# Patient Record
Sex: MALE | Race: WHITE | NOT HISPANIC OR LATINO
[De-identification: names, ages, dates, MRNs, and addresses within clinical notes are randomized per-mention and may not be internally consistent; named-entity substitution may affect disease eponyms.]

---

## 2018-06-06 PROBLEM — Z00.00 ENCOUNTER FOR PREVENTIVE HEALTH EXAMINATION: Status: ACTIVE | Noted: 2018-06-06

## 2018-06-08 PROBLEM — I46.9 SUDDEN CARDIAC DEATH: Status: ACTIVE | Noted: 2018-06-08

## 2018-06-08 PROBLEM — Z86.79 HISTORY OF CORONARY ARTERY DISEASE: Status: RESOLVED | Noted: 2018-06-08 | Resolved: 2018-06-08

## 2018-06-08 PROBLEM — Z78.9 SOCIAL ALCOHOL USE: Status: ACTIVE | Noted: 2018-06-08

## 2018-06-08 PROBLEM — Z82.49 FAMILY HISTORY OF VALVULAR HEART DISEASE: Status: ACTIVE | Noted: 2018-06-08

## 2018-06-08 PROBLEM — I25.2 HISTORY OF MYOCARDIAL INFARCTION: Status: RESOLVED | Noted: 2018-06-08 | Resolved: 2018-06-08

## 2018-06-08 PROBLEM — Z82.49 FAMILY HISTORY OF HYPERTENSION: Status: ACTIVE | Noted: 2018-06-08

## 2018-06-08 PROBLEM — Z82.49 FAMILY HISTORY OF CONGESTIVE HEART FAILURE: Status: ACTIVE | Noted: 2018-06-08

## 2018-06-12 ENCOUNTER — APPOINTMENT (OUTPATIENT)
Dept: CARDIOTHORACIC SURGERY | Facility: CLINIC | Age: 78
End: 2018-06-12
Payer: MEDICARE

## 2018-06-12 VITALS
DIASTOLIC BLOOD PRESSURE: 79 MMHG | RESPIRATION RATE: 13 BRPM | TEMPERATURE: 98 F | HEIGHT: 70 IN | BODY MASS INDEX: 25.77 KG/M2 | HEART RATE: 62 BPM | WEIGHT: 180 LBS | OXYGEN SATURATION: 98 % | SYSTOLIC BLOOD PRESSURE: 145 MMHG

## 2018-06-12 DIAGNOSIS — Z78.9 OTHER SPECIFIED HEALTH STATUS: ICD-10-CM

## 2018-06-12 DIAGNOSIS — Z82.49 FAMILY HISTORY OF ISCHEMIC HEART DISEASE AND OTHER DISEASES OF THE CIRCULATORY SYSTEM: ICD-10-CM

## 2018-06-12 DIAGNOSIS — I46.9 CARDIAC ARREST, CAUSE UNSPECIFIED: ICD-10-CM

## 2018-06-12 DIAGNOSIS — I25.2 OLD MYOCARDIAL INFARCTION: ICD-10-CM

## 2018-06-12 DIAGNOSIS — Z86.79 PERSONAL HISTORY OF OTHER DISEASES OF THE CIRCULATORY SYSTEM: ICD-10-CM

## 2018-06-12 PROCEDURE — 99205 OFFICE O/P NEW HI 60 MIN: CPT

## 2018-06-14 ENCOUNTER — TRANSCRIPTION ENCOUNTER (OUTPATIENT)
Age: 78
End: 2018-06-14

## 2018-06-21 ENCOUNTER — OUTPATIENT (OUTPATIENT)
Dept: OUTPATIENT SERVICES | Facility: HOSPITAL | Age: 78
LOS: 1 days | End: 2018-06-21
Payer: MEDICARE

## 2018-06-21 DIAGNOSIS — T82.897A OTHER SPECIFIED COMPLICATION OF CARDIAC PROSTHETIC DEVICES, IMPLANTS AND GRAFTS, INITIAL ENCOUNTER: ICD-10-CM

## 2018-06-21 DIAGNOSIS — I77.810 THORACIC AORTIC ECTASIA: ICD-10-CM

## 2018-06-21 LAB
ANION GAP SERPL CALC-SCNC: 10 MMOL/L — SIGNIFICANT CHANGE UP (ref 5–17)
BUN SERPL-MCNC: 26 MG/DL — HIGH (ref 8–20)
CALCIUM SERPL-MCNC: 9 MG/DL — SIGNIFICANT CHANGE UP (ref 8.6–10.2)
CHLORIDE SERPL-SCNC: 103 MMOL/L — SIGNIFICANT CHANGE UP (ref 98–107)
CO2 SERPL-SCNC: 26 MMOL/L — SIGNIFICANT CHANGE UP (ref 22–29)
CREAT SERPL-MCNC: 1.11 MG/DL — SIGNIFICANT CHANGE UP (ref 0.5–1.3)
GLUCOSE SERPL-MCNC: 92 MG/DL — SIGNIFICANT CHANGE UP (ref 70–115)
POTASSIUM SERPL-MCNC: 4.7 MMOL/L — SIGNIFICANT CHANGE UP (ref 3.5–5.3)
POTASSIUM SERPL-SCNC: 4.7 MMOL/L — SIGNIFICANT CHANGE UP (ref 3.5–5.3)
SODIUM SERPL-SCNC: 139 MMOL/L — SIGNIFICANT CHANGE UP (ref 135–145)

## 2018-06-21 PROCEDURE — 71275 CT ANGIOGRAPHY CHEST: CPT

## 2018-06-21 PROCEDURE — 74174 CTA ABD&PLVS W/CONTRAST: CPT

## 2018-06-21 PROCEDURE — 71275 CT ANGIOGRAPHY CHEST: CPT | Mod: 26

## 2018-06-21 PROCEDURE — 74174 CTA ABD&PLVS W/CONTRAST: CPT | Mod: 26

## 2018-06-21 PROCEDURE — 80048 BASIC METABOLIC PNL TOTAL CA: CPT

## 2018-06-21 PROCEDURE — 36415 COLL VENOUS BLD VENIPUNCTURE: CPT

## 2019-01-07 ENCOUNTER — OUTPATIENT (OUTPATIENT)
Dept: OUTPATIENT SERVICES | Facility: HOSPITAL | Age: 79
LOS: 1 days | End: 2019-01-07
Payer: MEDICARE

## 2019-01-07 ENCOUNTER — APPOINTMENT (OUTPATIENT)
Dept: CT IMAGING | Facility: CLINIC | Age: 79
End: 2019-01-07
Payer: MEDICARE

## 2019-01-07 DIAGNOSIS — I77.810 THORACIC AORTIC ECTASIA: ICD-10-CM

## 2019-01-07 PROCEDURE — 71275 CT ANGIOGRAPHY CHEST: CPT | Mod: 26

## 2019-01-07 PROCEDURE — 71275 CT ANGIOGRAPHY CHEST: CPT

## 2019-01-07 PROCEDURE — 82565 ASSAY OF CREATININE: CPT

## 2019-01-08 ENCOUNTER — CLINICAL ADVICE (OUTPATIENT)
Age: 79
End: 2019-01-08

## 2019-02-06 ENCOUNTER — APPOINTMENT (OUTPATIENT)
Dept: CARDIOTHORACIC SURGERY | Facility: CLINIC | Age: 79
End: 2019-02-06
Payer: MEDICARE

## 2019-02-08 ENCOUNTER — APPOINTMENT (OUTPATIENT)
Dept: CARDIOTHORACIC SURGERY | Facility: CLINIC | Age: 79
End: 2019-02-08
Payer: MEDICARE

## 2019-02-08 VITALS
OXYGEN SATURATION: 98 % | TEMPERATURE: 97.8 F | DIASTOLIC BLOOD PRESSURE: 98 MMHG | HEIGHT: 70 IN | HEART RATE: 61 BPM | SYSTOLIC BLOOD PRESSURE: 156 MMHG | RESPIRATION RATE: 13 BRPM

## 2019-02-08 PROCEDURE — 99213 OFFICE O/P EST LOW 20 MIN: CPT

## 2019-02-08 NOTE — PHYSICAL EXAM
[Sclera] : the sclera and conjunctiva were normal [PERRL With Normal Accommodation] : pupils were equal in size, round, and reactive to light [Extraocular Movements] : extraocular movements were intact [Neck Appearance] : the appearance of the neck was normal [Neck Cervical Mass (___cm)] : no neck mass was observed [Jugular Venous Distention Increased] : there was no jugular-venous distention [Thyroid Diffuse Enlargement] : the thyroid was not enlarged [Thyroid Nodule] : there were no palpable thyroid nodules [] : no respiratory distress [Auscultation Breath Sounds / Voice Sounds] : lungs were clear to auscultation bilaterally [Heart Rate And Rhythm] : heart rate was normal and rhythm regular [Surgical / Traumatic Scar Sternum] : a scar [Edema] : there was no peripheral edema [Breast Appearance] : normal in appearance [Bowel Sounds] : normal bowel sounds [Abdomen Soft] : soft [FreeTextEntry1] : deferred [Cervical Lymph Nodes Enlarged Posterior Bilaterally] : posterior cervical [No CVA Tenderness] : no ~M costovertebral angle tenderness [Abnormal Walk] : normal gait [Involuntary Movements] : no involuntary movements were seen [Skin Color & Pigmentation] : normal skin color and pigmentation [No Focal Deficits] : no focal deficits [Oriented To Time, Place, And Person] : oriented to person, place, and time [Impaired Insight] : insight and judgment were intact

## 2019-02-08 NOTE — HISTORY OF PRESENT ILLNESS
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 \par \par He was seen in June 2018 for aortic dilatation on echocardiogram and prosthetic aortic valve stenosis (mild elevation in gradients, mGr 23.6mm Hg, pGr 40.2 mmHg, LUCY 1.1 cm2).

## 2019-02-08 NOTE — ASSESSMENT
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 \par \par He was seen in June 2018 for aortic dilatation on echocardiogram and prosthetic aortic valve stenosis (mild elevation in gradients, mGr 23.6mm Hg, pGr 40.2 mmHg, LUCY 1.1 cm2). \par \par Imaging results and medical records were reviewed at length. He is scheduled for VT ablation next week with Dr. Tsang. He is continues on amiodarone and mexiletine. Gradients across the aortic valve are mGr 14.4 mmHg, pGr 29.6 mmHg, AoV 2.7m/s, EF 55%. Will continue to monitor the aortic root dilatation but eventually this will need to be addressed surgically. \par \par Plan:\par 1) Repeat CTA and Echo in 6 months\par 2) Proceed with VT ablation next week \par 3) Follow up in 6 months.

## 2019-02-08 NOTE — DATA REVIEWED
[FreeTextEntry1] : Cardiac Catherization revealed moderate bioprosthetic aortic valve stenosis, LVEF 45%, Left main coronary artery showed moderate calcification and patent, LAD demonstrated moderate tortuosity and moderate irregularities, Circumflex coronary artery moderate tortuosity and moderate irregularities, Right coronary artery showed moderate tortuosity and moderate irregularities. Ostial right posterior descending artery revealed 100% stenosis.  \par \par Echocardiogram revealed a bioprosthetic valve is present in the aortic position. The peak velocity is 3.25 m/sec, the peak gradient is 42.3 mm Hg, the mean gradient is 24 mm Hg. Trace aortic insufficiency is present. Marked posterior mitral annular calcification. Mild mitral regurgitation is present. Aorta at the level of the sinuses of Valsalva is dilated at 4.60 cm. The ascending aorta is dilated at 3.89 cm. Aortic arch is normal. LVEF 47%

## 2019-03-02 ENCOUNTER — TRANSCRIPTION ENCOUNTER (OUTPATIENT)
Age: 79
End: 2019-03-02

## 2019-12-14 ENCOUNTER — APPOINTMENT (OUTPATIENT)
Dept: CT IMAGING | Facility: CLINIC | Age: 79
End: 2019-12-14
Payer: MEDICARE

## 2019-12-14 ENCOUNTER — OUTPATIENT (OUTPATIENT)
Dept: OUTPATIENT SERVICES | Facility: HOSPITAL | Age: 79
LOS: 1 days | End: 2019-12-14
Payer: MEDICARE

## 2019-12-14 DIAGNOSIS — I77.810 THORACIC AORTIC ECTASIA: ICD-10-CM

## 2019-12-14 PROCEDURE — 71275 CT ANGIOGRAPHY CHEST: CPT

## 2019-12-14 PROCEDURE — 71275 CT ANGIOGRAPHY CHEST: CPT | Mod: 26

## 2020-12-03 ENCOUNTER — APPOINTMENT (OUTPATIENT)
Dept: CARDIOTHORACIC SURGERY | Facility: CLINIC | Age: 80
End: 2020-12-03
Payer: MEDICARE

## 2020-12-03 VITALS
HEART RATE: 75 BPM | OXYGEN SATURATION: 97 % | TEMPERATURE: 98 F | RESPIRATION RATE: 14 BRPM | DIASTOLIC BLOOD PRESSURE: 75 MMHG | SYSTOLIC BLOOD PRESSURE: 146 MMHG

## 2020-12-03 VITALS — BODY MASS INDEX: 25.77 KG/M2 | HEIGHT: 70 IN | WEIGHT: 180 LBS

## 2020-12-03 PROCEDURE — 99213 OFFICE O/P EST LOW 20 MIN: CPT

## 2020-12-03 RX ORDER — AMIODARONE HYDROCHLORIDE 200 MG/1
200 TABLET ORAL
Qty: 30 | Refills: 1 | Status: COMPLETED | COMMUNITY
Start: 2018-11-07 | End: 2020-12-03

## 2020-12-03 RX ORDER — APIXABAN 5 MG/1
5 TABLET, FILM COATED ORAL
Qty: 180 | Refills: 1 | Status: COMPLETED | COMMUNITY
End: 2020-12-03

## 2020-12-03 RX ORDER — AMIODARONE HYDROCHLORIDE 400 MG/1
400 TABLET ORAL EVERY 8 HOURS
Refills: 0 | Status: COMPLETED | COMMUNITY
Start: 2018-11-07 | End: 2020-12-03

## 2020-12-03 NOTE — PHYSICAL EXAM
[Sclera] : the sclera and conjunctiva were normal [PERRL With Normal Accommodation] : pupils were equal in size, round, and reactive to light [Extraocular Movements] : extraocular movements were intact [Neck Appearance] : the appearance of the neck was normal [Neck Cervical Mass (___cm)] : no neck mass was observed [Jugular Venous Distention Increased] : there was no jugular-venous distention [Thyroid Diffuse Enlargement] : the thyroid was not enlarged [Thyroid Nodule] : there were no palpable thyroid nodules [] : no respiratory distress [Auscultation Breath Sounds / Voice Sounds] : lungs were clear to auscultation bilaterally [Prosthetic Aortic Valve] : prosthetic aortic valve heard [No Pitting Edema] : no pitting edema present [Surgical / Traumatic Scar Sternum] : a scar [Right Carotid Bruit] : no bruit heard over the right carotid [Left Carotid Bruit] : no bruit heard over the left carotid [Breast Appearance] : normal in appearance [Bowel Sounds] : normal bowel sounds [Abdomen Soft] : soft [Cervical Lymph Nodes Enlarged Posterior Bilaterally] : posterior cervical [Cervical Lymph Nodes Enlarged Anterior Bilaterally] : anterior cervical [No CVA Tenderness] : no ~M costovertebral angle tenderness [Involuntary Movements] : no involuntary movements were seen [Skin Color & Pigmentation] : normal skin color and pigmentation [No Focal Deficits] : no focal deficits [Oriented To Time, Place, And Person] : oriented to person, place, and time [Impaired Insight] : insight and judgment were intact

## 2020-12-03 NOTE — REVIEW OF SYSTEMS
[Feeling Tired] : feeling tired [Chest Pain] : no chest pain [Palpitations] : palpitations [Dizziness] : no dizziness [Negative] : Heme/Lymph

## 2020-12-03 NOTE — CONSULT LETTER
[Dear  ___] : Dear ~SEVERO, [Courtesy Letter:] : I had the pleasure of seeing your patient, [unfilled], in my office today. [Please see my note below.] : Please see my note below. [Consult Closing:] : Thank you very much for allowing me to participate in the care of this patient.  If you have any questions, please do not hesitate to contact me. [Sincerely,] : Sincerely, [FreeTextEntry2] : Alfredito Romero MD\par [address]\par [place] [id number] [FreeTextEntry3] : Noe Fenton MD\par  & \par \par Cardiovascular & Thoracic Surgery\par [facility] \par [address]\par [facility] [address]\par

## 2020-12-03 NOTE — HISTORY OF PRESENT ILLNESS
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 \par \par He was seen in June 2018 for aortic dilatation on echocardiogram and prosthetic aortic valve stenosis (mild elevation in gradients, mGr 23.6mm Hg, pGr 40.2 mmHg, LUCY 1.1 cm2). \par

## 2021-05-06 ENCOUNTER — OUTPATIENT (OUTPATIENT)
Dept: OUTPATIENT SERVICES | Facility: HOSPITAL | Age: 81
LOS: 1 days | End: 2021-05-06
Payer: MEDICARE

## 2021-05-06 ENCOUNTER — APPOINTMENT (OUTPATIENT)
Dept: CT IMAGING | Facility: CLINIC | Age: 81
End: 2021-05-06
Payer: MEDICARE

## 2021-05-06 DIAGNOSIS — I35.1 NONRHEUMATIC AORTIC (VALVE) INSUFFICIENCY: ICD-10-CM

## 2021-05-06 DIAGNOSIS — I77.810 THORACIC AORTIC ECTASIA: ICD-10-CM

## 2021-05-06 DIAGNOSIS — Z00.8 ENCOUNTER FOR OTHER GENERAL EXAMINATION: ICD-10-CM

## 2021-05-06 PROCEDURE — G1004: CPT

## 2021-05-06 PROCEDURE — 71275 CT ANGIOGRAPHY CHEST: CPT

## 2021-05-06 PROCEDURE — 71275 CT ANGIOGRAPHY CHEST: CPT | Mod: 26,ME

## 2021-05-11 ENCOUNTER — APPOINTMENT (OUTPATIENT)
Dept: CARDIOTHORACIC SURGERY | Facility: CLINIC | Age: 81
End: 2021-05-11
Payer: MEDICARE

## 2021-05-11 VITALS
HEIGHT: 70 IN | HEART RATE: 62 BPM | DIASTOLIC BLOOD PRESSURE: 76 MMHG | BODY MASS INDEX: 24.34 KG/M2 | WEIGHT: 170 LBS | OXYGEN SATURATION: 98 % | TEMPERATURE: 97.6 F | SYSTOLIC BLOOD PRESSURE: 119 MMHG | RESPIRATION RATE: 14 BRPM

## 2021-05-11 PROCEDURE — 99215 OFFICE O/P EST HI 40 MIN: CPT

## 2021-05-11 NOTE — PHYSICAL EXAM
[Sclera] : the sclera and conjunctiva were normal [Neck Appearance] : the appearance of the neck was normal [Jugular Venous Distention Increased] : there was no jugular-venous distention [] : no respiratory distress [Respiration, Rhythm And Depth] : normal respiratory rhythm and effort [Exaggerated Use Of Accessory Muscles For Inspiration] : no accessory muscle use [Auscultation Breath Sounds / Voice Sounds] : lungs were clear to auscultation bilaterally [Apical Impulse] : the apical impulse was normal [Heart Sounds] : normal S1 and S2 [Heart Rate And Rhythm] : heart rate was normal and rhythm regular [Murmurs] : no murmurs [FreeTextEntry1] : Healed MSI [Right Carotid Bruit] : no bruit heard over the right carotid [Left Carotid Bruit] : no bruit heard over the left carotid [Bowel Sounds] : normal bowel sounds [Abdomen Soft] : soft [Abdomen Tenderness] : non-tender [No CVA Tenderness] : no ~M costovertebral angle tenderness [Involuntary Movements] : no involuntary movements were seen [Skin Color & Pigmentation] : normal skin color and pigmentation [No Focal Deficits] : no focal deficits [Oriented To Time, Place, And Person] : oriented to person, place, and time [Impaired Insight] : insight and judgment were intact [Affect] : the affect was normal [Mood] : the mood was normal

## 2021-05-11 NOTE — REVIEW OF SYSTEMS
[As noted in HPI] : as noted in HPI [Palpitations] : palpitations [Negative] : ENT [Chest Pain] : no chest pain [Lower Ext Edema] : no extremity edema

## 2021-05-11 NOTE — HISTORY OF PRESENT ILLNESS
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), aortic valve regurgitation (s/p AVR #23 bovine pericardial valve in 2004), mitral valve regurgitation, and ascending aortic dilatation (4.5-4.6 cm on TTE). \par \par He has been followed in our aortic registry with surveillance imaging. He was last seen for an office visit on 12/3/2020.  He was feeling well and clinically stable. Plan was repeat CTA of chest in six months and an echocardiogram in 1 year.  He returns today for this follow up visit to review recent imaging with CTA of chest on 5/6/2021 that demonstrated aortic root 5.9 x 6.1 previously 5.3 x 5.6 cm, mid ascending aorta at the level of the right pulmonary artery 4.4 x 4.4 cm unchanged, Transverse arch at the isthmus 3 x 3 cm, unchanged, Mid descending 2.8 x 2.8 cm unchanged.\par \par He presents today with his wife and reports he has been doing and feeling well.  Walks 2 miles a day and denies any chest pain, back pain or SOB.  He does note occasional palpitations infrequently at times without associated symptoms.  Overall he feels well with no complaints or concerns. Denies swelling, weight gain, dizziness, fever or chills.  Recieved both his COVID vaccinations.  \par \par

## 2021-05-11 NOTE — ASSESSMENT
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 He presents today to follow up on recent CTA results that demonstrated aortic root 5.9 x 6.1 previously 5.3 x 5.6 cm, mid ascending aorta at the level of the right pulmonary artery 4.4 x 4.4 cm unchanged, Transverse arch at the isthmus 3 x 3 cm, unchanged, Mid descending 2.8 x 2.8 cm unchanged. Currently doing well without symptoms. \par \par \par I reviewed the cardiac imaging, medical records and reports with patient and discussed the case.  I discussed the risks, benefits and alternatives to surgery. Risks included but not limited to bleeding, stroke, Myocardial Infarction, kidney problems, blood transfusion, permanent pacemaker implantation, infections and death.  I also discussed the various approaches in detail. I feel that the patient will benefit and is a candidate for a redo sternotomy with aortic aneurysm repair, possible aortic valve replacement. All questions and concerns were addressed and patient agrees to proceed with surgery. He will call after echo this week to schedule surgery date.\par  \par \par Plan:\par 1) Will have TTE with cardiologist Dr. Romero this week. Will have results faxed to our office fto review.\par 2) Cardiac Catherization to evaluate coronary arteries (can be done at [place] with Dr. Romero)\par 3) Stop Xarelto and ASA one week prior to surgery date\par 4) Call with any questions or concerns\par \par \par

## 2021-05-11 NOTE — DATA REVIEWED
[FreeTextEntry1] : Echocardiogram on 11/13/2020 demonstrated \par pGr 21.1 mmHg, mGr 12.4 mmHg, AoV 2.4 m/sec, LUCY 1. 27 cm2. Mild AR\par \par CTA of chest on 5/6/2021 demonstrated\par aortic root 5.9 x 6.1 previously 5.3 x 5.6 cm\par mid ascending aorta at the level of the right pulmonary artery 4.4 x 4.4 cm unchanged\par Transverse arch at the isthmus 3 x 3 cm, unchanged \par Mid descending 2.8 x 2.8 cm unchanged\par \par CTA coronary and heart with IV contrast on 10/28/2020\par Aneurysmal ascending thoracic aorta measuring up to 57 mm at the sinotubular junction, which is effaced\par Aortic root is somewhat rotated in appearance\par Annulus 31 x 26 mm\par Sinuses of Valsalva 54 x 54 x 51 mm\par Sinotubular junction 57 x 57 mm (STJ is effaced)\par Mid ascending thoracic aorta 56 x 54 mm \par Ascending aorta proximal to brachiocephalic trunk 43 x 43 mm\par Aortic valve prosthesis \par \par

## 2021-05-11 NOTE — END OF VISIT
[FreeTextEntry3] : Written by Raman Ariza NP, acting as a scribe for Dr. Fenton\par “The documentation recorded by the scribe accurately reflects the service I personally performed and the decisions made by me.” Signature Noe Fenton MD.

## 2021-05-11 NOTE — CONSULT LETTER
[Dear  ___] : Dear ~SEVERO, [Courtesy Letter:] : I had the pleasure of seeing your patient, [unfilled], in my office today. [Please see my note below.] : Please see my note below. [Consult Closing:] : Thank you very much for allowing me to participate in the care of this patient.  If you have any questions, please do not hesitate to contact me. [Sincerely,] : Sincerely, [FreeTextEntry2] : Alfredito Romero MD\par [address]\par [place] [id number] [FreeTextEntry3] : Noe Fenton MD\par  & \par \par Cardiovascular & Thoracic Surgery\par [facility] \par [address]\par [facility] [address]\par

## 2021-06-01 ENCOUNTER — OUTPATIENT (OUTPATIENT)
Dept: OUTPATIENT SERVICES | Facility: HOSPITAL | Age: 81
LOS: 1 days | End: 2021-06-01

## 2021-06-01 ENCOUNTER — OUTPATIENT (OUTPATIENT)
Dept: OUTPATIENT SERVICES | Facility: HOSPITAL | Age: 81
LOS: 1 days | End: 2021-06-01
Payer: MEDICARE

## 2021-06-01 VITALS
WEIGHT: 179.9 LBS | OXYGEN SATURATION: 99 % | HEART RATE: 60 BPM | HEIGHT: 67 IN | RESPIRATION RATE: 16 BRPM | SYSTOLIC BLOOD PRESSURE: 109 MMHG | DIASTOLIC BLOOD PRESSURE: 75 MMHG | TEMPERATURE: 98 F

## 2021-06-01 DIAGNOSIS — Z01.818 ENCOUNTER FOR OTHER PREPROCEDURAL EXAMINATION: ICD-10-CM

## 2021-06-01 DIAGNOSIS — Z98.890 OTHER SPECIFIED POSTPROCEDURAL STATES: Chronic | ICD-10-CM

## 2021-06-01 DIAGNOSIS — Z95.0 PRESENCE OF CARDIAC PACEMAKER: Chronic | ICD-10-CM

## 2021-06-01 DIAGNOSIS — E03.9 HYPOTHYROIDISM, UNSPECIFIED: ICD-10-CM

## 2021-06-01 DIAGNOSIS — Z95.810 PRESENCE OF AUTOMATIC (IMPLANTABLE) CARDIAC DEFIBRILLATOR: Chronic | ICD-10-CM

## 2021-06-01 DIAGNOSIS — Z95.2 PRESENCE OF PROSTHETIC HEART VALVE: Chronic | ICD-10-CM

## 2021-06-01 DIAGNOSIS — I71.2 THORACIC AORTIC ANEURYSM, WITHOUT RUPTURE: ICD-10-CM

## 2021-06-01 DIAGNOSIS — Z11.52 ENCOUNTER FOR SCREENING FOR COVID-19: ICD-10-CM

## 2021-06-01 DIAGNOSIS — Z29.9 ENCOUNTER FOR PROPHYLACTIC MEASURES, UNSPECIFIED: ICD-10-CM

## 2021-06-01 DIAGNOSIS — Z90.79 ACQUIRED ABSENCE OF OTHER GENITAL ORGAN(S): Chronic | ICD-10-CM

## 2021-06-01 LAB
A1C WITH ESTIMATED AVERAGE GLUCOSE RESULT: 5.4 % — SIGNIFICANT CHANGE UP (ref 4–5.6)
ANION GAP SERPL CALC-SCNC: 11 MMOL/L — SIGNIFICANT CHANGE UP (ref 5–17)
BLD GP AB SCN SERPL QL: NEGATIVE — SIGNIFICANT CHANGE UP
BUN SERPL-MCNC: 33 MG/DL — HIGH (ref 7–23)
CALCIUM SERPL-MCNC: 9.3 MG/DL — SIGNIFICANT CHANGE UP (ref 8.4–10.5)
CHLORIDE SERPL-SCNC: 105 MMOL/L — SIGNIFICANT CHANGE UP (ref 96–108)
CO2 SERPL-SCNC: 21 MMOL/L — LOW (ref 22–31)
CREAT SERPL-MCNC: 1.25 MG/DL — SIGNIFICANT CHANGE UP (ref 0.5–1.3)
ESTIMATED AVERAGE GLUCOSE: 108 MG/DL — SIGNIFICANT CHANGE UP (ref 68–114)
GLUCOSE SERPL-MCNC: 86 MG/DL — SIGNIFICANT CHANGE UP (ref 70–99)
HCT VFR BLD CALC: 37.9 % — LOW (ref 39–50)
HGB BLD-MCNC: 11.9 G/DL — LOW (ref 13–17)
MCHC RBC-ENTMCNC: 28.5 PG — SIGNIFICANT CHANGE UP (ref 27–34)
MCHC RBC-ENTMCNC: 31.4 GM/DL — LOW (ref 32–36)
MCV RBC AUTO: 90.7 FL — SIGNIFICANT CHANGE UP (ref 80–100)
MRSA PCR RESULT.: SIGNIFICANT CHANGE UP
NRBC # BLD: 0 /100 WBCS — SIGNIFICANT CHANGE UP (ref 0–0)
PLATELET # BLD AUTO: 171 K/UL — SIGNIFICANT CHANGE UP (ref 150–400)
POTASSIUM SERPL-MCNC: 4.9 MMOL/L — SIGNIFICANT CHANGE UP (ref 3.5–5.3)
POTASSIUM SERPL-SCNC: 4.9 MMOL/L — SIGNIFICANT CHANGE UP (ref 3.5–5.3)
RBC # BLD: 4.18 M/UL — LOW (ref 4.2–5.8)
RBC # FLD: 14 % — SIGNIFICANT CHANGE UP (ref 10.3–14.5)
RH IG SCN BLD-IMP: POSITIVE — SIGNIFICANT CHANGE UP
S AUREUS DNA NOSE QL NAA+PROBE: SIGNIFICANT CHANGE UP
SODIUM SERPL-SCNC: 137 MMOL/L — SIGNIFICANT CHANGE UP (ref 135–145)
WBC # BLD: 6.25 K/UL — SIGNIFICANT CHANGE UP (ref 3.8–10.5)
WBC # FLD AUTO: 6.25 K/UL — SIGNIFICANT CHANGE UP (ref 3.8–10.5)

## 2021-06-01 PROCEDURE — 71046 X-RAY EXAM CHEST 2 VIEWS: CPT | Mod: 26

## 2021-06-01 PROCEDURE — 93880 EXTRACRANIAL BILAT STUDY: CPT | Mod: 26

## 2021-06-01 NOTE — H&P PST ADULT - HISTORY OF PRESENT ILLNESS
80 yr old male with PMH of  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 (admitted to [facility] 5/2017), SVT (s/p VF arrest in 12/2017, while on a cruise: [place], hosp. in [facility]: then SB and AICD implanted) Aotic valve regurgitation (s/p AVR 2004), Mitral valve regurgitation, Ascending aortic dilation. 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 Pt reports cardiology has been following this for several years. Pt denies, c/p SOB, or palpitations at this time. Pt denies recent travel or sick contact.     **Covid vaccine record card in chart** 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 (admitted to [facility] 5/2017), SVT (s/p VF arrest in 12/2017, while on a cruise: [place], hosp. in [facility]: then SB and AICD implanted) Aotic valve regurgitation (s/p AVR 2004), Mitral valve regurgitation, Ascending aortic dilation. 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 Pt reports cardiology has been following this for several years. Pt denies, c/p SOB, or palpitations at this time. Pt evaluated by Dr. Fenton for scheduled Redo Sternotomy, [place] on 6/2/21. Pt denies recent travel or sick contact.     **Covid vaccine record card in chart**

## 2021-06-01 NOTE — H&P PST ADULT - NSICDXPROBLEM_GEN_ALL_CORE_FT
PROBLEM DIAGNOSES  Problem: Thoracic aortic aneurysm without rupture  Assessment and Plan:     Problem: Hypothyroidism  Assessment and Plan:        PROBLEM DIAGNOSES  Problem: Thoracic aortic aneurysm without rupture  Assessment and Plan: -scheduled redo sternotomy, demetrio on 6/2/21  -preop instructions given  -soap instructions given  -labs: CBC, BMP, A1c, T&S, MSSA/MRSA, done in PST  -sent for Cxay & B/L Carotid Dop. for today  -obtain last echo  --obtain last interrogation from EP MD  -pt stopped Xarelto as instructed by cardiology a week ago  -c/w anthypertensive meds a sprescribed    Problem: Hypothyroidism  Assessment and Plan: -c/w levothyroxine a prescribed    Problem: Need for prophylactic measure  Assessment and Plan: The Caprini score indicates that this patient is at high risk for a VTE event (score 6 or greater). Surgical patients in this group will benefit from both pharmacologic prophylaxis and intermittent compression devices.  The surgical team will determine the balance between VTE risk and bleeding risk, and other clinical considerations

## 2021-06-01 NOTE — H&P PST ADULT - ASSESSMENT
FAZALI VTE 2.0 SCORE [CLOT updated 2019]    AGE RELATED RISK FACTORS                                                       MOBILITY RELATED FACTORS  [ ] Age 41-60 years                                            (1 Point)                    [ ] Bed rest                                                        (1 Point)  [ ] Age: 61-74 years                                           (2 Points)                  [ ] Plaster cast                                                   (2 Points)  [ ] Age= 75 years                                              (3 Points)                    [ ] Bed bound for more than 72 hours                 (2 Points)    DISEASE RELATED RISK FACTORS                                               GENDER SPECIFIC FACTORS  [ ] Edema in the lower extremities                       (1 Point)              [ ] Pregnancy                                                     (1 Point)  [ ] Varicose veins                                               (1 Point)                     [ ] Post-partum < 6 weeks                                   (1 Point)             [ ] BMI > 25 Kg/m2                                            (1 Point)                     [ ] Hormonal therapy  or oral contraception          (1 Point)                 [ ] Sepsis (in the previous month)                        (1 Point)               [ ] History of pregnancy complications                 (1 point)  [ ] Pneumonia or serious lung disease                                               [ ] Unexplained or recurrent                     (1 Point)           (in the previous month)                               (1 Point)  [ ] Abnormal pulmonary function test                     (1 Point)                 SURGERY RELATED RISK FACTORS  [ ] Acute myocardial infarction                              (1 Point)               [ ]  Section                                             (1 Point)  [ ] Congestive heart failure (in the previous month)  (1 Point)      [ ] Minor surgery                                                  (1 Point)   [ ] Inflammatory bowel disease                             (1 Point)               [ ] Arthroscopic surgery                                        (2 Points)  [ ] Central venous access                                      (2 Points)                [ ] General surgery lasting more than 45 minutes (2 points)  [ ] Malignancy- Present or previous                   (2 Points)                [ ] Elective arthroplasty                                         (5 points)    [ ] Stroke (in the previous month)                          (5 Points)                                                                                                                                                           HEMATOLOGY RELATED FACTORS                                                 TRAUMA RELATED RISK FACTORS  [ ] Prior episodes of VTE                                     (3 Points)                [ ] Fracture of the hip, pelvis, or leg                       (5 Points)  [ ] Positive family history for VTE                         (3 Points)             [ ] Acute spinal cord injury (in the previous month)  (5 Points)  [ ] Prothrombin 55790 A                                     (3 Points)               [ ] Paralysis  (less than 1 month)                             (5 Points)  [ ] Factor V Leiden                                             (3 Points)                  [ ] Multiple Trauma within 1 month                        (5 Points)  [ ] Lupus anticoagulants                                     (3 Points)                                                           [ ] Anticardiolipin antibodies                               (3 Points)                                                       [ ] High homocysteine in the blood                      (3 Points)                                             [ ] Other congenital or acquired thrombophilia      (3 Points)                                                [ ] Heparin induced thrombocytopenia                  (3 Points)                                     Total Score [     5     ]

## 2021-06-01 NOTE — H&P PST ADULT - NSICDXPASTMEDICALHX_GEN_ALL_CORE_FT
PAST MEDICAL HISTORY:  AICD (automatic cardioverter/defibrillator) present     Aortic stenosis     Aortic valve regurgitation     Ascending aorta dilation     Cardiac arrest     H/O paroxysmal supraventricular tachycardia     HLD (hyperlipidemia)     Mitral valve regurgitation

## 2021-06-01 NOTE — H&P PST ADULT - NSANTHOSAYNRD_GEN_A_CORE
No. ANATOLIY screening performed.  STOP BANG Legend: 0-2 = LOW Risk; 3-4 = INTERMEDIATE Risk; 5-8 = HIGH Risk

## 2021-06-02 ENCOUNTER — INPATIENT (INPATIENT)
Facility: HOSPITAL | Age: 81
LOS: 6 days | Discharge: HOME | DRG: 219 | End: 2021-06-09
Attending: THORACIC SURGERY (CARDIOTHORACIC VASCULAR SURGERY) | Admitting: THORACIC SURGERY (CARDIOTHORACIC VASCULAR SURGERY)
Payer: MEDICARE

## 2021-06-02 ENCOUNTER — RESULT REVIEW (OUTPATIENT)
Age: 81
End: 2021-06-02

## 2021-06-02 ENCOUNTER — APPOINTMENT (OUTPATIENT)
Dept: CARDIOTHORACIC SURGERY | Facility: HOSPITAL | Age: 81
End: 2021-06-02

## 2021-06-02 VITALS
DIASTOLIC BLOOD PRESSURE: 71 MMHG | WEIGHT: 179.9 LBS | RESPIRATION RATE: 18 BRPM | OXYGEN SATURATION: 98 % | TEMPERATURE: 98 F | HEART RATE: 60 BPM | HEIGHT: 67 IN | SYSTOLIC BLOOD PRESSURE: 114 MMHG

## 2021-06-02 DIAGNOSIS — Z90.79 ACQUIRED ABSENCE OF OTHER GENITAL ORGAN(S): Chronic | ICD-10-CM

## 2021-06-02 DIAGNOSIS — Z95.0 PRESENCE OF CARDIAC PACEMAKER: Chronic | ICD-10-CM

## 2021-06-02 DIAGNOSIS — Z95.2 PRESENCE OF PROSTHETIC HEART VALVE: Chronic | ICD-10-CM

## 2021-06-02 DIAGNOSIS — Z98.890 OTHER SPECIFIED POSTPROCEDURAL STATES: Chronic | ICD-10-CM

## 2021-06-02 DIAGNOSIS — Z95.810 PRESENCE OF AUTOMATIC (IMPLANTABLE) CARDIAC DEFIBRILLATOR: Chronic | ICD-10-CM

## 2021-06-02 DIAGNOSIS — I71.2 THORACIC AORTIC ANEURYSM, WITHOUT RUPTURE: ICD-10-CM

## 2021-06-02 PROBLEM — Z86.79 PERSONAL HISTORY OF OTHER DISEASES OF THE CIRCULATORY SYSTEM: Chronic | Status: ACTIVE | Noted: 2021-06-01

## 2021-06-02 PROBLEM — I35.1 NONRHEUMATIC AORTIC (VALVE) INSUFFICIENCY: Chronic | Status: ACTIVE | Noted: 2021-06-01

## 2021-06-02 PROBLEM — I34.0 NONRHEUMATIC MITRAL (VALVE) INSUFFICIENCY: Chronic | Status: ACTIVE | Noted: 2021-06-01

## 2021-06-02 PROBLEM — I35.0 NONRHEUMATIC AORTIC (VALVE) STENOSIS: Chronic | Status: ACTIVE | Noted: 2021-06-01

## 2021-06-02 PROBLEM — I77.810 THORACIC AORTIC ECTASIA: Chronic | Status: ACTIVE | Noted: 2021-06-01

## 2021-06-02 PROBLEM — I46.9 CARDIAC ARREST, CAUSE UNSPECIFIED: Chronic | Status: ACTIVE | Noted: 2021-06-01

## 2021-06-02 PROBLEM — E78.5 HYPERLIPIDEMIA, UNSPECIFIED: Chronic | Status: ACTIVE | Noted: 2021-06-01

## 2021-06-02 LAB
ALBUMIN SERPL ELPH-MCNC: 3.8 G/DL — SIGNIFICANT CHANGE UP (ref 3.3–5)
ALP SERPL-CCNC: 64 U/L — SIGNIFICANT CHANGE UP (ref 40–120)
ALT FLD-CCNC: 15 U/L — SIGNIFICANT CHANGE UP (ref 10–45)
ANION GAP SERPL CALC-SCNC: 17 MMOL/L — SIGNIFICANT CHANGE UP (ref 5–17)
APTT BLD: 34.1 SEC — SIGNIFICANT CHANGE UP (ref 27.5–35.5)
AST SERPL-CCNC: 35 U/L — SIGNIFICANT CHANGE UP (ref 10–40)
BASE EXCESS BLDV CALC-SCNC: -0.6 MMOL/L — SIGNIFICANT CHANGE UP (ref -2–2)
BASE EXCESS BLDV CALC-SCNC: -1.4 MMOL/L — SIGNIFICANT CHANGE UP (ref -2–2)
BASE EXCESS BLDV CALC-SCNC: -2.6 MMOL/L — LOW (ref -2–2)
BASE EXCESS BLDV CALC-SCNC: -5.8 MMOL/L — LOW (ref -2–2)
BASOPHILS # BLD AUTO: 0.02 K/UL — SIGNIFICANT CHANGE UP (ref 0–0.2)
BASOPHILS NFR BLD AUTO: 0.2 % — SIGNIFICANT CHANGE UP (ref 0–2)
BILIRUB SERPL-MCNC: 1.4 MG/DL — HIGH (ref 0.2–1.2)
BLOOD GAS VENOUS - CREATININE: SIGNIFICANT CHANGE UP MG/DL (ref 0.5–1.3)
BUN SERPL-MCNC: 20 MG/DL — SIGNIFICANT CHANGE UP (ref 7–23)
CA-I SERPL-SCNC: 0.78 MMOL/L — LOW (ref 1.12–1.3)
CA-I SERPL-SCNC: 0.88 MMOL/L — LOW (ref 1.12–1.3)
CA-I SERPL-SCNC: 0.98 MMOL/L — LOW (ref 1.12–1.3)
CA-I SERPL-SCNC: 1.05 MMOL/L — LOW (ref 1.12–1.3)
CALCIUM SERPL-MCNC: 9.1 MG/DL — SIGNIFICANT CHANGE UP (ref 8.4–10.5)
CHLORIDE BLDV-SCNC: SIGNIFICANT CHANGE UP MMOL/L (ref 96–108)
CHLORIDE SERPL-SCNC: 111 MMOL/L — HIGH (ref 96–108)
CK MB BLD-MCNC: 9.6 % — HIGH (ref 0–3.5)
CK MB CFR SERPL CALC: 20.7 NG/ML — HIGH (ref 0–6.7)
CK SERPL-CCNC: 215 U/L — HIGH (ref 30–200)
CO2 SERPL-SCNC: 19 MMOL/L — LOW (ref 22–31)
CREAT SERPL-MCNC: 0.93 MG/DL — SIGNIFICANT CHANGE UP (ref 0.5–1.3)
EOSINOPHIL # BLD AUTO: 0 K/UL — SIGNIFICANT CHANGE UP (ref 0–0.5)
EOSINOPHIL NFR BLD AUTO: 0 % — SIGNIFICANT CHANGE UP (ref 0–6)
FIBRINOGEN PPP-MCNC: 448 MG/DL — SIGNIFICANT CHANGE UP (ref 290–520)
GAS PNL BLDA: SIGNIFICANT CHANGE UP
GAS PNL BLDV: 139 MMOL/L — SIGNIFICANT CHANGE UP (ref 135–145)
GAS PNL BLDV: 142 MMOL/L — SIGNIFICANT CHANGE UP (ref 135–145)
GAS PNL BLDV: 144 MMOL/L — SIGNIFICANT CHANGE UP (ref 135–145)
GAS PNL BLDV: 146 MMOL/L — HIGH (ref 135–145)
GAS PNL BLDV: SIGNIFICANT CHANGE UP
GLUCOSE BLDV-MCNC: 108 MG/DL — HIGH (ref 70–99)
GLUCOSE BLDV-MCNC: 117 MG/DL — HIGH (ref 70–99)
GLUCOSE BLDV-MCNC: 118 MG/DL — HIGH (ref 70–99)
GLUCOSE BLDV-MCNC: 127 MG/DL — HIGH (ref 70–99)
GLUCOSE SERPL-MCNC: 134 MG/DL — HIGH (ref 70–99)
HCO3 BLDV-SCNC: 20 MMOL/L — LOW (ref 21–29)
HCO3 BLDV-SCNC: 23 MMOL/L — SIGNIFICANT CHANGE UP (ref 21–29)
HCO3 BLDV-SCNC: 23 MMOL/L — SIGNIFICANT CHANGE UP (ref 21–29)
HCO3 BLDV-SCNC: 24 MMOL/L — SIGNIFICANT CHANGE UP (ref 21–29)
HCT VFR BLD CALC: 25.8 % — LOW (ref 39–50)
HCT VFR BLDA CALC: 28 % — LOW (ref 39–50)
HCT VFR BLDA CALC: 30 % — LOW (ref 39–50)
HCT VFR BLDA CALC: 31 % — LOW (ref 39–50)
HCT VFR BLDA CALC: 31 % — LOW (ref 39–50)
HEPARINASE TEG R TIME: 6.7 MIN — SIGNIFICANT CHANGE UP (ref 4.3–8.3)
HGB BLD CALC-MCNC: 10 G/DL — LOW (ref 13–17)
HGB BLD CALC-MCNC: 10 G/DL — LOW (ref 13–17)
HGB BLD CALC-MCNC: 9 G/DL — LOW (ref 13–17)
HGB BLD CALC-MCNC: 9.9 G/DL — LOW (ref 13–17)
HGB BLD-MCNC: 8.4 G/DL — LOW (ref 13–17)
HOROWITZ INDEX BLDV+IHG-RTO: 0 — SIGNIFICANT CHANGE UP
IMM GRANULOCYTES NFR BLD AUTO: 0.6 % — SIGNIFICANT CHANGE UP (ref 0–1.5)
INR BLD: 1.04 RATIO — SIGNIFICANT CHANGE UP (ref 0.88–1.16)
LACTATE BLDV-MCNC: 0.8 MMOL/L — SIGNIFICANT CHANGE UP (ref 0.7–2)
LACTATE BLDV-MCNC: 0.9 MMOL/L — SIGNIFICANT CHANGE UP (ref 0.7–2)
LACTATE BLDV-MCNC: 0.9 MMOL/L — SIGNIFICANT CHANGE UP (ref 0.7–2)
LACTATE BLDV-MCNC: 1.1 MMOL/L — SIGNIFICANT CHANGE UP (ref 0.7–2)
LYMPHOCYTES # BLD AUTO: 0.34 K/UL — LOW (ref 1–3.3)
LYMPHOCYTES # BLD AUTO: 3.5 % — LOW (ref 13–44)
MCHC RBC-ENTMCNC: 29.3 PG — SIGNIFICANT CHANGE UP (ref 27–34)
MCHC RBC-ENTMCNC: 32.6 GM/DL — SIGNIFICANT CHANGE UP (ref 32–36)
MCV RBC AUTO: 89.9 FL — SIGNIFICANT CHANGE UP (ref 80–100)
MONOCYTES # BLD AUTO: 0.57 K/UL — SIGNIFICANT CHANGE UP (ref 0–0.9)
MONOCYTES NFR BLD AUTO: 5.9 % — SIGNIFICANT CHANGE UP (ref 2–14)
NEUTROPHILS # BLD AUTO: 8.7 K/UL — HIGH (ref 1.8–7.4)
NEUTROPHILS NFR BLD AUTO: 89.8 % — HIGH (ref 43–77)
NRBC # BLD: 0 /100 WBCS — SIGNIFICANT CHANGE UP (ref 0–0)
PCO2 BLDV: 39 MMHG — SIGNIFICANT CHANGE UP (ref 35–50)
PCO2 BLDV: 39 MMHG — SIGNIFICANT CHANGE UP (ref 35–50)
PCO2 BLDV: 41 MMHG — SIGNIFICANT CHANGE UP (ref 35–50)
PCO2 BLDV: 45 MMHG — SIGNIFICANT CHANGE UP (ref 35–50)
PH BLDV: 7.3 — LOW (ref 7.35–7.45)
PH BLDV: 7.33 — LOW (ref 7.35–7.45)
PH BLDV: 7.38 — SIGNIFICANT CHANGE UP (ref 7.35–7.45)
PH BLDV: 7.4 — SIGNIFICANT CHANGE UP (ref 7.35–7.45)
PLATELET # BLD AUTO: 158 K/UL — SIGNIFICANT CHANGE UP (ref 150–400)
PLATELET MAPPING ACTF MAX AMPLITUDE: 16 MM — SIGNIFICANT CHANGE UP (ref 2–19)
PLATELET MAPPING ADP MAXIMUM AMPLITUDE: 60.8 MM — SIGNIFICANT CHANGE UP (ref 45–69)
PLATELET MAPPING ADP PERCENT INHIBITION: 11.6 % — SIGNIFICANT CHANGE UP (ref 0–17)
PLATELET MAPPING HKH MAXIMUM AMPLITUDE: 66.7 MM — SIGNIFICANT CHANGE UP (ref 53–68)
PO2 BLDV: 117 MMHG — HIGH (ref 25–45)
PO2 BLDV: 131 MMHG — HIGH (ref 25–45)
PO2 BLDV: 169 MMHG — HIGH (ref 25–45)
PO2 BLDV: 57 MMHG — HIGH (ref 25–45)
POTASSIUM BLDV-SCNC: 6.1 MMOL/L — HIGH (ref 3.5–5)
POTASSIUM BLDV-SCNC: 6.7 MMOL/L — CRITICAL HIGH (ref 3.5–5)
POTASSIUM BLDV-SCNC: 7.1 MMOL/L — CRITICAL HIGH (ref 3.5–5)
POTASSIUM BLDV-SCNC: 7.2 MMOL/L — CRITICAL HIGH (ref 3.5–5)
POTASSIUM SERPL-MCNC: 5.5 MMOL/L — HIGH (ref 3.5–5.3)
POTASSIUM SERPL-SCNC: 5.5 MMOL/L — HIGH (ref 3.5–5.3)
PROT SERPL-MCNC: 5.7 G/DL — LOW (ref 6–8.3)
PROTHROM AB SERPL-ACNC: 12.5 SEC — SIGNIFICANT CHANGE UP (ref 10.6–13.6)
RAPIDTEG MAXIMUM AMPLITUDE: 65.1 MM — SIGNIFICANT CHANGE UP (ref 52–70)
RBC # BLD: 2.87 M/UL — LOW (ref 4.2–5.8)
RBC # FLD: 13.7 % — SIGNIFICANT CHANGE UP (ref 10.3–14.5)
SAO2 % BLDV: 87 % — SIGNIFICANT CHANGE UP (ref 67–88)
SAO2 % BLDV: 98 % — HIGH (ref 67–88)
SAO2 % BLDV: 99 % — HIGH (ref 67–88)
SAO2 % BLDV: 99 % — HIGH (ref 67–88)
SODIUM SERPL-SCNC: 147 MMOL/L — HIGH (ref 135–145)
TEG FUNCTIONAL FIBRINOGEN: 22.9 MM — SIGNIFICANT CHANGE UP (ref 15–32)
TEG MAXIMUM AMPLITUDE: 62.9 MM — SIGNIFICANT CHANGE UP (ref 52–69)
TEG REACTION TIME: 7.1 MIN — SIGNIFICANT CHANGE UP (ref 4.6–9.1)
TROPONIN T, HIGH SENSITIVITY RESULT: 446 NG/L — HIGH (ref 0–51)
WBC # BLD: 9.69 K/UL — SIGNIFICANT CHANGE UP (ref 3.8–10.5)
WBC # FLD AUTO: 9.69 K/UL — SIGNIFICANT CHANGE UP (ref 3.8–10.5)

## 2021-06-02 PROCEDURE — 71045 X-RAY EXAM CHEST 1 VIEW: CPT | Mod: 26

## 2021-06-02 PROCEDURE — 33863 ASCENDING AORTIC GRAFT: CPT

## 2021-06-02 PROCEDURE — 88304 TISSUE EXAM BY PATHOLOGIST: CPT | Mod: 26

## 2021-06-02 PROCEDURE — 33425 REPAIR OF MITRAL VALVE: CPT

## 2021-06-02 PROCEDURE — 99291 CRITICAL CARE FIRST HOUR: CPT

## 2021-06-02 PROCEDURE — 93287 PERI-PX DEVICE EVAL & PRGR: CPT | Mod: 26

## 2021-06-02 RX ORDER — DEXTROSE 50 % IN WATER 50 %
50 SYRINGE (ML) INTRAVENOUS
Refills: 0 | Status: DISCONTINUED | OUTPATIENT
Start: 2021-06-02 | End: 2021-06-03

## 2021-06-02 RX ORDER — POLYETHYLENE GLYCOL 3350 17 G/17G
17 POWDER, FOR SOLUTION ORAL DAILY
Refills: 0 | Status: DISCONTINUED | OUTPATIENT
Start: 2021-06-02 | End: 2021-06-09

## 2021-06-02 RX ORDER — HYDROMORPHONE HYDROCHLORIDE 2 MG/ML
0.5 INJECTION INTRAMUSCULAR; INTRAVENOUS; SUBCUTANEOUS ONCE
Refills: 0 | Status: DISCONTINUED | OUTPATIENT
Start: 2021-06-02 | End: 2021-06-02

## 2021-06-02 RX ORDER — INSULIN HUMAN 100 [IU]/ML
3 INJECTION, SOLUTION SUBCUTANEOUS
Qty: 100 | Refills: 0 | Status: DISCONTINUED | OUTPATIENT
Start: 2021-06-02 | End: 2021-06-03

## 2021-06-02 RX ORDER — SODIUM BICARBONATE 1 MEQ/ML
50 SYRINGE (ML) INTRAVENOUS ONCE
Refills: 0 | Status: COMPLETED | OUTPATIENT
Start: 2021-06-02 | End: 2021-06-02

## 2021-06-02 RX ORDER — POTASSIUM CHLORIDE 20 MEQ
10 PACKET (EA) ORAL
Refills: 0 | Status: DISCONTINUED | OUTPATIENT
Start: 2021-06-02 | End: 2021-06-02

## 2021-06-02 RX ORDER — SODIUM CHLORIDE 9 MG/ML
500 INJECTION INTRAMUSCULAR; INTRAVENOUS; SUBCUTANEOUS ONCE
Refills: 0 | Status: COMPLETED | OUTPATIENT
Start: 2021-06-02 | End: 2021-06-02

## 2021-06-02 RX ORDER — SODIUM CHLORIDE 9 MG/ML
1000 INJECTION INTRAMUSCULAR; INTRAVENOUS; SUBCUTANEOUS
Refills: 0 | Status: DISCONTINUED | OUTPATIENT
Start: 2021-06-02 | End: 2021-06-05

## 2021-06-02 RX ORDER — CEFUROXIME AXETIL 250 MG
1500 TABLET ORAL EVERY 8 HOURS
Refills: 0 | Status: DISCONTINUED | OUTPATIENT
Start: 2021-06-02 | End: 2021-06-03

## 2021-06-02 RX ORDER — ALBUMIN HUMAN 25 %
250 VIAL (ML) INTRAVENOUS ONCE
Refills: 0 | Status: COMPLETED | OUTPATIENT
Start: 2021-06-02 | End: 2021-06-02

## 2021-06-02 RX ORDER — DEXTROSE 50 % IN WATER 50 %
25 SYRINGE (ML) INTRAVENOUS
Refills: 0 | Status: DISCONTINUED | OUTPATIENT
Start: 2021-06-02 | End: 2021-06-03

## 2021-06-02 RX ORDER — ACETAMINOPHEN 500 MG
1000 TABLET ORAL ONCE
Refills: 0 | Status: COMPLETED | OUTPATIENT
Start: 2021-06-02 | End: 2021-06-02

## 2021-06-02 RX ORDER — METOCLOPRAMIDE HCL 10 MG
10 TABLET ORAL EVERY 8 HOURS
Refills: 0 | Status: DISCONTINUED | OUTPATIENT
Start: 2021-06-02 | End: 2021-06-02

## 2021-06-02 RX ORDER — PANTOPRAZOLE SODIUM 20 MG/1
40 TABLET, DELAYED RELEASE ORAL DAILY
Refills: 0 | Status: DISCONTINUED | OUTPATIENT
Start: 2021-06-02 | End: 2021-06-03

## 2021-06-02 RX ORDER — SODIUM CHLORIDE 9 MG/ML
500 INJECTION, SOLUTION INTRAVENOUS ONCE
Refills: 0 | Status: COMPLETED | OUTPATIENT
Start: 2021-06-02 | End: 2021-06-02

## 2021-06-02 RX ORDER — SODIUM CHLORIDE 9 MG/ML
1000 INJECTION, SOLUTION INTRAVENOUS
Refills: 0 | Status: DISCONTINUED | OUTPATIENT
Start: 2021-06-02 | End: 2021-06-03

## 2021-06-02 RX ORDER — DEXMEDETOMIDINE HYDROCHLORIDE IN 0.9% SODIUM CHLORIDE 4 UG/ML
1 INJECTION INTRAVENOUS
Qty: 200 | Refills: 0 | Status: DISCONTINUED | OUTPATIENT
Start: 2021-06-02 | End: 2021-06-02

## 2021-06-02 RX ORDER — CHLORHEXIDINE GLUCONATE 213 G/1000ML
1 SOLUTION TOPICAL
Refills: 0 | Status: DISCONTINUED | OUTPATIENT
Start: 2021-06-02 | End: 2021-06-09

## 2021-06-02 RX ORDER — MILRINONE LACTATE 1 MG/ML
0.2 INJECTION, SOLUTION INTRAVENOUS
Qty: 20 | Refills: 0 | Status: DISCONTINUED | OUTPATIENT
Start: 2021-06-02 | End: 2021-06-02

## 2021-06-02 RX ORDER — CHLORHEXIDINE GLUCONATE 213 G/1000ML
1 SOLUTION TOPICAL ONCE
Refills: 0 | Status: DISCONTINUED | OUTPATIENT
Start: 2021-06-02 | End: 2021-06-02

## 2021-06-02 RX ORDER — CHLORHEXIDINE GLUCONATE 213 G/1000ML
5 SOLUTION TOPICAL
Refills: 0 | Status: DISCONTINUED | OUTPATIENT
Start: 2021-06-02 | End: 2021-06-03

## 2021-06-02 RX ORDER — SODIUM CHLORIDE 9 MG/ML
250 INJECTION INTRAMUSCULAR; INTRAVENOUS; SUBCUTANEOUS ONCE
Refills: 0 | Status: COMPLETED | OUTPATIENT
Start: 2021-06-02 | End: 2021-06-02

## 2021-06-02 RX ORDER — OXYCODONE AND ACETAMINOPHEN 5; 325 MG/1; MG/1
1 TABLET ORAL EVERY 6 HOURS
Refills: 0 | Status: DISCONTINUED | OUTPATIENT
Start: 2021-06-02 | End: 2021-06-09

## 2021-06-02 RX ORDER — METOCLOPRAMIDE HCL 10 MG
10 TABLET ORAL EVERY 8 HOURS
Refills: 0 | Status: DISCONTINUED | OUTPATIENT
Start: 2021-06-02 | End: 2021-06-04

## 2021-06-02 RX ORDER — SODIUM CHLORIDE 9 MG/ML
3 INJECTION INTRAMUSCULAR; INTRAVENOUS; SUBCUTANEOUS EVERY 8 HOURS
Refills: 0 | Status: DISCONTINUED | OUTPATIENT
Start: 2021-06-02 | End: 2021-06-02

## 2021-06-02 RX ORDER — LEVOTHYROXINE SODIUM 125 MCG
50 TABLET ORAL DAILY
Refills: 0 | Status: DISCONTINUED | OUTPATIENT
Start: 2021-06-03 | End: 2021-06-09

## 2021-06-02 RX ORDER — LIDOCAINE HCL 20 MG/ML
0.2 VIAL (ML) INJECTION ONCE
Refills: 0 | Status: DISCONTINUED | OUTPATIENT
Start: 2021-06-02 | End: 2021-06-02

## 2021-06-02 RX ORDER — NOREPINEPHRINE BITARTRATE/D5W 8 MG/250ML
0.08 PLASTIC BAG, INJECTION (ML) INTRAVENOUS
Qty: 8 | Refills: 0 | Status: DISCONTINUED | OUTPATIENT
Start: 2021-06-02 | End: 2021-06-03

## 2021-06-02 RX ORDER — OXYCODONE AND ACETAMINOPHEN 5; 325 MG/1; MG/1
2 TABLET ORAL EVERY 6 HOURS
Refills: 0 | Status: DISCONTINUED | OUTPATIENT
Start: 2021-06-02 | End: 2021-06-09

## 2021-06-02 RX ORDER — CEFUROXIME AXETIL 250 MG
1500 TABLET ORAL ONCE
Refills: 0 | Status: DISCONTINUED | OUTPATIENT
Start: 2021-06-02 | End: 2021-06-02

## 2021-06-02 RX ORDER — DOBUTAMINE HCL 250MG/20ML
1.25 VIAL (ML) INTRAVENOUS
Qty: 500 | Refills: 0 | Status: DISCONTINUED | OUTPATIENT
Start: 2021-06-02 | End: 2021-06-05

## 2021-06-02 RX ADMIN — HYDROMORPHONE HYDROCHLORIDE 0.5 MILLIGRAM(S): 2 INJECTION INTRAMUSCULAR; INTRAVENOUS; SUBCUTANEOUS at 19:24

## 2021-06-02 RX ADMIN — HYDROMORPHONE HYDROCHLORIDE 0.5 MILLIGRAM(S): 2 INJECTION INTRAMUSCULAR; INTRAVENOUS; SUBCUTANEOUS at 22:15

## 2021-06-02 RX ADMIN — SODIUM CHLORIDE 3000 MILLILITER(S): 9 INJECTION INTRAMUSCULAR; INTRAVENOUS; SUBCUTANEOUS at 22:45

## 2021-06-02 RX ADMIN — Medication 100 MILLIGRAM(S): at 16:42

## 2021-06-02 RX ADMIN — MILRINONE LACTATE 5.78 MICROGRAM(S)/KG/MIN: 1 INJECTION, SOLUTION INTRAVENOUS at 16:36

## 2021-06-02 RX ADMIN — SODIUM CHLORIDE 3 MILLILITER(S): 9 INJECTION INTRAMUSCULAR; INTRAVENOUS; SUBCUTANEOUS at 06:11

## 2021-06-02 RX ADMIN — Medication 125 MILLILITER(S): at 23:00

## 2021-06-02 RX ADMIN — Medication 125 MILLILITER(S): at 20:13

## 2021-06-02 RX ADMIN — SODIUM CHLORIDE 1000 MILLILITER(S): 9 INJECTION INTRAMUSCULAR; INTRAVENOUS; SUBCUTANEOUS at 19:44

## 2021-06-02 RX ADMIN — SODIUM CHLORIDE 2000 MILLILITER(S): 9 INJECTION, SOLUTION INTRAVENOUS at 16:40

## 2021-06-02 RX ADMIN — HYDROMORPHONE HYDROCHLORIDE 0.5 MILLIGRAM(S): 2 INJECTION INTRAMUSCULAR; INTRAVENOUS; SUBCUTANEOUS at 22:23

## 2021-06-02 RX ADMIN — Medication 62.5 MILLIMOLE(S): at 19:37

## 2021-06-02 RX ADMIN — HYDROMORPHONE HYDROCHLORIDE 0.5 MILLIGRAM(S): 2 INJECTION INTRAMUSCULAR; INTRAVENOUS; SUBCUTANEOUS at 23:36

## 2021-06-02 RX ADMIN — HYDROMORPHONE HYDROCHLORIDE 0.5 MILLIGRAM(S): 2 INJECTION INTRAMUSCULAR; INTRAVENOUS; SUBCUTANEOUS at 22:38

## 2021-06-02 RX ADMIN — CHLORHEXIDINE GLUCONATE 5 MILLILITER(S): 213 SOLUTION TOPICAL at 18:54

## 2021-06-02 RX ADMIN — Medication 400 MILLIGRAM(S): at 20:11

## 2021-06-02 RX ADMIN — Medication 100 MILLIGRAM(S): at 21:15

## 2021-06-02 RX ADMIN — HYDROMORPHONE HYDROCHLORIDE 0.5 MILLIGRAM(S): 2 INJECTION INTRAMUSCULAR; INTRAVENOUS; SUBCUTANEOUS at 20:11

## 2021-06-02 RX ADMIN — HYDROMORPHONE HYDROCHLORIDE 0.5 MILLIGRAM(S): 2 INJECTION INTRAMUSCULAR; INTRAVENOUS; SUBCUTANEOUS at 20:26

## 2021-06-02 RX ADMIN — HYDROMORPHONE HYDROCHLORIDE 0.5 MILLIGRAM(S): 2 INJECTION INTRAMUSCULAR; INTRAVENOUS; SUBCUTANEOUS at 21:59

## 2021-06-02 RX ADMIN — Medication 50 MILLIEQUIVALENT(S): at 23:36

## 2021-06-02 RX ADMIN — SODIUM CHLORIDE 2000 MILLILITER(S): 9 INJECTION INTRAMUSCULAR; INTRAVENOUS; SUBCUTANEOUS at 17:32

## 2021-06-02 RX ADMIN — HYDROMORPHONE HYDROCHLORIDE 0.5 MILLIGRAM(S): 2 INJECTION INTRAMUSCULAR; INTRAVENOUS; SUBCUTANEOUS at 19:39

## 2021-06-02 RX ADMIN — Medication 1000 MILLIGRAM(S): at 20:26

## 2021-06-02 RX ADMIN — HYDROMORPHONE HYDROCHLORIDE 0.5 MILLIGRAM(S): 2 INJECTION INTRAMUSCULAR; INTRAVENOUS; SUBCUTANEOUS at 23:51

## 2021-06-02 RX ADMIN — Medication 10 MILLIGRAM(S): at 21:16

## 2021-06-02 RX ADMIN — SODIUM CHLORIDE 2000 MILLILITER(S): 9 INJECTION INTRAMUSCULAR; INTRAVENOUS; SUBCUTANEOUS at 18:11

## 2021-06-02 RX ADMIN — DEXMEDETOMIDINE HYDROCHLORIDE IN 0.9% SODIUM CHLORIDE 19.3 MICROGRAM(S)/KG/HR: 4 INJECTION INTRAVENOUS at 17:37

## 2021-06-02 RX ADMIN — Medication 11.6 MICROGRAM(S)/KG/MIN: at 16:36

## 2021-06-02 NOTE — AIRWAY REMOVAL NOTE  ADULT & PEDS - ARTIFICAL AIRWAY REMOVAL COMMENTS
Written order for extubation verified. The patient was identified by full name and birth date compared to the identification band. Present during the procedure was Julio Cesar Garcia RN.

## 2021-06-02 NOTE — PROCEDURE NOTE - ADDITIONAL PROCEDURE DETAILS
Indication: post cardiac surgery device reprogramming    Presenting rhythm: AP  at 80bpm    Stable lead impedance    Changes made: DDD 80bpm per Dr. Fenton, tachytherapy enabled

## 2021-06-02 NOTE — PRE-ANESTHESIA EVALUATION ADULT - NSANTHPMHFT_GEN_ALL_CORE
79yo man with hx of AVR, VF/VT requiring multiple ablations, afib s/p ablation 10/2020 on xarelto, PPM dependent, AICD in place, hx inf wall MI LVEF 45%, HLD, METS>4, thoracic aortic aneurysm presenting for redo sternotomy and bental procedure. TTE shows MVR, LVEF 45%, RVF normal.

## 2021-06-02 NOTE — BRIEF OPERATIVE NOTE - NSICDXBRIEFPREOP_GEN_ALL_CORE_FT
PRE-OP DIAGNOSIS:  Aortic root aneurysm 02-Jun-2021 16:52:51  Huy Simon  History of aortic valve replacement 02-Jun-2021 16:53:01  Huy Simon  Mitral valve regurgitation 02-Jun-2021 16:53:09  Huy Simon

## 2021-06-02 NOTE — PROCEDURE NOTE - ADDITIONAL PROCEDURE DETAILS
Indication: pre op device reprogramming for cardiac surgery     Presenting rhythm: AP V sensed 60bpm     Patient is atrially dependent.     Changes made: DOO 80bpm per Dr. Fenton, tachytherapy disabled.    R2 pads on patient to external defibrillator

## 2021-06-02 NOTE — BRIEF OPERATIVE NOTE - NSICDXBRIEFPROCEDURE_GEN_ALL_CORE_FT
PROCEDURES:  Sternotomy, repeat 02-Jun-2021 16:52:08  Huy Simon  Bentall procedure 02-Jun-2021 16:52:18  Huy Simon  Repair, mitral valve, with WARREN 02-Jun-2021 16:52:42  Huy Simon

## 2021-06-02 NOTE — PRE-ANESTHESIA EVALUATION ADULT - NSANTHOSAYNRD_GEN_A_CORE
No. NAATOLIY screening performed.  STOP BANG Legend: 0-2 = LOW Risk; 3-4 = INTERMEDIATE Risk; 5-8 = HIGH Risk

## 2021-06-02 NOTE — PRE-OP CHECKLIST - HEIGHT IN CM
----- Message from Lauren Aleman M.D. sent at 4/5/2017 11:56 AM PDT -----  Please call jaime graves -- bloodwork today    Tx so much   [phone]

## 2021-06-02 NOTE — PRE-ANESTHESIA EVALUATION ADULT - NSANTHADDINFOFT_GEN_ALL_CORE
Discussed GETA, pre ind art line, CVC, intraop WARREN, possible transfusion requirement, postop ventilation, possibilty of dental trauma especially to loose incisor. All questions addressed.

## 2021-06-02 NOTE — PROGRESS NOTE ADULT - SUBJECTIVE AND OBJECTIVE BOX
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 5/2017), SVT (s/p VF arrest in 12/2017, while on a cruise: [place], hosp. in [facility]: then SB and AICD implanted) Aotic valve regurgitation (s/p AVR 2004), Mitral valve regurgitation, Ascending aortic dilation. 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 Pt reports cardiology has been following this for several years. Pt denies, c/p SOB, or palpitations at this time. Pt evaluated by Dr. Fenton for scheduled Redo Sternotomy, Bentall on 6/2/21. Pt denies recent travel or sick contact.     **Covid vaccine record card in chart** (01 Jun 2021 07:11)      Surgery/Hospital course:  6/02 Sternotomy repeat, Bentall Procedure, and Repair Mitral Valve with Transesophageal  Echocardiogram.    Today/Overnight:  80 yr old male with PMH of HLD, hypothyroidism, ICM, IWMI (1994, s/p DCA of RCA) AF (Xarelto), VT storm requiring ATP and ICD shocks (admitted to [facility] 5/2017), SVT (s/p VF arrest in 12/2017, had a recent Sternotomy repeat, Bentall Procedure, and Repair Mitral Valve with Transesophageal  Echocardiogram today requiring pressor support with IV Levophed and inotropic support with IV Primacor.       Vital Signs Last 24 Hrs  T(C): 35.6 (02 Jun 2021 16:32), Max: 36.4 (02 Jun 2021 06:02)  T(F): 96.1 (02 Jun 2021 16:32), Max: 97.5 (02 Jun 2021 06:02)  HR: 80 (02 Jun 2021 17:00) (60 - 80)  BP: 114/71 (02 Jun 2021 06:02) (114/71 - 114/71)  BP(mean): --  RR: 14 (02 Jun 2021 17:00) (14 - 18)  SpO2: 100% (02 Jun 2021 17:00) (98% - 100%)  ============================I/O===========================  I&O's Summary    ============================ LABS =========================                        8.4    9.69  )-----------( 158      ( 02 Jun 2021 16:40 )             25.8     06-01    137  |  105  |  33<H>  ----------------------------<  86   4.9   |  21<L>  |  1.25    Ca    9.3      01 Jun 2021 08:35          ABG - ( 02 Jun 2021 16:35 )  pH, Arterial: 7.34  pH, Blood: x     /  pCO2: 42    /  pO2: 277   / HCO3: 22    / Base Excess: -2.7  /  SaO2: 100                 ======================Micro/Rad/Cardio=================  CXR: Reviewed  Echo: Reviewed  ======================================================  PAST MEDICAL & SURGICAL HISTORY:  Aortic stenosis    AICD (automatic cardioverter/defibrillator) present    Aortic valve regurgitation    Ascending aorta dilation    H/O paroxysmal supraventricular tachycardia    HLD (hyperlipidemia)    Mitral valve regurgitation    Cardiac arrest    S/P aortic valve replacement  3/13/2004    S/P hernia repair  2002    History of tonsillectomy and adenoidectomy    S/P TURP  1996    S/P colonoscopy  2001, 2002, 2006, 2011, 2016    S/P endoscopy  2006    S/P cardiac cath  1994, 1995, 1999, 2002, 2004    AICD (automatic cardioverter/defibrillator) present  12/19/17    Cardiac pacemaker  12/19/17    History of cardioversion  9/11/20    S/P ablation of atrial fibrillation  10/29/20    Status post ablation of ventricular arrhythmia  2/14/19      ========================ASSESSMENT ================  Aortic Root Aneurysm Status Post Sternotomy repeat, Bentall Procedure, and Repair Mitral Valve with Transesophageal  Echocardiogram 6/02  Cardiac Arrest   Hypovolemic Shock  Cardiogenic Shock  Post op respiratory insufficiency   Automatic Cardioverter/Defibrillator  Aortic valve regurgitation  Ascending aorta dilation  Mitral valve regurgitation  History of Aortic Valve replacement in 2004   Status post ablation of ventricular arrhythmia  History of Hyperlipidemia   Stress Hyperglycemia   Acute Blood Loss Anemia Status Post  2 Platelets, 10 Cryo, 2 FFP, and 1000 FEIBA  Elevated blood urea nitrogen   Metabolic Acidosis    Plan:  ====================== NEUROLOGY=====================  Addressed analgesic regimen with Oxydone to optimize function and sedated with Iv Precedex for ventilatory synchrony.     dexMEDEtomidine Infusion 1 MICROgram(s)/kG/Hr (19.3 mL/Hr) IV Continuous <Continuous>  oxycodone    5 mG/acetaminophen 325 mG 1 Tablet(s) Oral every 6 hours PRN Mild Pain (1 - 3)  oxycodone    5 mG/acetaminophen 325 mG 2 Tablet(s) Oral every 6 hours PRN Moderate Pain (4 - 6)    ==================== RESPIRATORY======================  Post Operative Insuffiencey, respiratory status required full ventilatory support, close monitoring of respiratory rate and breathing pattern, the following of ABG’s with A-line monitoring, continuous pulse oximetry monitoring    Mechanical Ventilation:  Mode: AC/ CMV (Assist Control/ Continuous Mandatory Ventilation)  RR (machine): 14  TV (machine): 650  FiO2: 50  PEEP: 5  ITime: 1  MAP: 10  PIP: 18      ====================CARDIOVASCULAR==================  Patient with a history of a Aortic Valve replacement status post 2004,  Mitral valve regurgitation, Aortic valve regurgitation, Ascending aorta dilation, and with a Automatic Cardioverter/Defibrillator present had a Aortic Root Aneurysm Status Post Sternotomy repeat, Bentall Procedure, and Repair Mitral Valve with Transesophageal  Echocardiogram today on 6/02. Invasive hemodynamic monitoring with a central venous catheter & an A-line were required for the continuous central venous and MAP/BP monitoring to ensure adequate cardiovascular support. Patient is currently on IV Primacor for inotropic support and IV Levophed infusion for vasogenic shock. Most recent lactate is 1.0. Volume resuscitation ordered for hypovolemic shock.     milrinone Infusion 0.25 MICROgram(s)/kG/Min (5.78 mL/Hr) IV Continuous <Continuous>  norepinephrine Infusion 0.08 MICROgram(s)/kG/Min (11.6 mL/Hr) IV Continuous <Continuous>    ===================HEMATOLOGIC/ONC ===================  Anemia due to acute blood loss requiring 2 Platelets, 10 Cryo, 2 FFP, and 1000 FEIBA intraoperative. Most recent hematocrit 25.8% and hemoglobin 8.4, monitor hemoglobin and hematocrit levels.     ===================== RENAL =========================  Optimize renal perfusion with adequate volume resuscitation and continued monitoring of urine output, fluid balance, BUN/Creatinine.     ==================== GASTROINTESTINAL===================  NPO after recent procedure, advance diet as tolerated. Protonix for stress ulcer prophylaxis. Continue bowel regimen with Miralax.     lactated ringers Bolus 500 milliLiter(s) IV Bolus once  metoclopramide Injectable 10 milliGRAM(s) IV Push every 8 hours  pantoprazole  Injectable 40 milliGRAM(s) IV Push daily  polyethylene glycol 3350 17 Gram(s) Oral daily  sodium chloride 0.9%. 1000 milliLiter(s) (10 mL/Hr) IV Continuous <Continuous>    =======================    ENDOCRINE  =====================  Metabolic stability, stress hyperglycemia required an IV regular Insulin drip while following serial glucose levels to help achieve and maintain euglycemia.      dextrose 5%. 1000 milliLiter(s) (15 mL/Hr) IV Continuous <Continuous>  dextrose 50% Injectable 50 milliLiter(s) IV Push every 15 minutes  dextrose 50% Injectable 25 milliLiter(s) IV Push every 15 minutes  insulin regular Infusion 3 Unit(s)/Hr (3 mL/Hr) IV Continuous <Continuous>    ========================INFECTIOUS DISEASE================  Afebrile, white blood cells within normal limits. Continue Cefuroxime for perioperative antibiotic coverage.     cefuroxime  IVPB 1500 milliGRAM(s) IV Intermittent every 8 hours      Patient requires continuous monitoring with bedside rhythm monitoring, pulse oximetry monitoring, and continuous central venous and arterial pressure monitoring; and intermittent blood gas analysis.  Care plan discussed with ICU care team.    Patient remained critical, at risk for life threatening decompensation.   I have spent 35 minutes providing acute care with multiple re-evaluations throughout the evening.     By signing my name below, I, Randee Krause , attest that this documentation has been prepared under the direction and in the presence of Valeria Real MD   Electronically signed: Home De La Garza, 06-02-21 @ 17:15    I, Valeria Real, personally performed the services described in this documentation. All medical record entries made by the scribe were at my direction and in my presence. 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 in [facility]: then SB and AICD implanted) Aotic valve regurgitation (s/p AVR 2004), Mitral valve regurgitation, Ascending aortic dilation. 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 Pt reports cardiology has been following this for several years. Pt denies, c/p SOB, or palpitations at this time. Pt evaluated by Dr. Fenton for scheduled Redo Sternotomy, Bentall on 6/2/21. Pt denies recent travel or sick contact.     **Covid vaccine record card in chart** (01 Jun 2021 07:11)      Surgery/Hospital course:  6/02 Sternotomy repeat, Bentall Procedure, and Repair Mitral Valve with Transesophageal  Echocardiogram.    Today/Overnight:  80 yr old male with PMH of HLD, hypothyroidism, ICM, IWMI (1994, s/p DCA of RCA) AF (Xarelto), VT storm requiring ATP and ICD shocks (admitted to [facility] 5/2017), SVT (s/p VF arrest in 12/2017, had a recent Sternotomy repeat, Bentall Procedure, and Repair Mitral Valve with Transesophageal  Echocardiogram today requiring pressor support with IV Levophed and inotropic support with IV Primacor. Plan to start IV Dobutamine at 5.      Vital Signs Last 24 Hrs  T(C): 35.6 (02 Jun 2021 16:32), Max: 36.4 (02 Jun 2021 06:02)  T(F): 96.1 (02 Jun 2021 16:32), Max: 97.5 (02 Jun 2021 06:02)  HR: 80 (02 Jun 2021 17:00) (60 - 80)  BP: 114/71 (02 Jun 2021 06:02) (114/71 - 114/71)  BP(mean): --  RR: 14 (02 Jun 2021 17:00) (14 - 18)  SpO2: 100% (02 Jun 2021 17:00) (98% - 100%)  ============================I/O===========================  I&O's Summary    ============================ LABS =========================                        8.4    9.69  )-----------( 158      ( 02 Jun 2021 16:40 )             25.8     06-01    137  |  105  |  33<H>  ----------------------------<  86   4.9   |  21<L>  |  1.25    Ca    9.3      01 Jun 2021 08:35          ABG - ( 02 Jun 2021 16:35 )  pH, Arterial: 7.34  pH, Blood: x     /  pCO2: 42    /  pO2: 277   / HCO3: 22    / Base Excess: -2.7  /  SaO2: 100                 ======================Micro/Rad/Cardio=================  CXR: Reviewed  Echo: Reviewed  ======================================================  PAST MEDICAL & SURGICAL HISTORY:  Aortic stenosis    AICD (automatic cardioverter/defibrillator) present    Aortic valve regurgitation    Ascending aorta dilation    H/O paroxysmal supraventricular tachycardia    HLD (hyperlipidemia)    Mitral valve regurgitation    Cardiac arrest    S/P aortic valve replacement  3/13/2004    S/P hernia repair  2002    History of tonsillectomy and adenoidectomy    S/P TURP  1996    S/P colonoscopy  2001, 2002, 2006, 2011, 2016    S/P endoscopy  2006    S/P cardiac cath  1994, 1995, 1999, 2002, 2004    AICD (automatic cardioverter/defibrillator) present  12/19/17    Cardiac pacemaker  12/19/17    History of cardioversion  9/11/20    S/P ablation of atrial fibrillation  10/29/20    Status post ablation of ventricular arrhythmia  2/14/19      ========================ASSESSMENT ================  Aortic Root Aneurysm Status Post Sternotomy repeat, Bentall Procedure, and Repair Mitral Valve with Transesophageal  Echocardiogram 6/02  Cardiac Arrest   Hypovolemic Shock  Cardiogenic Shock  Post op respiratory insufficiency   Automatic Cardioverter/Defibrillator  Aortic valve regurgitation  Ascending aorta dilation  Mitral valve regurgitation  History of Aortic Valve replacement in 2004   Status post ablation of ventricular arrhythmia  History of Hyperlipidemia   Stress Hyperglycemia   Acute Blood Loss Anemia Status Post  2 Platelets, 10 Cryo, 2 FFP, and 1000 FEIBA  Elevated blood urea nitrogen   Metabolic Acidosis    Plan:  ====================== NEUROLOGY=====================  Addressed analgesic regimen with Oxydone to optimize function and sedated with Iv Precedex for ventilatory synchrony.     dexMEDEtomidine Infusion 1 MICROgram(s)/kG/Hr (19.3 mL/Hr) IV Continuous <Continuous>  oxycodone    5 mG/acetaminophen 325 mG 1 Tablet(s) Oral every 6 hours PRN Mild Pain (1 - 3)  oxycodone    5 mG/acetaminophen 325 mG 2 Tablet(s) Oral every 6 hours PRN Moderate Pain (4 - 6)    ==================== RESPIRATORY======================  Post Operative Insuffiencey, respiratory status required full ventilatory support, close monitoring of respiratory rate and breathing pattern, the following of ABG’s with A-line monitoring, continuous pulse oximetry monitoring    Mechanical Ventilation:  Mode: AC/ CMV (Assist Control/ Continuous Mandatory Ventilation)  RR (machine): 14  TV (machine): 650  FiO2: 50  PEEP: 5  ITime: 1  MAP: 10  PIP: 18      ====================CARDIOVASCULAR==================  Patient with a history of a Aortic Valve replacement status post 2004,  Mitral valve regurgitation, Aortic valve regurgitation, Ascending aorta dilation, and with a Automatic Cardioverter/Defibrillator present had a Aortic Root Aneurysm Status Post Sternotomy repeat, Bentall Procedure, and Repair Mitral Valve with Transesophageal  Echocardiogram today on 6/02. Invasive hemodynamic monitoring with a central venous catheter & an A-line were required for the continuous central venous and MAP/BP monitoring to ensure adequate cardiovascular support. Patient is currently on IV Primacor for inotropic support and IV Levophed infusion for vasogenic shock. Plan to start IV Dobutamine at 5 for inotropic support. Most recent lactate is 1.0. Volume resuscitation ordered for hypovolemic shock.     milrinone Infusion 0.25 MICROgram(s)/kG/Min (5.78 mL/Hr) IV Continuous <Continuous>  norepinephrine Infusion 0.08 MICROgram(s)/kG/Min (11.6 mL/Hr) IV Continuous <Continuous>    ===================HEMATOLOGIC/ONC ===================  Anemia due to acute blood loss requiring 2 Platelets, 10 Cryo, 2 FFP, and 1000 FEIBA intraoperative. Most recent hematocrit 25.8% and hemoglobin 8.4, monitor hemoglobin and hematocrit levels.     ===================== RENAL =========================  Optimize renal perfusion with adequate volume resuscitation and continued monitoring of urine output, fluid balance, BUN/Creatinine.     ==================== GASTROINTESTINAL===================  NPO after recent procedure, advance diet as tolerated. Protonix for stress ulcer prophylaxis. Continue bowel regimen with Miralax.     lactated ringers Bolus 500 milliLiter(s) IV Bolus once  metoclopramide Injectable 10 milliGRAM(s) IV Push every 8 hours  pantoprazole  Injectable 40 milliGRAM(s) IV Push daily  polyethylene glycol 3350 17 Gram(s) Oral daily  sodium chloride 0.9%. 1000 milliLiter(s) (10 mL/Hr) IV Continuous <Continuous>    =======================    ENDOCRINE  =====================  Metabolic stability, stress hyperglycemia required an IV regular Insulin drip while following serial glucose levels to help achieve and maintain euglycemia.      dextrose 5%. 1000 milliLiter(s) (15 mL/Hr) IV Continuous <Continuous>  dextrose 50% Injectable 50 milliLiter(s) IV Push every 15 minutes  dextrose 50% Injectable 25 milliLiter(s) IV Push every 15 minutes  insulin regular Infusion 3 Unit(s)/Hr (3 mL/Hr) IV Continuous <Continuous>    ========================INFECTIOUS DISEASE================  Afebrile, white blood cells within normal limits. Continue Cefuroxime for perioperative antibiotic coverage.     cefuroxime  IVPB 1500 milliGRAM(s) IV Intermittent every 8 hours      Patient requires continuous monitoring with bedside rhythm monitoring, pulse oximetry monitoring, and continuous central venous and arterial pressure monitoring; and intermittent blood gas analysis.  Care plan discussed with ICU care team.    Patient remained critical, at risk for life threatening decompensation.   I have spent 35 minutes providing acute care with multiple re-evaluations throughout the evening.     By signing my name below, I, Randee Krause , attest that this documentation has been prepared under the direction and in the presence of Valeria Real MD   Electronically signed: Home De La Garza, 06-02-21 @ 17:15    I, Valeria Real, personally performed the services described in this documentation. All medical record entries made by the buddyibe were at my direction and in my presence. 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 in [facility]: then SB and AICD implanted) Aotic valve regurgitation (s/p AVR 2004), Mitral valve regurgitation, Ascending aortic dilation. 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 Pt reports cardiology has been following this for several years. Pt denies, c/p SOB, or palpitations at this time. Pt evaluated by Dr. Fenton for scheduled Redo Sternotomy, Bentall on 6/2/21. Pt denies recent travel or sick contact.     **Covid vaccine record card in chart** (01 Jun 2021 07:11)      Surgery/Hospital course:  6/02 Sternotomy repeat, Bentall Procedure, and Repair Mitral Valve with Transesophageal  Echocardiogram.    Today/Overnight:  80 yr old male with PMH of HLD, hypothyroidism, ICM, IWMI (1994, s/p DCA of RCA) AF (Xarelto), VT storm requiring ATP and ICD shocks (admitted to [facility] 5/2017), SVT (s/p VF arrest in 12/2017, had a recent Sternotomy repeat, Bentall Procedure, and Repair Mitral Valve with Transesophageal  Echocardiogram today requiring pressor support with IV Levophed and inotropic support with IV Primacor. Plan to start IV Dobutamine at 5.      Vital Signs Last 24 Hrs  T(C): 35.6 (02 Jun 2021 16:32), Max: 36.4 (02 Jun 2021 06:02)  T(F): 96.1 (02 Jun 2021 16:32), Max: 97.5 (02 Jun 2021 06:02)  HR: 80 (02 Jun 2021 17:00) (60 - 80)  BP: 114/71 (02 Jun 2021 06:02) (114/71 - 114/71)  BP(mean): --  RR: 14 (02 Jun 2021 17:00) (14 - 18)  SpO2: 100% (02 Jun 2021 17:00) (98% - 100%)  ============================I/O===========================  I&O's Detail    02 Jun 2021 07:01  -  02 Jun 2021 22:00  --------------------------------------------------------  IN:    Albumin 5%  - 250 mL: 500 mL    DOBUTamine: 23.2 mL    IV PiggyBack: 887.5 mL    Lactated Ringers Bolus: 500 mL    Milrinone: 17.4 mL    Norepinephrine: 33.4 mL    sodium chloride 0.9%: 60 mL    Sodium Chloride 0.9% Bolus: 1000 mL  Total IN: 3021.5 mL    OUT:    Bulb (mL): 90 mL    Chest Tube (mL): 100 mL    Chest Tube (mL): 10 mL    Chest Tube (mL): 135 mL    Dexmedetomidine: 0 mL    Indwelling Catheter - Urethral (mL): 1535 mL    Insulin: 0 mL    Milrinone: 0 mL  Total OUT: 1870 mL    Total NET: 1151.5 mL    ============================ LABS =========================                        8.4    9.69  )-----------( 158      ( 02 Jun 2021 16:40 )             25.8     06-01    137  |  105  |  33<H>  ----------------------------<  86   4.9   |  21<L>  |  1.25    Ca    9.3      01 Jun 2021 08:35          ABG - ( 02 Jun 2021 16:35 )  pH, Arterial: 7.34  pH, Blood: x     /  pCO2: 42    /  pO2: 277   / HCO3: 22    / Base Excess: -2.7  /  SaO2: 100       ======================Micro/Rad/Cardio=================  CXR: Reviewed  Echo: Reviewed  ======================================================  PAST MEDICAL & SURGICAL HISTORY:  Aortic stenosis    AICD (automatic cardioverter/defibrillator) present    Aortic valve regurgitation    Ascending aorta dilation    H/O paroxysmal supraventricular tachycardia    HLD (hyperlipidemia)    Mitral valve regurgitation    Cardiac arrest    S/P aortic valve replacement  3/13/2004    S/P hernia repair  2002    History of tonsillectomy and adenoidectomy    S/P TURP  1996    S/P colonoscopy  2001, 2002, 2006, 2011, 2016    S/P endoscopy  2006    S/P cardiac cath  1994, 1995, 1999, 2002, 2004    AICD (automatic cardioverter/defibrillator) present  12/19/17    Cardiac pacemaker  12/19/17    History of cardioversion  9/11/20    S/P ablation of atrial fibrillation  10/29/20    Status post ablation of ventricular arrhythmia  2/14/19      ========================ASSESSMENT ================  Aortic Root Aneurysm Status Post Sternotomy repeat, Bentall Procedure, and Repair Mitral Valve with Transesophageal  Echocardiogram 6/02  Cardiac Arrest   Hypovolemic Shock  Cardiogenic Shock  Post op respiratory insufficiency   Automatic Cardioverter/Defibrillator  Aortic valve regurgitation  Ascending aorta dilation  Mitral valve regurgitation  History of Aortic Valve replacement in 2004   Status post ablation of ventricular arrhythmia  History of Hyperlipidemia   Stress Hyperglycemia   Acute Blood Loss Anemia Status Post  2 Platelets, 10 Cryo, 2 FFP, and 1000 FEIBA  Elevated blood urea nitrogen   Metabolic Acidosis    Plan:  ====================== NEUROLOGY=====================  Addressed analgesic regimen with IV Dilaudid to optimize function. Slowly clearing anesthesia, no focal deficit evident.    ==================== RESPIRATORY======================  Post operative respiratory status required full ventilatory support, close monitoring of respiratory rate and breathing pattern, the following of ABG’s with A-line monitoring, and continuous pulse oximetry monitoring. Plan for weaning to pressure support and extubation once patient is more alert.    Mechanical Ventilation:  Mode: AC/ CMV (Assist Control/ Continuous Mandatory Ventilation)  RR (machine): 14  TV (machine): 650  FiO2: 50  PEEP: 5  ITime: 1  MAP: 10  PIP: 18      ====================CARDIOVASCULAR==================  Patient with a history of a Aortic Valve replacement in 2004,  Mitral valve regurgitation, Aortic valve regurgitation, Ascending aorta dilation, and with a Automatic Cardioverter/Defibrillator present had a Aortic Root Aneurysm now recovering s/p Bentall Procedure, and Repair Mitral Valve with Transesophageal  Echocardiogram today on 6/02. Invasive hemodynamic monitoring with a central venous catheter & an A-line were required for the continuous central venous and MAP/BP monitoring to ensure adequate cardiovascular support. Patient admitted to CTICU on an IV Primacor and an IV Norepinephrine infusion for Cardiogenic shock and was transitioned to an IV Dobutamine infusion. In addition volume resuscitation ordered for hypovolemic shock and norepineprhine weaned to off.    ===================HEMATOLOGIC/ONC ===================  Anemia due to acute blood loss requiring 2 Platelets, 10 Cryo, 2 FFP, and 1000 FEIBA intraoperative. Most recent hematocrit 25.8% and hemoglobin 8.4, monitor hemoglobin and hematocrit levels. No current plan for transfusion.    ===================== RENAL =========================  Optimize renal perfusion with adequate volume resuscitation and continued monitoring of urine output, fluid balance, and BUN/Creatinine.     ==================== GASTROINTESTINAL===================  NPO after recent procedure. Protonix for stress ulcer prophylaxis. Continue bowel regimen with Miralax.     metoclopramide Injectable 10 milliGRAM(s) IV Push every 8 hours  pantoprazole  Injectable 40 milliGRAM(s) IV Push daily  polyethylene glycol 3350 17 Gram(s) Oral daily  sodium chloride 0.9%. 1000 milliLiter(s) (10 mL/Hr) IV Continuous <Continuous>    =======================    ENDOCRINE  =====================  Metabolic stability and potential stress hyperglycemia required monitoring of glucose levels with order for insulin drip for glucose consistently over 140 mg/dl to help achieve and maintain euglycemia. Currently not requiring an insulin infusion.    ========================INFECTIOUS DISEASE================  Afebrile, white blood cells within normal limits. Continue Cefuroxime for perioperative antibiotic coverage.     cefuroxime  IVPB 1500 milliGRAM(s) IV Intermittent every 8 hours      Patient requires continuous monitoring with bedside rhythm monitoring, pulse oximetry monitoring, and continuous central venous and arterial pressure monitoring; and intermittent blood gas analysis.  Care plan discussed with ICU care team.    Patient remained critical, at risk for life threatening decompensation.   I have spent 40 minutes providing acute care with multiple re-evaluations throughout the evening.     By signing my name below, I, Randee Krause , attest that this documentation has been prepared under the direction and in the presence of Valeria Real MD   Electronically signed: Home De La Garza, 06-02-21 @ 17:15    I, Valreia Real, personally performed the services described in this documentation. All medical record entries made by the scribe were at my direction and in my presence. I have reviewed the chart and agree that the record reflects my personal performance and is accurate and complete  Electronically signed: Valeria Real MD 06-02-21 @ 17:15

## 2021-06-02 NOTE — BRIEF OPERATIVE NOTE - OPERATION/FINDINGS
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  Status post redo sternotomy, aortic root replacement with 32mm Hemashield platinum graft.  Mitral valve repair with edge to edge repair of A2 and P2.  Aortic cross clamp time of 210 minutes.

## 2021-06-03 LAB
ALBUMIN SERPL ELPH-MCNC: 3.8 G/DL — SIGNIFICANT CHANGE UP (ref 3.3–5)
ALBUMIN SERPL ELPH-MCNC: 4.3 G/DL — SIGNIFICANT CHANGE UP (ref 3.3–5)
ALP SERPL-CCNC: 57 U/L — SIGNIFICANT CHANGE UP (ref 40–120)
ALP SERPL-CCNC: 66 U/L — SIGNIFICANT CHANGE UP (ref 40–120)
ALT FLD-CCNC: 12 U/L — SIGNIFICANT CHANGE UP (ref 10–45)
ALT FLD-CCNC: 15 U/L — SIGNIFICANT CHANGE UP (ref 10–45)
ANION GAP SERPL CALC-SCNC: 16 MMOL/L — SIGNIFICANT CHANGE UP (ref 5–17)
ANION GAP SERPL CALC-SCNC: 17 MMOL/L — SIGNIFICANT CHANGE UP (ref 5–17)
APTT BLD: 35.1 SEC — SIGNIFICANT CHANGE UP (ref 27.5–35.5)
AST SERPL-CCNC: 37 U/L — SIGNIFICANT CHANGE UP (ref 10–40)
AST SERPL-CCNC: 46 U/L — HIGH (ref 10–40)
BASE EXCESS BLDV CALC-SCNC: -0.6 MMOL/L — SIGNIFICANT CHANGE UP (ref -2–2)
BASE EXCESS BLDV CALC-SCNC: -1.9 MMOL/L — SIGNIFICANT CHANGE UP (ref -2–2)
BILIRUB SERPL-MCNC: 1.4 MG/DL — HIGH (ref 0.2–1.2)
BILIRUB SERPL-MCNC: 1.8 MG/DL — HIGH (ref 0.2–1.2)
BUN SERPL-MCNC: 23 MG/DL — SIGNIFICANT CHANGE UP (ref 7–23)
BUN SERPL-MCNC: 30 MG/DL — HIGH (ref 7–23)
CA-I SERPL-SCNC: 1.11 MMOL/L — LOW (ref 1.12–1.3)
CALCIUM SERPL-MCNC: 7.9 MG/DL — LOW (ref 8.4–10.5)
CALCIUM SERPL-MCNC: 8.4 MG/DL — SIGNIFICANT CHANGE UP (ref 8.4–10.5)
CHLORIDE BLDV-SCNC: 112 MMOL/L — HIGH (ref 96–108)
CHLORIDE SERPL-SCNC: 107 MMOL/L — SIGNIFICANT CHANGE UP (ref 96–108)
CHLORIDE SERPL-SCNC: 111 MMOL/L — HIGH (ref 96–108)
CO2 BLDV-SCNC: 26 MMOL/L — SIGNIFICANT CHANGE UP (ref 22–30)
CO2 BLDV-SCNC: 27 MMOL/L — SIGNIFICANT CHANGE UP (ref 22–30)
CO2 SERPL-SCNC: 20 MMOL/L — LOW (ref 22–31)
CO2 SERPL-SCNC: 21 MMOL/L — LOW (ref 22–31)
CREAT SERPL-MCNC: 1.33 MG/DL — HIGH (ref 0.5–1.3)
CREAT SERPL-MCNC: 1.79 MG/DL — HIGH (ref 0.5–1.3)
GAS PNL BLDA: SIGNIFICANT CHANGE UP
GAS PNL BLDV: 147 MMOL/L — HIGH (ref 135–145)
GAS PNL BLDV: SIGNIFICANT CHANGE UP
GLUCOSE BLDV-MCNC: 172 MG/DL — HIGH (ref 70–99)
GLUCOSE SERPL-MCNC: 145 MG/DL — HIGH (ref 70–99)
GLUCOSE SERPL-MCNC: 180 MG/DL — HIGH (ref 70–99)
HCO3 BLDV-SCNC: 24 MMOL/L — SIGNIFICANT CHANGE UP (ref 21–29)
HCO3 BLDV-SCNC: 25 MMOL/L — SIGNIFICANT CHANGE UP (ref 21–29)
HCT VFR BLD CALC: 22.3 % — LOW (ref 39–50)
HCT VFR BLDA CALC: 23 % — LOW (ref 39–50)
HGB BLD CALC-MCNC: 7.4 G/DL — LOW (ref 13–17)
HGB BLD-MCNC: 7.1 G/DL — LOW (ref 13–17)
HOROWITZ INDEX BLDV+IHG-RTO: 36 — SIGNIFICANT CHANGE UP
HOROWITZ INDEX BLDV+IHG-RTO: 40 — SIGNIFICANT CHANGE UP
INR BLD: 1.17 RATIO — HIGH (ref 0.88–1.16)
LACTATE BLDV-MCNC: 1.1 MMOL/L — SIGNIFICANT CHANGE UP (ref 0.7–2)
MAGNESIUM SERPL-MCNC: 2.8 MG/DL — HIGH (ref 1.6–2.6)
MCHC RBC-ENTMCNC: 28.9 PG — SIGNIFICANT CHANGE UP (ref 27–34)
MCHC RBC-ENTMCNC: 31.8 GM/DL — LOW (ref 32–36)
MCV RBC AUTO: 90.7 FL — SIGNIFICANT CHANGE UP (ref 80–100)
NRBC # BLD: 0 /100 WBCS — SIGNIFICANT CHANGE UP (ref 0–0)
OTHER CELLS CSF MANUAL: 8 ML/DL — LOW (ref 18–22)
PCO2 BLDV: 52 MMHG — HIGH (ref 35–50)
PCO2 BLDV: 52 MMHG — HIGH (ref 35–50)
PH BLDV: 7.29 — LOW (ref 7.35–7.45)
PH BLDV: 7.31 — LOW (ref 7.35–7.45)
PHOSPHATE SERPL-MCNC: 4.7 MG/DL — HIGH (ref 2.5–4.5)
PLATELET # BLD AUTO: 133 K/UL — LOW (ref 150–400)
PO2 BLDV: 44 MMHG — SIGNIFICANT CHANGE UP (ref 25–45)
PO2 BLDV: 48 MMHG — HIGH (ref 25–45)
POTASSIUM BLDV-SCNC: 4.2 MMOL/L — SIGNIFICANT CHANGE UP (ref 3.5–5.3)
POTASSIUM SERPL-MCNC: 4.4 MMOL/L — SIGNIFICANT CHANGE UP (ref 3.5–5.3)
POTASSIUM SERPL-MCNC: 5.1 MMOL/L — SIGNIFICANT CHANGE UP (ref 3.5–5.3)
POTASSIUM SERPL-SCNC: 4.4 MMOL/L — SIGNIFICANT CHANGE UP (ref 3.5–5.3)
POTASSIUM SERPL-SCNC: 5.1 MMOL/L — SIGNIFICANT CHANGE UP (ref 3.5–5.3)
PROT SERPL-MCNC: 5.5 G/DL — LOW (ref 6–8.3)
PROT SERPL-MCNC: 6.4 G/DL — SIGNIFICANT CHANGE UP (ref 6–8.3)
PROTHROM AB SERPL-ACNC: 13.9 SEC — HIGH (ref 10.6–13.6)
RBC # BLD: 2.46 M/UL — LOW (ref 4.2–5.8)
RBC # FLD: 14 % — SIGNIFICANT CHANGE UP (ref 10.3–14.5)
SAO2 % BLDV: 76 % — SIGNIFICANT CHANGE UP (ref 67–88)
SAO2 % BLDV: 80 % — SIGNIFICANT CHANGE UP (ref 67–88)
SODIUM SERPL-SCNC: 143 MMOL/L — SIGNIFICANT CHANGE UP (ref 135–145)
SODIUM SERPL-SCNC: 149 MMOL/L — HIGH (ref 135–145)
WBC # BLD: 10.05 K/UL — SIGNIFICANT CHANGE UP (ref 3.8–10.5)
WBC # FLD AUTO: 10.05 K/UL — SIGNIFICANT CHANGE UP (ref 3.8–10.5)

## 2021-06-03 PROCEDURE — 99292 CRITICAL CARE ADDL 30 MIN: CPT

## 2021-06-03 PROCEDURE — 99291 CRITICAL CARE FIRST HOUR: CPT

## 2021-06-03 PROCEDURE — 36620 INSERTION CATHETER ARTERY: CPT | Mod: 78

## 2021-06-03 PROCEDURE — 71045 X-RAY EXAM CHEST 1 VIEW: CPT | Mod: 26

## 2021-06-03 RX ORDER — VASOPRESSIN 20 [USP'U]/ML
0.1 INJECTION INTRAVENOUS
Qty: 50 | Refills: 0 | Status: DISCONTINUED | OUTPATIENT
Start: 2021-06-03 | End: 2021-06-04

## 2021-06-03 RX ORDER — HYDROMORPHONE HYDROCHLORIDE 2 MG/ML
0.5 INJECTION INTRAMUSCULAR; INTRAVENOUS; SUBCUTANEOUS ONCE
Refills: 0 | Status: DISCONTINUED | OUTPATIENT
Start: 2021-06-03 | End: 2021-06-03

## 2021-06-03 RX ORDER — ENOXAPARIN SODIUM 100 MG/ML
30 INJECTION SUBCUTANEOUS DAILY
Refills: 0 | Status: DISCONTINUED | OUTPATIENT
Start: 2021-06-03 | End: 2021-06-04

## 2021-06-03 RX ORDER — ATORVASTATIN CALCIUM 80 MG/1
40 TABLET, FILM COATED ORAL DAILY
Refills: 0 | Status: DISCONTINUED | OUTPATIENT
Start: 2021-06-03 | End: 2021-06-09

## 2021-06-03 RX ORDER — CALCIUM GLUCONATE 100 MG/ML
1 VIAL (ML) INTRAVENOUS ONCE
Refills: 0 | Status: COMPLETED | OUTPATIENT
Start: 2021-06-03 | End: 2021-06-03

## 2021-06-03 RX ORDER — HYDROMORPHONE HYDROCHLORIDE 2 MG/ML
0.5 INJECTION INTRAMUSCULAR; INTRAVENOUS; SUBCUTANEOUS ONCE
Refills: 0 | Status: DISCONTINUED | OUTPATIENT
Start: 2021-06-03 | End: 2021-06-04

## 2021-06-03 RX ORDER — ASPIRIN/CALCIUM CARB/MAGNESIUM 324 MG
81 TABLET ORAL DAILY
Refills: 0 | Status: DISCONTINUED | OUTPATIENT
Start: 2021-06-03 | End: 2021-06-09

## 2021-06-03 RX ORDER — FENTANYL CITRATE 50 UG/ML
50 INJECTION INTRAVENOUS ONCE
Refills: 0 | Status: DISCONTINUED | OUTPATIENT
Start: 2021-06-03 | End: 2021-06-03

## 2021-06-03 RX ORDER — FUROSEMIDE 40 MG
10 TABLET ORAL ONCE
Refills: 0 | Status: COMPLETED | OUTPATIENT
Start: 2021-06-03 | End: 2021-06-03

## 2021-06-03 RX ORDER — NOREPINEPHRINE BITARTRATE/D5W 8 MG/250ML
0.05 PLASTIC BAG, INJECTION (ML) INTRAVENOUS
Qty: 8 | Refills: 0 | Status: DISCONTINUED | OUTPATIENT
Start: 2021-06-03 | End: 2021-06-03

## 2021-06-03 RX ORDER — ACETAMINOPHEN 500 MG
1000 TABLET ORAL ONCE
Refills: 0 | Status: COMPLETED | OUTPATIENT
Start: 2021-06-03 | End: 2021-06-03

## 2021-06-03 RX ORDER — BUMETANIDE 0.25 MG/ML
0.5 INJECTION INTRAMUSCULAR; INTRAVENOUS
Qty: 20 | Refills: 0 | Status: DISCONTINUED | OUTPATIENT
Start: 2021-06-03 | End: 2021-06-04

## 2021-06-03 RX ORDER — INSULIN LISPRO 100/ML
VIAL (ML) SUBCUTANEOUS
Refills: 0 | Status: DISCONTINUED | OUTPATIENT
Start: 2021-06-03 | End: 2021-06-05

## 2021-06-03 RX ORDER — ALBUMIN HUMAN 25 %
250 VIAL (ML) INTRAVENOUS ONCE
Refills: 0 | Status: COMPLETED | OUTPATIENT
Start: 2021-06-03 | End: 2021-06-03

## 2021-06-03 RX ORDER — PANTOPRAZOLE SODIUM 20 MG/1
40 TABLET, DELAYED RELEASE ORAL
Refills: 0 | Status: DISCONTINUED | OUTPATIENT
Start: 2021-06-03 | End: 2021-06-09

## 2021-06-03 RX ADMIN — Medication 1000 MILLIGRAM(S): at 02:29

## 2021-06-03 RX ADMIN — Medication 400 MILLIGRAM(S): at 02:12

## 2021-06-03 RX ADMIN — Medication 2: at 23:10

## 2021-06-03 RX ADMIN — Medication 10 MILLIGRAM(S): at 21:34

## 2021-06-03 RX ADMIN — Medication 400 MILLIGRAM(S): at 21:35

## 2021-06-03 RX ADMIN — HYDROMORPHONE HYDROCHLORIDE 0.5 MILLIGRAM(S): 2 INJECTION INTRAMUSCULAR; INTRAVENOUS; SUBCUTANEOUS at 04:58

## 2021-06-03 RX ADMIN — OXYCODONE AND ACETAMINOPHEN 2 TABLET(S): 5; 325 TABLET ORAL at 14:15

## 2021-06-03 RX ADMIN — HYDROMORPHONE HYDROCHLORIDE 0.5 MILLIGRAM(S): 2 INJECTION INTRAMUSCULAR; INTRAVENOUS; SUBCUTANEOUS at 02:29

## 2021-06-03 RX ADMIN — ATORVASTATIN CALCIUM 40 MILLIGRAM(S): 80 TABLET, FILM COATED ORAL at 04:58

## 2021-06-03 RX ADMIN — HYDROMORPHONE HYDROCHLORIDE 0.5 MILLIGRAM(S): 2 INJECTION INTRAMUSCULAR; INTRAVENOUS; SUBCUTANEOUS at 06:38

## 2021-06-03 RX ADMIN — OXYCODONE AND ACETAMINOPHEN 2 TABLET(S): 5; 325 TABLET ORAL at 14:45

## 2021-06-03 RX ADMIN — HYDROMORPHONE HYDROCHLORIDE 0.5 MILLIGRAM(S): 2 INJECTION INTRAMUSCULAR; INTRAVENOUS; SUBCUTANEOUS at 20:00

## 2021-06-03 RX ADMIN — HYDROMORPHONE HYDROCHLORIDE 0.5 MILLIGRAM(S): 2 INJECTION INTRAMUSCULAR; INTRAVENOUS; SUBCUTANEOUS at 09:07

## 2021-06-03 RX ADMIN — HYDROMORPHONE HYDROCHLORIDE 0.5 MILLIGRAM(S): 2 INJECTION INTRAMUSCULAR; INTRAVENOUS; SUBCUTANEOUS at 20:15

## 2021-06-03 RX ADMIN — Medication 100 MILLIGRAM(S): at 05:02

## 2021-06-03 RX ADMIN — HYDROMORPHONE HYDROCHLORIDE 0.5 MILLIGRAM(S): 2 INJECTION INTRAMUSCULAR; INTRAVENOUS; SUBCUTANEOUS at 16:13

## 2021-06-03 RX ADMIN — HYDROMORPHONE HYDROCHLORIDE 0.5 MILLIGRAM(S): 2 INJECTION INTRAMUSCULAR; INTRAVENOUS; SUBCUTANEOUS at 15:54

## 2021-06-03 RX ADMIN — OXYCODONE AND ACETAMINOPHEN 1 TABLET(S): 5; 325 TABLET ORAL at 09:07

## 2021-06-03 RX ADMIN — FENTANYL CITRATE 50 MICROGRAM(S): 50 INJECTION INTRAVENOUS at 05:13

## 2021-06-03 RX ADMIN — CHLORHEXIDINE GLUCONATE 1 APPLICATION(S): 213 SOLUTION TOPICAL at 05:17

## 2021-06-03 RX ADMIN — HYDROMORPHONE HYDROCHLORIDE 0.5 MILLIGRAM(S): 2 INJECTION INTRAMUSCULAR; INTRAVENOUS; SUBCUTANEOUS at 06:53

## 2021-06-03 RX ADMIN — PANTOPRAZOLE SODIUM 40 MILLIGRAM(S): 20 TABLET, DELAYED RELEASE ORAL at 08:01

## 2021-06-03 RX ADMIN — Medication 10 MILLIGRAM(S): at 13:36

## 2021-06-03 RX ADMIN — Medication 2: at 16:43

## 2021-06-03 RX ADMIN — POLYETHYLENE GLYCOL 3350 17 GRAM(S): 17 POWDER, FOR SOLUTION ORAL at 12:11

## 2021-06-03 RX ADMIN — Medication 100 GRAM(S): at 00:22

## 2021-06-03 RX ADMIN — Medication 10 MILLIGRAM(S): at 14:12

## 2021-06-03 RX ADMIN — Medication 125 MILLILITER(S): at 12:23

## 2021-06-03 RX ADMIN — Medication 100 MILLIGRAM(S): at 13:36

## 2021-06-03 RX ADMIN — Medication 6.94 MICROGRAM(S)/KG/MIN: at 09:15

## 2021-06-03 RX ADMIN — Medication 10 MILLIGRAM(S): at 05:01

## 2021-06-03 RX ADMIN — FENTANYL CITRATE 50 MICROGRAM(S): 50 INJECTION INTRAVENOUS at 04:58

## 2021-06-03 RX ADMIN — Medication 50 MICROGRAM(S): at 05:01

## 2021-06-03 RX ADMIN — INSULIN HUMAN 3 UNIT(S)/HR: 100 INJECTION, SOLUTION SUBCUTANEOUS at 09:15

## 2021-06-03 RX ADMIN — HYDROMORPHONE HYDROCHLORIDE 0.5 MILLIGRAM(S): 2 INJECTION INTRAMUSCULAR; INTRAVENOUS; SUBCUTANEOUS at 05:13

## 2021-06-03 RX ADMIN — HYDROMORPHONE HYDROCHLORIDE 0.5 MILLIGRAM(S): 2 INJECTION INTRAMUSCULAR; INTRAVENOUS; SUBCUTANEOUS at 12:16

## 2021-06-03 RX ADMIN — HYDROMORPHONE HYDROCHLORIDE 0.5 MILLIGRAM(S): 2 INJECTION INTRAMUSCULAR; INTRAVENOUS; SUBCUTANEOUS at 11:40

## 2021-06-03 RX ADMIN — Medication 81 MILLIGRAM(S): at 04:58

## 2021-06-03 RX ADMIN — Medication 1000 MILLIGRAM(S): at 21:50

## 2021-06-03 RX ADMIN — HYDROMORPHONE HYDROCHLORIDE 0.5 MILLIGRAM(S): 2 INJECTION INTRAMUSCULAR; INTRAVENOUS; SUBCUTANEOUS at 09:32

## 2021-06-03 RX ADMIN — OXYCODONE AND ACETAMINOPHEN 1 TABLET(S): 5; 325 TABLET ORAL at 09:32

## 2021-06-03 RX ADMIN — HYDROMORPHONE HYDROCHLORIDE 0.5 MILLIGRAM(S): 2 INJECTION INTRAMUSCULAR; INTRAVENOUS; SUBCUTANEOUS at 02:12

## 2021-06-03 RX ADMIN — VASOPRESSIN 6 UNIT(S)/MIN: 20 INJECTION INTRAVENOUS at 09:15

## 2021-06-03 RX ADMIN — BUMETANIDE 2.5 MG/HR: 0.25 INJECTION INTRAMUSCULAR; INTRAVENOUS at 16:04

## 2021-06-03 RX ADMIN — ENOXAPARIN SODIUM 30 MILLIGRAM(S): 100 INJECTION SUBCUTANEOUS at 21:34

## 2021-06-03 NOTE — PHYSICAL THERAPY INITIAL EVALUATION ADULT - PERTINENT HX OF CURRENT PROBLEM, REHAB EVAL
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 in [facility] [place] [facility]": then [facility] and AICD implanted) Aotic valve regurgitation (s/p AVR 2004), Mitral valve regurgitation, Ascending aortic dilation.

## 2021-06-03 NOTE — CONSULT NOTE ADULT - ASSESSMENT
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

## 2021-06-03 NOTE — CONSULT NOTE ADULT - TIME BILLING
SURESH that is likely ATN  Bumex gtt to ensure nonoliguria   Critically sick POD #1 open heart surgery

## 2021-06-03 NOTE — PHYSICAL THERAPY INITIAL EVALUATION ADULT - ADDITIONAL COMMENTS
Pt reports he lives with spouse in [place] in ranch home with 1 stairs to entrance. Prior to admission pt independent with all functional mobility including ambulation without AD.

## 2021-06-03 NOTE — CONSULT NOTE ADULT - SUBJECTIVE AND OBJECTIVE BOX
NEPHROLOGY - NSN    Patient seen and examined.    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 in [facility]: then [facility] and AICD implanted) Aotic valve regurgitation (s/p AVR 2004), Mitral valve regurgitation, Ascending aortic dilation. 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 Pt reports cardiology has been following this for several years. Pt denies, c/p SOB, or palpitations at this time. Pt evaluated by Dr. Fenton for scheduled Redo Sternotomy, Bentall on 6/2/21. Pt denies recent travel or sick contact.   Sternotomy/Bentall procedure /Repair, mitral valve on 6/2   EF 45 % and on Bumex gtt  Off all inotropes and only on vaso gtt   Creatinine was normal before the OR and he has had 210minutes           PAST MEDICAL & SURGICAL HISTORY:  Aortic stenosis    AICD (automatic cardioverter/defibrillator) present    Aortic valve regurgitation    Ascending aorta dilation    H/O paroxysmal supraventricular tachycardia    HLD (hyperlipidemia)    Mitral valve regurgitation    Cardiac arrest    S/P aortic valve replacement  3/13/2004    S/P hernia repair  2002    History of tonsillectomy and adenoidectomy    S/P TURP  1996    S/P colonoscopy  2001, 2002, 2006, 2011, 2016    S/P endoscopy  2006    S/P cardiac cath  1994, 1995, 1999, 2002, 2004    AICD (automatic cardioverter/defibrillator) present  12/19/17    Cardiac pacemaker  12/19/17    History of cardioversion  9/11/20    S/P ablation of atrial fibrillation  10/29/20    Status post ablation of ventricular arrhythmia  2/14/19        MEDICATIONS  (STANDING):  aspirin enteric coated 81 milliGRAM(s) Oral daily  atorvastatin 40 milliGRAM(s) Oral daily  buMETAnide Infusion 0.5 mG/Hr (2.5 mL/Hr) IV Continuous <Continuous>  chlorhexidine 0.12% Liquid 5 milliLiter(s) Oral Mucosa two times a day  chlorhexidine 2% Cloths 1 Application(s) Topical <User Schedule>  dextrose 5%. 1000 milliLiter(s) (15 mL/Hr) IV Continuous <Continuous>  dextrose 50% Injectable 50 milliLiter(s) IV Push every 15 minutes  dextrose 50% Injectable 25 milliLiter(s) IV Push every 15 minutes  DOBUTamine Infusion 4 MICROgram(s)/kG/Min (9.25 mL/Hr) IV Continuous <Continuous>  enoxaparin Injectable 30 milliGRAM(s) SubCutaneous daily  insulin lispro (ADMELOG) corrective regimen sliding scale   SubCutaneous Before meals and at bedtime  insulin regular Infusion 3 Unit(s)/Hr (3 mL/Hr) IV Continuous <Continuous>  levothyroxine 50 MICROGram(s) Oral daily  metoclopramide Injectable 10 milliGRAM(s) IV Push every 8 hours  norepinephrine Infusion 0.05 MICROgram(s)/kG/Min (7.23 mL/Hr) IV Continuous <Continuous>  pantoprazole    Tablet 40 milliGRAM(s) Oral before breakfast  polyethylene glycol 3350 17 Gram(s) Oral daily  sodium chloride 0.9%. 1000 milliLiter(s) (10 mL/Hr) IV Continuous <Continuous>  vasopressin Infusion 0.1 Unit(s)/Min (6 mL/Hr) IV Continuous <Continuous>      Allergies    No Known Allergies    Intolerances        SOCIAL HISTORY:  Denies alcohol abuse, drug abuse or tobacco usage.     FAMILY HISTORY:      VITALS:  T(C): 36.9 (06-03-21 @ 16:00), Max: 37.1 (06-03-21 @ 00:00)  HR: 81 (06-03-21 @ 17:00) (80 - 107)  BP: --  RR: 26 (06-03-21 @ 17:00) (9 - 33)  SpO2: 100% (06-03-21 @ 17:00) (92% - 100%)    REVIEW OF SYSTEMS:  Denies any nausea, vomiting, diarrhea, fever or chills. Denies chest pain, SOB, focal weakness, hematuria or dysuria. Good oral intake and denies fatigue or weakness. All other pertinent systems are reviewed and are negative.    PHYSICAL EXAM:  Constitutional: NAD  HEENT: EOMI  Neck:  No JVD, supple   Respiratory: CTA B/L  Cardiovascular: S1 and S2, RRR  Gastrointestinal: + BS, soft, NT, ND  Extremities: No peripheral edema, + peripheral pulses  Neurological: A/O x 3, CN2-12 intact  Psychiatric: Normal mood, normal affect  : No Royal  Skin: No rashes, C/D/I  Access: Not applicable    I and O's:    06-02 @ 07:01  -  06-03 @ 07:00  --------------------------------------------------------  IN: 4772.2 mL / OUT: 3190 mL / NET: 1582.2 mL    06-03 @ 07:01  -  06-03 @ 17:40  --------------------------------------------------------  IN: 829.4 mL / OUT: 710 mL / NET: 119.4 mL          LABS:                        7.1    10.05 )-----------( 133      ( 03 Jun 2021 00:26 )             22.3     06-03    143  |  107  |  30<H>  ----------------------------<  145<H>  5.1   |  20<L>  |  1.79<H>    Ca    8.4      03 Jun 2021 13:50  Phos  4.7     06-03  Mg     2.8     06-03    TPro  6.4  /  Alb  4.3  /  TBili  1.4<H>  /  DBili  x   /  AST  46<H>  /  ALT  15  /  AlkPhos  66  06-03      URINE:      RADIOLOGY & ADDITIONAL STUDIES:     NEPHROLOGY - NSN    Patient seen and examined.    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 in [facility] [place]: then [facility] and AICD implanted) Aotic valve regurgitation (s/p AVR 2004), Mitral valve regurgitation, Ascending aortic dilation. 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 Pt reports cardiology has been following this for several years. Pt denies, c/p SOB, or palpitations at this time. Pt evaluated by Dr. Fenton for scheduled Redo Sternotomy, Bentall on 6/2/21. Pt denies recent travel or sick contact.   Sternotomy/Bentall procedure /Repair, mitral valve on 6/2   EF 45 % and on Bumex gtt  Off all pressors    Creatinine was normal before the OR and he has had 210minutes   Pt states that the right kidney is atrophic (per history)  There is no hematuria or bubbles in the urine.  No history of NSAIDS or nephrolithisis.  The patient urinates once or twice in the night and there is no incontinence.  No family hx or renal disease or back pain.    No recent abx use.  No alleviating or aggravating factors with respect to the kidneys.           PAST MEDICAL & SURGICAL HISTORY:  Aortic stenosis    AICD (automatic cardioverter/defibrillator) present    Aortic valve regurgitation    Ascending aorta dilation    H/O paroxysmal supraventricular tachycardia    HLD (hyperlipidemia)    Mitral valve regurgitation    Cardiac arrest    S/P aortic valve replacement  3/13/2004    S/P hernia repair  2002    History of tonsillectomy and adenoidectomy    S/P TURP  1996    S/P colonoscopy  2001, 2002, 2006, 2011, 2016    S/P endoscopy  2006    S/P cardiac cath  1994, 1995, 1999, 2002, 2004    AICD (automatic cardioverter/defibrillator) present  12/19/17    Cardiac pacemaker  12/19/17    History of cardioversion  9/11/20    S/P ablation of atrial fibrillation  10/29/20    Status post ablation of ventricular arrhythmia  2/14/19        MEDICATIONS  (STANDING):  aspirin enteric coated 81 milliGRAM(s) Oral daily  atorvastatin 40 milliGRAM(s) Oral daily  buMETAnide Infusion 0.5 mG/Hr (2.5 mL/Hr) IV Continuous <Continuous>  chlorhexidine 0.12% Liquid 5 milliLiter(s) Oral Mucosa two times a day  chlorhexidine 2% Cloths 1 Application(s) Topical <User Schedule>  dextrose 5%. 1000 milliLiter(s) (15 mL/Hr) IV Continuous <Continuous>  dextrose 50% Injectable 50 milliLiter(s) IV Push every 15 minutes  dextrose 50% Injectable 25 milliLiter(s) IV Push every 15 minutes  DOBUTamine Infusion 4 MICROgram(s)/kG/Min (9.25 mL/Hr) IV Continuous <Continuous>  enoxaparin Injectable 30 milliGRAM(s) SubCutaneous daily  insulin lispro (ADMELOG) corrective regimen sliding scale   SubCutaneous Before meals and at bedtime  insulin regular Infusion 3 Unit(s)/Hr (3 mL/Hr) IV Continuous <Continuous>  levothyroxine 50 MICROGram(s) Oral daily  metoclopramide Injectable 10 milliGRAM(s) IV Push every 8 hours  norepinephrine Infusion 0.05 MICROgram(s)/kG/Min (7.23 mL/Hr) IV Continuous <Continuous>  pantoprazole    Tablet 40 milliGRAM(s) Oral before breakfast  polyethylene glycol 3350 17 Gram(s) Oral daily  sodium chloride 0.9%. 1000 milliLiter(s) (10 mL/Hr) IV Continuous <Continuous>  vasopressin Infusion 0.1 Unit(s)/Min (6 mL/Hr) IV Continuous <Continuous>      Allergies    No Known Allergies    Intolerances        SOCIAL HISTORY:  Denies alcohol abuse, drug abuse or tobacco usage.     FAMILY HISTORY:      VITALS:  T(C): 36.9 (06-03-21 @ 16:00), Max: 37.1 (06-03-21 @ 00:00)  HR: 81 (06-03-21 @ 17:00) (80 - 107)  BP: --  RR: 26 (06-03-21 @ 17:00) (9 - 33)  SpO2: 100% (06-03-21 @ 17:00) (92% - 100%)    REVIEW OF SYSTEMS:  Denies any nausea, vomiting, diarrhea, fever or chills. Denies chest pain, SOB, focal weakness, hematuria or dysuria. Good oral intake and denies fatigue or weakness. All other pertinent systems are reviewed and are negative.    PHYSICAL EXAM:  Constitutional: NAD  HEENT: EOMI  Neck:  No JVD, supple   Respiratory: CTA B/L  Cardiovascular: S1 and S2, RRR  Gastrointestinal: + BS, soft, NT, ND  Extremities: No peripheral edema, + peripheral pulses  Neurological: A/O x 3, CN2-12 intact  Psychiatric: Normal mood, normal affect  : +  Royal  Skin: No rashes, C/D/I  Access: Not applicable    I and O's:    06-02 @ 07:01  -  06-03 @ 07:00  --------------------------------------------------------  IN: 4772.2 mL / OUT: 3190 mL / NET: 1582.2 mL    06-03 @ 07:01  -  06-03 @ 17:40  --------------------------------------------------------  IN: 829.4 mL / OUT: 710 mL / NET: 119.4 mL          LABS:                        7.1    10.05 )-----------( 133      ( 03 Jun 2021 00:26 )             22.3     06-03    143  |  107  |  30<H>  ----------------------------<  145<H>  5.1   |  20<L>  |  1.79<H>    Ca    8.4      03 Jun 2021 13:50  Phos  4.7     06-03  Mg     2.8     06-03    TPro  6.4  /  Alb  4.3  /  TBili  1.4<H>  /  DBili  x   /  AST  46<H>  /  ALT  15  /  AlkPhos  66  06-03      URINE:      RADIOLOGY & ADDITIONAL STUDIES:    < from: Xray Chest 1 View- PORTABLE-Routine (06.03.21 @ 02:29) >    EXAM:  XR CHEST PORTABLE ROUTINE 1V                            PROCEDURE DATE:  06/03/2021            INTERPRETATION:  CLINICAL INFORMATION: Status post CT surgery.    TECHNIQUE: Frontal radiograph of the chest.    COMPARISON: Frontal radiograph ofthe chest 6/2/2021.    FINDINGS:    Interval removal of endotracheal tube and enteric tube.  Status post median sternotomy.  Right IJ Cordis sheath catheter tip is in the SVC.  Mediastinal drain, left chest wall permanent pacemaker and bilateral chest tubes again seen.    Right lung is clear with resolution of previous right lower lung opacity.  Improved left retrocardiac/basilar aeration with residual subsegmental atelectasis seen.  No pleural effusion or pneumothorax.  Heart size and the mediastinum cannot be accurately evaluated on this projection.  No acute abnormality within visible osseous structures.      IMPRESSION:    Right lung is clear with resolution of previous right lower lung opacity.    Improved left retrocardiac/basilar aeration. Residual subsegmental atelectasis seen.              SANDRINE CALERO MD; Resident Radiology  This document has been electronically signed.  ULI MEYERS MD; Attending Radiologist  This document has been electronically signed. Michael  3 2021  1:31PM    < end of copied text >

## 2021-06-03 NOTE — PHYSICAL THERAPY INITIAL EVALUATION ADULT - PRECAUTIONS/LIMITATIONS, REHAB EVAL
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 Pt reports cardiology has been following this for several years. Pt evaluated by Dr. Fenton for scheduled Redo Sternotomy, Koko on 6/2/21./cardiac precautions/fall precautions/sternal precautions

## 2021-06-03 NOTE — PHYSICAL THERAPY INITIAL EVALUATION ADULT - ACTIVE RANGE OF MOTION EXAMINATION, REHAB EVAL
b/l shoulder flex assessed from 0-90 degrees/bilateral upper extremity Active ROM was WFL (within functional limits)/bilateral  lower extremity Active ROM was WFL (within functional limits)

## 2021-06-03 NOTE — PHYSICAL THERAPY INITIAL EVALUATION ADULT - CRITERIA FOR SKILLED THERAPEUTIC INTERVENTIONS
impairments found/functional limitations in following categories/rehab potential/predicted duration of therapy intervention/anticipated discharge recommendation

## 2021-06-03 NOTE — PROGRESS NOTE ADULT - SUBJECTIVE AND OBJECTIVE BOX
Patient seen and examined at the bedside.    Remained critically ill on continuous ICU monitoring.    OBJECTIVE:  T(C): 36.9 (2021 16:00), Max: 37.1 (2021 00:00)  T(F): 98.4 (2021 16:00), Max: 98.8 (2021 00:00)  HR: 81 (2021 17:00) (80 - 107)  BP: --  BP(mean): --  RR: 26 (2021 17:00) (9 - 33)  SpO2: 100% (2021 17:00) (92% - 100%)    REVIEW OF SYSTEMS:  Constitutional:     [x] negative [] fevers [] chills []fatigue            HEENT:               [x] negative [] difficulty hearing []vertigo []epistaxis     CV:                     [x] negative [ ] chest pain[] incisional chest pain [] edema                 Resp:                  [x] negative [] wheezing [] SOB [] cough [] hemoptysis    GI:                      [x] negative [] nausea [] vomiting []  diarrhea[] constipation [] melena         :                     [x] negative [ ] hematuria [ ] dysuria[ ] urgency [] incontinence         Musculoskeletal:  [x] negative [ ] back pain [ ] myalgias [ ] arthralgias [ ] fracture  Skin:                   [x] negative [ ] rash [ ] itch  Neurological:      [x] negative []seizures []syncope []confusion    [x] All other systems negative  [ ] Unable to assess ROS due to    Physical Exam:  General: NAD  Neurology: A&Ox3, nonfocal, BRAVO x 4  Eyes: PERRLA/ EOMI, Gross vision intact  ENT/Neck: Neck supple, trachea midline, No JVD, Gross hearing intact  Respiratory: No wheezing, rhonchi. Rales noted bilaterally.  CV: RRR, S1S2, no murmurs, rubs or gallops        [] Sternal dressing, [] Mediastinal CT, [] Pleural CT, [] RAYMUNDO drain        [] Sinus rhythm, [] Afib, [] Temporary pacing, [] PPM  Abdominal: Soft, NT, ND +BS,   Extremities: 1-2+ pedal edema noted, + peripheral pulses  Skin: No Rashes, Hematoma, Ecchymosis                         LINES:  [x] Arterial Line   [x] Central Line  [ ] PA catheter  [ ] IABP  [ ] ECMO  [ ] LVAD  [ ] Ventilator  [x] PPM/AICD [ ] pacemaker [ ] Impella      RADIOLOGY:  Patient name: AAMIR BHATIA  YOB: 1940   Age: 80 (M)   MR#: [id number]  Study Date: 2021  Location: O/PSonographer: Aamir Gayle M.D.  Study quality: Technically good  Referring Physician: [employer] Joceline SORIANO M.D  Blood Pressure: 110/70 mmHg  Height: 177 cm  Weight: 77 kg  BSA: 2 m2  Heart Rate: 60 mmHg  ------------------------------------------------------------------------  PROCEDURE: Intra operative transeophageal echocardiogram  with 2D, M mode and complete  Doppler examination.  The  transesophageal probe was placed in the esophagus after  induction of anesthesia. Real-time and reconstructed  3-dimensional imaging was performed.  Color Doppler  analysis was carried out.  INDICATION: Thoracic aortic aneurysm, without rupture  (I71.2)  ------------------------------------------------------------------------  Dimensions:    Normal Values:  LA:     6.9    2.0 - 4.0 cm  Ao:     5.7    2.0 - 3.8 cm  SEPTUM:        0.6 - 1.2 cm  PWT:           0.6 - 1.1 cm  LVIDd:  6.3    3.0 - 5.6 cm  LVIDs:  4.9    1.8 - 4.0 cm  Fractional short: 22 %  EF (Visual Estimate): 45 %  EF (Teicholtz): 44 %  Doppler Peak Velocity (m/sec): AoV=1.4  ------------------------------------------------------------------------  Pre-Bypass Observations:  Mitral Valve: Mitral annular calcification is seen. The  posterior leaflet is tethered, and there is severe  eccentric mitral regurgitation seen. The A2 leaflet tip or  an anterior leaflet cordae appears to be torn. Severe  posteriorly directed eccentric jet.  Aortic Valve/Aorta: Bioprosthetic aortic valve replacement.  All leaflets opening, valve appears well seated, no PVL  seen. Peak transaortic valve gradient equals 8 mm Hg, mean  transaortic valve gradient equals 4 mm Hg, aortic valve  velocity time integral equals 28 cm, estimated aortic valve  area equals 2.1 sqcm. Mild central aortic regurgitation.  Peak left ventricular outflow tract gradient equals 2 mm  Hg, mean gradient is equal to 1 mm Hg, LVOT velocity time  integral equals 17 cm.  Aortic Root: 5.7 cm.  Sinotubular Junction: 6 cm.  Ascending Aorta: 4.7 cm.  LVOT diameter: 2.1 cm.  Atheroma Thickness: 1.9 mm (Grade 1).  Descending Thoracic Aorta: 2.84  No aortic dissection seen.  Left Atrium: Severe left atrial enlargement. There is no  thrombus seen in the left atrial appendage.  Left Ventricle: Moderate segmental left ventricular  systolic dysfunction. LVEF 45%. Inferior wall akinesis.  Mild left ventricular enlargement. Indeterminate diastolic  function.  Right Heart: Normal right atrium. Normal right ventricular  size and function. Normal tricuspid valve. Mild tricuspid  regurgitation. Normal pulmonic valve. Minimal pulmonic  regurgitation.  Pericardium/Pleura: Normal pericardium with no pericardial  effusion.  Hemodynamic: Agitated saline injection and color flow  Doppler demonstrates no evidence of a patent foramen ovale.  ------------------------------------------------------------------------  Post-Bypass Observations:    s/p aortic root replacement with Bentall, s/p MV repair  with tati suture. On milrinone at  0.25mcg/kg/min. Paced  rhythm. LVEF 45%, RVF Normal, Mitral regurgitation has  improved to moderate, mean grad 5mmHg, peak 9mmHg. Mild  TVR, minimal PI. No aortic dissection seen. All findings  communicated with Dr. Fenton.  ------------------------------------------------------------------------  Conclusions:  1. Mitral annular calcification is seen. The posterior  leaflet is tethered, and there is severe eccentric mitral  regurgitation seen. The A2 leaflet tip or an anterior  leaflet cordae appears to be torn. Severe posteriorly  directed eccentric jet.  2. Bioprosthetic aortic valve replacement. All leaflets  opening, valve appears well seated, no PVL seen. Peak  transaortic valve gradient equals 8 mm Hg, mean transaortic  valve gradient equals 4 mm Hg, aortic valve velocity time  integral equals 28 cm, estimated aortic valve area equals  2.1 sqcm. Mild central aortic regurgitation.  3. Aortic Root: 5.7 cm.  Sinotubular Junction: 6 cm.  Ascending Aorta: 4.7 cm.  LVOT diameter: 2.1 cm.  Atheroma Thickness: 1.9 mm (Grade 1).  Descending Thoracic Aorta: 2.84  No aortic dissection seen.  4. Severe left atrial enlargement. There is no thrombus  seen in the left atrial appendage.  5. Mild left ventricular enlargement.  6. Moderate segmental left ventricular systolic  dysfunction. LVEF 45%. Peak left ventricular outflow tract  gradient equals 2 mm Hg, mean gradient is equal to 1 mm Hg,  LVOT velocity time integral equals 17 cm. Inferior wall  akinesis.  7. Septal Dyssynchrony consistent with ventricular pacing.  8. Indeterminate diastolic function.  9. Normal right atrium.  10. Normal right ventricular size and function.  11. Normal tricuspid valve. Mild tricuspid regurgitation.  12. Normal pulmonic valve. Minimal pulmonic regurgitation.  13. Agitated saline injection and color flow Doppler  demonstrates no evidence of a patent foramen ovale.  14. Normal pericardium with no pericardial effusion.  All findings communicated with Dr. Fenton  Confirmed on  2021 - 16:40:47 by Aamir Gayle M.D.  ------------------------------------------------------------------------    Assessment:    Plan:   ***Neuro***  Oxycodone PRN for analgesia.     [x] Nonfocal  [] Sedated  [] Motor Strength  [] Speech    ***Cardiovascular***    Hemodynamic lability, instability. Requires IVCD [x]  []Epi  []Primacor  [x]Levo  [x] Vaso  [] Cardene  [] Nitroprusside  Anticoagulation: [] Heparin  [] Argatroban [] Lovenox  [] Coumadin // [] HIT positive [] MALATHI positive  Antiplatelet therapy: [x] ASA  [] Brilinta  [] Plavix     CHF- acute [x]   chronic [x]    systolic [x]   diatolic [ ]          - Echo- EF -         [] ECMO: ____ rpm, ____ flow.   [] LVAD: _____rpm, _____flow.              - Monitor hemolysis labs including LDH, haptoglobin levels.    ***Pulmonary***  Receiving supplemental O2 therapy with BIPAP/ 6L NC.  Continue to monitor RR, breathing pattern, pulse ox, and ABG's.  Encourage incentive spirometry.   Ventilator Management:  []AC-rest  [] CPAP-PS Wean  []Trach Collar  []Extubate  [] T-Piece  []peep>5   Mode: CPAP with PS  FiO2: 40  PEEP: 5  PS: 5  MAP: 10  PIP: 18                ***GI***  Tolerating PO consistent carb diet.     Stress ulcer prophylaxis: [x] Protonix  [] Pepcid    ***Renal***  SURESH   Continue to monitor I/Os, BUN/Creatinine.   Replete lytes PRN. Keep K> 4 and Mg >2.    I&O's Summary    2021 07:  -  2021 07:00  --------------------------------------------------------  IN: 4772.2 mL / OUT: 3190 mL / NET: 1582.2 mL    2021 07: 17:41  --------------------------------------------------------  IN: 829.4 mL / OUT: 710 mL / NET: 119.4 mL      ***ID***      ***Endocrine***  [] Hyperglycemia  [] DM2 [] DM1 [] Prediabetes : HbA1c 5.4%             - [] Insulin gtt  [x] ISS  [] NPH  [] Lantus             - Need tight glycemic control to prevent wound infection.    [x] Acquired Hypothyroidism: Continue with Synthroid.    ALL MEDICATIONS (delete after use)  MEDICATIONS  (STANDING):  aspirin enteric coated 81 milliGRAM(s) Oral daily  atorvastatin 40 milliGRAM(s) Oral daily  buMETAnide Infusion 0.5 mG/Hr (2.5 mL/Hr) IV Continuous <Continuous>  chlorhexidine 0.12% Liquid 5 milliLiter(s) Oral Mucosa two times a day  chlorhexidine 2% Cloths 1 Application(s) Topical <User Schedule>  dextrose 5%. 1000 milliLiter(s) (15 mL/Hr) IV Continuous <Continuous>  dextrose 50% Injectable 50 milliLiter(s) IV Push every 15 minutes  dextrose 50% Injectable 25 milliLiter(s) IV Push every 15 minutes  DOBUTamine Infusion 4 MICROgram(s)/kG/Min (9.25 mL/Hr) IV Continuous <Continuous>  enoxaparin Injectable 30 milliGRAM(s) SubCutaneous daily  insulin lispro (ADMELOG) corrective regimen sliding scale   SubCutaneous Before meals and at bedtime  insulin regular Infusion 3 Unit(s)/Hr (3 mL/Hr) IV Continuous <Continuous>  levothyroxine 50 MICROGram(s) Oral daily  metoclopramide Injectable 10 milliGRAM(s) IV Push every 8 hours  norepinephrine Infusion 0.05 MICROgram(s)/kG/Min (7.23 mL/Hr) IV Continuous <Continuous>  pantoprazole    Tablet 40 milliGRAM(s) Oral before breakfast  polyethylene glycol 3350 17 Gram(s) Oral daily  sodium chloride 0.9%. 1000 milliLiter(s) (10 mL/Hr) IV Continuous <Continuous>  vasopressin Infusion 0.1 Unit(s)/Min (6 mL/Hr) IV Continuous <Continuous>    MEDICATIONS  (PRN):  oxycodone    5 mG/acetaminophen 325 mG 1 Tablet(s) Oral every 6 hours PRN Mild Pain (1 - 3)  oxycodone    5 mG/acetaminophen 325 mG 2 Tablet(s) Oral every 6 hours PRN Moderate Pain (4 - 6)        Patient requires continuous monitoring with bedside rhythm monitoring, pulse oximetry monitoring, and continuous central venous and arterial pressure monitoring; and intermittent blood gas analysis. Care plan discussed with the ICU care team.   Patient remained critical, at risk for life threatening decompensation.    I have spent 30 minutes providing critical care management to this patient.    By signing my name below, I, Wil Braden, attest that this documentation has been prepared under the direction and in the presence of Russell Dael MD   Electronically signed: Home Rose, ((TIMESTAMP))    I, Russell Deal, personally performed the services described in this documentation. all medical record entries made by the scribe were at my direction and in my presence. I have reviewed the chart and agree that the record reflects my personal performance and is accurate and complete  Electronically signed: Wil Braden MD          Patient seen and examined at the bedside.    Remained critically ill on continuous ICU monitoring.    OBJECTIVE:  T(C): 36.9 (2021 16:00), Max: 37.1 (2021 00:00)  T(F): 98.4 (2021 16:00), Max: 98.8 (2021 00:00)  HR: 81 (2021 17:00) (80 - 107)  BP: --  BP(mean): --  RR: 26 (2021 17:00) (9 - 33)  SpO2: 100% (2021 17:00) (92% - 100%)    REVIEW OF SYSTEMS:  Constitutional:     [x] negative [] fevers [] chills []fatigue            HEENT:               [x] negative [] difficulty hearing []vertigo []epistaxis     CV:                     [x] negative [ ] chest pain[] incisional chest pain [] edema                 Resp:                  [x] negative [] wheezing [] SOB [] cough [] hemoptysis    GI:                      [x] negative [] nausea [] vomiting []  diarrhea[] constipation [] melena         :                     [x] negative [ ] hematuria [ ] dysuria[ ] urgency [] incontinence         Musculoskeletal:  [x] negative [ ] back pain [ ] myalgias [ ] arthralgias [ ] fracture  Skin:                   [x] negative [ ] rash [ ] itch  Neurological:      [x] negative []seizures []syncope []confusion    [x] All other systems negative  [ ] Unable to assess ROS due to    Physical Exam:  General: NAD  Neurology: A&Ox3, nonfocal, BRAVO x 4  Eyes: PERRLA/ EOMI, Gross vision intact  ENT/Neck: Neck supple, trachea midline, No JVD, Gross hearing intact  Respiratory: No wheezing, rhonchi. Rales noted bilaterally.  CV: RRR, S1S2, no murmurs, rubs or gallops        [] Sternal dressing, [] Mediastinal CT, [] Pleural CT, [] RAYMUNDO drain        [] Sinus rhythm, [] Afib, [] Temporary pacing, [] PPM  Abdominal: Soft, NT, ND +BS,   Extremities: 1-2+ pedal edema noted, + peripheral pulses  Skin: No Rashes, Hematoma, Ecchymosis                         LINES:  [x] Arterial Line   [x] Central Line  [ ] PA catheter  [ ] IABP  [ ] ECMO  [ ] LVAD  [ ] Ventilator  [x] PPM/AICD [ ] pacemaker [ ] Impella      RADIOLOGY:  Patient name: AAMIR BHATIA  YOB: 1940   Age: 80 (M)   MR#: [id number]  Study Date: 2021  Location: O/PSonographer: Aamir Gayle M.D.  Study quality: Technically good  Referring Physician: [employer] Joceline SORIANO M.D  Blood Pressure: 110/70 mmHg  Height: 177 cm  Weight: 77 kg  BSA: 2 m2  Heart Rate: 60 mmHg  ------------------------------------------------------------------------  PROCEDURE: Intra operative transeophageal echocardiogram  with 2D, M mode and complete  Doppler examination.  The  transesophageal probe was placed in the esophagus after  induction of anesthesia. Real-time and reconstructed  3-dimensional imaging was performed.  Color Doppler  analysis was carried out.  INDICATION: Thoracic aortic aneurysm, without rupture  (I71.2)  ------------------------------------------------------------------------  Dimensions:    Normal Values:  LA:     6.9    2.0 - 4.0 cm  Ao:     5.7    2.0 - 3.8 cm  SEPTUM:        0.6 - 1.2 cm  PWT:           0.6 - 1.1 cm  LVIDd:  6.3    3.0 - 5.6 cm  LVIDs:  4.9    1.8 - 4.0 cm  Fractional short: 22 %  EF (Visual Estimate): 45 %  EF (Teicholtz): 44 %  Doppler Peak Velocity (m/sec): AoV=1.4  ------------------------------------------------------------------------  Pre-Bypass Observations:  Mitral Valve: Mitral annular calcification is seen. The  posterior leaflet is tethered, and there is severe  eccentric mitral regurgitation seen. The A2 leaflet tip or  an anterior leaflet cordae appears to be torn. Severe  posteriorly directed eccentric jet.  Aortic Valve/Aorta: Bioprosthetic aortic valve replacement.  All leaflets opening, valve appears well seated, no PVL  seen. Peak transaortic valve gradient equals 8 mm Hg, mean  transaortic valve gradient equals 4 mm Hg, aortic valve  velocity time integral equals 28 cm, estimated aortic valve  area equals 2.1 sqcm. Mild central aortic regurgitation.  Peak left ventricular outflow tract gradient equals 2 mm  Hg, mean gradient is equal to 1 mm Hg, LVOT velocity time  integral equals 17 cm.  Aortic Root: 5.7 cm.  Sinotubular Junction: 6 cm.  Ascending Aorta: 4.7 cm.  LVOT diameter: 2.1 cm.  Atheroma Thickness: 1.9 mm (Grade 1).  Descending Thoracic Aorta: 2.84  No aortic dissection seen.  Left Atrium: Severe left atrial enlargement. There is no  thrombus seen in the left atrial appendage.  Left Ventricle: Moderate segmental left ventricular  systolic dysfunction. LVEF 45%. Inferior wall akinesis.  Mild left ventricular enlargement. Indeterminate diastolic  function.  Right Heart: Normal right atrium. Normal right ventricular  size and function. Normal tricuspid valve. Mild tricuspid  regurgitation. Normal pulmonic valve. Minimal pulmonic  regurgitation.  Pericardium/Pleura: Normal pericardium with no pericardial  effusion.  Hemodynamic: Agitated saline injection and color flow  Doppler demonstrates no evidence of a patent foramen ovale.  ------------------------------------------------------------------------  Post-Bypass Observations:    s/p aortic root replacement with Bentall, s/p MV repair  with tati suture. On milrinone at  0.25mcg/kg/min. Paced  rhythm. LVEF 45%, RVF Normal, Mitral regurgitation has  improved to moderate, mean grad 5mmHg, peak 9mmHg. Mild  TVR, minimal PI. No aortic dissection seen. All findings  communicated with Dr. Fenton.  ------------------------------------------------------------------------  Conclusions:  1. Mitral annular calcification is seen. The posterior  leaflet is tethered, and there is severe eccentric mitral  regurgitation seen. The A2 leaflet tip or an anterior  leaflet cordae appears to be torn. Severe posteriorly  directed eccentric jet.  2. Bioprosthetic aortic valve replacement. All leaflets  opening, valve appears well seated, no PVL seen. Peak  transaortic valve gradient equals 8 mm Hg, mean transaortic  valve gradient equals 4 mm Hg, aortic valve velocity time  integral equals 28 cm, estimated aortic valve area equals  2.1 sqcm. Mild central aortic regurgitation.  3. Aortic Root: 5.7 cm.  Sinotubular Junction: 6 cm.  Ascending Aorta: 4.7 cm.  LVOT diameter: 2.1 cm.  Atheroma Thickness: 1.9 mm (Grade 1).  Descending Thoracic Aorta: 2.84  No aortic dissection seen.  4. Severe left atrial enlargement. There is no thrombus  seen in the left atrial appendage.  5. Mild left ventricular enlargement.  6. Moderate segmental left ventricular systolic  dysfunction. LVEF 45%. Peak left ventricular outflow tract  gradient equals 2 mm Hg, mean gradient is equal to 1 mm Hg,  LVOT velocity time integral equals 17 cm. Inferior wall  akinesis.  7. Septal Dyssynchrony consistent with ventricular pacing.  8. Indeterminate diastolic function.  9. Normal right atrium.  10. Normal right ventricular size and function.  11. Normal tricuspid valve. Mild tricuspid regurgitation.  12. Normal pulmonic valve. Minimal pulmonic regurgitation.  13. Agitated saline injection and color flow Doppler  demonstrates no evidence of a patent foramen ovale.  14. Normal pericardium with no pericardial effusion.  All findings communicated with Dr. Fenton  Confirmed on  2021 - 16:40:47 by Aamir Gayle M.D.  ------------------------------------------------------------------------    Assessment:  80 yr old male with PMH of HLD, hypothyroidism, ICM, Aortic Root Aneurysm Status Post Sternotomy repeat, Bentall Procedure, and Repair Mitral Valve with Transesophageal, Echocardiogram POD# 1, Cardiac Arrest, Hypovolemic Shock, Cardiogenic Shock, Post op respiratory insufficiency, Automatic Cardioverter/Defibrillator, Aortic valve regurgitation, Ascending aorta dilation, Mitral valve regurgitation, History of Aortic Valve replacement in , S/P ablation of ventricular arrhythmia, Hx of Hyperlipidemia, Stress Hyperglycemia, Acute Blood Loss Anemia Status Post  2 Platelets, 10 Cryo, 2 FFP, and 1000 FEIBA Elevated blood urea nitrogen, Metabolic Acidosis    Plan:   ***Neuro***  Oxycodone PRN for analgesia.   [x] Nonfocal  [] Sedated  [] Motor Strength  [] Speech    ***Cardiovascular***  S/P AVR, MR, Asc Ao dilatation in 2004.  Aortic Root Aneurysm now recovering S/P Re-op Bentall, MV annuloplasty POD# 1.   PPM, paced at 80VVI.   Lipitor for recent tissue valve procedure.   Hemodynamic lability, instability. Requires IVCD [x]  []Epi  []Primacor  [x]Levo  [x] Vaso  [] Cardene  [] Nitroprusside  Anticoagulation: [] Heparin  [] Argatroban [x] Lovenox  [] Coumadin // [] HIT positive [] MALATHI positive  Antiplatelet therapy: [x] ASA  [] Brilinta  [] Plavix     CHF- acute [x]   chronic [x]    systolic [x]   diatolic [ ]          - Echo- EF -     45-50%    [] ECMO: ____ rpm, ____ flow.   [] LVAD: _____rpm, _____flow.              - Monitor hemolysis labs including LDH, haptoglobin levels.    ***Pulmonary***  Receiving supplemental O2 therapy with 4L NC.  Continue to monitor RR, breathing pattern, pulse ox, and ABG's.  Encourage incentive spirometry.     ***GI***  Tolerating PO consistent carb diet.   Reglan for gut motility.   Stress ulcer prophylaxis: [x] Protonix  [] Pepcid    ***Renal***  SURESH with increasing creatinine 1.3 -> 1.7, keep trending.   Continue diuresis with IV Bumex infusion with goal Fluid  overload pt to void 50-60ccs an hour of urine.   Continue to monitor I/Os, BUN/Creatinine.   Replete lytes PRN. Keep K> 4 and Mg >2.    I&O's Summary    2021 07:01  -  2021 07:00  --------------------------------------------------------  IN: 4772.2 mL / OUT: 3190 mL / NET: 1582.2 mL    2021 07:01  -  2021 17:41  --------------------------------------------------------  IN: 829.4 mL / OUT: 710 mL / NET: 119.4 mL      ***ID***  Afebrile, WBC within normal limits.   Monitor temperature and trend WBC.     ***Endocrine***  [x] Hyperglycemia  [] DM2 [] DM1 [] Prediabetes : HbA1c 5.4%             - [] Insulin gtt  [x] ISS  [] NPH  [] Lantus             - Need tight glycemic control to prevent wound infection.    [x] Acquired Hypothyroidism: Continue with Synthroid.        Patient requires continuous monitoring with bedside rhythm monitoring, pulse oximetry monitoring, and continuous central venous and arterial pressure monitoring; and intermittent blood gas analysis. Care plan discussed with the ICU care team.   Patient remained critical, at risk for life threatening decompensation.    I have spent 30 minutes providing critical care management to this patient.    By signing my name below, I, Wil Braden, attest that this documentation has been prepared under the direction and in the presence of Russell Deal MD   Electronically signed: Home Rose, ((TIMESTAMP))    I, Russell Deal, personally performed the services described in this documentation. all medical record entries made by the scribe were at my direction and in my presence. I have reviewed the chart and agree that the record reflects my personal performance and is accurate and complete  Electronically signed: Wil Braden MD          Patient seen and examined at the bedside.    Remained critically ill on continuous ICU monitoring.    OBJECTIVE:  T(C): 36.9 (2021 16:00), Max: 37.1 (2021 00:00)  T(F): 98.4 (2021 16:00), Max: 98.8 (2021 00:00)  HR: 81 (2021 17:00) (80 - 107)  BP: --  BP(mean): --  RR: 26 (2021 17:00) (9 - 33)  SpO2: 100% (2021 17:00) (92% - 100%)    REVIEW OF SYSTEMS:  Constitutional:     [x] negative [] fevers [] chills []fatigue            HEENT:               [x] negative [] difficulty hearing []vertigo []epistaxis     CV:                     [x] negative [ ] chest pain[] incisional chest pain [] edema                 Resp:                  [x] negative [] wheezing [] SOB [] cough [] hemoptysis    GI:                      [x] negative [] nausea [] vomiting []  diarrhea[] constipation [] melena         :                     [x] negative [ ] hematuria [ ] dysuria[ ] urgency [] incontinence         Musculoskeletal:  [x] negative [ ] back pain [ ] myalgias [ ] arthralgias [ ] fracture  Skin:                   [x] negative [ ] rash [ ] itch  Neurological:      [x] negative []seizures []syncope []confusion    [x] All other systems negative  [ ] Unable to assess ROS due to    Physical Exam:  General: in bed with multiple lines, gtt & drains   Neurology: A&Ox3, nonfocal, BRAVO x 4  Eyes: PERRLA/ EOMI, Gross vision intact  ENT/Neck: Neck supple, trachea midline, No JVD, Gross hearing intact  Respiratory: No wheezing, rhonchi. Rales noted bilaterally.  CV: Paced HR         [X] Sternal dressing, [X] Mediastinal CT, [X] Pleural CT, [X] RAYMUNDO drain        [] Sinus rhythm, [] Afib, [] Temporary pacing, [X] PPM  Abdominal: Soft, NT, ND +BS,   Extremities: 1-2+ pedal edema noted, + peripheral pulses  Skin: No Rashes, Hematoma, Ecchymosis                         LINES:  [x] Arterial Line   [x] Central Line  [ ] PA catheter  [ ] IABP  [ ] ECMO  [ ] LVAD  [ ] Ventilator  [x] PPM/AICD [ X] pacemaker [ ] Impella      RADIOLOGY:  Patient name: AAMIR BHATIA  YOB: 1940   Age: 80 (M)   MR#: [id number]  Study Date: 2021  Location: O/PSonographer: Aamir Gayle M.D.  Study quality: Technically good  Referring Physician: [employer] [facility] Joceline SORIANO M.D  Blood Pressure: 110/70 mmHg  Height: 177 cm  Weight: 77 kg  BSA: 2 m2  Heart Rate: 60 mmHg  ------------------------------------------------------------------------  PROCEDURE: Intra operative transeophageal echocardiogram  with 2D, M mode and complete  Doppler examination.  The  transesophageal probe was placed in the esophagus after  induction of anesthesia. Real-time and reconstructed  3-dimensional imaging was performed.  Color Doppler  analysis was carried out.  INDICATION: Thoracic aortic aneurysm, without rupture  (I71.2)  ------------------------------------------------------------------------  Dimensions:    Normal Values:  LA:     6.9    2.0 - 4.0 cm  Ao:     5.7    2.0 - 3.8 cm  SEPTUM:        0.6 - 1.2 cm  PWT:           0.6 - 1.1 cm  LVIDd:  6.3    3.0 - 5.6 cm  LVIDs:  4.9    1.8 - 4.0 cm  Fractional short: 22 %  EF (Visual Estimate): 45 %  EF (Teicholtz): 44 %  Doppler Peak Velocity (m/sec): AoV=1.4  ------------------------------------------------------------------------  Pre-Bypass Observations:  Mitral Valve: Mitral annular calcification is seen. The  posterior leaflet is tethered, and there is severe  eccentric mitral regurgitation seen. The A2 leaflet tip or  an anterior leaflet cordae appears to be torn. Severe  posteriorly directed eccentric jet.  Aortic Valve/Aorta: Bioprosthetic aortic valve replacement.  All leaflets opening, valve appears well seated, no PVL  seen. Peak transaortic valve gradient equals 8 mm Hg, mean  transaortic valve gradient equals 4 mm Hg, aortic valve  velocity time integral equals 28 cm, estimated aortic valve  area equals 2.1 sqcm. Mild central aortic regurgitation.  Peak left ventricular outflow tract gradient equals 2 mm  Hg, mean gradient is equal to 1 mm Hg, LVOT velocity time  integral equals 17 cm.  Aortic Root: 5.7 cm.  Sinotubular Junction: 6 cm.  Ascending Aorta: 4.7 cm.  LVOT diameter: 2.1 cm.  Atheroma Thickness: 1.9 mm (Grade 1).  Descending Thoracic Aorta: 2.84  No aortic dissection seen.  Left Atrium: Severe left atrial enlargement. There is no  thrombus seen in the left atrial appendage.  Left Ventricle: Moderate segmental left ventricular  systolic dysfunction. LVEF 45%. Inferior wall akinesis.  Mild left ventricular enlargement. Indeterminate diastolic  function.  Right Heart: Normal right atrium. Normal right ventricular  size and function. Normal tricuspid valve. Mild tricuspid  regurgitation. Normal pulmonic valve. Minimal pulmonic  regurgitation.  Pericardium/Pleura: Normal pericardium with no pericardial  effusion.  Hemodynamic: Agitated saline injection and color flow  Doppler demonstrates no evidence of a patent foramen ovale.  ------------------------------------------------------------------------  Post-Bypass Observations:    s/p aortic root replacement with Bentall, s/p MV repair  with tati suture. On milrinone at  0.25mcg/kg/min. Paced  rhythm. LVEF 45%, RVF Normal, Mitral regurgitation has  improved to moderate, mean grad 5mmHg, peak 9mmHg. Mild  TVR, minimal PI. No aortic dissection seen. All findings  communicated with Dr. Fenton.  ------------------------------------------------------------------------  Conclusions:  1. Mitral annular calcification is seen. The posterior  leaflet is tethered, and there is severe eccentric mitral  regurgitation seen. The A2 leaflet tip or an anterior  leaflet cordae appears to be torn. Severe posteriorly  directed eccentric jet.  2. Bioprosthetic aortic valve replacement. All leaflets  opening, valve appears well seated, no PVL seen. Peak  transaortic valve gradient equals 8 mm Hg, mean transaortic  valve gradient equals 4 mm Hg, aortic valve velocity time  integral equals 28 cm, estimated aortic valve area equals  2.1 sqcm. Mild central aortic regurgitation.  3. Aortic Root: 5.7 cm.  Sinotubular Junction: 6 cm.  Ascending Aorta: 4.7 cm.  LVOT diameter: 2.1 cm.  Atheroma Thickness: 1.9 mm (Grade 1).  Descending Thoracic Aorta: 2.84  No aortic dissection seen.  4. Severe left atrial enlargement. There is no thrombus  seen in the left atrial appendage.  5. Mild left ventricular enlargement.  6. Moderate segmental left ventricular systolic  dysfunction. LVEF 45%. Peak left ventricular outflow tract  gradient equals 2 mm Hg, mean gradient is equal to 1 mm Hg,  LVOT velocity time integral equals 17 cm. Inferior wall  akinesis.  7. Septal Dyssynchrony consistent with ventricular pacing.  8. Indeterminate diastolic function.  9. Normal right atrium.  10. Normal right ventricular size and function.  11. Normal tricuspid valve. Mild tricuspid regurgitation.  12. Normal pulmonic valve. Minimal pulmonic regurgitation.  13. Agitated saline injection and color flow Doppler  demonstrates no evidence of a patent foramen ovale.  14. Normal pericardium with no pericardial effusion.  All findings communicated with Dr. Fenton  Confirmed on  2021 - 16:40:47 by Aamir Gayle M.D.  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   [x] Nonfocal  [] Sedated  [] Motor Strength  [] Speech    ***Cardiovascular***  Redo sternotomy Biobentall, MV repair  Post cardiotomy shock    requiring inotropic & pressors  Anemic required I unit PRBC   Episodes of PVC/ NSVT / AICD in place   Hemodynamic lability, instability. Requires IVCD [x]  []Epi  []Primacor  [x]Levo  [x] Vaso  [] Cardene  [] Nitroprusside  Anticoagulation: [] Heparin  [] Argatroban [x] Lovenox  [] Coumadin // [] HIT positive [] MALATHI positive  Antiplatelet therapy: [x] ASA  [] Brilinta  [] Plavix     Continue invasive hemodynamic monitoring   Trend perfusion indices   SURESH/ Oliguric / started on BUMEX gtt     CHF- acute [x]   chronic [x]    systolic [x]   diastolic [ ]          - Echo- EF -     45-50%    [] ECMO: ____ rpm, ____ flow.   [] LVAD: _____rpm, _____flow.              - Monitor hemolysis labs including LDH, haptoglobin levels.    ***Pulmonary***  S/P Extubated   Receiving supplemental O2 therapy with 4L NC.  Continue to monitor RR, breathing pattern, pulse ox, and ABG's.  Encourage incentive spirometry.   Lung expansion maneuvers  maintain all chest tubes     ***GI***  Tolerating PO consistent carb diet.   Reglan for gut motility.   Stress ulcer prophylaxis: [x] Protonix  [] Pepcid    ***Renal***  SURESH with increasing creatinine 1.3 -> 1.7, keep trending.   Continue diuresis with IV Bumex infusion with goal Fluid  overload pt to void 50-60ccs an hour of urine.   Continue to monitor I/Os, BUN/Creatinine.   Replete lytes PRN. Keep K> 4 and Mg >2.    I&O's Summary    2021 07:  -  2021 07:00  --------------------------------------------------------  IN: 4772.2 mL / OUT: 3190 mL / NET: 1582.2 mL    2021 07:01  -  2021 17:41  --------------------------------------------------------  IN: 829.4 mL / OUT: 710 mL / NET: 119.4 mL      ***ID***  Afebrile, WBC within normal limits.   Monitor temperature and trend WBC.     ***Endocrine***  [x] Hyperglycemia  [] DM2 [] DM1 [] Prediabetes : HbA1c 5.4%             - [] Insulin gtt  [x] ISS  [] NPH  [] Lantus             - Need tight glycemic control to prevent wound infection.    [x] Acquired Hypothyroidism: Continue with Synthroid.        Patient requires continuous monitoring with bedside rhythm monitoring, pulse oximetry monitoring, and continuous central venous and arterial pressure monitoring; and intermittent blood gas analysis. Care plan discussed with the ICU care team.   Patient remained critical, at risk for life threatening decompensation.    I have spent 30 minutes providing critical care management to this patient.    By signing my name below, I, Wil Braden, attest that this documentation has been prepared under the direction and in the presence of Russell Deal MD   Electronically signed: Home Rose, ((TIMESTAMP))    I, Russell Deal, personally performed the services described in this documentation. all medical record entries made by the scribe were at my direction and in my presence. I have reviewed the chart and agree that the record reflects my personal performance and is accurate and complete  Electronically signed: Wil Braden MD

## 2021-06-04 LAB
ALBUMIN SERPL ELPH-MCNC: 4 G/DL — SIGNIFICANT CHANGE UP (ref 3.3–5)
ALP SERPL-CCNC: 66 U/L — SIGNIFICANT CHANGE UP (ref 40–120)
ALT FLD-CCNC: 16 U/L — SIGNIFICANT CHANGE UP (ref 10–45)
ANION GAP SERPL CALC-SCNC: 14 MMOL/L — SIGNIFICANT CHANGE UP (ref 5–17)
ANION GAP SERPL CALC-SCNC: 15 MMOL/L — SIGNIFICANT CHANGE UP (ref 5–17)
AST SERPL-CCNC: 44 U/L — HIGH (ref 10–40)
BASE EXCESS BLDV CALC-SCNC: -2.3 MMOL/L — LOW (ref -2–2)
BASE EXCESS BLDV CALC-SCNC: -2.9 MMOL/L — LOW (ref -2–2)
BASOPHILS # BLD AUTO: 0.03 K/UL — SIGNIFICANT CHANGE UP (ref 0–0.2)
BASOPHILS NFR BLD AUTO: 0.2 % — SIGNIFICANT CHANGE UP (ref 0–2)
BILIRUB SERPL-MCNC: 1.2 MG/DL — SIGNIFICANT CHANGE UP (ref 0.2–1.2)
BUN SERPL-MCNC: 35 MG/DL — HIGH (ref 7–23)
BUN SERPL-MCNC: 37 MG/DL — HIGH (ref 7–23)
CA-I SERPL-SCNC: 1.07 MMOL/L — LOW (ref 1.12–1.3)
CALCIUM SERPL-MCNC: 8.3 MG/DL — LOW (ref 8.4–10.5)
CALCIUM SERPL-MCNC: 8.5 MG/DL — SIGNIFICANT CHANGE UP (ref 8.4–10.5)
CHLORIDE BLDV-SCNC: 107 MMOL/L — SIGNIFICANT CHANGE UP (ref 96–108)
CHLORIDE SERPL-SCNC: 100 MMOL/L — SIGNIFICANT CHANGE UP (ref 96–108)
CHLORIDE SERPL-SCNC: 104 MMOL/L — SIGNIFICANT CHANGE UP (ref 96–108)
CO2 BLDV-SCNC: 24 MMOL/L — SIGNIFICANT CHANGE UP (ref 22–30)
CO2 BLDV-SCNC: 25 MMOL/L — SIGNIFICANT CHANGE UP (ref 22–30)
CO2 SERPL-SCNC: 20 MMOL/L — LOW (ref 22–31)
CO2 SERPL-SCNC: 22 MMOL/L — SIGNIFICANT CHANGE UP (ref 22–31)
CREAT SERPL-MCNC: 2.21 MG/DL — HIGH (ref 0.5–1.3)
CREAT SERPL-MCNC: 2.45 MG/DL — HIGH (ref 0.5–1.3)
EOSINOPHIL # BLD AUTO: 0.18 K/UL — SIGNIFICANT CHANGE UP (ref 0–0.5)
EOSINOPHIL NFR BLD AUTO: 1.4 % — SIGNIFICANT CHANGE UP (ref 0–6)
GAS PNL BLDA: SIGNIFICANT CHANGE UP
GAS PNL BLDV: 134 MMOL/L — LOW (ref 135–145)
GAS PNL BLDV: SIGNIFICANT CHANGE UP
GAS PNL BLDV: SIGNIFICANT CHANGE UP
GLUCOSE BLDV-MCNC: 133 MG/DL — HIGH (ref 70–99)
GLUCOSE BLDV-MCNC: 264 MG/DL — HIGH (ref 70–99)
GLUCOSE SERPL-MCNC: 151 MG/DL — HIGH (ref 70–99)
GLUCOSE SERPL-MCNC: 156 MG/DL — HIGH (ref 70–99)
HCO3 BLDV-SCNC: 23 MMOL/L — SIGNIFICANT CHANGE UP (ref 21–29)
HCO3 BLDV-SCNC: 23 MMOL/L — SIGNIFICANT CHANGE UP (ref 21–29)
HCT VFR BLD CALC: 26 % — LOW (ref 39–50)
HCT VFR BLDA CALC: 26 % — LOW (ref 39–50)
HGB BLD CALC-MCNC: 8.2 G/DL — LOW (ref 13–17)
HGB BLD-MCNC: 8.5 G/DL — LOW (ref 13–17)
HOROWITZ INDEX BLDV+IHG-RTO: 36 — SIGNIFICANT CHANGE UP
HOROWITZ INDEX BLDV+IHG-RTO: 36 — SIGNIFICANT CHANGE UP
IMM GRANULOCYTES NFR BLD AUTO: 0.6 % — SIGNIFICANT CHANGE UP (ref 0–1.5)
LACTATE BLDV-MCNC: 0.7 MMOL/L — SIGNIFICANT CHANGE UP (ref 0.7–2)
LYMPHOCYTES # BLD AUTO: 0.56 K/UL — LOW (ref 1–3.3)
LYMPHOCYTES # BLD AUTO: 4.4 % — LOW (ref 13–44)
MAGNESIUM SERPL-MCNC: 2.6 MG/DL — SIGNIFICANT CHANGE UP (ref 1.6–2.6)
MCHC RBC-ENTMCNC: 29.1 PG — SIGNIFICANT CHANGE UP (ref 27–34)
MCHC RBC-ENTMCNC: 32.7 GM/DL — SIGNIFICANT CHANGE UP (ref 32–36)
MCV RBC AUTO: 89 FL — SIGNIFICANT CHANGE UP (ref 80–100)
MONOCYTES # BLD AUTO: 0.97 K/UL — HIGH (ref 0–0.9)
MONOCYTES NFR BLD AUTO: 7.6 % — SIGNIFICANT CHANGE UP (ref 2–14)
NEUTROPHILS # BLD AUTO: 11.02 K/UL — HIGH (ref 1.8–7.4)
NEUTROPHILS NFR BLD AUTO: 85.8 % — HIGH (ref 43–77)
NRBC # BLD: 0 /100 WBCS — SIGNIFICANT CHANGE UP (ref 0–0)
OTHER CELLS CSF MANUAL: 10 ML/DL — LOW (ref 18–22)
PCO2 BLDV: 47 MMHG — SIGNIFICANT CHANGE UP (ref 35–50)
PCO2 BLDV: 48 MMHG — SIGNIFICANT CHANGE UP (ref 35–50)
PH BLDV: 7.3 — LOW (ref 7.35–7.45)
PH BLDV: 7.31 — LOW (ref 7.35–7.45)
PHOSPHATE SERPL-MCNC: 5.6 MG/DL — HIGH (ref 2.5–4.5)
PLATELET # BLD AUTO: 167 K/UL — SIGNIFICANT CHANGE UP (ref 150–400)
PO2 BLDV: 46 MMHG — HIGH (ref 25–45)
PO2 BLDV: 56 MMHG — HIGH (ref 25–45)
POTASSIUM BLDA-SCNC: 3.9 MMOL/L — SIGNIFICANT CHANGE UP (ref 3.5–5.3)
POTASSIUM BLDA-SCNC: 4.5 MMOL/L — SIGNIFICANT CHANGE UP (ref 3.5–5.3)
POTASSIUM BLDV-SCNC: 4.4 MMOL/L — SIGNIFICANT CHANGE UP (ref 3.5–5.3)
POTASSIUM BLDV-SCNC: 4.5 MMOL/L — SIGNIFICANT CHANGE UP (ref 3.5–5.3)
POTASSIUM BLDV-SCNC: 4.6 MMOL/L — SIGNIFICANT CHANGE UP (ref 3.5–5.3)
POTASSIUM SERPL-MCNC: 4.6 MMOL/L — SIGNIFICANT CHANGE UP (ref 3.5–5.3)
POTASSIUM SERPL-MCNC: 5.5 MMOL/L — HIGH (ref 3.5–5.3)
POTASSIUM SERPL-SCNC: 4.6 MMOL/L — SIGNIFICANT CHANGE UP (ref 3.5–5.3)
POTASSIUM SERPL-SCNC: 5.5 MMOL/L — HIGH (ref 3.5–5.3)
PROT SERPL-MCNC: 6.1 G/DL — SIGNIFICANT CHANGE UP (ref 6–8.3)
RBC # BLD: 2.92 M/UL — LOW (ref 4.2–5.8)
RBC # FLD: 15.5 % — HIGH (ref 10.3–14.5)
SAO2 % BLDV: 80 % — SIGNIFICANT CHANGE UP (ref 67–88)
SAO2 % BLDV: 88 % — SIGNIFICANT CHANGE UP (ref 67–88)
SODIUM SERPL-SCNC: 137 MMOL/L — SIGNIFICANT CHANGE UP (ref 135–145)
SODIUM SERPL-SCNC: 138 MMOL/L — SIGNIFICANT CHANGE UP (ref 135–145)
SURGICAL PATHOLOGY STUDY: SIGNIFICANT CHANGE UP
TSH SERPL-MCNC: 2.28 UIU/ML — SIGNIFICANT CHANGE UP (ref 0.27–4.2)
WBC # BLD: 12.84 K/UL — HIGH (ref 3.8–10.5)
WBC # FLD AUTO: 12.84 K/UL — HIGH (ref 3.8–10.5)

## 2021-06-04 PROCEDURE — 99291 CRITICAL CARE FIRST HOUR: CPT

## 2021-06-04 PROCEDURE — 71045 X-RAY EXAM CHEST 1 VIEW: CPT | Mod: 26

## 2021-06-04 PROCEDURE — 99292 CRITICAL CARE ADDL 30 MIN: CPT | Mod: 24

## 2021-06-04 RX ORDER — INSULIN HUMAN 100 [IU]/ML
10 INJECTION, SOLUTION SUBCUTANEOUS ONCE
Refills: 0 | Status: COMPLETED | OUTPATIENT
Start: 2021-06-04 | End: 2021-06-04

## 2021-06-04 RX ORDER — SODIUM ZIRCONIUM CYCLOSILICATE 10 G/10G
10 POWDER, FOR SUSPENSION ORAL ONCE
Refills: 0 | Status: COMPLETED | OUTPATIENT
Start: 2021-06-04 | End: 2021-06-04

## 2021-06-04 RX ORDER — ALBUMIN HUMAN 25 %
250 VIAL (ML) INTRAVENOUS ONCE
Refills: 0 | Status: COMPLETED | OUTPATIENT
Start: 2021-06-04 | End: 2021-06-04

## 2021-06-04 RX ORDER — DEXTROSE 50 % IN WATER 50 %
50 SYRINGE (ML) INTRAVENOUS ONCE
Refills: 0 | Status: COMPLETED | OUTPATIENT
Start: 2021-06-04 | End: 2021-06-04

## 2021-06-04 RX ORDER — TAMSULOSIN HYDROCHLORIDE 0.4 MG/1
0.4 CAPSULE ORAL AT BEDTIME
Refills: 0 | Status: DISCONTINUED | OUTPATIENT
Start: 2021-06-04 | End: 2021-06-09

## 2021-06-04 RX ORDER — APIXABAN 2.5 MG/1
2.5 TABLET, FILM COATED ORAL EVERY 12 HOURS
Refills: 0 | Status: DISCONTINUED | OUTPATIENT
Start: 2021-06-04 | End: 2021-06-09

## 2021-06-04 RX ORDER — HYDROMORPHONE HYDROCHLORIDE 2 MG/ML
0.5 INJECTION INTRAMUSCULAR; INTRAVENOUS; SUBCUTANEOUS ONCE
Refills: 0 | Status: DISCONTINUED | OUTPATIENT
Start: 2021-06-04 | End: 2021-06-04

## 2021-06-04 RX ORDER — CALCIUM GLUCONATE 100 MG/ML
1 VIAL (ML) INTRAVENOUS ONCE
Refills: 0 | Status: COMPLETED | OUTPATIENT
Start: 2021-06-04 | End: 2021-06-04

## 2021-06-04 RX ORDER — MAGNESIUM SULFATE 500 MG/ML
2 VIAL (ML) INJECTION ONCE
Refills: 0 | Status: COMPLETED | OUTPATIENT
Start: 2021-06-04 | End: 2021-06-04

## 2021-06-04 RX ORDER — POTASSIUM CHLORIDE 20 MEQ
20 PACKET (EA) ORAL ONCE
Refills: 0 | Status: COMPLETED | OUTPATIENT
Start: 2021-06-04 | End: 2021-06-04

## 2021-06-04 RX ADMIN — Medication 125 MILLILITER(S): at 10:30

## 2021-06-04 RX ADMIN — TAMSULOSIN HYDROCHLORIDE 0.4 MILLIGRAM(S): 0.4 CAPSULE ORAL at 22:19

## 2021-06-04 RX ADMIN — Medication 125 MILLILITER(S): at 13:56

## 2021-06-04 RX ADMIN — Medication 50 MILLILITER(S): at 03:13

## 2021-06-04 RX ADMIN — SODIUM ZIRCONIUM CYCLOSILICATE 10 GRAM(S): 10 POWDER, FOR SUSPENSION ORAL at 02:17

## 2021-06-04 RX ADMIN — Medication 10 MILLIGRAM(S): at 05:04

## 2021-06-04 RX ADMIN — PANTOPRAZOLE SODIUM 40 MILLIGRAM(S): 20 TABLET, DELAYED RELEASE ORAL at 07:36

## 2021-06-04 RX ADMIN — POLYETHYLENE GLYCOL 3350 17 GRAM(S): 17 POWDER, FOR SOLUTION ORAL at 12:17

## 2021-06-04 RX ADMIN — Medication 2: at 21:45

## 2021-06-04 RX ADMIN — CHLORHEXIDINE GLUCONATE 1 APPLICATION(S): 213 SOLUTION TOPICAL at 06:06

## 2021-06-04 RX ADMIN — Medication 50 MICROGRAM(S): at 05:04

## 2021-06-04 RX ADMIN — INSULIN HUMAN 10 UNIT(S): 100 INJECTION, SOLUTION SUBCUTANEOUS at 03:16

## 2021-06-04 RX ADMIN — Medication 50 GRAM(S): at 22:25

## 2021-06-04 RX ADMIN — Medication 2: at 12:17

## 2021-06-04 RX ADMIN — Medication 20 MILLIEQUIVALENT(S): at 22:39

## 2021-06-04 RX ADMIN — HYDROMORPHONE HYDROCHLORIDE 0.5 MILLIGRAM(S): 2 INJECTION INTRAMUSCULAR; INTRAVENOUS; SUBCUTANEOUS at 01:51

## 2021-06-04 RX ADMIN — HYDROMORPHONE HYDROCHLORIDE 0.5 MILLIGRAM(S): 2 INJECTION INTRAMUSCULAR; INTRAVENOUS; SUBCUTANEOUS at 02:06

## 2021-06-04 RX ADMIN — APIXABAN 2.5 MILLIGRAM(S): 2.5 TABLET, FILM COATED ORAL at 17:37

## 2021-06-04 RX ADMIN — HYDROMORPHONE HYDROCHLORIDE 0.5 MILLIGRAM(S): 2 INJECTION INTRAMUSCULAR; INTRAVENOUS; SUBCUTANEOUS at 10:01

## 2021-06-04 RX ADMIN — HYDROMORPHONE HYDROCHLORIDE 0.5 MILLIGRAM(S): 2 INJECTION INTRAMUSCULAR; INTRAVENOUS; SUBCUTANEOUS at 11:00

## 2021-06-04 RX ADMIN — Medication 100 GRAM(S): at 03:14

## 2021-06-04 RX ADMIN — Medication 2: at 07:38

## 2021-06-04 RX ADMIN — Medication 125 MILLILITER(S): at 18:00

## 2021-06-04 RX ADMIN — Medication 2: at 16:29

## 2021-06-04 RX ADMIN — APIXABAN 2.5 MILLIGRAM(S): 2.5 TABLET, FILM COATED ORAL at 06:09

## 2021-06-04 RX ADMIN — ATORVASTATIN CALCIUM 40 MILLIGRAM(S): 80 TABLET, FILM COATED ORAL at 12:16

## 2021-06-04 RX ADMIN — Medication 81 MILLIGRAM(S): at 12:16

## 2021-06-04 NOTE — PROGRESS NOTE ADULT - SUBJECTIVE AND OBJECTIVE BOX
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 in [facility]: then [facility] and AICD implanted) Aotic valve regurgitation (s/p AVR 2004), Mitral valve regurgitation, Ascending aortic dilation. 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 Pt reports cardiology has been following this for several years. Pt denies, c/p SOB, or palpitations at this time. Pt evaluated by Dr. Fenton for scheduled Redo Sternotomy, Bentall on 6/2/21. Pt denies recent travel or sick contact.     **Covid vaccine record card in chart** (01 Jun 2021 07:11)      Surgery/Hospital course:  6/02 Sternotomy repeat, Bentall Procedure, and Repair Mitral Valve with Transesophageal  Echocardiogram.  6/04 Extubated     Today/Overnight:  IV Primacor and IV Levo dc'ed, patient is experiencing fevers of 100.4 F, and continue Flomax for Elevated blood urea nitrogen and Metabolic Acidosis.     Vital Signs Last 24 Hrs  T(C): 38 (04 Jun 2021 16:00), Max: 38 (04 Jun 2021 16:00)  T(F): 100.4 (04 Jun 2021 16:00), Max: 100.4 (04 Jun 2021 16:00)  HR: 80 (04 Jun 2021 18:00) (71 - 97)  BP: 127/67 (04 Jun 2021 18:00) (86/52 - 129/68)  BP(mean): 92 (04 Jun 2021 18:00) (64 - 100)  RR: 20 (04 Jun 2021 18:00) (11 - 36)  SpO2: 99% (04 Jun 2021 18:00) (86% - 100%)  ============================I/O===========================  I&O's Detail    03 Jun 2021 07:01  -  04 Jun 2021 07:00  --------------------------------------------------------  IN:    Albumin 5%  - 250 mL: 250 mL    Bumetanide: 32.5 mL    DOBUTamine: 11.6 mL    DOBUTamine: 46.4 mL    DOBUTamine: 69 mL    DOBUTamine: 18.6 mL    DOBUTamine: 13.8 mL    Insulin: 11 mL    IV PiggyBack: 100 mL    Oral Fluid: 570 mL    PRBCs (Packed Red Blood Cells): 300 mL    sodium chloride 0.9%: 240 mL    Vasopressin: 73 mL  Total IN: 1735.9 mL    OUT:    Bulb (mL): 75 mL    Chest Tube (mL): 150 mL    Chest Tube (mL): 150 mL    Chest Tube (mL): 90 mL    Voided (mL): 2640 mL  Total OUT: 3105 mL    Total NET: -1369.1 mL      04 Jun 2021 07:01  -  04 Jun 2021 18:48  --------------------------------------------------------  IN:    DOBUTamine: 63.8 mL    Oral Fluid: 240 mL    sodium chloride 0.9%: 90 mL    Vasopressin: 12 mL  Total IN: 405.8 mL    OUT:    Bulb (mL): 25 mL    Indwelling Catheter - Urethral (mL): 1305 mL    Voided (mL): 0 mL  Total OUT: 1330 mL    Total NET: -924.2 mL        ============================ LABS =========================                        8.5    12.84 )-----------( 167      ( 04 Jun 2021 00:34 )             26.0     06-04    137  |  100  |  37<H>  ----------------------------<  151<H>  4.6   |  22  |  2.45<H>    Ca    8.5      04 Jun 2021 13:13  Phos  5.6     06-04  Mg     2.6     06-04    TPro  6.1  /  Alb  4.0  /  TBili  1.2  /  DBili  x   /  AST  44<H>  /  ALT  16  /  AlkPhos  66  06-04    LIVER FUNCTIONS - ( 04 Jun 2021 00:34 )  Alb: 4.0 g/dL / Pro: 6.1 g/dL / ALK PHOS: 66 U/L / ALT: 16 U/L / AST: 44 U/L / GGT: x           PT/INR - ( 03 Jun 2021 00:26 )   PT: 13.9 sec;   INR: 1.17 ratio         PTT - ( 03 Jun 2021 00:26 )  PTT:35.1 sec  ABG - ( 04 Jun 2021 05:27 )  pH, Arterial: 7.36  pH, Blood: x     /  pCO2: 44    /  pO2: 138   / HCO3: 24    / Base Excess: -.9   /  SaO2: 100                 ======================Micro/Rad/Cardio=================  CXR: Reviewed  Echo: Reviewed  ======================================================  PAST MEDICAL & SURGICAL HISTORY:  Aortic stenosis    AICD (automatic cardioverter/defibrillator) present    Aortic valve regurgitation    Ascending aorta dilation    H/O paroxysmal supraventricular tachycardia    HLD (hyperlipidemia)    Mitral valve regurgitation    Cardiac arrest    S/P aortic valve replacement  3/13/2004    S/P hernia repair  2002    History of tonsillectomy and adenoidectomy    S/P TURP  1996    S/P colonoscopy  2001, 2002, 2006, 2011, 2016    S/P endoscopy  2006    S/P cardiac cath  1994, 1995, 1999, 2002, 2004    AICD (automatic cardioverter/defibrillator) present  12/19/17    Cardiac pacemaker  12/19/17    History of cardioversion  9/11/20    S/P ablation of atrial fibrillation  10/29/20    Status post ablation of ventricular arrhythmia  2/14/19      ========================ASSESSMENT ================  Aortic Root Aneurysm Status Post Sternotomy repeat, Bentall Procedure, and Repair Mitral Valve with Transesophageal  Echocardiogram 6/02  Cardiac Arrest   Hypovolemic Shock  Cardiogenic Shock  Post op respiratory insufficiency   Automatic Cardioverter/Defibrillator  Aortic valve regurgitation  Ascending aorta dilation  Mitral valve regurgitation  History of Aortic Valve replacement in 2004   Status post ablation of ventricular arrhythmia  History of Hyperlipidemia   Stress Hyperglycemia   Acute Blood Loss Anemia Status Post  2 Platelets, 10 Cryo, 2 FFP, and 1000 FEIBA  Elevated blood urea nitrogen   Metabolic Acidosis  Hyperchloremia     Plan:  ====================== NEUROLOGY=====================  Nonfocal, continue to monitor neuro status per ICU protocol.   Continue with Oxycodone for pain management     oxycodone    5 mG/acetaminophen 325 mG 1 Tablet(s) Oral every 6 hours PRN Mild Pain (1 - 3)  oxycodone    5 mG/acetaminophen 325 mG 2 Tablet(s) Oral every 6 hours PRN Moderate Pain (4 - 6)    ==================== RESPIRATORY======================  Extubated, receiving supplemental O2 therapy with BiPAP FiO2 40%  Continue to monitor RR, breathing pattern, pulse ox, and ABG's.  Encourage incentive spirometry.     ====================CARDIOVASCULAR==================  Patient with a history of a Aortic Valve replacement in 2004,  Mitral valve regurgitation, Aortic valve regurgitation, Ascending aorta dilation, and with a Automatic Cardioverter/Defibrillator present had a Aortic Root Aneurysm now recovering s/p Bentall Procedure, and Repair Mitral Valve with Transesophageal  Echocardiogram on 6/02.  IV Primacor and an IV Norepinephrine infusion for Cardiogenic shock have been Dc'ed   Continue with IV Vasopressor for pressor support   Transitioned to an IV Dobutamine infusion  Continue with ASA and Lipitor       aspirin enteric coated 81 milliGRAM(s) Oral daily  atorvastatin 40 milliGRAM(s) Oral daily  DOBUTamine Infusion 2.5 MICROgram(s)/kG/Min (5.78 mL/Hr) IV Continuous <Continuous>  vasopressin Infusion 0.1 Unit(s)/Min (6 mL/Hr) IV Continuous <Continuous>    ===================HEMATOLOGIC/ONC ===================  Anemia due to acute blood loss requiring 2 Platelets, 10 Cryo, 2 FFP, and 1000 FEIBA intraoperative S/P 6/02.  H/H 9.4/29%  monitor hemoglobin and hematocrit levels. No current plan for transfusion.   Continue Apixaban for blood clots    apixaban 2.5 milliGRAM(s) Oral every 12 hours      ===================== RENAL =========================  Continue to monitor I/Os, BUN/Creatinine, and urine output.   Goal net negative fluid balance. Replete lytes PRN. Keep K> 4 and Mg >2.   Continue Flomax for Elevated blood urea nitrogen and Metabolic Acidosis.     tamsulosin 0.4 milliGRAM(s) Oral at bedtime    ==================== GASTROINTESTINAL===================  Tolerating regular diet  Continue Protonix for stress ulcer prophylaxis.   Bowel regimen with Miralax.     pantoprazole    Tablet 40 milliGRAM(s) Oral before breakfast  polyethylene glycol 3350 17 Gram(s) Oral daily  sodium chloride 0.9%. 1000 milliLiter(s) (10 mL/Hr) IV Continuous <Continuous>    =======================    ENDOCRINE  =====================  Stress Hyperglycemia, glycemic control with Admelog sliding scale and Glucagon PRN.   Monitor blood glucose levels.   Continue Synthroid for Hypothyroidism.    insulin lispro (ADMELOG) corrective regimen sliding scale   SubCutaneous Before meals and at bedtime  levothyroxine 50 MICROGram(s) Oral daily      ========================INFECTIOUS DISEASE================  Febrile, 99.9F  Leukocytosis WBC: 12.84    Patient requires continuous monitoring with bedside rhythm monitoring, pulse oximetry monitoring, and continuous central venous and arterial pressure monitoring; and intermittent blood gas analysis.  Care plan discussed with ICU care team.    Patient remained critical, at risk for life threatening decompensation.   I have spent 35 minutes providing acute care with multiple re-evaluations throughout the evening.     By signing my name below, I, Randee Krause, attest that this documentation has been prepared under the direction and in the presence of Juliet Chase NP.  Electronically signed: Home De La Garza, 06-04-21 @ 18:48    I, Juliet Chase NP, personally performed the services described in this documentation. All medical record entries made by the scribe were at my direction and in my presence. 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 in [facility]: then [facility] and AICD implanted) Aotic valve regurgitation (s/p AVR 2004), Mitral valve regurgitation, Ascending aortic dilation. 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 Pt reports cardiology has been following this for several years. Pt denies, c/p SOB, or palpitations at this time. Pt evaluated by Dr. Fenton for scheduled Redo Sternotomy, Bentall on 6/2/21. Pt denies recent travel or sick contact.     **Covid vaccine record card in chart** (01 Jun 2021 07:11)      Surgery/Hospital course:  6/02 Sternotomy repeat, Bentall Procedure, and Repair Mitral Valve with Transesophageal  Echocardiogram.  6/04 Extubated     Today/Overnight:  IV Primacor and IV Levo dc'ed, patient is experiencing fevers of 100.4 F, and continue Flomax for Elevated blood urea nitrogen and Metabolic Acidosis.     Vital Signs Last 24 Hrs  T(C): 38 (04 Jun 2021 16:00), Max: 38 (04 Jun 2021 16:00)  T(F): 100.4 (04 Jun 2021 16:00), Max: 100.4 (04 Jun 2021 16:00)  HR: 80 (04 Jun 2021 18:00) (71 - 97)  BP: 127/67 (04 Jun 2021 18:00) (86/52 - 129/68)  BP(mean): 92 (04 Jun 2021 18:00) (64 - 100)  RR: 20 (04 Jun 2021 18:00) (11 - 36)  SpO2: 99% (04 Jun 2021 18:00) (86% - 100%)  ============================I/O===========================  I&O's Detail    03 Jun 2021 07:01  -  04 Jun 2021 07:00  --------------------------------------------------------  IN:    Albumin 5%  - 250 mL: 250 mL    Bumetanide: 32.5 mL    DOBUTamine: 11.6 mL    DOBUTamine: 46.4 mL    DOBUTamine: 69 mL    DOBUTamine: 18.6 mL    DOBUTamine: 13.8 mL    Insulin: 11 mL    IV PiggyBack: 100 mL    Oral Fluid: 570 mL    PRBCs (Packed Red Blood Cells): 300 mL    sodium chloride 0.9%: 240 mL    Vasopressin: 73 mL  Total IN: 1735.9 mL    OUT:    Bulb (mL): 75 mL    Chest Tube (mL): 150 mL    Chest Tube (mL): 150 mL    Chest Tube (mL): 90 mL    Voided (mL): 2640 mL  Total OUT: 3105 mL    Total NET: -1369.1 mL      04 Jun 2021 07:01  -  04 Jun 2021 18:48  --------------------------------------------------------  IN:    DOBUTamine: 63.8 mL    Oral Fluid: 240 mL    sodium chloride 0.9%: 90 mL    Vasopressin: 12 mL  Total IN: 405.8 mL    OUT:    Bulb (mL): 25 mL    Indwelling Catheter - Urethral (mL): 1305 mL    Voided (mL): 0 mL  Total OUT: 1330 mL    Total NET: -924.2 mL        ============================ LABS =========================                        8.5    12.84 )-----------( 167      ( 04 Jun 2021 00:34 )             26.0     06-04    137  |  100  |  37<H>  ----------------------------<  151<H>  4.6   |  22  |  2.45<H>    Ca    8.5      04 Jun 2021 13:13  Phos  5.6     06-04  Mg     2.6     06-04    TPro  6.1  /  Alb  4.0  /  TBili  1.2  /  DBili  x   /  AST  44<H>  /  ALT  16  /  AlkPhos  66  06-04    LIVER FUNCTIONS - ( 04 Jun 2021 00:34 )  Alb: 4.0 g/dL / Pro: 6.1 g/dL / ALK PHOS: 66 U/L / ALT: 16 U/L / AST: 44 U/L / GGT: x           PT/INR - ( 03 Jun 2021 00:26 )   PT: 13.9 sec;   INR: 1.17 ratio         PTT - ( 03 Jun 2021 00:26 )  PTT:35.1 sec  ABG - ( 04 Jun 2021 05:27 )  pH, Arterial: 7.36  pH, Blood: x     /  pCO2: 44    /  pO2: 138   / HCO3: 24    / Base Excess: -.9   /  SaO2: 100                 ======================Micro/Rad/Cardio=================  CXR: Reviewed  Echo: Reviewed  ======================================================  PAST MEDICAL & SURGICAL HISTORY:  Aortic stenosis    AICD (automatic cardioverter/defibrillator) present    Aortic valve regurgitation    Ascending aorta dilation    H/O paroxysmal supraventricular tachycardia    HLD (hyperlipidemia)    Mitral valve regurgitation    Cardiac arrest    S/P aortic valve replacement  3/13/2004    S/P hernia repair  2002    History of tonsillectomy and adenoidectomy    S/P TURP  1996    S/P colonoscopy  2001, 2002, 2006, 2011, 2016    S/P endoscopy  2006    S/P cardiac cath  1994, 1995, 1999, 2002, 2004    AICD (automatic cardioverter/defibrillator) present  12/19/17    Cardiac pacemaker  12/19/17    History of cardioversion  9/11/20    S/P ablation of atrial fibrillation  10/29/20    Status post ablation of ventricular arrhythmia  2/14/19      ========================ASSESSMENT ================  Aortic Root Aneurysm Status Post Sternotomy repeat, Bentall Procedure, and Repair Mitral Valve with Transesophageal  Echocardiogram 6/02  Cardiac Arrest   Hypovolemic Shock  Cardiogenic Shock  Post op respiratory insufficiency   Automatic Cardioverter/Defibrillator  Aortic valve regurgitation  Ascending aorta dilation  Mitral valve regurgitation  History of Aortic Valve replacement in 2004   Status post ablation of ventricular arrhythmia  History of Hyperlipidemia   Stress Hyperglycemia   Acute Blood Loss Anemia Status Post  2 Platelets, 10 Cryo, 2 FFP, and 1000 FEIBA  Elevated blood urea nitrogen   Metabolic Acidosis  Hyperchloremia     Plan:  ====================== NEUROLOGY=====================  Nonfocal, continue to monitor neuro status per ICU protocol.   Continue with Oxycodone for pain management     oxycodone    5 mG/acetaminophen 325 mG 1 Tablet(s) Oral every 6 hours PRN Mild Pain (1 - 3)  oxycodone    5 mG/acetaminophen 325 mG 2 Tablet(s) Oral every 6 hours PRN Moderate Pain (4 - 6)    ==================== RESPIRATORY======================  Extubated, receiving supplemental O2 therapy nc 4l  Continue to monitor RR, breathing pattern, pulse ox, and ABG's.  Encourage incentive spirometry.     ====================CARDIOVASCULAR==================  Patient with a history of a Aortic Valve replacement in 2004,  Mitral valve regurgitation, Aortic valve regurgitation, Ascending aorta dilation, and with a Automatic Cardioverter/Defibrillator present had a Aortic Root Aneurysm now recovering s/p Bentall Procedure, and Repair Mitral Valve with Transesophageal  Echocardiogram on 6/02.  IV Primacor and an IV Norepinephrine infusion for Cardiogenic shock have been Dc'ed   Continue with IV Vasopressor for pressor support   Transitioned to an IV Dobutamine infusion  Continue with ASA and Lipitor       aspirin enteric coated 81 milliGRAM(s) Oral daily  atorvastatin 40 milliGRAM(s) Oral daily  DOBUTamine Infusion 2.5 MICROgram(s)/kG/Min (5.78 mL/Hr) IV Continuous <Continuous>  vasopressin Infusion 0.1 Unit(s)/Min (6 mL/Hr) IV Continuous <Continuous>    ===================HEMATOLOGIC/ONC ===================  Anemia due to acute blood loss requiring 2 Platelets, 10 Cryo, 2 FFP, and 1000 FEIBA intraoperative S/P 6/02.  H/H 9.4/29%  monitor hemoglobin and hematocrit levels. No current plan for transfusion.   Continue Apixaban for blood clots    apixaban 2.5 milliGRAM(s) Oral every 12 hours      ===================== RENAL =========================  Continue to monitor I/Os, BUN/Creatinine, and urine output.   Goal net negative fluid balance. Replete lytes PRN. Keep K> 4 and Mg >2.   Continue Flomax for Elevated blood urea nitrogen and Metabolic Acidosis.     tamsulosin 0.4 milliGRAM(s) Oral at bedtime    ==================== GASTROINTESTINAL===================  Tolerating regular diet  Continue Protonix for stress ulcer prophylaxis.   Bowel regimen with Miralax.     pantoprazole    Tablet 40 milliGRAM(s) Oral before breakfast  polyethylene glycol 3350 17 Gram(s) Oral daily  sodium chloride 0.9%. 1000 milliLiter(s) (10 mL/Hr) IV Continuous <Continuous>    =======================    ENDOCRINE  =====================  Stress Hyperglycemia, glycemic control with Admelog sliding scale and Glucagon PRN.   Monitor blood glucose levels.   Continue Synthroid for Hypothyroidism.    insulin lispro (ADMELOG) corrective regimen sliding scale   SubCutaneous Before meals and at bedtime  levothyroxine 50 MICROGram(s) Oral daily      ========================INFECTIOUS DISEASE================  Febrile, 99.9F  Leukocytosis WBC: 12.84    Patient requires continuous monitoring with bedside rhythm monitoring, pulse oximetry monitoring, and continuous central venous and arterial pressure monitoring; and intermittent blood gas analysis.  Care plan discussed with ICU care team.    Patient remained critical, at risk for life threatening decompensation.   I have spent 35 minutes providing acute care with multiple re-evaluations throughout the evening.     By signing my name below, I, Randee Krause, attest that this documentation has been prepared under the direction and in the presence of Juliet Chase NP.  Electronically signed: Home De La Garza, 06-04-21 @ 18:48    I, Juliet Chase NP, personally performed the services described in this documentation. All medical record entries made by the scribe were at my direction and in my presence. I have reviewed the chart and agree that the record reflects my personal performance and is accurate and complete  Electronically signed: Juliet Chase NP, 06-04-21 @ 18:48

## 2021-06-04 NOTE — PROGRESS NOTE ADULT - SUBJECTIVE AND OBJECTIVE BOX
[facility] [phone]        Patient seen and examined in bed.  He is on  gtt and vaso gtt         MEDICATIONS  (STANDING):  apixaban 2.5 milliGRAM(s) Oral every 12 hours  aspirin enteric coated 81 milliGRAM(s) Oral daily  atorvastatin 40 milliGRAM(s) Oral daily  chlorhexidine 2% Cloths 1 Application(s) Topical <User Schedule>  DOBUTamine Infusion 2.5 MICROgram(s)/kG/Min (5.78 mL/Hr) IV Continuous <Continuous>  insulin lispro (ADMELOG) corrective regimen sliding scale   SubCutaneous Before meals and at bedtime  levothyroxine 50 MICROGram(s) Oral daily  pantoprazole    Tablet 40 milliGRAM(s) Oral before breakfast  polyethylene glycol 3350 17 Gram(s) Oral daily  sodium chloride 0.9%. 1000 milliLiter(s) (10 mL/Hr) IV Continuous <Continuous>  tamsulosin 0.4 milliGRAM(s) Oral at bedtime  vasopressin Infusion 0.1 Unit(s)/Min (6 mL/Hr) IV Continuous <Continuous>      VITAL:  T(C): , Max: 37.5 (21 @ 20:00)  T(F): , Max: 99.5 (21 @ 20:00)  HR: 80 (21 @ 08:00)  BP: 108/52 (21 @ 03:30)  BP(mean): 76 (21 @ 03:30)  RR: 26 (21 @ 08:00)  SpO2: 99% (21 @ 08:00)  Wt(kg): --    I and O's:     @ 07:01  -   @ 07:00  --------------------------------------------------------  IN: 1735.9 mL / OUT: 3105 mL / NET: -1369.1 mL     @ 07:01  -   @ 08:13  --------------------------------------------------------  IN: 257.8 mL / OUT: 250 mL / NET: 7.8 mL          PHYSICAL EXAM:    Constitutional: NAD  Neck:  No JVD  Respiratory: CTAB/L  Cardiovascular: S1 and S2  Gastrointestinal: BS+, soft, NT/ND  Extremities: No peripheral edema  Neurological: A/O x 3, no focal deficits  Psychiatric: Normal mood, normal affect  : +  Royal  Skin: No rashes  Access: Not applicable    LABS:                        8.5    12.84 )-----------( 167      ( 2021 00:34 )             26.0     06-04    138  |  104  |  35<H>  ----------------------------<  156<H>  5.5<H>   |  20<L>  |  2.21<H>    Ca    8.3<L>      2021 00:34  Phos  5.6     06-04  Mg     2.6     06-04    TPro  6.1  /  Alb  4.0  /  TBili  1.2  /  DBili  x   /  AST  44<H>  /  ALT  16  /  AlkPhos  66  06-04          Urine Studies:          RADIOLOGY & ADDITIONAL STUDIES:

## 2021-06-04 NOTE — PROGRESS NOTE ADULT - ACP WITH SCRIBE
I personally performed the service described in the documentation  recorded by the scribe in my presence, and it accurately and completely records my words and actions.

## 2021-06-04 NOTE — PROGRESS NOTE ADULT - SUBJECTIVE AND OBJECTIVE BOX
CRITICAL CARE ATTENDING - CTICU    MEDICATIONS  (STANDING):  apixaban 2.5 milliGRAM(s) Oral every 12 hours  aspirin enteric coated 81 milliGRAM(s) Oral daily  atorvastatin 40 milliGRAM(s) Oral daily  chlorhexidine 2% Cloths 1 Application(s) Topical <User Schedule>  DOBUTamine Infusion 2.5 MICROgram(s)/kG/Min (5.78 mL/Hr) IV Continuous <Continuous>  insulin lispro (ADMELOG) corrective regimen sliding scale   SubCutaneous Before meals and at bedtime  levothyroxine 50 MICROGram(s) Oral daily  pantoprazole    Tablet 40 milliGRAM(s) Oral before breakfast  polyethylene glycol 3350 17 Gram(s) Oral daily  sodium chloride 0.9%. 1000 milliLiter(s) (10 mL/Hr) IV Continuous <Continuous>  tamsulosin 0.4 milliGRAM(s) Oral at bedtime  vasopressin Infusion 0.1 Unit(s)/Min (6 mL/Hr) IV Continuous <Continuous>                                    8.5    12.84 )-----------( 167      ( 2021 00:34 )             26.0       06-04    138  |  104  |  35<H>  ----------------------------<  156<H>  5.5<H>   |  20<L>  |  2.21<H>    Ca    8.3<L>      2021 00:34  Phos  5.6     06-04  Mg     2.6     06-04    TPro  6.1  /  Alb  4.0  /  TBili  1.2  /  DBili  x   /  AST  44<H>  /  ALT  16  /  AlkPhos  66  06-04      PT/INR - ( 2021 00:26 )   PT: 13.9 sec;   INR: 1.17 ratio         PTT - ( 2021 00:26 )  PTT:35.1 sec        Daily     Daily Weight in k.7 (2021 00:00)      -02 @ 07:01  -  - @ 07:00  --------------------------------------------------------  IN: 4772.2 mL / OUT: 3190 mL / NET: 1582.2 mL     @ 07:01   @ 06:21  --------------------------------------------------------  IN: 1718.1 mL / OUT: 2955 mL / NET: -1236.9 mL        Critically Ill patient  : [ ] preoperative ,   [ x] post operative    Requires :  [x ] Arterial Line   [ x] Central Line  [ ] PA catheter  [ ] IABP  [ ] ECMO  [ ] LVAD  [ ] Ventilator  [ x] PPM /AICD  [ ] Impella.                      [x ] ABG's     [x ] Pulse Oxymetry Monitoring  Bedside evaluation , monitoring , treatment of hemodynamics , fluids , IVP/ IVCD meds.        Diagnosis:     POD 2 - Re-op bentall, MV annuloplasty / Ascending Aortic Repair    Requires chest PT, pulmonary toilet, suctioning to maintain SaO2,  patent airway and treat atelectasis.     CHF- acute [x ]   chronic [ x]    systolic [x ]   diatolic [ ]          - Echo- EF - 45-50%      [ ] RV dysfunction          - Cxr-cardiomegally, edema          - Clinical-  [x ]inotropes   [ x]pressors   [ ]diuresis   [ ]IABP   [ ]ECMO   [ ]LVAD   [ ]Respiratory Failure.     Hemodynamic lability,  instability. Requires IVCD [ x] vasopressors [x ] inotropes  [ ] vasodilator  [x ]IVSS fluid  to maintain MAP, perfusion, C.I.     PPM / AICD    Hypotension     Hypovolemia     Renal Failure - Acute Kidney Injury     Requires bedside physical therapy, mobilization and total correction care.     Chest Tube Drainage     ECG                 By signing my name below, I, Devora De La Fuente, attest that this documentation has been prepared under the direction and in the presence of Ildefonso Bañuelos MD.   Electronically Signed: Home Wilburn 21 @ 06:21      Discussed with CT surgeon, Physician Assistant - Nurse Practitioner- Critical care medicine team.   Discussed at  AM / PM rounds.   Chart, labs , films reviewed.    Cumulative Critical Care Time Given Today:  CRITICAL CARE ATTENDING - CTICU    MEDICATIONS  (STANDING):  apixaban 2.5 milliGRAM(s) Oral every 12 hours  aspirin enteric coated 81 milliGRAM(s) Oral daily  atorvastatin 40 milliGRAM(s) Oral daily  chlorhexidine 2% Cloths 1 Application(s) Topical <User Schedule>  DOBUTamine Infusion 2.5 MICROgram(s)/kG/Min (5.78 mL/Hr) IV Continuous <Continuous>  insulin lispro (ADMELOG) corrective regimen sliding scale   SubCutaneous Before meals and at bedtime  levothyroxine 50 MICROGram(s) Oral daily  pantoprazole    Tablet 40 milliGRAM(s) Oral before breakfast  polyethylene glycol 3350 17 Gram(s) Oral daily  sodium chloride 0.9%. 1000 milliLiter(s) (10 mL/Hr) IV Continuous <Continuous>  tamsulosin 0.4 milliGRAM(s) Oral at bedtime  vasopressin Infusion 0.1 Unit(s)/Min (6 mL/Hr) IV Continuous <Continuous>                                    8.5    12.84 )-----------( 167      ( 2021 00:34 )             26.0       06-04    138  |  104  |  35<H>  ----------------------------<  156<H>  5.5<H>   |  20<L>  |  2.21<H>    Ca    8.3<L>      2021 00:34  Phos  5.6     06-04  Mg     2.6     06-04    TPro  6.1  /  Alb  4.0  /  TBili  1.2  /  DBili  x   /  AST  44<H>  /  ALT  16  /  AlkPhos  66  06-04      PT/INR - ( 2021 00:26 )   PT: 13.9 sec;   INR: 1.17 ratio         PTT - ( 2021 00:26 )  PTT:35.1 sec        Daily     Daily Weight in k.7 (2021 00:00)      -02 @ 07:01  -  - @ 07:00  --------------------------------------------------------  IN: 4772.2 mL / OUT: 3190 mL / NET: 1582.2 mL     @ 07:01   @ 06:21  --------------------------------------------------------  IN: 1718.1 mL / OUT: 2955 mL / NET: -1236.9 mL        Critically Ill patient  : [ ] preoperative ,   [ x] post operative    Requires :  [x ] Arterial Line   [ x] Central Line  [ ] PA catheter  [ ] IABP  [ ] ECMO  [ ] LVAD  [ ] Ventilator  [ x] PPM /AICD  [ ] Impella.                      [x ] ABG's     [x ] Pulse Oxymetry Monitoring  Bedside evaluation , monitoring , treatment of hemodynamics , fluids , IVP/ IVCD meds.        Diagnosis:     POD 2 - Re-op bentall, MV annuloplasty / Ascending Aortic Repair    Requires chest PT, pulmonary toilet, suctioning to maintain SaO2,  patent airway and treat atelectasis.     CHF- acute [x ]   chronic [ x]    systolic [x ]   diatolic [ ]          - Echo- EF - 45-50%      [ ] RV dysfunction          - Cxr-cardiomegally, edema          - Clinical-  [x ]inotropes   [ x]pressors   [ x]diuresis   [ ]IABP   [ ]ECMO   [ ]LVAD   [ ]Respiratory Failure.     Hemodynamic lability,  instability. Requires IVCD [ x] vasopressors [x ] inotropes  [ ] vasodilator  [x ]IVSS fluid  to maintain MAP, perfusion, C.I.     PPM / AICD    Hypotension     Hypovolemia     Renal Failure - Acute Kidney Injury     Oliguria    Requires bedside physical therapy, mobilization and total shelter care.     Chest Tube Drainage     ECG     Hyperkalemia                By signing my name below, I, Devora De La Fuente, attest that this documentation has been prepared under the direction and in the presence of Ildefonso Bañuelos MD.   Electronically Signed: Home Wilburn 21 @ 06:21    IIldefonso, personally performed the services described in this documentation. All medical record entries made by the scribe were at my direction and in my presence. I have reviewed the chart and agree that the record reflects my personal performance and is accurate and complete.   Ildefonso Bañuelos MD.       Discussed with CT surgeon, Physician Assistant - Nurse Practitioner- Critical care medicine team.   Discussed at  AM / PM rounds.   Chart, labs , films reviewed.    Cumulative Critical Care Time Given Today:  45 min

## 2021-06-04 NOTE — PROGRESS NOTE ADULT - ASSESSMENT
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  Creatinine increase was expected and should plateau in am in am  Renal sono at some point but not needed at present  Lokelma 10gr po x1   (with the degree of urine output the potassium should not be an issue and VBG potassium was 4.4)  2 CVS-Inotropic support with ;  Before dc will start ARB/ACE(not now)  Off pressors   3 -Cont herbert and please keep   4 Pulm-Incentive spirometry;  Chest tube per CTICU     Sayed [facility]   [phone]

## 2021-06-05 LAB
ALBUMIN SERPL ELPH-MCNC: 4.1 G/DL — SIGNIFICANT CHANGE UP (ref 3.3–5)
ALBUMIN SERPL ELPH-MCNC: 4.1 G/DL — SIGNIFICANT CHANGE UP (ref 3.3–5)
ALBUMIN SERPL ELPH-MCNC: 4.2 G/DL — SIGNIFICANT CHANGE UP (ref 3.3–5)
ALP SERPL-CCNC: 71 U/L — SIGNIFICANT CHANGE UP (ref 40–120)
ALP SERPL-CCNC: 79 U/L — SIGNIFICANT CHANGE UP (ref 40–120)
ALP SERPL-CCNC: 86 U/L — SIGNIFICANT CHANGE UP (ref 40–120)
ALT FLD-CCNC: 19 U/L — SIGNIFICANT CHANGE UP (ref 10–45)
ALT FLD-CCNC: 24 U/L — SIGNIFICANT CHANGE UP (ref 10–45)
ALT FLD-CCNC: 26 U/L — SIGNIFICANT CHANGE UP (ref 10–45)
ANION GAP SERPL CALC-SCNC: 13 MMOL/L — SIGNIFICANT CHANGE UP (ref 5–17)
ANION GAP SERPL CALC-SCNC: 14 MMOL/L — SIGNIFICANT CHANGE UP (ref 5–17)
ANION GAP SERPL CALC-SCNC: 15 MMOL/L — SIGNIFICANT CHANGE UP (ref 5–17)
AST SERPL-CCNC: 36 U/L — SIGNIFICANT CHANGE UP (ref 10–40)
AST SERPL-CCNC: 37 U/L — SIGNIFICANT CHANGE UP (ref 10–40)
AST SERPL-CCNC: 56 U/L — HIGH (ref 10–40)
BASOPHILS # BLD AUTO: 0.01 K/UL — SIGNIFICANT CHANGE UP (ref 0–0.2)
BASOPHILS NFR BLD AUTO: 0.1 % — SIGNIFICANT CHANGE UP (ref 0–2)
BILIRUB SERPL-MCNC: 1.2 MG/DL — SIGNIFICANT CHANGE UP (ref 0.2–1.2)
BILIRUB SERPL-MCNC: 1.3 MG/DL — HIGH (ref 0.2–1.2)
BILIRUB SERPL-MCNC: 1.3 MG/DL — HIGH (ref 0.2–1.2)
BLD GP AB SCN SERPL QL: NEGATIVE — SIGNIFICANT CHANGE UP
BUN SERPL-MCNC: 37 MG/DL — HIGH (ref 7–23)
BUN SERPL-MCNC: 39 MG/DL — HIGH (ref 7–23)
BUN SERPL-MCNC: 39 MG/DL — HIGH (ref 7–23)
CALCIUM SERPL-MCNC: 8.5 MG/DL — SIGNIFICANT CHANGE UP (ref 8.4–10.5)
CALCIUM SERPL-MCNC: 8.7 MG/DL — SIGNIFICANT CHANGE UP (ref 8.4–10.5)
CALCIUM SERPL-MCNC: 8.9 MG/DL — SIGNIFICANT CHANGE UP (ref 8.4–10.5)
CHLORIDE SERPL-SCNC: 98 MMOL/L — SIGNIFICANT CHANGE UP (ref 96–108)
CHLORIDE SERPL-SCNC: 99 MMOL/L — SIGNIFICANT CHANGE UP (ref 96–108)
CHLORIDE SERPL-SCNC: 99 MMOL/L — SIGNIFICANT CHANGE UP (ref 96–108)
CO2 SERPL-SCNC: 23 MMOL/L — SIGNIFICANT CHANGE UP (ref 22–31)
CO2 SERPL-SCNC: 23 MMOL/L — SIGNIFICANT CHANGE UP (ref 22–31)
CO2 SERPL-SCNC: 25 MMOL/L — SIGNIFICANT CHANGE UP (ref 22–31)
CREAT SERPL-MCNC: 2.39 MG/DL — HIGH (ref 0.5–1.3)
CREAT SERPL-MCNC: 2.51 MG/DL — HIGH (ref 0.5–1.3)
CREAT SERPL-MCNC: 2.53 MG/DL — HIGH (ref 0.5–1.3)
EOSINOPHIL # BLD AUTO: 0.03 K/UL — SIGNIFICANT CHANGE UP (ref 0–0.5)
EOSINOPHIL NFR BLD AUTO: 0.3 % — SIGNIFICANT CHANGE UP (ref 0–6)
GAS PNL BLDA: SIGNIFICANT CHANGE UP
GLUCOSE SERPL-MCNC: 106 MG/DL — HIGH (ref 70–99)
GLUCOSE SERPL-MCNC: 125 MG/DL — HIGH (ref 70–99)
GLUCOSE SERPL-MCNC: 135 MG/DL — HIGH (ref 70–99)
HCT VFR BLD CALC: 27.4 % — LOW (ref 39–50)
HGB BLD-MCNC: 8.8 G/DL — LOW (ref 13–17)
IMM GRANULOCYTES NFR BLD AUTO: 0.9 % — SIGNIFICANT CHANGE UP (ref 0–1.5)
LYMPHOCYTES # BLD AUTO: 0.65 K/UL — LOW (ref 1–3.3)
LYMPHOCYTES # BLD AUTO: 6.6 % — LOW (ref 13–44)
MAGNESIUM SERPL-MCNC: 2.9 MG/DL — HIGH (ref 1.6–2.6)
MAGNESIUM SERPL-MCNC: 3 MG/DL — HIGH (ref 1.6–2.6)
MCHC RBC-ENTMCNC: 28.2 PG — SIGNIFICANT CHANGE UP (ref 27–34)
MCHC RBC-ENTMCNC: 32.1 GM/DL — SIGNIFICANT CHANGE UP (ref 32–36)
MCV RBC AUTO: 87.8 FL — SIGNIFICANT CHANGE UP (ref 80–100)
MONOCYTES # BLD AUTO: 0.67 K/UL — SIGNIFICANT CHANGE UP (ref 0–0.9)
MONOCYTES NFR BLD AUTO: 6.8 % — SIGNIFICANT CHANGE UP (ref 2–14)
NEUTROPHILS # BLD AUTO: 8.44 K/UL — HIGH (ref 1.8–7.4)
NEUTROPHILS NFR BLD AUTO: 85.3 % — HIGH (ref 43–77)
NRBC # BLD: 0 /100 WBCS — SIGNIFICANT CHANGE UP (ref 0–0)
PHOSPHATE SERPL-MCNC: 3.4 MG/DL — SIGNIFICANT CHANGE UP (ref 2.5–4.5)
PHOSPHATE SERPL-MCNC: 3.8 MG/DL — SIGNIFICANT CHANGE UP (ref 2.5–4.5)
PLATELET # BLD AUTO: 105 K/UL — LOW (ref 150–400)
POTASSIUM SERPL-MCNC: 4.1 MMOL/L — SIGNIFICANT CHANGE UP (ref 3.5–5.3)
POTASSIUM SERPL-MCNC: 4.3 MMOL/L — SIGNIFICANT CHANGE UP (ref 3.5–5.3)
POTASSIUM SERPL-MCNC: 4.4 MMOL/L — SIGNIFICANT CHANGE UP (ref 3.5–5.3)
POTASSIUM SERPL-MCNC: 5.5 MMOL/L — HIGH (ref 3.5–5.3)
POTASSIUM SERPL-SCNC: 4.1 MMOL/L — SIGNIFICANT CHANGE UP (ref 3.5–5.3)
POTASSIUM SERPL-SCNC: 4.3 MMOL/L — SIGNIFICANT CHANGE UP (ref 3.5–5.3)
POTASSIUM SERPL-SCNC: 4.4 MMOL/L — SIGNIFICANT CHANGE UP (ref 3.5–5.3)
POTASSIUM SERPL-SCNC: 5.5 MMOL/L — HIGH (ref 3.5–5.3)
PROT SERPL-MCNC: 6.2 G/DL — SIGNIFICANT CHANGE UP (ref 6–8.3)
PROT SERPL-MCNC: 6.6 G/DL — SIGNIFICANT CHANGE UP (ref 6–8.3)
PROT SERPL-MCNC: 6.7 G/DL — SIGNIFICANT CHANGE UP (ref 6–8.3)
RBC # BLD: 3.12 M/UL — LOW (ref 4.2–5.8)
RBC # FLD: 15.9 % — HIGH (ref 10.3–14.5)
RH IG SCN BLD-IMP: POSITIVE — SIGNIFICANT CHANGE UP
SODIUM SERPL-SCNC: 136 MMOL/L — SIGNIFICANT CHANGE UP (ref 135–145)
SODIUM SERPL-SCNC: 136 MMOL/L — SIGNIFICANT CHANGE UP (ref 135–145)
SODIUM SERPL-SCNC: 137 MMOL/L — SIGNIFICANT CHANGE UP (ref 135–145)
WBC # BLD: 9.89 K/UL — SIGNIFICANT CHANGE UP (ref 3.8–10.5)
WBC # FLD AUTO: 9.89 K/UL — SIGNIFICANT CHANGE UP (ref 3.8–10.5)

## 2021-06-05 PROCEDURE — 71045 X-RAY EXAM CHEST 1 VIEW: CPT | Mod: 26

## 2021-06-05 PROCEDURE — 99292 CRITICAL CARE ADDL 30 MIN: CPT

## 2021-06-05 PROCEDURE — 99232 SBSQ HOSP IP/OBS MODERATE 35: CPT | Mod: GC

## 2021-06-05 PROCEDURE — 99291 CRITICAL CARE FIRST HOUR: CPT

## 2021-06-05 RX ORDER — METOPROLOL TARTRATE 50 MG
25 TABLET ORAL
Refills: 0 | Status: DISCONTINUED | OUTPATIENT
Start: 2021-06-05 | End: 2021-06-06

## 2021-06-05 RX ORDER — AMIODARONE HYDROCHLORIDE 400 MG/1
150 TABLET ORAL ONCE
Refills: 0 | Status: COMPLETED | OUTPATIENT
Start: 2021-06-05 | End: 2021-06-05

## 2021-06-05 RX ORDER — ALBUMIN HUMAN 25 %
250 VIAL (ML) INTRAVENOUS ONCE
Refills: 0 | Status: COMPLETED | OUTPATIENT
Start: 2021-06-05 | End: 2021-06-05

## 2021-06-05 RX ORDER — POTASSIUM CHLORIDE 20 MEQ
20 PACKET (EA) ORAL ONCE
Refills: 0 | Status: COMPLETED | OUTPATIENT
Start: 2021-06-05 | End: 2021-06-05

## 2021-06-05 RX ORDER — INSULIN LISPRO 100/ML
VIAL (ML) SUBCUTANEOUS AT BEDTIME
Refills: 0 | Status: DISCONTINUED | OUTPATIENT
Start: 2021-06-06 | End: 2021-06-09

## 2021-06-05 RX ORDER — DEXTROSE 50 % IN WATER 50 %
25 SYRINGE (ML) INTRAVENOUS ONCE
Refills: 0 | Status: DISCONTINUED | OUTPATIENT
Start: 2021-06-05 | End: 2021-06-05

## 2021-06-05 RX ORDER — AMIODARONE HYDROCHLORIDE 400 MG/1
TABLET ORAL
Refills: 0 | Status: DISCONTINUED | OUTPATIENT
Start: 2021-06-05 | End: 2021-06-05

## 2021-06-05 RX ORDER — DEXTROSE 50 % IN WATER 50 %
12.5 SYRINGE (ML) INTRAVENOUS ONCE
Refills: 0 | Status: DISCONTINUED | OUTPATIENT
Start: 2021-06-05 | End: 2021-06-05

## 2021-06-05 RX ORDER — GLUCAGON INJECTION, SOLUTION 0.5 MG/.1ML
1 INJECTION, SOLUTION SUBCUTANEOUS ONCE
Refills: 0 | Status: DISCONTINUED | OUTPATIENT
Start: 2021-06-05 | End: 2021-06-05

## 2021-06-05 RX ORDER — AMIODARONE HYDROCHLORIDE 400 MG/1
400 TABLET ORAL EVERY 8 HOURS
Refills: 0 | Status: DISCONTINUED | OUTPATIENT
Start: 2021-06-05 | End: 2021-06-07

## 2021-06-05 RX ORDER — MAGNESIUM SULFATE 500 MG/ML
1 VIAL (ML) INJECTION ONCE
Refills: 0 | Status: COMPLETED | OUTPATIENT
Start: 2021-06-05 | End: 2021-06-05

## 2021-06-05 RX ORDER — INSULIN LISPRO 100/ML
VIAL (ML) SUBCUTANEOUS
Refills: 0 | Status: DISCONTINUED | OUTPATIENT
Start: 2021-06-05 | End: 2021-06-09

## 2021-06-05 RX ORDER — SODIUM CHLORIDE 9 MG/ML
1000 INJECTION, SOLUTION INTRAVENOUS
Refills: 0 | Status: DISCONTINUED | OUTPATIENT
Start: 2021-06-05 | End: 2021-06-09

## 2021-06-05 RX ORDER — DEXTROSE 50 % IN WATER 50 %
25 SYRINGE (ML) INTRAVENOUS ONCE
Refills: 0 | Status: DISCONTINUED | OUTPATIENT
Start: 2021-06-05 | End: 2021-06-09

## 2021-06-05 RX ORDER — DEXTROSE 50 % IN WATER 50 %
15 SYRINGE (ML) INTRAVENOUS ONCE
Refills: 0 | Status: DISCONTINUED | OUTPATIENT
Start: 2021-06-05 | End: 2021-06-05

## 2021-06-05 RX ADMIN — PANTOPRAZOLE SODIUM 40 MILLIGRAM(S): 20 TABLET, DELAYED RELEASE ORAL at 07:50

## 2021-06-05 RX ADMIN — Medication 5: at 08:01

## 2021-06-05 RX ADMIN — AMIODARONE HYDROCHLORIDE 600 MILLIGRAM(S): 400 TABLET ORAL at 22:41

## 2021-06-05 RX ADMIN — Medication 8: at 11:30

## 2021-06-05 RX ADMIN — AMIODARONE HYDROCHLORIDE 600 MILLIGRAM(S): 400 TABLET ORAL at 18:35

## 2021-06-05 RX ADMIN — AMIODARONE HYDROCHLORIDE 400 MILLIGRAM(S): 400 TABLET ORAL at 21:58

## 2021-06-05 RX ADMIN — Medication 81 MILLIGRAM(S): at 11:31

## 2021-06-05 RX ADMIN — Medication 20 MILLIEQUIVALENT(S): at 23:22

## 2021-06-05 RX ADMIN — Medication 50 MICROGRAM(S): at 06:17

## 2021-06-05 RX ADMIN — APIXABAN 2.5 MILLIGRAM(S): 2.5 TABLET, FILM COATED ORAL at 17:02

## 2021-06-05 RX ADMIN — AMIODARONE HYDROCHLORIDE 400 MILLIGRAM(S): 400 TABLET ORAL at 16:42

## 2021-06-05 RX ADMIN — Medication 100 GRAM(S): at 07:50

## 2021-06-05 RX ADMIN — Medication 100 GRAM(S): at 20:56

## 2021-06-05 RX ADMIN — Medication 125 MILLILITER(S): at 07:32

## 2021-06-05 RX ADMIN — AMIODARONE HYDROCHLORIDE 600 MILLIGRAM(S): 400 TABLET ORAL at 08:51

## 2021-06-05 RX ADMIN — ATORVASTATIN CALCIUM 40 MILLIGRAM(S): 80 TABLET, FILM COATED ORAL at 11:31

## 2021-06-05 RX ADMIN — Medication 25 MILLIGRAM(S): at 23:30

## 2021-06-05 RX ADMIN — AMIODARONE HYDROCHLORIDE 400 MILLIGRAM(S): 400 TABLET ORAL at 09:13

## 2021-06-05 RX ADMIN — CHLORHEXIDINE GLUCONATE 1 APPLICATION(S): 213 SOLUTION TOPICAL at 06:14

## 2021-06-05 RX ADMIN — TAMSULOSIN HYDROCHLORIDE 0.4 MILLIGRAM(S): 0.4 CAPSULE ORAL at 21:58

## 2021-06-05 RX ADMIN — APIXABAN 2.5 MILLIGRAM(S): 2.5 TABLET, FILM COATED ORAL at 05:01

## 2021-06-05 NOTE — CONSULT NOTE ADULT - SUBJECTIVE AND OBJECTIVE BOX
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 in [facility]: then [facility] and AICD implanted) Aotic valve regurgitation (s/p AVR 2004), Mitral valve regurgitation, Ascending aortic dilation. 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 Pt reports cardiology has been following this for several years. Pt denies, c/p SOB, or palpitations at this time. Pt evaluated by Dr. Fenton for scheduled Redo Sternotomy, Bentall on 6/2/21. Pt denies recent travel or sick contact.     **Covid vaccine record card in chart**   Surgery/Hospital Course:  6/02 repeat sternotomy, Bentall Procedure, and mitral valve repair   6/04 Extubated     Post procedure course c/b cardiogenic shock and SURESH requiring IV dobutamine. Today, dobutamine was discontinued at 8AM. Patient was noted to have several episodes of NSVT (longest being around 30 seconds) terminated by ATP when assessed by device interrogation. patient is otherwise asymptomatic without any complaints.           PMHx:   Aortic stenosis    AICD (automatic cardioverter/defibrillator) present    Aortic valve regurgitation    Ascending aorta dilation    H/O paroxysmal supraventricular tachycardia    HLD (hyperlipidemia)    Mitral valve regurgitation    S/P ablation of atrial fibrillation    Cardiac arrest        PSHx:   S/P aortic valve replacement    S/P hernia repair    History of tonsillectomy and adenoidectomy    S/P TURP    S/P colonoscopy    S/P endoscopy    S/P cardiac cath    AICD (automatic cardioverter/defibrillator) present    Cardiac pacemaker    History of cardioversion    S/P ablation of atrial fibrillation    Status post ablation of ventricular arrhythmia        Allergies:  No Known Allergies      Home Meds:    Current Medications:   aMIOdarone    Tablet 400 milliGRAM(s) Oral every 8 hours  apixaban 2.5 milliGRAM(s) Oral every 12 hours  aspirin enteric coated 81 milliGRAM(s) Oral daily  atorvastatin 40 milliGRAM(s) Oral daily  chlorhexidine 2% Cloths 1 Application(s) Topical <User Schedule>  dextrose 5%. 1000 milliLiter(s) IV Continuous <Continuous>  dextrose 5%. 1000 milliLiter(s) IV Continuous <Continuous>  dextrose 50% Injectable 25 Gram(s) IV Push once  insulin lispro (ADMELOG) corrective regimen sliding scale   SubCutaneous three times a day before meals  levothyroxine 50 MICROGram(s) Oral daily  metoprolol tartrate 25 milliGRAM(s) Oral two times a day  oxycodone    5 mG/acetaminophen 325 mG 1 Tablet(s) Oral every 6 hours PRN  oxycodone    5 mG/acetaminophen 325 mG 2 Tablet(s) Oral every 6 hours PRN  pantoprazole    Tablet 40 milliGRAM(s) Oral before breakfast  polyethylene glycol 3350 17 Gram(s) Oral daily  tamsulosin 0.4 milliGRAM(s) Oral at bedtime      F      Physical Exam:  T(F): 99.5 (06-05), Max: 99.9 (06-05)  HR: 93 (06-05) (80 - 122)  BP: 101/68 (06-05) (90/57 - 137/62)  RR: 12 (06-05)  SpO2: 97% (06-05)  GENERAL: No acute distress, well-developed  HEAD:  Atraumatic, Normocephalic  ENT: EOMI, PERRLA, conjunctiva and sclera clear, Neck supple, No JVD, moist mucosa  CHEST/LUNG: Clear to auscultation bilaterally; No wheeze, equal breath sounds bilaterally   BACK: No spinal tenderness  HEART: Regular rate and rhythm; No murmurs, rubs, or gallops  ABDOMEN: Soft, Nontender, Nondistended; Bowel sounds present  EXTREMITIES:  No clubbing, cyanosis, or edema  PSYCH: Nl behavior, nl affect  NEUROLOGY: AAOx3, non-focal, cranial nerves intact  SKIN: Normal color, No rashes or lesions  LINES:    Cardiovascular Diagnostic Testing:    ECG: Personally reviewed:    Echo: Personally reviewed:    Stress Testing:    Cath:    Imaging:    CXR: Personally reviewed    Labs: Personally reviewed                        8.8    9.89  )-----------( 105      ( 05 Jun 2021 00:54 )             27.4     06-05    x   |  x   |  x   ----------------------------<  x   4.1   |  x   |  x     Ca    8.9      05 Jun 2021 17:57  Phos  3.4     06-05  Mg     3.0     06-05    TPro  6.6  /  Alb  4.2  /  TBili  1.3<H>  /  DBili  x   /  AST  37  /  ALT  24  /  AlkPhos  86  06-05            Thyroid Stimulating Hormone, Serum: 2.28 uIU/mL (06-04 @ 00:08)

## 2021-06-05 NOTE — CONSULT NOTE ADULT - ATTENDING COMMENTS
History of ventricular arrhythmia s/p ablations (for VT and AF).  Most recent ablation was approximately 2 months ago after which Amanda and Amio were DCed.  Now with recurrent arrhythmia (salvos of NSVT and one episode requiring ATP) post OHS.  VTs are superiorly directed c/w originating from his inferior wall scar.  Would resume amio for now.  Also agree with beta blocker therapy; increase as tolerated.

## 2021-06-05 NOTE — PROGRESS NOTE ADULT - SUBJECTIVE AND OBJECTIVE BOX
Extubated Off pressors/remains on low-dose Dobutamine gtt   VITAL: T(C): , Max: 38 (06-04-21 @ 16:00) T(F): , Max: 100.4 (06-04-21 @ 16:00) HR: 84 (06-05-21 @ 10:00) BP: 94/66 (06-05-21 @ 10:00) BP(mean): 77 (06-05-21 @ 10:00) RR: 20 (06-05-21 @ 10:00) SpO2: 99% (06-05-21 @ 10:00) urine output 3240cc/24h   PHYSICAL EXAM: Constitutional: NAD Neck:  No JVD Respiratory: CTAB/L Cardiovascular: S1 and S2 Gastrointestinal: BS+, soft, NT/ND Extremities: No peripheral edema Neurological: A/O x 3, no focal deficits Psychiatric: Normal mood, normal affect : +  Royal Skin: No rashes  LABS:                      8.8   9.89  )-----------( 105      ( 05 Jun 2021 00:54 )            27.4   Na(136)/K(4.3)/Cl(98)/HCO3(23)/BUN(37)/Cr(2.51)Glu(135)/Ca(8.5)/Mg(2.9)/PO4(3.8)    06-05 @ 00:54 Na(137)/K(4.6)/Cl(100)/HCO3(22)/BUN(37)/Cr(2.45)Glu(151)/Ca(8.5)/Mg(--)/PO4(--)    06-04 @ 13:13 Na(138)/K(5.5)/Cl(104)/HCO3(20)/BUN(35)/Cr(2.21)Glu(156)/Ca(8.3)/Mg(2.6)/PO4(5.6)    06-04 @ 00:34 Na(143)/K(5.1)/Cl(107)/HCO3(20)/BUN(30)/Cr(1.79)Glu(145)/Ca(8.4)/Mg(--)/PO4(--)    06-03 @ 13:50 Na(149)/K(4.4)/Cl(111)/HCO3(21)/BUN(23)/Cr(1.33)Glu(180)/Ca(7.9)/Mg(2.8)/PO4(4.7)    06-03 @ 00:26 Na(147)/K(5.5)/Cl(111)/HCO3(19)/BUN(20)/Cr(0.93)Glu(134)/Ca(9.1)/Mg(3.6)/PO4(2.4)    06-02 @ 16:40   IMPRESSION: 80M w/ AFib, CAD, HFrEF-ICD, and VT storm; s/p MV repair+Bentall 6/2/21  (1)Renal - SURESH - ischemic ATN - nonoliguric/superb urine output without diuretics; creatinine plateauing in mid 2s, corresponding with GFR of ~25ml/min  (2)Lytes - highly acceptable  (3)CV - inotropic requirements downtrending s/p OHS   RECOMMEND: (1)No standing IVF or diuretics for now; would add intermittent-dose Lasix (40mg IV q12h) if urine output trends below 30cc/h (2)Dose new meds for GFR 25ml/min (present dosing is acceptable)   Saravanan Casanova MD [facility] Office: [phone] Cell: [phone]        Extubated Off pressors/inotopres   VITAL: T(C): , Max: 38 (06-04-21 @ 16:00) T(F): , Max: 100.4 (06-04-21 @ 16:00) HR: 84 (06-05-21 @ 10:00) BP: 94/66 (06-05-21 @ 10:00) BP(mean): 77 (06-05-21 @ 10:00) RR: 20 (06-05-21 @ 10:00) SpO2: 99% (06-05-21 @ 10:00) urine output 3240cc/24h   PHYSICAL EXAM: Constitutional: NAD Neck:  No JVD Respiratory: CTAB/L Cardiovascular: reg s1s2 Gastrointestinal: BS+, soft, NT/ND Extremities: No peripheral edema Neurological: A/O x 3, no focal deficits Psychiatric: Normal mood, normal affect : +  Royal Skin: No rashes  LABS:                      8.8   9.89  )-----------( 105      ( 05 Jun 2021 00:54 )            27.4   Na(136)/K(4.3)/Cl(98)/HCO3(23)/BUN(37)/Cr(2.51)Glu(135)/Ca(8.5)/Mg(2.9)/PO4(3.8)    06-05 @ 00:54 Na(137)/K(4.6)/Cl(100)/HCO3(22)/BUN(37)/Cr(2.45)Glu(151)/Ca(8.5)/Mg(--)/PO4(--)    06-04 @ 13:13 Na(138)/K(5.5)/Cl(104)/HCO3(20)/BUN(35)/Cr(2.21)Glu(156)/Ca(8.3)/Mg(2.6)/PO4(5.6)    06-04 @ 00:34 Na(143)/K(5.1)/Cl(107)/HCO3(20)/BUN(30)/Cr(1.79)Glu(145)/Ca(8.4)/Mg(--)/PO4(--)    06-03 @ 13:50 Na(149)/K(4.4)/Cl(111)/HCO3(21)/BUN(23)/Cr(1.33)Glu(180)/Ca(7.9)/Mg(2.8)/PO4(4.7)    06-03 @ 00:26 Na(147)/K(5.5)/Cl(111)/HCO3(19)/BUN(20)/Cr(0.93)Glu(134)/Ca(9.1)/Mg(3.6)/PO4(2.4)    06-02 @ 16:40   IMPRESSION: 80M w/ AFib, CAD, HFrEF-ICD, and VT storm; s/p MV repair+Bentall 6/2/21  (1)Renal - SURESH - ischemic ATN - nonoliguric/superb urine output without diuretics; creatinine plateauing in mid 2s, corresponding with GFR of ~25ml/min  (2)Lytes - highly acceptable  (3)CV - inotropic requirements downtrending s/p OHS   RECOMMEND: (1)No standing IVF or diuretics for now; would add intermittent-dose Lasix (40mg IV q12h) if urine output trends below 30cc/h (2)Dose new meds for GFR 25ml/min (present dosing is acceptable) (3)No objection to d/c of keon Casanova MD [facility] Office: [phone] Cell: [phone]

## 2021-06-05 NOTE — CONSULT NOTE ADULT - ASSESSMENT
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  Now s/p Redo Sternotomy, Bentall on 6/2/21 and MV repair. Post procedure course c/b cardiogenic shock and SURESH requiring IV dobutamine. Today, dobutamine was discontinued at 8AM. Patient was noted to have several episodes of NSVT (longest being around 30 seconds) terminated by ATP when assessed by device interrogation. patient is otherwise asymptomatic without any complaints.   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  Now s/p Redo Sternotomy, Bentall on 6/2/21 and MV repair. Post procedure course c/b cardiogenic shock and SURESH requiring IV dobutamine. Today, dobutamine was discontinued at 8AM. Patient was noted to have several episodes of NSVT (longest being around 30 seconds) terminated by ATP when assessed by device interrogation. patient is otherwise asymptomatic without any complaints.   -ICD interogation with episodes of NSVT that were terminated by ATP  -restart home lopressor (can start at 25mg BID and uptitrate to his home dose at 50mg BID)  -continue PO amio (currently on 400 TID)  -may benefit from VT ablation in the future but given recent cardiac surgery, would hold off until recovered from surgical standpoint  -ensure K>4, Mg>2  -will discuss with Dr Carbajal in AM

## 2021-06-05 NOTE — PROGRESS NOTE ADULT - SUBJECTIVE AND OBJECTIVE BOX
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 in [facility]: then [facility] and AICD implanted) Aotic valve regurgitation (s/p AVR 2004), Mitral valve regurgitation, Ascending aortic dilation. 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 Pt reports cardiology has been following this for several years. Pt denies, c/p SOB, or palpitations at this time. Pt evaluated by Dr. Fenton for scheduled Redo Sternotomy, Bentall on 6/2/21. Pt denies recent travel or sick contact.     **Covid vaccine record card in chart** (01 Jun 2021 07:11)      Surgery/Hospital Course:  6/02 repeat sternotomy, Bentall Procedure, and mitral valve repair   6/04 Extubated     Today:  No acute events     ICU Vital Signs Last 24 Hrs  T(C): 37.7 (05 Jun 2021 04:00), Max: 38 (04 Jun 2021 16:00)  T(F): 99.9 (05 Jun 2021 04:00), Max: 100.4 (04 Jun 2021 16:00)  HR: 87 (05 Jun 2021 07:00) (80 - 103)  BP: 112/58 (05 Jun 2021 07:00) (86/52 - 133/68)  BP(mean): 79 (05 Jun 2021 07:00) (64 - 100)  ABP: 122/67 (05 Jun 2021 05:00) (103/47 - 279/279)  ABP(mean): 89 (05 Jun 2021 05:00) (69 - 279)  RR: 20 (05 Jun 2021 07:00) (12 - 36)  SpO2: 100% (05 Jun 2021 07:00) (86% - 100%)      Physical Exam:  Gen:  Awake, alert   CNS: non focal 	  Neck: no JVD  RES : clear , no wheezing              CVS: Regular  rhythm. Normal S1/S2  Abd: Soft, non-distended. Bowel sounds present.  Skin: No rash.  Ext:  no edema    ============================I/O===========================   I&O's Detail    04 Jun 2021 07:01  -  05 Jun 2021 07:00  --------------------------------------------------------  IN:    Albumin 5%  - 250 mL: 250 mL    DOBUTamine: 2.9 mL    DOBUTamine: 92.8 mL    DOBUTamine: 20.3 mL    IV PiggyBack: 50 mL    Oral Fluid: 420 mL    sodium chloride 0.9%: 170 mL    Vasopressin: 12 mL  Total IN: 1018 mL    OUT:    Bulb (mL): 42 mL    Indwelling Catheter - Urethral (mL): 3240 mL    Voided (mL): 0 mL  Total OUT: 3282 mL    Total NET: -2264 mL        ============================ LABS =========================                        8.8    9.89  )-----------( 105      ( 05 Jun 2021 00:54 )             27.4     06-05    136  |  98  |  37<H>  ----------------------------<  135<H>  4.3   |  23  |  2.51<H>    Ca    8.5      05 Jun 2021 00:54  Phos  3.8     06-05  Mg     2.9     06-05    TPro  6.2  /  Alb  4.1  /  TBili  1.3<H>  /  DBili  x   /  AST  36  /  ALT  19  /  AlkPhos  71  06-05    LIVER FUNCTIONS - ( 05 Jun 2021 00:54 )  Alb: 4.1 g/dL / Pro: 6.2 g/dL / ALK PHOS: 71 U/L / ALT: 19 U/L / AST: 36 U/L / GGT: x             ABG - ( 05 Jun 2021 00:50 )  pH, Arterial: 7.42  pH, Blood: x     /  pCO2: 41    /  pO2: 107   / HCO3: 26    / Base Excess: 2.0   /  SaO2: 99                  ======================Micro/Rad/Cardio=================  CXR: Reviewed  Echo:Reviewed  ======================================================  PAST MEDICAL & SURGICAL HISTORY:  Aortic stenosis    AICD (automatic cardioverter/defibrillator) present    Aortic valve regurgitation    Ascending aorta dilation    H/O paroxysmal supraventricular tachycardia    HLD (hyperlipidemia)    Mitral valve regurgitation    Cardiac arrest    S/P aortic valve replacement  3/13/2004    S/P hernia repair  2002    History of tonsillectomy and adenoidectomy    S/P TURP  1996    S/P colonoscopy  2001, 2002, 2006, 2011, 2016    S/P endoscopy  2006    S/P cardiac cath  1994, 1995, 1999, 2002, 2004    AICD (automatic cardioverter/defibrillator) present  12/19/17    Cardiac pacemaker  12/19/17    History of cardioversion  9/11/20    S/P ablation of atrial fibrillation  10/29/20    Status post ablation of ventricular arrhythmia  2/14/19      ====================ASSESSMENT ==============  Aortic Root Aneurysm Status Post Sternotomy repeat, Bentall Procedure, and Repair Mitral Valve 6/02  Cardiac Arrest   Hypovolemic Shock  Cardiogenic Shock  Post op respiratory insufficiency   Automatic Cardioverter/Defibrillator  Stress Hyperglycemia   Acute Blood Loss Anemia   Elevated blood urea nitrogen   Metabolic Acidosis  Hyperchloremia   Post op respiratory insufficiency     Plan:  ====================== NEUROLOGY=====================  Nonfocal, continue monitoring neuro status   Percocet PRN for analgesia     oxycodone    5 mG/acetaminophen 325 mG 1 Tablet(s) Oral every 6 hours PRN Mild Pain (1 - 3)  oxycodone    5 mG/acetaminophen 325 mG 2 Tablet(s) Oral every 6 hours PRN Moderate Pain (4 - 6)    ==================== RESPIRATORY======================  Supplemental O2 via 6L NC   Encourage incentive spirometry, continue pulse ox monitoring, follow ABGs     ====================CARDIOVASCULAR==================  S/p repeat sternotomy, Bentall Procedure, and mitral valve repair   Continue invasive hemodynamic monitoring   Inotropic support with IV Dobutamine   PPM/AICD paced at 80   ASA, Eliquis, and Lipitor for CAD     aspirin enteric coated 81 milliGRAM(s) Oral daily  atorvastatin 40 milliGRAM(s) Oral daily  apixaban 2.5 milliGRAM(s) Oral every 12 hours  DOBUTamine Infusion 1.25 MICROgram(s)/kG/Min (2.89 mL/Hr) IV Continuous <Continuous>    ===================HEMATOLOGIC/ONC ===================  Acute blood loss anemia, monitor H&H/Plts      ===================== RENAL =========================  Nephrology following   SURESH, likely ATN and multifactorial, nonoliguric   Continue monitoring urine output, I&OS, BUN/Cr   Monitor and replete lytes prn   Hyperkalemia s/p Lokelma   Hx of BPH, c/w Flomax     tamsulosin 0.4 milliGRAM(s) Oral at bedtime    ==================== GASTROINTESTINAL===================  Tolerating regular PO diet   Bowel regimen with Miralax     albumin human  5% IVPB 250 milliLiter(s) IV Intermittent once  magnesium sulfate  IVPB 1 Gram(s) IV Intermittent once  GI prophylaxis, pantoprazole    Tablet 40 milliGRAM(s) Oral before breakfast  polyethylene glycol 3350 17 Gram(s) Oral daily    =======================    ENDOCRINE  =====================  Continue glucose control with admelog sliding scale for stress hyperglycemia   Synthroid for hypothyroidism     insulin lispro (ADMELOG) corrective regimen sliding scale   SubCutaneous Before meals and at bedtime  levothyroxine 50 MICROGram(s) Oral daily    ========================INFECTIOUS DISEASE================  Temp 99.9F, WBC downtrending 12.84->9.89   Continue trending WBC and monitoring fever curve         I have spent 35 minutes providing acute care for this critically ill patient     Patient requires continuous monitoring with bedside rhythm monitoring, pulse ox monitoring, and intermittent blood gas analysis. Care plan discussed with ICU care team. Patient remained critical and at risk for life threatening decompensation.     By signing my name below, I, Devora De La Fuente, attest that this documentation has been prepared under the direction and in the presence of Zackery Greenberg MD   Electronically signed: Home Wilburn, 06-05-21 @ 07:18    I, Zackery Greenberg, personally performed the services described in this documentation. all medical record entries made by the buddyibmarino were at my direction and in my presence. 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 in [facility]: then [facility] and AICD implanted) Aotic valve regurgitation (s/p AVR 2004), Mitral valve regurgitation, Ascending aortic dilation. 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 Pt reports cardiology has been following this for several years. Pt denies, c/p SOB, or palpitations at this time. Pt evaluated by Dr. Fenton for scheduled Redo Sternotomy, Bentall on 6/2/21. Pt denies recent travel or sick contact.     **Covid vaccine record card in chart** (01 Jun 2021 07:11)      Surgery/Hospital Course:  6/02 repeat sternotomy, Bentall Procedure, and mitral valve repair   6/04 Extubated     Today:  Resolving post op respiratory insufficiency requiring supplemental O2. Also with resolving post op cardiogenic shock/cardiovascular dysfunction requiring IV Dobutamine. Post op acute kidney injury, nonoliguric, -1.3L x24 hours without diuretic, will monitor serial BUN/Cr and maintain herbert. Requiring ASA and Eliquis for full dose anticoagulation.     ICU Vital Signs Last 24 Hrs  T(C): 37.7 (05 Jun 2021 04:00), Max: 38 (04 Jun 2021 16:00)  T(F): 99.9 (05 Jun 2021 04:00), Max: 100.4 (04 Jun 2021 16:00)  HR: 87 (05 Jun 2021 07:00) (80 - 103)  BP: 112/58 (05 Jun 2021 07:00) (86/52 - 133/68)  BP(mean): 79 (05 Jun 2021 07:00) (64 - 100)  ABP: 122/67 (05 Jun 2021 05:00) (103/47 - 279/279)  ABP(mean): 89 (05 Jun 2021 05:00) (69 - 279)  RR: 20 (05 Jun 2021 07:00) (12 - 36)  SpO2: 100% (05 Jun 2021 07:00) (86% - 100%)      Physical Exam:  Gen:  Awake, alert   CNS: non focal 	  Neck: no JVD  RES : clear , no wheezing              CVS: Regular  rhythm. Normal S1/S2  Abd: Soft, non-distended. Bowel sounds present.  Skin: No rash.  Ext:  no edema    ============================I/O===========================   I&O's Detail    04 Jun 2021 07:01  -  05 Jun 2021 07:00  --------------------------------------------------------  IN:    Albumin 5%  - 250 mL: 250 mL    DOBUTamine: 2.9 mL    DOBUTamine: 92.8 mL    DOBUTamine: 20.3 mL    IV PiggyBack: 50 mL    Oral Fluid: 420 mL    sodium chloride 0.9%: 170 mL    Vasopressin: 12 mL  Total IN: 1018 mL    OUT:    Bulb (mL): 42 mL    Indwelling Catheter - Urethral (mL): 3240 mL    Voided (mL): 0 mL  Total OUT: 3282 mL    Total NET: -2264 mL        ============================ LABS =========================                        8.8    9.89  )-----------( 105      ( 05 Jun 2021 00:54 )             27.4     06-05    136  |  98  |  37<H>  ----------------------------<  135<H>  4.3   |  23  |  2.51<H>    Ca    8.5      05 Jun 2021 00:54  Phos  3.8     06-05  Mg     2.9     06-05    TPro  6.2  /  Alb  4.1  /  TBili  1.3<H>  /  DBili  x   /  AST  36  /  ALT  19  /  AlkPhos  71  06-05    LIVER FUNCTIONS - ( 05 Jun 2021 00:54 )  Alb: 4.1 g/dL / Pro: 6.2 g/dL / ALK PHOS: 71 U/L / ALT: 19 U/L / AST: 36 U/L / GGT: x             ABG - ( 05 Jun 2021 00:50 )  pH, Arterial: 7.42  pH, Blood: x     /  pCO2: 41    /  pO2: 107   / HCO3: 26    / Base Excess: 2.0   /  SaO2: 99                  ======================Micro/Rad/Cardio=================  CXR: Reviewed  Echo:Reviewed  ======================================================  PAST MEDICAL & SURGICAL HISTORY:  Aortic stenosis    AICD (automatic cardioverter/defibrillator) present    Aortic valve regurgitation    Ascending aorta dilation    H/O paroxysmal supraventricular tachycardia    HLD (hyperlipidemia)    Mitral valve regurgitation    Cardiac arrest    S/P aortic valve replacement  3/13/2004    S/P hernia repair  2002    History of tonsillectomy and adenoidectomy    S/P TURP  1996    S/P colonoscopy  2001, 2002, 2006, 2011, 2016    S/P endoscopy  2006    S/P cardiac cath  1994, 1995, 1999, 2002, 2004    AICD (automatic cardioverter/defibrillator) present  12/19/17    Cardiac pacemaker  12/19/17    History of cardioversion  9/11/20    S/P ablation of atrial fibrillation  10/29/20    Status post ablation of ventricular arrhythmia  2/14/19      ====================ASSESSMENT ==============  Aortic Root Aneurysm Status Post Sternotomy repeat, Bentall Procedure, and Repair Mitral Valve 6/02  Cardiac Arrest   Hypovolemic Shock  Cardiogenic Shock  Post op respiratory insufficiency   Automatic Cardioverter/Defibrillator  Stress Hyperglycemia   Acute Blood Loss Anemia   Elevated blood urea nitrogen   Metabolic Acidosis  Hyperchloremia   Post op respiratory insufficiency     Plan:  ====================== NEUROLOGY=====================  Nonfocal, continue monitoring neuro status   Percocet PRN for analgesia     oxycodone    5 mG/acetaminophen 325 mG 1 Tablet(s) Oral every 6 hours PRN Mild Pain (1 - 3)  oxycodone    5 mG/acetaminophen 325 mG 2 Tablet(s) Oral every 6 hours PRN Moderate Pain (4 - 6)    ==================== RESPIRATORY======================  Resolving post op respiratory insufficiency requiring supplemental O2 via NC   Encourage incentive spirometry, continue pulse ox monitoring, follow ABGs     ====================CARDIOVASCULAR==================  S/p repeat sternotomy, Bentall Procedure, and mitral valve repair   Resolving post op cardiogenic shock/cardiovascular dysfunction requiring IV Dobutamine   Continue invasive hemodynamic monitoring   Inotropic support with IV Dobutamine   PPM/AICD paced at 80   ASA and Eliquis for full dose anticoagulation; Lipitor for CAD     aspirin enteric coated 81 milliGRAM(s) Oral daily  atorvastatin 40 milliGRAM(s) Oral daily  apixaban 2.5 milliGRAM(s) Oral every 12 hours  DOBUTamine Infusion 1.25 MICROgram(s)/kG/Min (2.89 mL/Hr) IV Continuous <Continuous>    ===================HEMATOLOGIC/ONC ===================  Acute blood loss anemia, monitor H&H/Plts      ===================== RENAL =========================  Nephrology following   Post op acute kidney injury, nonoliguric, -1.3L x24 hours without diuretic,   Will monitor serial BUN/Cr and maintain herbert  Continue monitoring urine output, I&OS   Monitor and replete lytes prn   Hyperkalemia s/p Lokelma   Hx of BPH, c/w Flomax     tamsulosin 0.4 milliGRAM(s) Oral at bedtime    ==================== GASTROINTESTINAL===================  Tolerating regular PO diet   Bowel regimen with Miralax     albumin human  5% IVPB 250 milliLiter(s) IV Intermittent once  magnesium sulfate  IVPB 1 Gram(s) IV Intermittent once  GI prophylaxis, pantoprazole    Tablet 40 milliGRAM(s) Oral before breakfast  polyethylene glycol 3350 17 Gram(s) Oral daily    =======================    ENDOCRINE  =====================  Continue glucose control with admelog sliding scale for stress hyperglycemia   Synthroid for hypothyroidism     insulin lispro (ADMELOG) corrective regimen sliding scale   SubCutaneous Before meals and at bedtime  levothyroxine 50 MICROGram(s) Oral daily    ========================INFECTIOUS DISEASE================  Temp 99.9F, WBC downtrending 12.84->9.89   Continue trending WBC and monitoring fever curve         I have spent 35 minutes providing acute care for this critically ill patient     Patient requires continuous monitoring with bedside rhythm monitoring, pulse ox monitoring, and intermittent blood gas analysis. Care plan discussed with ICU care team. Patient remained critical and at risk for life threatening decompensation.     By signing my name below, I, Devora De La Fuente, attest that this documentation has been prepared under the direction and in the presence of Zackery Greenberg MD   Electronically signed: Home Wilburn, 06-05-21 @ 07:18    I, Zackery Greenberg, personally performed the services described in this documentation. all medical record entries made by the buddyibmarino were at my direction and in my presence. 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 in [facility] [place] [facility]: then [facility] and AICD implanted) Aotic valve regurgitation (s/p AVR 2004), Mitral valve regurgitation, Ascending aortic dilation. 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 Pt reports cardiology has been following this for several years. Pt denies, c/p SOB, or palpitations at this time. Pt evaluated by Dr. Fenton for scheduled Redo Sternotomy, Bentall on 6/2/21. Pt denies recent travel or sick contact.     **Covid vaccine record card in chart** (01 Jun 2021 07:11)      Surgery/Hospital Course:  6/02 repeat sternotomy, Bentall Procedure, and mitral valve repair   6/04 Extubated     Today:  Resolving post op respiratory insufficiency requiring supplemental O2. Also with resolving post op cardiogenic shock/cardiovascular dysfunction requiring IV Dobutamine. Post op acute kidney injury, nonoliguric, -1.3L x24 hours without diuretic, will monitor serial BUN/Cr and maintain herbert. Requiring ASA and Eliquis for full dose anticoagulation.     ICU Vital Signs Last 24 Hrs  T(C): 37.7 (05 Jun 2021 04:00), Max: 38 (04 Jun 2021 16:00)  T(F): 99.9 (05 Jun 2021 04:00), Max: 100.4 (04 Jun 2021 16:00)  HR: 87 (05 Jun 2021 07:00) (80 - 103)  BP: 112/58 (05 Jun 2021 07:00) (86/52 - 133/68)  BP(mean): 79 (05 Jun 2021 07:00) (64 - 100)  ABP: 122/67 (05 Jun 2021 05:00) (103/47 - 279/279)  ABP(mean): 89 (05 Jun 2021 05:00) (69 - 279)  RR: 20 (05 Jun 2021 07:00) (12 - 36)  SpO2: 100% (05 Jun 2021 07:00) (86% - 100%)      Physical Exam:  Gen:  Awake, alert   CNS: non focal 	  Neck: no JVD  RES : clear , no wheezing              CVS: Regular  rhythm. Normal S1/S2  Abd: Soft, non-distended. Bowel sounds present.  Skin: No rash.  Ext:  no edema    ============================I/O===========================   I&O's Detail    04 Jun 2021 07:01  -  05 Jun 2021 07:00  --------------------------------------------------------  IN:    Albumin 5%  - 250 mL: 250 mL    DOBUTamine: 2.9 mL    DOBUTamine: 92.8 mL    DOBUTamine: 20.3 mL    IV PiggyBack: 50 mL    Oral Fluid: 420 mL    sodium chloride 0.9%: 170 mL    Vasopressin: 12 mL  Total IN: 1018 mL    OUT:    Bulb (mL): 42 mL    Indwelling Catheter - Urethral (mL): 3240 mL    Voided (mL): 0 mL  Total OUT: 3282 mL    Total NET: -2264 mL        ============================ LABS =========================                        8.8    9.89  )-----------( 105      ( 05 Jun 2021 00:54 )             27.4     06-05    136  |  98  |  37<H>  ----------------------------<  135<H>  4.3   |  23  |  2.51<H>    Ca    8.5      05 Jun 2021 00:54  Phos  3.8     06-05  Mg     2.9     06-05    TPro  6.2  /  Alb  4.1  /  TBili  1.3<H>  /  DBili  x   /  AST  36  /  ALT  19  /  AlkPhos  71  06-05    LIVER FUNCTIONS - ( 05 Jun 2021 00:54 )  Alb: 4.1 g/dL / Pro: 6.2 g/dL / ALK PHOS: 71 U/L / ALT: 19 U/L / AST: 36 U/L / GGT: x             ABG - ( 05 Jun 2021 00:50 )  pH, Arterial: 7.42  pH, Blood: x     /  pCO2: 41    /  pO2: 107   / HCO3: 26    / Base Excess: 2.0   /  SaO2: 99                  ======================Micro/Rad/Cardio=================  CXR: Reviewed  Echo:Reviewed  ======================================================  PAST MEDICAL & SURGICAL HISTORY:  Aortic stenosis    AICD (automatic cardioverter/defibrillator) present    Aortic valve regurgitation    Ascending aorta dilation    H/O paroxysmal supraventricular tachycardia    HLD (hyperlipidemia)    Mitral valve regurgitation    Cardiac arrest    S/P aortic valve replacement  3/13/2004    S/P hernia repair  2002    History of tonsillectomy and adenoidectomy    S/P TURP  1996    S/P colonoscopy  2001, 2002, 2006, 2011, 2016    S/P endoscopy  2006    S/P cardiac cath  1994, 1995, 1999, 2002, 2004    AICD (automatic cardioverter/defibrillator) present  12/19/17    Cardiac pacemaker  12/19/17    History of cardioversion  9/11/20    S/P ablation of atrial fibrillation  10/29/20    Status post ablation of ventricular arrhythmia  2/14/19      ====================ASSESSMENT ==============  Aortic Root Aneurysm Status Post Sternotomy repeat, Bentall Procedure, and Repair Mitral Valve 6/02  Cardiac Arrest   Hypovolemic Shock  Cardiogenic Shock  Post op respiratory insufficiency   Automatic Cardioverter/Defibrillator  Stress Hyperglycemia   Acute Blood Loss Anemia   Elevated blood urea nitrogen   Metabolic Acidosis  Hyperchloremia   Post op respiratory insufficiency     Plan:  ====================== NEUROLOGY=====================  Nonfocal, continue monitoring neuro status   Percocet PRN for analgesia     oxycodone    5 mG/acetaminophen 325 mG 1 Tablet(s) Oral every 6 hours PRN Mild Pain (1 - 3)  oxycodone    5 mG/acetaminophen 325 mG 2 Tablet(s) Oral every 6 hours PRN Moderate Pain (4 - 6)    ==================== RESPIRATORY======================  Resolving post op respiratory insufficiency requiring supplemental O2 via NC   Encourage incentive spirometry, continue pulse ox monitoring, follow ABGs     ====================CARDIOVASCULAR==================  S/p repeat sternotomy, Bentall Procedure, and mitral valve repair   Resolving post op cardiogenic shock/cardiovascular dysfunction requiring IV Dobutamine   Continue invasive hemodynamic monitoring   Inotropic support with IV Dobutamine   PPM/AICD paced at 80   ASA and Eliquis for full dose anticoagulation; Lipitor for CAD     aspirin enteric coated 81 milliGRAM(s) Oral daily  atorvastatin 40 milliGRAM(s) Oral daily  apixaban 2.5 milliGRAM(s) Oral every 12 hours  DOBUTamine Infusion 1.25 MICROgram(s)/kG/Min (2.89 mL/Hr) IV Continuous <Continuous>    ===================HEMATOLOGIC/ONC ===================  Acute blood loss anemia, monitor H&H/Plts      ===================== RENAL =========================  Nephrology following   Post op acute kidney injury, nonoliguric, -1.3L x24 hours without diuretic,   Will monitor serial BUN/Cr and maintain herbert  Continue monitoring urine output, I&OS   Monitor and replete lytes prn   Hyperkalemia s/p Lokelma   Hx of BPH, c/w Flomax     tamsulosin 0.4 milliGRAM(s) Oral at bedtime    ==================== GASTROINTESTINAL===================  Tolerating regular PO diet   Bowel regimen with Miralax     albumin human  5% IVPB 250 milliLiter(s) IV Intermittent once  magnesium sulfate  IVPB 1 Gram(s) IV Intermittent once  GI prophylaxis, pantoprazole    Tablet 40 milliGRAM(s) Oral before breakfast  polyethylene glycol 3350 17 Gram(s) Oral daily    =======================    ENDOCRINE  =====================  Continue glucose control with admelog sliding scale for stress hyperglycemia   Synthroid for hypothyroidism     insulin lispro (ADMELOG) corrective regimen sliding scale   SubCutaneous Before meals and at bedtime  levothyroxine 50 MICROGram(s) Oral daily    ========================INFECTIOUS DISEASE================  Temp 99.9F, WBC downtrending 12.84->9.89   Continue trending WBC and monitoring fever curve         I have spent 75 minutes providing acute care for this critically ill patient     Patient requires continuous monitoring with bedside rhythm monitoring, pulse ox monitoring, and intermittent blood gas analysis. Care plan discussed with ICU care team. Patient remained critical and at risk for life threatening decompensation.     By signing my name below, I, Devora De La Fuente, attest that this documentation has been prepared under the direction and in the presence of Zackery Greenberg MD   Electronically signed: Home Wilburn, 06-05-21 @ 07:18    I, Zackery Greenberg, personally performed the services described in this documentation. all medical record entries made by the buddyibmarino were at my direction and in my presence. I have reviewed the chart and agree that the record reflects my personal performance and is accurate and complete  Electronically signed: Zackery Greenberg MD        English

## 2021-06-06 DIAGNOSIS — N17.9 ACUTE KIDNEY FAILURE, UNSPECIFIED: ICD-10-CM

## 2021-06-06 DIAGNOSIS — Z29.9 ENCOUNTER FOR PROPHYLACTIC MEASURES, UNSPECIFIED: ICD-10-CM

## 2021-06-06 DIAGNOSIS — I47.2 VENTRICULAR TACHYCARDIA: ICD-10-CM

## 2021-06-06 DIAGNOSIS — Z98.890 OTHER SPECIFIED POSTPROCEDURAL STATES: ICD-10-CM

## 2021-06-06 LAB
ALBUMIN SERPL ELPH-MCNC: 3.8 G/DL — SIGNIFICANT CHANGE UP (ref 3.3–5)
ALBUMIN SERPL ELPH-MCNC: 3.9 G/DL — SIGNIFICANT CHANGE UP (ref 3.3–5)
ALP SERPL-CCNC: 80 U/L — SIGNIFICANT CHANGE UP (ref 40–120)
ALP SERPL-CCNC: 81 U/L — SIGNIFICANT CHANGE UP (ref 40–120)
ALT FLD-CCNC: 21 U/L — SIGNIFICANT CHANGE UP (ref 10–45)
ALT FLD-CCNC: 26 U/L — SIGNIFICANT CHANGE UP (ref 10–45)
ANION GAP SERPL CALC-SCNC: 13 MMOL/L — SIGNIFICANT CHANGE UP (ref 5–17)
ANION GAP SERPL CALC-SCNC: 16 MMOL/L — SIGNIFICANT CHANGE UP (ref 5–17)
AST SERPL-CCNC: 32 U/L — SIGNIFICANT CHANGE UP (ref 10–40)
AST SERPL-CCNC: 50 U/L — HIGH (ref 10–40)
BASOPHILS # BLD AUTO: 0.02 K/UL — SIGNIFICANT CHANGE UP (ref 0–0.2)
BASOPHILS NFR BLD AUTO: 0.2 % — SIGNIFICANT CHANGE UP (ref 0–2)
BILIRUB SERPL-MCNC: 1.2 MG/DL — SIGNIFICANT CHANGE UP (ref 0.2–1.2)
BILIRUB SERPL-MCNC: 1.2 MG/DL — SIGNIFICANT CHANGE UP (ref 0.2–1.2)
BUN SERPL-MCNC: 37 MG/DL — HIGH (ref 7–23)
BUN SERPL-MCNC: 38 MG/DL — HIGH (ref 7–23)
CALCIUM SERPL-MCNC: 8.5 MG/DL — SIGNIFICANT CHANGE UP (ref 8.4–10.5)
CALCIUM SERPL-MCNC: 8.7 MG/DL — SIGNIFICANT CHANGE UP (ref 8.4–10.5)
CHLORIDE SERPL-SCNC: 100 MMOL/L — SIGNIFICANT CHANGE UP (ref 96–108)
CHLORIDE SERPL-SCNC: 100 MMOL/L — SIGNIFICANT CHANGE UP (ref 96–108)
CK MB BLD-MCNC: 1.8 % — SIGNIFICANT CHANGE UP (ref 0–3.5)
CK MB CFR SERPL CALC: 4.2 NG/ML — SIGNIFICANT CHANGE UP (ref 0–6.7)
CK SERPL-CCNC: 169 U/L — SIGNIFICANT CHANGE UP (ref 30–200)
CK SERPL-CCNC: 231 U/L — HIGH (ref 30–200)
CO2 SERPL-SCNC: 22 MMOL/L — SIGNIFICANT CHANGE UP (ref 22–31)
CO2 SERPL-SCNC: 24 MMOL/L — SIGNIFICANT CHANGE UP (ref 22–31)
CREAT SERPL-MCNC: 2.22 MG/DL — HIGH (ref 0.5–1.3)
CREAT SERPL-MCNC: 2.25 MG/DL — HIGH (ref 0.5–1.3)
EOSINOPHIL # BLD AUTO: 0.1 K/UL — SIGNIFICANT CHANGE UP (ref 0–0.5)
EOSINOPHIL NFR BLD AUTO: 1.1 % — SIGNIFICANT CHANGE UP (ref 0–6)
GLUCOSE SERPL-MCNC: 119 MG/DL — HIGH (ref 70–99)
GLUCOSE SERPL-MCNC: 123 MG/DL — HIGH (ref 70–99)
HCT VFR BLD CALC: 29.8 % — LOW (ref 39–50)
HGB BLD-MCNC: 9.6 G/DL — LOW (ref 13–17)
IMM GRANULOCYTES NFR BLD AUTO: 0.8 % — SIGNIFICANT CHANGE UP (ref 0–1.5)
LYMPHOCYTES # BLD AUTO: 0.71 K/UL — LOW (ref 1–3.3)
LYMPHOCYTES # BLD AUTO: 8 % — LOW (ref 13–44)
MAGNESIUM SERPL-MCNC: 3 MG/DL — HIGH (ref 1.6–2.6)
MCHC RBC-ENTMCNC: 28.3 PG — SIGNIFICANT CHANGE UP (ref 27–34)
MCHC RBC-ENTMCNC: 32.2 GM/DL — SIGNIFICANT CHANGE UP (ref 32–36)
MCV RBC AUTO: 87.9 FL — SIGNIFICANT CHANGE UP (ref 80–100)
MONOCYTES # BLD AUTO: 0.79 K/UL — SIGNIFICANT CHANGE UP (ref 0–0.9)
MONOCYTES NFR BLD AUTO: 8.9 % — SIGNIFICANT CHANGE UP (ref 2–14)
NEUTROPHILS # BLD AUTO: 7.21 K/UL — SIGNIFICANT CHANGE UP (ref 1.8–7.4)
NEUTROPHILS NFR BLD AUTO: 81 % — HIGH (ref 43–77)
NRBC # BLD: 0 /100 WBCS — SIGNIFICANT CHANGE UP (ref 0–0)
PHOSPHATE SERPL-MCNC: 3.3 MG/DL — SIGNIFICANT CHANGE UP (ref 2.5–4.5)
PLATELET # BLD AUTO: 100 K/UL — LOW (ref 150–400)
POTASSIUM SERPL-MCNC: 4.5 MMOL/L — SIGNIFICANT CHANGE UP (ref 3.5–5.3)
POTASSIUM SERPL-MCNC: 5.4 MMOL/L — HIGH (ref 3.5–5.3)
POTASSIUM SERPL-SCNC: 4.5 MMOL/L — SIGNIFICANT CHANGE UP (ref 3.5–5.3)
POTASSIUM SERPL-SCNC: 5.4 MMOL/L — HIGH (ref 3.5–5.3)
PROT SERPL-MCNC: 6.2 G/DL — SIGNIFICANT CHANGE UP (ref 6–8.3)
PROT SERPL-MCNC: 6.6 G/DL — SIGNIFICANT CHANGE UP (ref 6–8.3)
RBC # BLD: 3.39 M/UL — LOW (ref 4.2–5.8)
RBC # FLD: 15.7 % — HIGH (ref 10.3–14.5)
SARS-COV-2 RNA SPEC QL NAA+PROBE: SIGNIFICANT CHANGE UP
SODIUM SERPL-SCNC: 137 MMOL/L — SIGNIFICANT CHANGE UP (ref 135–145)
SODIUM SERPL-SCNC: 138 MMOL/L — SIGNIFICANT CHANGE UP (ref 135–145)
TROPONIN T, HIGH SENSITIVITY RESULT: 248 NG/L — HIGH (ref 0–51)
WBC # BLD: 8.9 K/UL — SIGNIFICANT CHANGE UP (ref 3.8–10.5)
WBC # FLD AUTO: 8.9 K/UL — SIGNIFICANT CHANGE UP (ref 3.8–10.5)

## 2021-06-06 PROCEDURE — 93306 TTE W/DOPPLER COMPLETE: CPT | Mod: 26

## 2021-06-06 PROCEDURE — 71045 X-RAY EXAM CHEST 1 VIEW: CPT | Mod: 26

## 2021-06-06 PROCEDURE — 99291 CRITICAL CARE FIRST HOUR: CPT

## 2021-06-06 RX ORDER — SODIUM CHLORIDE 9 MG/ML
3 INJECTION INTRAMUSCULAR; INTRAVENOUS; SUBCUTANEOUS EVERY 8 HOURS
Refills: 0 | Status: DISCONTINUED | OUTPATIENT
Start: 2021-06-06 | End: 2021-06-09

## 2021-06-06 RX ORDER — METOPROLOL TARTRATE 50 MG
50 TABLET ORAL
Refills: 0 | Status: DISCONTINUED | OUTPATIENT
Start: 2021-06-06 | End: 2021-06-09

## 2021-06-06 RX ADMIN — AMIODARONE HYDROCHLORIDE 400 MILLIGRAM(S): 400 TABLET ORAL at 14:14

## 2021-06-06 RX ADMIN — POLYETHYLENE GLYCOL 3350 17 GRAM(S): 17 POWDER, FOR SOLUTION ORAL at 11:28

## 2021-06-06 RX ADMIN — SODIUM CHLORIDE 3 MILLILITER(S): 9 INJECTION INTRAMUSCULAR; INTRAVENOUS; SUBCUTANEOUS at 14:10

## 2021-06-06 RX ADMIN — TAMSULOSIN HYDROCHLORIDE 0.4 MILLIGRAM(S): 0.4 CAPSULE ORAL at 21:27

## 2021-06-06 RX ADMIN — Medication 50 MILLIGRAM(S): at 21:35

## 2021-06-06 RX ADMIN — AMIODARONE HYDROCHLORIDE 400 MILLIGRAM(S): 400 TABLET ORAL at 21:25

## 2021-06-06 RX ADMIN — Medication 2: at 11:32

## 2021-06-06 RX ADMIN — Medication 25 MILLIGRAM(S): at 05:05

## 2021-06-06 RX ADMIN — SODIUM CHLORIDE 3 MILLILITER(S): 9 INJECTION INTRAMUSCULAR; INTRAVENOUS; SUBCUTANEOUS at 21:35

## 2021-06-06 RX ADMIN — PANTOPRAZOLE SODIUM 40 MILLIGRAM(S): 20 TABLET, DELAYED RELEASE ORAL at 06:22

## 2021-06-06 RX ADMIN — ATORVASTATIN CALCIUM 40 MILLIGRAM(S): 80 TABLET, FILM COATED ORAL at 11:32

## 2021-06-06 RX ADMIN — Medication 81 MILLIGRAM(S): at 11:32

## 2021-06-06 RX ADMIN — Medication 50 MICROGRAM(S): at 05:06

## 2021-06-06 RX ADMIN — CHLORHEXIDINE GLUCONATE 1 APPLICATION(S): 213 SOLUTION TOPICAL at 05:06

## 2021-06-06 RX ADMIN — AMIODARONE HYDROCHLORIDE 400 MILLIGRAM(S): 400 TABLET ORAL at 05:05

## 2021-06-06 RX ADMIN — APIXABAN 2.5 MILLIGRAM(S): 2.5 TABLET, FILM COATED ORAL at 05:06

## 2021-06-06 RX ADMIN — APIXABAN 2.5 MILLIGRAM(S): 2.5 TABLET, FILM COATED ORAL at 18:50

## 2021-06-06 NOTE — PROGRESS NOTE ADULT - SUBJECTIVE AND OBJECTIVE BOX
off pressors/off inotropes   VITAL: T(C): , Max: 37.5 (06-05-21 @ 20:00) T(F): , Max: 99.5 (06-05-21 @ 20:00) HR: 80 (06-06-21 @ 12:00) BP: 109/68 (06-06-21 @ 12:00) BP(mean): 83 (06-06-21 @ 12:00) RR: 19 (06-06-21 @ 12:00) SpO2: 99% (06-06-21 @ 12:00) urine output 1670cc/24h   PHYSICAL EXAM: Constitutional: NAD Neck:  No JVD Respiratory: CTAB/L Cardiovascular: reg s1s2 Gastrointestinal: BS+, soft, NT/ND Extremities: No peripheral edema Neurological: A/O x 3, no focal deficits : +  Royal Skin: No rashes   LABS:                      9.6   8.90  )-----------( 100      ( 06 Jun 2021 00:20 )            29.8   Na(137)/K(4.5)/Cl(100)/HCO3(24)/BUN(38)/Cr(2.25)Glu(119)/Ca(8.7)/Mg(--)/PO4(--)    06-06 @ 03:07 Na(138)/K(5.4)/Cl(100)/HCO3(22)/BUN(37)/Cr(2.22)Glu(123)/Ca(8.5)/Mg(3.0)/PO4(3.3)    06-06 @ 00:20 Na(--)/K(4.1)/Cl(--)/HCO3(--)/BUN(--)/Cr(--)Glu(--)/Ca(--)/Mg(--)/PO4(--)    06-05 @ 22:28 Na(137)/K(4.4)/Cl(99)/HCO3(25)/BUN(39)/Cr(2.53)Glu(125)/Ca(8.9)/Mg(--)/PO4(--)    06-05 @ 17:57   IMPRESSION: 80M w/ AFib, CAD, HFrEF-ICD, and VT storm; s/p MV repair+Bentall 6/2/21  (1)Renal - SURESH - nonoliguric ischemic ATN - plateaued creatinine in low 2s - GFR ~25ml/min  (2)Lytes - highly acceptable  (3)CV - inotropic requirements downtrending s/p OHS   RECOMMEND: (1)No IVF/no diuretics (2)Dose new meds for GFR 25ml/min (present dosing is acceptable) (3)No objection to d/c of keon Casanova MD [facility] Office: [phone] Cell: [phone]        off pressors/off inotropes no complaints  VITAL: T(C): , Max: 37.5 (06-05-21 @ 20:00) T(F): , Max: 99.5 (06-05-21 @ 20:00) HR: 80 (06-06-21 @ 12:00) BP: 109/68 (06-06-21 @ 12:00) BP(mean): 83 (06-06-21 @ 12:00) RR: 19 (06-06-21 @ 12:00) SpO2: 99% (06-06-21 @ 12:00) urine output 1670cc/24h   PHYSICAL EXAM: Constitutional: NAD Neck:  No JVD Respiratory: CTAB/L Cardiovascular: reg s1s2 Gastrointestinal: BS+, soft, NT/ND Extremities: No peripheral edema Neurological: A/O x 3, no focal deficits : +  Royal Skin: No rashes   LABS:                      9.6   8.90  )-----------( 100      ( 06 Jun 2021 00:20 )            29.8   Na(137)/K(4.5)/Cl(100)/HCO3(24)/BUN(38)/Cr(2.25)Glu(119)/Ca(8.7)/Mg(--)/PO4(--)    06-06 @ 03:07 Na(138)/K(5.4)/Cl(100)/HCO3(22)/BUN(37)/Cr(2.22)Glu(123)/Ca(8.5)/Mg(3.0)/PO4(3.3)    06-06 @ 00:20 Na(--)/K(4.1)/Cl(--)/HCO3(--)/BUN(--)/Cr(--)Glu(--)/Ca(--)/Mg(--)/PO4(--)    06-05 @ 22:28 Na(137)/K(4.4)/Cl(99)/HCO3(25)/BUN(39)/Cr(2.53)Glu(125)/Ca(8.9)/Mg(--)/PO4(--)    06-05 @ 17:57   IMPRESSION: 80M w/ AFib, CAD, HFrEF-ICD, and VT storm; s/p MV repair+Bentall 6/2/21  (1)Renal - SURESH - nonoliguric ischemic ATN - plateaued creatinine in low 2s - GFR ~25ml/min  (2)Lytes - highly acceptable  (3)CV - inotropic requirements downtrending s/p OHS   RECOMMEND: (1)No IVF/no diuretics (2)Dose new meds for GFR 25ml/min (present dosing is acceptable) (3)No objection to d/c of keon Casanova MD [facility] Office: [phone] Cell: [phone]

## 2021-06-06 NOTE — PROGRESS NOTE ADULT - SUBJECTIVE AND OBJECTIVE BOX
VITAL SIGNS-Telemetry:  SR 80  Vital Signs Last 24 Hrs  T(C): 37.3 (21 @ 12:00), Max: 37.5 (21 @ 20:00)  T(F): 99.1 (21 @ 12:00), Max: 99.5 (21 @ 20:00)  HR: 80 (21 @ 13:20) (80 - 94)  BP: 107/61 (21 @ 13:20) (92/58 - 137/62)  RR: 18 (21 @ 13:00) (7 - 26)  SpO2: 96% (21 @ 13:20) (96% - 100%)          @ 07:01  -   @ 07:00  --------------------------------------------------------  IN: 640 mL / OUT: 1670 mL / NET: -1030 mL     @ 07:01  -   @ 14:09  --------------------------------------------------------  IN: 250 mL / OUT: 430 mL / NET: -180 mL    Daily     Daily Weight in k.2 (2021 00:00)    CAPILLARY BLOOD GLUCOSE  POCT Blood Glucose.: 128 mg/dL (2021 11:25)  POCT Blood Glucose.: 112 mg/dL (2021 07:52)  POCT Blood Glucose.: 130 mg/dL (2021 22:04)  POCT Blood Glucose.: 106 mg/dL (2021 16:43)             PHYSICAL EXAM:  Neurology: alert and oriented x 3, nonfocal, no gross deficits  CV : S1S2  Sternal Wound :  CDI , Stable  Lungs: cta  Abdomen: soft, nontender, nondistended, positive bowel sounds, last bowel movement  preop       Extremities:     no c/c/e    aMIOdarone    Tablet 400 milliGRAM(s) Oral every 8 hours  apixaban 2.5 milliGRAM(s) Oral every 12 hours  aspirin enteric coated 81 milliGRAM(s) Oral daily  atorvastatin 40 milliGRAM(s) Oral daily  chlorhexidine 2% Cloths 1 Application(s) Topical <User Schedule>  dextrose 5%. 1000 milliLiter(s) IV Continuous <Continuous>  dextrose 5%. 1000 milliLiter(s) IV Continuous <Continuous>  dextrose 50% Injectable 25 Gram(s) IV Push once  insulin lispro (ADMELOG) corrective regimen sliding scale   SubCutaneous at bedtime  insulin lispro (ADMELOG) corrective regimen sliding scale   SubCutaneous three times a day before meals  levothyroxine 50 MICROGram(s) Oral daily  metoprolol tartrate 50 milliGRAM(s) Oral two times a day  oxycodone    5 mG/acetaminophen 325 mG 1 Tablet(s) Oral every 6 hours PRN  oxycodone    5 mG/acetaminophen 325 mG 2 Tablet(s) Oral every 6 hours PRN  pantoprazole    Tablet 40 milliGRAM(s) Oral before breakfast  polyethylene glycol 3350 17 Gram(s) Oral daily  sodium chloride 0.9% lock flush 3 milliLiter(s) IV Push every 8 hours  tamsulosin 0.4 milliGRAM(s) Oral at bedtime      Physical Therapy Rec:   Home  [  x]   Home w/ PT  [  ]  Rehab  [  ]  Discussed with Cardiothoracic Team at AM rounds. VITAL SIGNS-Telemetry:  SR 80  Vital Signs Last 24 Hrs  T(C): 37.3 (21 @ 12:00), Max: 37.5 (21 @ 20:00)  T(F): 99.1 (21 @ 12:00), Max: 99.5 (21 @ 20:00)  HR: 80 (21 @ 13:20) (80 - 94)  BP: 107/61 (21 @ 13:20) (92/58 - 137/62)  RR: 18 (21 @ 13:00) (7 - 26)  SpO2: 96% (21 @ 13:20) (96% - 100%)          @ 07:01  -   @ 07:00  --------------------------------------------------------  IN: 640 mL / OUT: 1670 mL / NET: -1030 mL     @ 07:01  -   @ 14:09  --------------------------------------------------------  IN: 250 mL / OUT: 430 mL / NET: -180 mL    Daily     Daily Weight in k.2 (2021 00:00)    CAPILLARY BLOOD GLUCOSE  POCT Blood Glucose.: 128 mg/dL (2021 11:25)  POCT Blood Glucose.: 112 mg/dL (2021 07:52)  POCT Blood Glucose.: 130 mg/dL (2021 22:04)  POCT Blood Glucose.: 106 mg/dL (2021 16:43)             PHYSICAL EXAM:  Neurology: alert and oriented x 3, nonfocal, no gross deficits  CV : S1S2  Sternal Wound :  CDI , Stable  Lungs: cta  Abdomen: soft, nontender, nondistended, positive bowel sounds, last bowel movement  preop       Extremities:     no c/c/e  RUEDA - SBD    aMIOdarone    Tablet 400 milliGRAM(s) Oral every 8 hours  apixaban 2.5 milliGRAM(s) Oral every 12 hours  aspirin enteric coated 81 milliGRAM(s) Oral daily  atorvastatin 40 milliGRAM(s) Oral daily  chlorhexidine 2% Cloths 1 Application(s) Topical <User Schedule>  dextrose 5%. 1000 milliLiter(s) IV Continuous <Continuous>  dextrose 5%. 1000 milliLiter(s) IV Continuous <Continuous>  dextrose 50% Injectable 25 Gram(s) IV Push once  insulin lispro (ADMELOG) corrective regimen sliding scale   SubCutaneous at bedtime  insulin lispro (ADMELOG) corrective regimen sliding scale   SubCutaneous three times a day before meals  levothyroxine 50 MICROGram(s) Oral daily  metoprolol tartrate 50 milliGRAM(s) Oral two times a day  oxycodone    5 mG/acetaminophen 325 mG 1 Tablet(s) Oral every 6 hours PRN  oxycodone    5 mG/acetaminophen 325 mG 2 Tablet(s) Oral every 6 hours PRN  pantoprazole    Tablet 40 milliGRAM(s) Oral before breakfast  polyethylene glycol 3350 17 Gram(s) Oral daily  sodium chloride 0.9% lock flush 3 milliLiter(s) IV Push every 8 hours  tamsulosin 0.4 milliGRAM(s) Oral at bedtime      Physical Therapy Rec:   Home  [  x]   Home w/ PT  [  ]  Rehab  [  ]  Discussed with Cardiothoracic Team at AM rounds.

## 2021-06-06 NOTE — PROGRESS NOTE ADULT - ASSESSMENT
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 in [facility]: then SB and AICD implanted) Aotic valve regurgitation (s/p AVR 2004), Mitral valve regurgitation, Ascending aortic dilation. 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 Pt reports cardiology has been following this for several years. Pt denies, c/p SOB, or palpitations at this time. Pt evaluated by Dr. Fenton for scheduled Redo Sternotomy, [place] on 6/2/21. Pt denies recent travel or sick contact.      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 in [facility]: then SB and AICD implanted) Aotic valve regurgitation (s/p AVR ), Mitral valve regurgitation, Ascending aortic dilation. CTA of chest in 21 reveals aortic root aneurysm. Pt reports cardiology has been following this for several years. Pt denies, c/p SOB, or palpitations at this time. Pt evaluated by Dr. Fenton for scheduled Redo Sternotomy, Bentall on 21    On 21, pt underwent Redo Bentall/MV repair ef 45%  post-op   21 -Dobutrex gtt - NSVT - shocked by his ICD. -  d/c'd - amio load initiated  SURESH - creatinine 2.2 from 1.2 - herbert to remain in place for strict I/O's - will monitor bmp daily  diuretics d/c'd   echo this am - tr. to SDU POD #3  d/c plan anticipate home mid week

## 2021-06-06 NOTE — PROGRESS NOTE ADULT - SUBJECTIVE AND OBJECTIVE BOX
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 in [facility]: then [facility] and AICD implanted) Aotic valve regurgitation (s/p AVR 2004), Mitral valve regurgitation, Ascending aortic dilation. 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 Pt reports cardiology has been following this for several years. Pt denies, c/p SOB, or palpitations at this time. Pt evaluated by Dr. Fenton for scheduled Redo Sternotomy, Bentall on 6/2/21. Pt denies recent travel or sick contact.     **Covid vaccine record card in chart** (01 Jun 2021 07:11)      Surgery/Hospital Course:  6/02 Sternotomy repeat, Bentall Procedure, MVr  6/04 Extubated     Today:    ICU Vital Signs Last 24 Hrs  T(C): 37.3 (06 Jun 2021 04:00), Max: 37.6 (05 Jun 2021 08:00)  T(F): 99.1 (06 Jun 2021 04:00), Max: 99.7 (05 Jun 2021 08:00)  HR: 80 (06 Jun 2021 06:00) (80 - 122)  BP: 118/79 (06 Jun 2021 06:00) (90/57 - 137/62)  BP(mean): 95 (06 Jun 2021 06:00) (68 - 96)  ABP: --  ABP(mean): --  RR: 22 (06 Jun 2021 06:00) (7 - 27)  SpO2: 99% (06 Jun 2021 06:00) (96% - 100%)      Physical Exam:  Gen:  Awake, alert   CNS: non focal 	  Neck: no JVD  RES : clear , no wheezing              CVS: Regular  rhythm. Normal S1/S2  Abd: Soft, non-distended. Bowel sounds present.  Skin: No rash.  Ext:  no edema    ============================I/O===========================   I&O's Detail    04 Jun 2021 07:01  -  05 Jun 2021 07:00  --------------------------------------------------------  IN:    Albumin 5%  - 250 mL: 500 mL    DOBUTamine: 20.3 mL    DOBUTamine: 2.9 mL    DOBUTamine: 92.8 mL    IV PiggyBack: 50 mL    Oral Fluid: 420 mL    sodium chloride 0.9%: 170 mL    Vasopressin: 12 mL  Total IN: 1268 mL    OUT:    Bulb (mL): 42 mL    Indwelling Catheter - Urethral (mL): 3240 mL    Voided (mL): 0 mL  Total OUT: 3282 mL    Total NET: -2014 mL      05 Jun 2021 07:01  -  06 Jun 2021 06:45  --------------------------------------------------------  IN:    IV PiggyBack: 400 mL    Oral Fluid: 240 mL  Total IN: 640 mL    OUT:    DOBUTamine: 0 mL    Indwelling Catheter - Urethral (mL): 1545 mL  Total OUT: 1545 mL    Total NET: -905 mL        ============================ LABS =========================                        9.6    8.90  )-----------( 100      ( 06 Jun 2021 00:20 )             29.8     06-06    137  |  100  |  38<H>  ----------------------------<  119<H>  4.5   |  24  |  2.25<H>    Ca    8.7      06 Jun 2021 03:07  Phos  3.3     06-06  Mg     3.0     06-06    TPro  6.2  /  Alb  3.8  /  TBili  1.2  /  DBili  x   /  AST  32  /  ALT  21  /  AlkPhos  81  06-06    LIVER FUNCTIONS - ( 06 Jun 2021 03:07 )  Alb: 3.8 g/dL / Pro: 6.2 g/dL / ALK PHOS: 81 U/L / ALT: 21 U/L / AST: 32 U/L / GGT: x             ABG - ( 05 Jun 2021 00:50 )  pH, Arterial: 7.42  pH, Blood: x     /  pCO2: 41    /  pO2: 107   / HCO3: 26    / Base Excess: 2.0   /  SaO2: 99                  ======================Micro/Rad/Cardio=================  CXR: Reviewed  Echo: Reviewed  ======================================================  PAST MEDICAL & SURGICAL HISTORY:  Aortic stenosis    AICD (automatic cardioverter/defibrillator) present    Aortic valve regurgitation    Ascending aorta dilation    H/O paroxysmal supraventricular tachycardia    HLD (hyperlipidemia)    Mitral valve regurgitation    Cardiac arrest    S/P aortic valve replacement  3/13/2004    S/P hernia repair  2002    History of tonsillectomy and adenoidectomy    S/P TURP  1996    S/P colonoscopy  2001, 2002, 2006, 2011, 2016    S/P endoscopy  2006    S/P cardiac cath  1994, 1995, 1999, 2002, 2004    AICD (automatic cardioverter/defibrillator) present  12/19/17    Cardiac pacemaker  12/19/17    History of cardioversion  9/11/20    S/P ablation of atrial fibrillation  10/29/20    Status post ablation of ventricular arrhythmia  2/14/19      ====================ASSESSMENT ==============  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   Continue with Percocet for analgesia PRN    oxycodone    5 mG/acetaminophen 325 mG 1 Tablet(s) Oral every 6 hours PRN Mild Pain (1 - 3)  oxycodone    5 mG/acetaminophen 325 mG 2 Tablet(s) Oral every 6 hours PRN Moderate Pain (4 - 6)  =================== RESPIRATORY======================  Comfortable on room air, SpO2 >95%  Continue to monitor SpO2 via pulse oximetry  Encourage bedside spirometry     ====================CARDIOVASCULAR==================  S/p repeat sternotomy, Bentall Procedure, and mitral valve repair on 6/2  Resolving post op cardiogenic shock/cardiovascular dysfunction requiring IV Dobutamine   Continue BP and rate control with PO Amio and Lopressor   Invasive hemodynamic monitoring, assess perfusion indices.   PPM/AICD in place, currently paced at 80   ASA and Eliquis for full dose anticoagulation; Lipitor for CAD     aMIOdarone    Tablet 400 milliGRAM(s) Oral every 8 hours  metoprolol tartrate 25 milliGRAM(s) Oral two times a day  aspirin enteric coated 81 milliGRAM(s) Oral daily  atorvastatin 40 milliGRAM(s) Oral daily  apixaban 2.5 milliGRAM(s) Oral every 12 hours  ===================HEMATOLOGIC/ONC ===================  Anemia due to acute blood loss, requiring 2 Platelets, 10 Cryo, 2 FFP, and 1000 FEIBA intraoperative on 6/02.  Monitor hemoglobin and hematocrit levels. No current plan for transfusion.     ===================== RENAL =========================  Nephrology following. Post op nonoliguric SURESH.  Continue to monitor I/Os, BUN/Creatinine, and urine output.   Goal net negative fluid balance. Replete lytes PRN. Keep K> 4 and Mg >2.   Hx of BPH, c/w Flomax     tamsulosin 0.4 milliGRAM(s) Oral at bedtime  ==================== GASTROINTESTINAL===================  Tolerating regular PO diet  Continue Protonix for stress ulcer prophylaxis.   Bowel regimen with Miralax.     dextrose 5%. 1000 milliLiter(s) (50 mL/Hr) IV Continuous <Continuous>  dextrose 5%. 1000 milliLiter(s) (100 mL/Hr) IV Continuous <Continuous>  pantoprazole    Tablet 40 milliGRAM(s) Oral before breakfast  polyethylene glycol 3350 17 Gram(s) Oral daily  =======================    ENDOCRINE  =====================  Stress Hyperglycemia, glycemic control with Admelog sliding scale. Monitor blood glucose levels.   Continue Synthroid for Hypothyroidism.    dextrose 50% Injectable 25 Gram(s) IV Push once  insulin lispro (ADMELOG) corrective regimen sliding scale   SubCutaneous at bedtime  insulin lispro (ADMELOG) corrective regimen sliding scale   SubCutaneous three times a day before meals  levothyroxine 50 MICROGram(s) Oral daily  ========================INFECTIOUS DISEASE================  TMAX 99.7F, WBC within normal limits.   Monitor temperature and trend WBC. Monitor off abx.       I have spent 35 minutes providing acute care for this critically ill patient     Patient requires continuous monitoring with bedside rhythm monitoring, pulse ox monitoring, and intermittent blood gas analysis. Care plan discussed with ICU care team. Patient remained critical and at risk for life threatening decompensation.     By signing my name below, I, Bell Alaniz, attest that this documentation has been prepared under the direction and in the presence of Zackery Greenberg MD   Electronically signed: Home Rose, 06-06-21 @ 06:45    I, Zackery Greenberg, personally performed the services described in this documentation. all medical record entries made by the scribe were at my direction and in my presence. 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 in [facility]: then [facility] and AICD implanted) Aotic valve regurgitation (s/p AVR 2004), Mitral valve regurgitation, Ascending aortic dilation. 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 Pt reports cardiology has been following this for several years. Pt denies, c/p SOB, or palpitations at this time. Pt evaluated by Dr. Fenton for scheduled Redo Sternotomy, Bentall on 6/2/21. Pt denies recent travel or sick contact.     **Covid vaccine record card in chart** (01 Jun 2021 07:11)      Surgery/Hospital Course:  6/02 Sternotomy repeat, Bentall Procedure, MVr  6/04 Extubated     Today:  Patient with resolving post op cardiogenic shock/cardiovascular dysfunction, requiring IV Dobutamine infusion. PPM/AICD in place, currently paced at 80. Will increase Lopressor from 25mg BID to 50mg BID. Pt is a candidate for transfer to the stepdown unit.     ICU Vital Signs Last 24 Hrs  T(C): 37.3 (06 Jun 2021 04:00), Max: 37.6 (05 Jun 2021 08:00)  T(F): 99.1 (06 Jun 2021 04:00), Max: 99.7 (05 Jun 2021 08:00)  HR: 80 (06 Jun 2021 06:00) (80 - 122)  BP: 118/79 (06 Jun 2021 06:00) (90/57 - 137/62)  BP(mean): 95 (06 Jun 2021 06:00) (68 - 96)  ABP: --  ABP(mean): --  RR: 22 (06 Jun 2021 06:00) (7 - 27)  SpO2: 99% (06 Jun 2021 06:00) (96% - 100%)      Physical Exam:  Gen:  Awake, alert   CNS: non focal 	  Neck: no JVD  RES : clear , no wheezing              CVS: Regular  rhythm. Normal S1/S2  Abd: Soft, non-distended. Bowel sounds present.  Skin: No rash.  Ext:  no edema    ============================I/O===========================   I&O's Detail    04 Jun 2021 07:01  -  05 Jun 2021 07:00  --------------------------------------------------------  IN:    Albumin 5%  - 250 mL: 500 mL    DOBUTamine: 20.3 mL    DOBUTamine: 2.9 mL    DOBUTamine: 92.8 mL    IV PiggyBack: 50 mL    Oral Fluid: 420 mL    sodium chloride 0.9%: 170 mL    Vasopressin: 12 mL  Total IN: 1268 mL    OUT:    Bulb (mL): 42 mL    Indwelling Catheter - Urethral (mL): 3240 mL    Voided (mL): 0 mL  Total OUT: 3282 mL    Total NET: -2014 mL      05 Jun 2021 07:01  -  06 Jun 2021 06:45  --------------------------------------------------------  IN:    IV PiggyBack: 400 mL    Oral Fluid: 240 mL  Total IN: 640 mL    OUT:    DOBUTamine: 0 mL    Indwelling Catheter - Urethral (mL): 1545 mL  Total OUT: 1545 mL    Total NET: -905 mL        ============================ LABS =========================                        9.6    8.90  )-----------( 100      ( 06 Jun 2021 00:20 )             29.8     06-06    137  |  100  |  38<H>  ----------------------------<  119<H>  4.5   |  24  |  2.25<H>    Ca    8.7      06 Jun 2021 03:07  Phos  3.3     06-06  Mg     3.0     06-06    TPro  6.2  /  Alb  3.8  /  TBili  1.2  /  DBili  x   /  AST  32  /  ALT  21  /  AlkPhos  81  06-06    LIVER FUNCTIONS - ( 06 Jun 2021 03:07 )  Alb: 3.8 g/dL / Pro: 6.2 g/dL / ALK PHOS: 81 U/L / ALT: 21 U/L / AST: 32 U/L / GGT: x             ABG - ( 05 Jun 2021 00:50 )  pH, Arterial: 7.42  pH, Blood: x     /  pCO2: 41    /  pO2: 107   / HCO3: 26    / Base Excess: 2.0   /  SaO2: 99                  ======================Micro/Rad/Cardio=================  CXR: Reviewed  Echo: Reviewed  ======================================================  PAST MEDICAL & SURGICAL HISTORY:  Aortic stenosis    AICD (automatic cardioverter/defibrillator) present    Aortic valve regurgitation    Ascending aorta dilation    H/O paroxysmal supraventricular tachycardia    HLD (hyperlipidemia)    Mitral valve regurgitation    Cardiac arrest    S/P aortic valve replacement  3/13/2004    S/P hernia repair  2002    History of tonsillectomy and adenoidectomy    S/P TURP  1996    S/P colonoscopy  2001, 2002, 2006, 2011, 2016    S/P endoscopy  2006    S/P cardiac cath  1994, 1995, 1999, 2002, 2004    AICD (automatic cardioverter/defibrillator) present  12/19/17    Cardiac pacemaker  12/19/17    History of cardioversion  9/11/20    S/P ablation of atrial fibrillation  10/29/20    Status post ablation of ventricular arrhythmia  2/14/19      ====================ASSESSMENT ==============  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   Continue with Percocet for analgesia PRN    oxycodone    5 mG/acetaminophen 325 mG 1 Tablet(s) Oral every 6 hours PRN Mild Pain (1 - 3)  oxycodone    5 mG/acetaminophen 325 mG 2 Tablet(s) Oral every 6 hours PRN Moderate Pain (4 - 6)  =================== RESPIRATORY======================  Comfortable on room air, SpO2 >95%  Continue to monitor SpO2 via pulse oximetry  Encourage bedside spirometry     ====================CARDIOVASCULAR==================  S/p repeat sternotomy, Bentall Procedure, and mitral valve repair on 6/2  Resolving post op cardiogenic shock/cardiovascular dysfunction requiring IV Dobutamine   Continue BP and rate control with PO Amio and Lopressor   Invasive hemodynamic monitoring, assess perfusion indices.   PPM/AICD in place, currently paced at 80   ASA and Eliquis for full dose anticoagulation; Lipitor for CAD     aMIOdarone    Tablet 400 milliGRAM(s) Oral every 8 hours  metoprolol tartrate 25 milliGRAM(s) Oral two times a day  aspirin enteric coated 81 milliGRAM(s) Oral daily  atorvastatin 40 milliGRAM(s) Oral daily  apixaban 2.5 milliGRAM(s) Oral every 12 hours  ===================HEMATOLOGIC/ONC ===================  Anemia due to acute blood loss, requiring 2 Platelets, 10 Cryo, 2 FFP, and 1000 FEIBA intraoperative on 6/02.  Monitor hemoglobin and hematocrit levels. No current plan for transfusion.     ===================== RENAL =========================  Nephrology following. Post op nonoliguric SURESH.  Continue to monitor I/Os, BUN/Creatinine, and urine output.   Goal net negative fluid balance. Replete lytes PRN. Keep K> 4 and Mg >2.   Hx of BPH, c/w Flomax     tamsulosin 0.4 milliGRAM(s) Oral at bedtime  ==================== GASTROINTESTINAL===================  Tolerating regular PO diet  Continue Protonix for stress ulcer prophylaxis.   Bowel regimen with Miralax.     dextrose 5%. 1000 milliLiter(s) (50 mL/Hr) IV Continuous <Continuous>  dextrose 5%. 1000 milliLiter(s) (100 mL/Hr) IV Continuous <Continuous>  pantoprazole    Tablet 40 milliGRAM(s) Oral before breakfast  polyethylene glycol 3350 17 Gram(s) Oral daily  =======================    ENDOCRINE  =====================  Stress Hyperglycemia, glycemic control with Admelog sliding scale. Monitor blood glucose levels.   Continue Synthroid for Hypothyroidism.    dextrose 50% Injectable 25 Gram(s) IV Push once  insulin lispro (ADMELOG) corrective regimen sliding scale   SubCutaneous at bedtime  insulin lispro (ADMELOG) corrective regimen sliding scale   SubCutaneous three times a day before meals  levothyroxine 50 MICROGram(s) Oral daily  ========================INFECTIOUS DISEASE================  TMAX 99.7F, WBC within normal limits.   Monitor temperature and trend WBC. Monitor off abx.       I have spent 35 minutes providing acute care for this critically ill patient     Patient requires continuous monitoring with bedside rhythm monitoring, pulse ox monitoring, and intermittent blood gas analysis. Care plan discussed with ICU care team. Patient remained critical and at risk for life threatening decompensation.     By signing my name below, I, Bell Alaniz, attest that this documentation has been prepared under the direction and in the presence of Zackery Greenberg MD   Electronically signed: Home Rose, 06-06-21 @ 06:45    I, Zackery Greenberg, personally performed the services described in this documentation. all medical record entries made by the buddyibmarino were at my direction and in my presence. 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 in [facility]: then [facility] and AICD implanted) Aotic valve regurgitation (s/p AVR 2004), Mitral valve regurgitation, Ascending aortic dilation. 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 Pt reports cardiology has been following this for several years. Pt denies, c/p SOB, or palpitations at this time. Pt evaluated by Dr. Fenton for scheduled Redo Sternotomy, Bentall on 6/2/21. Pt denies recent travel or sick contact.     **Covid vaccine record card in chart** (01 Jun 2021 07:11)      Surgery/Hospital Course:  6/02 Sternotomy repeat, Bentall Procedure, MVr  6/04 Extubated     Today:  Patient with resolving post op cardiogenic shock/cardiovascular dysfunction, requiring IV Dobutamine infusion. PPM/AICD in place, currently paced at 80. Will increase Lopressor from 25mg BID to 50mg BID. Pt is a candidate for transfer to the stepdown unit.     ICU Vital Signs Last 24 Hrs  T(C): 37.3 (06 Jun 2021 04:00), Max: 37.6 (05 Jun 2021 08:00)  T(F): 99.1 (06 Jun 2021 04:00), Max: 99.7 (05 Jun 2021 08:00)  HR: 80 (06 Jun 2021 06:00) (80 - 122)  BP: 118/79 (06 Jun 2021 06:00) (90/57 - 137/62)  BP(mean): 95 (06 Jun 2021 06:00) (68 - 96)  ABP: --  ABP(mean): --  RR: 22 (06 Jun 2021 06:00) (7 - 27)  SpO2: 99% (06 Jun 2021 06:00) (96% - 100%)      Physical Exam:  Gen:  Awake, alert   CNS: non focal 	  Neck: no JVD  RES : clear , no wheezing              CVS: Regular  rhythm. Normal S1/S2  Abd: Soft, non-distended. Bowel sounds present.  Skin: No rash.  Ext:  no edema    ============================I/O===========================   I&O's Detail    04 Jun 2021 07:01  -  05 Jun 2021 07:00  --------------------------------------------------------  IN:    Albumin 5%  - 250 mL: 500 mL    DOBUTamine: 20.3 mL    DOBUTamine: 2.9 mL    DOBUTamine: 92.8 mL    IV PiggyBack: 50 mL    Oral Fluid: 420 mL    sodium chloride 0.9%: 170 mL    Vasopressin: 12 mL  Total IN: 1268 mL    OUT:    Bulb (mL): 42 mL    Indwelling Catheter - Urethral (mL): 3240 mL    Voided (mL): 0 mL  Total OUT: 3282 mL    Total NET: -2014 mL      05 Jun 2021 07:01  -  06 Jun 2021 06:45  --------------------------------------------------------  IN:    IV PiggyBack: 400 mL    Oral Fluid: 240 mL  Total IN: 640 mL    OUT:    DOBUTamine: 0 mL    Indwelling Catheter - Urethral (mL): 1545 mL  Total OUT: 1545 mL    Total NET: -905 mL        ============================ LABS =========================                        9.6    8.90  )-----------( 100      ( 06 Jun 2021 00:20 )             29.8     06-06    137  |  100  |  38<H>  ----------------------------<  119<H>  4.5   |  24  |  2.25<H>    Ca    8.7      06 Jun 2021 03:07  Phos  3.3     06-06  Mg     3.0     06-06    TPro  6.2  /  Alb  3.8  /  TBili  1.2  /  DBili  x   /  AST  32  /  ALT  21  /  AlkPhos  81  06-06    LIVER FUNCTIONS - ( 06 Jun 2021 03:07 )  Alb: 3.8 g/dL / Pro: 6.2 g/dL / ALK PHOS: 81 U/L / ALT: 21 U/L / AST: 32 U/L / GGT: x             ABG - ( 05 Jun 2021 00:50 )  pH, Arterial: 7.42  pH, Blood: x     /  pCO2: 41    /  pO2: 107   / HCO3: 26    / Base Excess: 2.0   /  SaO2: 99                  ======================Micro/Rad/Cardio=================  CXR: Reviewed  Echo: Reviewed  ======================================================  PAST MEDICAL & SURGICAL HISTORY:  Aortic stenosis    AICD (automatic cardioverter/defibrillator) present    Aortic valve regurgitation    Ascending aorta dilation    H/O paroxysmal supraventricular tachycardia    HLD (hyperlipidemia)    Mitral valve regurgitation    Cardiac arrest    S/P aortic valve replacement  3/13/2004    S/P hernia repair  2002    History of tonsillectomy and adenoidectomy    S/P TURP  1996    S/P colonoscopy  2001, 2002, 2006, 2011, 2016    S/P endoscopy  2006    S/P cardiac cath  1994, 1995, 1999, 2002, 2004    AICD (automatic cardioverter/defibrillator) present  12/19/17    Cardiac pacemaker  12/19/17    History of cardioversion  9/11/20    S/P ablation of atrial fibrillation  10/29/20    Status post ablation of ventricular arrhythmia  2/14/19      ====================ASSESSMENT ==============  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   Continue with Percocet for analgesia PRN    oxycodone    5 mG/acetaminophen 325 mG 1 Tablet(s) Oral every 6 hours PRN Mild Pain (1 - 3)  oxycodone    5 mG/acetaminophen 325 mG 2 Tablet(s) Oral every 6 hours PRN Moderate Pain (4 - 6)  =================== RESPIRATORY======================  Comfortable on room air, SpO2 >95%  Continue to monitor SpO2 via pulse oximetry  Encourage bedside spirometry     ====================CARDIOVASCULAR==================  S/p repeat sternotomy, Bentall Procedure, and mitral valve repair on 6/2  Resolving post op cardiogenic shock/cardiovascular dysfunction requiring IV Dobutamine   Continue BP and rate control with PO Amio and Lopressor. Increase Lopressor from 25mg BID to 50mg BID.  Invasive hemodynamic monitoring, assess perfusion indices.   PPM/AICD in place, currently paced at 80   ASA and Eliquis for full dose anticoagulation; Lipitor for CAD     aMIOdarone    Tablet 400 milliGRAM(s) Oral every 8 hours  metoprolol tartrate 25 milliGRAM(s) Oral two times a day  aspirin enteric coated 81 milliGRAM(s) Oral daily  atorvastatin 40 milliGRAM(s) Oral daily  apixaban 2.5 milliGRAM(s) Oral every 12 hours  ===================HEMATOLOGIC/ONC ===================  Anemia due to acute blood loss, requiring 2 Platelets, 10 Cryo, 2 FFP, and 1000 FEIBA intraoperative on 6/02.  Monitor hemoglobin and hematocrit levels. No current plan for transfusion.     ===================== RENAL =========================  Nephrology following. Post op nonoliguric SURESH.  Continue to monitor I/Os, BUN/Creatinine, and urine output.   Goal net negative fluid balance. Replete lytes PRN. Keep K> 4 and Mg >2.   Hx of BPH, c/w Flomax     tamsulosin 0.4 milliGRAM(s) Oral at bedtime  ==================== GASTROINTESTINAL===================  Tolerating regular PO diet  Continue Protonix for stress ulcer prophylaxis.   Bowel regimen with Miralax.     dextrose 5%. 1000 milliLiter(s) (50 mL/Hr) IV Continuous <Continuous>  dextrose 5%. 1000 milliLiter(s) (100 mL/Hr) IV Continuous <Continuous>  pantoprazole    Tablet 40 milliGRAM(s) Oral before breakfast  polyethylene glycol 3350 17 Gram(s) Oral daily  =======================    ENDOCRINE  =====================  Stress Hyperglycemia, glycemic control with Admelog sliding scale. Monitor blood glucose levels.   Continue Synthroid for Hypothyroidism.    dextrose 50% Injectable 25 Gram(s) IV Push once  insulin lispro (ADMELOG) corrective regimen sliding scale   SubCutaneous at bedtime  insulin lispro (ADMELOG) corrective regimen sliding scale   SubCutaneous three times a day before meals  levothyroxine 50 MICROGram(s) Oral daily  ========================INFECTIOUS DISEASE================  TMAX 99.7F, WBC within normal limits.   Monitor temperature and trend WBC. Monitor off abx.       I have spent 35 minutes providing acute care for this critically ill patient     Patient requires continuous monitoring with bedside rhythm monitoring, pulse ox monitoring, and intermittent blood gas analysis. Care plan discussed with ICU care team. Patient remained critical and at risk for life threatening decompensation.     By signing my name below, I, Bell Alaniz, attest that this documentation has been prepared under the direction and in the presence of Zackery Greenberg MD   Electronically signed: Home Rose, 06-06-21 @ 06:45    I, Zackery Greenberg, personally performed the services described in this documentation. all medical record entries made by the buddyibmarino were at my direction and in my presence. I have reviewed the chart and agree that the record reflects my personal performance and is accurate and complete  Electronically signed: Zackery Greenberg MD

## 2021-06-07 LAB
ALBUMIN SERPL ELPH-MCNC: 3.5 G/DL — SIGNIFICANT CHANGE UP (ref 3.3–5)
ALP SERPL-CCNC: 78 U/L — SIGNIFICANT CHANGE UP (ref 40–120)
ALT FLD-CCNC: 26 U/L — SIGNIFICANT CHANGE UP (ref 10–45)
ANION GAP SERPL CALC-SCNC: 12 MMOL/L — SIGNIFICANT CHANGE UP (ref 5–17)
AST SERPL-CCNC: 28 U/L — SIGNIFICANT CHANGE UP (ref 10–40)
BASOPHILS # BLD AUTO: 0.02 K/UL — SIGNIFICANT CHANGE UP (ref 0–0.2)
BASOPHILS NFR BLD AUTO: 0.3 % — SIGNIFICANT CHANGE UP (ref 0–2)
BILIRUB SERPL-MCNC: 1 MG/DL — SIGNIFICANT CHANGE UP (ref 0.2–1.2)
BUN SERPL-MCNC: 41 MG/DL — HIGH (ref 7–23)
CALCIUM SERPL-MCNC: 8.6 MG/DL — SIGNIFICANT CHANGE UP (ref 8.4–10.5)
CHLORIDE SERPL-SCNC: 100 MMOL/L — SIGNIFICANT CHANGE UP (ref 96–108)
CO2 SERPL-SCNC: 23 MMOL/L — SIGNIFICANT CHANGE UP (ref 22–31)
CREAT SERPL-MCNC: 2.14 MG/DL — HIGH (ref 0.5–1.3)
EOSINOPHIL # BLD AUTO: 0.11 K/UL — SIGNIFICANT CHANGE UP (ref 0–0.5)
EOSINOPHIL NFR BLD AUTO: 1.6 % — SIGNIFICANT CHANGE UP (ref 0–6)
GLUCOSE SERPL-MCNC: 108 MG/DL — HIGH (ref 70–99)
HCT VFR BLD CALC: 28.7 % — LOW (ref 39–50)
HGB BLD-MCNC: 9.3 G/DL — LOW (ref 13–17)
IMM GRANULOCYTES NFR BLD AUTO: 0.4 % — SIGNIFICANT CHANGE UP (ref 0–1.5)
LYMPHOCYTES # BLD AUTO: 0.75 K/UL — LOW (ref 1–3.3)
LYMPHOCYTES # BLD AUTO: 10.6 % — LOW (ref 13–44)
MAGNESIUM SERPL-MCNC: 2.9 MG/DL — HIGH (ref 1.6–2.6)
MCHC RBC-ENTMCNC: 28.5 PG — SIGNIFICANT CHANGE UP (ref 27–34)
MCHC RBC-ENTMCNC: 32.4 GM/DL — SIGNIFICANT CHANGE UP (ref 32–36)
MCV RBC AUTO: 88 FL — SIGNIFICANT CHANGE UP (ref 80–100)
MONOCYTES # BLD AUTO: 0.88 K/UL — SIGNIFICANT CHANGE UP (ref 0–0.9)
MONOCYTES NFR BLD AUTO: 12.4 % — SIGNIFICANT CHANGE UP (ref 2–14)
NEUTROPHILS # BLD AUTO: 5.3 K/UL — SIGNIFICANT CHANGE UP (ref 1.8–7.4)
NEUTROPHILS NFR BLD AUTO: 74.7 % — SIGNIFICANT CHANGE UP (ref 43–77)
NRBC # BLD: 0 /100 WBCS — SIGNIFICANT CHANGE UP (ref 0–0)
PHOSPHATE SERPL-MCNC: 2.8 MG/DL — SIGNIFICANT CHANGE UP (ref 2.5–4.5)
PLATELET # BLD AUTO: 148 K/UL — LOW (ref 150–400)
POTASSIUM SERPL-MCNC: 4.3 MMOL/L — SIGNIFICANT CHANGE UP (ref 3.5–5.3)
POTASSIUM SERPL-SCNC: 4.3 MMOL/L — SIGNIFICANT CHANGE UP (ref 3.5–5.3)
PROT SERPL-MCNC: 6 G/DL — SIGNIFICANT CHANGE UP (ref 6–8.3)
RBC # BLD: 3.26 M/UL — LOW (ref 4.2–5.8)
RBC # FLD: 15 % — HIGH (ref 10.3–14.5)
SODIUM SERPL-SCNC: 135 MMOL/L — SIGNIFICANT CHANGE UP (ref 135–145)
WBC # BLD: 7.09 K/UL — SIGNIFICANT CHANGE UP (ref 3.8–10.5)
WBC # FLD AUTO: 7.09 K/UL — SIGNIFICANT CHANGE UP (ref 3.8–10.5)

## 2021-06-07 PROCEDURE — 93283 PRGRMG EVAL IMPLANTABLE DFB: CPT | Mod: 26

## 2021-06-07 RX ORDER — AMIODARONE HYDROCHLORIDE 400 MG/1
400 TABLET ORAL DAILY
Refills: 0 | Status: CANCELLED | OUTPATIENT
Start: 2021-06-13 | End: 2021-06-09

## 2021-06-07 RX ORDER — AMIODARONE HYDROCHLORIDE 400 MG/1
200 TABLET ORAL DAILY
Refills: 0 | Status: CANCELLED | OUTPATIENT
Start: 2021-06-20 | End: 2021-06-09

## 2021-06-07 RX ADMIN — Medication 50 MILLIGRAM(S): at 05:31

## 2021-06-07 RX ADMIN — Medication 2: at 08:41

## 2021-06-07 RX ADMIN — APIXABAN 2.5 MILLIGRAM(S): 2.5 TABLET, FILM COATED ORAL at 05:32

## 2021-06-07 RX ADMIN — SODIUM CHLORIDE 3 MILLILITER(S): 9 INJECTION INTRAMUSCULAR; INTRAVENOUS; SUBCUTANEOUS at 14:11

## 2021-06-07 RX ADMIN — CHLORHEXIDINE GLUCONATE 1 APPLICATION(S): 213 SOLUTION TOPICAL at 05:40

## 2021-06-07 RX ADMIN — POLYETHYLENE GLYCOL 3350 17 GRAM(S): 17 POWDER, FOR SOLUTION ORAL at 06:58

## 2021-06-07 RX ADMIN — Medication 50 MILLIGRAM(S): at 17:25

## 2021-06-07 RX ADMIN — Medication 81 MILLIGRAM(S): at 11:52

## 2021-06-07 RX ADMIN — Medication 50 MICROGRAM(S): at 05:31

## 2021-06-07 RX ADMIN — AMIODARONE HYDROCHLORIDE 400 MILLIGRAM(S): 400 TABLET ORAL at 05:31

## 2021-06-07 RX ADMIN — ATORVASTATIN CALCIUM 40 MILLIGRAM(S): 80 TABLET, FILM COATED ORAL at 11:52

## 2021-06-07 RX ADMIN — PANTOPRAZOLE SODIUM 40 MILLIGRAM(S): 20 TABLET, DELAYED RELEASE ORAL at 05:30

## 2021-06-07 RX ADMIN — AMIODARONE HYDROCHLORIDE 400 MILLIGRAM(S): 400 TABLET ORAL at 14:09

## 2021-06-07 RX ADMIN — TAMSULOSIN HYDROCHLORIDE 0.4 MILLIGRAM(S): 0.4 CAPSULE ORAL at 21:16

## 2021-06-07 RX ADMIN — SODIUM CHLORIDE 3 MILLILITER(S): 9 INJECTION INTRAMUSCULAR; INTRAVENOUS; SUBCUTANEOUS at 21:00

## 2021-06-07 RX ADMIN — SODIUM CHLORIDE 3 MILLILITER(S): 9 INJECTION INTRAMUSCULAR; INTRAVENOUS; SUBCUTANEOUS at 05:41

## 2021-06-07 RX ADMIN — APIXABAN 2.5 MILLIGRAM(S): 2.5 TABLET, FILM COATED ORAL at 17:25

## 2021-06-07 NOTE — PROCEDURE NOTE - ADDITIONAL PROCEDURE DETAILS
call to check device for inappropriate pacing on telemetry     upon interrogation it is noted that patient is a paced with long AV delays, this is intentional and allows for ventricular intrinsic condition     [id number] call to check device for inappropriate pacing on telemetry   normal pacemaker function   upon interrogation it is noted that the pacemaker settings with long AV delays, this is intentional and allows for ventricular intrinsic condition     [id number]

## 2021-06-07 NOTE — PROGRESS NOTE ADULT - SUBJECTIVE AND OBJECTIVE BOX
im ok  VITAL SIGNS    Telemetry:  nsr 70    Vital Signs Last 24 Hrs  T(C): 36.7 (21 @ 07:14), Max: 37.3 (21 @ 12:00)  T(F): 98.1 (21 @ 07:14), Max: 99.1 (21 @ 12:00)  HR: 80 (21 @ 08:00) (80 - 85)  BP: 90/53 (21 @ 08:00) (82/50 - 121/59)  RR: 19 (21 @ 08:00) (17 - 24)  SpO2: 96% (21 @ 08:00) (95% - 99%)                    @ 07:01  -   @ 07:00  --------------------------------------------------------  IN: 810 mL / OUT: 1475 mL / NET: -665 mL     @ 07:01  -   @ 08:37  --------------------------------------------------------  IN: 0 mL / OUT: 45 mL / NET: -45 mL          Daily     Daily Weight in k.5 (2021 06:48)            CAPILLARY BLOOD GLUCOSE      POCT Blood Glucose.: 122 mg/dL (2021 08:16)  POCT Blood Glucose.: 115 mg/dL (2021 21:25)  POCT Blood Glucose.: 94 mg/dL (2021 16:13)  POCT Blood Glucose.: 128 mg/dL (2021 11:25)                  Coumadin    [ ] YES          [  x]      NO         Eliquis                          PHYSICAL EXAM        Neurology: alert and oriented x 3, nonfocal, no gross deficits  CV : s1 s2 RRR  Sternal Wound :  CDI , Stable  Lungs: cta  Abdomen: soft, nontender, nondistended, positive bowel sounds, last bowel movement +                        :    herbert - sbd         Extremities:    trace   edema   /  -   calve tenderness ,              aMIOdarone    Tablet 400 milliGRAM(s) Oral every 8 hours  apixaban 2.5 milliGRAM(s) Oral every 12 hours  aspirin enteric coated 81 milliGRAM(s) Oral daily  atorvastatin 40 milliGRAM(s) Oral daily  chlorhexidine 2% Cloths 1 Application(s) Topical <User Schedule>  dextrose 5%. 1000 milliLiter(s) IV Continuous <Continuous>  dextrose 5%. 1000 milliLiter(s) IV Continuous <Continuous>  dextrose 50% Injectable 25 Gram(s) IV Push once  insulin lispro (ADMELOG) corrective regimen sliding scale   SubCutaneous at bedtime  insulin lispro (ADMELOG) corrective regimen sliding scale   SubCutaneous three times a day before meals  levothyroxine 50 MICROGram(s) Oral daily  metoprolol tartrate 50 milliGRAM(s) Oral two times a day  oxycodone    5 mG/acetaminophen 325 mG 1 Tablet(s) Oral every 6 hours PRN  oxycodone    5 mG/acetaminophen 325 mG 2 Tablet(s) Oral every 6 hours PRN  pantoprazole    Tablet 40 milliGRAM(s) Oral before breakfast  polyethylene glycol 3350 17 Gram(s) Oral daily  sodium chloride 0.9% lock flush 3 milliLiter(s) IV Push every 8 hours  tamsulosin 0.4 milliGRAM(s) Oral at bedtime                    Physical Therapy Rec:   Home  [  ]   Home w/ PT  [  ]  Rehab  [  ]  Discussed with Cardiothoracic Team at AM rounds.

## 2021-06-07 NOTE — PROGRESS NOTE ADULT - ASSESSMENT
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 Most recent ablation was approximately 2 months ago after which Mexiletine  and Amiodarone  were discontinued. Aortic valve regurgitation (s/p AVR 2004), Mitral valve regurgitation, Ascending aortic dilation. 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  Now s/p Redo Sternotomy, Bentall  and MV repair on 6/2/21.  Post op  course c/b cardiogenic shock,  SURESH requiring IV dobutamine  and NSVT terminated by ATP after dobutamine discontinued according to ICD interrogation.     1. 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 Most recent ablation was approximately 2 months ago after which Mexiletine  and Amiodarone  were discontinued. Aortic valve regurgitation (s/p AVR 2004), Mitral valve regurgitation, Ascending aortic dilation. 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  Now s/p Redo Sternotomy, Bentall  and MV repair on 6/2/21.  Post op  course c/b cardiogenic shock,  SURESH requiring IV dobutamine  and NSVT terminated by ATP after dobutamine discontinued according to ICD interrogation.     1. ICM, with  NSVT post OHS    - Monitor on telemetry  - Keep K+ >4, MG++>2   - continue metoprolol tartrate 50 mg oral two times   - continue Amiodarone load 400 TID to 10 gm then 200 mg oral daily   - may benefit from VT ablation in the future but given recent cardiac surgery, would hold off until recovered from surgical standpoint    [phone]  addendum : 4:15  Change Amiodarone load to 400 mg oral BID x 1 week   then 400 mg oral daily x 1 week then 200 mg daily

## 2021-06-07 NOTE — PROGRESS NOTE ADULT - ASSESSMENT
80M w/ AFib, CAD, HFrEF-ICD, and VT storm; s/p MV repair+Bentall 6/2/21    (1)Renal - SURESH - nonoliguric ischemic ATN - plateaued creatinine in low 2s - GFR ~25ml/min    (2)Lytes - highly acceptable    (3)CV - Soft BP and now on lopressor       RECOMMEND:  (1)Once BP improves then start Lasix 40mg po qd   (2)Dose new meds for GFR 25ml/min (present dosing is acceptable)  (3) Eliquis dosing at present is correct but once the creatinine is down to 1.5 then will need to increase   (4)Cont Flomax         Sayed [facility]   [facility]   [phone]        80M w/ AFib, CAD, HFrEF-ICD, and VT storm; s/p MV repair+Bentall 6/2/21    (1)Renal - SURESH - nonoliguric ischemic ATN - plateaued creatinine in low 2s - GFR ~25ml/min    (2)Lytes - highly acceptable    (3)CV - Soft BP and now on lopressor       RECOMMEND:  (1)Once BP improves then start Lasix 40mg po qd   (2)Dose new meds for GFR 25ml/min (present dosing is acceptable)  (3) Eliquis dosing at present is correct but once the creatinine is down to 1.5 then will need to increase   (4)Cont Flomax and dc the Royal         Sayed [facility]   [phone]

## 2021-06-07 NOTE — SBIRT NOTE ADULT - NSSBIRTALCPOSREINDET_GEN_A_CORE
Provided SBIRT services: Full screen positive. Screening results were reviewed with the patient and patient was provided information about healthy guidelines and potential negative consequences associated with level of risk. Motivation and readiness to reduce or stop use was discussed and goals and activities to make changes were suggested/offered.

## 2021-06-07 NOTE — PROGRESS NOTE ADULT - SUBJECTIVE AND OBJECTIVE BOX
NEPHROLOGY-[facility] [phone]        Patient seen and examined in bed.  He was in good spirits         MEDICATIONS  (STANDING):  aMIOdarone    Tablet 400 milliGRAM(s) Oral every 8 hours  apixaban 2.5 milliGRAM(s) Oral every 12 hours  aspirin enteric coated 81 milliGRAM(s) Oral daily  atorvastatin 40 milliGRAM(s) Oral daily  chlorhexidine 2% Cloths 1 Application(s) Topical <User Schedule>  dextrose 5%. 1000 milliLiter(s) (50 mL/Hr) IV Continuous <Continuous>  dextrose 5%. 1000 milliLiter(s) (100 mL/Hr) IV Continuous <Continuous>  dextrose 50% Injectable 25 Gram(s) IV Push once  insulin lispro (ADMELOG) corrective regimen sliding scale   SubCutaneous at bedtime  insulin lispro (ADMELOG) corrective regimen sliding scale   SubCutaneous three times a day before meals  levothyroxine 50 MICROGram(s) Oral daily  metoprolol tartrate 50 milliGRAM(s) Oral two times a day  pantoprazole    Tablet 40 milliGRAM(s) Oral before breakfast  polyethylene glycol 3350 17 Gram(s) Oral daily  sodium chloride 0.9% lock flush 3 milliLiter(s) IV Push every 8 hours  tamsulosin 0.4 milliGRAM(s) Oral at bedtime      VITAL:  T(C): , Max: 37.3 (06-06-21 @ 12:00)  T(F): , Max: 99.1 (06-06-21 @ 12:00)  HR: 80 (06-07-21 @ 08:00)  BP: 90/53 (06-07-21 @ 08:00)  BP(mean): 66 (06-07-21 @ 08:00)  RR: 19 (06-07-21 @ 08:00)  SpO2: 96% (06-07-21 @ 08:00)  Wt(kg): --    I and O's:    06-06 @ 07:01  -  06-07 @ 07:00  --------------------------------------------------------  IN: 810 mL / OUT: 1475 mL / NET: -665 mL    06-07 @ 07:01  -  06-07 @ 08:29  --------------------------------------------------------  IN: 0 mL / OUT: 45 mL / NET: -45 mL          PHYSICAL EXAM:    Constitutional: NAD  Neck:  No JVD  Respiratory: CTAB/L  Cardiovascular: S1 and S2  Gastrointestinal: BS+, soft, NT/ND  Extremities: No peripheral edema  Neurological: A/O x 3, no focal deficits  Psychiatric: Normal mood, normal affect  : No Royal  Skin: No rashes  Access: Not applicable    LABS:                        9.3    7.09  )-----------( 148      ( 07 Jun 2021 06:08 )             28.7     06-07    135  |  100  |  41<H>  ----------------------------<  108<H>  4.3   |  23  |  2.14<H>    Ca    8.6      07 Jun 2021 06:08  Phos  2.8     06-07  Mg     2.9     06-07    TPro  6.0  /  Alb  3.5  /  TBili  1.0  /  DBili  x   /  AST  28  /  ALT  26  /  AlkPhos  78  06-07          Urine Studies:          RADIOLOGY & ADDITIONAL STUDIES:    < from: Xray Chest 1 View- PORTABLE-Routine (Xray Chest 1 View- PORTABLE-Routine in AM.) (06.06.21 @ 02:28) >    EXAM:  XR CHEST PORTABLE ROUTINE 1V                            PROCEDURE DATE:  06/06/2021            INTERPRETATION:  A single chest x-ray was obtained on June 6, 2021.    INDICATION: Status post open heart surgery.    IMPRESSION:    The heart is enlarged. Bilateral pleural effusion. Left lower lobe pneumonia and/or atelectasis. A pacer is in good position. Status post sternotomy. No pneumothorax.                DEB LOERA MD; Attending Radiologist  This document has been electronically signed. Jun 6 2021  8:06AM    < end of copied text >           NEPHROLOGY[facility] [phone]        Patient seen and examined in bed.  He was in good spirits         MEDICATIONS  (STANDING):  aMIOdarone    Tablet 400 milliGRAM(s) Oral every 8 hours  apixaban 2.5 milliGRAM(s) Oral every 12 hours  aspirin enteric coated 81 milliGRAM(s) Oral daily  atorvastatin 40 milliGRAM(s) Oral daily  chlorhexidine 2% Cloths 1 Application(s) Topical <User Schedule>  dextrose 5%. 1000 milliLiter(s) (50 mL/Hr) IV Continuous <Continuous>  dextrose 5%. 1000 milliLiter(s) (100 mL/Hr) IV Continuous <Continuous>  dextrose 50% Injectable 25 Gram(s) IV Push once  insulin lispro (ADMELOG) corrective regimen sliding scale   SubCutaneous at bedtime  insulin lispro (ADMELOG) corrective regimen sliding scale   SubCutaneous three times a day before meals  levothyroxine 50 MICROGram(s) Oral daily  metoprolol tartrate 50 milliGRAM(s) Oral two times a day  pantoprazole    Tablet 40 milliGRAM(s) Oral before breakfast  polyethylene glycol 3350 17 Gram(s) Oral daily  sodium chloride 0.9% lock flush 3 milliLiter(s) IV Push every 8 hours  tamsulosin 0.4 milliGRAM(s) Oral at bedtime      VITAL:  T(C): , Max: 37.3 (06-06-21 @ 12:00)  T(F): , Max: 99.1 (06-06-21 @ 12:00)  HR: 80 (06-07-21 @ 08:00)  BP: 90/53 (06-07-21 @ 08:00)  BP(mean): 66 (06-07-21 @ 08:00)  RR: 19 (06-07-21 @ 08:00)  SpO2: 96% (06-07-21 @ 08:00)  Wt(kg): --    I and O's:    06-06 @ 07:01  -  06-07 @ 07:00  --------------------------------------------------------  IN: 810 mL / OUT: 1475 mL / NET: -665 mL    06-07 @ 07:01  -  06-07 @ 08:29  --------------------------------------------------------  IN: 0 mL / OUT: 45 mL / NET: -45 mL          PHYSICAL EXAM:    Constitutional: NAD  Neck:  No JVD  Respiratory: CTAB/L  Cardiovascular: S1 and S2  Gastrointestinal: BS+, soft, NT/ND  Extremities: No peripheral edema  Neurological: A/O x 3, no focal deficits  Psychiatric: Normal mood, normal affect  : + Royal  Skin: No rashes  Access: Not applicable    LABS:                        9.3    7.09  )-----------( 148      ( 07 Jun 2021 06:08 )             28.7     06-07    135  |  100  |  41<H>  ----------------------------<  108<H>  4.3   |  23  |  2.14<H>    Ca    8.6      07 Jun 2021 06:08  Phos  2.8     06-07  Mg     2.9     06-07    TPro  6.0  /  Alb  3.5  /  TBili  1.0  /  DBili  x   /  AST  28  /  ALT  26  /  AlkPhos  78  06-07          Urine Studies:          RADIOLOGY & ADDITIONAL STUDIES:    < from: Xray Chest 1 View- PORTABLE-Routine (Xray Chest 1 View- PORTABLE-Routine in AM.) (06.06.21 @ 02:28) >    EXAM:  XR CHEST PORTABLE ROUTINE 1V                            PROCEDURE DATE:  06/06/2021            INTERPRETATION:  A single chest x-ray was obtained on June 6, 2021.    INDICATION: Status post open heart surgery.    IMPRESSION:    The heart is enlarged. Bilateral pleural effusion. Left lower lobe pneumonia and/or atelectasis. A pacer is in good position. Status post sternotomy. No pneumothorax.                DEB LOERA MD; Attending Radiologist  This document has been electronically signed. Jun 6 2021  8:06AM    < end of copied text >

## 2021-06-07 NOTE — PROGRESS NOTE ADULT - ASSESSMENT
80 yr old male with PMH of HLD, hypothyroidism, ICM, IWMI (, s/p DCA of RCA) AF (Xarelto), VT storm requirring ATP and ICD shocks (admitted to [facility] 2017), SVT (s/p VF arrest in 2017, while on a cruise: [place], hosp. in [facility]: then SB and AICD implanted) Aotic valve regurgitation (s/p AVR ), Mitral valve regurgitation, Ascending aortic dilation. CTA of chest in 21 reveals aortic root aneurysm. Pt reports cardiology has been following this for several years. Pt denies, c/p SOB, or palpitations at this time. Pt evaluated by Dr. Fenton for scheduled Redo Sternotomy, Bentall on 21    On 21, pt underwent Redo Bentall/MV repair ef 45%  post-op   21 -Dobutrex gtt - NSVT - shocked by his ICD. -  d/c'd - amio load initiated  SURESH - creatinine 2.2 from 1.2 - herbert to remain in place for strict I/O's - will monitor bmp daily  diuretics d/c'd   echo this am - tr. to SDU POD #3  d/c plan anticipate home mid week   Increase ambulation.  Has been off , d/c  herbert.  Creat stable - suresh/ckd  Eps called for pacer interrogation, v pacing  with inappropriate spikes

## 2021-06-07 NOTE — PROGRESS NOTE ADULT - SUBJECTIVE AND OBJECTIVE BOX
24H hour events:     MEDICATIONS:  aMIOdarone    Tablet 400 milliGRAM(s) Oral every 8 hours  apixaban 2.5 milliGRAM(s) Oral every 12 hours  aspirin enteric coated 81 milliGRAM(s) Oral daily  metoprolol tartrate 50 milliGRAM(s) Oral two times a day  tamsulosin 0.4 milliGRAM(s) Oral at bedtime  oxycodone    5 mG/acetaminophen 325 mG 1 Tablet(s) Oral every 6 hours PRN  oxycodone    5 mG/acetaminophen 325 mG 2 Tablet(s) Oral every 6 hours PRN    pantoprazole    Tablet 40 milliGRAM(s) Oral before breakfast  polyethylene glycol 3350 17 Gram(s) Oral daily    atorvastatin 40 milliGRAM(s) Oral daily  dextrose 50% Injectable 25 Gram(s) IV Push once  insulin lispro (ADMELOG) corrective regimen sliding scale   SubCutaneous at bedtime  insulin lispro (ADMELOG) corrective regimen sliding scale   SubCutaneous three times a day before meals  levothyroxine 50 MICROGram(s) Oral daily    chlorhexidine 2% Cloths 1 Application(s) Topical <User Schedule>  dextrose 5%. 1000 milliLiter(s) IV Continuous <Continuous>  dextrose 5%. 1000 milliLiter(s) IV Continuous <Continuous>  sodium chloride 0.9% lock flush 3 milliLiter(s) IV Push every 8 hours    REVIEW OF SYSTEMS:  See HPI, otherwise ROS negative.    PHYSICAL EXAM:  T(C): 36.7 (06-07-21 @ 07:14), Max: 37.3 (06-06-21 @ 12:00)  HR: 80 (06-07-21 @ 08:00) (80 - 85)  BP: 90/53 (06-07-21 @ 08:00) (82/50 - 121/59)  RR: 19 (06-07-21 @ 08:00) (18 - 24)  SpO2: 96% (06-07-21 @ 08:00) (95% - 99%)    I&O's Summary    06 Jun 2021 07:01  -  07 Jun 2021 07:00  --------------------------------------------------------  IN: 810 mL / OUT: 1475 mL / NET: -665 mL    07 Jun 2021 07:01  -  07 Jun 2021 11:31  --------------------------------------------------------  IN: 360 mL / OUT: 85 mL / NET: 275 mL    Appearance: Alert. NAD	  Cardiovascular: +S1S2 RRR no m/g/r  Respiratory: CTA B/L	  Psychiatry: A & O x 3, Mood & affect appropriate  Gastrointestinal:  Soft, NT. ND. +BS	  Skin: mid line sternal incision 	  Neurologic: Non-focal  Extremities: No edema BLE  Vascular: Peripheral pulses palpable 2+ bilaterally    LABS:	 	    CBC Full  -  ( 07 Jun 2021 06:08 )  WBC Count : 7.09 K/uL  Hemoglobin : 9.3 g/dL  Hematocrit : 28.7 %  Platelet Count - Automated : 148 K/uL  Mean Cell Volume : 88.0 fl  Mean Cell Hemoglobin : 28.5 pg  Mean Cell Hemoglobin Concentration : 32.4 gm/dL  Auto Neutrophil # : 5.30 K/uL  Auto Lymphocyte # : 0.75 K/uL  Auto Monocyte # : 0.88 K/uL  Auto Eosinophil # : 0.11 K/uL  Auto Basophil # : 0.02 K/uL  Auto Neutrophil % : 74.7 %  Auto Lymphocyte % : 10.6 %  Auto Monocyte % : 12.4 %  Auto Eosinophil % : 1.6 %  Auto Basophil % : 0.3 %    06-07    135  |  100  |  41<H>  ----------------------------<  108<H>  4.3   |  23  |  2.14<H>  06-06    137  |  100  |  38<H>  ----------------------------<  119<H>  4.5   |  24  |  2.25<H>    Ca    8.6      07 Jun 2021 06:08  Ca    8.7      06 Jun 2021 03:07  Phos  2.8     06-07  Phos  3.3     06-06  Mg     2.9     06-07  Mg     3.0     06-06    TPro  6.0  /  Alb  3.5  /  TBili  1.0  /  DBili  x   /  AST  28  /  ALT  26  /  AlkPhos  78  06-07  TPro  6.2  /  Alb  3.8  /  TBili  1.2  /  DBili  x   /  AST  32  /  ALT  21  /  AlkPhos  81  06-06    TSH: Thyroid Stimulating Hormone, Serum (06.04.21 @ 00:08)   Thyroid Stimulating Hormone, Serum: 2.28 uIU/mL     TELEMETRY: a paced v sensed 80's  RADIOLOGY:    	  ASSESSMENT/PLAN:

## 2021-06-08 LAB
ALBUMIN SERPL ELPH-MCNC: 3.6 G/DL — SIGNIFICANT CHANGE UP (ref 3.3–5)
ALP SERPL-CCNC: 83 U/L — SIGNIFICANT CHANGE UP (ref 40–120)
ALT FLD-CCNC: 32 U/L — SIGNIFICANT CHANGE UP (ref 10–45)
ANION GAP SERPL CALC-SCNC: 13 MMOL/L — SIGNIFICANT CHANGE UP (ref 5–17)
AST SERPL-CCNC: 32 U/L — SIGNIFICANT CHANGE UP (ref 10–40)
BASOPHILS # BLD AUTO: 0.02 K/UL — SIGNIFICANT CHANGE UP (ref 0–0.2)
BASOPHILS NFR BLD AUTO: 0.3 % — SIGNIFICANT CHANGE UP (ref 0–2)
BILIRUB SERPL-MCNC: 0.9 MG/DL — SIGNIFICANT CHANGE UP (ref 0.2–1.2)
BUN SERPL-MCNC: 49 MG/DL — HIGH (ref 7–23)
CALCIUM SERPL-MCNC: 8.6 MG/DL — SIGNIFICANT CHANGE UP (ref 8.4–10.5)
CHLORIDE SERPL-SCNC: 100 MMOL/L — SIGNIFICANT CHANGE UP (ref 96–108)
CO2 SERPL-SCNC: 21 MMOL/L — LOW (ref 22–31)
CREAT SERPL-MCNC: 2 MG/DL — HIGH (ref 0.5–1.3)
EOSINOPHIL # BLD AUTO: 0.16 K/UL — SIGNIFICANT CHANGE UP (ref 0–0.5)
EOSINOPHIL NFR BLD AUTO: 2.1 % — SIGNIFICANT CHANGE UP (ref 0–6)
GLUCOSE SERPL-MCNC: 110 MG/DL — HIGH (ref 70–99)
HCT VFR BLD CALC: 29.8 % — LOW (ref 39–50)
HGB BLD-MCNC: 9.5 G/DL — LOW (ref 13–17)
IMM GRANULOCYTES NFR BLD AUTO: 0.6 % — SIGNIFICANT CHANGE UP (ref 0–1.5)
LYMPHOCYTES # BLD AUTO: 0.79 K/UL — LOW (ref 1–3.3)
LYMPHOCYTES # BLD AUTO: 10.2 % — LOW (ref 13–44)
MAGNESIUM SERPL-MCNC: 2.8 MG/DL — HIGH (ref 1.6–2.6)
MCHC RBC-ENTMCNC: 27.8 PG — SIGNIFICANT CHANGE UP (ref 27–34)
MCHC RBC-ENTMCNC: 31.9 GM/DL — LOW (ref 32–36)
MCV RBC AUTO: 87.1 FL — SIGNIFICANT CHANGE UP (ref 80–100)
MONOCYTES # BLD AUTO: 0.83 K/UL — SIGNIFICANT CHANGE UP (ref 0–0.9)
MONOCYTES NFR BLD AUTO: 10.8 % — SIGNIFICANT CHANGE UP (ref 2–14)
NEUTROPHILS # BLD AUTO: 5.87 K/UL — SIGNIFICANT CHANGE UP (ref 1.8–7.4)
NEUTROPHILS NFR BLD AUTO: 76 % — SIGNIFICANT CHANGE UP (ref 43–77)
NRBC # BLD: 0 /100 WBCS — SIGNIFICANT CHANGE UP (ref 0–0)
PHOSPHATE SERPL-MCNC: 3.1 MG/DL — SIGNIFICANT CHANGE UP (ref 2.5–4.5)
PLATELET # BLD AUTO: 174 K/UL — SIGNIFICANT CHANGE UP (ref 150–400)
POTASSIUM SERPL-MCNC: 4.3 MMOL/L — SIGNIFICANT CHANGE UP (ref 3.5–5.3)
POTASSIUM SERPL-SCNC: 4.3 MMOL/L — SIGNIFICANT CHANGE UP (ref 3.5–5.3)
PROT SERPL-MCNC: 6.1 G/DL — SIGNIFICANT CHANGE UP (ref 6–8.3)
RBC # BLD: 3.42 M/UL — LOW (ref 4.2–5.8)
RBC # FLD: 14.8 % — HIGH (ref 10.3–14.5)
SODIUM SERPL-SCNC: 134 MMOL/L — LOW (ref 135–145)
WBC # BLD: 7.72 K/UL — SIGNIFICANT CHANGE UP (ref 3.8–10.5)
WBC # FLD AUTO: 7.72 K/UL — SIGNIFICANT CHANGE UP (ref 3.8–10.5)

## 2021-06-08 PROCEDURE — 99232 SBSQ HOSP IP/OBS MODERATE 35: CPT

## 2021-06-08 PROCEDURE — 71045 X-RAY EXAM CHEST 1 VIEW: CPT | Mod: 26

## 2021-06-08 RX ORDER — AMIODARONE HYDROCHLORIDE 400 MG/1
400 TABLET ORAL EVERY 12 HOURS
Refills: 0 | Status: DISCONTINUED | OUTPATIENT
Start: 2021-06-08 | End: 2021-06-09

## 2021-06-08 RX ADMIN — Medication 50 MILLIGRAM(S): at 22:28

## 2021-06-08 RX ADMIN — Medication 50 MICROGRAM(S): at 05:34

## 2021-06-08 RX ADMIN — Medication 81 MILLIGRAM(S): at 11:50

## 2021-06-08 RX ADMIN — TAMSULOSIN HYDROCHLORIDE 0.4 MILLIGRAM(S): 0.4 CAPSULE ORAL at 21:01

## 2021-06-08 RX ADMIN — Medication 50 MILLIGRAM(S): at 05:34

## 2021-06-08 RX ADMIN — POLYETHYLENE GLYCOL 3350 17 GRAM(S): 17 POWDER, FOR SOLUTION ORAL at 11:50

## 2021-06-08 RX ADMIN — SODIUM CHLORIDE 3 MILLILITER(S): 9 INJECTION INTRAMUSCULAR; INTRAVENOUS; SUBCUTANEOUS at 11:52

## 2021-06-08 RX ADMIN — APIXABAN 2.5 MILLIGRAM(S): 2.5 TABLET, FILM COATED ORAL at 05:34

## 2021-06-08 RX ADMIN — SODIUM CHLORIDE 3 MILLILITER(S): 9 INJECTION INTRAMUSCULAR; INTRAVENOUS; SUBCUTANEOUS at 21:04

## 2021-06-08 RX ADMIN — Medication 10 MILLIGRAM(S): at 12:13

## 2021-06-08 RX ADMIN — AMIODARONE HYDROCHLORIDE 400 MILLIGRAM(S): 400 TABLET ORAL at 20:59

## 2021-06-08 RX ADMIN — SODIUM CHLORIDE 3 MILLILITER(S): 9 INJECTION INTRAMUSCULAR; INTRAVENOUS; SUBCUTANEOUS at 05:43

## 2021-06-08 RX ADMIN — PANTOPRAZOLE SODIUM 40 MILLIGRAM(S): 20 TABLET, DELAYED RELEASE ORAL at 05:35

## 2021-06-08 RX ADMIN — CHLORHEXIDINE GLUCONATE 1 APPLICATION(S): 213 SOLUTION TOPICAL at 05:34

## 2021-06-08 RX ADMIN — ATORVASTATIN CALCIUM 40 MILLIGRAM(S): 80 TABLET, FILM COATED ORAL at 11:50

## 2021-06-08 RX ADMIN — APIXABAN 2.5 MILLIGRAM(S): 2.5 TABLET, FILM COATED ORAL at 17:48

## 2021-06-08 NOTE — PROGRESS NOTE ADULT - PROBLEM SELECTOR PLAN 4
cr 2.2 - diuretics d/c'd   keon for strict I./O's  ck basic metabolic daily

## 2021-06-08 NOTE — PROGRESS NOTE ADULT - SUBJECTIVE AND OBJECTIVE BOX
im good    VITAL SIGNS    Telemetry:  nsr 80    Vital Signs Last 24 Hrs  T(C): 36.7 (21 @ 04:00), Max: 36.9 (21 @ 12:23)  T(F): 98 (21 @ 04:00), Max: 98.5 (21 @ 19:04)  HR: 80 (21 @ 04:00) (80 - 80)  BP: 111/60 (21 @ 04:00) (87/55 - 114/66)  RR: 18 (21 @ 04:00) (18 - 21)  SpO2: 94% (21 @ 04:00) (93% - 98%)                    @ 07:01  -   @ 07:00  --------------------------------------------------------  IN: 810 mL / OUT: 1475 mL / NET: -665 mL     @ 07:01  -   @ 06:02  --------------------------------------------------------  IN: 720 mL / OUT: 621 mL / NET: 99 mL          Daily     Daily Weight in k.5 (2021 06:48)            CAPILLARY BLOOD GLUCOSE      POCT Blood Glucose.: 124 mg/dL (2021 21:19)  POCT Blood Glucose.: 110 mg/dL (2021 16:11)  POCT Blood Glucose.: 96 mg/dL (2021 11:45)  POCT Blood Glucose.: 122 mg/dL (2021 08:16)            Drains:     Pacing Wires          Coumadin    [ ] YES          [ x ]      NO         eliquis                          PHYSICAL EXAM        Neurology: alert and oriented x 3, nonfocal, no gross deficits  CV : s1 s2 RRR  Sternal Wound :  CDI , Stable  Lungs: bibasilar crackles  Abdomen: soft, nontender, nondistended, positive bowel sounds, last bowel movement +                       :    voiding         Extremities:    +  edema   /  -   calve tenderness ,           apixaban 2.5 milliGRAM(s) Oral every 12 hours  aspirin enteric coated 81 milliGRAM(s) Oral daily  atorvastatin 40 milliGRAM(s) Oral daily  chlorhexidine 2% Cloths 1 Application(s) Topical <User Schedule>  dextrose 5%. 1000 milliLiter(s) IV Continuous <Continuous>  dextrose 5%. 1000 milliLiter(s) IV Continuous <Continuous>  dextrose 50% Injectable 25 Gram(s) IV Push once  insulin lispro (ADMELOG) corrective regimen sliding scale   SubCutaneous at bedtime  insulin lispro (ADMELOG) corrective regimen sliding scale   SubCutaneous three times a day before meals  levothyroxine 50 MICROGram(s) Oral daily  metoprolol tartrate 50 milliGRAM(s) Oral two times a day  oxycodone    5 mG/acetaminophen 325 mG 1 Tablet(s) Oral every 6 hours PRN  oxycodone    5 mG/acetaminophen 325 mG 2 Tablet(s) Oral every 6 hours PRN  pantoprazole    Tablet 40 milliGRAM(s) Oral before breakfast  polyethylene glycol 3350 17 Gram(s) Oral daily  sodium chloride 0.9% lock flush 3 milliLiter(s) IV Push every 8 hours  tamsulosin 0.4 milliGRAM(s) Oral at bedtime                    Physical Therapy Rec:   Home  [x  ]   Home w/ PT  [  ]  Rehab  [  ]  Discussed with Cardiothoracic Team at AM rounds.

## 2021-06-08 NOTE — DIETITIAN INITIAL EVALUATION ADULT. - CHIEF COMPLAINT
Pt is a "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 (admitted to [facility] 5/2017), SVT (s/p VF arrest in 12/2017, while on a cruise: [place], hosp. in [facility]: then SB and AICD implanted) Aotic valve regurgitation (s/p AVR 2004), Mitral valve regurgitation, Ascending aortic dilation. 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 Pt reports cardiology has been following this for several years. Pt denies, c/p SOB, or palpitations at this time. Pt evaluated by Dr. Fenton for scheduled Redo Sternotomy, K[place] on 6/2/21"

## 2021-06-08 NOTE — DIETITIAN INITIAL EVALUATION ADULT. - PERTINENT LABORATORY DATA
06-08 Na 134 mmol/L<L> Glu 110 mg/dL<H> K+ 4.3 mmol/L Cr  2.00 mg/dL<H> BUN 49 mg/dL<H> Phos 3.1 mg/dL Alb 3.6 g/dL   Hgb 9.5 g/dL<L> Hct 29.8 %<L>  A1C with Estimated Average Glucose Result: 5.4 % (06-01-21 @ 11:06)  CAPILLARY BLOOD GLUCOSE  POCT Blood Glucose.: 117 mg/dL (08 Jun 2021 11:34)  POCT Blood Glucose.: 102 mg/dL (08 Jun 2021 07:45)  POCT Blood Glucose.: 124 mg/dL (07 Jun 2021 21:19)  POCT Blood Glucose.: 110 mg/dL (07 Jun 2021 16:11)

## 2021-06-08 NOTE — PROGRESS NOTE ADULT - ASSESSMENT
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 Most recent ablation was approximately 2 months ago after which Mexiletine  and Amiodarone  were discontinued. Aortic valve regurgitation (s/p AVR 2004), Mitral valve regurgitation, Ascending aortic dilation. 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  Now s/p Redo Sternotomy, Bentall  and MV repair on 6/2/21.  Post op  course c/b cardiogenic shock,  SURESH requiring IV dobutamine  and NSVT terminated by ATP after dobutamine discontinued according to ICD interrogation.     1. ICM, with  NSVT post OHS    - Monitor on telemetry  - Keep K+ >4, MG++>2   - continue metoprolol tartrate 50 mg oral two times daily  - continue  Amiodarone load to 400 mg oral BID x 1 week then 400 mg oral daily x 1 week then 200 mg daily, discussed load dosing and black box warning when taking amiodarone, will need monitor LFTs, TFTs, and yearly out patient follow up with opthalmology and PFTs  - may benefit from VT ablation in the future but given recent cardiac surgery, would hold off until recovered from surgical standpoint    [phone]   80 year old  male with PMHx  hypothyroidism, HLD, CAD, IWMI (1994, s/p DCA of RCA)  ICM, s/p VF arrest in  s/p STJ Abbott dual chamber ICD implant 12/2017, 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 Most recent ablation was approximately 2 months ago after which Mexiletine  and Amiodarone  were discontinued. Aortic valve regurgitation (s/p AVR 2004), Mitral valve regurgitation, Ascending aortic dilation. 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  Now s/p Redo Sternotomy, Bentall  and MV repair on 6/2/21.  Post op  course c/b cardiogenic shock,  SURESH requiring IV dobutamine  and NSVT terminated by ATP after dobutamine discontinued according to ICD interrogation.     1. ICM, with  NSVT post OHS    - Monitor on telemetry  - Keep K+ >4, MG++>2   - continue metoprolol tartrate 50 mg oral two times daily  - continue  Amiodarone load to 400 mg oral BID x 1 week then 400 mg oral daily x 1 week then 200 mg daily, discussed load dosing and black box warning when taking amiodarone, will need monitor LFTs, TFTs, and yearly out patient follow up with opthalmology and PFTs  - may benefit from VT ablation in the future but given recent cardiac surgery, would hold off until recovered from surgical standpoint  - call when appropriate  to reduce pacing rate to original lower base rate 60 bpm   [phone]   80 year old  male with PMHx  hypothyroidism, HLD, CAD, IWMI (1994, s/p DCA of RCA)  ICM, s/p VF arrest in  s/p STJ Abbott dual chamber ICD implant 12/2017, 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 Most recent ablation was approximately 2 months ago after which Mexiletine  and Amiodarone  were discontinued. Aortic valve regurgitation (s/p AVR 2004), Mitral valve regurgitation, Ascending aortic dilation. 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  Now s/p Redo Sternotomy, Bentall  and MV repair on 6/2/21.  Post op  course c/b cardiogenic shock,  SURESH requiring IV dobutamine  and NSVT terminated by ATP after dobutamine discontinued according to ICD interrogation.     1. ICM, with  NSVT post OHS    - Monitor on telemetry  - Keep K+ >4, MG++>2   - continue metoprolol tartrate 50 mg oral two times daily  - continue  Amiodarone load to 400 mg oral BID x 1 week then 400 mg oral daily x 1 week then 200 mg daily, discussed load dosing and black box warning when taking amiodarone, will need monitor LFTs, TFTs, and yearly out patient follow up with opthalmology and PFTs  - may benefit from VT ablation in the future but given recent cardiac surgery, would hold off until recovered from surgical standpoint  - call when appropriate  to reduce pacing rate to original lower base rate 60 bpm   [phone]  addendum   EP signing off   follow up as out patient

## 2021-06-08 NOTE — PROGRESS NOTE ADULT - ASSESSMENT
80M w/ AFib, CAD, HFrEF-ICD, and VT storm; s/p MV repair+Bentall 6/2/21    (1)Renal - SURESH - nonoliguric ischemic ATN - plateaued creatinine in low 2s - GFR ~25ml/min    (2)Lytes - highly acceptable    (3)CV -NSVT       RECOMMEND:  (1)Torsemide 10mg po qd   (2)Dose new meds for GFR 25ml/min (present dosing is acceptable)  (3) Eliquis dosing at present is correct but once the creatinine is down to 1.5 then will need to increase   (4)EPS keli noted;  Possibly start Amio         Sayed [facility]   [phone]

## 2021-06-08 NOTE — DIETITIAN INITIAL EVALUATION ADULT. - ORAL INTAKE PTA/DIET HISTORY
Pt was eating well with no changes in appetite. Pt was not following therapeutic diet; eats well-balanced meals prepared by wife, however pt does report frequent salt use. Confirms no known food allergies. Denies Hx of chewing or swallowing issues. Denies micronutrient or oral nutrient supplement use.

## 2021-06-08 NOTE — DIETITIAN INITIAL EVALUATION ADULT. - DIET TYPE
Fluid needs deferred to provider. RD remains available for diet changes as needed/able./low sodium/regular Fluid needs deferred to provider. RD remains available for diet changes as needed/able./DASH/TLC (sodium and cholesterol restricted diet)

## 2021-06-08 NOTE — PROGRESS NOTE ADULT - ATTENDING COMMENTS
This 80 year old man with history of VT and ablations recently underwent aortic and mitral valve surgery. Post operatively, he developed sustained monomorphic VT treated by his [employer] ICD. He was commenced on amiodarone.    He has remained arrhythmia free on the drug and stable. Plan to continue amiodarone and review in clinic to see if he needs any further interventions for VT
lethargy, confusion , fall

## 2021-06-08 NOTE — DIETITIAN INITIAL EVALUATION ADULT. - PHYSCIAL ASSESSMENT
IBW%: 127%   Skin per nursing documentation: No pressure injuries noted. Midsternal incision noted.  BMI based on daily wt 85.5 kG (6/7) well nourished

## 2021-06-08 NOTE — PROGRESS NOTE ADULT - SUBJECTIVE AND OBJECTIVE BOX
24H hour events: no NSVT or VT over night     MEDICATIONS:  apixaban 2.5 milliGRAM(s) Oral every 12 hours  aspirin enteric coated 81 milliGRAM(s) Oral daily  metoprolol tartrate 50 milliGRAM(s) Oral two times a day  tamsulosin 0.4 milliGRAM(s) Oral at bedtime  torsemide 10 milliGRAM(s) Oral daily   oxycodone    5 mG/acetaminophen 325 mG 1 Tablet(s) Oral every 6 hours PRN  oxycodone    5 mG/acetaminophen 325 mG 2 Tablet(s) Oral every 6 hours PRN    pantoprazole    Tablet 40 milliGRAM(s) Oral before breakfast  polyethylene glycol 3350 17 Gram(s) Oral daily    atorvastatin 40 milliGRAM(s) Oral daily  dextrose 50% Injectable 25 Gram(s) IV Push once  insulin lispro (ADMELOG) corrective regimen sliding scale   SubCutaneous at bedtime  insulin lispro (ADMELOG) corrective regimen sliding scale   SubCutaneous three times a day before meals  levothyroxine 50 MICROGram(s) Oral daily    chlorhexidine 2% Cloths 1 Application(s) Topical <User Schedule>  dextrose 5%. 1000 milliLiter(s) IV Continuous <Continuous>  dextrose 5%. 1000 milliLiter(s) IV Continuous <Continuous>  sodium chloride 0.9% lock flush 3 milliLiter(s) IV Push every 8 hours    REVIEW OF SYSTEMS:  See HPI, otherwise ROS negative.    PHYSICAL EXAM:  T(C): 36.6 (06-08-21 @ 07:29), Max: 36.9 (06-07-21 @ 12:23)  HR: 80 (06-08-21 @ 08:00) (78 - 80)  BP: 94/61 (06-08-21 @ 07:29) (87/55 - 114/66)  RR: 18 (06-08-21 @ 08:00) (18 - 21)  SpO2: 99% (06-08-21 @ 08:00) (93% - 99%)    I&O's Summary    07 Jun 2021 07:01  -  08 Jun 2021 07:00  --------------------------------------------------------  IN: 720 mL / OUT: 821 mL / NET: -101 mL    08 Jun 2021 07:01  -  08 Jun 2021 10:24  --------------------------------------------------------  IN: 150 mL / OUT: 200 mL / NET: -50 mL    Appearance: Alert. NAD	  Cardiovascular: +S1S2 RRR no m/g/r  Respiratory: CTA B/L	  Psychiatry: A & O x 3, Mood & affect appropriate  Gastrointestinal:  Soft, NT. ND. +BS	  Skin: mid sternal incision 	  Neurologic: Non-focal  Extremities: edema bilateral + 2   Vascular: Peripheral pulses palpable 2+ bilaterally    LABS:	 	    CBC Full  -  ( 08 Jun 2021 05:37 )  WBC Count : 7.72 K/uL  Hemoglobin : 9.5 g/dL  Hematocrit : 29.8 %  Platelet Count - Automated : 174 K/uL  Mean Cell Volume : 87.1 fl  Mean Cell Hemoglobin : 27.8 pg  Mean Cell Hemoglobin Concentration : 31.9 gm/dL  Auto Neutrophil # : 5.87 K/uL  Auto Lymphocyte # : 0.79 K/uL  Auto Monocyte # : 0.83 K/uL  Auto Eosinophil # : 0.16 K/uL  Auto Basophil # : 0.02 K/uL  Auto Neutrophil % : 76.0 %  Auto Lymphocyte % : 10.2 %  Auto Monocyte % : 10.8 %  Auto Eosinophil % : 2.1 %  Auto Basophil % : 0.3 %    06-08    134<L>  |  100  |  49<H>  ----------------------------<  110<H>  4.3   |  21<L>  |  2.00<H>  06-07    135  |  100  |  41<H>  ----------------------------<  108<H>  4.3   |  23  |  2.14<H>    Ca    8.6      08 Jun 2021 05:37  Ca    8.6      07 Jun 2021 06:08  Phos  3.1     06-08  Phos  2.8     06-07  Mg     2.8     06-08  Mg     2.9     06-07    TPro  6.1  /  Alb  3.6  /  TBili  0.9  /  DBili  x   /  AST  32  /  ALT  32  /  AlkPhos  83  06-08  TPro  6.0  /  Alb  3.5  /  TBili  1.0  /  DBili  x   /  AST  28  /  ALT  26  /  AlkPhos  78  06-07    TSH: Thyroid Stimulating Hormone, Serum (06.04.21 @ 00:08)   Thyroid Stimulating Hormone, Serum: 2.28 uIU/mL     TELEMETRY: a paced v sensed 80's  RADIOLOGY:  < from: TTE with Doppler (w/Cont) (06.06.21 @ 07:27) >  ------------------------------------------------------------------------  Dimensions:    Normal Values:  LA:     5.4    2.0 - 4.0 cm  Ao:     3.4    2.0 - 3.8 cm  SEPTUM: 1.3    0.6 - 1.2 cm  PWT:    1.1    0.6 - 1.1 cm  LVIDd:  5.4    3.0 - 5.6 cm  LVIDs:         1.8 - 4.0 cm  Derived variables:  LVMI: 140 g/m2  RWT: 0.40  EF (Visual Estimate): 40 %  Doppler Peak Velocity (m/sec): AoV=2.1  ------------------------------------------------------------------------  Observations:  Mitral Valve: Mitral annular calcification and calcified  mitral leaflets with normal diastolic opening. The  posterior leaflets is particularly restricted. Moderate to  severe posteriorly directed mitral regurgitation. Mean  transmitral valve gradient equals 5 mm Hg, consistent with  mild to moderate mitral stenosis.  Aortic Valve/Aorta: Bioprosthetic aortic valve. Peak  transaortic valve gradient equals 17 mm Hg, mean  transaortic valve gradient equals 11 mm Hg, which is  probably normal in the presence of a bioprosthetic aortic  valve.  Aortic Root: 3.4 cm.  Left Atrium: Severely dilated left atrium.  LA volume index  = 50 cc/m2.  Left Ventricle: Endocardial visualization enhanced with  intravenous injection of Ultrasonic Enhancing Agent  (Definity).  Moderate segmental left ventricular systolic  dysfunction. The inferior and inferolateral walls are  hypokinetic. Septal motion is consistent with RV pacing.  Eccentric left ventricular hypertrophy (dilated left  ventricle with normal relative wall thickness).  Right Heart: Normal right atrium. A device wire is noted in  the right heart. Normal right ventricular size with  decreased right ventricular systolic function. Normal  tricuspid valve. Minimal tricuspid regurgitation. Normal  pulmonic valve.Minimal pulmonic regurgitation.  Pericardium/Pleura: Normal pericardium with no pericardial  effusion.  Bilateral pleural effusions.  Hemodynamic: Estimated right atrial pressure is 8 mm Hg.  Estimated right ventricular systolic pressure equals 27 mm  Hg, assuming right atrial pressure equals 8 mm Hg,  consistent with normal pulmonary pressures.  ------------------------------------------------------------------------  Conclusions:  1. Mitral annular calcification and calcified mitral  leaflets with normal diastolic opening. The posterior  leaflets is particularly restricted. Moderate to severe  posteriorly directed mitral regurgitation. Mean transmitral  valve gradient equals 5 mm Hg, consistent with mild to  moderate mitral stenosis.  2. Bioprosthetic aortic valve. Peak transaortic valve  gradient equals 17 mm Hg, mean transaortic valve gradient  equals 11 mm Hg, which is probably normal in the presence  of a bioprosthetic aortic valve.  3. Eccentric left ventricular hypertrophy (dilated left  ventricle with normal relative wall thickness).  4. Endocardial visualization enhanced with intravenous  injection of Ultrasonic Enhancing Agent (Definity).  Moderate segmental left ventricular systolic dysfunction.  The inferior and inferolateral walls are hypokinetic.  Septal motion is consistent with RV pacing.  5. Normal right atrium. A device wire is noted in the right  heart.  6. Normal right ventricular size with decreased right  ventricular systolic function.  7. Bilateral pleural effusions.  ------------------------------------------------------------------------  Confirmed on  6/6/2021 - 14:50:38 by Rush Ferreira M.D.  ------------------------------------------------------------------------        	 24H hour events: no NSVT or VT over night     MEDICATIONS:  apixaban 2.5 milliGRAM(s) Oral every 12 hours  aspirin enteric coated 81 milliGRAM(s) Oral daily  metoprolol tartrate 50 milliGRAM(s) Oral two times a day  tamsulosin 0.4 milliGRAM(s) Oral at bedtime  torsemide 10 milliGRAM(s) Oral daily   oxycodone    5 mG/acetaminophen 325 mG 1 Tablet(s) Oral every 6 hours PRN  oxycodone    5 mG/acetaminophen 325 mG 2 Tablet(s) Oral every 6 hours PRN    pantoprazole    Tablet 40 milliGRAM(s) Oral before breakfast  polyethylene glycol 3350 17 Gram(s) Oral daily    atorvastatin 40 milliGRAM(s) Oral daily  dextrose 50% Injectable 25 Gram(s) IV Push once  insulin lispro (ADMELOG) corrective regimen sliding scale   SubCutaneous at bedtime  insulin lispro (ADMELOG) corrective regimen sliding scale   SubCutaneous three times a day before meals  levothyroxine 50 MICROGram(s) Oral daily    chlorhexidine 2% Cloths 1 Application(s) Topical <User Schedule>  dextrose 5%. 1000 milliLiter(s) IV Continuous <Continuous>  dextrose 5%. 1000 milliLiter(s) IV Continuous <Continuous>  sodium chloride 0.9% lock flush 3 milliLiter(s) IV Push every 8 hours    REVIEW OF SYSTEMS:  See HPI, otherwise ROS negative.    PHYSICAL EXAM:  T(C): 36.6 (06-08-21 @ 07:29), Max: 36.9 (06-07-21 @ 12:23)  HR: 80 (06-08-21 @ 08:00) (78 - 80)  BP: 94/61 (06-08-21 @ 07:29) (87/55 - 114/66)  RR: 18 (06-08-21 @ 08:00) (18 - 21)  SpO2: 99% (06-08-21 @ 08:00) (93% - 99%)    I&O's Summary    07 Jun 2021 07:01  -  08 Jun 2021 07:00  --------------------------------------------------------  IN: 720 mL / OUT: 821 mL / NET: -101 mL    08 Jun 2021 07:01  -  08 Jun 2021 10:24  --------------------------------------------------------  IN: 150 mL / OUT: 200 mL / NET: -50 mL    Appearance: Alert. NAD	  Cardiovascular: +S1S2 RRR no m/g/r  Respiratory: CTA B/L	  Psychiatry: A & O x 3, Mood & affect appropriate  Gastrointestinal:  Soft, NT. ND. +BS	  Skin: mid sternal incision 	  Neurologic: Non-focal  Extremities: edema bilateral + 2   Vascular: Peripheral pulses palpable 2+ bilaterally    LABS:	 	    CBC Full  -  ( 08 Jun 2021 05:37 )  WBC Count : 7.72 K/uL  Hemoglobin : 9.5 g/dL  Hematocrit : 29.8 %  Platelet Count - Automated : 174 K/uL  Mean Cell Volume : 87.1 fl  Mean Cell Hemoglobin : 27.8 pg  Mean Cell Hemoglobin Concentration : 31.9 gm/dL  Auto Neutrophil # : 5.87 K/uL  Auto Lymphocyte # : 0.79 K/uL  Auto Monocyte # : 0.83 K/uL  Auto Eosinophil # : 0.16 K/uL  Auto Basophil # : 0.02 K/uL  Auto Neutrophil % : 76.0 %  Auto Lymphocyte % : 10.2 %  Auto Monocyte % : 10.8 %  Auto Eosinophil % : 2.1 %  Auto Basophil % : 0.3 %    06-08    134<L>  |  100  |  49<H>  ----------------------------<  110<H>  4.3   |  21<L>  |  2.00<H>  06-07    135  |  100  |  41<H>  ----------------------------<  108<H>  4.3   |  23  |  2.14<H>    Ca    8.6      08 Jun 2021 05:37  Ca    8.6      07 Jun 2021 06:08  Phos  3.1     06-08  Phos  2.8     06-07  Mg     2.8     06-08  Mg     2.9     06-07    TPro  6.1  /  Alb  3.6  /  TBili  0.9  /  DBili  x   /  AST  32  /  ALT  32  /  AlkPhos  83  06-08  TPro  6.0  /  Alb  3.5  /  TBili  1.0  /  DBili  x   /  AST  28  /  ALT  26  /  AlkPhos  78  06-07    TSH: Thyroid Stimulating Hormone, Serum (06.04.21 @ 00:08)   Thyroid Stimulating Hormone, Serum: 2.28 uIU/mL     TELEMETRY: a paced v sensed 80's, av paced   RADIOLOGY:  < from: TTE with Doppler (w/Cont) (06.06.21 @ 07:27) >  ------------------------------------------------------------------------  Dimensions:    Normal Values:  LA:     5.4    2.0 - 4.0 cm  Ao:     3.4    2.0 - 3.8 cm  SEPTUM: 1.3    0.6 - 1.2 cm  PWT:    1.1    0.6 - 1.1 cm  LVIDd:  5.4    3.0 - 5.6 cm  LVIDs:         1.8 - 4.0 cm  Derived variables:  LVMI: 140 g/m2  RWT: 0.40  EF (Visual Estimate): 40 %  Doppler Peak Velocity (m/sec): AoV=2.1  ------------------------------------------------------------------------  Observations:  Mitral Valve: Mitral annular calcification and calcified  mitral leaflets with normal diastolic opening. The  posterior leaflets is particularly restricted. Moderate to  severe posteriorly directed mitral regurgitation. Mean  transmitral valve gradient equals 5 mm Hg, consistent with  mild to moderate mitral stenosis.  Aortic Valve/Aorta: Bioprosthetic aortic valve. Peak  transaortic valve gradient equals 17 mm Hg, mean  transaortic valve gradient equals 11 mm Hg, which is  probably normal in the presence of a bioprosthetic aortic  valve.  Aortic Root: 3.4 cm.  Left Atrium: Severely dilated left atrium.  LA volume index  = 50 cc/m2.  Left Ventricle: Endocardial visualization enhanced with  intravenous injection of Ultrasonic Enhancing Agent  (Definity).  Moderate segmental left ventricular systolic  dysfunction. The inferior and inferolateral walls are  hypokinetic. Septal motion is consistent with RV pacing.  Eccentric left ventricular hypertrophy (dilated left  ventricle with normal relative wall thickness).  Right Heart: Normal right atrium. A device wire is noted in  the right heart. Normal right ventricular size with  decreased right ventricular systolic function. Normal  tricuspid valve. Minimal tricuspid regurgitation. Normal  pulmonic valve.Minimal pulmonic regurgitation.  Pericardium/Pleura: Normal pericardium with no pericardial  effusion.  Bilateral pleural effusions.  Hemodynamic: Estimated right atrial pressure is 8 mm Hg.  Estimated right ventricular systolic pressure equals 27 mm  Hg, assuming right atrial pressure equals 8 mm Hg,  consistent with normal pulmonary pressures.  ------------------------------------------------------------------------  Conclusions:  1. Mitral annular calcification and calcified mitral  leaflets with normal diastolic opening. The posterior  leaflets is particularly restricted. Moderate to severe  posteriorly directed mitral regurgitation. Mean transmitral  valve gradient equals 5 mm Hg, consistent with mild to  moderate mitral stenosis.  2. Bioprosthetic aortic valve. Peak transaortic valve  gradient equals 17 mm Hg, mean transaortic valve gradient  equals 11 mm Hg, which is probably normal in the presence  of a bioprosthetic aortic valve.  3. Eccentric left ventricular hypertrophy (dilated left  ventricle with normal relative wall thickness).  4. Endocardial visualization enhanced with intravenous  injection of Ultrasonic Enhancing Agent (Definity).  Moderate segmental left ventricular systolic dysfunction.  The inferior and inferolateral walls are hypokinetic.  Septal motion is consistent with RV pacing.  5. Normal right atrium. A device wire is noted in the right  heart.  6. Normal right ventricular size with decreased right  ventricular systolic function.  7. Bilateral pleural effusions.  ------------------------------------------------------------------------  Confirmed on  6/6/2021 - 14:50:38 by Rush Ferreira M.D.  ------------------------------------------------------------------------

## 2021-06-08 NOTE — PROGRESS NOTE ADULT - SUBJECTIVE AND OBJECTIVE BOX
NEPHROLOGY-[facility] [phone]        Patient seen and examined in bed.  He was in good spirits         MEDICATIONS  (STANDING):  apixaban 2.5 milliGRAM(s) Oral every 12 hours  aspirin enteric coated 81 milliGRAM(s) Oral daily  atorvastatin 40 milliGRAM(s) Oral daily  chlorhexidine 2% Cloths 1 Application(s) Topical <User Schedule>  dextrose 5%. 1000 milliLiter(s) (50 mL/Hr) IV Continuous <Continuous>  dextrose 5%. 1000 milliLiter(s) (100 mL/Hr) IV Continuous <Continuous>  dextrose 50% Injectable 25 Gram(s) IV Push once  insulin lispro (ADMELOG) corrective regimen sliding scale   SubCutaneous at bedtime  insulin lispro (ADMELOG) corrective regimen sliding scale   SubCutaneous three times a day before meals  levothyroxine 50 MICROGram(s) Oral daily  metoprolol tartrate 50 milliGRAM(s) Oral two times a day  pantoprazole    Tablet 40 milliGRAM(s) Oral before breakfast  polyethylene glycol 3350 17 Gram(s) Oral daily  sodium chloride 0.9% lock flush 3 milliLiter(s) IV Push every 8 hours  tamsulosin 0.4 milliGRAM(s) Oral at bedtime  torsemide 10 milliGRAM(s) Oral daily      VITAL:  T(C): , Max: 36.9 (06-07-21 @ 19:04)  T(F): , Max: 98.5 (06-07-21 @ 19:04)  HR: 80 (06-08-21 @ 11:33)  BP: 124/69 (06-08-21 @ 11:33)  BP(mean): 79 (06-08-21 @ 04:00)  RR: 18 (06-08-21 @ 11:33)  SpO2: 98% (06-08-21 @ 11:33)  Wt(kg): --    I and O's:    06-07 @ 07:01  -  06-08 @ 07:00  --------------------------------------------------------  IN: 720 mL / OUT: 821 mL / NET: -101 mL    06-08 @ 07:01  -  06-08 @ 12:33  --------------------------------------------------------  IN: 150 mL / OUT: 200 mL / NET: -50 mL          PHYSICAL EXAM:    Constitutional: NAD  Neck:  No JVD  Respiratory: CTAB/L  Cardiovascular: S1 and S2  Gastrointestinal: BS+, soft, NT/ND  Extremities: No peripheral edema  Neurological: A/O x 3, no focal deficits  Psychiatric: Normal mood, normal affect  : No Royal  Skin: No rashes  Access: Not applicable    LABS:                        9.5    7.72  )-----------( 174      ( 08 Jun 2021 05:37 )             29.8     06-08    134<L>  |  100  |  49<H>  ----------------------------<  110<H>  4.3   |  21<L>  |  2.00<H>    Ca    8.6      08 Jun 2021 05:37  Phos  3.1     06-08  Mg     2.8     06-08    TPro  6.1  /  Alb  3.6  /  TBili  0.9  /  DBili  x   /  AST  32  /  ALT  32  /  AlkPhos  83  06-08          Urine Studies:          RADIOLOGY & ADDITIONAL STUDIES:

## 2021-06-08 NOTE — DIETITIAN INITIAL EVALUATION ADULT. - ADD RECOMMEND
2) Reinforce nutrition education as able. 3) Continue to trend labs, weight, skin integrity, and intake. 4) As medically feasible, consider addition of multivitamin.

## 2021-06-08 NOTE — PROGRESS NOTE ADULT - ASSESSMENT
80 yr old male with PMH of HLD, hypothyroidism, ICM, IWMI (, s/p DCA of RCA) AF (Xarelto), VT storm requirring ATP and ICD shocks (admitted to [facility] 2017), SVT (s/p VF arrest in 2017, while on a cruise: [place] [facility] [place] then SB and AICD implanted) Aotic valve regurgitation (s/p AVR ), Mitral valve regurgitation, Ascending aortic dilation. CTA of chest in 21 reveals aortic root aneurysm. Pt reports cardiology has been following this for several years. Pt denies, c/p SOB, or palpitations at this time. Pt evaluated by Dr. Fenton for scheduled Redo Sternotomy, Bentall on 21    On 21, pt underwent Redo Bentall/MV repair ef 45%  post-op   21 -Dobutrex gtt - NSVT - shocked by his ICD. -  d/c'd - amio load initiated  SURESH - creatinine 2.2 from 1.2 - herbert to remain in place for strict I/O's - will monitor bmp daily  diuretics d/c'd   echo this am - tr. to SDU POD #3  d/c plan anticipate home mid week   Increase ambulation.  Has been off , d/c  herbert.  Creat stable - suresh/ckd  Eps called for pacer interrogation, v pacing  with inappropriate spikes   Pacer interrogated yesterday, dddr 80, may decrease prior to d/c or as outpatient   Increase ambulation.  Renal following, consider diuresis

## 2021-06-08 NOTE — PROGRESS NOTE ADULT - PROBLEM SELECTOR PROBLEM 1
S/P ascending aortic aneurysm repair

## 2021-06-08 NOTE — DIETITIAN INITIAL EVALUATION ADULT. - PERTINENT MEDS FT
atorvastatin 40 milliGRAM(s) Oral daily  insulin lispro (ADMELOG) corrective regimen sliding scale   SubCutaneous at bedtime  insulin lispro (ADMELOG) corrective regimen sliding scale   SubCutaneous three times a day before meals  levothyroxine 50 MICROGram(s) Oral daily  pantoprazole    Tablet 40 milliGRAM(s) Oral before breakfast  polyethylene glycol 3350 17 Gram(s) Oral daily  sodium chloride 0.9% lock flush 3 milliLiter(s) IV Push every 8 hours  torsemide 10 milliGRAM(s) Oral daily

## 2021-06-08 NOTE — PROGRESS NOTE ADULT - PROBLEM SELECTOR PROBLEM 3
NSVT (nonsustained ventricular tachycardia)

## 2021-06-08 NOTE — DIETITIAN INITIAL EVALUATION ADULT. - OTHER INFO
Dosing wt: 169.9 lbs. Daily wt in lbs - standin.4 (). Reports UBW of 175-180 lbs, denies any recent changes in wt. Wt fluctuations inhouse likely 2/2 fluid shifts as pt being diuresed with edema. RD will continue to trend as new wts available/able.     Pt is eating well with no changes in appetite. Denies recent N/V, diarrhea, or constipation. Last BM .    Provided verbal education on heart healthy nutrition. Emphasis on lean protein sources; limiting sodium intake; foods that contain sodium; choosing whole grains vs refined carbohydrates; limiting saturated fat + red meat intake; increased protein needs s/p midsternal incision and need for optimal glycemic control. Pt made aware RD to remain available for any questions.

## 2021-06-09 ENCOUNTER — TRANSCRIPTION ENCOUNTER (OUTPATIENT)
Age: 81
End: 2021-06-09

## 2021-06-09 VITALS
RESPIRATION RATE: 16 BRPM | OXYGEN SATURATION: 98 % | SYSTOLIC BLOOD PRESSURE: 107 MMHG | HEART RATE: 70 BPM | TEMPERATURE: 98 F | DIASTOLIC BLOOD PRESSURE: 65 MMHG

## 2021-06-09 LAB
ALBUMIN SERPL ELPH-MCNC: 3.6 G/DL — SIGNIFICANT CHANGE UP (ref 3.3–5)
ALP SERPL-CCNC: 94 U/L — SIGNIFICANT CHANGE UP (ref 40–120)
ALT FLD-CCNC: 39 U/L — SIGNIFICANT CHANGE UP (ref 10–45)
ANION GAP SERPL CALC-SCNC: 14 MMOL/L — SIGNIFICANT CHANGE UP (ref 5–17)
AST SERPL-CCNC: 40 U/L — SIGNIFICANT CHANGE UP (ref 10–40)
BASOPHILS # BLD AUTO: 0.03 K/UL — SIGNIFICANT CHANGE UP (ref 0–0.2)
BASOPHILS NFR BLD AUTO: 0.4 % — SIGNIFICANT CHANGE UP (ref 0–2)
BILIRUB SERPL-MCNC: 0.9 MG/DL — SIGNIFICANT CHANGE UP (ref 0.2–1.2)
BUN SERPL-MCNC: 50 MG/DL — HIGH (ref 7–23)
CALCIUM SERPL-MCNC: 9.6 MG/DL — SIGNIFICANT CHANGE UP (ref 8.4–10.5)
CHLORIDE SERPL-SCNC: 100 MMOL/L — SIGNIFICANT CHANGE UP (ref 96–108)
CO2 SERPL-SCNC: 23 MMOL/L — SIGNIFICANT CHANGE UP (ref 22–31)
CREAT SERPL-MCNC: 2.12 MG/DL — HIGH (ref 0.5–1.3)
EOSINOPHIL # BLD AUTO: 0.21 K/UL — SIGNIFICANT CHANGE UP (ref 0–0.5)
EOSINOPHIL NFR BLD AUTO: 2.7 % — SIGNIFICANT CHANGE UP (ref 0–6)
GLUCOSE SERPL-MCNC: 115 MG/DL — HIGH (ref 70–99)
HCT VFR BLD CALC: 29.6 % — LOW (ref 39–50)
HGB BLD-MCNC: 9.6 G/DL — LOW (ref 13–17)
IMM GRANULOCYTES NFR BLD AUTO: 0.6 % — SIGNIFICANT CHANGE UP (ref 0–1.5)
LYMPHOCYTES # BLD AUTO: 0.82 K/UL — LOW (ref 1–3.3)
LYMPHOCYTES # BLD AUTO: 10.4 % — LOW (ref 13–44)
MCHC RBC-ENTMCNC: 28.2 PG — SIGNIFICANT CHANGE UP (ref 27–34)
MCHC RBC-ENTMCNC: 32.4 GM/DL — SIGNIFICANT CHANGE UP (ref 32–36)
MCV RBC AUTO: 87.1 FL — SIGNIFICANT CHANGE UP (ref 80–100)
MONOCYTES # BLD AUTO: 0.97 K/UL — HIGH (ref 0–0.9)
MONOCYTES NFR BLD AUTO: 12.3 % — SIGNIFICANT CHANGE UP (ref 2–14)
NEUTROPHILS # BLD AUTO: 5.8 K/UL — SIGNIFICANT CHANGE UP (ref 1.8–7.4)
NEUTROPHILS NFR BLD AUTO: 73.6 % — SIGNIFICANT CHANGE UP (ref 43–77)
NRBC # BLD: 0 /100 WBCS — SIGNIFICANT CHANGE UP (ref 0–0)
PLATELET # BLD AUTO: 194 K/UL — SIGNIFICANT CHANGE UP (ref 150–400)
POTASSIUM SERPL-MCNC: 4.6 MMOL/L — SIGNIFICANT CHANGE UP (ref 3.5–5.3)
POTASSIUM SERPL-SCNC: 4.6 MMOL/L — SIGNIFICANT CHANGE UP (ref 3.5–5.3)
PROT SERPL-MCNC: 6.3 G/DL — SIGNIFICANT CHANGE UP (ref 6–8.3)
RBC # BLD: 3.4 M/UL — LOW (ref 4.2–5.8)
RBC # FLD: 14.6 % — HIGH (ref 10.3–14.5)
SODIUM SERPL-SCNC: 137 MMOL/L — SIGNIFICANT CHANGE UP (ref 135–145)
WBC # BLD: 7.88 K/UL — SIGNIFICANT CHANGE UP (ref 3.8–10.5)
WBC # FLD AUTO: 7.88 K/UL — SIGNIFICANT CHANGE UP (ref 3.8–10.5)

## 2021-06-09 PROCEDURE — U0003: CPT

## 2021-06-09 PROCEDURE — 82435 ASSAY OF BLOOD CHLORIDE: CPT

## 2021-06-09 PROCEDURE — 80048 BASIC METABOLIC PNL TOTAL CA: CPT

## 2021-06-09 PROCEDURE — 86900 BLOOD TYPING SEROLOGIC ABO: CPT

## 2021-06-09 PROCEDURE — G0463: CPT

## 2021-06-09 PROCEDURE — 97116 GAIT TRAINING THERAPY: CPT

## 2021-06-09 PROCEDURE — U0005: CPT

## 2021-06-09 PROCEDURE — 85384 FIBRINOGEN ACTIVITY: CPT

## 2021-06-09 PROCEDURE — 36430 TRANSFUSION BLD/BLD COMPNT: CPT

## 2021-06-09 PROCEDURE — 85025 COMPLETE CBC W/AUTO DIFF WBC: CPT

## 2021-06-09 PROCEDURE — 83735 ASSAY OF MAGNESIUM: CPT

## 2021-06-09 PROCEDURE — P9047: CPT

## 2021-06-09 PROCEDURE — 85730 THROMBOPLASTIN TIME PARTIAL: CPT

## 2021-06-09 PROCEDURE — 86965 POOLING BLOOD PLATELETS: CPT

## 2021-06-09 PROCEDURE — 84132 ASSAY OF SERUM POTASSIUM: CPT

## 2021-06-09 PROCEDURE — 83605 ASSAY OF LACTIC ACID: CPT

## 2021-06-09 PROCEDURE — 82803 BLOOD GASES ANY COMBINATION: CPT

## 2021-06-09 PROCEDURE — C1768: CPT

## 2021-06-09 PROCEDURE — 86891 AUTOLOGOUS BLOOD OP SALVAGE: CPT

## 2021-06-09 PROCEDURE — P9011: CPT

## 2021-06-09 PROCEDURE — 83036 HEMOGLOBIN GLYCOSYLATED A1C: CPT

## 2021-06-09 PROCEDURE — 85610 PROTHROMBIN TIME: CPT

## 2021-06-09 PROCEDURE — 82962 GLUCOSE BLOOD TEST: CPT

## 2021-06-09 PROCEDURE — P9012: CPT

## 2021-06-09 PROCEDURE — P9059: CPT

## 2021-06-09 PROCEDURE — 84443 ASSAY THYROID STIM HORMONE: CPT

## 2021-06-09 PROCEDURE — 85018 HEMOGLOBIN: CPT

## 2021-06-09 PROCEDURE — 93880 EXTRACRANIAL BILAT STUDY: CPT

## 2021-06-09 PROCEDURE — C9803: CPT

## 2021-06-09 PROCEDURE — 94002 VENT MGMT INPAT INIT DAY: CPT

## 2021-06-09 PROCEDURE — 86850 RBC ANTIBODY SCREEN: CPT

## 2021-06-09 PROCEDURE — 85014 HEMATOCRIT: CPT

## 2021-06-09 PROCEDURE — 82550 ASSAY OF CK (CPK): CPT

## 2021-06-09 PROCEDURE — P9037: CPT

## 2021-06-09 PROCEDURE — 82565 ASSAY OF CREATININE: CPT

## 2021-06-09 PROCEDURE — P9041: CPT

## 2021-06-09 PROCEDURE — 86901 BLOOD TYPING SEROLOGIC RH(D): CPT

## 2021-06-09 PROCEDURE — C1889: CPT

## 2021-06-09 PROCEDURE — 82947 ASSAY GLUCOSE BLOOD QUANT: CPT

## 2021-06-09 PROCEDURE — 97530 THERAPEUTIC ACTIVITIES: CPT

## 2021-06-09 PROCEDURE — 85027 COMPLETE CBC AUTOMATED: CPT

## 2021-06-09 PROCEDURE — 82553 CREATINE MB FRACTION: CPT

## 2021-06-09 PROCEDURE — 85396 CLOTTING ASSAY WHOLE BLOOD: CPT

## 2021-06-09 PROCEDURE — 84484 ASSAY OF TROPONIN QUANT: CPT

## 2021-06-09 PROCEDURE — 97162 PT EVAL MOD COMPLEX 30 MIN: CPT

## 2021-06-09 PROCEDURE — 84295 ASSAY OF SERUM SODIUM: CPT

## 2021-06-09 PROCEDURE — C1769: CPT

## 2021-06-09 PROCEDURE — C8929: CPT

## 2021-06-09 PROCEDURE — 88304 TISSUE EXAM BY PATHOLOGIST: CPT

## 2021-06-09 PROCEDURE — 71045 X-RAY EXAM CHEST 1 VIEW: CPT

## 2021-06-09 PROCEDURE — P9045: CPT

## 2021-06-09 PROCEDURE — 87641 MR-STAPH DNA AMP PROBE: CPT

## 2021-06-09 PROCEDURE — 71046 X-RAY EXAM CHEST 2 VIEWS: CPT

## 2021-06-09 PROCEDURE — 87640 STAPH A DNA AMP PROBE: CPT

## 2021-06-09 PROCEDURE — 94660 CPAP INITIATION&MGMT: CPT

## 2021-06-09 PROCEDURE — 86923 COMPATIBILITY TEST ELECTRIC: CPT

## 2021-06-09 PROCEDURE — 97110 THERAPEUTIC EXERCISES: CPT

## 2021-06-09 PROCEDURE — 82330 ASSAY OF CALCIUM: CPT

## 2021-06-09 PROCEDURE — 80053 COMPREHEN METABOLIC PANEL: CPT

## 2021-06-09 PROCEDURE — 84100 ASSAY OF PHOSPHORUS: CPT

## 2021-06-09 PROCEDURE — P9016: CPT

## 2021-06-09 PROCEDURE — C1751: CPT

## 2021-06-09 RX ORDER — DOCUSATE SODIUM 100 MG
2 CAPSULE ORAL
Qty: 0 | Refills: 0 | DISCHARGE

## 2021-06-09 RX ORDER — POLYETHYLENE GLYCOL 3350 17 G/17G
17 POWDER, FOR SOLUTION ORAL
Qty: 510 | Refills: 0
Start: 2021-06-09 | End: 2021-07-08

## 2021-06-09 RX ORDER — ERYTHROPOIETIN 10000 [IU]/ML
10000 INJECTION, SOLUTION INTRAVENOUS; SUBCUTANEOUS ONCE
Refills: 0 | Status: COMPLETED | OUTPATIENT
Start: 2021-06-09 | End: 2021-06-09

## 2021-06-09 RX ORDER — POLYETHYLENE GLYCOL 3350 17 G/17G
1 POWDER, FOR SOLUTION ORAL
Qty: 0 | Refills: 0 | DISCHARGE

## 2021-06-09 RX ORDER — ASPIRIN/CALCIUM CARB/MAGNESIUM 324 MG
1 TABLET ORAL
Qty: 30 | Refills: 0
Start: 2021-06-09 | End: 2021-07-08

## 2021-06-09 RX ORDER — ASPIRIN/CALCIUM CARB/MAGNESIUM 324 MG
1 TABLET ORAL
Qty: 0 | Refills: 0 | DISCHARGE

## 2021-06-09 RX ORDER — RIVAROXABAN 15 MG-20MG
1 KIT ORAL
Qty: 14 | Refills: 0
Start: 2021-06-09 | End: 2021-06-22

## 2021-06-09 RX ORDER — FUROSEMIDE 40 MG
1 TABLET ORAL
Qty: 7 | Refills: 0
Start: 2021-06-09 | End: 2021-06-15

## 2021-06-09 RX ORDER — OXYCODONE HYDROCHLORIDE 5 MG/1
1 TABLET ORAL
Qty: 30 | Refills: 0
Start: 2021-06-09 | End: 2021-06-13

## 2021-06-09 RX ORDER — RIVAROXABAN 15 MG-20MG
1 KIT ORAL
Qty: 0 | Refills: 0 | DISCHARGE

## 2021-06-09 RX ORDER — APIXABAN 2.5 MG/1
1 TABLET, FILM COATED ORAL
Qty: 60 | Refills: 0
Start: 2021-06-09 | End: 2021-07-08

## 2021-06-09 RX ORDER — METOPROLOL TARTRATE 50 MG
1 TABLET ORAL
Qty: 60 | Refills: 0
Start: 2021-06-09 | End: 2021-07-08

## 2021-06-09 RX ORDER — AMLODIPINE BESYLATE AND BENAZEPRIL HYDROCHLORIDE 10; 20 MG/1; MG/1
1 CAPSULE ORAL
Qty: 0 | Refills: 0 | DISCHARGE

## 2021-06-09 RX ORDER — AMIODARONE HYDROCHLORIDE 400 MG/1
1 TABLET ORAL
Qty: 30 | Refills: 0
Start: 2021-06-09 | End: 2021-07-08

## 2021-06-09 RX ORDER — METOPROLOL TARTRATE 50 MG
1 TABLET ORAL
Qty: 0 | Refills: 0 | DISCHARGE

## 2021-06-09 RX ORDER — UBIDECARENONE 100 MG
1 CAPSULE ORAL
Qty: 0 | Refills: 0 | DISCHARGE

## 2021-06-09 RX ORDER — PANTOPRAZOLE SODIUM 20 MG/1
1 TABLET, DELAYED RELEASE ORAL
Qty: 14 | Refills: 0
Start: 2021-06-09 | End: 2021-06-22

## 2021-06-09 RX ADMIN — Medication 10 MILLIGRAM(S): at 07:10

## 2021-06-09 RX ADMIN — Medication 50 MILLIGRAM(S): at 06:16

## 2021-06-09 RX ADMIN — ERYTHROPOIETIN 10000 UNIT(S): 10000 INJECTION, SOLUTION INTRAVENOUS; SUBCUTANEOUS at 11:02

## 2021-06-09 RX ADMIN — Medication 50 MICROGRAM(S): at 06:16

## 2021-06-09 RX ADMIN — CHLORHEXIDINE GLUCONATE 1 APPLICATION(S): 213 SOLUTION TOPICAL at 06:18

## 2021-06-09 RX ADMIN — PANTOPRAZOLE SODIUM 40 MILLIGRAM(S): 20 TABLET, DELAYED RELEASE ORAL at 07:09

## 2021-06-09 RX ADMIN — APIXABAN 2.5 MILLIGRAM(S): 2.5 TABLET, FILM COATED ORAL at 06:16

## 2021-06-09 RX ADMIN — SODIUM CHLORIDE 3 MILLILITER(S): 9 INJECTION INTRAMUSCULAR; INTRAVENOUS; SUBCUTANEOUS at 07:04

## 2021-06-09 RX ADMIN — Medication 81 MILLIGRAM(S): at 11:02

## 2021-06-09 RX ADMIN — AMIODARONE HYDROCHLORIDE 400 MILLIGRAM(S): 400 TABLET ORAL at 06:16

## 2021-06-09 NOTE — DISCHARGE NOTE PROVIDER - CARE PROVIDERS DIRECT ADDRESSES
[email or web address]

## 2021-06-09 NOTE — DISCHARGE NOTE PROVIDER - CARE PROVIDER_API CALL
Noe Fenton)  Surgery; Surgical Critical Care; Thoracic and Cardiac Surgery  [address] [place] [address]  Phone: [phone]  Fax: [phone]  Scheduled Appointment: 06/17/2021 03:30 PM    Alfredito Romero  [place] [address]  Phone: [phone]  Fax: [phone]  Follow Up Time: 2 weeks   Noe Fenton)  Surgery; Surgical Critical Care; Thoracic and Cardiac Surgery  [address] [place] [address]  Phone: [phone]  Fax: [phone]  Scheduled Appointment: 06/17/2021 03:30 PM    Alfredito Romero  [place] [address]  Phone: [phone]  Fax: [phone]  Follow Up Time: 2 weeks    markel DRIVER  Phone: [phone]  Fax: (   )    -  Follow Up Time:

## 2021-06-09 NOTE — PROGRESS NOTE ADULT - ASSESSMENT
80M w/ AFib, CAD, HFrEF-ICD, and VT storm; s/p MV repair+Bentall 6/2/21    (1)Renal - SURESH - nonoliguric ischemic ATN - plateaued creatinine in low 2s - GFR ~25ml/min    (2)Lytes - acceptable    (3)CV -NSVT       RECOMMEND:  (1)Torsemide 10mg po qd   (2)Dose new meds for GFR 25ml/min (present dosing is acceptable)  (3) Eliquis dosing at present is correct but once the creatinine is down to 1.5 then will need to increase   (4)EPS keli noted;  Possibly start Amio     Sayed [facility]   [phone]        80M w/ AFib, CAD, HFrEF-ICD, and VT storm; s/p MV repair+Bentall 6/2/21    (1)Renal - SURESH - nonoliguric ischemic ATN - plateaued creatinine in low 2s - GFR ~25ml/min    (2)Lytes - acceptable    (3)CV -NSVT       RECOMMEND:  (1)Torsemide 10mg po qd   (2)Dose new meds for GFR 25ml/min (present dosing is acceptable)  (3) Eliquis dosing at present is correct but once the creatinine is down to 1.5 then will need to increase   (4)EPS keli noted;   Amio started     Possible dc planning  Retacrit 10000 x 1   DW CTS    Sayed Fisgo   [phone]

## 2021-06-09 NOTE — DISCHARGE NOTE PROVIDER - HOSPITAL COURSE
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 in [facility]: then SB and AICD implanted) Aotic valve regurgitation (s/p AVR ), Mitral valve regurgitation, Ascending aortic dilation. CTA of chest in 21 reveals aortic root aneurysm. Pt reports cardiology has been following this for several years. Pt denies, c/p SOB, or palpitations at this time. Pt evaluated by Dr. Fenton for scheduled Redo Sternotomy, Bentall on 21    On 21, pt underwent Redo Bentall/MV repair ef 45%  post-op   21 -Dobutrex gtt - NSVT - shocked by his ICD. -  d/c'd - amio load initiated  SURESH - creatinine 2.2 from 1.2 - herbert to remain in place for strict I/O's - will monitor bmp daily  diuretics d/c'd   echo this am - tr. to SDU POD #3  d/c plan anticipate home mid week   Increase ambulation.  Has been off , d/c  herbert.  Creat stable - suresh/ckd  Eps called for pacer interrogation, v pacing  with inappropriate spikes   Pacer interrogated yesterday, dddr 80, may decrease prior to d/c or as outpatient   Increase ambulation.  Renal following, consider diuresis   Torsemide 10 yesterday.  D/c planning  for today, lasix 20mg, amio 200.

## 2021-06-09 NOTE — DISCHARGE NOTE PROVIDER - NSDCFUADDINST_GEN_ALL_CORE_FT
CONTINUE XARELTO 10MG PO DAILY FOR 2 WEEKS UNLESS OTHERWISE INSTRUCTED BY DR KOEHLER.  THEN INCREASE TO YOUR PREOP DOSE 20MG PO DAILY    1. Daily Shower  2. Weight yourself daily and notify any weight gain greater than 2-3 pounds in 24 hours.  3. Regular diet - low fat, low cholesterol, no added salt.  4. Cleanse Midsternal incision and leg incision daily while showering with warm water and mild soap, pat dry and maintain open to air.   5. Follow Cardiac Surgery Do's and Don'ts discharge instructions.   6. No driving until cleared by MD.   7. No heavy lifting nothing greater than 5 pounds until cleared by MD.   8. Call / Notify MD any fever greater than 101.0  9. Increase Activity as tolerated.

## 2021-06-09 NOTE — DISCHARGE NOTE PROVIDER - NSDCMRMEDTOKEN_GEN_ALL_CORE_FT
amiodarone 200 mg oral tablet: 1 tab(s) orally once a day  aspirin 81 mg oral delayed release tablet: 1 tab(s) orally once a day  atorvastatin 40 mg oral tablet: 1 tab(s) orally once a day (at bedtime)  Lasix 20 mg oral tablet: 1 tab(s) orally once a day   levothyroxine 50 mcg (0.05 mg) oral tablet: 1 tab(s) orally once a day  metoprolol tartrate 50 mg oral tablet: 1 tab(s) orally 2 times a day  oxyCODONE 5 mg oral capsule: 1 cap(s) orally every 4 hours, As Needed -for moderate pain MDD:5  pantoprazole 40 mg oral delayed release tablet: 1 tab(s) orally once a day (before a meal)  polyethylene glycol 3350 oral powder for reconstitution: 17 gram(s) orally once a day  tamsulosin 0.4 mg oral capsule: 1 cap(s) orally once a day (at bedtime)  Xarelto 10 mg oral tablet: 1 tab(s) orally once a day

## 2021-06-09 NOTE — DISCHARGE NOTE PROVIDER - PROVIDER TOKENS
PROVIDER:[TOKEN:[[id number]],SCHEDULEDAPPT:[06/17/2021],SCHEDULEDAPPTTIME:[03:30 PM]],PROVIDER:[TOKEN:[[id number]],FOLLOWUP:[2 weeks]] PROVIDER:[TOKEN:[[id number]],SCHEDULEDAPPT:[06/17/2021],SCHEDULEDAPPTTIME:[03:30 PM]],PROVIDER:[TOKEN:[[id number]],FOLLOWUP:[2 weeks]],FREE:[LAST:[ALMASRY],FIRST:[jean baptiste],PHONE:[phone]],FAX:[(   )    -]]

## 2021-06-09 NOTE — PROGRESS NOTE ADULT - NSICDXPILOT_GEN_ALL_CORE
[place]

## 2021-06-09 NOTE — PROCEDURE NOTE - INTERROGATION NOTE: ZONE III THERAPIES
Health Maintenance Due   Topic Date Due   • Shingles Vaccine (1 of 2) 04/02/2016       Patient is due for topics as listed above but is not proceeding with Immunization(s) Shingles at this time.         
ATPx1, 30Jx1, 36Jx5
VF AT x 1 shock
ATPx1, 30Jx1, 36Jx5

## 2021-06-09 NOTE — PROGRESS NOTE ADULT - PROVIDER SPECIALTY LIST ADULT
Critical Care
Critical Care
Electrophysiology
Nephrology
Nephrology
Critical Care
Critical Care
Electrophysiology
Nephrology
Critical Care
Nephrology
Nephrology
CT Surgery
Critical Care
Critical Care
Nephrology
CT Surgery
CT Surgery

## 2021-06-09 NOTE — DISCHARGE NOTE NURSING/CASE MANAGEMENT/SOCIAL WORK - PATIENT PORTAL LINK FT
You can access the FollowMyHealth Patient Portal offered by [facility] by registering at the following website: [email or web address]. By joining [facility]’s FollowMyHealth portal, you will also be able to view your health information using other applications (apps) compatible with our system.

## 2021-06-09 NOTE — PROGRESS NOTE ADULT - SUBJECTIVE AND OBJECTIVE BOX
NEPHROLOGY-NSN [phone]        Patient seen and examined in bed.  He was in good spirits         MEDICATIONS  (STANDING):  aMIOdarone    Tablet 400 milliGRAM(s) Oral every 12 hours  apixaban 2.5 milliGRAM(s) Oral every 12 hours  aspirin enteric coated 81 milliGRAM(s) Oral daily  atorvastatin 40 milliGRAM(s) Oral daily  chlorhexidine 2% Cloths 1 Application(s) Topical <User Schedule>  dextrose 5%. 1000 milliLiter(s) (50 mL/Hr) IV Continuous <Continuous>  dextrose 5%. 1000 milliLiter(s) (100 mL/Hr) IV Continuous <Continuous>  dextrose 50% Injectable 25 Gram(s) IV Push once  insulin lispro (ADMELOG) corrective regimen sliding scale   SubCutaneous at bedtime  insulin lispro (ADMELOG) corrective regimen sliding scale   SubCutaneous three times a day before meals  levothyroxine 50 MICROGram(s) Oral daily  metoprolol tartrate 50 milliGRAM(s) Oral two times a day  pantoprazole    Tablet 40 milliGRAM(s) Oral before breakfast  polyethylene glycol 3350 17 Gram(s) Oral daily  sodium chloride 0.9% lock flush 3 milliLiter(s) IV Push every 8 hours  tamsulosin 0.4 milliGRAM(s) Oral at bedtime  torsemide 10 milliGRAM(s) Oral daily      VITAL:  T(C): , Max: 36.7 (06-09-21 @ 07:42)  T(F): , Max: 98 (06-09-21 @ 07:42)  HR: 79 (06-09-21 @ 07:42)  BP: 102/64 (06-09-21 @ 07:42)  BP(mean): 82 (06-09-21 @ 00:52)  RR: 18 (06-09-21 @ 00:52)  SpO2: 96% (06-09-21 @ 00:52)  Wt(kg): --    I and O's:    06-08 @ 07:01  -  06-09 @ 07:00  --------------------------------------------------------  IN: 717 mL / OUT: 2081 mL / NET: -1364 mL    06-09 @ 07:01  -  06-09 @ 07:51  --------------------------------------------------------  IN: 0 mL / OUT: 200 mL / NET: -200 mL          PHYSICAL EXAM:    Constitutional: NAD  Neck:  No JVD  Respiratory: CTAB/L  Cardiovascular: S1 and S2  Gastrointestinal: BS+, soft, NT/ND  Extremities: No peripheral edema  Neurological: A/O x 3, no focal deficits  Psychiatric: Normal mood, normal affect  : No Royal  Skin: No rashes  Access: Not applicable    LABS:                        9.6    7.88  )-----------( 194      ( 09 Jun 2021 06:11 )             29.6     06-09    137  |  100  |  50<H>  ----------------------------<  115<H>  4.6   |  23  |  2.12<H>    Ca    9.6      09 Jun 2021 06:11  Phos  3.1     06-08  Mg     2.8     06-08    TPro  6.3  /  Alb  3.6  /  TBili  0.9  /  DBili  x   /  AST  40  /  ALT  39  /  AlkPhos  94  06-09          Urine Studies:          RADIOLOGY & ADDITIONAL STUDIES:             NEPHROLOGY-NSN [phone]        Patient seen and examined in bed.  He was in good spirits         MEDICATIONS  (STANDING):  aMIOdarone    Tablet 400 milliGRAM(s) Oral every 12 hours  apixaban 2.5 milliGRAM(s) Oral every 12 hours  aspirin enteric coated 81 milliGRAM(s) Oral daily  atorvastatin 40 milliGRAM(s) Oral daily  chlorhexidine 2% Cloths 1 Application(s) Topical <User Schedule>  dextrose 5%. 1000 milliLiter(s) (50 mL/Hr) IV Continuous <Continuous>  dextrose 5%. 1000 milliLiter(s) (100 mL/Hr) IV Continuous <Continuous>  dextrose 50% Injectable 25 Gram(s) IV Push once  insulin lispro (ADMELOG) corrective regimen sliding scale   SubCutaneous at bedtime  insulin lispro (ADMELOG) corrective regimen sliding scale   SubCutaneous three times a day before meals  levothyroxine 50 MICROGram(s) Oral daily  metoprolol tartrate 50 milliGRAM(s) Oral two times a day  pantoprazole    Tablet 40 milliGRAM(s) Oral before breakfast  polyethylene glycol 3350 17 Gram(s) Oral daily  sodium chloride 0.9% lock flush 3 milliLiter(s) IV Push every 8 hours  tamsulosin 0.4 milliGRAM(s) Oral at bedtime  torsemide 10 milliGRAM(s) Oral daily      VITAL:  T(C): , Max: 36.7 (06-09-21 @ 07:42)  T(F): , Max: 98 (06-09-21 @ 07:42)  HR: 79 (06-09-21 @ 07:42)  BP: 102/64 (06-09-21 @ 07:42)  BP(mean): 82 (06-09-21 @ 00:52)  RR: 18 (06-09-21 @ 00:52)  SpO2: 96% (06-09-21 @ 00:52)  Wt(kg): --    I and O's:    06-08 @ 07:01  -  06-09 @ 07:00  --------------------------------------------------------  IN: 717 mL / OUT: 2081 mL / NET: -1364 mL    06-09 @ 07:01  -  06-09 @ 07:51  --------------------------------------------------------  IN: 0 mL / OUT: 200 mL / NET: -200 mL          PHYSICAL EXAM:    Constitutional: NAD  Neck:  No JVD  Respiratory: CTAB/L  Cardiovascular: S1 and S2  Gastrointestinal: BS+, soft, NT/ND  Extremities: trace  peripheral edema  Neurological: A/O x 3, no focal deficits  Psychiatric: Normal mood, normal affect  : No Royal  Skin: No rashes  Access: Not applicable    LABS:                        9.6    7.88  )-----------( 194      ( 09 Jun 2021 06:11 )             29.6     06-09    137  |  100  |  50<H>  ----------------------------<  115<H>  4.6   |  23  |  2.12<H>    Ca    9.6      09 Jun 2021 06:11  Phos  3.1     06-08  Mg     2.8     06-08    TPro  6.3  /  Alb  3.6  /  TBili  0.9  /  DBili  x   /  AST  40  /  ALT  39  /  AlkPhos  94  06-09          Urine Studies:          RADIOLOGY & ADDITIONAL STUDIES:

## 2021-06-09 NOTE — DISCHARGE NOTE PROVIDER - NSDCPNSUBOBJ_GEN_ALL_CORE
VITAL SIGNS    Telemetry:  nsr 80  Vital Signs Last 24 Hrs  T(C): 36.7 (21 @ 07:42), Max: 36.7 (21 @ 07:42)  T(F): 98 (21 @ 07:42), Max: 98 (21 @ 07:42)  HR: 79 (21 @ 07:42) (79 - 80)  BP: 102/64 (21 @ 07:42) (94/58 - 124/69)  RR: 18 (21 @ 00:52) (18 - 18)  SpO2: 96% (21 @ 00:52) (94% - 98%)                    @ 07:01  -   @ 07:00  --------------------------------------------------------  IN: 717 mL / OUT: 2081 mL / NET: -1364 mL     @ 07:01  -   @ 09:36  --------------------------------------------------------  IN: 240 mL / OUT: 200 mL / NET: 40 mL          Daily     Daily Weight in k.9 (2021 04:00)            CAPILLARY BLOOD GLUCOSE      POCT Blood Glucose.: 107 mg/dL (2021 07:40)  POCT Blood Glucose.: 109 mg/dL (2021 21:43)  POCT Blood Glucose.: 115 mg/dL (2021 16:24)  POCT Blood Glucose.: 117 mg/dL (2021 11:34)                    Coumadin    [ ] YES          [  x]      NO         xarelto 10mg  2 weeks post op then resume xarelto 20mg po qd                          PHYSICAL EXAM        Neurology: alert and oriented x 3, nonfocal, no gross deficits  CV : s1 s2 RRR  Sternal Wound :  CDI , Stable  Lungs: cta  Abdomen: soft, nontender, nondistended, positive bowel sounds, last bowel movement +                    :    voiding   Extremities:      edema   /  -   calve tenderness ,              aMIOdarone    Tablet 400 milliGRAM(s) Oral every 12 hours  apixaban 2.5 milliGRAM(s) Oral every 12 hours  aspirin enteric coated 81 milliGRAM(s) Oral daily  atorvastatin 40 milliGRAM(s) Oral daily  chlorhexidine 2% Cloths 1 Application(s) Topical <User Schedule>  dextrose 5%. 1000 milliLiter(s) IV Continuous <Continuous>  dextrose 5%. 1000 milliLiter(s) IV Continuous <Continuous>  dextrose 50% Injectable 25 Gram(s) IV Push once  epoetin zara-epbx (RETACRIT) Injectable 64834 Unit(s) SubCutaneous once  insulin lispro (ADMELOG) corrective regimen sliding scale   SubCutaneous at bedtime  insulin lispro (ADMELOG) corrective regimen sliding scale   SubCutaneous three times a day before meals  levothyroxine 50 MICROGram(s) Oral daily  metoprolol tartrate 50 milliGRAM(s) Oral two times a day  oxycodone    5 mG/acetaminophen 325 mG 1 Tablet(s) Oral every 6 hours PRN  oxycodone    5 mG/acetaminophen 325 mG 2 Tablet(s) Oral every 6 hours PRN  pantoprazole    Tablet 40 milliGRAM(s) Oral before breakfast  polyethylene glycol 3350 17 Gram(s) Oral daily  sodium chloride 0.9% lock flush 3 milliLiter(s) IV Push every 8 hours  tamsulosin 0.4 milliGRAM(s) Oral at bedtime  torsemide 10 milliGRAM(s) Oral daily                    Physical Therapy Rec:   Home  [x  ]   Home w/ PT  [  ]  Rehab  [  ]  Discussed with Cardiothoracic Team at AM rounds.

## 2021-06-09 NOTE — DISCHARGE NOTE PROVIDER - NSDCFUADDAPPT_GEN_ALL_CORE_FT
YOU HAVE AN APPOINTMENT WITH DR KOEHLER 6/17 3:30 PM    CALL YOUR CARDIOLOGIST AND ELECTROPHYSIOLOGIST AND SEE THEM IN 2-3 WEEKS.

## 2021-06-10 RX ORDER — LEVOTHYROXINE SODIUM 0.05 MG/1
50 TABLET ORAL DAILY
Qty: 90 | Refills: 0 | Status: ACTIVE | COMMUNITY

## 2021-06-10 RX ORDER — AMLODIPINE BESYLATE AND BENAZEPRIL HYDROCHLORIDE 2.5; 1 MG/1; MG/1
2.5-1 CAPSULE ORAL AT BEDTIME
Refills: 0 | Status: DISCONTINUED | COMMUNITY
End: 2021-06-10

## 2021-06-10 RX ORDER — MEXILETINE HYDROCHLORIDE 150 MG/1
150 CAPSULE ORAL
Qty: 60 | Refills: 0 | Status: DISCONTINUED | COMMUNITY
End: 2021-06-10

## 2021-06-10 RX ORDER — LORATADINE 10 MG
17 TABLET,DISINTEGRATING ORAL DAILY
Refills: 0 | Status: ACTIVE | COMMUNITY
Start: 2021-06-10

## 2021-06-10 RX ORDER — UBIDECARENONE 200 MG
200 CAPSULE ORAL
Refills: 0 | Status: ACTIVE | COMMUNITY

## 2021-06-10 RX ORDER — AMOXICILLIN 500 MG/1
500 CAPSULE ORAL
Qty: 12 | Refills: 1 | Status: DISCONTINUED | COMMUNITY
End: 2021-06-10

## 2021-06-10 RX ORDER — ATORVASTATIN CALCIUM 40 MG/1
40 TABLET, FILM COATED ORAL
Qty: 30 | Refills: 0 | Status: ACTIVE | COMMUNITY

## 2021-06-10 RX ORDER — DOCUSATE SODIUM 100 MG/1
100 CAPSULE, LIQUID FILLED ORAL DAILY
Refills: 0 | Status: ACTIVE | COMMUNITY
Start: 2020-12-03

## 2021-06-11 ENCOUNTER — APPOINTMENT (OUTPATIENT)
Dept: CARE COORDINATION | Facility: HOME HEALTH | Age: 81
End: 2021-06-11
Payer: MEDICARE

## 2021-06-11 VITALS
DIASTOLIC BLOOD PRESSURE: 70 MMHG | HEART RATE: 70 BPM | TEMPERATURE: 97 F | WEIGHT: 187 LBS | RESPIRATION RATE: 18 BRPM | BODY MASS INDEX: 26.83 KG/M2 | OXYGEN SATURATION: 96 % | SYSTOLIC BLOOD PRESSURE: 114 MMHG

## 2021-06-11 PROCEDURE — 99024 POSTOP FOLLOW-UP VISIT: CPT

## 2021-06-11 NOTE — HISTORY OF PRESENT ILLNESS
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 in [facility]: then [facility] and AICD implanted) Aortic valve regurgitation (s/p AVR ), Mitral valve regurgitation, Ascending aortic dilation. CTA of chest in 21 reveals aortic root aneurysm. Pt reports cardiology has been following this for several years. Pt denies, c/p SOB, or palpitations at this time. Pt evaluated by Dr. Fenton for scheduled Redo Sternotomy, Bentall on 21\par On 21, pt underwent Redo Bentall/MV repair EF 45%\par post-op \par 21 -Dobutrex gtt - NSVT - shocked by his ICD. -  d/c'd - amio load initiated\par SURESH - creatinine 2.2 from 1.2 - Royal to remain in place for strict I/O's - will monitor BMP daily\par diuretics d/c'd\par  echo this am - tr. to SDU POD #3\par d/c plan anticipate home mid week\par  Increase ambulation.\par Has been off , d/c Royal.\par Creat stable - SURESH/CKD\par Eps called for pacer interrogation, v pacing with inappropriate spikes\par  Pacer interrogated yesterday, dddr 80, may decrease prior to d/c or as outpatient\par  Increase ambulation.\par Renal following, consider diuresis\par  Torsemide 10 yesterday. D/c planning for today, Lasix 20mg, amio 200.\par \par Pt. discharged home on 21. Labs & studies reviewed. \par \par Pt. is seen & examined in his home today in routine f/u. Pt. has all medications. He reports no pain. He reports feeling tired & taking frequent naps. He reports mild LUA that is relived with rest. His weight is up ~ 8 lbs since pre-op. (As per EMR, wt on discharge 188.1 lbs). No other c/o at time of exam.

## 2021-06-11 NOTE — REVIEW OF SYSTEMS
[Feeling Tired] : feeling tired [Recent Weight Gain (___ Lbs)] : recent [unfilled] ~Ulb weight gain [Lower Ext Edema] : lower extremity edema [As Noted in HPI] : as noted in HPI [Chills] : no chills [Fever] : no fever [Feeling Poorly] : not feeling poorly [Shortness Of Breath] : no shortness of breath [Wheezing] : no wheezing [Cough] : no cough [SOB on Exertion] : shortness of breath during exertion [Orthopnea] : no orthopnea [PND] : no PND [Limb Pain] : no limb pain [Sleep Disturbances] : no sleep disturbances [Anxiety] : no anxiety [Depression] : no depression [Negative] : Heme/Lymph [FreeTextEntry7] : Last BM today.

## 2021-06-11 NOTE — PHYSICAL EXAM
[Sclera] : the sclera and conjunctiva were normal [PERRL With Normal Accommodation] : pupils were equal in size, round, and reactive to light [Extraocular Movements] : extraocular movements were intact [Neck Appearance] : the appearance of the neck was normal [] : the neck was supple [Jugular Venous Distention Increased] : there was no jugular-venous distention [Exaggerated Use Of Accessory Muscles For Inspiration] : no accessory muscle use [Auscultation Breath Sounds / Voice Sounds] : lungs were clear to auscultation bilaterally [Decreased Breath Sounds] : decreased breath sounds [Heart Rate And Rhythm] : heart rate was normal and rhythm regular [Heart Sounds] : normal S1 and S2 [Examination Of The Chest] : the chest was normal in appearance [Chest Visual Inspection Thoracic Asymmetry] : no chest asymmetry [Diminished Respiratory Excursion] : normal chest expansion [2+] : left 2+ [Fingers] :  capillary refill of the fingers was normal [Toes] :  capillary refill of the toes was normal [Bowel Sounds] : normal bowel sounds [Abdomen Soft] : soft [Abdomen Tenderness] : non-tender [Abnormal Walk] : normal gait [Nail Clubbing] : no clubbing  or cyanosis of the fingernails [Motor Tone] : muscle strength and tone were normal [Skin Turgor] : normal skin turgor [FreeTextEntry1] : Slightly pale. Resolving abrasion to left scapula.  [No Focal Deficits] : no focal deficits [Oriented To Time, Place, And Person] : oriented to person, place, and time [Impaired Insight] : insight and judgment were intact [Affect] : the affect was normal [Mood] : the mood was normal

## 2021-06-11 NOTE — ASSESSMENT
[FreeTextEntry1] : WDWN, elderly male doing well s/p re-do Bentall/MVR.\par \par \par Problems:\par 1.HTN/HLD/CAD/s/p MI/ICM/PCI/VF arrest/s/p ICD/PPM insertion/s/p AVR/aortic root aneurysm: s/p re-do Bentall/MVR.\par Post-op bilateral effusions\par c/w daily weights, temps and showers.\par Continue with current meds: Atorvastatin, Amiodarone, ASA, Metoprolol, Lasix & Xarelto (renal dose).\par Low dose Lasix given SURESH on CKD\par f/u BMP on office visit.\par May need increase in Lasix if weight doesn't trend down/edema persists\par c/w Tylenol/Oxycodone PRN pain.\par c/w Miralax PRN constipation.\par c/w Colace 100 mg po qhs PRN\par c/w Pantoprazole for GI ppx.\par Keep legs elevated.\par Ambulate as tolerated.\par Incentive spirometry, coughing & deep breathing.\par Homecare- [facility].\par Diet- low salt, low fat: education provided.\par f/u appts - CTS, Cardiology & PCP.\par f/u with EPS for ICD/PPM check\par [facility] team will continue to f/u with pt status. \par Pt agrees to call with any questions, issues or concerns.\par \par 2. Hypothyroidism\par c/w Synthroid as prescribed\par f/u with PCP\par \par 3. SURESH on CKD\par Avoid nephrotoxics\par f/u BMP as above\par f/u with PCP\par \par 4. BPH, symptoms controlled\par c/w Flomax as prescribed\par \par

## 2021-06-15 RX ORDER — FUROSEMIDE 20 MG/1
20 TABLET ORAL DAILY
Qty: 7 | Refills: 0 | Status: DISCONTINUED | COMMUNITY
Start: 2021-06-10 | End: 2021-06-15

## 2021-06-15 RX ORDER — PANTOPRAZOLE 40 MG/1
40 TABLET, DELAYED RELEASE ORAL DAILY
Refills: 0 | Status: DISCONTINUED | COMMUNITY
Start: 2021-06-10 | End: 2021-06-15

## 2021-06-17 ENCOUNTER — APPOINTMENT (OUTPATIENT)
Dept: CARDIOTHORACIC SURGERY | Facility: CLINIC | Age: 81
End: 2021-06-17
Payer: MEDICARE

## 2021-06-17 VITALS
HEIGHT: 70 IN | RESPIRATION RATE: 14 BRPM | BODY MASS INDEX: 25.97 KG/M2 | WEIGHT: 181.4 LBS | TEMPERATURE: 98.3 F | OXYGEN SATURATION: 98 % | DIASTOLIC BLOOD PRESSURE: 72 MMHG | HEART RATE: 90 BPM | SYSTOLIC BLOOD PRESSURE: 117 MMHG

## 2021-06-17 PROCEDURE — 99024 POSTOP FOLLOW-UP VISIT: CPT

## 2021-06-17 RX ORDER — METOPROLOL TARTRATE 50 MG/1
50 TABLET, FILM COATED ORAL
Refills: 0 | Status: COMPLETED | COMMUNITY
Start: 2021-06-10 | End: 2021-06-17

## 2021-06-18 LAB
ALBUMIN SERPL ELPH-MCNC: 3.8 G/DL
ALP BLD-CCNC: 112 U/L
ALT SERPL-CCNC: 24 U/L
ANION GAP SERPL CALC-SCNC: 11 MMOL/L
AST SERPL-CCNC: 27 U/L
BILIRUB SERPL-MCNC: 0.6 MG/DL
BUN SERPL-MCNC: 31 MG/DL
CALCIUM SERPL-MCNC: 9.4 MG/DL
CHLORIDE SERPL-SCNC: 102 MMOL/L
CO2 SERPL-SCNC: 23 MMOL/L
CREAT SERPL-MCNC: 1.74 MG/DL
GLUCOSE SERPL-MCNC: 124 MG/DL
POTASSIUM SERPL-SCNC: 4.5 MMOL/L
PROT SERPL-MCNC: 7.1 G/DL
SODIUM SERPL-SCNC: 136 MMOL/L

## 2021-06-19 RX ORDER — AMIODARONE HYDROCHLORIDE 200 MG/1
200 TABLET ORAL DAILY
Refills: 0 | Status: DISCONTINUED | COMMUNITY
Start: 2021-06-10 | End: 2021-06-19

## 2021-06-22 ENCOUNTER — OUTPATIENT (OUTPATIENT)
Dept: OUTPATIENT SERVICES | Facility: HOSPITAL | Age: 81
LOS: 1 days | End: 2021-06-22
Payer: MEDICARE

## 2021-06-22 ENCOUNTER — APPOINTMENT (OUTPATIENT)
Dept: CARDIOTHORACIC SURGERY | Facility: CLINIC | Age: 81
End: 2021-06-22
Payer: MEDICARE

## 2021-06-22 ENCOUNTER — NON-APPOINTMENT (OUTPATIENT)
Age: 81
End: 2021-06-22

## 2021-06-22 VITALS
TEMPERATURE: 97.9 F | HEIGHT: 70 IN | WEIGHT: 174 LBS | BODY MASS INDEX: 24.91 KG/M2 | DIASTOLIC BLOOD PRESSURE: 77 MMHG | SYSTOLIC BLOOD PRESSURE: 117 MMHG | HEART RATE: 76 BPM

## 2021-06-22 VITALS — SYSTOLIC BLOOD PRESSURE: 107 MMHG | DIASTOLIC BLOOD PRESSURE: 70 MMHG

## 2021-06-22 DIAGNOSIS — Z98.890 OTHER SPECIFIED POSTPROCEDURAL STATES: Chronic | ICD-10-CM

## 2021-06-22 DIAGNOSIS — Z90.79 ACQUIRED ABSENCE OF OTHER GENITAL ORGAN(S): Chronic | ICD-10-CM

## 2021-06-22 DIAGNOSIS — Z95.2 PRESENCE OF PROSTHETIC HEART VALVE: Chronic | ICD-10-CM

## 2021-06-22 DIAGNOSIS — Z95.810 PRESENCE OF AUTOMATIC (IMPLANTABLE) CARDIAC DEFIBRILLATOR: Chronic | ICD-10-CM

## 2021-06-22 DIAGNOSIS — Z95.0 PRESENCE OF CARDIAC PACEMAKER: Chronic | ICD-10-CM

## 2021-06-22 DIAGNOSIS — Z98.890 OTHER SPECIFIED POSTPROCEDURAL STATES: ICD-10-CM

## 2021-06-22 PROCEDURE — 71046 X-RAY EXAM CHEST 2 VIEWS: CPT | Mod: 26,77

## 2021-06-22 PROCEDURE — 71046 X-RAY EXAM CHEST 2 VIEWS: CPT | Mod: 26

## 2021-06-22 PROCEDURE — 71046 X-RAY EXAM CHEST 2 VIEWS: CPT

## 2021-06-22 PROCEDURE — 99024 POSTOP FOLLOW-UP VISIT: CPT

## 2021-06-22 RX ORDER — PANTOPRAZOLE 40 MG/1
40 TABLET, DELAYED RELEASE ORAL
Qty: 30 | Refills: 0 | Status: COMPLETED | COMMUNITY
Start: 2021-06-15 | End: 2021-06-22

## 2021-06-22 RX ORDER — OXYCODONE 5 MG/1
5 TABLET ORAL
Refills: 0 | Status: COMPLETED | COMMUNITY
Start: 2021-06-10 | End: 2021-06-22

## 2021-06-22 RX ORDER — FUROSEMIDE 40 MG/1
40 TABLET ORAL DAILY
Qty: 30 | Refills: 0 | Status: COMPLETED | COMMUNITY
Start: 2021-06-17 | End: 2021-06-22

## 2021-06-26 VITALS — BODY MASS INDEX: 22.9 KG/M2 | WEIGHT: 160 LBS | HEIGHT: 70 IN

## 2021-06-29 ENCOUNTER — APPOINTMENT (OUTPATIENT)
Dept: CARDIOTHORACIC SURGERY | Facility: CLINIC | Age: 81
End: 2021-06-29
Payer: MEDICARE

## 2021-06-29 ENCOUNTER — OUTPATIENT (OUTPATIENT)
Dept: OUTPATIENT SERVICES | Facility: HOSPITAL | Age: 81
LOS: 1 days | End: 2021-06-29

## 2021-06-29 ENCOUNTER — INPATIENT (INPATIENT)
Facility: HOSPITAL | Age: 81
LOS: 0 days | Discharge: HOME | DRG: 187 | End: 2021-06-30
Attending: THORACIC SURGERY (CARDIOTHORACIC VASCULAR SURGERY) | Admitting: THORACIC SURGERY (CARDIOTHORACIC VASCULAR SURGERY)
Payer: MEDICARE

## 2021-06-29 VITALS
DIASTOLIC BLOOD PRESSURE: 64 MMHG | SYSTOLIC BLOOD PRESSURE: 104 MMHG | HEART RATE: 92 BPM | RESPIRATION RATE: 18 BRPM | OXYGEN SATURATION: 99 % | TEMPERATURE: 98 F

## 2021-06-29 DIAGNOSIS — Z98.890 OTHER SPECIFIED POSTPROCEDURAL STATES: Chronic | ICD-10-CM

## 2021-06-29 DIAGNOSIS — Z95.0 PRESENCE OF CARDIAC PACEMAKER: Chronic | ICD-10-CM

## 2021-06-29 DIAGNOSIS — Z90.79 ACQUIRED ABSENCE OF OTHER GENITAL ORGAN(S): Chronic | ICD-10-CM

## 2021-06-29 DIAGNOSIS — Z98.890 OTHER SPECIFIED POSTPROCEDURAL STATES: ICD-10-CM

## 2021-06-29 DIAGNOSIS — J90 PLEURAL EFFUSION, NOT ELSEWHERE CLASSIFIED: ICD-10-CM

## 2021-06-29 DIAGNOSIS — Z95.810 PRESENCE OF AUTOMATIC (IMPLANTABLE) CARDIAC DEFIBRILLATOR: Chronic | ICD-10-CM

## 2021-06-29 DIAGNOSIS — Z95.2 PRESENCE OF PROSTHETIC HEART VALVE: Chronic | ICD-10-CM

## 2021-06-29 DIAGNOSIS — R06.02 SHORTNESS OF BREATH: ICD-10-CM

## 2021-06-29 LAB
ALBUMIN SERPL ELPH-MCNC: 3.9 G/DL — SIGNIFICANT CHANGE UP (ref 3.3–5)
ALP SERPL-CCNC: 120 U/L — SIGNIFICANT CHANGE UP (ref 40–120)
ALT FLD-CCNC: 52 U/L — HIGH (ref 10–45)
ANION GAP SERPL CALC-SCNC: 15 MMOL/L — SIGNIFICANT CHANGE UP (ref 5–17)
ANION GAP SERPL CALC-SCNC: 16 MMOL/L — SIGNIFICANT CHANGE UP (ref 5–17)
APTT BLD: 24.6 SEC — LOW (ref 27.5–35.5)
AST SERPL-CCNC: 32 U/L — SIGNIFICANT CHANGE UP (ref 10–40)
BASOPHILS # BLD AUTO: 0.02 K/UL — SIGNIFICANT CHANGE UP (ref 0–0.2)
BASOPHILS NFR BLD AUTO: 0.1 % — SIGNIFICANT CHANGE UP (ref 0–2)
BILIRUB SERPL-MCNC: 0.5 MG/DL — SIGNIFICANT CHANGE UP (ref 0.2–1.2)
BUN SERPL-MCNC: 68 MG/DL — HIGH (ref 7–23)
BUN SERPL-MCNC: 72 MG/DL — HIGH (ref 7–23)
CALCIUM SERPL-MCNC: 9.2 MG/DL — SIGNIFICANT CHANGE UP (ref 8.4–10.5)
CALCIUM SERPL-MCNC: 9.8 MG/DL — SIGNIFICANT CHANGE UP (ref 8.4–10.5)
CHLORIDE SERPL-SCNC: 96 MMOL/L — SIGNIFICANT CHANGE UP (ref 96–108)
CHLORIDE SERPL-SCNC: 97 MMOL/L — SIGNIFICANT CHANGE UP (ref 96–108)
CO2 SERPL-SCNC: 20 MMOL/L — LOW (ref 22–31)
CO2 SERPL-SCNC: 22 MMOL/L — SIGNIFICANT CHANGE UP (ref 22–31)
CREAT SERPL-MCNC: 2.05 MG/DL — HIGH (ref 0.5–1.3)
CREAT SERPL-MCNC: 2.12 MG/DL — HIGH (ref 0.5–1.3)
EOSINOPHIL # BLD AUTO: 0.01 K/UL — SIGNIFICANT CHANGE UP (ref 0–0.5)
EOSINOPHIL NFR BLD AUTO: 0.1 % — SIGNIFICANT CHANGE UP (ref 0–6)
GLUCOSE SERPL-MCNC: 142 MG/DL — HIGH (ref 70–99)
GLUCOSE SERPL-MCNC: 151 MG/DL — HIGH (ref 70–99)
HCT VFR BLD CALC: 33 % — LOW (ref 39–50)
HGB BLD-MCNC: 10.7 G/DL — LOW (ref 13–17)
IMM GRANULOCYTES NFR BLD AUTO: 1.4 % — SIGNIFICANT CHANGE UP (ref 0–1.5)
INR BLD: 1.12 RATIO — SIGNIFICANT CHANGE UP (ref 0.88–1.16)
LYMPHOCYTES # BLD AUTO: 0.67 K/UL — LOW (ref 1–3.3)
LYMPHOCYTES # BLD AUTO: 4.7 % — LOW (ref 13–44)
MCHC RBC-ENTMCNC: 27.6 PG — SIGNIFICANT CHANGE UP (ref 27–34)
MCHC RBC-ENTMCNC: 32.4 GM/DL — SIGNIFICANT CHANGE UP (ref 32–36)
MCV RBC AUTO: 85.1 FL — SIGNIFICANT CHANGE UP (ref 80–100)
MONOCYTES # BLD AUTO: 0.3 K/UL — SIGNIFICANT CHANGE UP (ref 0–0.9)
MONOCYTES NFR BLD AUTO: 2.1 % — SIGNIFICANT CHANGE UP (ref 2–14)
NEUTROPHILS # BLD AUTO: 12.94 K/UL — HIGH (ref 1.8–7.4)
NEUTROPHILS NFR BLD AUTO: 91.6 % — HIGH (ref 43–77)
NRBC # BLD: 0 /100 WBCS — SIGNIFICANT CHANGE UP (ref 0–0)
PLATELET # BLD AUTO: 393 K/UL — SIGNIFICANT CHANGE UP (ref 150–400)
POTASSIUM SERPL-MCNC: 4.8 MMOL/L — SIGNIFICANT CHANGE UP (ref 3.5–5.3)
POTASSIUM SERPL-MCNC: 5.5 MMOL/L — HIGH (ref 3.5–5.3)
POTASSIUM SERPL-SCNC: 4.8 MMOL/L — SIGNIFICANT CHANGE UP (ref 3.5–5.3)
POTASSIUM SERPL-SCNC: 5.5 MMOL/L — HIGH (ref 3.5–5.3)
PROT SERPL-MCNC: 7.2 G/DL — SIGNIFICANT CHANGE UP (ref 6–8.3)
PROTHROM AB SERPL-ACNC: 13.4 SEC — SIGNIFICANT CHANGE UP (ref 10.6–13.6)
RBC # BLD: 3.88 M/UL — LOW (ref 4.2–5.8)
RBC # FLD: 14.7 % — HIGH (ref 10.3–14.5)
SARS-COV-2 N GENE NPH QL NAA+PROBE: NOT DETECTED
SODIUM SERPL-SCNC: 133 MMOL/L — LOW (ref 135–145)
SODIUM SERPL-SCNC: 133 MMOL/L — LOW (ref 135–145)
TSH SERPL-MCNC: 2.6 UIU/ML — SIGNIFICANT CHANGE UP (ref 0.27–4.2)
WBC # BLD: 14.14 K/UL — HIGH (ref 3.8–10.5)
WBC # FLD AUTO: 14.14 K/UL — HIGH (ref 3.8–10.5)

## 2021-06-29 PROCEDURE — 71045 X-RAY EXAM CHEST 1 VIEW: CPT | Mod: 26,76

## 2021-06-29 PROCEDURE — 93010 ELECTROCARDIOGRAM REPORT: CPT

## 2021-06-29 PROCEDURE — 32556 INSERT CATH PLEURA W/O IMAGE: CPT | Mod: RT,78

## 2021-06-29 PROCEDURE — 32556 INSERT CATH PLEURA W/O IMAGE: CPT | Mod: LT,78

## 2021-06-29 PROCEDURE — 99024 POSTOP FOLLOW-UP VISIT: CPT | Mod: PD

## 2021-06-29 RX ORDER — METOPROLOL TARTRATE 50 MG
50 TABLET ORAL
Refills: 0 | Status: DISCONTINUED | OUTPATIENT
Start: 2021-06-29 | End: 2021-06-30

## 2021-06-29 RX ORDER — PANTOPRAZOLE SODIUM 20 MG/1
40 TABLET, DELAYED RELEASE ORAL
Refills: 0 | Status: DISCONTINUED | OUTPATIENT
Start: 2021-06-29 | End: 2021-06-30

## 2021-06-29 RX ORDER — ALPRAZOLAM 0.25 MG
0.5 TABLET ORAL AT BEDTIME
Refills: 0 | Status: DISCONTINUED | OUTPATIENT
Start: 2021-06-29 | End: 2021-06-30

## 2021-06-29 RX ORDER — SPIRONOLACTONE 25 MG/1
50 TABLET, FILM COATED ORAL DAILY
Refills: 0 | Status: DISCONTINUED | OUTPATIENT
Start: 2021-06-29 | End: 2021-06-29

## 2021-06-29 RX ORDER — ACETAMINOPHEN 500 MG
650 TABLET ORAL ONCE
Refills: 0 | Status: COMPLETED | OUTPATIENT
Start: 2021-06-29 | End: 2021-06-29

## 2021-06-29 RX ORDER — TAMSULOSIN HYDROCHLORIDE 0.4 MG/1
0.4 CAPSULE ORAL AT BEDTIME
Refills: 0 | Status: DISCONTINUED | OUTPATIENT
Start: 2021-06-29 | End: 2021-06-30

## 2021-06-29 RX ORDER — ASPIRIN/CALCIUM CARB/MAGNESIUM 324 MG
81 TABLET ORAL DAILY
Refills: 0 | Status: DISCONTINUED | OUTPATIENT
Start: 2021-06-29 | End: 2021-06-30

## 2021-06-29 RX ORDER — LEVOTHYROXINE SODIUM 125 MCG
50 TABLET ORAL DAILY
Refills: 0 | Status: DISCONTINUED | OUTPATIENT
Start: 2021-06-29 | End: 2021-06-30

## 2021-06-29 RX ORDER — ATORVASTATIN CALCIUM 80 MG/1
40 TABLET, FILM COATED ORAL AT BEDTIME
Refills: 0 | Status: DISCONTINUED | OUTPATIENT
Start: 2021-06-29 | End: 2021-06-30

## 2021-06-29 RX ORDER — POLYETHYLENE GLYCOL 3350 17 G/17G
17 POWDER, FOR SOLUTION ORAL DAILY
Refills: 0 | Status: DISCONTINUED | OUTPATIENT
Start: 2021-06-29 | End: 2021-06-30

## 2021-06-29 RX ADMIN — Medication 50 MILLIGRAM(S): at 17:28

## 2021-06-29 RX ADMIN — ATORVASTATIN CALCIUM 40 MILLIGRAM(S): 80 TABLET, FILM COATED ORAL at 21:00

## 2021-06-29 RX ADMIN — TAMSULOSIN HYDROCHLORIDE 0.4 MILLIGRAM(S): 0.4 CAPSULE ORAL at 21:00

## 2021-06-29 RX ADMIN — Medication 650 MILLIGRAM(S): at 21:00

## 2021-06-29 RX ADMIN — Medication 650 MILLIGRAM(S): at 21:30

## 2021-06-29 RX ADMIN — POLYETHYLENE GLYCOL 3350 17 GRAM(S): 17 POWDER, FOR SOLUTION ORAL at 17:28

## 2021-06-29 RX ADMIN — Medication 0.5 MILLIGRAM(S): at 21:00

## 2021-06-29 NOTE — PROCEDURE NOTE - NSPROCDETAILS_GEN_ALL_CORE
Jung technique/dressing applied/secured in place/sterile dressing applied
dressing applied/secured in place/sterile dressing applied/thoracostomy tube placed percutaneously/ultrasound assessment of fluid (location)

## 2021-06-29 NOTE — H&P ADULT - HISTORY OF PRESENT ILLNESS
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 in \[facility]": then SB and AICD implanted) Aortic valve regurgitation (s/p AVR ), Mitral valve regurgitation, Ascending aortic dilation. CTA of chest in 21 reveals aortic root aneurysm. Pt reports cardiology has been following this for several years.    On 21, pt underwent Redo Bentall/MV repair ef 45%  post-op   21 -Dobutrex gtt - NSVT - shocked by his ICD. -  d/c'd - amio load initiated  SURESH - creatinine 2.2 from 1.2 - herbert to remain in place for strict I/O's - will monitor bmp daily  diuretics d/c'd   echo this am - tr. to SDU POD #3  d/c plan anticipate home mid week  / Increase ambulation.  Has been off , d/c  herbert.  Creat stable - suresh/ckd  Eps called for pacer interrogation, v pacing  with inappropriate spikes   Pacer interrogated yesterday, dddr 80, may decrease prior to d/c or as outpatient   Increase ambulation.  Renal following, consider diuresis   Torsemide 10 yesterday.  D/c planning  for today, lasix 20mg, amio 200.  He developed a pleurel effusion in  his Left pleural space and was drained in  our office.  He was placed on  prednisone and d/c home.  He presents today with bilateral pleural effusions and is being admitted for drainage.

## 2021-06-29 NOTE — H&P ADULT - NSHPPHYSICALEXAM_GEN_ALL_CORE
General: NAD  HEENT:  NC/AT  Respiratory: clear to auscultation bilaterally, no wheeze, no rhonchi,, diminished bases  Cardiovascular: , S1 and S2 audible,   GI: bowel sounds present x4, abdomen soft, non tender, non distended  Peripheral Vascular:  1+ edema, 2+ peripheral pulses, no clubbing, cyanosis , +varicosities/PVD noted  Musculoskeletal: 5/5 strength bilateral upper and lower extremities  Psychiatric: Normal mood, normal affect observed  Neuro: alert and oriented x 4, gait steady, speech clear, no focal deficits noted

## 2021-06-29 NOTE — H&P ADULT - NSICDXPASTSURGICALHX_GEN_ALL_CORE_FT
PAST SURGICAL HISTORY:  AICD (automatic cardioverter/defibrillator) present 12/19/17    Cardiac pacemaker 12/19/17    History of cardioversion 9/11/20    History of tonsillectomy and adenoidectomy     S/P ablation of atrial fibrillation 10/29/20    S/P aortic valve replacement 3/13/2004    S/P cardiac cath 1994, 1995, 1999, 2002, 2004    S/P colonoscopy 2001, 2002, 2006, 2011, 2016    S/P endoscopy 2006    S/P hernia repair 2002    S/P TURP 1996    Status post ablation of ventricular arrhythmia 2/14/19

## 2021-06-29 NOTE — H&P ADULT - NSHPREVIEWOFSYSTEMS_GEN_ALL_CORE
REVIEW OF SYSTEMS:  CONSTITUTIONAL: No fever, weight loss, or fatigue  EYES: No eye pain, visual disturbances, or discharge  ENMT:  No difficulty hearing, tinnitus, vertigo; No sinus or throat pain  NECK: No pain or stiffness  RESPIRATORY: Has non productive cough,No wheezing, chills or hemoptysis; shortness of breath with exertion  CARDIOVASCULAR: No chest pain,No palpitations, dizziness, or leg swelling  GASTROINTESTINAL: No abdominal or epigastric pain. No nausea, vomiting, or hematemesis; No diarrhea ,has No melena  GENITOURINARY: No dysuria, frequency, hematuria, or incontinence  NEUROLOGICAL: No headaches, memory loss, loss of strength, numbness, or tremors  SKIN: No itching, burning, rashes, or lesions   LYMPH NODES: No enlarged glands  ENDOCRINE: No heat or cold intolerance; No hair loss  MUSCULOSKELETAL: No joint pain or swelling; No muscle, back, or extremity pain  PSYCHIATRIC: No depression, anxiety, mood swings, or difficulty sleeping  HEME/LYMPH: No easy bruising, or bleeding gums  ALLERGY: No hives or eczema

## 2021-06-29 NOTE — H&P ADULT - NSHPSOCIALHISTORY_GEN_ALL_CORE
Social History:  · Marital Status	  · Lives With	spouse     Substance Use History:  · Substance Use	never used  caffeine  · Caffeine Type	coffee  · Caffeine Amount/Frequency	1-2 cups/cans per day

## 2021-06-30 ENCOUNTER — TRANSCRIPTION ENCOUNTER (OUTPATIENT)
Age: 81
End: 2021-06-30

## 2021-06-30 VITALS
SYSTOLIC BLOOD PRESSURE: 91 MMHG | TEMPERATURE: 98 F | HEART RATE: 69 BPM | RESPIRATION RATE: 18 BRPM | OXYGEN SATURATION: 97 % | DIASTOLIC BLOOD PRESSURE: 55 MMHG

## 2021-06-30 LAB
ANION GAP SERPL CALC-SCNC: 12 MMOL/L — SIGNIFICANT CHANGE UP (ref 5–17)
BUN SERPL-MCNC: 63 MG/DL — HIGH (ref 7–23)
CALCIUM SERPL-MCNC: 9.8 MG/DL — SIGNIFICANT CHANGE UP (ref 8.4–10.5)
CHLORIDE SERPL-SCNC: 98 MMOL/L — SIGNIFICANT CHANGE UP (ref 96–108)
CO2 SERPL-SCNC: 24 MMOL/L — SIGNIFICANT CHANGE UP (ref 22–31)
COVID-19 SPIKE DOMAIN AB INTERP: POSITIVE
COVID-19 SPIKE DOMAIN ANTIBODY RESULT: >250 U/ML — HIGH
CREAT SERPL-MCNC: 1.96 MG/DL — HIGH (ref 0.5–1.3)
GLUCOSE SERPL-MCNC: 98 MG/DL — SIGNIFICANT CHANGE UP (ref 70–99)
HCT VFR BLD CALC: 33 % — LOW (ref 39–50)
HGB BLD-MCNC: 10.5 G/DL — LOW (ref 13–17)
MCHC RBC-ENTMCNC: 27.2 PG — SIGNIFICANT CHANGE UP (ref 27–34)
MCHC RBC-ENTMCNC: 31.8 GM/DL — LOW (ref 32–36)
MCV RBC AUTO: 85.5 FL — SIGNIFICANT CHANGE UP (ref 80–100)
NRBC # BLD: 0 /100 WBCS — SIGNIFICANT CHANGE UP (ref 0–0)
PLATELET # BLD AUTO: 385 K/UL — SIGNIFICANT CHANGE UP (ref 150–400)
POTASSIUM SERPL-MCNC: 4.8 MMOL/L — SIGNIFICANT CHANGE UP (ref 3.5–5.3)
POTASSIUM SERPL-SCNC: 4.8 MMOL/L — SIGNIFICANT CHANGE UP (ref 3.5–5.3)
RBC # BLD: 3.86 M/UL — LOW (ref 4.2–5.8)
RBC # FLD: 14.9 % — HIGH (ref 10.3–14.5)
SARS-COV-2 IGG+IGM SERPL QL IA: >250 U/ML — HIGH
SARS-COV-2 IGG+IGM SERPL QL IA: POSITIVE
SODIUM SERPL-SCNC: 134 MMOL/L — LOW (ref 135–145)
WBC # BLD: 12.81 K/UL — HIGH (ref 3.8–10.5)
WBC # FLD AUTO: 12.81 K/UL — HIGH (ref 3.8–10.5)

## 2021-06-30 PROCEDURE — 85730 THROMBOPLASTIN TIME PARTIAL: CPT

## 2021-06-30 PROCEDURE — 93005 ELECTROCARDIOGRAM TRACING: CPT

## 2021-06-30 PROCEDURE — 71045 X-RAY EXAM CHEST 1 VIEW: CPT

## 2021-06-30 PROCEDURE — 71045 X-RAY EXAM CHEST 1 VIEW: CPT | Mod: 26

## 2021-06-30 PROCEDURE — 84443 ASSAY THYROID STIM HORMONE: CPT

## 2021-06-30 PROCEDURE — 85025 COMPLETE CBC W/AUTO DIFF WBC: CPT

## 2021-06-30 PROCEDURE — 80053 COMPREHEN METABOLIC PANEL: CPT

## 2021-06-30 PROCEDURE — 85027 COMPLETE CBC AUTOMATED: CPT

## 2021-06-30 PROCEDURE — 85610 PROTHROMBIN TIME: CPT

## 2021-06-30 PROCEDURE — 86769 SARS-COV-2 COVID-19 ANTIBODY: CPT

## 2021-06-30 PROCEDURE — 80048 BASIC METABOLIC PNL TOTAL CA: CPT

## 2021-06-30 RX ORDER — AMIODARONE HYDROCHLORIDE 400 MG/1
1 TABLET ORAL
Qty: 30 | Refills: 0
Start: 2021-06-30 | End: 2021-07-29

## 2021-06-30 RX ORDER — SPIRONOLACTONE 25 MG/1
1 TABLET, FILM COATED ORAL
Qty: 0 | Refills: 0 | DISCHARGE

## 2021-06-30 RX ORDER — METOPROLOL SUCCINATE 50 MG/1
50 TABLET, EXTENDED RELEASE ORAL
Refills: 0 | Status: COMPLETED | COMMUNITY
End: 2021-06-30

## 2021-06-30 RX ADMIN — Medication 50 MILLIGRAM(S): at 05:18

## 2021-06-30 RX ADMIN — Medication 50 MICROGRAM(S): at 05:18

## 2021-06-30 RX ADMIN — Medication 40 MILLIGRAM(S): at 05:18

## 2021-06-30 RX ADMIN — PANTOPRAZOLE SODIUM 40 MILLIGRAM(S): 20 TABLET, DELAYED RELEASE ORAL at 05:18

## 2021-06-30 NOTE — DISCHARGE NOTE PROVIDER - NSDCCPCAREPLAN_GEN_ALL_CORE_FT
PRINCIPAL DISCHARGE DIAGNOSIS  Diagnosis: Recurrent pleural effusion  Assessment and Plan of Treatment: bilateral Pigtail catheter drainage.  Prednisone

## 2021-06-30 NOTE — DISCHARGE NOTE NURSING/CASE MANAGEMENT/SOCIAL WORK - PATIENT PORTAL LINK FT
You can access the FollowMyHealth Patient Portal offered by [facility] by registering at the following website: [email or web address]. By joining [facility]’s FollowMyHealth portal, you will also be able to view your health information using other applications (apps) compatible with our system.

## 2021-06-30 NOTE — DISCHARGE NOTE PROVIDER - NSDCFUADDAPPT_GEN_ALL_CORE_FT
YOU HAVE AN APPOINTMENT WITH DR KOEHLER ON 7/8 AT 2:30PM    PLEASE CALL YOUR CARDIOLOGIST AND SEE HIM IN 2 WEEKS

## 2021-06-30 NOTE — DISCHARGE NOTE PROVIDER - CARE PROVIDER_API CALL
Noe Fenton (MD)  Surgery; Surgical Critical Care; Thoracic and Cardiac Surgery  [address] [place] [address]  Phone: [phone]  Fax: [phone]  Scheduled Appointment: 07/08/2021 02:30 PM

## 2021-06-30 NOTE — DISCHARGE NOTE PROVIDER - NSDCPNSUBOBJ_GEN_ALL_CORE
VITAL SIGNS    Telemetry:  v paced af ib    Vital Signs Last 24 Hrs  T(C): 36.6 (21 @ 06:54), Max: 36.6 (21 @ 06:54)  T(F): 97.8 (21 @ 06:54), Max: 97.8 (21 @ 06:54)  HR: 69 (21 @ 06:54) (69 - 92)  BP: 91/55 (21 @ 06:54) (91/55 - 118/67)  RR: 18 (21 @ 06:54) (18 - 18)  SpO2: 97% (21 @ 06:54) (96% - 100%)                    @ 07:01  -   @ 07:00  --------------------------------------------------------  IN: 400 mL / OUT: 2695 mL / NET: -2295 mL          Daily     Daily Weight in k.8 (2021 05:42)            CAPILLARY BLOOD GLUCOSE               Coumadin    [ ] YES          [ x ]      NO                                   PHYSICAL EXAM        Neurology: alert and oriented x 3, nonfocal, no gross deficits  CV : s1 s2 RRR  Sternal Wound :  CDI , Stable  Lungs: cta  Abdomen: soft, nontender, nondistended, positive bowel sounds, last bowel movement  +                       :    voiding         Extremities:    -  edema   /  -   calve tenderness ,              ALPRAZolam 0.5 milliGRAM(s) Oral at bedtime PRN  aspirin enteric coated 81 milliGRAM(s) Oral daily  atorvastatin 40 milliGRAM(s) Oral at bedtime  levothyroxine 50 MICROGram(s) Oral daily  metoprolol tartrate 50 milliGRAM(s) Oral two times a day  pantoprazole    Tablet 40 milliGRAM(s) Oral before breakfast  polyethylene glycol 3350 17 Gram(s) Oral daily  predniSONE   Tablet 40 milliGRAM(s) Oral daily  tamsulosin 0.4 milliGRAM(s) Oral at bedtime                    Discussed with Cardiothoracic Team at AM rounds.

## 2021-06-30 NOTE — DISCHARGE NOTE PROVIDER - NSDCFUADDINST_GEN_ALL_CORE_FT
1. Daily Shower  2. Weight yourself daily and notify any weight gain greater than 2-3 pounds in 24 hours.  3. Regular diet - low fat, low cholesterol, no added salt.  4. Cleanse Midsternal incision and leg incision daily while showering with warm water and mild soap, pat dry and maintain open to air.   5. Follow Cardiac Surgery Do's and Don'ts discharge instructions.   6. No driving until cleared by MD.   7. No heavy lifting nothing greater than 5 pounds until cleared by MD.   8. Call / Notify MD any fever greater than 101.0  9. Increase Activity as tolerated.

## 2021-06-30 NOTE — DISCHARGE NOTE PROVIDER - HOSPITAL COURSE
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 in [facility]: then [facility] and AICD implanted) Aortic valve regurgitation (s/p AVR ), Mitral valve regurgitation, Ascending aortic dilation.     On 21, pt underwent Redo Bentall/MV repair ef 45%  post-op course   21 -Dobutrex gtt - NSVT - shocked by his ICD. -  d/c'd - amio load initiated  SURESH - creatinine 2.2 from 1.2 - herbert to remain in place for strict I/O's - will monitor bmp daily  diuretics d/c'd   echo this am - tr. to SDU POD #3  d/c plan anticipate home mid week   Increase ambulation.  Has been off , d/c  herbert.  Creat stable - suresh/ckd  Eps called for pacer interrogation, v pacing  with inappropriate spikes   Pacer interrogated yesterday, dddr 80, may decrease prior to d/c or as outpatient   Increase ambulation.  Renal following, consider diuresis   Torsemide 10 yesterday.  D/c planning  for today, lasix 20mg, amio 200.  He developed a pleural effusion in  his Left pleural space and was drained in  our office.  He was placed on  prednisone and d/c home.   He presents today with bilateral pleural effusions and is being admitted for drainage.   L PTC drainage for 1L, R for 550.  PTC's d/c , pt for d/c home as per Dr Fenton.

## 2021-06-30 NOTE — DISCHARGE NOTE PROVIDER - NSDCMRMEDTOKEN_GEN_ALL_CORE_FT
amiodarone 200 mg oral tablet: 1 tab(s) orally once a day  aspirin 81 mg oral delayed release tablet: 1 tab(s) orally once a day  atorvastatin 40 mg oral tablet: 1 tab(s) orally once a day (at bedtime)  levothyroxine 50 mcg (0.05 mg) oral tablet: 1 tab(s) orally once a day  metoprolol tartrate 50 mg oral tablet: 1 tab(s) orally 2 times a day  oxyCODONE 5 mg oral capsule: 1 cap(s) orally every 4 hours, As Needed -for moderate pain MDD:5  pantoprazole 40 mg oral delayed release tablet: 1 tab(s) orally once a day (before a meal)  polyethylene glycol 3350 oral powder for reconstitution: 17 gram(s) orally once a day  predniSONE 20 mg oral tablet: 2 tab(s) orally once a day  x 4 days then...    Starting Monday 7/4 start 1 tab daily and continue until instructed to stop  tamsulosin 0.4 mg oral capsule: 1 cap(s) orally once a day (at bedtime)

## 2021-06-30 NOTE — DISCHARGE NOTE PROVIDER - NSDCACTIVITY_GEN_ALL_CORE
Sex allowed/Showering allowed/Stairs allowed/Driving allowed/Walking - Indoors allowed/No heavy lifting/straining/Walking - Outdoors allowed

## 2021-07-01 ENCOUNTER — NON-APPOINTMENT (OUTPATIENT)
Age: 81
End: 2021-07-01

## 2021-07-02 RX ORDER — BENAZEPRIL HYDROCHLORIDE 10 MG/1
10 TABLET, FILM COATED ORAL
Qty: 90 | Refills: 0 | Status: DISCONTINUED | COMMUNITY
Start: 2021-03-12

## 2021-07-02 RX ORDER — RIVAROXABAN 10 MG/1
10 TABLET, FILM COATED ORAL DAILY
Qty: 60 | Refills: 0 | Status: DISCONTINUED | COMMUNITY
Start: 2020-12-03 | End: 2021-07-02

## 2021-07-02 RX ORDER — SPIRONOLACTONE 50 MG/1
50 TABLET ORAL
Qty: 30 | Refills: 0 | Status: DISCONTINUED | COMMUNITY
Start: 2021-06-17 | End: 2021-07-02

## 2021-07-02 RX ORDER — RIVAROXABAN 20 MG/1
20 TABLET, FILM COATED ORAL
Qty: 90 | Refills: 0 | Status: DISCONTINUED | COMMUNITY
Start: 2021-04-27

## 2021-07-02 RX ORDER — AMLODIPINE BESYLATE 2.5 MG/1
2.5 TABLET ORAL
Qty: 90 | Refills: 0 | Status: DISCONTINUED | COMMUNITY
Start: 2021-03-12

## 2021-07-02 NOTE — BRIEF OPERATIVE NOTE - ESTIMATED BLOOD LOSS
Neurology called to bedside by RN at ~1500 for episode of desaturation to SPO2 upper 80s, staring, and increased eye blinking. Concerning for seizure.    Neurology team went to bedside and assessed patient. Patient developed myoclonic shaking in RUE during assessment.    Neuro Exam:   MS: Eyes open. No verbal output, not following commands.  CN: Pupils reactive bilaterally. Eyes deviated to the left. Cross midline with OCR. Face symmetric.  Motor: High frequency myoclonic shaking in RUE. No withdrawal to noxious stimuli x4.  Reflexes: Toes upgoing bilaterally.  Coordination/Gait: Not assessed.    Plan:  [] Ativan 1MG IVP x1 STAT.  [] Increase Vimpat to 100MG IV BID.  [] Please see neurology progress note from today for the rest of treatment plan. 0

## 2021-07-09 ENCOUNTER — APPOINTMENT (OUTPATIENT)
Dept: CARDIOTHORACIC SURGERY | Facility: CLINIC | Age: 81
End: 2021-07-09
Payer: MEDICARE

## 2021-07-09 ENCOUNTER — TRANSCRIPTION ENCOUNTER (OUTPATIENT)
Age: 81
End: 2021-07-09

## 2021-07-12 ENCOUNTER — NON-APPOINTMENT (OUTPATIENT)
Age: 81
End: 2021-07-12

## 2021-07-12 ENCOUNTER — APPOINTMENT (OUTPATIENT)
Dept: CARDIOTHORACIC SURGERY | Facility: CLINIC | Age: 81
End: 2021-07-12
Payer: MEDICARE

## 2021-07-12 ENCOUNTER — OUTPATIENT (OUTPATIENT)
Dept: OUTPATIENT SERVICES | Facility: HOSPITAL | Age: 81
LOS: 1 days | End: 2021-07-12
Payer: MEDICARE

## 2021-07-12 VITALS — BODY MASS INDEX: 24.05 KG/M2 | WEIGHT: 168 LBS | HEIGHT: 70 IN

## 2021-07-12 VITALS
RESPIRATION RATE: 14 BRPM | OXYGEN SATURATION: 98 % | DIASTOLIC BLOOD PRESSURE: 65 MMHG | HEART RATE: 80 BPM | TEMPERATURE: 98.3 F | SYSTOLIC BLOOD PRESSURE: 98 MMHG

## 2021-07-12 VITALS — DIASTOLIC BLOOD PRESSURE: 68 MMHG | SYSTOLIC BLOOD PRESSURE: 101 MMHG

## 2021-07-12 DIAGNOSIS — Z95.0 PRESENCE OF CARDIAC PACEMAKER: Chronic | ICD-10-CM

## 2021-07-12 DIAGNOSIS — Z98.890 OTHER SPECIFIED POSTPROCEDURAL STATES: Chronic | ICD-10-CM

## 2021-07-12 DIAGNOSIS — Z95.810 PRESENCE OF AUTOMATIC (IMPLANTABLE) CARDIAC DEFIBRILLATOR: Chronic | ICD-10-CM

## 2021-07-12 DIAGNOSIS — Z95.2 PRESENCE OF PROSTHETIC HEART VALVE: Chronic | ICD-10-CM

## 2021-07-12 DIAGNOSIS — Z90.79 ACQUIRED ABSENCE OF OTHER GENITAL ORGAN(S): Chronic | ICD-10-CM

## 2021-07-12 DIAGNOSIS — Z00.00 ENCOUNTER FOR GENERAL ADULT MEDICAL EXAMINATION WITHOUT ABNORMAL FINDINGS: ICD-10-CM

## 2021-07-12 PROCEDURE — 99024 POSTOP FOLLOW-UP VISIT: CPT

## 2021-07-12 PROCEDURE — 71046 X-RAY EXAM CHEST 2 VIEWS: CPT | Mod: 26

## 2021-07-12 PROCEDURE — 71046 X-RAY EXAM CHEST 2 VIEWS: CPT

## 2021-07-12 RX ORDER — PREDNISONE 20 MG/1
20 TABLET ORAL
Refills: 0 | Status: COMPLETED | COMMUNITY
Start: 2021-07-02 | End: 2021-07-12

## 2021-07-12 RX ORDER — VIT A/VIT C/VIT E/ZINC/COPPER 4296-226
CAPSULE ORAL DAILY
Refills: 0 | Status: ACTIVE | COMMUNITY
Start: 2020-12-03

## 2021-07-19 ENCOUNTER — APPOINTMENT (OUTPATIENT)
Dept: CARDIOTHORACIC SURGERY | Facility: CLINIC | Age: 81
End: 2021-07-19
Payer: MEDICARE

## 2021-07-19 ENCOUNTER — OUTPATIENT (OUTPATIENT)
Dept: OUTPATIENT SERVICES | Facility: HOSPITAL | Age: 81
LOS: 1 days | End: 2021-07-19
Payer: MEDICARE

## 2021-07-19 ENCOUNTER — RESULT REVIEW (OUTPATIENT)
Age: 81
End: 2021-07-19

## 2021-07-19 VITALS
HEIGHT: 70 IN | TEMPERATURE: 97.5 F | WEIGHT: 168 LBS | BODY MASS INDEX: 24.05 KG/M2 | RESPIRATION RATE: 16 BRPM | SYSTOLIC BLOOD PRESSURE: 115 MMHG | OXYGEN SATURATION: 100 % | HEART RATE: 81 BPM | DIASTOLIC BLOOD PRESSURE: 76 MMHG

## 2021-07-19 DIAGNOSIS — Z98.890 OTHER SPECIFIED POSTPROCEDURAL STATES: ICD-10-CM

## 2021-07-19 DIAGNOSIS — Z98.890 OTHER SPECIFIED POSTPROCEDURAL STATES: Chronic | ICD-10-CM

## 2021-07-19 DIAGNOSIS — Z95.2 PRESENCE OF PROSTHETIC HEART VALVE: Chronic | ICD-10-CM

## 2021-07-19 DIAGNOSIS — Z95.810 PRESENCE OF AUTOMATIC (IMPLANTABLE) CARDIAC DEFIBRILLATOR: Chronic | ICD-10-CM

## 2021-07-19 DIAGNOSIS — Z95.0 PRESENCE OF CARDIAC PACEMAKER: Chronic | ICD-10-CM

## 2021-07-19 DIAGNOSIS — Z90.79 ACQUIRED ABSENCE OF OTHER GENITAL ORGAN(S): Chronic | ICD-10-CM

## 2021-07-19 PROCEDURE — 71046 X-RAY EXAM CHEST 2 VIEWS: CPT | Mod: 26

## 2021-07-19 PROCEDURE — 71046 X-RAY EXAM CHEST 2 VIEWS: CPT

## 2021-07-19 PROCEDURE — 99024 POSTOP FOLLOW-UP VISIT: CPT

## 2021-07-19 RX ORDER — PREDNISONE 20 MG/1
20 TABLET ORAL DAILY
Qty: 7 | Refills: 0 | Status: COMPLETED | COMMUNITY
Start: 2021-07-12 | End: 2021-07-19

## 2021-07-19 RX ORDER — METOPROLOL TARTRATE 50 MG/1
50 TABLET, FILM COATED ORAL TWICE DAILY
Qty: 60 | Refills: 0 | Status: COMPLETED | COMMUNITY
Start: 2021-06-30 | End: 2021-07-19

## 2021-07-19 RX ORDER — PREDNISONE 20 MG/1
20 TABLET ORAL
Qty: 14 | Refills: 0 | Status: COMPLETED | COMMUNITY
Start: 2021-06-22 | End: 2021-07-19

## 2021-07-19 RX ORDER — SPIRONOLACTONE 25 MG/1
25 TABLET ORAL
Qty: 14 | Refills: 0 | Status: COMPLETED | COMMUNITY
Start: 2021-07-12 | End: 2021-07-19

## 2021-07-19 RX ORDER — AMIODARONE HYDROCHLORIDE 200 MG/1
200 TABLET ORAL DAILY
Qty: 30 | Refills: 0 | Status: COMPLETED | COMMUNITY
Start: 2021-06-30 | End: 2021-07-19

## 2021-07-19 RX ORDER — TAMSULOSIN HYDROCHLORIDE 0.4 MG/1
0.4 CAPSULE ORAL DAILY
Refills: 0 | Status: COMPLETED | COMMUNITY
Start: 2021-06-10 | End: 2021-07-19

## 2021-07-20 LAB
ALBUMIN SERPL ELPH-MCNC: 4 G/DL
ALP BLD-CCNC: 86 U/L
ALT SERPL-CCNC: 21 U/L
ANION GAP SERPL CALC-SCNC: 13 MMOL/L
AST SERPL-CCNC: 20 U/L
BILIRUB SERPL-MCNC: 0.8 MG/DL
BUN SERPL-MCNC: 56 MG/DL
CALCIUM SERPL-MCNC: 9.7 MG/DL
CHLORIDE SERPL-SCNC: 101 MMOL/L
CO2 SERPL-SCNC: 24 MMOL/L
CREAT SERPL-MCNC: 1.96 MG/DL
GLUCOSE SERPL-MCNC: 129 MG/DL
POTASSIUM SERPL-SCNC: 4.9 MMOL/L
PROT SERPL-MCNC: 7.1 G/DL
SODIUM SERPL-SCNC: 138 MMOL/L

## 2021-07-23 LAB
ALBUMIN SERPL ELPH-MCNC: 3.6 G/DL
ALP BLD-CCNC: 104 U/L
ALT SERPL-CCNC: 22 U/L
ANION GAP SERPL CALC-SCNC: 10 MMOL/L
AST SERPL-CCNC: 24 U/L
BILIRUB SERPL-MCNC: 0.5 MG/DL
BUN SERPL-MCNC: 48 MG/DL
CALCIUM SERPL-MCNC: 8.6 MG/DL
CHLORIDE SERPL-SCNC: 104 MMOL/L
CO2 SERPL-SCNC: 20 MMOL/L
CREAT SERPL-MCNC: 1.72 MG/DL
GLUCOSE SERPL-MCNC: 129 MG/DL
POTASSIUM SERPL-SCNC: 5.2 MMOL/L
PROT SERPL-MCNC: 6.4 G/DL
SODIUM SERPL-SCNC: 134 MMOL/L

## 2021-07-27 RX ORDER — RIVAROXABAN 10 MG/1
10 TABLET, FILM COATED ORAL DAILY
Qty: 30 | Refills: 0 | Status: COMPLETED | COMMUNITY
Start: 2021-07-19 | End: 2021-07-27

## 2021-08-06 ENCOUNTER — LABORATORY RESULT (OUTPATIENT)
Age: 81
End: 2021-08-06

## 2021-08-06 ENCOUNTER — NON-APPOINTMENT (OUTPATIENT)
Age: 81
End: 2021-08-06

## 2021-08-06 ENCOUNTER — APPOINTMENT (OUTPATIENT)
Dept: CARDIOTHORACIC SURGERY | Facility: CLINIC | Age: 81
End: 2021-08-06
Payer: MEDICARE

## 2021-08-06 ENCOUNTER — OUTPATIENT (OUTPATIENT)
Dept: OUTPATIENT SERVICES | Facility: HOSPITAL | Age: 81
LOS: 1 days | End: 2021-08-06
Payer: MEDICARE

## 2021-08-06 VITALS
DIASTOLIC BLOOD PRESSURE: 69 MMHG | TEMPERATURE: 98 F | RESPIRATION RATE: 16 BRPM | SYSTOLIC BLOOD PRESSURE: 96 MMHG | HEART RATE: 70 BPM | OXYGEN SATURATION: 98 %

## 2021-08-06 VITALS — DIASTOLIC BLOOD PRESSURE: 66 MMHG | SYSTOLIC BLOOD PRESSURE: 102 MMHG

## 2021-08-06 VITALS — WEIGHT: 173 LBS | HEIGHT: 70 IN | BODY MASS INDEX: 24.77 KG/M2

## 2021-08-06 DIAGNOSIS — Z98.890 OTHER SPECIFIED POSTPROCEDURAL STATES: Chronic | ICD-10-CM

## 2021-08-06 DIAGNOSIS — Z98.890 OTHER SPECIFIED POSTPROCEDURAL STATES: ICD-10-CM

## 2021-08-06 DIAGNOSIS — Z95.810 PRESENCE OF AUTOMATIC (IMPLANTABLE) CARDIAC DEFIBRILLATOR: Chronic | ICD-10-CM

## 2021-08-06 DIAGNOSIS — Z90.79 ACQUIRED ABSENCE OF OTHER GENITAL ORGAN(S): Chronic | ICD-10-CM

## 2021-08-06 DIAGNOSIS — Z95.2 PRESENCE OF PROSTHETIC HEART VALVE: Chronic | ICD-10-CM

## 2021-08-06 DIAGNOSIS — Z95.0 PRESENCE OF CARDIAC PACEMAKER: Chronic | ICD-10-CM

## 2021-08-06 PROCEDURE — 93970 EXTREMITY STUDY: CPT | Mod: 26

## 2021-08-06 PROCEDURE — 71046 X-RAY EXAM CHEST 2 VIEWS: CPT

## 2021-08-06 PROCEDURE — 99024 POSTOP FOLLOW-UP VISIT: CPT

## 2021-08-06 PROCEDURE — 93970 EXTREMITY STUDY: CPT

## 2021-08-06 PROCEDURE — 71046 X-RAY EXAM CHEST 2 VIEWS: CPT | Mod: 26

## 2021-08-06 PROCEDURE — 93000 ELECTROCARDIOGRAM COMPLETE: CPT

## 2021-08-24 ENCOUNTER — APPOINTMENT (OUTPATIENT)
Dept: CARDIOTHORACIC SURGERY | Facility: CLINIC | Age: 81
End: 2021-08-24

## 2021-08-31 ENCOUNTER — APPOINTMENT (OUTPATIENT)
Dept: CARDIOTHORACIC SURGERY | Facility: CLINIC | Age: 81
End: 2021-08-31
Payer: MEDICARE

## 2021-08-31 ENCOUNTER — APPOINTMENT (OUTPATIENT)
Dept: CARDIOTHORACIC SURGERY | Facility: CLINIC | Age: 81
End: 2021-08-31

## 2021-08-31 ENCOUNTER — OUTPATIENT (OUTPATIENT)
Dept: OUTPATIENT SERVICES | Facility: HOSPITAL | Age: 81
LOS: 1 days | End: 2021-08-31
Payer: MEDICARE

## 2021-08-31 VITALS
SYSTOLIC BLOOD PRESSURE: 124 MMHG | BODY MASS INDEX: 24.62 KG/M2 | HEIGHT: 70 IN | HEART RATE: 62 BPM | TEMPERATURE: 97.8 F | OXYGEN SATURATION: 100 % | DIASTOLIC BLOOD PRESSURE: 76 MMHG | RESPIRATION RATE: 16 BRPM | WEIGHT: 172 LBS

## 2021-08-31 DIAGNOSIS — Z98.890 OTHER SPECIFIED POSTPROCEDURAL STATES: Chronic | ICD-10-CM

## 2021-08-31 DIAGNOSIS — Z95.2 PRESENCE OF PROSTHETIC HEART VALVE: Chronic | ICD-10-CM

## 2021-08-31 DIAGNOSIS — Z95.810 PRESENCE OF AUTOMATIC (IMPLANTABLE) CARDIAC DEFIBRILLATOR: Chronic | ICD-10-CM

## 2021-08-31 DIAGNOSIS — Z09 ENCOUNTER FOR FOLLOW-UP EXAMINATION AFTER COMPLETED TREATMENT FOR CONDITIONS OTHER THAN MALIGNANT NEOPLASM: ICD-10-CM

## 2021-08-31 DIAGNOSIS — Z95.0 PRESENCE OF CARDIAC PACEMAKER: Chronic | ICD-10-CM

## 2021-08-31 DIAGNOSIS — Z90.79 ACQUIRED ABSENCE OF OTHER GENITAL ORGAN(S): Chronic | ICD-10-CM

## 2021-08-31 DIAGNOSIS — Z98.890 OTHER SPECIFIED POSTPROCEDURAL STATES: ICD-10-CM

## 2021-08-31 LAB
ALBUMIN SERPL ELPH-MCNC: 4 G/DL
ALP BLD-CCNC: 116 U/L
ALT SERPL-CCNC: 10 U/L
ANION GAP SERPL CALC-SCNC: 14 MMOL/L
AST SERPL-CCNC: 17 U/L
BILIRUB SERPL-MCNC: 0.4 MG/DL
BUN SERPL-MCNC: 49 MG/DL
CALCIUM SERPL-MCNC: 9.1 MG/DL
CHLORIDE SERPL-SCNC: 102 MMOL/L
CO2 SERPL-SCNC: 23 MMOL/L
CREAT SERPL-MCNC: 2.21 MG/DL
GLUCOSE SERPL-MCNC: 101 MG/DL
HAPTOGLOB SERPL-MCNC: 117 MG/DL
LDH SERPL-CCNC: 241 U/L
POTASSIUM SERPL-SCNC: 4.9 MMOL/L
PROT SERPL-MCNC: 6.8 G/DL
SODIUM SERPL-SCNC: 139 MMOL/L

## 2021-08-31 PROCEDURE — 71046 X-RAY EXAM CHEST 2 VIEWS: CPT

## 2021-08-31 PROCEDURE — 99024 POSTOP FOLLOW-UP VISIT: CPT

## 2021-08-31 PROCEDURE — 71046 X-RAY EXAM CHEST 2 VIEWS: CPT | Mod: 26

## 2021-09-07 RX ORDER — TORSEMIDE 20 MG/1
20 TABLET ORAL
Qty: 30 | Refills: 0 | Status: COMPLETED | COMMUNITY
Start: 2021-07-12 | End: 2021-09-07

## 2021-11-07 NOTE — PROCEDURE NOTE - INTERROGATION NOTE: MODEL
initiation of breastfeeding/breast milk feeding
Ellipse  [phone]
Ellipse  [phone]
Ellipse  [phone]
[place]se  [phone]

## 2022-01-10 ENCOUNTER — APPOINTMENT (OUTPATIENT)
Dept: CT IMAGING | Facility: CLINIC | Age: 82
End: 2022-01-10
Payer: MEDICARE

## 2022-01-10 ENCOUNTER — OUTPATIENT (OUTPATIENT)
Dept: OUTPATIENT SERVICES | Facility: HOSPITAL | Age: 82
LOS: 1 days | End: 2022-01-10
Payer: MEDICARE

## 2022-01-10 DIAGNOSIS — Z98.890 OTHER SPECIFIED POSTPROCEDURAL STATES: Chronic | ICD-10-CM

## 2022-01-10 DIAGNOSIS — I77.810 THORACIC AORTIC ECTASIA: ICD-10-CM

## 2022-01-10 DIAGNOSIS — Z95.2 PRESENCE OF PROSTHETIC HEART VALVE: Chronic | ICD-10-CM

## 2022-01-10 DIAGNOSIS — Z95.0 PRESENCE OF CARDIAC PACEMAKER: Chronic | ICD-10-CM

## 2022-01-10 DIAGNOSIS — Z95.810 PRESENCE OF AUTOMATIC (IMPLANTABLE) CARDIAC DEFIBRILLATOR: Chronic | ICD-10-CM

## 2022-01-10 DIAGNOSIS — Z90.79 ACQUIRED ABSENCE OF OTHER GENITAL ORGAN(S): Chronic | ICD-10-CM

## 2022-01-10 PROCEDURE — 71275 CT ANGIOGRAPHY CHEST: CPT | Mod: 26,MH

## 2022-01-10 PROCEDURE — 71275 CT ANGIOGRAPHY CHEST: CPT

## 2022-03-22 NOTE — H&P PST ADULT - ENERGY EXPENDITURE (METS)
Render Note In Bullet Format When Appropriate: No Consent: The patient's consent was obtained including but not limited to risks of crusting, scabbing, blistering, scarring, darker or lighter pigmentary change, recurrence, incomplete removal and infection. Show Aperture Variable?: Yes Detail Level: Detailed Duration Of Freeze Thaw-Cycle (Seconds): 0 Post-Care Instructions: I reviewed with the patient in detail post-care instructions. Patient is to wear sunprotection, and avoid picking at any of the treated lesions. Pt may apply Vaseline to crusted or scabbing areas. DASI 9.89

## 2023-04-10 ENCOUNTER — APPOINTMENT (OUTPATIENT)
Dept: CARDIOTHORACIC SURGERY | Facility: CLINIC | Age: 83
End: 2023-04-10
Payer: MEDICARE

## 2023-04-10 ENCOUNTER — NON-APPOINTMENT (OUTPATIENT)
Age: 83
End: 2023-04-10

## 2023-04-10 ENCOUNTER — OUTPATIENT (OUTPATIENT)
Dept: OUTPATIENT SERVICES | Facility: HOSPITAL | Age: 83
LOS: 1 days | End: 2023-04-10
Payer: MEDICARE

## 2023-04-10 VITALS
BODY MASS INDEX: 23.7 KG/M2 | TEMPERATURE: 98 F | SYSTOLIC BLOOD PRESSURE: 95 MMHG | DIASTOLIC BLOOD PRESSURE: 59 MMHG | HEART RATE: 60 BPM | WEIGHT: 160 LBS | OXYGEN SATURATION: 97 % | HEIGHT: 69 IN | RESPIRATION RATE: 16 BRPM

## 2023-04-10 VITALS
HEIGHT: 69 IN | RESPIRATION RATE: 16 BRPM | OXYGEN SATURATION: 97 % | WEIGHT: 160 LBS | SYSTOLIC BLOOD PRESSURE: 95 MMHG | HEART RATE: 60 BPM | BODY MASS INDEX: 23.7 KG/M2 | DIASTOLIC BLOOD PRESSURE: 59 MMHG

## 2023-04-10 DIAGNOSIS — Z98.890 OTHER SPECIFIED POSTPROCEDURAL STATES: Chronic | ICD-10-CM

## 2023-04-10 DIAGNOSIS — Z95.0 PRESENCE OF CARDIAC PACEMAKER: Chronic | ICD-10-CM

## 2023-04-10 DIAGNOSIS — Z90.79 ACQUIRED ABSENCE OF OTHER GENITAL ORGAN(S): Chronic | ICD-10-CM

## 2023-04-10 DIAGNOSIS — Z98.890 OTHER SPECIFIED POSTPROCEDURAL STATES: ICD-10-CM

## 2023-04-10 DIAGNOSIS — I35.0 NONRHEUMATIC AORTIC (VALVE) STENOSIS: ICD-10-CM

## 2023-04-10 DIAGNOSIS — Z95.810 PRESENCE OF AUTOMATIC (IMPLANTABLE) CARDIAC DEFIBRILLATOR: Chronic | ICD-10-CM

## 2023-04-10 DIAGNOSIS — Z95.2 PRESENCE OF PROSTHETIC HEART VALVE: Chronic | ICD-10-CM

## 2023-04-10 PROCEDURE — 99214 OFFICE O/P EST MOD 30 MIN: CPT

## 2023-04-10 PROCEDURE — 93356 MYOCRD STRAIN IMG SPCKL TRCK: CPT

## 2023-04-10 PROCEDURE — 99205 OFFICE O/P NEW HI 60 MIN: CPT

## 2023-04-10 PROCEDURE — 93306 TTE W/DOPPLER COMPLETE: CPT | Mod: 26

## 2023-04-10 PROCEDURE — 93000 ELECTROCARDIOGRAM COMPLETE: CPT

## 2023-04-10 PROCEDURE — 93306 TTE W/DOPPLER COMPLETE: CPT

## 2023-04-10 RX ORDER — RIVAROXABAN 15 MG/1
15 TABLET, FILM COATED ORAL DAILY
Refills: 0 | Status: ACTIVE | COMMUNITY

## 2023-04-10 RX ORDER — AMIODARONE HYDROCHLORIDE 200 MG/1
200 TABLET ORAL DAILY
Refills: 0 | Status: COMPLETED | COMMUNITY
Start: 2021-08-06 | End: 2023-04-10

## 2023-04-10 RX ORDER — TAMSULOSIN HYDROCHLORIDE 0.4 MG/1
0.4 CAPSULE ORAL DAILY
Refills: 0 | Status: DISCONTINUED | COMMUNITY
Start: 2021-07-02 | End: 2023-04-10

## 2023-04-10 RX ORDER — APIXABAN 2.5 MG/1
2.5 TABLET, FILM COATED ORAL
Qty: 180 | Refills: 0 | Status: DISCONTINUED | COMMUNITY
Start: 2021-07-27 | End: 2023-04-10

## 2023-04-10 NOTE — ASSESSMENT
[FreeTextEntry1] : I reviewed the cardiac imaging, medical records and reports with patient and discussed the case.  I discussed the risks, benefits and alternatives to surgery. Risks included but not limited to bleeding, stroke, Myocardial Infarction, kidney problems, blood transfusion, permanent pacemaker implantation, infections and death. \par \par I discussed TMVR [place] Trial and discussed qualification for such. I also discussed the various approaches in detail. I feel that the patient will benefit and is a candidate for APOLLO TRIAL. All questions and concerns were addressed and patient agrees to proceed with workup for Transcatheter Mitral Valve Replacement.  \par \par \par Plan:\par \par - CT to assess anatomy

## 2023-04-10 NOTE — HISTORY OF PRESENT ILLNESS
[FreeTextEntry1] : Mr. Sher is an 82 year old male followed and referred by Dr. Romero for evaluation of mitral regurgitation. He is known to our office and has an extensive past medical history including an IWMI in 1994, VT Arrest while in [facility] [place] [facility]\"" on a cruise in December 2017, AICD in January 2018, AFib (s/p ablation in 2020 and again on 3/29/23), maintained on Xarelto, AVR in 2004 (#23 Bovine Pericardial Valve), and a prior Aortic Aneurysm for which he underwent a Bental with a #32 Hemashield graft (attached to prior AVR) and MV repair with Dr. Fenton on 6/2/21.\par \par He is followed closely by Dr. Romero and reports he was feeling well until about one month ago when he noticed an onset of exertional dyspnea and SOB with walking his normal daily routine. He reports he was noted to have "elevated HR and found to be in AF".  He underwent af ablation 2 weeks ago at [facility]. He notes his groins were sore from the nurses holding pressure to BL groins for the past 2 weeks and just the other day started to ambulate getting back to his usual.  He reports he is unsure if his ablation helped his SOB as he is not back to his activity level as of yet due to groin/hip pain from ablation.  He was walking 2 miles last month prior to onset of symptoms. HE is managed on diuretics and has been for some time.  Notes occasional swelling to lower extremities at times.  Denies CP, palpitations, dizziness, or syncope. He notes his Cr is "always elevated because I have one atrophied kidney" and is followed by a Nephrologist (Dr Doan in [place]); his last Cr in February was 2.02. \par \par His recent pre-ablation WARREN at [facility] on 3/27/23 reported moderately reduced LV function (EF 35-40%), paradoxical septal motion (consistent with prior cardiac surgery), RVE with moderately reduced RV function, a bioAVR with a mean AV gradient of 10 mmHg and mild AI, marked posterior MAC with severely decreased posterior leaflet mobility, mild prolapse of the middle scallop of the anterior leaflet with torn chords, severe posteriorly directed MR, systolic flow reversal in the PV, moderate TR, and moderate PHTN. A repeat echocardiogram today demonstrates \par \par \par \par

## 2023-04-14 NOTE — CARDIOLOGY SUMMARY
[de-identified] : Dual chamber PPM [de-identified] : 3/27/23\par LVEF 35-40%, mild AI, severe MR, moderate TR [de-identified] : AICD [de-identified] : VT Ablation\par AFib Ablation in 2020 and again on 3/29/2023 [de-identified] : 5/25/21 [facility]\par LM minor disease\par mLAD 40% stenosis\par RCA patent site of prior DCA [de-identified] : 2020 - AVR #23 Bovine Pericardial\par 6/2/21 - Bental #32 Hemashield to prior AVR, MV edge to edge repair  with Dr. Fenton

## 2023-04-14 NOTE — PHYSICAL EXAM
[General Appearance - Alert] : alert [General Appearance - In No Acute Distress] : in no acute distress [Sclera] : the sclera and conjunctiva were normal [Neck Appearance] : the appearance of the neck was normal [Jugular Venous Distention Increased] : there was no jugular-venous distention [] : no respiratory distress [Respiration, Rhythm And Depth] : normal respiratory rhythm and effort [Exaggerated Use Of Accessory Muscles For Inspiration] : no accessory muscle use [Auscultation Breath Sounds / Voice Sounds] : lungs were clear to auscultation bilaterally [Apical Impulse] : the apical impulse was normal [Heart Rate And Rhythm] : heart rate was normal and rhythm regular [Heart Sounds] : normal S1 and S2 [Bowel Sounds] : normal bowel sounds [Abdomen Soft] : soft [Abdomen Tenderness] : non-tender [Involuntary Movements] : no involuntary movements were seen [No Focal Deficits] : no focal deficits [Oriented To Time, Place, And Person] : oriented to person, place, and time [Impaired Insight] : insight and judgment were intact [Affect] : the affect was normal [Mood] : the mood was normal [de-identified] : trace pretibial edema bilaterally

## 2023-04-14 NOTE — DISCUSSION/SUMMARY
[FreeTextEntry1] : Mr. Sher is referred by Dr. Romero for evaluation of mitral regurgitation.  I reviewed his previous WARREN and TTE with Dr. Wilder in detail.  We also compared his TTE to a prior study here from 2021 at the time of his surgery with Dr Fenton.  He indeed has severe MR. He also has a pacing lead with TR that is to be further quantified (but does not appear to be severe).  He has chronic LV dysfunction however his function appears improved as compared to prior.  Of note, Dr. Fenton did perform a previous surgical mitral valve repair but I do not see evidence of a previous ring. Given his degree of MAC and a short posterior leaflet (though it is inadequately visualized on today's study), I do not think his anatomy is amenable for M-KRISTIE.  \par \par As such, I am recommending consideration for TMVR as part of the [place] Trial.  I have explained the details of the study to him in detail and have asked Dr. Fernandez, my CT surgical colleague, to see him in this regard as well.  He would require a CT scan as the next step in his evaluation. Of note, his GFR has been around 30, which is the lower cutoff for the study.  He will follow-up with his nephrologist as well for any renal optimization as indicated.  \par \par He will provide informed consent with Dr. Fernandez should he choose to proceed with further evaluation.  He will ultimately need a contrast CT scan, as noted above.  I am making no changes to his medications at this time.  I did discuss the potential risks and benefits of TMVR with him and his wife in detail.  We also discussed the details of the procedure itself with respect to the surgical access and type of valve used.\par \par Ultimately, as part of the study protocol, he will also be evaluated by our CHF colleagues, which I also explained.  He will also need a repeat WARREN prior to proceeding.  I have explained and answered all of their questions in detail.  We will notify Dr. Romero of our impressions and recommendations.

## 2023-04-14 NOTE — HISTORY OF PRESENT ILLNESS
[FreeTextEntry1] : Mr. Sher is an 82 year old male referred by Dr. Romero for evaluation of mitral regurgitation. He has an extensive past medical history including an IWMI in 1994, VT Arrest while in [facility] on a cruise in December 2017, AICD in January 2018, AFib (s/p ablation in 2020 and again on 3/29/23), maintained on Xarelto, AVR in 2004 (#23 Bovine Pericardial Valve), and a prior Aortic Aneurysm for which he underwent a Bentall with a #32 Hemashield graft (attached to prior AVR) and MV repair with Dr. Fenton on 6/2/21.\par \par He is followed closely by Dr. Romero and reports he was feeling well until about one month ago when he noticed an onset of exertional dyspnea when on his daily walks that forced him to stop and rest. He underwent an ablation in March at [place] with no symptomatic improvement, though he has had bilateral hip pain post procedure that has limited his mobility (he notes that extensive and prolonged pressure was held following the ablation, which he attributes this pain to). The pain has improved over the past two days and he has began walking again. WARREN at time of ablation demonstrated severe MR and moderate TR. \par \par He reports his medication has been titrated by Dr. Romero for CHF management. He took Farxiga for a couple of months though found it "dried him out" when taken in addition to the Torsemide and Metolazone. His weight has been stable at 160 pounds. He has had no hospitalization for CHF. \par \par His recent pre-ablation WARREN at [facility] on 3/27/23 reported moderately reduced LV function (EF 35-40%), paradoxical septal motion (consistent with prior cardiac surgery), RVE with moderately reduced RV function, a bioAVR with a mean AV gradient of 10 mmHg and mild AI, marked posterior MAC with severely decreased posterior leaflet mobility, mild prolapse of the middle scallop of the anterior leaflet with torn chords, severe posteriorly directed MR, systolic flow reversal in the PV, moderate TR, and moderate PHTN. A repeat echocardiogram today demonstrates moderate to severe LV dysfunction with severe MR (unclear etiology), systolic flow reversal in the PV, and a short posterior leaflet with calcification at the base. Significant TR is also noted and will be further quantified.\par \par He states "I have known about this for years" dating back to 2004, when it was prior deemed not warranting intervention. He notes his referral today was prompted by MR maddox on his recent WARREN. He reports being very active, walking 2-2.5 miles daily (with incline) in his neighborhood, and developed LUA 2-3 weeks ago, at which time he was found to be back in AFIB. He reports having had 2 prior ablations. This prompted another AFIB ablation from which "I am just now recovering" mostly related to the access sites--he notes "they couldn't stop the bleeding, they held pressure, they must have bruised my joints, and then I went home and the incision opened up again" which prompted an urgent visit to the [facility] at [place] which was handled in the EP office with compression and Steri-Strips, per his report. He is unable to assess his current symptoms as he has not yet resumed his usual exercise routine. He reports no angina, palpitations, dizziness, or syncope. There have been no recent medication changes. He notes his BP runs low. He notes his Cr is "always elevated because I have one atrophied kidney" and is followed by a Nephrologist (Dr Doan in [place]); his last Cr in February was 2.02.

## 2023-04-14 NOTE — REVIEW OF SYSTEMS
[Fever] : no fever [Chills] : no chills [Chest Discomfort] : no chest discomfort [Palpitations] : no palpitations [Orthopnea] : no orthopnea [PND] : no PND [Abdominal Pain] : no abdominal pain [Dizziness] : no dizziness [FreeTextEntry7] : appetite decreased

## 2023-05-01 ENCOUNTER — OUTPATIENT (OUTPATIENT)
Dept: OUTPATIENT SERVICES | Facility: HOSPITAL | Age: 83
LOS: 1 days | End: 2023-05-01
Payer: SUBSIDIZED

## 2023-05-01 ENCOUNTER — RESULT REVIEW (OUTPATIENT)
Age: 83
End: 2023-05-01

## 2023-05-01 ENCOUNTER — APPOINTMENT (OUTPATIENT)
Dept: CARDIOLOGY | Facility: CLINIC | Age: 83
End: 2023-05-01

## 2023-05-01 DIAGNOSIS — Z98.890 OTHER SPECIFIED POSTPROCEDURAL STATES: Chronic | ICD-10-CM

## 2023-05-01 DIAGNOSIS — Z00.6 ENCOUNTER FOR EXAMINATION FOR NORMAL COMPARISON AND CONTROL IN CLINICAL RESEARCH PROGRAM: ICD-10-CM

## 2023-05-01 DIAGNOSIS — Z95.2 PRESENCE OF PROSTHETIC HEART VALVE: Chronic | ICD-10-CM

## 2023-05-01 DIAGNOSIS — Z00.00 ENCOUNTER FOR GENERAL ADULT MEDICAL EXAMINATION WITHOUT ABNORMAL FINDINGS: ICD-10-CM

## 2023-05-01 DIAGNOSIS — Z95.810 PRESENCE OF AUTOMATIC (IMPLANTABLE) CARDIAC DEFIBRILLATOR: Chronic | ICD-10-CM

## 2023-05-01 DIAGNOSIS — I34.0 NONRHEUMATIC MITRAL (VALVE) INSUFFICIENCY: ICD-10-CM

## 2023-05-01 DIAGNOSIS — Z95.0 PRESENCE OF CARDIAC PACEMAKER: Chronic | ICD-10-CM

## 2023-05-01 DIAGNOSIS — Z90.79 ACQUIRED ABSENCE OF OTHER GENITAL ORGAN(S): Chronic | ICD-10-CM

## 2023-05-01 PROCEDURE — 75572 CT HRT W/3D IMAGE: CPT | Mod: 26

## 2023-05-01 PROCEDURE — 75572 CT HRT W/3D IMAGE: CPT

## 2023-05-17 ENCOUNTER — APPOINTMENT (OUTPATIENT)
Dept: CARDIOLOGY | Facility: CLINIC | Age: 83
End: 2023-05-17

## 2023-05-18 ENCOUNTER — APPOINTMENT (OUTPATIENT)
Dept: CARDIOLOGY | Facility: CLINIC | Age: 83
End: 2023-05-18

## 2023-05-18 DIAGNOSIS — Z87.891 PERSONAL HISTORY OF NICOTINE DEPENDENCE: ICD-10-CM

## 2023-05-18 RX ORDER — PANTOPRAZOLE 40 MG/1
40 TABLET, DELAYED RELEASE ORAL DAILY
Refills: 0 | Status: DISCONTINUED | COMMUNITY
Start: 2021-07-05 | End: 2023-05-18

## 2023-05-18 NOTE — ASSESSMENT
[FreeTextEntry1] : 82 year old male PMH including an IWMI in 1994, [place] Arrest while in hospitals [place] a cruise in December [place], AICD in January 2018, AFib (s/p ablation in 2020 and again on 3/29/23), maintained on Xarelto, AVR in 2004 (#23 Bovine Pericardial Valve), and a prior Aortic Aneurysm for which he underwent a Bentall with a #32 Hemashield graft (attached to prior AVR) and MV repair with Dr. Fenton on 6/2/21 who is pursuing a TMVR.  presented for telehealth appointment today pre catheterization in preparation for his cath scheduled with Dr. Balbuena on  5/24.

## 2023-05-18 NOTE — HISTORY OF PRESENT ILLNESS
[FreeTextEntry1] : Hematology: Dr. Tsang (iron infusion 5/4)\par Cardiologist: Dr. Vaughan\par Nephrologist: Dr. Doan\par EP: Dr. Tsang\par \par 82 year old male PMH including an IWMI in 1994, VT Arrest while in Hospitals in [place] on a cruise in December 2017, AICD in January 2018, AFib (s/p ablation in 2020 and again on 3/29/23), maintained on Xarelto, AVR in 2004 (#23 Bovine Pericardial Valve), and a prior Aortic Aneurysm for which he underwent a Bentall with a #32 Hemashield graft (attached to prior AVR) and MV repair with Dr. Fenton on 6/2/21.  He is followed closely by his cardiologist Dr. Romero and reports he was feeling well until about one month ago when he noticed an onset of exertional dyspnea when on his daily walks that forced him to stop and rest. He underwent an ablation in March at [facility] with no symptomatic improvement. WARREN at time of ablation demonstrated severe MR and moderate TR. \par \par His recent pre-ablation WARREN at [facility] on 3/27/23 reported moderately reduced LV function (EF 35-40%), paradoxical septal motion (consistent with prior cardiac surgery), RVE with moderately reduced RV function, a bioAVR with a mean AV gradient of 10 mmHg and mild AI, marked posterior MAC with severely decreased posterior leaflet mobility, mild prolapse of the middle scallop of the anterior leaflet with torn chords, severe posteriorly directed MR, systolic flow reversal in the PV, moderate TR, and moderate PHTN. A repeat echocardiogram today demonstrates moderate to severe LV dysfunction with severe MR (unclear etiology), systolic flow reversal in the PV, and a short posterior leaflet with calcification at the base. Significant TR is also noted and will be further quantified.  \par \par He reports no angina, palpitations, dizziness, or syncope. He was referred to Dr. Fernandez and Dr. Valentino for evaluation for mitral regurgitation. He is not a candidate for open heart and is pursuing a TMVR.  He presented for telehealth appointment today pre catheterization in preparation for his cath scheduled with Dr. Balbuena on  5/24.  \par \par CTA Chest:\par IMPRESSION:\par Decreased mobility of the posterior mitral valve leaflet and tethering resulting in malcoaptation of the mitral valve leaflets. Mild to moderate mitral annular calcification of the posterior mitral annulus and and minimal calcification of the subvalvular mitral apparatus. Four-chamber cardiac enlargement. Thinned and aneurysmal basal to mid inferior wall of the left ventricle with focal calcifications.Dilated main pulmonary artery.  The left upper lobe opacity and patchy linear nodular opacities and left lower lobe along with patchy opacities are of uncertain etiology.\par A follow-up CT chest is recommended in 6 weeks for resolution/change.\par \par

## 2023-05-18 NOTE — REASON FOR VISIT
[Home] : at home, [unfilled] , at the time of the visit. [Medical Office: ([facility])___] : at the medical office located in

## 2023-05-18 NOTE — CARDIOLOGY SUMMARY
[de-identified] : Dual chamber PPM [de-identified] : VT Ablation\par AFib Ablation in 2020 and again on 3/29/2023  [de-identified] : VT Ablation\par AFib Ablation in 2020 and again on 3/29/2023  [de-identified] : 5/25/21 [facility]\par LM minor disease\par mLAD 40% stenosis\par RCA patent site of prior DCA

## 2023-05-18 NOTE — DISCUSSION/SUMMARY
[FreeTextEntry1] : Discussed with patient to hold Xarelto 2 days prior to procedure and the morning of the procedure. \par Discussion was had with the patient that he will need to be NPO for 6 hours prior to scheduled procedure.  Patient verbalized understanding of above and all pre procedural questions answered. \par .

## 2023-05-24 ENCOUNTER — OUTPATIENT (OUTPATIENT)
Dept: OUTPATIENT SERVICES | Facility: HOSPITAL | Age: 83
LOS: 1 days | End: 2023-05-24
Payer: MEDICARE

## 2023-05-24 ENCOUNTER — TRANSCRIPTION ENCOUNTER (OUTPATIENT)
Age: 83
End: 2023-05-24

## 2023-05-24 VITALS
HEART RATE: 60 BPM | DIASTOLIC BLOOD PRESSURE: 70 MMHG | OXYGEN SATURATION: 97 % | RESPIRATION RATE: 15 BRPM | SYSTOLIC BLOOD PRESSURE: 108 MMHG

## 2023-05-24 VITALS
SYSTOLIC BLOOD PRESSURE: 114 MMHG | HEART RATE: 60 BPM | HEIGHT: 69 IN | DIASTOLIC BLOOD PRESSURE: 66 MMHG | TEMPERATURE: 99 F | WEIGHT: 160.06 LBS | OXYGEN SATURATION: 97 % | RESPIRATION RATE: 15 BRPM

## 2023-05-24 DIAGNOSIS — Z98.890 OTHER SPECIFIED POSTPROCEDURAL STATES: Chronic | ICD-10-CM

## 2023-05-24 DIAGNOSIS — Z95.2 PRESENCE OF PROSTHETIC HEART VALVE: Chronic | ICD-10-CM

## 2023-05-24 DIAGNOSIS — Z95.0 PRESENCE OF CARDIAC PACEMAKER: Chronic | ICD-10-CM

## 2023-05-24 DIAGNOSIS — Z90.79 ACQUIRED ABSENCE OF OTHER GENITAL ORGAN(S): Chronic | ICD-10-CM

## 2023-05-24 DIAGNOSIS — Z95.810 PRESENCE OF AUTOMATIC (IMPLANTABLE) CARDIAC DEFIBRILLATOR: Chronic | ICD-10-CM

## 2023-05-24 DIAGNOSIS — I35.0 NONRHEUMATIC AORTIC (VALVE) STENOSIS: ICD-10-CM

## 2023-05-24 LAB
ANION GAP SERPL CALC-SCNC: 14 MMOL/L — SIGNIFICANT CHANGE UP (ref 5–17)
BUN SERPL-MCNC: 61 MG/DL — HIGH (ref 7–23)
CALCIUM SERPL-MCNC: 9.7 MG/DL — SIGNIFICANT CHANGE UP (ref 8.4–10.5)
CHLORIDE SERPL-SCNC: 99 MMOL/L — SIGNIFICANT CHANGE UP (ref 96–108)
CO2 SERPL-SCNC: 23 MMOL/L — SIGNIFICANT CHANGE UP (ref 22–31)
CREAT SERPL-MCNC: 1.93 MG/DL — HIGH (ref 0.5–1.3)
EGFR: 34 ML/MIN/1.73M2 — LOW
GLUCOSE SERPL-MCNC: 92 MG/DL — SIGNIFICANT CHANGE UP (ref 70–99)
HCT VFR BLD CALC: 38 % — LOW (ref 39–50)
HGB BLD-MCNC: 12.3 G/DL — LOW (ref 13–17)
HGB FLD-MCNC: 11.1 G/DL — LOW (ref 12.6–17.4)
MCHC RBC-ENTMCNC: 28.9 PG — SIGNIFICANT CHANGE UP (ref 27–34)
MCHC RBC-ENTMCNC: 32.4 GM/DL — SIGNIFICANT CHANGE UP (ref 32–36)
MCV RBC AUTO: 89.4 FL — SIGNIFICANT CHANGE UP (ref 80–100)
NRBC # BLD: 0 /100 WBCS — SIGNIFICANT CHANGE UP (ref 0–0)
OXYHGB MFR BLDMV: 53.4 % — LOW (ref 90–95)
PLATELET # BLD AUTO: 182 K/UL — SIGNIFICANT CHANGE UP (ref 150–400)
POTASSIUM SERPL-MCNC: 4.7 MMOL/L — SIGNIFICANT CHANGE UP (ref 3.5–5.3)
POTASSIUM SERPL-SCNC: 4.7 MMOL/L — SIGNIFICANT CHANGE UP (ref 3.5–5.3)
RBC # BLD: 4.25 M/UL — SIGNIFICANT CHANGE UP (ref 4.2–5.8)
RBC # FLD: 17.6 % — HIGH (ref 10.3–14.5)
SAO2 % BLD: 53.8 % — LOW (ref 60–90)
SODIUM SERPL-SCNC: 136 MMOL/L — SIGNIFICANT CHANGE UP (ref 135–145)
WBC # BLD: 6.85 K/UL — SIGNIFICANT CHANGE UP (ref 3.8–10.5)
WBC # FLD AUTO: 6.85 K/UL — SIGNIFICANT CHANGE UP (ref 3.8–10.5)

## 2023-05-24 PROCEDURE — 36415 COLL VENOUS BLD VENIPUNCTURE: CPT

## 2023-05-24 PROCEDURE — C1894: CPT

## 2023-05-24 PROCEDURE — 99152 MOD SED SAME PHYS/QHP 5/>YRS: CPT

## 2023-05-24 PROCEDURE — 93456 R HRT CORONARY ARTERY ANGIO: CPT | Mod: 26

## 2023-05-24 PROCEDURE — 85027 COMPLETE CBC AUTOMATED: CPT

## 2023-05-24 PROCEDURE — 93010 ELECTROCARDIOGRAM REPORT: CPT

## 2023-05-24 PROCEDURE — C1887: CPT

## 2023-05-24 PROCEDURE — 82803 BLOOD GASES ANY COMBINATION: CPT

## 2023-05-24 PROCEDURE — 93005 ELECTROCARDIOGRAM TRACING: CPT

## 2023-05-24 PROCEDURE — 80048 BASIC METABOLIC PNL TOTAL CA: CPT

## 2023-05-24 PROCEDURE — C1889: CPT

## 2023-05-24 PROCEDURE — 93456 R HRT CORONARY ARTERY ANGIO: CPT

## 2023-05-24 PROCEDURE — C1769: CPT

## 2023-05-24 NOTE — H&P CARDIOLOGY - HISTORY OF PRESENT ILLNESS
83 yo M with PMhx HTN, HLD, GERD, moderately reduced EF 35-40%,  AFIB ON XARELTO, cad HX  OF Atherectomy,  IWMI 1994, VT ARREST IN 12/2017,  AVR in 2004 ,  AORTIC ANEURYSM  AND MV repair with Dr Fenton 6/2/21. He presents today for [facility] as part of  the TAVR work up for severe MR with Dr Valentino . He was c/o SOB last month and was diagnosed with Afib, he had an ablation last month at [place]. He contineus to experience SOB and fatigue with minimal exertion.

## 2023-05-24 NOTE — ASU DISCHARGE PLAN (ADULT/PEDIATRIC) - MODE OF TRANSPORTATION
Follow up:  Schedule MRI pelvis in January - call [phone]    Please call with any questions or concerns regarding your clinic visit today.    It is a pleasure being involved in your health care.    Contacts post-consultation depending on your need:    Radiology Appointments [phone]    Schedule Clinic Appointments [phone] # 1   M-F 7:30 - 5 pm    TAMMI Ramos [phone]    Clinic Fax Number [phone]    Surgery Scheduling [phone]    My Chart is available 24 hours a day and is a secure way to access your records and communicate with your care team.  I strongly recommend signing up if you haven't already done so, if you are comfortable with computers.  If you would like to inquire about this or are having problems with My Chart access, you may call [phone] or go online at [email or web address].  Please allow at least 24 hours for a response and extra time on weekends and Holidays.    
Ambulatory

## 2023-05-24 NOTE — ASU PATIENT PROFILE, ADULT - FALL HARM RISK - UNIVERSAL INTERVENTIONS
Bed in lowest position, wheels locked, appropriate side rails in place/Call bell, personal items and telephone in reach/Instruct patient to call for assistance before getting out of bed or chair/Non-slip footwear when patient is out of bed/[place] to call system/Physically safe environment - no spills, clutter or unnecessary equipment/Purposeful Proactive Rounding/Room/bathroom lighting operational, light cord in reach

## 2023-05-24 NOTE — ASU DISCHARGE PLAN (ADULT/PEDIATRIC) - NS MD DC FALL RISK RISK
For information on Fall & Injury Prevention, visit: [email or web address] OR  [email or web address] OR  https://www.cdc.gov/steadi/patient.html

## 2023-05-24 NOTE — H&P CARDIOLOGY - NSICDXPASTMEDICALHX_GEN_ALL_CORE_FT
"Patient presents with complaints of worsening sob.  Notes that two sundays ago he was exposed to someone who was covid positive.  Patient notes that he does have a history of asthma and that last night he almost came in \"my breathing has never been that bad\"  "
PAST MEDICAL HISTORY:  AICD (automatic cardioverter/defibrillator) present     Aortic stenosis     Aortic valve regurgitation     Ascending aorta dilation     Cardiac arrest     H/O paroxysmal supraventricular tachycardia     HLD (hyperlipidemia)     Mitral valve regurgitation

## 2023-06-16 ENCOUNTER — LABORATORY RESULT (OUTPATIENT)
Age: 83
End: 2023-06-16

## 2023-06-16 ENCOUNTER — APPOINTMENT (OUTPATIENT)
Dept: HEART FAILURE | Facility: CLINIC | Age: 83
End: 2023-06-16
Payer: MEDICARE

## 2023-06-16 ENCOUNTER — NON-APPOINTMENT (OUTPATIENT)
Age: 83
End: 2023-06-16

## 2023-06-16 VITALS
SYSTOLIC BLOOD PRESSURE: 115 MMHG | BODY MASS INDEX: 23.85 KG/M2 | DIASTOLIC BLOOD PRESSURE: 76 MMHG | HEIGHT: 69 IN | WEIGHT: 161 LBS | OXYGEN SATURATION: 98 % | HEART RATE: 60 BPM

## 2023-06-16 PROCEDURE — 93000 ELECTROCARDIOGRAM COMPLETE: CPT

## 2023-06-16 PROCEDURE — 99205 OFFICE O/P NEW HI 60 MIN: CPT

## 2023-06-21 LAB
HGB BLDA-MCNC: 11.6 G/DL — LOW (ref 12.6–17.4)
OXYHGB MFR BLDA: 95.2 % — HIGH (ref 90–95)
SAO2 % BLDA: 97.3 % — SIGNIFICANT CHANGE UP (ref 94–98)

## 2023-06-24 NOTE — HISTORY OF PRESENT ILLNESS
[FreeTextEntry1] : Mr. Sher is an 81 y/o M w/ h/o CAD c/b IWMI (1994) s/p angioplasty, aortic stenosis s/p bioAVR 2004), VT arrest (2017) s/p ICD, afib s/p multiple DCCV and ablation x2 (2020 and 2023; on AC), prior Ao aneurysm s/p Bentall (2021), severe MR with MAC, atrophic kidney with CKD (b/l Cr 1.9; GFR 34) who is being considered for APOLLO (TMVR) trial. Referred by Dr. Valentino. Also followed by Dr. Romero (primary cardiologist). \par \par Per patient, HPI began in 1994 when had an MI and had an angioplasty. Was well until 2004 when had bioAVR placed after which he was doing well. Was well until 2017 when had a cardiac arrest while on a cruise ship. Required CPR/defib by bystander physicians. Was admitted at ICU in [facility]. Ultimately obtained an ICD. Had some ICD shocks after which improved after adjustment of his device. Had an aortic aneurysm root in 2021 and underwent Bentall . Was found to have afib and required DCCV and ablations. Was found to have severe MR with MAC and referred to Dr. Valentino for transcatheter intervention consideration. \par \par Was last seen by Dr. Valentino 4/10/23 where he reported exertional dyspnea. Is followed by Dr. Romero (primary cardiologist) and had a WARREN in March 2023 which showed severe MR and moderate TR. \par \par Was fairly active up until couple of months ago (was able to walk 5 miles) but now reduced over time. Now feels fatigue with 100 feet and has to stop for a couple of seconds. Denies orthopnea. Uses 2 pillows. Reports occasional bendopnea. Feels more dyspneic with inclines. \par \par Monitors weight daily - has been 160 pounds. Reports some decreased appetite. Adherent to sodium/fluid restriction. Takes torsemide 10 mg daily with good effect, as well as metolazone 2.5 mg twice/week. \par \par Had recent labs - believes Cr was 1.9. \par \par

## 2023-06-24 NOTE — CARDIOLOGY SUMMARY
[de-identified] : \par 6/16/23 - A paced, RBBB, HR 81\par 4/10/23 - AV paced; HR 61 [de-identified] : \par 4/10/23 - TTE - EF 38%, LVEDD 6.7 cm, severe MR with systolic reversal in pulmonic vein, severe TR, RVSP 49; IVC dilated\par \par 3/27/23 ([facility] WARREN) - EF 35-40%, paradoxical septal motion, RV enlargement/dysfunction, bioAVR (mean gradient 10) with mild AI, marked MAC with posterior leaflet immobility, mild prolapse of anterior leaflet with torn chords, severe posterior directed MR w/ systolic reversal in PV, moderate TR; no intracardiac clot [de-identified] : \par 5/24/23 - pLAD 40%; mRCA 40%; RA 16, RV 76/19, PA 77/34/50; PCWP 25 (v 35); /67/84; CO/CI 3.5/1.86; SVR 1560

## 2023-06-24 NOTE — ASSESSMENT
[FreeTextEntry1] : Mr. Sher is an 83 y/o M w/ h/o CAD c/b IWMI (1994) s/p angioplasty, aortic stenosis s/p bioAVR 2004), VT arrest (2017) s/p ICD, afib s/p multiple DCCV and ablation x2 (2020 and 2023; on AC), prior Ao aneurysm s/p Bentall (2021), severe MR with MAC, atrophic kidney with CKD (b/l Cr 1.9; GFR 34) who is being considered for APOLLO (TMVR) trial. Currently endorses NYHA class III symptoms and is overloaded. \par \par 1. Severe MR with MAC - structural degeneration\par - increase torsemide to 20 mg twice/day for 3 days, then return to 10 mg daily; instructed to take extra 10 mg as needed in afternoon for weight gain of 3 pounds in 1 day or 5 pounds in 1 week; suspect dry weight is approx 152-154 pounds\par - can use metolazone prn but would attempt to increase loop diuretic first \par - counseled about sodium/fluid restriction\par - discussed disease process with patient and family \par - keep log of weight/BP\par - he's on appropriate medications and is a reasonable candidate for the APOLLO trial if anatomically suitable \par \par RTC as needed

## 2023-06-24 NOTE — PHYSICAL EXAM
[Well Developed] : well developed [Well Nourished] : well nourished [No Acute Distress] : no acute distress [Normal Conjunctiva] : normal conjunctiva [No Carotid Bruit] : no carotid bruit [Normal S1, S2] : normal S1, S2 [No Rub] : no rub [No Gallop] : no gallop [Clear Lung Fields] : clear lung fields [Good Air Entry] : good air entry [No Respiratory Distress] : no respiratory distress  [Soft] : abdomen soft [Non Tender] : non-tender [No Masses/organomegaly] : no masses/organomegaly [Normal Bowel Sounds] : normal bowel sounds [Normal Gait] : normal gait [No Cyanosis] : no cyanosis [No Clubbing] : no clubbing [No Varicosities] : no varicosities [No Rash] : no rash [No Skin Lesions] : no skin lesions [Moves all extremities] : moves all extremities [No Focal Deficits] : no focal deficits [Normal Speech] : normal speech [Normal memory] : normal memory [Alert and Oriented] : alert and oriented [de-identified] : JVP approx 10-12 cm w/ HJR  [de-identified] : RRR; grade III/VI systolic murmur best heard in axilla [de-identified] : b/l 1+ edema

## 2023-07-11 ENCOUNTER — NON-APPOINTMENT (OUTPATIENT)
Age: 83
End: 2023-07-11

## 2023-07-13 ENCOUNTER — NON-APPOINTMENT (OUTPATIENT)
Age: 83
End: 2023-07-13

## 2023-07-26 ENCOUNTER — APPOINTMENT (OUTPATIENT)
Dept: HEART FAILURE | Facility: CLINIC | Age: 83
End: 2023-07-26
Payer: MEDICARE

## 2023-07-26 VITALS
DIASTOLIC BLOOD PRESSURE: 61 MMHG | HEIGHT: 69 IN | WEIGHT: 161 LBS | OXYGEN SATURATION: 96 % | HEART RATE: 88 BPM | BODY MASS INDEX: 23.85 KG/M2 | SYSTOLIC BLOOD PRESSURE: 101 MMHG

## 2023-07-26 PROCEDURE — 99214 OFFICE O/P EST MOD 30 MIN: CPT

## 2023-07-26 PROCEDURE — 93000 ELECTROCARDIOGRAM COMPLETE: CPT

## 2023-08-04 NOTE — CARDIOLOGY SUMMARY
[de-identified] : 7/26/23 - unchanged 6/16/23 - A paced, RBBB, HR 81 4/10/23 - AV paced; HR 61 [de-identified] : \par 4/10/23 - TTE - EF 38%, LVEDD 6.7 cm, severe MR with systolic reversal in pulmonic vein, severe TR, RVSP 49; IVC dilated\par \par 3/27/23 (SB WARREN) - EF 35-40%, paradoxical septal motion, RV enlargement/dysfunction, bioAVR (mean gradient 10) with mild AI, marked MAC with posterior leaflet immobility, mild prolapse of anterior leaflet with torn chords, severe posterior directed MR w/ systolic reversal in PV, moderate TR; no intracardiac clot [de-identified] : 5/24/23 - pLAD 40%; mRCA 40%; RA 16, RV 76/19, PA 77/34/50; PCWP 25 (v 35); /67/84; CO/CI 3.5/1.86; SVR 1560; PVR 7.1 (driven by low CI)

## 2023-08-04 NOTE — PHYSICAL EXAM
[Well Developed] : well developed [Well Nourished] : well nourished [No Acute Distress] : no acute distress [Normal Conjunctiva] : normal conjunctiva [No Carotid Bruit] : no carotid bruit [Normal S1, S2] : normal S1, S2 [No Rub] : no rub [No Gallop] : no gallop [Clear Lung Fields] : clear lung fields [Good Air Entry] : good air entry [No Respiratory Distress] : no respiratory distress  [Soft] : abdomen soft [Non Tender] : non-tender [No Masses/organomegaly] : no masses/organomegaly [Normal Bowel Sounds] : normal bowel sounds [Normal Gait] : normal gait [No Cyanosis] : no cyanosis [No Clubbing] : no clubbing [No Varicosities] : no varicosities [No Rash] : no rash [No Skin Lesions] : no skin lesions [Moves all extremities] : moves all extremities [No Focal Deficits] : no focal deficits [Normal Speech] : normal speech [Alert and Oriented] : alert and oriented [Normal memory] : normal memory [de-identified] : JVP approx 12 cm w/ HJR and v waves to 14 [de-identified] : RRR; grade III/VI systolic murmur best heard in axilla [de-identified] : b/l 1+ edema

## 2023-08-04 NOTE — HISTORY OF PRESENT ILLNESS
[FreeTextEntry1] : Mr. Sher is an 83 y/o M w/ h/o CAD c/b IWMI (1994) s/p angioplasty, aortic stenosis s/p bioAVR 2004), VT arrest (2017) s/p ICD, afib s/p multiple DCCV and ablation x2 (2020 and 2023; on AC), prior Ao aneurysm s/p Bentall (2021), severe MR with MAC, atrophic kidney with CKD (b/l Cr 1.9; GFR 34), HFrEF (35-40%; LVEDD 6.7 cm) who is being considered for APOLLO (TMVR) trial. Referred by Dr. Valentino. Also followed by Dr. Romero (primary cardiologist).   For full initial details, please refer to note from 6/16/23.   Since last seen, he was brought to the committee for the APOLLO trial but was declined due to severe pulmonary hypertension (RVSP 70s) and suboptimal medical therapy.  Has tried Farxiga 5 mg (third attempt) 2 weeks prior but reports had LE swelling and felt increasingly dehydrated with kidney pain so had stopped it. Felt better afterwards. Had previously tried Jardiance about 1 year ago with same issue.   Last visit, felt fatigue with 100 feet and has to stop for a couple of seconds which is the same as prior. Denies orthopnea. Uses 2 pillows. Reports occasional bendopnea. Feels more dyspneic with inclines. Feels a little better than prior since he had a iron infusion.   Monitors weight daily - has been 156-160 pounds. Adherent to sodium but does endorses drinking 2 quarts/day. Takes torsemide 10 mg daily with good effect. Has taken extra torsemide at times with good effect. Continues to take metolazone 2.5 mg twice/week.   Had recent labs - believes Cr was 1.9.

## 2023-08-04 NOTE — ASSESSMENT
[FreeTextEntry1] : Mr. Sher is an 83 y/o M w/ h/o CAD c/b IWMI (1994) s/p angioplasty, aortic stenosis s/p bioAVR 2004), VT arrest (2017) s/p ICD, afib s/p multiple DCCV and ablation x2 (2020 and 2023; on AC), prior Ao aneurysm s/p Bentall (2021), severe MR with MAC, atrophic kidney with CKD (b/l Cr 1.9; GFR 34), HFrEF (35-40%; LVEDD 6.7 cm)  who is being considered for APOLLO (TMVR) trial but needs further optimization as has pulmonary HTN and concern was severe RV dysfunction and pulmonary HTN. Currently endorses NYHA class III symptoms and is overloaded. Will attempt to diurese and further escalate medications to reduce pulmonary pressures. Difficult situation as pulmonary HTN is possible cpcPH or sequelae of long-standing L sided valvular disease.   1. Severe MR with MAC - structural degeneration - increase torsemide to 40 mg twice/day for 3 days, then reduce to 20 mg daily; instructed to take extra 10 mg as needed in afternoon for weight gain of 3 pounds in 1 day or 5 pounds in 1 week; suspect dry weight is approx 152-154 pounds - can use metolazone prn but would attempt to increase loop diuretic first  - counseled about sodium/fluid restriction - discussed disease process with patient and family  - keep log of weight/BP - unable to tolerate SGLT2i as attempted multiple times with adverse effect (e.g. back pain, worsening breathing) and would like not to try again - unable to tolerate ACEi/ARB/MRA due to single kidney with low GFR (30s) - increase hydralazine to 25 mg three times/day  - discussed possibility of admission to optimize him but he would like to refrain from that for now as he feels reasonable   2. HFrEF - possibly mixed - c/w hydral as above - c/w toprol 100mg daily   3. Afib  - c/w AC   4. Pulmonary HTN - PVR 7 however driven by low CO/CI as well; TPG 25; DPG 9 - will reassess with further optimization   RTC 4 weeks

## 2023-08-08 ENCOUNTER — NON-APPOINTMENT (OUTPATIENT)
Age: 83
End: 2023-08-08

## 2023-08-14 ENCOUNTER — NON-APPOINTMENT (OUTPATIENT)
Age: 83
End: 2023-08-14

## 2023-08-16 ENCOUNTER — INPATIENT (INPATIENT)
Facility: HOSPITAL | Age: 83
LOS: 13 days | Discharge: HOME HEALTH | DRG: 286 | End: 2023-08-30
Attending: THORACIC SURGERY (CARDIOTHORACIC VASCULAR SURGERY) | Admitting: THORACIC SURGERY (CARDIOTHORACIC VASCULAR SURGERY)
Payer: MEDICARE

## 2023-08-16 ENCOUNTER — LABORATORY RESULT (OUTPATIENT)
Age: 83
End: 2023-08-16

## 2023-08-16 ENCOUNTER — NON-APPOINTMENT (OUTPATIENT)
Age: 83
End: 2023-08-16

## 2023-08-16 VITALS
WEIGHT: 163.14 LBS | DIASTOLIC BLOOD PRESSURE: 75 MMHG | HEART RATE: 80 BPM | SYSTOLIC BLOOD PRESSURE: 107 MMHG | HEIGHT: 69 IN | OXYGEN SATURATION: 97 % | TEMPERATURE: 98 F | RESPIRATION RATE: 17 BRPM

## 2023-08-16 DIAGNOSIS — I50.9 HEART FAILURE, UNSPECIFIED: ICD-10-CM

## 2023-08-16 DIAGNOSIS — Z98.890 OTHER SPECIFIED POSTPROCEDURAL STATES: Chronic | ICD-10-CM

## 2023-08-16 DIAGNOSIS — I48.91 UNSPECIFIED ATRIAL FIBRILLATION: ICD-10-CM

## 2023-08-16 DIAGNOSIS — Z90.79 ACQUIRED ABSENCE OF OTHER GENITAL ORGAN(S): Chronic | ICD-10-CM

## 2023-08-16 DIAGNOSIS — Z95.810 PRESENCE OF AUTOMATIC (IMPLANTABLE) CARDIAC DEFIBRILLATOR: Chronic | ICD-10-CM

## 2023-08-16 DIAGNOSIS — Z95.2 PRESENCE OF PROSTHETIC HEART VALVE: Chronic | ICD-10-CM

## 2023-08-16 DIAGNOSIS — I34.0 NONRHEUMATIC MITRAL (VALVE) INSUFFICIENCY: ICD-10-CM

## 2023-08-16 DIAGNOSIS — Z95.0 PRESENCE OF CARDIAC PACEMAKER: Chronic | ICD-10-CM

## 2023-08-16 LAB
ALBUMIN SERPL ELPH-MCNC: 4.5 G/DL — SIGNIFICANT CHANGE UP (ref 3.3–5)
ALP SERPL-CCNC: 249 U/L — HIGH (ref 40–120)
ALT FLD-CCNC: 35 U/L — SIGNIFICANT CHANGE UP (ref 10–45)
ANION GAP SERPL CALC-SCNC: 16 MMOL/L — SIGNIFICANT CHANGE UP (ref 5–17)
APPEARANCE UR: CLEAR — SIGNIFICANT CHANGE UP
APTT BLD: 37.8 SEC — HIGH (ref 24.5–35.6)
AST SERPL-CCNC: 28 U/L — SIGNIFICANT CHANGE UP (ref 10–40)
BASOPHILS # BLD AUTO: 0.04 K/UL — SIGNIFICANT CHANGE UP (ref 0–0.2)
BASOPHILS NFR BLD AUTO: 0.6 % — SIGNIFICANT CHANGE UP (ref 0–2)
BILIRUB SERPL-MCNC: 1 MG/DL — SIGNIFICANT CHANGE UP (ref 0.2–1.2)
BILIRUB UR-MCNC: NEGATIVE — SIGNIFICANT CHANGE UP
BUN SERPL-MCNC: 102 MG/DL — HIGH (ref 7–23)
CALCIUM SERPL-MCNC: 10.1 MG/DL — SIGNIFICANT CHANGE UP (ref 8.4–10.5)
CHLORIDE SERPL-SCNC: 92 MMOL/L — LOW (ref 96–108)
CO2 SERPL-SCNC: 28 MMOL/L — SIGNIFICANT CHANGE UP (ref 22–31)
COLOR SPEC: SIGNIFICANT CHANGE UP
CREAT SERPL-MCNC: 2.84 MG/DL — HIGH (ref 0.5–1.3)
DIFF PNL FLD: NEGATIVE — SIGNIFICANT CHANGE UP
EGFR: 21 ML/MIN/1.73M2 — LOW
EOSINOPHIL # BLD AUTO: 0.08 K/UL — SIGNIFICANT CHANGE UP (ref 0–0.5)
EOSINOPHIL NFR BLD AUTO: 1.1 % — SIGNIFICANT CHANGE UP (ref 0–6)
GLUCOSE SERPL-MCNC: 102 MG/DL — HIGH (ref 70–99)
GLUCOSE UR QL: NEGATIVE — SIGNIFICANT CHANGE UP
HCT VFR BLD CALC: 38.7 % — LOW (ref 39–50)
HGB BLD-MCNC: 12.6 G/DL — LOW (ref 13–17)
IMM GRANULOCYTES NFR BLD AUTO: 0.4 % — SIGNIFICANT CHANGE UP (ref 0–0.9)
INR BLD: 1.93 RATIO — HIGH (ref 0.85–1.18)
KETONES UR-MCNC: NEGATIVE — SIGNIFICANT CHANGE UP
LEUKOCYTE ESTERASE UR-ACNC: NEGATIVE — SIGNIFICANT CHANGE UP
LYMPHOCYTES # BLD AUTO: 1.08 K/UL — SIGNIFICANT CHANGE UP (ref 1–3.3)
LYMPHOCYTES # BLD AUTO: 15.2 % — SIGNIFICANT CHANGE UP (ref 13–44)
MCHC RBC-ENTMCNC: 29.2 PG — SIGNIFICANT CHANGE UP (ref 27–34)
MCHC RBC-ENTMCNC: 32.6 GM/DL — SIGNIFICANT CHANGE UP (ref 32–36)
MCV RBC AUTO: 89.6 FL — SIGNIFICANT CHANGE UP (ref 80–100)
MONOCYTES # BLD AUTO: 0.81 K/UL — SIGNIFICANT CHANGE UP (ref 0–0.9)
MONOCYTES NFR BLD AUTO: 11.4 % — SIGNIFICANT CHANGE UP (ref 2–14)
MRSA PCR RESULT.: SIGNIFICANT CHANGE UP
NEUTROPHILS # BLD AUTO: 5.05 K/UL — SIGNIFICANT CHANGE UP (ref 1.8–7.4)
NEUTROPHILS NFR BLD AUTO: 71.3 % — SIGNIFICANT CHANGE UP (ref 43–77)
NITRITE UR-MCNC: NEGATIVE — SIGNIFICANT CHANGE UP
NRBC # BLD: 0 /100 WBCS — SIGNIFICANT CHANGE UP (ref 0–0)
PH UR: 6 — SIGNIFICANT CHANGE UP (ref 5–8)
PLATELET # BLD AUTO: 158 K/UL — SIGNIFICANT CHANGE UP (ref 150–400)
POTASSIUM SERPL-MCNC: 3.8 MMOL/L — SIGNIFICANT CHANGE UP (ref 3.5–5.3)
POTASSIUM SERPL-SCNC: 3.8 MMOL/L — SIGNIFICANT CHANGE UP (ref 3.5–5.3)
PROT SERPL-MCNC: 7.4 G/DL — SIGNIFICANT CHANGE UP (ref 6–8.3)
PROT UR-MCNC: NEGATIVE — SIGNIFICANT CHANGE UP
PROTHROM AB SERPL-ACNC: 19.9 SEC — HIGH (ref 9.5–13)
RBC # BLD: 4.32 M/UL — SIGNIFICANT CHANGE UP (ref 4.2–5.8)
RBC # FLD: 19.9 % — HIGH (ref 10.3–14.5)
S AUREUS DNA NOSE QL NAA+PROBE: SIGNIFICANT CHANGE UP
SODIUM SERPL-SCNC: 136 MMOL/L — SIGNIFICANT CHANGE UP (ref 135–145)
SP GR SPEC: 1.01 — SIGNIFICANT CHANGE UP (ref 1.01–1.02)
T3FREE SERPL-MCNC: 2.19 PG/ML — SIGNIFICANT CHANGE UP (ref 2–4.4)
T4 FREE SERPL-MCNC: 1.4 NG/DL — SIGNIFICANT CHANGE UP (ref 0.9–1.8)
TSH SERPL-MCNC: 5.96 UIU/ML — HIGH (ref 0.27–4.2)
UROBILINOGEN FLD QL: NEGATIVE — SIGNIFICANT CHANGE UP
WBC # BLD: 7.09 K/UL — SIGNIFICANT CHANGE UP (ref 3.8–10.5)
WBC # FLD AUTO: 7.09 K/UL — SIGNIFICANT CHANGE UP (ref 3.8–10.5)

## 2023-08-16 PROCEDURE — 93010 ELECTROCARDIOGRAM REPORT: CPT

## 2023-08-16 PROCEDURE — 71045 X-RAY EXAM CHEST 1 VIEW: CPT | Mod: 26

## 2023-08-16 RX ORDER — PANTOPRAZOLE SODIUM 20 MG/1
40 TABLET, DELAYED RELEASE ORAL
Refills: 0 | Status: DISCONTINUED | OUTPATIENT
Start: 2023-08-16 | End: 2023-08-30

## 2023-08-16 RX ORDER — LANOLIN ALCOHOL/MO/W.PET/CERES
3 CREAM (GRAM) TOPICAL AT BEDTIME
Refills: 0 | Status: DISCONTINUED | OUTPATIENT
Start: 2023-08-16 | End: 2023-08-30

## 2023-08-16 RX ORDER — ACETAMINOPHEN 500 MG
650 TABLET ORAL EVERY 6 HOURS
Refills: 0 | Status: DISCONTINUED | OUTPATIENT
Start: 2023-08-16 | End: 2023-08-30

## 2023-08-16 RX ORDER — SODIUM CHLORIDE 9 MG/ML
3 INJECTION INTRAMUSCULAR; INTRAVENOUS; SUBCUTANEOUS EVERY 8 HOURS
Refills: 0 | Status: DISCONTINUED | OUTPATIENT
Start: 2023-08-16 | End: 2023-08-30

## 2023-08-16 RX ORDER — POLYETHYLENE GLYCOL 3350 17 G/17G
17 POWDER, FOR SOLUTION ORAL DAILY
Refills: 0 | Status: DISCONTINUED | OUTPATIENT
Start: 2023-08-16 | End: 2023-08-30

## 2023-08-16 RX ORDER — BUMETANIDE 0.25 MG/ML
4 INJECTION INTRAMUSCULAR; INTRAVENOUS
Refills: 0 | Status: DISCONTINUED | OUTPATIENT
Start: 2023-08-16 | End: 2023-08-17

## 2023-08-16 RX ORDER — TAMSULOSIN HYDROCHLORIDE 0.4 MG/1
1 CAPSULE ORAL
Qty: 0 | Refills: 0 | DISCHARGE

## 2023-08-16 RX ORDER — ONDANSETRON 8 MG/1
4 TABLET, FILM COATED ORAL EVERY 8 HOURS
Refills: 0 | Status: DISCONTINUED | OUTPATIENT
Start: 2023-08-16 | End: 2023-08-30

## 2023-08-16 RX ORDER — ATORVASTATIN CALCIUM 80 MG/1
40 TABLET, FILM COATED ORAL AT BEDTIME
Refills: 0 | Status: DISCONTINUED | OUTPATIENT
Start: 2023-08-16 | End: 2023-08-30

## 2023-08-16 RX ORDER — METOPROLOL TARTRATE 50 MG
100 TABLET ORAL DAILY
Refills: 0 | Status: DISCONTINUED | OUTPATIENT
Start: 2023-08-16 | End: 2023-08-20

## 2023-08-16 RX ORDER — LEVOTHYROXINE SODIUM 125 MCG
50 TABLET ORAL DAILY
Refills: 0 | Status: DISCONTINUED | OUTPATIENT
Start: 2023-08-16 | End: 2023-08-30

## 2023-08-16 RX ORDER — BUMETANIDE 0.25 MG/ML
4 INJECTION INTRAMUSCULAR; INTRAVENOUS
Refills: 0 | Status: DISCONTINUED | OUTPATIENT
Start: 2023-08-16 | End: 2023-08-16

## 2023-08-16 RX ORDER — HYDRALAZINE HCL 50 MG
25 TABLET ORAL THREE TIMES A DAY
Refills: 0 | Status: DISCONTINUED | OUTPATIENT
Start: 2023-08-16 | End: 2023-08-23

## 2023-08-16 RX ADMIN — Medication 3 MILLIGRAM(S): at 21:07

## 2023-08-16 RX ADMIN — BUMETANIDE 132 MILLIGRAM(S): 0.25 INJECTION INTRAMUSCULAR; INTRAVENOUS at 18:06

## 2023-08-16 RX ADMIN — ATORVASTATIN CALCIUM 40 MILLIGRAM(S): 80 TABLET, FILM COATED ORAL at 21:07

## 2023-08-16 RX ADMIN — SODIUM CHLORIDE 3 MILLILITER(S): 9 INJECTION INTRAMUSCULAR; INTRAVENOUS; SUBCUTANEOUS at 21:11

## 2023-08-16 RX ADMIN — Medication 25 MILLIGRAM(S): at 21:07

## 2023-08-16 NOTE — H&P ADULT - NSHPPHYSICALEXAM_GEN_ALL_CORE
General: Well nourished, well developed, no acute distress.                                                         Neuro: Normal exam oriented to person/place & time with no focal motor or sensory  deficits.                    Eyes: Normal exam of conjunctiva & lids, pupils equally reactive.   ENT: Normal exam of nasal/oral mucosa with absence of cyanosis.   Neck: Normal exam of jugular veins, trachea & thyroid.   Chest: Normal lung exam with good air movement absence of wheezes, rales, or rhonchi:                                                                          CV:  Auscultation: normal [ ] S3[ ] S4[ ] Irregular [ ] Rub[ ] Clicks[ ]  Murmurs none:[ ]systolic [ ]  diastolic [ ] holosystolic [ ]  Carotids: No Bruits[ ] Other____________ Abdominal Aorta: normal [ ] nonpalpable[ ]                                                                         GI: Normal exam of abdomen, liver & spleen with no noted masses or tenderness.  (+) BS X 4 Quadrants, Nontender / Non Distended.                                                                                             Extremities: Normal no evidence of cyanosis or deformity Edema: none[ ]trace[ ]1+[ ]2+[ ]3+[ ]4+[ ]  Lower Extremity Pulses: Right[ ] Left[ ]Varicosities[ ]  SKIN : Normal exam to inspection & palpation.                                                           PHYSICAL EXAM  Vital Signs Last 24 Hrs  T(C): 36.4 (16 Aug 2023 16:36), Max: 36.4 (16 Aug 2023 16:36)  T(F): 97.5 (16 Aug 2023 16:36), Max: 97.5 (16 Aug 2023 16:36)  HR: 80 (16 Aug 2023 16:36) (80 - 80)  BP: 107/75 (16 Aug 2023 16:36) (107/75 - 107/75)  BP(mean): --  RR: 17 (16 Aug 2023 16:36) (17 - 17)  SpO2: 97% (16 Aug 2023 16:36) (97% - 97%)    Parameters below as of 16 Aug 2023 16:36  Patient On (Oxygen Delivery Method): room air General: Well nourished, well developed, no acute distress.                                                         Neuro: Normal exam oriented to person/place & time with no focal motor or sensory  deficits.                    Eyes: Normal exam of conjunctiva & lids, pupils equally reactive.   ENT: Normal exam of nasal/oral mucosa with absence of cyanosis.   Neck: Normal exam of jugular veins, trachea & thyroid.   Chest: Normal lung exam with good air movement absence of wheezes, rales, or rhonchi:                                                                          CV:  Auscultation: normal [X ] S3[ ] S4[ ] Irregular [ ] Rub[ ] Clicks[ ]  Murmurs none:[]systolic [X ]  diastolic [ ] holosystolic [ ] (+) JVD   Carotids: No Bruits[+1] Other____________ Abdominal Aorta: normal [X ] nonpalpable[X ]                                                                         GI: Normal exam of abdomen, liver & spleen with no noted masses or tenderness.  (+) BS X 4 Quadrants, Nontender / Non Distended.                                                                                             Extremities: Normal no evidence of cyanosis or deformity Edema: none[ ]trace[ ]1+[X ]2+[ ]3+[ ]4+[ ]  Lower Extremity Pulses: Right[+1 ] Left[+1 ]Varicosities[- ]  SKIN : Normal exam to inspection & palpation.                                                           PHYSICAL EXAM  Vital Signs Last 24 Hrs  T(C): 36.4 (16 Aug 2023 16:36), Max: 36.4 (16 Aug 2023 16:36)  T(F): 97.5 (16 Aug 2023 16:36), Max: 97.5 (16 Aug 2023 16:36)  HR: 80 (16 Aug 2023 16:36) (80 - 80)  BP: 107/75 (16 Aug 2023 16:36) (107/75 - 107/75)  BP(mean): --  RR: 17 (16 Aug 2023 16:36) (17 - 17)  SpO2: 97% (16 Aug 2023 16:36) (97% - 97%)    Parameters below as of 16 Aug 2023 16:36  Patient On (Oxygen Delivery Method): room air

## 2023-08-16 NOTE — H&P ADULT - PROBLEM SELECTOR PLAN 1
Admit to 2 [facility] Telemetry Floor   Portable CXR ordered   Continue on Toprol 100 mg PO daily   Torsemide D/C'd started on Bumex 4 mg IV BID   Labs: CMP / CBC / PT/ INR/ TSH/ A1C   Activity as tolerated  Heart Failure Consult called   Structural Team to follow

## 2023-08-16 NOTE — H&P ADULT - NSICDXPASTMEDICALHX_GEN_ALL_CORE_FT
PAST MEDICAL HISTORY:  AICD (automatic cardioverter/defibrillator) present     Aortic stenosis     Aortic valve regurgitation     Ascending aorta dilation     Cardiac arrest     H/O paroxysmal supraventricular tachycardia     HLD (hyperlipidemia)     Mitral valve regurgitation     Severe mitral regurgitation

## 2023-08-16 NOTE — H&P ADULT - PROBLEM SELECTOR PLAN 3
Per Dr. Valentino hold Xarelto for today for possible RHC in AM.  Continue on Toprol 100 mg PO daily

## 2023-08-16 NOTE — H&P ADULT - HISTORY OF PRESENT ILLNESS
83 yo Male    Past Medical History  Aortic stenosis    AICD (automatic cardioverter/defibrillator) present    Aortic valve regurgitation    Ascending aorta dilation    H/O paroxysmal supraventricular tachycardia    HLD (hyperlipidemia)    Mitral valve regurgitation    S/P ablation of atrial fibrillation    Cardiac arrest    Severe mitral regurgitation        Past Surgical History  S/P aortic valve replacement  3/13/2004    S/P hernia repair  2002    History of tonsillectomy and adenoidectomy    S/P TURP  1996    S/P colonoscopy  2001, 2002, 2006, 2011, 2016    S/P endoscopy  2006    S/P cardiac cath  1994, 1995, 1999, 2002, 2004    AICD (automatic cardioverter/defibrillator) present  12/19/17    Cardiac pacemaker  12/19/17    History of cardioversion  9/11/20    S/P ablation of atrial fibrillation  10/29/20    Status post ablation of ventricular arrhythmia  2/14/19        MEDICATIONS  (STANDING):  atorvastatin 40 milliGRAM(s) Oral at bedtime  hydrALAZINE 25 milliGRAM(s) Oral three times a day  levothyroxine 50 MICROGram(s) Oral daily  metoprolol succinate  milliGRAM(s) Oral daily  pantoprazole    Tablet 40 milliGRAM(s) Oral before breakfast  polyethylene glycol 3350 17 Gram(s) Oral daily  sodium chloride 0.9% lock flush 3 milliLiter(s) IV Push every 8 hours                                   83 yo Male with PMHx of  HTN, HLD, GERD, CHF, moderately reduced EF 35-40%, AFIB (on Xarelto) s/p ablation, CAD with h/o Atherectomy,  IWMI 1994, VT ARREST IN 12/2017, AICD, s/p AVR in 2004, s/p AORTIC ANEURYSM AND MV repair with Dr Fenton 6/2/21, and now severe MR.  Patient reports worsening SOB x 2 days, with associated symptoms of fatigue, deconditioning and inability to walk long distance.  Presents today as a direct admission for CHF exacerbation and requiring medical optimization.     Of Note: Patient is known to our service has been following outpatient with Dr. Valentino and Structural Team for possible TMVR.

## 2023-08-16 NOTE — H&P ADULT - NSHPREVIEWOFSYSTEMS_GEN_ALL_CORE
Review of Systems  GENERAL:  Fevers[] chills[] sweats[] fatigue[] weight loss[] weight gain []                                        NEURO:  parathesias[] seizures []  syncope []  confusion []                                                                                  EYES: glasses[]  blurry vision[]  discharge[] pain[] glaucoma []                                                                            ENMT:  difficulty hearing []  vertigo[]  dysphagia[] epistaxis[] recent dental work []                                      CV:  chest pain[] palpitations[] LUA [] diaphoresis [] edema[]                                                                                             RESPIRATORY:  wheezing[] SOB[] cough [] sputum[] hemoptysis[]                                                                    GI:  nausea[]  vomiting []  diarrhea[] constipation [] melena []                                                                        : hematuria[ ]  dysuria[ ] urgency[] incontinence[]                                                                                              MUSCULOSKELETAL:  arthritis[ ]  joint swelling [ ] muscle weakness [ ]                                                                  SKIN/BREAST:  rash[ ] itching [ ]  hair loss[ ] masses[ ]                                                                                                PSYCH:  dementia [ ] depression [ ] anxiety[ ]                                                                                                                  HEME/LYMPH:  bruises easily[ ] enlarged lymph nodes[ ] tender lymph nodes[ ]                                                 ENDOCRINE:  cold intolerance[ ] heat intolerance[ ] polydipsia[ ] Review of Systems  GENERAL:  Fevers[] chills[] sweats[] fatigue[] weight loss[] weight gain []                                        NEURO:  parathesias[] seizures []  syncope []  confusion []                                                                                  EYES: glasses[]  blurry vision[]  discharge[] pain[] glaucoma []                                                                            ENMT:  difficulty hearing []  vertigo[]  dysphagia[] epistaxis[] recent dental work []                                      CV:  chest pain[] palpitations[] LUA [X] diaphoresis [] edema[X]                                                                                             RESPIRATORY:  wheezing[] SOB[X] cough [] sputum[] hemoptysis[]                                                                    GI:  nausea[]  vomiting []  diarrhea[] constipation [] melena []                                                                        : hematuria[ ]  dysuria[ ] urgency[] incontinence[]                                                                                              MUSCULOSKELETAL:  arthritis[ ]  joint swelling [ ] muscle weakness [ ]                                                                  SKIN/BREAST:  rash[ ] itching [ ]  hair loss[ ] masses[ ]                                                                                                PSYCH:  dementia [ ] depression [ ] anxiety[ ]                                                                                                                  HEME/LYMPH:  bruises easily[ ] enlarged lymph nodes[ ] tender lymph nodes[ ]                                                 ENDOCRINE:  cold intolerance[ ] heat intolerance[ ] polydipsia[ ]

## 2023-08-16 NOTE — H&P ADULT - ASSESSMENT
83 yo Male with PMHx of  HTN, HLD, GERD, CHF, moderately reduced EF 35-40%, AFIB (on Xarelto) s/p ablation, CAD with h/o Atherectomy,  IWMI 1994, VT ARREST IN 12/2017, AICD, s/p AVR in 2004, s/p AORTIC ANEURYSM AND MV repair with Dr Fenton 6/2/21, and now severe MR.  Patient reports worsening SOB x 2 days, with associated symptoms of fatigue, deconditioning and inability to walk long distance.  Presents today as a direct admission for CHF exacerbation and requiring medical optimization.     Hospital Course:   8/16 Admit to 2 [facility] Telemetry Floor.  Heart Failure consult called.  CXR ordered.  D/C'd Torsemide and started on Bumex 4 mg IV BID. Per Dr. Valentino hold Xarelto for today for possible RHC in AM.  Awaiting HF reccs.

## 2023-08-16 NOTE — H&P ADULT - NSHPSOCIALHISTORY_GEN_ALL_CORE
SOCIAL HISTORY:  Smoker: [ ] Yes  [ ] No        PACK YEARS:                         WHEN QUIT?  ETOH use: [ ] Yes  [ ] No              FREQUENCY / QUANTITY:  Ilicit Drug use:  [ ] Yes  [ ] No  Occupation:  Live with:  Assist device use: SOCIAL HISTORY:  Smoker: [ ] Yes  [X ] No        PACK YEARS:                         WHEN QUIT?  ETOH use: [ ] Yes  [X ] No              FREQUENCY / QUANTITY:  Ilicit Drug use:  [ ] Yes  [X] No  Occupation: Retired   Live with: Spouse   Assist device use: Denies

## 2023-08-16 NOTE — INPATIENT CERTIFICATION FOR MEDICARE PATIENTS - THE STATUS OF COMORBIDITIES.
CRYOTHERAPY/   LIQUID NITROGEN THERAPY      Cryotherapy is used to treat warts, seborrheic keratoses, actinic keratoses, and other benign and pre-malignant lesions.    Cryotherapy destroys tissue by direct freezing and thawing of skin lesions.  Larger lesions may require multiple treatments or longer freezing and thawing cycles.    Side effects include:     Swelling of area treated and the surrounding area (especially on face)   Mild to moderate pain   Redness around the areas     Blisters   Numbness   Mild scarring   Local pigment changes    If you develop blisters, you may:    1)  Sterilize a needle with heat or rubbing alcohol and puncture the blister.    2)  Apply Vaseline or Aquaphor and a Band-Aid.    Call the clinic if you develop:  1)  Large blisters that do not heal within 3 days.    2)  Bleeding.    3)  Open sores that do not heal.    4)  Open sores that have draining pus, spreading redness, tenderness or       warmth.           5)  Return to clinic if lesion persists after 1 month.      What if I am having some discomfort?  Following minor procedure, the discomfort is usually mild.  You may use Tylenol, Extra Strength Tylenol, Naproxen or Ibuprofen.    If you are already taking daily aspirin or other blood thinners, you do NOT have to discontinue your daily use.    For any concerns, call the Dermatology clinic at:  • [phone], during business hours Monday-Friday  • [phone] evenings after 5 pm, weekends, holidays  (rev 9/15)         
2. The status of comorbities. (See ED/admit documents)

## 2023-08-16 NOTE — H&P ADULT - PROBLEM SELECTOR PLAN 2
Heart Failure Consult called   Continue on Toprol 100 mg PO daily   Torsemide D/C'd started on Bumex 4 mg IV BID  Continue on Hydralazine 25 mg PO TID

## 2023-08-16 NOTE — PATIENT PROFILE ADULT - FUNCTIONAL ASSESSMENT - DAILY ACTIVITY 2.
Rehabilitation Medicine coverage for Francis Jeffery MD    Primary Rehabilitation Diagnosis: brain abcess/BI  Planned Discharge Destination: Home (12/14/21)  Expected Discharge Date: 12/14/2021    Chief Complaint:  New pared mobility, self cares cognition    Subjective:  Had a pretty good night.  Slept okay.  Denies any pain.  No complaints to address.  No concerns from nursing to address.     Physical Exam:     Vital Last Value 24 Hour Range   Temperature 97.9 °F (36.6 °C) (12/04/21 0543) Temp  Min: 97.9 °F (36.6 °C)  Max: 97.9 °F (36.6 °C)   Pulse 76 (12/04/21 0805) Pulse  Min: 64  Max: 76   Respiratory 17 (12/04/21 0543) Resp  Min: 17  Max: 17   Non-Invasive  Blood Pressure 118/72 (12/04/21 0805) BP  Min: 118/72  Max: 135/78   Pulse Oximetry 93 % (12/04/21 0543) SpO2  Min: 93 %  Max: 95 %     She is a well-developed and well-nourished lady in no apparent distress, resting comfortably in bed after just getting off the commode.  Head is normocephalic.  The right frontal craniotomy incision is clean, dry and intact without erythema.  No scleral icterus or injection.  Skin is and dry.  Nares are patent bilaterally.  Hearing conversational.  Oral mucosa pink and moist.  Neck is supple.  Trachea midline.  Jugular venous distention 30°.  She answers my questions in a goal-directed fashion but has clear cognitive impairment.  She does know where she is, as far as the hospital, but is not sure of the day or the date  Extraocular movements are intact.  Does have a left-sided facial droop.  She moves the upper and lower extremities on request with functional and symmetric but with some mild left-sided weakness, the arm is about 4/5, the leg is about 4+/five.    Reviewed: PMH, allergies, medications, labs    Current Functional status over the last 24 hours:    Mobility Documentation:  Supine to Sit: Touching/Steadying Assistance (12/03/21 1331)  Sit to Stand: Touching/Steadying Assistance (12/04/21 0930)  Stand to Sit:  Touching/Steadying Assistance (12/04/21 0930)  Gait Assistance: Minimal Assist (Min) (12/04/21 0930)  Assistive Device/: 1 Person;Gait Belt (12/04/21 0930)  Ambulation Distance (Feet): 60 Feet (12/04/21 0930)   Selfcare Documentation:  Eating Assistance: Supervision (pt traditionally uses hands for self feeding.) (12/02/21 0930)  Grooming Assistance: Minimal Assist (Min) (for VCs) (12/03/21 1100)  Bathing Assistance: Minimal Assist (Min) (12/03/21 1100)  Upper Body Dressing Assistance: Partial/Moderate Assistance (gown) (12/03/21 1100)  Lower Body Clothing Assistance: Substantial/Maximal Assistance (12/02/21 0930)  Communication/Cognition/Swallowing Documentation:  Swallow/Feeding Tips: Feeds self with periodic supervision;See posted feeding/swallowing guidelines;Needs assist with feeding (12/03/21 1000)     Quality:  VTE Pharmacologic Prophylaxis: Yes  VTE Mechanical Prophylaxis: Yes    Active rehabilitation diagnosis assessment and plan:  1. Brain abscess, status post right frontal craniotomy for evacuation, leaving him with left hemiparesis and impairments in all areas mobility, self cares, communication cognition.  Comorbid medical conditions are optimized to allow for maximal performance in and tolerance of therapy.  Continue with inpatient rehabilitation and discharge planning, without alteration in the normal therapy schedule.  Estimated discharge date right now is 12/14/2021    Other relevant diagnoses:  2. Hypertension, blood pressures are adequately controlled.  Continue present management.    3. DVT prophylaxis with heparin.    4. IV antibiotics status post abscess drainage to continue.    Dr. Israel follows for medical needs.    The patient is participating actively with the Rehabilitation Team and is making progress. The rehab team's documentation reviewed with current status noted above. Please see team conference reports for specific discharge plans.     I have considered how current  medical status and co-morbidities are impacting progress towards goals. The patient has continued functional deficits requiring inpatient rehabilitation. Continue intensive rehab, medical supervision, nursing cares and comprehensive discharge planning.     4 = No assist / stand by assistance

## 2023-08-17 DIAGNOSIS — I27.20 PULMONARY HYPERTENSION, UNSPECIFIED: ICD-10-CM

## 2023-08-17 LAB
A1C WITH ESTIMATED AVERAGE GLUCOSE RESULT: 5.7 % — HIGH (ref 4–5.6)
ALBUMIN SERPL ELPH-MCNC: 4 G/DL — SIGNIFICANT CHANGE UP (ref 3.3–5)
ALP SERPL-CCNC: 213 U/L — HIGH (ref 40–120)
ALT FLD-CCNC: 30 U/L — SIGNIFICANT CHANGE UP (ref 10–45)
ANION GAP SERPL CALC-SCNC: 17 MMOL/L — SIGNIFICANT CHANGE UP (ref 5–17)
AST SERPL-CCNC: 24 U/L — SIGNIFICANT CHANGE UP (ref 10–40)
BILIRUB SERPL-MCNC: 1.2 MG/DL — SIGNIFICANT CHANGE UP (ref 0.2–1.2)
BUN SERPL-MCNC: 97 MG/DL — HIGH (ref 7–23)
CALCIUM SERPL-MCNC: 9.5 MG/DL — SIGNIFICANT CHANGE UP (ref 8.4–10.5)
CHLORIDE SERPL-SCNC: 93 MMOL/L — LOW (ref 96–108)
CO2 SERPL-SCNC: 25 MMOL/L — SIGNIFICANT CHANGE UP (ref 22–31)
CREAT SERPL-MCNC: 2.63 MG/DL — HIGH (ref 0.5–1.3)
EGFR: 24 ML/MIN/1.73M2 — LOW
ESTIMATED AVERAGE GLUCOSE: 117 MG/DL — HIGH (ref 68–114)
GLUCOSE SERPL-MCNC: 98 MG/DL — SIGNIFICANT CHANGE UP (ref 70–99)
HCT VFR BLD CALC: 34.7 % — LOW (ref 39–50)
HGB BLD-MCNC: 11.4 G/DL — LOW (ref 13–17)
MCHC RBC-ENTMCNC: 29.2 PG — SIGNIFICANT CHANGE UP (ref 27–34)
MCHC RBC-ENTMCNC: 32.9 GM/DL — SIGNIFICANT CHANGE UP (ref 32–36)
MCV RBC AUTO: 88.7 FL — SIGNIFICANT CHANGE UP (ref 80–100)
NRBC # BLD: 0 /100 WBCS — SIGNIFICANT CHANGE UP (ref 0–0)
PLATELET # BLD AUTO: 130 K/UL — LOW (ref 150–400)
POTASSIUM SERPL-MCNC: 3 MMOL/L — LOW (ref 3.5–5.3)
POTASSIUM SERPL-SCNC: 3 MMOL/L — LOW (ref 3.5–5.3)
PROT SERPL-MCNC: 6.7 G/DL — SIGNIFICANT CHANGE UP (ref 6–8.3)
RBC # BLD: 3.91 M/UL — LOW (ref 4.2–5.8)
RBC # FLD: 19.8 % — HIGH (ref 10.3–14.5)
SODIUM SERPL-SCNC: 135 MMOL/L — SIGNIFICANT CHANGE UP (ref 135–145)
WBC # BLD: 6.57 K/UL — SIGNIFICANT CHANGE UP (ref 3.8–10.5)
WBC # FLD AUTO: 6.57 K/UL — SIGNIFICANT CHANGE UP (ref 3.8–10.5)

## 2023-08-17 PROCEDURE — 99231 SBSQ HOSP IP/OBS SF/LOW 25: CPT | Mod: FS

## 2023-08-17 PROCEDURE — 99222 1ST HOSP IP/OBS MODERATE 55: CPT

## 2023-08-17 RX ORDER — POTASSIUM CHLORIDE 20 MEQ
20 PACKET (EA) ORAL
Refills: 0 | Status: COMPLETED | OUTPATIENT
Start: 2023-08-17 | End: 2023-08-17

## 2023-08-17 RX ORDER — BUMETANIDE 0.25 MG/ML
4 INJECTION INTRAMUSCULAR; INTRAVENOUS
Refills: 0 | Status: DISCONTINUED | OUTPATIENT
Start: 2023-08-17 | End: 2023-08-18

## 2023-08-17 RX ORDER — BUMETANIDE 0.25 MG/ML
4 INJECTION INTRAMUSCULAR; INTRAVENOUS
Refills: 0 | Status: DISCONTINUED | OUTPATIENT
Start: 2023-08-17 | End: 2023-08-17

## 2023-08-17 RX ADMIN — SODIUM CHLORIDE 3 MILLILITER(S): 9 INJECTION INTRAMUSCULAR; INTRAVENOUS; SUBCUTANEOUS at 12:57

## 2023-08-17 RX ADMIN — BUMETANIDE 132 MILLIGRAM(S): 0.25 INJECTION INTRAMUSCULAR; INTRAVENOUS at 17:56

## 2023-08-17 RX ADMIN — SODIUM CHLORIDE 3 MILLILITER(S): 9 INJECTION INTRAMUSCULAR; INTRAVENOUS; SUBCUTANEOUS at 21:07

## 2023-08-17 RX ADMIN — PANTOPRAZOLE SODIUM 40 MILLIGRAM(S): 20 TABLET, DELAYED RELEASE ORAL at 05:55

## 2023-08-17 RX ADMIN — Medication 25 MILLIGRAM(S): at 13:31

## 2023-08-17 RX ADMIN — ATORVASTATIN CALCIUM 40 MILLIGRAM(S): 80 TABLET, FILM COATED ORAL at 21:13

## 2023-08-17 RX ADMIN — SODIUM CHLORIDE 3 MILLILITER(S): 9 INJECTION INTRAMUSCULAR; INTRAVENOUS; SUBCUTANEOUS at 05:53

## 2023-08-17 RX ADMIN — Medication 25 MILLIGRAM(S): at 21:13

## 2023-08-17 RX ADMIN — Medication 50 MICROGRAM(S): at 05:55

## 2023-08-17 RX ADMIN — Medication 25 MILLIGRAM(S): at 05:55

## 2023-08-17 RX ADMIN — Medication 3 MILLIGRAM(S): at 21:13

## 2023-08-17 RX ADMIN — Medication 20 MILLIEQUIVALENT(S): at 08:53

## 2023-08-17 RX ADMIN — BUMETANIDE 132 MILLIGRAM(S): 0.25 INJECTION INTRAMUSCULAR; INTRAVENOUS at 05:55

## 2023-08-17 RX ADMIN — Medication 20 MILLIEQUIVALENT(S): at 10:47

## 2023-08-17 RX ADMIN — Medication 100 MILLIGRAM(S): at 05:54

## 2023-08-17 NOTE — DIETITIAN INITIAL EVALUATION ADULT - PROBLEM SELECTOR PLAN 1
Admit to 2 [facility] Telemetry Floor   Portable CXR ordered   Continue on Toprol 100 mg PO daily   Torsemide D/C'd started on Bumex 4 mg IV BID   Labs: CMP / CBC / PT/ INR/ TSH/ A1C   Activity as tolerated  Heart Failure Consult called   Structural Team to follow

## 2023-08-17 NOTE — PROGRESS NOTE ADULT - ASSESSMENT
83 yo Male with PMHx of  HTN, HLD, GERD, CHF, moderately reduced EF 35-40%, AFIB (on Xarelto) s/p ablation, CAD with h/o Atherectomy,  IWMI 1994, VT ARREST IN 12/2017, AICD, s/p AVR in 2004, s/p AORTIC ANEURYSM AND MV repair with Dr Fenton 6/2/21, and now severe MR.  Patient reports worsening SOB x 2 days, with associated symptoms of fatigue, deconditioning and inability to walk long distance.  Presents today as a direct admission for CHF exacerbation and requiring medical optimization.     Hospital Course:   8/16 Admit to 2 [facility] Telemetry Floor.  Heart Failure consult called.  CXR ordered.  D/C'd Torsemide and started on Bumex 4 mg IV BID. Per Dr. Valentino hold Xarelto for today for possible RHC in AM.  Awaiting HF reccs.     8/17 VSS - bun 92 this am from 102 - as per Dr. Fenton - renal called  81 yo Male with PMHx of  HTN, HLD, GERD, CHF, moderately reduced EF 35-40%, AFIB (on Xarelto) s/p ablation, CAD with h/o Atherectomy,  IWMI 1994, VT ARREST IN 12/2017, AICD, s/p AVR in 2004, s/p AORTIC ANEURYSM AND MV repair with Dr Fenton 6/2/21, and now severe MR.  Patient reports worsening SOB x 2 days, with associated symptoms of fatigue, deconditioning and inability to walk long distance.  Presents today as a direct admission for CHF exacerbation and requiring medical optimization.     Hospital Course:   8/16 Admit to 2 [facility] Telemetry Floor.  Heart Failure consult called.  CXR ordered.  D/C'd Torsemide and started on Bumex 4 mg IV BID. Per Dr. Valentino hold Xarelto for today for possible RHC in AM.  Awaiting HF reccs.     8/17 VSS - bun 92 this am from 102 - as per Dr. Fenton - renal called - pt seen by renal & HF team - will continue Bumex 4mg IV bid-

## 2023-08-17 NOTE — CONSULT NOTE ADULT - SUBJECTIVE AND OBJECTIVE BOX
HPI:  83 yo Male with PMHx of  HTN, HLD, GERD, CHF, moderately reduced EF 35-40%, AFIB (on Xarelto) s/p ablation, CAD with h/o Atherectomy,  IWMI 1994, VT ARREST IN 12/2017, AICD, s/p AVR in 2004, s/p AORTIC ANEURYSM AND MV repair with Dr Fenton 6/2/21, and now severe MR.  Patient reports worsening SOB x 2 days, with associated symptoms of fatigue, deconditioning and inability to walk long distance.  Presents today as a direct admission for CHF exacerbation and requiring medical optimization.     Mr. Sher is known to the Structural Heart Team, he was being evaluated as an outpatient for severe mitral regurgitation and was consented for enrollment in the APO[employer]O TMVR clinical trial in April. [facility] has recently halted enrollment of the study. He is admitted now with increasing dyspnea and edema.     (16 Aug 2023 16:41)      PAST MEDICAL & SURGICAL HISTORY:  Aortic stenosis      AICD (automatic cardioverter/defibrillator) present      Aortic valve regurgitation      Ascending aorta dilation      H/O paroxysmal supraventricular tachycardia      HLD (hyperlipidemia)      Mitral valve regurgitation      Cardiac arrest      Severe mitral regurgitation      S/P aortic valve replacement  3/13/2004      S/P hernia repair  2002      History of tonsillectomy and adenoidectomy      S/P TURP  1996      S/P colonoscopy  2001, 2002, 2006, 2011, 2016      S/P endoscopy  2006      S/P cardiac cath  1994, 1995, 1999, 2002, 2004      AICD (automatic cardioverter/defibrillator) present  12/19/17      Cardiac pacemaker  12/19/17      History of cardioversion  9/11/20      S/P ablation of atrial fibrillation  10/29/20      Status post ablation of ventricular arrhythmia  2/14/19          REVIEW OF SYSTEMS:    CONSTITUTIONAL: No weakness, fevers or chills, (+) fatigue   EYES/ENT: No visual changes;  No vertigo or throat pain   NECK: No pain or stiffness  RESPIRATORY: No cough, wheezing, hemoptysis; No shortness of breath at rest  CARDIOVASCULAR: No chest pain or palpitations, (+) exertional dyspnea, (+) BLE edema to knees   GASTROINTESTINAL: No abdominal or epigastric pain. No nausea, vomiting, or hematemesis; No diarrhea or constipation. No melena or hematochezia.  GENITOURINARY: No dysuria, frequency or hematuria  NEUROLOGICAL: No numbness or weakness  SKIN: No itching, rashes      MEDICATIONS  (STANDING):  atorvastatin 40 milliGRAM(s) Oral at bedtime  buMETAnide Injectable 4 milliGRAM(s) IV Push two times a day  hydrALAZINE 25 milliGRAM(s) Oral three times a day  levothyroxine 50 MICROGram(s) Oral daily  metoprolol succinate  milliGRAM(s) Oral daily  pantoprazole    Tablet 40 milliGRAM(s) Oral before breakfast  polyethylene glycol 3350 17 Gram(s) Oral daily  sodium chloride 0.9% lock flush 3 milliLiter(s) IV Push every 8 hours    MEDICATIONS  (PRN):  acetaminophen     Tablet .. 650 milliGRAM(s) Oral every 6 hours PRN Temp greater or equal to 38C (100.4F), Mild Pain (1 - 3)  aluminum hydroxide/magnesium hydroxide/simethicone Suspension 30 milliLiter(s) Oral every 4 hours PRN Dyspepsia  melatonin 3 milliGRAM(s) Oral at bedtime PRN Insomnia  ondansetron Injectable 4 milliGRAM(s) IV Push every 8 hours PRN Nausea and/or Vomiting      Allergies    No Known Allergies    Intolerances        Vital Signs Last 24 Hrs  T(C): 36.3 (17 Aug 2023 13:00), Max: 36.7 (16 Aug 2023 20:17)  T(F): 97.4 (17 Aug 2023 13:00), Max: 98 (16 Aug 2023 20:17)  HR: 80 (17 Aug 2023 13:00) (80 - 88)  BP: 91/60 (17 Aug 2023 13:00) (91/60 - 107/75)  BP(mean): 76 (17 Aug 2023 04:20) (74 - 76)  RR: 18 (17 Aug 2023 13:00) (17 - 18)  SpO2: 92% (17 Aug 2023 13:00) (92% - 97%)    Parameters below as of 17 Aug 2023 13:00  Patient On (Oxygen Delivery Method): room air        Physical Exam  General: A/ox 3, No acute Distress  Neck: Supple, (+) JVD  Cardiac: S1 S2, II Apical murmur  Pulmonary: diminished at bases bilaterally, Breathing unlabored, Faint bibasilar rales R>L  Abdomen: Soft, Non -tender, +BS x 4 quads  Extremities: No Rashes, 2+ pretibial edema bilaterally   Neuro: A/o x 3, No focal deficits    LABS:                        11.4   6.57  )-----------( 130      ( 17 Aug 2023 06:36 )             34.7     08-17    135  |  93<L>  |  97<H>  ----------------------------<  98  3.0<L>   |  25  |  2.63<H>    Ca    9.5      17 Aug 2023 06:36    TPro  6.7  /  Alb  4.0  /  TBili  1.2  /  DBili  x   /  AST  24  /  ALT  30  /  AlkPhos  213<H>  08-17    PT/INR - ( 16 Aug 2023 16:56 )   PT: 19.9 sec;   INR: 1.93 ratio         PTT - ( 16 Aug 2023 16:56 )  PTT:37.8 sec  Urinalysis Basic - ( 17 Aug 2023 06:36 )    Color: x / Appearance: x / SG: x / pH: x  Gluc: 98 mg/dL / Ketone: x  / Bili: x / Urobili: x   Blood: x / Protein: x / Nitrite: x   Leuk Esterase: x / RBC: x / WBC x   Sq Epi: x / Non Sq Epi: x / Bacteria: x        RADIOLOGY & ADDITIONAL STUDIES:    < from: TTE Congenital Anomalies W or WO Ultrasound Enhancing Agent (04.10.23 @ 10:15) >  CONCLUSIONS:      1. Moderately dilated left ventricular cavity size. The left ventricular wall thickness is normal. The left ventricular systolic function is moderately-severely decreased with an ejection fraction of 38 % by 3D. There are regional wall motion abnormalities.   2. Multiple segmental abnormalities exist. See findings.   3. Analysis of left ventricular diastolic function and filling pressure is made challenging by the presence of severe mitral regurgitation.   4. Mildly enlarged right ventricular cavity size, normal wall thickness and mildly reduced systolic function.   5. There is severe mitral regurgitation.      The mitral regurgitant jet is posteriorly directed. Mechanism of mitral regurgitation: unable to determine. Mitral inflow is E-wave dominant (>1.2m/sec). Systolic flow reversal is noted in the pulmonary veins consistent with severe mitral regurgitation. The posterior leaflet is not well visualized. The posterior leaflet is very short and there is probably calcification at the base of the leaflet. No evidence of mitral stenosis.   6. The tricuspid valve was not well visualized. There is severe tricuspid regurgitation.      The tricuspid regurgitation is directed centrally. There is systolic flow reversal in the hepatic vein consistent with severe tricuspid regurgitation. Estimated pulmonary artery systolic pressure is 64 mmHg. The TR PISA radius is 0.9 cm. The TRvena contracta is 1.2 cm. Difficult to determine whether the TR is related to the pacemaker lead.   7. Estimated pulmonary artery systolic pressure is 64 mmHg.   8. Compared to the transthoracic echocardiogram performed on 6/6/2021 There is improvement in wall motion abnormalities (particularly the lateral wall), and improvement in VLEF> There is still severe mitral regurgiattion that is posteriorly directed.   9. No pericardial effusion seen.    ________________________________________________________________________________________    < from: TTE Congenital Anomalies W or WO Ultrasound Enhancing Agent (04.10.23 @ 10:15) >  Electronically signed by Ofelia Wilder on 4/10/2023 at 11:34:07 AM       < end of copied text >    < from: Cardiac Catheterization (05.24.23 @ 15:57) >  Diagnostic Findings:     Coronary Angiography   The coronary circulation is right dominant.      LM   Left main artery: Angiography shows mild atherosclerosis.      LAD   Proximal left anterior descending: There is a 40 % stenosis. First  diagonal: There is a 30 % stenosis.    CX   Circumflex: Angiography shows mild atherosclerosis.      RCA   Mid right coronary artery: There is a 40 % stenosis. First right  posterolateral: There is a 50 % stenosis.    Patient: OVIDIO            MRN: [id number]  Study Date: 05/24/2023   03:57 PM      Page 1 of 4    < end of copied text >

## 2023-08-17 NOTE — DIETITIAN INITIAL EVALUATION ADULT - REASON INDICATOR FOR ASSESSMENT
consulted for MST score 2 or >. information obtained from pt, team during interdisciplinary rounds, electronic medical record

## 2023-08-17 NOTE — CONSULT NOTE ADULT - SUBJECTIVE AND OBJECTIVE BOX
[facility]  [phone]  NEPHROLOGY      INITIAL CONSULT NOTE  --------------------------------------------------------------------------------  HPI:    82 year old Male with PMHx of HTN, HLD, GERD, CHF, moderately reduced EF 35-40%, AFIB (on Xarelto) s/p ablation, CAD with h/o Atherectomy, IWMI 1994, VT ARREST IN 12/2017, AICD, s/p AVR in 2004, s/p AORTIC ANEURYSM AND MV repair with Dr Fenton 6/2/21, and now severe MR. Patient reports worsening SOB x 2 days, with associated symptoms of fatigue, deconditioning and inability to walk long distance.  Presents today as a direct admission for CHF exacerbation and requiring medical optimization. Due to his abnormal creatinine, renal consult called.     PAST HISTORY  --------------------------------------------------------------------------------  PAST MEDICAL & SURGICAL HISTORY:  Aortic stenosis      AICD (automatic cardioverter/defibrillator) present      Aortic valve regurgitation      Ascending aorta dilation      H/O paroxysmal supraventricular tachycardia      HLD (hyperlipidemia)      Mitral valve regurgitation      Cardiac arrest      Severe mitral regurgitation      S/P aortic valve replacement  3/13/2004      S/P hernia repair  2002      History of tonsillectomy and adenoidectomy      S/P TURP  1996      S/P colonoscopy  2001, 2002, 2006, 2011, 2016      S/P endoscopy  2006      S/P cardiac cath  1994, 1995, 1999, 2002, 2004      AICD (automatic cardioverter/defibrillator) present  12/19/17      Cardiac pacemaker  12/19/17      History of cardioversion  9/11/20      S/P ablation of atrial fibrillation  10/29/20      Status post ablation of ventricular arrhythmia  2/14/19        FAMILY HISTORY:    PAST SOCIAL HISTORY:    ALLERGIES & MEDICATIONS  --------------------------------------------------------------------------------  Allergies    No Known Allergies    Intolerances      Standing Inpatient Medications  atorvastatin 40 milliGRAM(s) Oral at bedtime  buMETAnide Injectable 4 milliGRAM(s) IV Push two times a day  hydrALAZINE 25 milliGRAM(s) Oral three times a day  levothyroxine 50 MICROGram(s) Oral daily  metoprolol succinate  milliGRAM(s) Oral daily  pantoprazole    Tablet 40 milliGRAM(s) Oral before breakfast  polyethylene glycol 3350 17 Gram(s) Oral daily  sodium chloride 0.9% lock flush 3 milliLiter(s) IV Push every 8 hours    PRN Inpatient Medications  acetaminophen     Tablet .. 650 milliGRAM(s) Oral every 6 hours PRN  aluminum hydroxide/magnesium hydroxide/simethicone Suspension 30 milliLiter(s) Oral every 4 hours PRN  melatonin 3 milliGRAM(s) Oral at bedtime PRN  ondansetron Injectable 4 milliGRAM(s) IV Push every 8 hours PRN      REVIEW OF SYSTEMS  --------------------------------------------------------------------------------  Gen: No  fevers/chills   Head/Eyes/Ears/Mouth: No headache; Normal hearing;  No sinus pain/discomfort, sore throat  Respiratory: +SOB on admission, No worsening dyspnea, cough, wheezing, hemoptysis  CV: No chest pain, orthopnea  GI: No abdominal pain, diarrhea, nausea, vomiting,  : No dysuria, decrease urination   MSK: No joint pain/swelling; no back pain  Neuro: No dizziness/lightheadedness, weakness,   also with +edema     VITALS/PHYSICAL EXAM  --------------------------------------------------------------------------------  T(C): 36.5 (08-17-23 @ 04:20), Max: 36.7 (08-16-23 @ 20:17)  HR: 88 (08-17-23 @ 05:52) (80 - 88)  BP: 101/64 (08-17-23 @ 05:52) (96/64 - 107/75)  RR: 18 (08-17-23 @ 05:52) (17 - 18)  SpO2: 96% (08-17-23 @ 05:52) (96% - 97%)  Wt(kg): --  Height (cm): 175.3 (08-16-23 @ 16:36)  Weight (kg): 74 (08-16-23 @ 16:36)  BMI (kg/m2): 24.1 (08-16-23 @ 16:36)  BSA (m2): 1.89 (08-16-23 @ 16:36)      08-16-23 @ 07:01  -  08-17-23 @ 07:00  --------------------------------------------------------  IN: 240 mL / OUT: 1475 mL / NET: -1235 mL    08-17-23 @ 07:01  -  08-17-23 @ 13:43  --------------------------------------------------------  IN: 0 mL / OUT: 751 mL / NET: -751 mL      Physical Exam:  	Gen: Non toxic comfortable appearing, on RA   	Pulm: decrease bs  no rales or ronchi or wheezing  	CV: +JVD. RRR, S1S2; no rub  	Back: No CVA tenderness; no sacral edema  	Abd: +BS, soft, nontender/nondistended  	: No suprapubic tenderness  	UE: Warm, no cyanosis  no clubbing,  no edema; no asterixis  	LE: Warm, no cyanosis  no clubbing, 2-3+ edema  	Neuro: alert and oriented. speech coherent   	Skin: Warm, no decrease skin turgor     LABS/STUDIES  --------------------------------------------------------------------------------              11.4   6.57  >-----------<  130      [08-17-23 @ 06:36]              34.7     135  |  93  |  97  ----------------------------<  98      [08-17-23 @ 06:36]  3.0   |  25  |  2.63        Ca     9.5     [08-17-23 @ 06:36]    TPro  6.7  /  Alb  4.0  /  TBili  1.2  /  DBili  x   /  AST  24  /  ALT  30  /  AlkPhos  213  [08-17-23 @ 06:36]    PT/INR: PT 19.9 , INR 1.93       [08-16-23 @ 16:56]  PTT: 37.8       [08-16-23 @ 16:56]      Creatinine Trend:  SCr 2.63 [08-17 @ 06:36]  SCr 2.84 [08-16 @ 16:58]    Urinalysis (08.16.23 @ 16:55)   pH Urine: 6.0  Glucose Qualitative, Urine: Negative  Blood, Urine: Negative  Color: Light Yellow  Urine Appearance: Clear  Bilirubin: Negative  Ketone - Urine: Negative  Specific Gravity: 1.010  Protein, Urine: Negative  Urobilinogen: Negative  Nitrite: Negative  Leukocyte Esterase Concentration: Negative      TSH 5.96      [08-16-23 @ 16:55]

## 2023-08-17 NOTE — CONSULT NOTE ADULT - ASSESSMENT
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  Patient reports worsening SOB x 2 days, with associated symptoms of fatigue, deconditioning and inability to walk long distance.  Presents today as a direct admission for CHF exacerbation and requiring medical optimization.     Mr. Sher is known to the Structural Heart Team, he was being evaluated as an outpatient for severe mitral regurgitation and was consented for enrollment in the [employer]OLLO TMVR clinical trial in April. [employer] has recently halted enrollment of the study. He is admitted now with increasing dyspnea and edema.

## 2023-08-17 NOTE — DIETITIAN INITIAL EVALUATION ADULT - PERTINENT MEDS FT
MEDICATIONS  (STANDING):  atorvastatin 40 milliGRAM(s) Oral at bedtime  hydrALAZINE 25 milliGRAM(s) Oral three times a day  levothyroxine 50 MICROGram(s) Oral daily  metoprolol succinate  milliGRAM(s) Oral daily  pantoprazole    Tablet 40 milliGRAM(s) Oral before breakfast  polyethylene glycol 3350 17 Gram(s) Oral daily  potassium chloride    Tablet ER 20 milliEquivalent(s) Oral every 2 hours  sodium chloride 0.9% lock flush 3 milliLiter(s) IV Push every 8 hours    MEDICATIONS  (PRN):  acetaminophen     Tablet .. 650 milliGRAM(s) Oral every 6 hours PRN Temp greater or equal to 38C (100.4F), Mild Pain (1 - 3)  aluminum hydroxide/magnesium hydroxide/simethicone Suspension 30 milliLiter(s) Oral every 4 hours PRN Dyspepsia  melatonin 3 milliGRAM(s) Oral at bedtime PRN Insomnia  ondansetron Injectable 4 milliGRAM(s) IV Push every 8 hours PRN Nausea and/or Vomiting

## 2023-08-17 NOTE — CONSULT NOTE ADULT - ASSESSMENT
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 Patient reports worsening SOB x 2 days, with associated symptoms of fatigue, deconditioning and inability to walk long distance.  Presents today as a direct admission for CHF exacerbation and requiring medical optimization.       1- SURESH on CKDIV  2- CHF  3- severe MR  4- hypokalemia      SURESH in setting of decompensated CHF  creatinine slightly improved today, high BUN on admission  bumex 4 mg iv BID  supplement KCL, check mag level  hydralazine 25 mg TID  strict I/O  keep O>I  daily standing weight  2G sodium diet  trend creatinine and electrolytes closely 82 year old Male with PMHx of HTN, HLD, GERD, CHF, moderately reduced EF 35-40%, AFIB (on Xarelto) s/p ablation, CAD with h/o Atherectomy, IWMI 1994, VT ARREST IN 12/2017, AICD, s/p AVR in 2004, s/p AORTIC ANEURYSM AND MV repair with Dr Fenton 6/2/21, and now severe MR. Patient reports worsening SOB x 2 days, with associated symptoms of fatigue, deconditioning and inability to walk long distance.  Presents today as a direct admission for CHF exacerbation and requiring medical optimization.       1- SURESH on CKDIV  2- CHF  3- severe MR  4- hypokalemia      SURESH in setting of decompensated CHF  creatinine slightly improved today, high BUN on admission  bumex 2 mg iv BID  supplement KCL, check mag level  hydralazine 25 mg TID  strict I/O  keep O>I  daily standing weight  2G sodium diet  trend creatinine and electrolytes closely

## 2023-08-17 NOTE — CONSULT NOTE ADULT - PROBLEM SELECTOR RECOMMENDATION 4
- PVR 7 however driven by low CO/CI as well; TPG 25; DPG 9  - will reassess with further optimization

## 2023-08-17 NOTE — PROGRESS NOTE ADULT - PROBLEM SELECTOR PLAN 1
Admit to 2 [facility] Telemetry Floor   Portable CXR ordered   Continue on Toprol 100 mg PO daily   Torsemide D/C'd started on Bumex 4 mg IV BID   Labs: CMP / CBC / PT/ INR/ TSH/ A1C   Activity as tolerated  Heart Failure Consult called   Structural Team to follow

## 2023-08-17 NOTE — DIETITIAN INITIAL EVALUATION ADULT - PERTINENT LABORATORY DATA
08-17    135  |  93<L>  |  97<H>  ----------------------------<  98  3.0<L>   |  25  |  2.63<H>    Ca    9.5      17 Aug 2023 06:36    TPro  6.7  /  Alb  4.0  /  TBili  1.2  /  DBili  x   /  AST  24  /  ALT  30  /  AlkPhos  213<H>  08-17  A1C with Estimated Average Glucose Result: 5.7 % (08-16-23 @ 16:56)

## 2023-08-17 NOTE — PROGRESS NOTE ADULT - SUBJECTIVE AND OBJECTIVE BOX
[facility]  [phone]  NEPHROLOGY      INITIAL CONSULT NOTE  --------------------------------------------------------------------------------  HPI:    82 year old Male with PMHx of HTN, HLD, GERD, CHF, moderately reduced EF 35-40%, AFIB (on Xarelto) s/p ablation, CAD with h/o Atherectomy, IWMI 1994, VT ARREST IN 12/2017, AICD, s/p AVR in 2004, s/p AORTIC ANEURYSM AND MV repair with Dr Fenton 6/2/21, and now severe MR. Patient reports worsening SOB x 2 days, with associated symptoms of fatigue, deconditioning and inability to walk long distance.  Presents today as a direct admission for CHF exacerbation and requiring medical optimization. Due to his abnormal creatinine, renal consult called.     PAST HISTORY  --------------------------------------------------------------------------------  PAST MEDICAL & SURGICAL HISTORY:  Aortic stenosis      AICD (automatic cardioverter/defibrillator) present      Aortic valve regurgitation      Ascending aorta dilation      H/O paroxysmal supraventricular tachycardia      HLD (hyperlipidemia)      Mitral valve regurgitation      Cardiac arrest      Severe mitral regurgitation      S/P aortic valve replacement  3/13/2004      S/P hernia repair  2002      History of tonsillectomy and adenoidectomy      S/P TURP  1996      S/P colonoscopy  2001, 2002, 2006, 2011, 2016      S/P endoscopy  2006      S/P cardiac cath  1994, 1995, 1999, 2002, 2004      AICD (automatic cardioverter/defibrillator) present  12/19/17      Cardiac pacemaker  12/19/17      History of cardioversion  9/11/20      S/P ablation of atrial fibrillation  10/29/20      Status post ablation of ventricular arrhythmia  2/14/19        FAMILY HISTORY:    PAST SOCIAL HISTORY:    ALLERGIES & MEDICATIONS  --------------------------------------------------------------------------------  Allergies    No Known Allergies    Intolerances      Standing Inpatient Medications  atorvastatin 40 milliGRAM(s) Oral at bedtime  buMETAnide Injectable 4 milliGRAM(s) IV Push two times a day  hydrALAZINE 25 milliGRAM(s) Oral three times a day  levothyroxine 50 MICROGram(s) Oral daily  metoprolol succinate  milliGRAM(s) Oral daily  pantoprazole    Tablet 40 milliGRAM(s) Oral before breakfast  polyethylene glycol 3350 17 Gram(s) Oral daily  sodium chloride 0.9% lock flush 3 milliLiter(s) IV Push every 8 hours    PRN Inpatient Medications  acetaminophen     Tablet .. 650 milliGRAM(s) Oral every 6 hours PRN  aluminum hydroxide/magnesium hydroxide/simethicone Suspension 30 milliLiter(s) Oral every 4 hours PRN  melatonin 3 milliGRAM(s) Oral at bedtime PRN  ondansetron Injectable 4 milliGRAM(s) IV Push every 8 hours PRN      REVIEW OF SYSTEMS  --------------------------------------------------------------------------------  Gen: No  fevers/chills   Head/Eyes/Ears/Mouth: No headache; Normal hearing;  No sinus pain/discomfort, sore throat  Respiratory: +SOB on admission, No worsening dyspnea, cough, wheezing, hemoptysis  CV: No chest pain, orthopnea  GI: No abdominal pain, diarrhea, nausea, vomiting,  : No dysuria, decrease urination   MSK: No joint pain/swelling; no back pain  Neuro: No dizziness/lightheadedness, weakness,   also with +edema     VITALS/PHYSICAL EXAM  --------------------------------------------------------------------------------  T(C): 36.5 (08-17-23 @ 04:20), Max: 36.7 (08-16-23 @ 20:17)  HR: 88 (08-17-23 @ 05:52) (80 - 88)  BP: 101/64 (08-17-23 @ 05:52) (96/64 - 107/75)  RR: 18 (08-17-23 @ 05:52) (17 - 18)  SpO2: 96% (08-17-23 @ 05:52) (96% - 97%)  Wt(kg): --  Height (cm): 175.3 (08-16-23 @ 16:36)  Weight (kg): 74 (08-16-23 @ 16:36)  BMI (kg/m2): 24.1 (08-16-23 @ 16:36)  BSA (m2): 1.89 (08-16-23 @ 16:36)      08-16-23 @ 07:01  -  08-17-23 @ 07:00  --------------------------------------------------------  IN: 240 mL / OUT: 1475 mL / NET: -1235 mL    08-17-23 @ 07:01  -  08-17-23 @ 13:43  --------------------------------------------------------  IN: 0 mL / OUT: 751 mL / NET: -751 mL      Physical Exam:  	Gen: Non toxic comfortable appearing, on RA   	Pulm: decrease bs  no rales or ronchi or wheezing  	CV: +JVD. RRR, S1S2; no rub  	Back: No CVA tenderness; no sacral edema  	Abd: +BS, soft, nontender/nondistended  	: No suprapubic tenderness  	UE: Warm, no cyanosis  no clubbing,  no edema; no asterixis  	LE: Warm, no cyanosis  no clubbing, 2-3+ edema  	Neuro: alert and oriented. speech coherent   	Skin: Warm, no decrease skin turgor     LABS/STUDIES  --------------------------------------------------------------------------------              11.4   6.57  >-----------<  130      [08-17-23 @ 06:36]              34.7     135  |  93  |  97  ----------------------------<  98      [08-17-23 @ 06:36]  3.0   |  25  |  2.63        Ca     9.5     [08-17-23 @ 06:36]    TPro  6.7  /  Alb  4.0  /  TBili  1.2  /  DBili  x   /  AST  24  /  ALT  30  /  AlkPhos  213  [08-17-23 @ 06:36]    PT/INR: PT 19.9 , INR 1.93       [08-16-23 @ 16:56]  PTT: 37.8       [08-16-23 @ 16:56]      Creatinine Trend:  SCr 2.63 [08-17 @ 06:36]  SCr 2.84 [08-16 @ 16:58]    Urinalysis (08.16.23 @ 16:55)   pH Urine: 6.0  Glucose Qualitative, Urine: Negative  Blood, Urine: Negative  Color: Light Yellow  Urine Appearance: Clear  Bilirubin: Negative  Ketone - Urine: Negative  Specific Gravity: 1.010  Protein, Urine: Negative  Urobilinogen: Negative  Nitrite: Negative  Leukocyte Esterase Concentration: Negative      TSH 5.96      [08-16-23 @ 16:55]

## 2023-08-17 NOTE — CONSULT NOTE ADULT - PROBLEM SELECTOR RECOMMENDATION 9
Enrollment in [employer] clinical trial for TMVR currently on hold   Reviewed TTE again with Dr. Dockery, his anatomy is not suitable for Mitral KRISTIE   Will discuss with Dr. Valentino and Dr. Fenton and explore alternative clinical trial options for TMVR vs re-operation with Dr. Fenton.   Repeat TTE pending
HFrEF - possibly mixed. Recent [facility] hemodynamics notes marginal CI. May require inotropy support if pursuing valvular intervention this admission  - Resume Bumex 4mg IVP q12 hours  - Continue hydral 25mg TID   - Continue toprol 100mg daily  - unable to tolerate ACEi/ARB/MRA due to single kidney with low GFR (30s). In the past unable to tolerate SGLT2i as attempted multiple times with adverse effect (e.g. back pain, worsening breathing) would defer re-trial.   ?- Strict I/O's  - Replete electrolytes to target K+>4.0 and Mg >2.0  -Please repeat TTE

## 2023-08-17 NOTE — DIETITIAN INITIAL EVALUATION ADULT - ADD RECOMMEND
1) Will continue to monitor PO intake, weight, labs, skin, GI status and diet 2) Obtain food preferences as able to optimize PO intake. RD to add protein fortified snacks at meals. 3) nutrition risk placed in chart.

## 2023-08-17 NOTE — CONSULT NOTE ADULT - ASSESSMENT
Mr. Sher is an 83 y/o M w/ h/o CAD c/b IWMI (1994) s/p angioplasty, aortic stenosis s/p bioAVR 2004), VT arrest (2017) s/p ICD, afib s/p multiple DCCV and ablation x2 (2020 and 2023; on AC), prior Ao aneurysm s/p [place] (2021), severe MR with MAC, atrophic kidney with CKD (b/l Cr 1.9; GFR 34), HFrEF (35-40%; LVEDD 6.7 cm) who is being considered for APOLLO (TMVR) trial but needs further optimization as has pulmonary HTN and concern was severe RV dysfunction and pulmonary HTN, who presents in [facility]. His labs are notable for ProBNP 27K and Cr above b/l at 2.6 (b/l 1.9). He is warm peripherally and moderately volume up on exam.     Cardiac Studies  4/10/23 - TTE - EF 38%, LVEDD 6.7 cm, severe MR with systolic reversal in pulmonic vein, severe TR, RVSP 49; IVC dilated  ?3/27/23 ([facility]) - EF 35-40%, paradoxical septal motion, RV enlargement/dysfunction, bioAVR (mean gradient 10) with mild AI, marked MAC with posterior leaflet immobility, mild prolapse of anterior leaflet with torn chords, severe posterior directed MR w/ systolic reversal in PV, moderate TR; no intracardiac clot    5/24/23 - pLAD 40%; mRCA 40%; RA 16, RV 76/19, PA 77/34/50; PCWP 25 (v 35); /67/84; CO/CI 3.5/1.86; SVR 1560; PVR 7.1 (driven by low CI)

## 2023-08-17 NOTE — PROGRESS NOTE ADULT - SUBJECTIVE AND OBJECTIVE BOX
VITAL SIGNS-Telemetry:   80  Vital Signs Last 24 Hrs  T(C): 36.5 (23 @ 04:20), Max: 36.7 (23 @ 20:17)  T(F): 97.7 (23 @ 04:20), Max: 98 (23 @ 20:17)  HR: 88 (23 @ 05:52) (80 - 88)  BP: 101/64 (23 @ 05:52) (96/64 - 107/75)  RR: 18 (23 @ 05:52) (17 - 18)  SpO2: 96% (23 @ 05:52) (96% - 97%)          @ 07:01  -   @ 07:00  --------------------------------------------------------  IN: 240 mL / OUT: 1475 mL / NET: -1235 mL     @ 07:01  -   @ 08:35  --------------------------------------------------------  IN: 0 mL / OUT: 450 mL / NET: -450 mL    Daily Height in cm: 175.26 (16 Aug 2023 16:36)    Daily Weight in k (16 Aug 2023 16:36)         PHYSICAL EXAM:  Neurology: alert and oriented x 3, nonfocal, no gross deficits  CV : S1S2  Lungs:  Abdomen: soft, nontender, nondistended, positive bowel sounds, last bowel movement         Extremities:         acetaminophen     Tablet .. 650 milliGRAM(s) Oral every 6 hours PRN  aluminum hydroxide/magnesium hydroxide/simethicone Suspension 30 milliLiter(s) Oral every 4 hours PRN  atorvastatin 40 milliGRAM(s) Oral at bedtime  hydrALAZINE 25 milliGRAM(s) Oral three times a day  levothyroxine 50 MICROGram(s) Oral daily  melatonin 3 milliGRAM(s) Oral at bedtime PRN  metoprolol succinate  milliGRAM(s) Oral daily  ondansetron Injectable 4 milliGRAM(s) IV Push every 8 hours PRN  pantoprazole    Tablet 40 milliGRAM(s) Oral before breakfast  polyethylene glycol 3350 17 Gram(s) Oral daily  potassium chloride    Tablet ER 20 milliEquivalent(s) Oral every 2 hours  sodium chloride 0.9% lock flush 3 milliLiter(s) IV Push every 8 hours    Physical Therapy Rec:   Home  [  x]   Home w/ PT  [  ]  Rehab  [  ]  Discussed with Cardiothoracic Team at AM rounds. VITAL SIGNS-Telemetry:   80  Vital Signs Last 24 Hrs  T(C): 36.5 (23 @ 04:20), Max: 36.7 (23 @ 20:17)  T(F): 97.7 (23 @ 04:20), Max: 98 (23 @ 20:17)  HR: 88 (23 @ 05:52) (80 - 88)  BP: 101/64 (23 @ 05:52) (96/64 - 107/75)  RR: 18 (23 @ 05:52) (17 - 18)  SpO2: 96% (23 @ 05:52) (96% - 97%)          @ 07:01  -   @ 07:00  --------------------------------------------------------  IN: 240 mL / OUT: 1475 mL / NET: -1235 mL     @ 07:01  -   @ 08:35  --------------------------------------------------------  IN: 0 mL / OUT: 450 mL / NET: -450 mL    Daily Height in cm: 175.26 (16 Aug 2023 16:36)    Daily Weight in k (16 Aug 2023 16:36)         PHYSICAL EXAM:  Neurology: alert and oriented x 3, nonfocal, no gross deficits  CV : S1S2  Lungs: cta  Abdomen: soft, nontender, nondistended, positive bowel sounds, last bowel movement         Extremities:     +edema b/l no calf tenderness    acetaminophen     Tablet .. 650 milliGRAM(s) Oral every 6 hours PRN  aluminum hydroxide/magnesium hydroxide/simethicone Suspension 30 milliLiter(s) Oral every 4 hours PRN  atorvastatin 40 milliGRAM(s) Oral at bedtime  hydrALAZINE 25 milliGRAM(s) Oral three times a day  levothyroxine 50 MICROGram(s) Oral daily  melatonin 3 milliGRAM(s) Oral at bedtime PRN  metoprolol succinate  milliGRAM(s) Oral daily  ondansetron Injectable 4 milliGRAM(s) IV Push every 8 hours PRN  pantoprazole    Tablet 40 milliGRAM(s) Oral before breakfast  polyethylene glycol 3350 17 Gram(s) Oral daily  potassium chloride    Tablet ER 20 milliEquivalent(s) Oral every 2 hours  sodium chloride 0.9% lock flush 3 milliLiter(s) IV Push every 8 hours    Physical Therapy Rec:   Home  [  x]   Home w/ PT  [  ]  Rehab  [  ]  Discussed with Cardiothoracic Team at AM rounds.

## 2023-08-17 NOTE — DIETITIAN INITIAL EVALUATION ADULT - REASON FOR ADMISSION
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  Patient reports worsening SOB x 2 days, with associated symptoms of fatigue, deconditioning and inability to walk long distance.  Presents today as a direct admission for CHF exacerbation and requiring medical optimization.

## 2023-08-17 NOTE — CONSULT NOTE ADULT - PROBLEM SELECTOR RECOMMENDATION 2
- Diuretics as above  - HDZN as above  - Following with Structural Team; If plan  ?
CHF team following, pt. reports improvement in dyspnea and edema since admission  Diuresis per CHF team  Continue strict I&O and daily weights     KAYLAN Villegas NP

## 2023-08-17 NOTE — DIETITIAN INITIAL EVALUATION ADULT - NS FNS WEIGHT CHANGE REASON
reports weight loss, some losses may be due to fluid loss. Unable to quantify exact amounts. RD will continue to monitor trends./unintentional

## 2023-08-17 NOTE — DIETITIAN INITIAL EVALUATION ADULT - CONTINUE CURRENT NUTRITION CARE PLAN
pt declines wanting oral supplement at this time  liberalize diet as below due to reported poor PO intake

## 2023-08-17 NOTE — PROGRESS NOTE ADULT - ASSESSMENT
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 Patient reports worsening SOB x 2 days, with associated symptoms of fatigue, deconditioning and inability to walk long distance.  Presents today as a direct admission for CHF exacerbation and requiring medical optimization.       1- SURESH on CKDIV  2- CHF  3- severe MR  4- hypokalemia      SURESH in setting of decompensated CHF  creatinine slightly improved today, high BUN on admission  bumex 4 mg iv BID  supplement KCL, check mag level  hydralazine 25 mg TID  strict I/O  keep O>I  daily standing weight  2G sodium diet  trend creatinine and electrolytes closely

## 2023-08-17 NOTE — DIETITIAN INITIAL EVALUATION ADULT - NSPROEDAREADYLEARN_GEN_A_NUR
due to poor PO intake and malnutrition, HF education not warranted at this time. briefly discussed importance of low sodium. Encouraged PO intake with emphasis on protein sources to aid in muscle building./acuteness of illness

## 2023-08-17 NOTE — CONSULT NOTE ADULT - SUBJECTIVE AND OBJECTIVE BOX
ADVANCED HEART FAILURE & TRANSPLANT  - PROGRESS NOTE  *To reach the NS2 Team from 8am to 5pm (MON-FRI), please call [phone].   _______________________________________________________________________________________________________    HPI:  81 yo Male with PMHx of  HTN, HLD, GERD, CHF, moderately reduced EF 35-40%, AFIB (on Xarelto) s/p ablation, CAD with h/o Atherectomy,  IWMI 1994, VT ARREST IN 12/2017, AICD, s/p AVR in 2004, s/p AORTIC ANEURYSM AND MV repair with Dr Fenton 6/2/21, and now severe MR.  Patient reports worsening SOB x 2 days, with associated symptoms of fatigue, deconditioning and inability to walk long distance.  Presents today as a direct admission for CHF exacerbation and requiring medical optimization.     Of Note: Patient is known to our service has been following outpatient with Dr. Valentino and Structural Team for possible TMVR.                                   (16 Aug 2023 16:41)      PAST MEDICAL & SURGICAL HISTORY:  Aortic stenosis      AICD (automatic cardioverter/defibrillator) present      Aortic valve regurgitation      Ascending aorta dilation      H/O paroxysmal supraventricular tachycardia      HLD (hyperlipidemia)      Mitral valve regurgitation      Cardiac arrest      Severe mitral regurgitation      S/P aortic valve replacement  3/13/2004      S/P hernia repair  2002      History of tonsillectomy and adenoidectomy      S/P TURP  1996      S/P colonoscopy  2001, 2002, 2006, 2011, 2016      S/P endoscopy  2006      S/P cardiac cath  1994, 1995, 1999, 2002, 2004      AICD (automatic cardioverter/defibrillator) present  12/19/17      Cardiac pacemaker  12/19/17      History of cardioversion  9/11/20      S/P ablation of atrial fibrillation  10/29/20      Status post ablation of ventricular arrhythmia  2/14/19          REVIEW OF SYSTEMS      General:	    Skin/Breast:  	  Ophthalmologic:  	  ENMT:	    Respiratory and Thorax:  	  Cardiovascular:	    Gastrointestinal:	    Genitourinary:	    Musculoskeletal:	    Neurological:	    Psychiatric:	    Hematology/Lymphatics:	    Endocrine:	    Allergic/Immunologic:	    MEDICATIONS  (STANDING):  atorvastatin 40 milliGRAM(s) Oral at bedtime  hydrALAZINE 25 milliGRAM(s) Oral three times a day  levothyroxine 50 MICROGram(s) Oral daily  metoprolol succinate  milliGRAM(s) Oral daily  pantoprazole    Tablet 40 milliGRAM(s) Oral before breakfast  polyethylene glycol 3350 17 Gram(s) Oral daily  sodium chloride 0.9% lock flush 3 milliLiter(s) IV Push every 8 hours    MEDICATIONS  (PRN):  acetaminophen     Tablet .. 650 milliGRAM(s) Oral every 6 hours PRN Temp greater or equal to 38C (100.4F), Mild Pain (1 - 3)  aluminum hydroxide/magnesium hydroxide/simethicone Suspension 30 milliLiter(s) Oral every 4 hours PRN Dyspepsia  melatonin 3 milliGRAM(s) Oral at bedtime PRN Insomnia  ondansetron Injectable 4 milliGRAM(s) IV Push every 8 hours PRN Nausea and/or Vomiting      Allergies    No Known Allergies    Intolerances        SOCIAL HISTORY:    FAMILY HISTORY:      Vital Signs Last 24 Hrs  T(C): 36.5 (17 Aug 2023 04:20), Max: 36.7 (16 Aug 2023 20:17)  T(F): 97.7 (17 Aug 2023 04:20), Max: 98 (16 Aug 2023 20:17)  HR: 88 (17 Aug 2023 05:52) (80 - 88)  BP: 101/64 (17 Aug 2023 05:52) (96/64 - 107/75)  BP(mean): 76 (17 Aug 2023 04:20) (74 - 76)  RR: 18 (17 Aug 2023 05:52) (17 - 18)  SpO2: 96% (17 Aug 2023 05:52) (96% - 97%)    Parameters below as of 17 Aug 2023 05:52  Patient On (Oxygen Delivery Method): room air        PHYSICAL EXAM:      Constitutional:    Eyes:    ENMT:    Neck:    Breasts:    Back:    Respiratory:    Cardiovascular:    Gastrointestinal:    Genitourinary:    Rectal:    Extremities:    Vascular:    Neurological:    Skin:    Lymph Nodes:    Musculoskeletal:    Psychiatric:        LABS:                        11.4   6.57  )-----------( 130      ( 17 Aug 2023 06:36 )             34.7     08-17    135  |  93<L>  |  97<H>  ----------------------------<  98  3.0<L>   |  25  |  2.63<H>    Ca    9.5      17 Aug 2023 06:36    TPro  6.7  /  Alb  4.0  /  TBili  1.2  /  DBili  x   /  AST  24  /  ALT  30  /  AlkPhos  213<H>  08-17    PT/INR - ( 16 Aug 2023 16:56 )   PT: 19.9 sec;   INR: 1.93 ratio         PTT - ( 16 Aug 2023 16:56 )  PTT:37.8 sec  Urinalysis Basic - ( 17 Aug 2023 06:36 )    Color: x / Appearance: x / SG: x / pH: x  Gluc: 98 mg/dL / Ketone: x  / Bili: x / Urobili: x   Blood: x / Protein: x / Nitrite: x   Leuk Esterase: x / RBC: x / WBC x   Sq Epi: x / Non Sq Epi: x / Bacteria: x        RADIOLOGY & ADDITIONAL STUDIES: ADVANCED HEART FAILURE & TRANSPLANT  - PROGRESS NOTE  *To reach the NS2 Team from 8am to 5pm (MON-FRI), please call [phone].   _______________________________________________________________________________________________________    HPI:  Mr. Sher is an 83 y/o M w/ h/o CAD c/b IWMI () s/p angioplasty, aortic stenosis s/p bioAVR ), VT arrest () s/p ICD, afib s/p multiple DCCV and ablation x2 ( and ; on AC), prior Ao aneurysm s/p Bentall (), severe MR with MAC, atrophic kidney with CKD (b/l Cr 1.9; GFR 34), HFrEF (35-40%; LVEDD 6.7 cm) who is being considered for APOLLO (TMVR) trial, follows with Dr. Valentino. who presents with progressively worsening orthopnea, fatigue and LE edema over the past two months. He is known to HF program and follow as an outpt with Dr. Ruiz.  ?    He endorses a b/l AT of ~2-3miles without limitations however notes over the past 2 months he has had progressive decline in functional capacity. More recently before presenting to ED was only able to walk to his driveway and required rest periods in between. He also, endorses worsening orthopnea and ultimately has been sleeping in a recliner for the past two weeks, denies PND, no SOB at rest. He reports adherence to home diuretics regimen of Torsemide 40mg BID and limits sodium in his diet. Since admission and receiving IV Bumex 4mg BID he notes significant improvement in his symptoms and endorses good UOP with each dose. Otherwise, denies CP/Palpitations, no LH/Dizziness      HPI:    PAST MEDICAL & SURGICAL HISTORY:  Aortic stenosis  AICD (automatic cardioverter/defibrillator) presen  Aortic valve regurgitation  Ascending aorta dilation  H/O paroxysmal supraventricular tachycardia  HLD (hyperlipidemia)  Mitral valve regurgitation  Cardiac arrest  Severe mitral regurgitation  S/P aortic valve replacement  3/13/2004  S/P hernia repair  2002  History of tonsillectomy and adenoidectomy  S/P TURP    S/P colonoscopy  , , , ,   S/P endoscopy    S/P cardiac cath  , , , ,   AICD (automatic cardioverter/defibrillator) present  17  Cardiac pacemaker  17  History of cardioversion  20  S/P ablation of atrial fibrillation  10/29/20  Status post ablation of ventricular arrhythmia  19    FAMILY HISTORY:    Home Medications:  atorvastatin 40 mg oral tablet: 1 tab(s) orally once a day (at bedtime) (16 Aug 2023 17:03)  CoQ10 300 mg oral capsule: 1 orally once a day (at bedtime) (16 Aug 2023 16:55)  hydrALAZINE 25 mg oral tablet: 1 orally 3 times a day (16 Aug 2023 16:53)  levothyroxine 50 mcg (0.05 mg) oral tablet: 1 tab(s) orally once a day (16 Aug 2023 17:03)  metOLazone 2.5 mg oral tablet: 1 orally once a day (16 Aug 2023 18:02)  Presser Vision Areds 2:  (16 Aug 2023 16:57)  Toprol- mg oral tablet, extended release: 1 orally once a day (16 Aug 2023 16:54)  torsemide 20 mg oral tablet: 1 orally once a day (16 Aug 2023 18:03)  Xarelto 15 mg oral tablet: 1 orally (16 Aug 2023 16:53)      Medications:  acetaminophen     Tablet .. 650 milliGRAM(s) Oral every 6 hours PRN  aluminum hydroxide/magnesium hydroxide/simethicone Suspension 30 milliLiter(s) Oral every 4 hours PRN  atorvastatin 40 milliGRAM(s) Oral at bedtime  buMETAnide IVPB 4 milliGRAM(s) IV Intermittent <User Schedule>  hydrALAZINE 25 milliGRAM(s) Oral three times a day  levothyroxine 50 MICROGram(s) Oral daily  melatonin 3 milliGRAM(s) Oral at bedtime PRN  metoprolol succinate  milliGRAM(s) Oral daily  ondansetron Injectable 4 milliGRAM(s) IV Push every 8 hours PRN  pantoprazole    Tablet 40 milliGRAM(s) Oral before breakfast  polyethylene glycol 3350 17 Gram(s) Oral daily  sodium chloride 0.9% lock flush 3 milliLiter(s) IV Push every 8 hours      Review of systems:  10 point review of systems completed and are negative unless noted in HPI    Vitals:  T(C): 36.3 (23 @ 13:00), Max: 36.7 (23 @ 20:17)  HR: 80 (23 @ 13:00) (80 - 88)  BP: 91/60 (23 @ 13:00) (91/60 - 107/75)  BP(mean): 76 (23 @ 04:20) (74 - 76)  RR: 18 (23 @ 13:00) (17 - 18)  SpO2: 92% (23 @ 13:00) (92% - 97%)    Daily Height in cm: 175.26 (16 Aug 2023 16:36)    Daily Weight in k (17 Aug 2023 10:08)    Weight (kg): 74 ( @ 16:36)    I&O's Summary    16 Aug 2023 07:01  -  17 Aug 2023 07:00  --------------------------------------------------------  IN: 240 mL / OUT: 1475 mL / NET: -1235 mL    17 Aug 2023 07:01  -  17 Aug 2023 16:16  --------------------------------------------------------  IN: 0 mL / OUT: 751 mL / NET: -751 mL      Physical Exam:  Appearance: No Acute Distress  HEENT: PERRL  Neck: JVP 12-14cm of H20,   Cardiovascular: Normal S1 S2, No murmurs/rubs/gallops, warm peripherally  Respiratory: Clear to auscultation bilaterally  Gastrointestinal: Soft, Non-tender	  Skin: No cyanosis	  Neurologic: Non-focal  Extremities: +2 pitting LE edema  Psychiatry: A & O x 3, Mood & affect appropriate    Labs:                        11.4   6.57  )-----------( 130      ( 17 Aug 2023 06:36 )             34.7         135  |  93<L>  |  97<H>  ----------------------------<  98  3.0<L>   |  25  |  2.63<H>    Ca    9.5      17 Aug 2023 06:36    TPro  6.7  /  Alb  4.0  /  TBili  1.2  /  DBili  x   /  AST  24  /  ALT  30  /  AlkPhos  213<H>      PT/INR - ( 16 Aug 2023 16:56 )   PT: 19.9 sec;   INR: 1.93 ratio         PTT - ( 16 Aug 2023 16:56 )  PTT:37.8 sec    TELEMETRY: Pace 80's

## 2023-08-17 NOTE — DIETITIAN INITIAL EVALUATION ADULT - ORAL INTAKE PTA/DIET HISTORY
visited pt at bedside this morning. reports poor PO intake x 1 month PTA due to loss of appetite/acuteness of illness. no known allergies noted in chart.

## 2023-08-18 LAB
ALBUMIN SERPL ELPH-MCNC: 4.2 G/DL — SIGNIFICANT CHANGE UP (ref 3.3–5)
ALBUMIN SERPL ELPH-MCNC: 4.4 G/DL
ALP BLD-CCNC: 258 U/L
ALP SERPL-CCNC: 219 U/L — HIGH (ref 40–120)
ALT FLD-CCNC: 28 U/L — SIGNIFICANT CHANGE UP (ref 10–45)
ALT SERPL-CCNC: 37 U/L
ANION GAP SERPL CALC-SCNC: 17 MMOL/L — SIGNIFICANT CHANGE UP (ref 5–17)
ANION GAP SERPL CALC-SCNC: 17 MMOL/L — SIGNIFICANT CHANGE UP (ref 5–17)
ANION GAP SERPL CALC-SCNC: 19 MMOL/L
AST SERPL-CCNC: 26 U/L — SIGNIFICANT CHANGE UP (ref 10–40)
AST SERPL-CCNC: 31 U/L
BILIRUB SERPL-MCNC: 0.9 MG/DL
BILIRUB SERPL-MCNC: 1.2 MG/DL — SIGNIFICANT CHANGE UP (ref 0.2–1.2)
BUN SERPL-MCNC: 103 MG/DL — HIGH (ref 7–23)
BUN SERPL-MCNC: 99 MG/DL
BUN SERPL-MCNC: 99 MG/DL — HIGH (ref 7–23)
CALCIUM SERPL-MCNC: 9.3 MG/DL — SIGNIFICANT CHANGE UP (ref 8.4–10.5)
CALCIUM SERPL-MCNC: 9.7 MG/DL — SIGNIFICANT CHANGE UP (ref 8.4–10.5)
CALCIUM SERPL-MCNC: 9.8 MG/DL
CHLORIDE SERPL-SCNC: 91 MMOL/L — LOW (ref 96–108)
CHLORIDE SERPL-SCNC: 92 MMOL/L
CHLORIDE SERPL-SCNC: 93 MMOL/L — LOW (ref 96–108)
CO2 SERPL-SCNC: 24 MMOL/L
CO2 SERPL-SCNC: 26 MMOL/L — SIGNIFICANT CHANGE UP (ref 22–31)
CO2 SERPL-SCNC: 27 MMOL/L — SIGNIFICANT CHANGE UP (ref 22–31)
CREAT SERPL-MCNC: 2.6 MG/DL — HIGH (ref 0.5–1.3)
CREAT SERPL-MCNC: 2.64 MG/DL — HIGH (ref 0.5–1.3)
CREAT SERPL-MCNC: 2.7 MG/DL
EGFR: 23 ML/MIN/1.73M2
EGFR: 23 ML/MIN/1.73M2 — LOW
EGFR: 24 ML/MIN/1.73M2 — LOW
GLUCOSE SERPL-MCNC: 137 MG/DL
GLUCOSE SERPL-MCNC: 145 MG/DL — HIGH (ref 70–99)
GLUCOSE SERPL-MCNC: 91 MG/DL — SIGNIFICANT CHANGE UP (ref 70–99)
HCT VFR BLD CALC: 36.2 % — LOW (ref 39–50)
HGB BLD-MCNC: 11.9 G/DL — LOW (ref 13–17)
MCHC RBC-ENTMCNC: 28.8 PG — SIGNIFICANT CHANGE UP (ref 27–34)
MCHC RBC-ENTMCNC: 32.9 GM/DL — SIGNIFICANT CHANGE UP (ref 32–36)
MCV RBC AUTO: 87.7 FL — SIGNIFICANT CHANGE UP (ref 80–100)
NRBC # BLD: 0 /100 WBCS — SIGNIFICANT CHANGE UP (ref 0–0)
NT-PROBNP SERPL-MCNC: ABNORMAL PG/ML
PLATELET # BLD AUTO: 134 K/UL — LOW (ref 150–400)
POTASSIUM SERPL-MCNC: 2.9 MMOL/L — CRITICAL LOW (ref 3.5–5.3)
POTASSIUM SERPL-MCNC: 3.9 MMOL/L — SIGNIFICANT CHANGE UP (ref 3.5–5.3)
POTASSIUM SERPL-SCNC: 2.9 MMOL/L — CRITICAL LOW (ref 3.5–5.3)
POTASSIUM SERPL-SCNC: 3.9 MMOL/L — SIGNIFICANT CHANGE UP (ref 3.5–5.3)
POTASSIUM SERPL-SCNC: 4.1 MMOL/L
PROT SERPL-MCNC: 7.2 G/DL
PROT SERPL-MCNC: 7.2 G/DL — SIGNIFICANT CHANGE UP (ref 6–8.3)
RBC # BLD: 4.13 M/UL — LOW (ref 4.2–5.8)
RBC # FLD: 19.8 % — HIGH (ref 10.3–14.5)
SODIUM SERPL-SCNC: 135 MMOL/L — SIGNIFICANT CHANGE UP (ref 135–145)
SODIUM SERPL-SCNC: 136 MMOL/L
SODIUM SERPL-SCNC: 136 MMOL/L — SIGNIFICANT CHANGE UP (ref 135–145)
WBC # BLD: 5.29 K/UL — SIGNIFICANT CHANGE UP (ref 3.8–10.5)
WBC # FLD AUTO: 5.29 K/UL — SIGNIFICANT CHANGE UP (ref 3.8–10.5)

## 2023-08-18 PROCEDURE — 93451 RIGHT HEART CATH: CPT | Mod: 26

## 2023-08-18 PROCEDURE — 99152 MOD SED SAME PHYS/QHP 5/>YRS: CPT

## 2023-08-18 PROCEDURE — 99232 SBSQ HOSP IP/OBS MODERATE 35: CPT | Mod: FS,24

## 2023-08-18 PROCEDURE — 99232 SBSQ HOSP IP/OBS MODERATE 35: CPT | Mod: 25

## 2023-08-18 RX ORDER — POTASSIUM BICARBONATE 978 MG/1
25 TABLET, EFFERVESCENT ORAL ONCE
Refills: 0 | Status: COMPLETED | OUTPATIENT
Start: 2023-08-18 | End: 2023-08-18

## 2023-08-18 RX ORDER — BUMETANIDE 0.25 MG/ML
4 INJECTION INTRAMUSCULAR; INTRAVENOUS
Refills: 0 | Status: DISCONTINUED | OUTPATIENT
Start: 2023-08-18 | End: 2023-08-20

## 2023-08-18 RX ORDER — POTASSIUM BICARBONATE 978 MG/1
25 TABLET, EFFERVESCENT ORAL
Refills: 0 | Status: COMPLETED | OUTPATIENT
Start: 2023-08-18 | End: 2023-08-18

## 2023-08-18 RX ADMIN — POTASSIUM BICARBONATE 25 MILLIEQUIVALENT(S): 978 TABLET, EFFERVESCENT ORAL at 09:19

## 2023-08-18 RX ADMIN — POTASSIUM BICARBONATE 25 MILLIEQUIVALENT(S): 978 TABLET, EFFERVESCENT ORAL at 07:34

## 2023-08-18 RX ADMIN — SODIUM CHLORIDE 3 MILLILITER(S): 9 INJECTION INTRAMUSCULAR; INTRAVENOUS; SUBCUTANEOUS at 15:21

## 2023-08-18 RX ADMIN — Medication 25 MILLIGRAM(S): at 05:27

## 2023-08-18 RX ADMIN — SODIUM CHLORIDE 3 MILLILITER(S): 9 INJECTION INTRAMUSCULAR; INTRAVENOUS; SUBCUTANEOUS at 05:22

## 2023-08-18 RX ADMIN — PANTOPRAZOLE SODIUM 40 MILLIGRAM(S): 20 TABLET, DELAYED RELEASE ORAL at 05:26

## 2023-08-18 RX ADMIN — SODIUM CHLORIDE 3 MILLILITER(S): 9 INJECTION INTRAMUSCULAR; INTRAVENOUS; SUBCUTANEOUS at 22:48

## 2023-08-18 RX ADMIN — BUMETANIDE 132 MILLIGRAM(S): 0.25 INJECTION INTRAMUSCULAR; INTRAVENOUS at 13:40

## 2023-08-18 RX ADMIN — ATORVASTATIN CALCIUM 40 MILLIGRAM(S): 80 TABLET, FILM COATED ORAL at 21:29

## 2023-08-18 RX ADMIN — POTASSIUM BICARBONATE 25 MILLIEQUIVALENT(S): 978 TABLET, EFFERVESCENT ORAL at 08:22

## 2023-08-18 RX ADMIN — POTASSIUM BICARBONATE 25 MILLIEQUIVALENT(S): 978 TABLET, EFFERVESCENT ORAL at 18:22

## 2023-08-18 RX ADMIN — Medication 25 MILLIGRAM(S): at 13:41

## 2023-08-18 RX ADMIN — Medication 100 MILLIGRAM(S): at 05:26

## 2023-08-18 RX ADMIN — BUMETANIDE 132 MILLIGRAM(S): 0.25 INJECTION INTRAMUSCULAR; INTRAVENOUS at 05:26

## 2023-08-18 RX ADMIN — Medication 50 MICROGRAM(S): at 05:27

## 2023-08-18 RX ADMIN — POLYETHYLENE GLYCOL 3350 17 GRAM(S): 17 POWDER, FOR SOLUTION ORAL at 09:20

## 2023-08-18 NOTE — PROGRESS NOTE ADULT - SUBJECTIVE AND OBJECTIVE BOX
Subjective:  - Initially urinating well, mildly reduced today    Medications:  acetaminophen     Tablet .. 650 milliGRAM(s) Oral every 6 hours PRN  aluminum hydroxide/magnesium hydroxide/simethicone Suspension 30 milliLiter(s) Oral every 4 hours PRN  atorvastatin 40 milliGRAM(s) Oral at bedtime  buMETAnide IVPB 4 milliGRAM(s) IV Intermittent <User Schedule>  hydrALAZINE 25 milliGRAM(s) Oral three times a day  levothyroxine 50 MICROGram(s) Oral daily  melatonin 3 milliGRAM(s) Oral at bedtime PRN  metoprolol succinate  milliGRAM(s) Oral daily  ondansetron Injectable 4 milliGRAM(s) IV Push every 8 hours PRN  pantoprazole    Tablet 40 milliGRAM(s) Oral before breakfast  polyethylene glycol 3350 17 Gram(s) Oral daily  sodium chloride 0.9% lock flush 3 milliLiter(s) IV Push every 8 hours    Physical Exam:    Vitals:  Vital Signs Last 24 Hours  T(C): 36.4 (23 @ 12:27), Max: 36.6 (23 @ 19:40)  HR: 80 (23 @ 13:12) (80 - 81)  BP: 92/59 (23 @ 13:12) (86/55 - 93/66)  RR: 18 (23 @ 13:12) (18 - 18)  SpO2: 99% (23 @ 12:27) (95% - 99%)    Weight in k.5 ( @ 08:21)    I&O's Summary    17 Aug 2023 07:  -  18 Aug 2023 07:00  --------------------------------------------------------  IN: 290 mL / OUT: 2551 mL / NET: -2261 mL    18 Aug 2023 07:  -  18 Aug 2023 13:17  --------------------------------------------------------  IN: 120 mL / OUT: 200 mL / NET: -80 mL    Tele: Paced 80s    General: No distress. Comfortable.  HEENT: EOM intact.  Neck: JVP 18 cm H2O with HJR  Chest: Clear to auscultation bilaterally  CV: Normal S1 and S2. Radial pulses normal.  Abdomen: Soft, non-distended, non-tender  Extremities: Warm peripherally. 2+ BLE edema   Neurology: Alert and oriented times three. Sensation intact  Psych: Affect normal    Labs:                        11.9   5.29  )-----------( 134      ( 18 Aug 2023 06:00 )             36.2         135  |  91<L>  |  103<H>  ----------------------------<  145<H>  3.9   |  27  |  2.64<H>    Ca    9.7      18 Aug 2023 12:14  Mg     2.5         TPro  7.2  /  Alb  4.2  /  TBili  1.2  /  DBili  x   /  AST  26  /  ALT  28  /  AlkPhos  219<H>  18    PT/INR - ( 16 Aug 2023 16:56 )   PT: 19.9 sec;   INR: 1.93 ratio         PTT - ( 16 Aug 2023 16:56 )  PTT:37.8 sec

## 2023-08-18 NOTE — PROGRESS NOTE ADULT - PROBLEM SELECTOR PLAN 2
Heart Failure following  Continue on Toprol 100 mg PO daily    Bumex 4 mg IV BID  Continue on Hydralazine 25 mg PO TID  Right heart cath today

## 2023-08-18 NOTE — PROGRESS NOTE ADULT - PROBLEM SELECTOR PLAN 1
Continue on Toprol 100 mg PO daily   Bumex 4 mg IV BID   Activity as tolerated  Heart Failure Consult called   Structural Team to follow

## 2023-08-18 NOTE — PROGRESS NOTE ADULT - SUBJECTIVE AND OBJECTIVE BOX
Subjective:    Tele:                              T(C): 36.4 (08-18-23 @ 05:10), Max: 36.6 (08-17-23 @ 19:40)  HR: 80 (08-18-23 @ 05:10) (80 - 81)  BP: 93/66 (08-18-23 @ 05:10) (88/61 - 93/66)  ABP: --  ABP(mean): --  RR: 18 (08-18-23 @ 05:10) (18 - 18)  SpO2: 95% (08-18-23 @ 05:10) (92% - 95%)  Wt(kg): --  CVP(mm Hg): --  CO: --  CI: --  PA: --    LVEF:       08-18    136  |  93<L>  |  99<H>  ----------------------------<  91  2.9<LL>   |  26  |  2.60<H>    Ca    9.3      18 Aug 2023 06:00  Mg     2.5     08-18    TPro  6.7  /  Alb  4.0  /  TBili  1.2  /  DBili  x   /  AST  24  /  ALT  30  /  AlkPhos  213<H>  08-17                               11.9   5.29  )-----------( 134      ( 18 Aug 2023 06:00 )             36.2        PT/INR - ( 16 Aug 2023 16:56 )   PT: 19.9 sec;   INR: 1.93 ratio         PTT - ( 16 Aug 2023 16:56 )  PTT:37.8 sec      Assessment    Neurology: alert and oriented x 3  CV : S1S2  Lungs: few crackles at bases bilaterally  Abdomen: soft, nontender, nondistended, positive bowel sounds, last bowel movement 8/16        Extremities:     2+edema b/l no calf tenderness        MEDICATIONS  (STANDING):  atorvastatin 40 milliGRAM(s) Oral at bedtime  hydrALAZINE 25 milliGRAM(s) Oral three times a day  levothyroxine 50 MICROGram(s) Oral daily  metoprolol succinate  milliGRAM(s) Oral daily  pantoprazole    Tablet 40 milliGRAM(s) Oral before breakfast  polyethylene glycol 3350 17 Gram(s) Oral daily  sodium chloride 0.9% lock flush 3 milliLiter(s) IV Push every 8 hours       PAST MEDICAL & SURGICAL HISTORY:  Aortic stenosis      AICD (automatic cardioverter/defibrillator) present      Aortic valve regurgitation      Ascending aorta dilation      H/O paroxysmal supraventricular tachycardia      HLD (hyperlipidemia)      Mitral valve regurgitation      Cardiac arrest      Severe mitral regurgitation      S/P aortic valve replacement  3/13/2004      S/P hernia repair  2002      History of tonsillectomy and adenoidectomy      S/P TURP  1996      S/P colonoscopy  2001, 2002, 2006, 2011, 2016      S/P endoscopy  2006      S/P cardiac cath  1994, 1995, 1999, 2002, 2004      AICD (automatic cardioverter/defibrillator) present  12/19/17      Cardiac pacemaker  12/19/17      History of cardioversion  9/11/20      S/P ablation of atrial fibrillation  10/29/20      Status post ablation of ventricular arrhythmia  2/14/19

## 2023-08-18 NOTE — PROGRESS NOTE ADULT - SUBJECTIVE AND OBJECTIVE BOX
[facility]   [phone]  RENAL FOLLOW UP NOTE  --------------------------------------------------------------------------------  Chief Complaint:    24 hour events/subjective:    seen earlier   states breathing is better and urinating well     PAST HISTORY  --------------------------------------------------------------------------------  No significant changes to PMH, PSH, FHx, SHx, unless otherwise noted    ALLERGIES & MEDICATIONS  --------------------------------------------------------------------------------  Allergies    No Known Allergies    Intolerances      Standing Inpatient Medications  atorvastatin 40 milliGRAM(s) Oral at bedtime  buMETAnide IVPB 4 milliGRAM(s) IV Intermittent <User Schedule>  hydrALAZINE 25 milliGRAM(s) Oral three times a day  levothyroxine 50 MICROGram(s) Oral daily  metoprolol succinate  milliGRAM(s) Oral daily  pantoprazole    Tablet 40 milliGRAM(s) Oral before breakfast  polyethylene glycol 3350 17 Gram(s) Oral daily  sodium chloride 0.9% lock flush 3 milliLiter(s) IV Push every 8 hours    PRN Inpatient Medications  acetaminophen     Tablet .. 650 milliGRAM(s) Oral every 6 hours PRN  aluminum hydroxide/magnesium hydroxide/simethicone Suspension 30 milliLiter(s) Oral every 4 hours PRN  melatonin 3 milliGRAM(s) Oral at bedtime PRN  ondansetron Injectable 4 milliGRAM(s) IV Push every 8 hours PRN      REVIEW OF SYSTEMS  --------------------------------------------------------------------------------    Gen: denies  fevers/chills,  CVS: denies chest pain/palpitations  Resp: denies worsening  SOB/Cough  GI: Denies N/V/Abd pain  : Denies dysuria/oliguria/hematuria        VITALS/PHYSICAL EXAM  --------------------------------------------------------------------------------  T(C): 36.5 (08-18-23 @ 20:56), Max: 36.6 (08-18-23 @ 16:04)  HR: 94 (08-18-23 @ 21:35) (79 - 94)  BP: 92/61 (08-18-23 @ 21:35) (82/53 - 99/68)  RR: 18 (08-18-23 @ 20:56) (16 - 18)  SpO2: 93% (08-18-23 @ 20:56) (93% - 99%)  Wt(kg): --        08-17-23 @ 07:01  -  08-18-23 @ 07:00  --------------------------------------------------------  IN: 290 mL / OUT: 2551 mL / NET: -2261 mL    08-18-23 @ 07:01  -  08-18-23 @ 21:52  --------------------------------------------------------  IN: 360 mL / OUT: 750 mL / NET: -390 mL      Physical Exam:  	    	Gen: Non toxic comfortable appearing, on RA   	Pulm: decrease bs  no rales or ronchi or wheezing  	CV: +JVD. RRR, S1S2; no rub  	Abd: +BS, soft, nontender/nondistended  	: No suprapubic tenderness  	UE: Warm, no cyanosis  no clubbing,  no edema; no asterixis  	LE: Warm, no cyanosis  no clubbing, 2 +  edema  	Neuro: alert and oriented. speech coherent     LABS/STUDIES  --------------------------------------------------------------------------------              11.9   5.29  >-----------<  134      [08-18-23 @ 06:00]              36.2     135  |  91  |  103  ----------------------------<  145      [08-18-23 @ 12:14]  3.9   |  27  |  2.64        Ca     9.7     [08-18-23 @ 12:14]      Mg     2.5     [08-18-23 @ 06:00]    TPro  7.2  /  Alb  4.2  /  TBili  1.2  /  DBili  x   /  AST  26  /  ALT  28  /  AlkPhos  219  [08-18-23 @ 12:14]          Creatinine Trend:  SCr 2.64 [08-18 @ 12:14]  SCr 2.60 [08-18 @ 06:00]  SCr 2.63 [08-17 @ 06:36]  SCr 2.84 [08-16 @ 16:58]              Urinalysis - [08-18-23 @ 12:14]      Color  / Appearance  / SG  / pH       Gluc 145 / Ketone   / Bili  / Urobili        Blood  / Protein  / Leuk Est  / Nitrite       RBC  / WBC  / Hyaline  / Gran  / Sq Epi  / Non Sq Epi  / Bacteria     Urinalysis - [08-18-23 @ 12:14]      Color  / Appearance  / SG  / pH       Gluc 145 / Ketone   / Bili  / Urobili        Blood  / Protein  / Leuk Est  / Nitrite       RBC  / WBC  / Hyaline  / Gran  / Sq Epi  / Non Sq Epi  / Bacteria       TSH 5.96      [08-16-23 @ 16:55]

## 2023-08-18 NOTE — PROGRESS NOTE ADULT - ASSESSMENT
83 yo Male with PMHx of  HTN, HLD, GERD, CHF, moderately reduced EF 35-40%, AFIB (on Xarelto) s/p ablation, CAD with h/o Atherectomy,  IWMI 1994, VT ARREST IN 12/2017, AICD, s/p AVR in 2004, s/p AORTIC ANEURYSM AND MV repair with Dr Fenton 6/2/21, and now severe MR.  Patient reports worsening SOB x 2 days, with associated symptoms of fatigue, deconditioning and inability to walk long distance.  Presents today as a direct admission for CHF exacerbation and requiring medical optimization.     Hospital Course:   8/16 Admit to 2 [facility] Telemetry Floor.  Heart Failure consult called.  CXR ordered.  D/C'd Torsemide and started on Bumex 4 mg IV BID. Per Dr. Valentino hold Xarelto for today for possible RHC in AM.  Awaiting HF reccs.     8/17 VSS - bun 92 this am from 102 - as per Dr. Fenton - renal called - pt seen by renal & HF team - will continue Bumex 4mg IV bid-   8/18 RHC today for diagnostic eval Continue aggressive diuresis renal following  Replete potassium

## 2023-08-18 NOTE — PROGRESS NOTE ADULT - ASSESSMENT
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 Patient reports worsening SOB x 2 days, with associated symptoms of fatigue, deconditioning and inability to walk long distance.  Presents today as a direct admission for CHF exacerbation and requiring medical optimization.       1- SURESH on CKDIV  2- CHF  3- severe MR  4- hypokalemia  5- HTN       SURESH in setting of decompensated CHF  cr is still high. he also remains with BUN   bumex  now on 4 mg bid iv.  to cont keep O> I  monitor his renal status closely   k is better   hydralazine 25 mg TID  strict I/O  keep O>I  daily standing weight  2G sodium diet  trend creatinine and electrolytes closely  d/w cts team when seen earlier

## 2023-08-18 NOTE — PROGRESS NOTE ADULT - ASSESSMENT
Mr. Sher is an 81 y/o M w/ h/o CAD c/b IWMI (1994) s/p angioplasty, aortic stenosis s/p bioAVR 2004), VT arrest (2017) s/p ICD, afib s/p multiple DCCV and ablation x2 (2020 and 2023; on AC), prior Ao aneurysm s/p Bentall (2021), severe MR with MAC, atrophic kidney with CKD (b/l Cr 1.9; GFR 34), HFrEF (35-40%; LVEDD 6.7 cm) who is being considered for APOLLO (TMVR) trial but needs further optimization as has pulmonary HTN and concern was severe RV dysfunction and pulmonary HTN, who presents in ADHF and SURESH    Diuresing well to intermittent IV Bumex though remains volume expanded. Recent invasive hemodynamics in May with marginal CI. Planned for RHC today.     Cardiac Studies  ·	4/10/23 - TTE - EF 38%, LVEDD 6.7 cm, severe MR with systolic reversal in pulmonic vein, severe TR, RVSP 49; IVC dilated  ·	3/27/23 ([facility]) - EF 35-40%, paradoxical septal motion, RV enlargement/dysfunction, bioAVR (mean gradient 10) with mild AI, marked MAC with posterior leaflet immobility, mild prolapse of anterior leaflet with torn chords, severe posterior directed MR w/ systolic reversal in PV, moderate TR; no intracardiac clot    ·	5/24/23 - pLAD 40%; mRCA 40%; RA 16, RV 76/19, PA 77/34/50; PCWP 25 (v 35); /67/84; CO/CI 3.5/1.86; SVR 1560; PVR 7.1 (driven by low CI)

## 2023-08-18 NOTE — PROGRESS NOTE ADULT - PROBLEM SELECTOR PLAN 1
- Etiology: likely mixed  - Continue Bumex 4 mg IV BID and start KCL 40 meq PO BID  - Continue HDZN 25 mg TID, hold for SBP <90  - Continue Toprol  mg QD  - No ACEi/ARB/MRA due to SURESH on CKD and in past, did not tolerate SGLT2-i  due to back pain and SOB  - Replete electrolytes to target K+>4.0 and Mg >2.0  - Repeat TTE

## 2023-08-19 LAB
ALBUMIN SERPL ELPH-MCNC: 3.7 G/DL — SIGNIFICANT CHANGE UP (ref 3.3–5)
ALP SERPL-CCNC: 201 U/L — HIGH (ref 40–120)
ALT FLD-CCNC: 25 U/L — SIGNIFICANT CHANGE UP (ref 10–45)
ANION GAP SERPL CALC-SCNC: 18 MMOL/L — HIGH (ref 5–17)
AST SERPL-CCNC: 23 U/L — SIGNIFICANT CHANGE UP (ref 10–40)
BILIRUB SERPL-MCNC: 1.2 MG/DL — SIGNIFICANT CHANGE UP (ref 0.2–1.2)
BUN SERPL-MCNC: 101 MG/DL — HIGH (ref 7–23)
CALCIUM SERPL-MCNC: 9.4 MG/DL — SIGNIFICANT CHANGE UP (ref 8.4–10.5)
CHLORIDE SERPL-SCNC: 93 MMOL/L — LOW (ref 96–108)
CO2 SERPL-SCNC: 26 MMOL/L — SIGNIFICANT CHANGE UP (ref 22–31)
CREAT SERPL-MCNC: 2.69 MG/DL — HIGH (ref 0.5–1.3)
EGFR: 23 ML/MIN/1.73M2 — LOW
GLUCOSE SERPL-MCNC: 98 MG/DL — SIGNIFICANT CHANGE UP (ref 70–99)
HCT VFR BLD CALC: 36.1 % — LOW (ref 39–50)
HGB BLD-MCNC: 11.9 G/DL — LOW (ref 13–17)
MAGNESIUM SERPL-MCNC: 2.7 MG/DL — HIGH (ref 1.6–2.6)
MCHC RBC-ENTMCNC: 29 PG — SIGNIFICANT CHANGE UP (ref 27–34)
MCHC RBC-ENTMCNC: 33 GM/DL — SIGNIFICANT CHANGE UP (ref 32–36)
MCV RBC AUTO: 88 FL — SIGNIFICANT CHANGE UP (ref 80–100)
NRBC # BLD: 0 /100 WBCS — SIGNIFICANT CHANGE UP (ref 0–0)
PLATELET # BLD AUTO: 142 K/UL — LOW (ref 150–400)
POTASSIUM SERPL-MCNC: 3.5 MMOL/L — SIGNIFICANT CHANGE UP (ref 3.5–5.3)
POTASSIUM SERPL-SCNC: 3.5 MMOL/L — SIGNIFICANT CHANGE UP (ref 3.5–5.3)
PREALB SERPL-MCNC: 20 MG/DL — SIGNIFICANT CHANGE UP (ref 20–40)
PROT SERPL-MCNC: 6.6 G/DL — SIGNIFICANT CHANGE UP (ref 6–8.3)
RBC # BLD: 4.1 M/UL — LOW (ref 4.2–5.8)
RBC # FLD: 19.4 % — HIGH (ref 10.3–14.5)
SODIUM SERPL-SCNC: 137 MMOL/L — SIGNIFICANT CHANGE UP (ref 135–145)
WBC # BLD: 5.25 K/UL — SIGNIFICANT CHANGE UP (ref 3.8–10.5)
WBC # FLD AUTO: 5.25 K/UL — SIGNIFICANT CHANGE UP (ref 3.8–10.5)

## 2023-08-19 PROCEDURE — 99232 SBSQ HOSP IP/OBS MODERATE 35: CPT

## 2023-08-19 RX ORDER — SORBITOL SOLUTION 70 %
30 SOLUTION, ORAL MISCELLANEOUS ONCE
Refills: 0 | Status: COMPLETED | OUTPATIENT
Start: 2023-08-19 | End: 2023-08-19

## 2023-08-19 RX ORDER — POTASSIUM BICARBONATE 978 MG/1
25 TABLET, EFFERVESCENT ORAL
Refills: 0 | Status: COMPLETED | OUTPATIENT
Start: 2023-08-19 | End: 2023-08-19

## 2023-08-19 RX ORDER — SENNA PLUS 8.6 MG/1
2 TABLET ORAL AT BEDTIME
Refills: 0 | Status: DISCONTINUED | OUTPATIENT
Start: 2023-08-19 | End: 2023-08-30

## 2023-08-19 RX ORDER — POTASSIUM CHLORIDE 20 MEQ
40 PACKET (EA) ORAL
Refills: 0 | Status: DISCONTINUED | OUTPATIENT
Start: 2023-08-20 | End: 2023-08-25

## 2023-08-19 RX ADMIN — POLYETHYLENE GLYCOL 3350 17 GRAM(S): 17 POWDER, FOR SOLUTION ORAL at 13:58

## 2023-08-19 RX ADMIN — SODIUM CHLORIDE 3 MILLILITER(S): 9 INJECTION INTRAMUSCULAR; INTRAVENOUS; SUBCUTANEOUS at 20:58

## 2023-08-19 RX ADMIN — BUMETANIDE 132 MILLIGRAM(S): 0.25 INJECTION INTRAMUSCULAR; INTRAVENOUS at 05:48

## 2023-08-19 RX ADMIN — Medication 30 MILLILITER(S): at 16:46

## 2023-08-19 RX ADMIN — POTASSIUM BICARBONATE 25 MILLIEQUIVALENT(S): 978 TABLET, EFFERVESCENT ORAL at 10:22

## 2023-08-19 RX ADMIN — SODIUM CHLORIDE 3 MILLILITER(S): 9 INJECTION INTRAMUSCULAR; INTRAVENOUS; SUBCUTANEOUS at 14:07

## 2023-08-19 RX ADMIN — Medication 25 MILLIGRAM(S): at 21:01

## 2023-08-19 RX ADMIN — Medication 25 MILLIGRAM(S): at 13:59

## 2023-08-19 RX ADMIN — Medication 100 MILLIGRAM(S): at 05:50

## 2023-08-19 RX ADMIN — SODIUM CHLORIDE 3 MILLILITER(S): 9 INJECTION INTRAMUSCULAR; INTRAVENOUS; SUBCUTANEOUS at 06:13

## 2023-08-19 RX ADMIN — BUMETANIDE 132 MILLIGRAM(S): 0.25 INJECTION INTRAMUSCULAR; INTRAVENOUS at 14:01

## 2023-08-19 RX ADMIN — Medication 50 MICROGRAM(S): at 05:49

## 2023-08-19 RX ADMIN — ATORVASTATIN CALCIUM 40 MILLIGRAM(S): 80 TABLET, FILM COATED ORAL at 21:00

## 2023-08-19 RX ADMIN — Medication 25 MILLIGRAM(S): at 06:28

## 2023-08-19 RX ADMIN — POTASSIUM BICARBONATE 25 MILLIEQUIVALENT(S): 978 TABLET, EFFERVESCENT ORAL at 08:09

## 2023-08-19 NOTE — PROGRESS NOTE ADULT - ASSESSMENT
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 Patient reports worsening SOB x 2 days, with associated symptoms of fatigue, deconditioning and inability to walk long distance.  Presents today as a direct admission for CHF exacerbation and requiring medical optimization.       1- SURESH on CKDIV  2- CHF  3- severe MR  4- hypokalemia  5- HTN       SURESH in setting of decompensated CHF  cr is still high. he also remains with high BUN   bumex  now on 4 mg bid iv.  to cont keep O> I  monitor his renal status closely   likely will need to decrease bumex if cont to be worsening azotemia   keep K > 3.5  hydralazine 25 mg TID  strict I/O  keep O>I  daily standing weight  2G sodium diet  trend creatinine and electrolytes closely  d/w cts team when seen earlier

## 2023-08-19 NOTE — PROGRESS NOTE ADULT - SUBJECTIVE AND OBJECTIVE BOX
[facility]   [phone]  RENAL FOLLOW UP NOTE  --------------------------------------------------------------------------------  Chief Complaint:    24 hour events/subjective:    seen earlier   states breathing is much better and urinating well     PAST HISTORY  --------------------------------------------------------------------------------  No significant changes to PMH, PSH, FHx, SHx, unless otherwise noted    ALLERGIES & MEDICATIONS  --------------------------------------------------------------------------------  Allergies    No Known Allergies    Intolerances      Standing Inpatient Medications  atorvastatin 40 milliGRAM(s) Oral at bedtime  buMETAnide IVPB 4 milliGRAM(s) IV Intermittent <User Schedule>  hydrALAZINE 25 milliGRAM(s) Oral three times a day  levothyroxine 50 MICROGram(s) Oral daily  metoprolol succinate  milliGRAM(s) Oral daily  pantoprazole    Tablet 40 milliGRAM(s) Oral before breakfast  polyethylene glycol 3350 17 Gram(s) Oral daily  senna 2 Tablet(s) Oral at bedtime  sodium chloride 0.9% lock flush 3 milliLiter(s) IV Push every 8 hours    PRN Inpatient Medications  acetaminophen     Tablet .. 650 milliGRAM(s) Oral every 6 hours PRN  aluminum hydroxide/magnesium hydroxide/simethicone Suspension 30 milliLiter(s) Oral every 4 hours PRN  melatonin 3 milliGRAM(s) Oral at bedtime PRN  ondansetron Injectable 4 milliGRAM(s) IV Push every 8 hours PRN      REVIEW OF SYSTEMS  --------------------------------------------------------------------------------    Gen: denies  fevers/chills,  CVS: denies chest pain/palpitations  Resp: denies SOB/Cough  GI: Denies N/V/Abd pain  : Denies dysuria    VITALS/PHYSICAL EXAM  --------------------------------------------------------------------------------  T(C): 36.6 (08-19-23 @ 20:20), Max: 36.7 (08-19-23 @ 05:15)  HR: 80 (08-19-23 @ 20:20) (78 - 80)  BP: 93/60 (08-19-23 @ 20:20) (88/62 - 93/63)  RR: 18 (08-19-23 @ 20:20) (18 - 18)  SpO2: 92% (08-19-23 @ 20:20) (90% - 97%)  Wt(kg): --        08-18-23 @ 07:01  -  08-19-23 @ 07:00  --------------------------------------------------------  IN: 800 mL / OUT: 1550 mL / NET: -750 mL    08-19-23 @ 07:01  -  08-19-23 @ 22:32  --------------------------------------------------------  IN: 900 mL / OUT: 501 mL / NET: 399 mL      Physical Exam:  	      	Gen: Non toxic comfortable appearing, on RA   	Pulm: decrease bs  no rales or ronchi or wheezing  	CV: +JVD. RRR, S1S2; no rub  	Abd: +BS, soft, nontender/nondistended  	: No suprapubic tenderness  	UE: Warm, no cyanosis  no clubbing,  no edema; no asterixis  	LE: Warm, no cyanosis  no clubbing, 2 -   edema  	Neuro: alert and oriented. speech coherent       LABS/STUDIES  --------------------------------------------------------------------------------              11.9   5.25  >-----------<  142      [08-19-23 @ 06:38]              36.1     137  |  93  |  101  ----------------------------<  98      [08-19-23 @ 06:37]  3.5   |  26  |  2.69        Ca     9.4     [08-19-23 @ 06:37]      Mg     2.7     [08-19-23 @ 06:37]    TPro  6.6  /  Alb  3.7  /  TBili  1.2  /  DBili  x   /  AST  23  /  ALT  25  /  AlkPhos  201  [08-19-23 @ 06:37]          Creatinine Trend:  SCr 2.69 [08-19 @ 06:37]  SCr 2.64 [08-18 @ 12:14]  SCr 2.60 [08-18 @ 06:00]  SCr 2.63 [08-17 @ 06:36]  SCr 2.84 [08-16 @ 16:58]              Urinalysis - [08-19-23 @ 06:37]      Color  / Appearance  / SG  / pH       Gluc 98 / Ketone   / Bili  / Urobili        Blood  / Protein  / Leuk Est  / Nitrite       RBC  / WBC  / Hyaline  / Gran  / Sq Epi  / Non Sq Epi  / Bacteria     Urinalysis - [08-19-23 @ 06:37]      Color  / Appearance  / SG  / pH       Gluc 98 / Ketone   / Bili  / Urobili        Blood  / Protein  / Leuk Est  / Nitrite       RBC  / WBC  / Hyaline  / Gran  / Sq Epi  / Non Sq Epi  / Bacteria       TSH 5.96      [08-16-23 @ 16:55]

## 2023-08-19 NOTE — PROGRESS NOTE ADULT - PROBLEM SELECTOR PLAN 2
Heart Failure following  Continue on Toprol 100 mg PO daily   Continue on Bumex 4 mg IV BID  Continue on Hydralazine 25 mg PO TID  Right heart cath today

## 2023-08-19 NOTE — PROGRESS NOTE ADULT - PROBLEM SELECTOR PLAN 1
Continue on Toprol 100 mg PO daily   Bumex 4 mg IV BID   Activity as tolerated  Heart Failure following   Structural Team following

## 2023-08-19 NOTE — PROGRESS NOTE ADULT - SUBJECTIVE AND OBJECTIVE BOX
VITAL SIGNS    Subjective: "I'm feeling much better" Denies CP, palpitation, SOB, LUA, HA, dizziness, N/V/D, fever or chills.  No acute event noted overnight.     Telemetry: V-Paced 75-80's      Vital Signs Last 24 Hrs  T(C): 36.4 (08-19-23 @ 11:55), Max: 36.7 (08-19-23 @ 05:15)  T(F): 97.6 (08-19-23 @ 11:55), Max: 98.1 (08-19-23 @ 05:15)  HR: 78 (08-19-23 @ 11:55) (78 - 94)  BP: 92/64 (08-19-23 @ 11:55) (82/53 - 99/68)  RR: 18 (08-19-23 @ 11:55) (16 - 18)  SpO2: 90% (08-19-23 @ 11:55) (90% - 97%)           08-18 @ 07:01  -  08-19 @ 07:00  --------------------------------------------------------  IN: 800 mL / OUT: 1550 mL / NET: -750 mL    08-19 @ 07:01  -  08-19 @ 13:59  --------------------------------------------------------  IN: 600 mL / OUT: 300 mL / NET: 300 mL    LABS  08-19    137  |  93<L>  |  101<H>  ----------------------------<  98  3.5   |  26  |  2.69<H>    Ca    9.4      19 Aug 2023 06:37  Mg     2.7     08-19    TPro  6.6  /  Alb  3.7  /  TBili  1.2  /  DBili  x   /  AST  23  /  ALT  25  /  AlkPhos  201<H>  08-19                                 11.9   5.25  )-----------( 142      ( 19 Aug 2023 06:38 )             36.1            PHYSICAL EXAM    Neurology: alert and oriented x 3, nonfocal, no gross deficits    CV: (+) Systolic murmurs    Lungs: CTA B/L     Abdomen: soft, nontender, nondistended, positive bowel sounds, (+) Flatus; (-) BM     :  Voiding               Extremities:  B/L LE (+) 1 edema; negative calf tenderness; (+) 2 DP palpable          acetaminophen Tablet .. 650 milliGRAM(s) Oral every 6 hours PRN  aluminum hydroxide/magnesium hydroxide/simethicone Suspension 30 milliLiter(s) Oral every 4 hours PRN  atorvastatin 40 milliGRAM(s) Oral at bedtime  buMETAnide IVPB 4 milliGRAM(s) IV Intermittent <User Schedule>  hydrALAZINE 25 milliGRAM(s) Oral three times a day  levothyroxine 50 MICROGram(s) Oral daily  melatonin 3 milliGRAM(s) Oral at bedtime PRN  metoprolol succinate  milliGRAM(s) Oral daily  ondansetron Injectable 4 milliGRAM(s) IV Push every 8 hours PRN  pantoprazole    Tablet 40 milliGRAM(s) Oral before breakfast  polyethylene glycol 3350 17 Gram(s) Oral daily  sodium chloride 0.9% lock flush 3 milliLiter(s) IV Push every 8 hours    Physical Therapy Rec:   Home  [ X ]   Home w/ PT  [  ]  Rehab  [  ]    Discussed with Cardiothoracic Team at AM rounds.

## 2023-08-20 LAB
ALBUMIN SERPL ELPH-MCNC: 3.8 G/DL — SIGNIFICANT CHANGE UP (ref 3.3–5)
ALP SERPL-CCNC: 210 U/L — HIGH (ref 40–120)
ALT FLD-CCNC: 24 U/L — SIGNIFICANT CHANGE UP (ref 10–45)
ANION GAP SERPL CALC-SCNC: 17 MMOL/L — SIGNIFICANT CHANGE UP (ref 5–17)
ANION GAP SERPL CALC-SCNC: 19 MMOL/L — HIGH (ref 5–17)
AST SERPL-CCNC: 30 U/L — SIGNIFICANT CHANGE UP (ref 10–40)
BILIRUB SERPL-MCNC: 1.2 MG/DL — SIGNIFICANT CHANGE UP (ref 0.2–1.2)
BUN SERPL-MCNC: 101 MG/DL — HIGH (ref 7–23)
BUN SERPL-MCNC: 94 MG/DL — HIGH (ref 7–23)
CALCIUM SERPL-MCNC: 9.2 MG/DL — SIGNIFICANT CHANGE UP (ref 8.4–10.5)
CALCIUM SERPL-MCNC: 9.4 MG/DL — SIGNIFICANT CHANGE UP (ref 8.4–10.5)
CHLORIDE SERPL-SCNC: 93 MMOL/L — LOW (ref 96–108)
CHLORIDE SERPL-SCNC: 93 MMOL/L — LOW (ref 96–108)
CO2 SERPL-SCNC: 23 MMOL/L — SIGNIFICANT CHANGE UP (ref 22–31)
CO2 SERPL-SCNC: 25 MMOL/L — SIGNIFICANT CHANGE UP (ref 22–31)
CREAT SERPL-MCNC: 2.65 MG/DL — HIGH (ref 0.5–1.3)
CREAT SERPL-MCNC: 3.01 MG/DL — HIGH (ref 0.5–1.3)
EGFR: 20 ML/MIN/1.73M2 — LOW
EGFR: 23 ML/MIN/1.73M2 — LOW
GLUCOSE SERPL-MCNC: 151 MG/DL — HIGH (ref 70–99)
GLUCOSE SERPL-MCNC: 90 MG/DL — SIGNIFICANT CHANGE UP (ref 70–99)
HCT VFR BLD CALC: 35.1 % — LOW (ref 39–50)
HGB BLD-MCNC: 11.5 G/DL — LOW (ref 13–17)
MCHC RBC-ENTMCNC: 29 PG — SIGNIFICANT CHANGE UP (ref 27–34)
MCHC RBC-ENTMCNC: 32.8 GM/DL — SIGNIFICANT CHANGE UP (ref 32–36)
MCV RBC AUTO: 88.6 FL — SIGNIFICANT CHANGE UP (ref 80–100)
NRBC # BLD: 0 /100 WBCS — SIGNIFICANT CHANGE UP (ref 0–0)
PLATELET # BLD AUTO: 119 K/UL — LOW (ref 150–400)
POTASSIUM SERPL-MCNC: 3.6 MMOL/L — SIGNIFICANT CHANGE UP (ref 3.5–5.3)
POTASSIUM SERPL-MCNC: 4.2 MMOL/L — SIGNIFICANT CHANGE UP (ref 3.5–5.3)
POTASSIUM SERPL-SCNC: 3.6 MMOL/L — SIGNIFICANT CHANGE UP (ref 3.5–5.3)
POTASSIUM SERPL-SCNC: 4.2 MMOL/L — SIGNIFICANT CHANGE UP (ref 3.5–5.3)
PROT SERPL-MCNC: 6.8 G/DL — SIGNIFICANT CHANGE UP (ref 6–8.3)
RBC # BLD: 3.96 M/UL — LOW (ref 4.2–5.8)
RBC # FLD: 19.4 % — HIGH (ref 10.3–14.5)
SODIUM SERPL-SCNC: 133 MMOL/L — LOW (ref 135–145)
SODIUM SERPL-SCNC: 137 MMOL/L — SIGNIFICANT CHANGE UP (ref 135–145)
WBC # BLD: 6.62 K/UL — SIGNIFICANT CHANGE UP (ref 3.8–10.5)
WBC # FLD AUTO: 6.62 K/UL — SIGNIFICANT CHANGE UP (ref 3.8–10.5)

## 2023-08-20 PROCEDURE — 99233 SBSQ HOSP IP/OBS HIGH 50: CPT

## 2023-08-20 PROCEDURE — 99231 SBSQ HOSP IP/OBS SF/LOW 25: CPT

## 2023-08-20 PROCEDURE — 93306 TTE W/DOPPLER COMPLETE: CPT | Mod: 26

## 2023-08-20 PROCEDURE — 93356 MYOCRD STRAIN IMG SPCKL TRCK: CPT

## 2023-08-20 RX ORDER — BUMETANIDE 0.25 MG/ML
1 INJECTION INTRAMUSCULAR; INTRAVENOUS
Qty: 20 | Refills: 0 | Status: DISCONTINUED | OUTPATIENT
Start: 2023-08-20 | End: 2023-08-25

## 2023-08-20 RX ORDER — METOPROLOL TARTRATE 50 MG
50 TABLET ORAL DAILY
Refills: 0 | Status: DISCONTINUED | OUTPATIENT
Start: 2023-08-21 | End: 2023-08-26

## 2023-08-20 RX ORDER — MILRINONE LACTATE 1 MG/ML
0.25 INJECTION, SOLUTION INTRAVENOUS
Qty: 20 | Refills: 0 | Status: DISCONTINUED | OUTPATIENT
Start: 2023-08-20 | End: 2023-08-25

## 2023-08-20 RX ADMIN — SODIUM CHLORIDE 3 MILLILITER(S): 9 INJECTION INTRAMUSCULAR; INTRAVENOUS; SUBCUTANEOUS at 05:44

## 2023-08-20 RX ADMIN — Medication 50 MICROGRAM(S): at 05:41

## 2023-08-20 RX ADMIN — SODIUM CHLORIDE 3 MILLILITER(S): 9 INJECTION INTRAMUSCULAR; INTRAVENOUS; SUBCUTANEOUS at 13:25

## 2023-08-20 RX ADMIN — Medication 25 MILLIGRAM(S): at 21:54

## 2023-08-20 RX ADMIN — Medication 40 MILLIEQUIVALENT(S): at 17:21

## 2023-08-20 RX ADMIN — Medication 40 MILLIEQUIVALENT(S): at 05:51

## 2023-08-20 RX ADMIN — PANTOPRAZOLE SODIUM 40 MILLIGRAM(S): 20 TABLET, DELAYED RELEASE ORAL at 05:42

## 2023-08-20 RX ADMIN — Medication 25 MILLIGRAM(S): at 05:41

## 2023-08-20 RX ADMIN — POLYETHYLENE GLYCOL 3350 17 GRAM(S): 17 POWDER, FOR SOLUTION ORAL at 11:08

## 2023-08-20 RX ADMIN — BUMETANIDE 132 MILLIGRAM(S): 0.25 INJECTION INTRAMUSCULAR; INTRAVENOUS at 06:40

## 2023-08-20 RX ADMIN — MILRINONE LACTATE 2.78 MICROGRAM(S)/KG/MIN: 1 INJECTION, SOLUTION INTRAVENOUS at 09:46

## 2023-08-20 RX ADMIN — ATORVASTATIN CALCIUM 40 MILLIGRAM(S): 80 TABLET, FILM COATED ORAL at 21:54

## 2023-08-20 RX ADMIN — SENNA PLUS 2 TABLET(S): 8.6 TABLET ORAL at 21:54

## 2023-08-20 RX ADMIN — SODIUM CHLORIDE 3 MILLILITER(S): 9 INJECTION INTRAMUSCULAR; INTRAVENOUS; SUBCUTANEOUS at 22:00

## 2023-08-20 RX ADMIN — Medication 100 MILLIGRAM(S): at 05:41

## 2023-08-20 RX ADMIN — BUMETANIDE 5 MG/HR: 0.25 INJECTION INTRAMUSCULAR; INTRAVENOUS at 10:39

## 2023-08-20 NOTE — PROGRESS NOTE ADULT - ASSESSMENT
83 yo Male with PMHx of  HTN, HLD, GERD, CHF, moderately reduced EF 35-40%, AFIB (on Xarelto) s/p ablation, CAD with h/o Atherectomy,  IWMI 1994, VT ARREST IN 12/2017, AICD, s/p AVR in 2004, s/p AORTIC ANEURYSM AND MV repair with Dr Fenton 6/2/21, and now severe MR.  Patient reports worsening SOB x 2 days, with associated symptoms of fatigue, deconditioning and inability to walk long distance.  Presents today as a direct admission for CHF exacerbation and requiring medical optimization.     Hospital Course:   8/16 Admit to 2 [facility] Telemetry Floor.  Heart Failure consult called.  CXR ordered.  D/C'd Torsemide and started on Bumex 4 mg IV BID. Per Dr. Valentino hold Xarelto for today for possible RHC in AM.  Awaiting HF reccs.     8/17 VSS - bun 92 this am from 102 - as per Dr. Fenton - renal called - pt seen by renal & HF team - will continue Bumex 4mg IV bid-   8/18 RHC today for diagnostic eval Continue aggressive diuresis renal following  Replete potassium.  8/19 VVS; Potassium 3.5 --> Supplemented. Started om KCL 40 meq PO BID.  Heart Failure and Renal following.  Pre albumin ordered: 20 Continue with current medication regimen.  Sorbitol 30 mg PO x 1.   8/20 VSS + BM  seen by HF now for BUMEX GTT 1mg/hr  and milrinone for inotropic support 125mcg/kg/min

## 2023-08-20 NOTE — PROGRESS NOTE ADULT - PROBLEM SELECTOR PLAN 3
- PVR 5 however driven by low CO/CI as well; TPG 22; DPG 8  - will reassess with further optimization - Pre and post capillary  - Continue diuresis

## 2023-08-20 NOTE — PROGRESS NOTE ADULT - ATTENDING COMMENTS
Pt states he feels much better.   Has JVD ~10cm, clear lungs and minimal LE edema.  I/Os from yesterday not appropriately documented.   RHC with elevated filling pressures, Cpc-PHTN, low CI and SVR 1200s.  His BP is already borderline low. Will start low dose milrinone gtt and bumex gtt.   Monitor electrolytes and BP closely.

## 2023-08-20 NOTE — PROGRESS NOTE ADULT - SUBJECTIVE AND OBJECTIVE BOX
[facility]   [phone]  RENAL FOLLOW UP NOTE  --------------------------------------------------------------------------------  Chief Complaint:    24 hour events/subjective:    seen earlier   states breathing is much better and has been urinating well     PAST HISTORY  --------------------------------------------------------------------------------  No significant changes to PMH, PSH, FHx, SHx, unless otherwise noted    ALLERGIES & MEDICATIONS  --------------------------------------------------------------------------------  Allergies    No Known Allergies    Intolerances      Standing Inpatient Medications  atorvastatin 40 milliGRAM(s) Oral at bedtime  buMETAnide Infusion 1 mG/Hr IV Continuous <Continuous>  hydrALAZINE 25 milliGRAM(s) Oral three times a day  levothyroxine 50 MICROGram(s) Oral daily  milrinone Infusion 0.125 MICROgram(s)/kG/Min IV Continuous <Continuous>  pantoprazole    Tablet 40 milliGRAM(s) Oral before breakfast  polyethylene glycol 3350 17 Gram(s) Oral daily  potassium chloride    Tablet ER 40 milliEquivalent(s) Oral two times a day  senna 2 Tablet(s) Oral at bedtime  sodium chloride 0.9% lock flush 3 milliLiter(s) IV Push every 8 hours    PRN Inpatient Medications  acetaminophen     Tablet .. 650 milliGRAM(s) Oral every 6 hours PRN  aluminum hydroxide/magnesium hydroxide/simethicone Suspension 30 milliLiter(s) Oral every 4 hours PRN  melatonin 3 milliGRAM(s) Oral at bedtime PRN  ondansetron Injectable 4 milliGRAM(s) IV Push every 8 hours PRN      REVIEW OF SYSTEMS  --------------------------------------------------------------------------------    Gen: denies  fevers/chills,  CVS: denies chest pain/palpitations  Resp: denies SOB/Cough  GI: Denies N/V/Abd pain  : Denies dysuria    VITALS/PHYSICAL EXAM  --------------------------------------------------------------------------------  T(C): 36.5 (08-20-23 @ 21:08), Max: 37 (08-20-23 @ 05:10)  HR: 81 (08-20-23 @ 21:08) (70 - 81)  BP: 94/66 (08-20-23 @ 21:08) (86/55 - 100/67)  RR: 18 (08-20-23 @ 21:08) (18 - 18)  SpO2: 97% (08-20-23 @ 21:08) (95% - 97%)  Wt(kg): --        08-19-23 @ 07:01  -  08-20-23 @ 07:00  --------------------------------------------------------  IN: 900 mL / OUT: 901 mL / NET: -1 mL    08-20-23 @ 07:01  -  08-20-23 @ 22:18  --------------------------------------------------------  IN: 498 mL / OUT: 1950 mL / NET: -1452 mL      Physical Exam:  	  	Gen: Non toxic comfortable appearing, on RA   	Pulm: decrease bs  no rales or ronchi or wheezing  	CV: +JVD. RRR, S1S2; no rub  	Abd: +BS, soft, nontender/nondistended  	UE: Warm, no cyanosis  no clubbing,  no edema; no asterixis  	LE: Warm, no cyanosis  no clubbing, 2 -   edema  	Neuro: alert and oriented. speech coherent     LABS/STUDIES  --------------------------------------------------------------------------------              11.5   6.62  >-----------<  119      [08-20-23 @ 07:03]              35.1     133  |  93  |  94  ----------------------------<  151      [08-20-23 @ 18:33]  4.2   |  23  |  3.01        Ca     9.2     [08-20-23 @ 18:33]      Mg     2.7     [08-19-23 @ 06:37]    TPro  6.8  /  Alb  3.8  /  TBili  1.2  /  DBili  x   /  AST  30  /  ALT  24  /  AlkPhos  210  [08-20-23 @ 18:33]          Creatinine Trend:  SCr 3.01 [08-20 @ 18:33]  SCr 2.65 [08-20 @ 07:02]  SCr 2.69 [08-19 @ 06:37]  SCr 2.64 [08-18 @ 12:14]  SCr 2.60 [08-18 @ 06:00]              Urinalysis - [08-20-23 @ 18:33]      Color  / Appearance  / SG  / pH       Gluc 151 / Ketone   / Bili  / Urobili        Blood  / Protein  / Leuk Est  / Nitrite       RBC  / WBC  / Hyaline  / Gran  / Sq Epi  / Non Sq Epi  / Bacteria     Urinalysis - [08-20-23 @ 18:33]      Color  / Appearance  / SG  / pH       Gluc 151 / Ketone   / Bili  / Urobili        Blood  / Protein  / Leuk Est  / Nitrite       RBC  / WBC  / Hyaline  / Gran  / Sq Epi  / Non Sq Epi  / Bacteria       TSH 5.96      [08-16-23 @ 16:55]

## 2023-08-20 NOTE — PROGRESS NOTE ADULT - PROBLEM SELECTOR PLAN 2
Heart Failure following  8/20 decrease Toprol 50 daily per HF team   8/20 milrinone 125 mcg/kg/min   8/20 Bumex gtt 1mg/hr   Continue on Hydralazine 25 mg PO TID

## 2023-08-20 NOTE — PROGRESS NOTE ADULT - PROBLEM SELECTOR PLAN 2
- structural following, recommends:  "Enrollment in [employer] clinical trial for TMVR currently on hold   Reviewed TTE again with Dr. Dockery, his anatomy is not suitable for Mitral KRISTIE   Will discuss with Dr. Valentino and Dr. Fenton and explore alternative clinical trial options for TMVR vs re-operation with Dr. Fenton"

## 2023-08-20 NOTE — PROGRESS NOTE ADULT - SUBJECTIVE AND OBJECTIVE BOX
Patient seen and examined at bedside.    Overnight Events:   - no events overnight  -  on tele in the 80s, no events    Review Of Systems: No chest pain, shortness of breath, or palpitations            Current Meds:  acetaminophen     Tablet .. 650 milliGRAM(s) Oral every 6 hours PRN  aluminum hydroxide/magnesium hydroxide/simethicone Suspension 30 milliLiter(s) Oral every 4 hours PRN  atorvastatin 40 milliGRAM(s) Oral at bedtime  buMETAnide Infusion 1 mG/Hr IV Continuous <Continuous>  hydrALAZINE 25 milliGRAM(s) Oral three times a day  levothyroxine 50 MICROGram(s) Oral daily  melatonin 3 milliGRAM(s) Oral at bedtime PRN  milrinone Infusion 0.125 MICROgram(s)/kG/Min IV Continuous <Continuous>  ondansetron Injectable 4 milliGRAM(s) IV Push every 8 hours PRN  pantoprazole    Tablet 40 milliGRAM(s) Oral before breakfast  polyethylene glycol 3350 17 Gram(s) Oral daily  potassium chloride    Tablet ER 40 milliEquivalent(s) Oral two times a day  senna 2 Tablet(s) Oral at bedtime  sodium chloride 0.9% lock flush 3 milliLiter(s) IV Push every 8 hours      Vitals:  T(F): 98.6 (08-20), Max: 98.6 (08-20)  HR: 81 (08-20) (78 - 81)  BP: 95/62 (08-20) (92/64 - 95/62)  RR: 18 (08-20)  SpO2: 95% (08-20)  I&O's Summary    19 Aug 2023 07:01  -  20 Aug 2023 07:00  --------------------------------------------------------  IN: 900 mL / OUT: 901 mL / NET: -1 mL    20 Aug 2023 07:01  -  20 Aug 2023 11:30  --------------------------------------------------------  IN: 0 mL / OUT: 1250 mL / NET: -1250 mL        Physical Exam:  General: No distress. Comfortable.  HEENT: EOM intact.  Neck: JVP 16 cm H2O with HJR  Chest: Clear to auscultation bilaterally  CV: Normal S1 and S2. Radial pulses normal.  Abdomen: Soft, non-distended, non-tender  Extremities: Warm peripherally. 2+ BLE edema   Neurology: A&Ox3, nonfocal                            11.5   6.62  )-----------( 119      ( 20 Aug 2023 07:03 )             35.1     08-20    137  |  93<L>  |  101<H>  ----------------------------<  90  3.6   |  25  |  2.65<H>    Ca    9.4      20 Aug 2023 07:02  Mg     2.7     08-19    TPro  6.6  /  Alb  3.7  /  TBili  1.2  /  DBili  x   /  AST  23  /  ALT  25  /  AlkPhos  201<H>  08-19

## 2023-08-20 NOTE — PROGRESS NOTE ADULT - PROBLEM SELECTOR PLAN 1
- Etiology: likely mixed  - stop IV pushes of bumex; start bumex gtt at 1mg/hr to aim for -1-2L net daily, if not at goal, can increase bumex gtt  - start milrinone at 0.125 mcg/kg/min  - check daily lactate, BMP, LFTs  - c/w KCL 40 meq PO BID  - Continue HDZN 25 mg TID, hold for SBP <90  - Continue Toprol XL 50 mg QD  - No ACEi/ARB/MRA due to SURESH on CKD and in past, did not tolerate SGLT2-i due to back pain and SOB  - Replete electrolytes to target K+>4.0 and Mg >2.0  - Repeat TTE - Etiology: likely mixed  - RHC with elevated filling pressures, low CI and SVR ~1200s. Would benefitt from AL reduction but pt's BP is already borderline low.  - Diuretics: transition IV boluses to bumex gtt at 1mg/hr to aim for -1-2L net daily, if not at goal, can increase bumex gtt  - start milrinone at 0.125 mcg/kg/min  - check daily lactate, BMP, LFTs  - c/w KCL 40 meq PO BID  - Continue HDZN 25 mg TID, hold for SBP <90  - Continue Toprol XL 50 mg QD  - No ACEi/ARB/MRA due to SURESH on CKD and in past, did not tolerate SGLT2-i due to back pain and SOB  - Replete electrolytes to target K+>4.0 and Mg >2.0  - Repeat TTE

## 2023-08-20 NOTE — PROGRESS NOTE ADULT - SUBJECTIVE AND OBJECTIVE BOX
Subjective ' hello - what is next?'    VITAL SIGNS    Telemetry:   Vpaced 80    Vital Signs Last 24 Hrs  T(C): 37 (23 @ 05:10), Max: 37 (23 @ 05:10)  T(F): 98.6 (23 @ 05:10), Max: 98.6 (23 @ 05:10)  HR: 81 (23 @ 05:10) (78 - 81)  BP: 95/62 (23 @ 05:10) (92/64 - 95/62)  RR: 18 (23 @ 05:10) (18 - 18)  SpO2: 95% (23 @ 05:10) (90% - 95%)                    @ 07:01  -   @ 07:00  --------------------------------------------------------  IN: 900 mL / OUT: 901 mL / NET: -1 mL     @ 07:01  -   @ 10:53  --------------------------------------------------------  IN: 0 mL / OUT: 1250 mL / NET: -1250 mL     Daily Weight in k.3 (20 Aug 2023 07:02)                            11.5   6.62  )-----------( 119      ( 20 Aug 2023 07:03 )             35.1         08    137  |  93<L>  |  101<H>  ----------------------------<  90  3.6   |  25  |  2.65<H>    Ca    9.4      20 Aug 2023 07:02  Mg     2.7         TPro  6.6  /  Alb  3.7  /  TBili  1.2  /  DBili  x   /  AST  23  /  ALT  25  /  AlkPhos  201<H>        < from: Cardiac Catheterization (23 @ 16:27) >    Qp 3.60 l/min Qp Index 1.90 l/min/m2    Qp/Qs   EPBF                                EPBF Index   L-R                                 L-R Index   R-L                                 R-L Index   Systemic Resistances, Phase: Baseline   SVR                                   TVR   SVRI                                               TVRI   SVR                                                TVR   SVRI                                               TVRI   Pulmonary Resistances:   PVR      400.25 dyn*s/cm5                 TPR  1045.10 dyn*s/cm5  PVRI              757.40 dyn*s*m2/cm5              TPRI  1977.65 dyn*s*m2/cm5  PVR               5.00 BULLARD                          TPR  13.07 BULLARD  PVRI              9.47 BULLARD*m2        TPRI  24.73 BULLARD*m2  Ratios:   PVR-SVR                                            TPR-TVR   Shunts:   venO2                                              CO   R-L Shunt                                          R-L Shunt   Akbar. Method   L-R Shunt                                          L-R Shunt   R-L Shunt                                          R-L Shunt   Akbar. Method     < from: Cardiac Catheterization (23 @ 16:27) >    Hemodynamic Pressures:   Phase         Location          [mmHg]               [mmHg]  [mmHg]           HR  Baseline      RV                  s      65  ed      19         80  Baseline PA                  s      65                d      33  m  47         73  Baseline      PCW                 a      25                v      43  m  29         80  Baseline      RA                  a      20                v      28  m  19         72    < end of copied text >            MEDICATIONS  (STANDING):  atorvastatin 40 milliGRAM(s) Oral at bedtime  buMETAnide Infusion 1 mG/Hr (5 mL/Hr) IV Continuous <Continuous>  hydrALAZINE 25 milliGRAM(s) Oral three times a day  levothyroxine 50 MICROGram(s) Oral daily  metoprolol succinate  milliGRAM(s) Oral daily  milrinone Infusion 0.125 MICROgram(s)/kG/Min (2.78 mL/Hr) IV Continuous <Continuous>  pantoprazole    Tablet 40 milliGRAM(s) Oral before breakfast  polyethylene glycol 3350 17 Gram(s) Oral daily  potassium chloride    Tablet ER 40 milliEquivalent(s) Oral two times a day  senna 2 Tablet(s) Oral at bedtime  sodium chloride 0.9% lock flush 3 milliLiter(s) IV Push every 8 hours    MEDICATIONS  (PRN):  acetaminophen     Tablet .. 650 milliGRAM(s) Oral every 6 hours PRN Temp greater or equal to 38C (100.4F), Mild Pain (1 - 3)  aluminum hydroxide/magnesium hydroxide/simethicone Suspension 30 milliLiter(s) Oral every 4 hours PRN Dyspepsia  melatonin 3 milliGRAM(s) Oral at bedtime PRN Insomnia  ondansetron Injectable 4 milliGRAM(s) IV Push every 8 hours PRN Nausea and/or Vomiting    CAPILLARY BLOOD GLUCOSEPHYSICAL EXAM        Neurology: alert and oriented x 3, nonfocal, no gross deficits    CV :[id number]  Lungs:   B/l CTA on room air   Abdomen: soft, nontender, nondistended, positive bowel sounds,    :   voids             Extremities:     warm well perfsued equal strength throughout    B/lle warm well perfuse+DP                                       Physical Therapy Rec:   Home  [  x    Discussed with Cardiothoracic Team at AM rounds.

## 2023-08-20 NOTE — PROGRESS NOTE ADULT - ASSESSMENT
Mr. Sher is an 81 y/o M w/ h/o CAD c/b IWMI (1994) s/p angioplasty, aortic stenosis s/p bioAVR 2004), VT arrest (2017) s/p ICD, afib s/p multiple DCCV and ablation x2 (2020 and 2023; on AC), prior Ao aneurysm s/p Bentall (2021), severe MR with MAC, atrophic kidney with CKD (b/l Cr 1.9; GFR 34), HFrEF (35-40%; LVEDD 6.7 cm) who is being considered for APOLLO (TMVR) trial but needs further optimization as has pulmonary HTN and concern was severe RV dysfunction and pulmonary HTN, who presents in ADHF and SURESH.    Diuresing well to intermittent IV Bumex though remains volume expanded. Recent invasive hemodynamics in May with marginal CI. RHC on 8/18/23 below, with elevated filling pressures and low CI. Started on low-dose milrinone and bumex gtt on 8/20.    Cardiac Studies  8/18/23 RHC - RA 19, RV 65/4, PA 65/33/47, PCWP 25, sat 52.9%, CO/CI 3.6/1.9, SVR 1300, PVR 5  4/10/23 - TTE - EF 38%, LVEDD 6.7 cm, severe MR with systolic reversal in pulmonic vein, severe TR, RVSP 49; IVC dilated  3/27/23 ([facility]) - EF 35-40%, paradoxical septal motion, RV enlargement/dysfunction, bioAVR (mean gradient 10) with mild AI, marked MAC with posterior leaflet immobility, mild prolapse of anterior leaflet with torn chords, severe posterior directed MR w/ systolic reversal in PV, moderate TR; no intracardiac clot    5/24/23 - pLAD 40%; mRCA 40%; RA 16, RV 76/19, PA 77/34/50; PCWP 25 (v 35); /67/84; CO/CI 3.5/1.86; SVR 1560; PVR 7.1 (driven by low CI)     Mr. Sher is an 83 y/o M w/ h/o CAD c/b IWMI (1994) s/p angioplasty, aortic stenosis s/p bioAVR 2004), VT arrest (2017) s/p ICD, afib s/p multiple DCCV and ablation x2 (2020 and 2023; on AC), prior Ao aneurysm s/p Bentall (2021), severe MR with MAC, atrophic kidney with CKD (b/l Cr 1.9; GFR 34), HFrEF (35-40%; LVEDD 6.7 cm) who is being considered for APOLLO (TMVR) trial but needs further optimization as has pulmonary HTN and concern was severe RV dysfunction and pulmonary HTN, who presents in ADHF and SURESH.    Diuresing well to intermittent IV Bumex though remains volume expanded. Recent invasive hemodynamics in May with marginal CI. RHC on 8/18/23 below, with elevated filling pressures and low CI. Started on low-dose milrinone and bumex gtt on 8/20.    Cardiac Studies  8/18/23 RHC - RA 19, RV 65/4, PA 65/33/47, PCWP 25, sat 52.9%, CO/CI 3.6/1.9, BP 90/61/72 SVR 1178 dsc, PVR 5  4/10/23 - TTE - EF 38%, LVEDD 6.7 cm, severe MR with systolic reversal in pulmonic vein, severe TR, RVSP 49; IVC dilated  3/27/23 ([facility]) - EF 35-40%, paradoxical septal motion, RV enlargement/dysfunction, bioAVR (mean gradient 10) with mild AI, marked MAC with posterior leaflet immobility, mild prolapse of anterior leaflet with torn chords, severe posterior directed MR w/ systolic reversal in PV, moderate TR; no intracardiac clot    5/24/23 - pLAD 40%; mRCA 40%; RA 16, RV 76/19, PA 77/34/50; PCWP 25 (v 35); /67/84; CO/CI 3.5/1.86; SVR 1560; PVR 7.1 (driven by low CI)

## 2023-08-20 NOTE — PROGRESS NOTE ADULT - PROBLEM SELECTOR PLAN 1
decrease Toprol 50 daily per HF team     Bumex gtt 1mg/hr  milrinone 125 mcg/kg/min  Activity as tolerated  Heart Failure following   Structural Team following

## 2023-08-20 NOTE — PROGRESS NOTE ADULT - ASSESSMENT
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 Patient reports worsening SOB x 2 days, with associated symptoms of fatigue, deconditioning and inability to walk long distance.  Presents today as a direct admission for CHF exacerbation and requiring medical optimization.       1- SURESH on CKDIV  2- CHF  3- severe MR  4- hypokalemia k is better   5- HTN       SURESH in setting of decompensated CHF  cr is still high. he also remains with high BUN   on bumex 1 mg .hr   gentle diuresis and monitor for worsening renal function cr so far steady and bun steady at this high level   keep K > 3.5  hydralazine 25 mg TID  strict I/O  keep O>I  daily standing weight  2G sodium diet  trend creatinine and electrolytes closely  d/w cts team when seen earlier

## 2023-08-21 LAB
ALBUMIN SERPL ELPH-MCNC: 4 G/DL — SIGNIFICANT CHANGE UP (ref 3.3–5)
ALP SERPL-CCNC: 201 U/L — HIGH (ref 40–120)
ALT FLD-CCNC: 23 U/L — SIGNIFICANT CHANGE UP (ref 10–45)
ANION GAP SERPL CALC-SCNC: 14 MMOL/L — SIGNIFICANT CHANGE UP (ref 5–17)
AST SERPL-CCNC: 26 U/L — SIGNIFICANT CHANGE UP (ref 10–40)
BILIRUB SERPL-MCNC: 1.5 MG/DL — HIGH (ref 0.2–1.2)
BUN SERPL-MCNC: 95 MG/DL — HIGH (ref 7–23)
CALCIUM SERPL-MCNC: 9.5 MG/DL — SIGNIFICANT CHANGE UP (ref 8.4–10.5)
CHLORIDE SERPL-SCNC: 96 MMOL/L — SIGNIFICANT CHANGE UP (ref 96–108)
CO2 SERPL-SCNC: 29 MMOL/L — SIGNIFICANT CHANGE UP (ref 22–31)
CREAT SERPL-MCNC: 2.95 MG/DL — HIGH (ref 0.5–1.3)
EGFR: 21 ML/MIN/1.73M2 — LOW
GLUCOSE SERPL-MCNC: 115 MG/DL — HIGH (ref 70–99)
HCT VFR BLD CALC: 35 % — LOW (ref 39–50)
HGB BLD-MCNC: 11.3 G/DL — LOW (ref 13–17)
MCHC RBC-ENTMCNC: 28.9 PG — SIGNIFICANT CHANGE UP (ref 27–34)
MCHC RBC-ENTMCNC: 32.3 GM/DL — SIGNIFICANT CHANGE UP (ref 32–36)
MCV RBC AUTO: 89.5 FL — SIGNIFICANT CHANGE UP (ref 80–100)
NRBC # BLD: 0 /100 WBCS — SIGNIFICANT CHANGE UP (ref 0–0)
PLATELET # BLD AUTO: 121 K/UL — LOW (ref 150–400)
POTASSIUM SERPL-MCNC: 3.8 MMOL/L — SIGNIFICANT CHANGE UP (ref 3.5–5.3)
POTASSIUM SERPL-SCNC: 3.8 MMOL/L — SIGNIFICANT CHANGE UP (ref 3.5–5.3)
PROT SERPL-MCNC: 6.9 G/DL — SIGNIFICANT CHANGE UP (ref 6–8.3)
RBC # BLD: 3.91 M/UL — LOW (ref 4.2–5.8)
RBC # FLD: 19.3 % — HIGH (ref 10.3–14.5)
SODIUM SERPL-SCNC: 139 MMOL/L — SIGNIFICANT CHANGE UP (ref 135–145)
WBC # BLD: 5.28 K/UL — SIGNIFICANT CHANGE UP (ref 3.8–10.5)
WBC # FLD AUTO: 5.28 K/UL — SIGNIFICANT CHANGE UP (ref 3.8–10.5)

## 2023-08-21 PROCEDURE — 71045 X-RAY EXAM CHEST 1 VIEW: CPT | Mod: 26

## 2023-08-21 PROCEDURE — 99232 SBSQ HOSP IP/OBS MODERATE 35: CPT | Mod: FS

## 2023-08-21 PROCEDURE — 99233 SBSQ HOSP IP/OBS HIGH 50: CPT | Mod: GC

## 2023-08-21 RX ORDER — RIVAROXABAN 15 MG-20MG
15 KIT ORAL DAILY
Refills: 0 | Status: DISCONTINUED | OUTPATIENT
Start: 2023-08-21 | End: 2023-08-30

## 2023-08-21 RX ORDER — POTASSIUM BICARBONATE 978 MG/1
25 TABLET, EFFERVESCENT ORAL ONCE
Refills: 0 | Status: DISCONTINUED | OUTPATIENT
Start: 2023-08-21 | End: 2023-08-21

## 2023-08-21 RX ADMIN — Medication 25 MILLIGRAM(S): at 22:05

## 2023-08-21 RX ADMIN — PANTOPRAZOLE SODIUM 40 MILLIGRAM(S): 20 TABLET, DELAYED RELEASE ORAL at 05:50

## 2023-08-21 RX ADMIN — SENNA PLUS 2 TABLET(S): 8.6 TABLET ORAL at 22:01

## 2023-08-21 RX ADMIN — ATORVASTATIN CALCIUM 40 MILLIGRAM(S): 80 TABLET, FILM COATED ORAL at 22:01

## 2023-08-21 RX ADMIN — BUMETANIDE 5 MG/HR: 0.25 INJECTION INTRAMUSCULAR; INTRAVENOUS at 22:05

## 2023-08-21 RX ADMIN — SODIUM CHLORIDE 3 MILLILITER(S): 9 INJECTION INTRAMUSCULAR; INTRAVENOUS; SUBCUTANEOUS at 13:04

## 2023-08-21 RX ADMIN — RIVAROXABAN 15 MILLIGRAM(S): KIT at 17:02

## 2023-08-21 RX ADMIN — Medication 25 MILLIGRAM(S): at 13:03

## 2023-08-21 RX ADMIN — MILRINONE LACTATE 2.78 MICROGRAM(S)/KG/MIN: 1 INJECTION, SOLUTION INTRAVENOUS at 22:05

## 2023-08-21 RX ADMIN — Medication 40 MILLIEQUIVALENT(S): at 05:49

## 2023-08-21 RX ADMIN — SODIUM CHLORIDE 3 MILLILITER(S): 9 INJECTION INTRAMUSCULAR; INTRAVENOUS; SUBCUTANEOUS at 21:45

## 2023-08-21 RX ADMIN — Medication 50 MILLIGRAM(S): at 05:48

## 2023-08-21 RX ADMIN — Medication 50 MICROGRAM(S): at 05:43

## 2023-08-21 RX ADMIN — Medication 25 MILLIGRAM(S): at 05:50

## 2023-08-21 RX ADMIN — Medication 40 MILLIEQUIVALENT(S): at 17:02

## 2023-08-21 RX ADMIN — SODIUM CHLORIDE 3 MILLILITER(S): 9 INJECTION INTRAMUSCULAR; INTRAVENOUS; SUBCUTANEOUS at 05:52

## 2023-08-21 NOTE — PROGRESS NOTE ADULT - ATTENDING COMMENTS
Severely elevated filling pressures on RHC 8/18 and minimal change in weight in the interim. Bumex infusion started yesterday with low dose milrinone, marginal BPs, and severe MR/TR. Chronic RV pacing. SURESH on CKD.    Goal is euvolemia and then consider CRT upgrade if chronic pacing is contributing to dyssynchrony as it may help the MR if secondary.  May also need eval to r/o amyloid if truly severe LVH (will review prior echos).  Continue current therapies. Keep K 4.5-5.0 on bumex infusion.

## 2023-08-21 NOTE — PROGRESS NOTE ADULT - PROBLEM SELECTOR PLAN 2
Heart Failure following  8/20 decrease Toprol 50 daily per HF team   8/20 milrinone 125 mcg/kg/min   8/20 Bumex gtt 1mg/hr   Continue on Hydralazine 25 mg PO TID Heart Failure following  8/20 decrease Toprol 50 daily per HF team   8/20 milrinone 125 mcg/kg/min  8/20 Bumex gtt 1mg/hr   Continue on Hydralazine 25 mg PO TID

## 2023-08-21 NOTE — PROGRESS NOTE ADULT - PROBLEM SELECTOR PLAN 1
decrease Toprol 50 daily per HF team     Bumex gtt 1mg/hr  milrinone 125 mcg/kg/min  Activity as tolerated  Heart Failure following   Structural Team following decrease Toprol 50 daily per HF team     Bumex gtt 1mg/hr  milrinone 125 mcg/kg/min  Activity as tolerated  Heart Failure following   Structural Team following  discharge planning- home when medically optimized and f/u at [facility] for Tendem trial

## 2023-08-21 NOTE — PROGRESS NOTE ADULT - ASSESSMENT
83 yo Male with PMHx of  HTN, HLD, GERD, CHF, moderately reduced EF 35-40%, AFIB (on Xarelto) s/p ablation, CAD with h/o Atherectomy,  IWMI 1994, VT ARREST IN 12/2017, AICD, s/p AVR in 2004, s/p AORTIC ANEURYSM AND MV repair with Dr Fenton 6/2/21, and now severe MR.  Patient reports worsening SOB x 2 days, with associated symptoms of fatigue, deconditioning and inability to walk long distance.  Presents today as a direct admission for CHF exacerbation and requiring medical optimization.     Hospital Course:   8/16 Admit to 2 [facility] Telemetry Floor.  Heart Failure consult called.  CXR ordered.  D/C'd Torsemide and started on Bumex 4 mg IV BID. Per Dr. Valentino hold Xarelto for today for possible RHC in AM.  Awaiting HF reccs.     8/17 VSS - bun 92 this am from 102 - as per Dr. Fenton - renal called - pt seen by renal & HF team - will continue Bumex 4mg IV bid-   8/18 RHC today for diagnostic eval Continue aggressive diuresis renal following  Replete potassium.  8/19 VVS; Potassium 3.5 --> Supplemented. Started om KCL 40 meq PO BID.  Heart Failure and Renal following.  Pre albumin ordered: 20 Continue with current medication regimen.  Sorbitol 30 mg PO x 1.   8/20 VSS + BM  seen by HF now for BUMEX GTT 1mg/hr  and milrinone for inotropic support 125mcg/kg/min   83 yo Male with PMHx of  HTN, HLD, GERD, CHF, moderately reduced EF 35-40%, AFIB (on Xarelto) s/p ablation, CAD with h/o Atherectomy,  IWMI 1994, VT ARREST IN 12/2017, AICD, s/p AVR in 2004, s/p AORTIC ANEURYSM AND MV repair with Dr Fenton 6/2/21, and now severe MR.  Patient reports worsening SOB x 2 days, with associated symptoms of fatigue, deconditioning and inability to walk long distance.  Presents today as a direct admission for CHF exacerbation and requiring medical optimization.     Hospital Course:   8/16 Admit to 2 [facility] Telemetry Floor.  Heart Failure consult called.  CXR ordered.  D/C'd Torsemide and started on Bumex 4 mg IV BID. Per Dr. Valentino hold Xarelto for today for possible RHC in AM.  Awaiting HF reccs.     8/17 VSS - bun 92 this am from 102 - as per Dr. Fenton - renal called - pt seen by renal & HF team - will continue Bumex 4mg IV bid-   8/18 RHC today for diagnostic eval Continue aggressive diuresis renal following  Replete potassium.  8/19 VVS; Potassium 3.5 --> Supplemented. Started om KCL 40 meq PO BID.  Heart Failure and Renal following.  Pre albumin ordered: 20 Continue with current medication regimen.  Sorbitol 30 mg PO x 1.   8/20 VSS + BM  seen by HF now for BUMEX GTT 1mg/hr  and milrinone for inotropic support 125mcg/kg/min    8/21 VSS: continue primacor gttp .125 and bumex gttp 1 mg/ hr; CHF following; resume xarelto for hx afib   discharge planning- home when medically optimized and f/u at [facility] for Tendem trial

## 2023-08-21 NOTE — PROGRESS NOTE ADULT - PROBLEM SELECTOR PLAN 2
Patient well known to Structural team  Based on initial discussion, current milrinone requirement and SURESH exclude pt from eligibility from TMVR with Tendyne. Presence of MAC and leaflet calcification precludes KRISTIE.   - Treatment of CHF as above  - Will reassess MR once euvolemic

## 2023-08-21 NOTE — PROGRESS NOTE ADULT - PROBLEM SELECTOR PLAN 3
Continue on Xarelto 15 mg PO daily   8/20    Toprol 50 mg PO daily continue Toprol 50 mg PO daily  monitor and supplement electrolytes  resume AC xarelnona - d/w Dr. Valentino

## 2023-08-21 NOTE — PROGRESS NOTE ADULT - SUBJECTIVE AND OBJECTIVE BOX
[facility]   [phone]  RENAL FOLLOW UP NOTE  --------------------------------------------------------------------------------  Chief Complaint:    24 hour events/subjective:        PAST HISTORY  --------------------------------------------------------------------------------  No significant changes to PMH, PSH, FHx, SHx, unless otherwise noted    ALLERGIES & MEDICATIONS  --------------------------------------------------------------------------------  Allergies    No Known Allergies    Intolerances      Standing Inpatient Medications  atorvastatin 40 milliGRAM(s) Oral at bedtime  buMETAnide Infusion 1 mG/Hr IV Continuous <Continuous>  hydrALAZINE 25 milliGRAM(s) Oral three times a day  levothyroxine 50 MICROGram(s) Oral daily  metoprolol succinate ER 50 milliGRAM(s) Oral daily  milrinone Infusion 0.125 MICROgram(s)/kG/Min IV Continuous <Continuous>  pantoprazole    Tablet 40 milliGRAM(s) Oral before breakfast  polyethylene glycol 3350 17 Gram(s) Oral daily  potassium chloride    Tablet ER 40 milliEquivalent(s) Oral two times a day  rivaroxaban 15 milliGRAM(s) Oral daily  senna 2 Tablet(s) Oral at bedtime  sodium chloride 0.9% lock flush 3 milliLiter(s) IV Push every 8 hours    PRN Inpatient Medications  acetaminophen     Tablet .. 650 milliGRAM(s) Oral every 6 hours PRN  aluminum hydroxide/magnesium hydroxide/simethicone Suspension 30 milliLiter(s) Oral every 4 hours PRN  melatonin 3 milliGRAM(s) Oral at bedtime PRN  ondansetron Injectable 4 milliGRAM(s) IV Push every 8 hours PRN      REVIEW OF SYSTEMS  --------------------------------------------------------------------------------    Gen: denies  fevers/chills,  CVS: denies chest pain/palpitations  Resp: denies SOB/Cough  GI: Denies N/V/Abd pain  : Denies dysuria/oliguria/hematuria    All other systems were reviewed and are negative, except as noted.    VITALS/PHYSICAL EXAM  --------------------------------------------------------------------------------  T(C): 36.6 (08-21-23 @ 20:48), Max: 36.6 (08-21-23 @ 20:48)  HR: 81 (08-21-23 @ 20:48) (80 - 96)  BP: 95/64 (08-21-23 @ 20:48) (90/53 - 95/64)  RR: 18 (08-21-23 @ 20:48) (18 - 18)  SpO2: 95% (08-21-23 @ 20:48) (94% - 95%)  Wt(kg): --        08-20-23 @ 07:01  -  08-21-23 @ 07:00  --------------------------------------------------------  IN: 591.6 mL / OUT: 2650 mL / NET: -2058.4 mL    08-21-23 @ 07:01  -  08-21-23 @ 21:44  --------------------------------------------------------  IN: 405.8 mL / OUT: 1900 mL / NET: -1494.2 mL      Physical Exam:  	  	Gen: Non toxic comfortable appearing, on RA    	Pulm: decrease bs  no rales or ronchi or wheezing  	CV: +JVD. RRR, S1S2; no rub II/VI M   	Abd: +BS, soft, nontender/nondistended  	UE: Warm, no cyanosis  no clubbing,  no edema; no asterixis  	LE: Warm, no cyanosis  no clubbing, 1-2    edema  	Neuro: alert and oriented. speech coherent       LABS/STUDIES  --------------------------------------------------------------------------------              11.3   5.28  >-----------<  121      [08-21-23 @ 05:32]              35.0     139  |  96  |  95  ----------------------------<  115      [08-21-23 @ 05:33]  3.8   |  29  |  2.95        Ca     9.5     [08-21-23 @ 05:33]    TPro  6.9  /  Alb  4.0  /  TBili  1.5  /  DBili  x   /  AST  26  /  ALT  23  /  AlkPhos  201  [08-21-23 @ 05:33]          Creatinine Trend:  SCr 2.95 [08-21 @ 05:33]  SCr 3.01 [08-20 @ 18:33]  SCr 2.65 [08-20 @ 07:02]  SCr 2.69 [08-19 @ 06:37]  SCr 2.64 [08-18 @ 12:14]              Urinalysis - [08-21-23 @ 05:33]      Color  / Appearance  / SG  / pH       Gluc 115 / Ketone   / Bili  / Urobili        Blood  / Protein  / Leuk Est  / Nitrite       RBC  / WBC  / Hyaline  / Gran  / Sq Epi  / Non Sq Epi  / Bacteria     Urinalysis - [08-21-23 @ 05:33]      Color  / Appearance  / SG  / pH       Gluc 115 / Ketone   / Bili  / Urobili        Blood  / Protein  / Leuk Est  / Nitrite       RBC  / WBC  / Hyaline  / Gran  / Sq Epi  / Non Sq Epi  / Bacteria       TSH 5.96      [08-16-23 @ 16:55]

## 2023-08-21 NOTE — PROGRESS NOTE ADULT - PROBLEM SELECTOR PLAN 1
RHC 8/17 with elevated biventricular filling pressures, low CI, SVR ~1200s, and cpcPH.   - Etiology: ICM with possible nonischemic component. Would benefit from evaluation for amyloid once euvolemic.   - GDMT: C/w Toprol 50mg, hydralazine 25mg TID. No ACEi/ARB/MRA due to SURESH on CKD and in past, did not tolerate SGLT2-i due to back pain and SOB  - Diuretics: C/w bumex 1mg/hr. If pt develops worsening hand cramping (can be side effect of bumex), can switch to lasix 20mg/hr  - C/w milrinone at 0.125 mcg/kg/min  - check daily lactate, BMP, LFTs  - c/w KCL 40 meq PO BID  - Replete electrolytes to target K+>4.0 and Mg >2.0  - Daily standing weight/strict IO  - Fluid restriction 1500cc/day RHC 8/17 with elevated biventricular filling pressures, low CI, SVR ~1200s, and cpcPH.   - Etiology: ICM with possible nonischemic component. Would benefit from evaluation for amyloid once euvolemic.   - GDMT: C/w Toprol 50mg, hydralazine 25mg TID. No ACEi/ARB/MRA due to SURESH on CKD and in past, did not tolerate SGLT2-i due to back pain and SOB  - Diuretics: C/w bumex 1mg/hr. If pt develops worsening hand cramping (can be side effect of bumex), can switch to lasix 20mg/hr  - Rhythm: Obtain old EKGs if possible (before ICD) to determine whether pt would be candidate for CRT  - C/w milrinone at 0.125 mcg/kg/min  - check daily lactate, BMP, LFTs  - c/w KCL 40 meq PO BID  - Replete electrolytes to target K+>4.0 and Mg >2.0  - Daily standing weight/strict IO  - Fluid restriction 1500cc/day

## 2023-08-21 NOTE — PROGRESS NOTE ADULT - SUBJECTIVE AND OBJECTIVE BOX
VITAL SIGNS    Telemetry:    Vital Signs Last 24 Hrs  T(C): 36.4 (23 @ 04:20), Max: 36.5 (23 @ 21:08)  T(F): 97.5 (23 @ 04:20), Max: 97.7 (23 @ 21:08)  HR: 80 (23 @ 05:40) (70 - 83)  BP: 90/56 (23 @ 05:40) (86/55 - 100/67)  RR: 18 (23 @ 05:40) (18 - 18)  SpO2: 94% (23 @ 05:40) (94% - 97%)             @ 07:01  -   @ 07:00  --------------------------------------------------------  IN: 591.6 mL / OUT: 2650 mL / NET: -2058.4 mL     @ 07:01  -   @ 11:21  --------------------------------------------------------  IN: 207.8 mL / OUT: 500 mL / NET: -292.2 mL       Daily     Daily Weight in k (21 Aug 2023 04:20)  Admit Wt: Drug Dosing Weight  Height (cm): 175.3 (16 Aug 2023 16:36)  Weight (kg): 74 (16 Aug 2023 16:36)  BMI (kg/m2): 24.1 (16 Aug 2023 16:36)  BSA (m2): 1.89 (16 Aug 2023 16:36)    Bilirubin Total: 1.5 mg/dL ( 05:33)  Bilirubin Total: 1.2 mg/dL ( 18:33)    CAPILLARY BLOOD GLUCOSE              LABS:     @ 07:01  -   @ 07:00  --------------------------------------------------------  IN: 591.6 mL / OUT: 2650 mL / NET: -2058.4 mL     @ 07:01  -   @ 11:21  --------------------------------------------------------  IN: 207.8 mL / OUT: 500 mL / NET: -292.2 mL    cret                        11.3   5.28  )-----------( 121      ( 21 Aug 2023 05:32 )             35.0         139  |  96  |  95<H>  ----------------------------<  115<H>  3.8   |  29  |  2.95<H>    Ca    9.5      21 Aug 2023 05:33    TPro  6.9  /  Alb  4.0  /  TBili  1.5<H>  /  DBili  x   /  AST  26  /  ALT  23  /  AlkPhos  201<H>          acetaminophen     Tablet .. 650 milliGRAM(s) Oral every 6 hours PRN  aluminum hydroxide/magnesium hydroxide/simethicone Suspension 30 milliLiter(s) Oral every 4 hours PRN  atorvastatin 40 milliGRAM(s) Oral at bedtime  buMETAnide Infusion 1 mG/Hr IV Continuous <Continuous>  hydrALAZINE 25 milliGRAM(s) Oral three times a day  levothyroxine 50 MICROGram(s) Oral daily  melatonin 3 milliGRAM(s) Oral at bedtime PRN  metoprolol succinate ER 50 milliGRAM(s) Oral daily  milrinone Infusion 0.125 MICROgram(s)/kG/Min IV Continuous <Continuous>  ondansetron Injectable 4 milliGRAM(s) IV Push every 8 hours PRN  pantoprazole    Tablet 40 milliGRAM(s) Oral before breakfast  polyethylene glycol 3350 17 Gram(s) Oral daily  potassium chloride    Tablet ER 40 milliEquivalent(s) Oral two times a day  senna 2 Tablet(s) Oral at bedtime  sodium chloride 0.9% lock flush 3 milliLiter(s) IV Push every 8 hours      PHYSICAL EXAM    Subjective: "Hi.   Neurology: alert and oriented x 3, nonfocal, no gross deficits  CV : tele:  RSR  Sternal Wound :  CDI with dressing , Stable  Lungs: clear. RR easy, unlabored   Abdomen: soft, nontender, nondistended, positive bowel sounds, bowel movement   Neg N/V/D   :  pt voiding without difficulty   Extremities:   BRAVO; edema, neg calf tenderness.   PPP bilaterally      PW:  Chest tubes:                 VITAL SIGNS    Telemetry:  V.paced @ 80  Vital Signs Last 24 Hrs  T(C): 36.4 (23 @ 04:20), Max: 36.5 (23 @ 21:08)  T(F): 97.5 (23 @ 04:20), Max: 97.7 (23 @ 21:08)  HR: 80 (23 @ 05:40) (70 - 83)  BP: 90/56 (23 @ 05:40) (86/55 - 100/67)  RR: 18 (23 @ 05:40) (18 - 18)  SpO2: 94% (23 @ 05:40) (94% - 97%)             @ 07:01  -   @ 07:00  --------------------------------------------------------  IN: 591.6 mL / OUT: 2650 mL / NET: -2058.4 mL     @ 07:01  -   @ 11:21  --------------------------------------------------------  IN: 207.8 mL / OUT: 500 mL / NET: -292.2 mL       Daily     Daily Weight in k (21 Aug 2023 04:20)  Admit Wt: Drug Dosing Weight  Height (cm): 175.3 (16 Aug 2023 16:36)  Weight (kg): 74 (16 Aug 2023 16:36)  BMI (kg/m2): 24.1 (16 Aug 2023 16:36)  BSA (m2): 1.89 (16 Aug 2023 16:36)    Bilirubin Total: 1.5 mg/dL ( @ 05:33)  Bilirubin Total: 1.2 mg/dL ( 18:33)    CAPILLARY BLOOD GLUCOSE              LABS:     07:01  -   @ 07:00  --------------------------------------------------------  IN: 591.6 mL / OUT: 2650 mL / NET: -2058.4 mL     @ 07:01  -   @ 11:21  --------------------------------------------------------  IN: 207.8 mL / OUT: 500 mL / NET: -292.2 mL    cret                        11.3   5.28  )-----------( 121      ( 21 Aug 2023 05:32 )             35.0         139  |  96  |  95<H>  ----------------------------<  115<H>  3.8   |  29  |  2.95<H>    Ca    9.5      21 Aug 2023 05:33    TPro  6.9  /  Alb  4.0  /  TBili  1.5<H>  /  DBili  x   /  AST  26  /  ALT  23  /  AlkPhos  201<H>          acetaminophen     Tablet .. 650 milliGRAM(s) Oral every 6 hours PRN  aluminum hydroxide/magnesium hydroxide/simethicone Suspension 30 milliLiter(s) Oral every 4 hours PRN  atorvastatin 40 milliGRAM(s) Oral at bedtime  buMETAnide Infusion 1 mG/Hr IV Continuous <Continuous>  hydrALAZINE 25 milliGRAM(s) Oral three times a day  levothyroxine 50 MICROGram(s) Oral daily  melatonin 3 milliGRAM(s) Oral at bedtime PRN  metoprolol succinate ER 50 milliGRAM(s) Oral daily  milrinone Infusion 0.125 MICROgram(s)/kG/Min IV Continuous <Continuous>  ondansetron Injectable 4 milliGRAM(s) IV Push every 8 hours PRN  pantoprazole    Tablet 40 milliGRAM(s) Oral before breakfast  polyethylene glycol 3350 17 Gram(s) Oral daily  potassium chloride    Tablet ER 40 milliEquivalent(s) Oral two times a day  senna 2 Tablet(s) Oral at bedtime  sodium chloride 0.9% lock flush 3 milliLiter(s) IV Push every 8 hours      PHYSICAL EXAM    Subjective: "I want to go home."   Neurology: alert and oriented x 3, nonfocal, no gross deficits  CV : tele:  V. paced @ 80  Lungs: clear. RR easy, unlabored   Abdomen: soft, nontender, nondistended, positive bowel sounds, +bowel movement   Neg N/V/D   :  pt voiding without difficulty   Extremities:   BRAVO; trace LE edema, neg calf tenderness.   PPP bilaterally; chronic venous stasis discoloration

## 2023-08-21 NOTE — PROGRESS NOTE ADULT - SUBJECTIVE AND OBJECTIVE BOX
INTERVAL EVENTS:  -Patient switched to bumex gtt with milrinone yesterday  -Denies CP, SOB. Endorses mild cramping of hands.     PAST MEDICAL & SURGICAL HISTORY:  Aortic stenosis    AICD (automatic cardioverter/defibrillator) present    Aortic valve regurgitation    Ascending aorta dilation    H/O paroxysmal supraventricular tachycardia    HLD (hyperlipidemia)    Mitral valve regurgitation    S/P ablation of atrial fibrillation    Cardiac arrest    Severe mitral regurgitation    S/P aortic valve replacement  3/13/2004    S/P hernia repair  2002    History of tonsillectomy and adenoidectomy    S/P TURP  1996    S/P colonoscopy  2001, 2002, 2006, 2011, 2016    S/P endoscopy  2006    S/P cardiac cath  1994, 1995, 1999, 2002, 2004    AICD (automatic cardioverter/defibrillator) present  12/19/17    Cardiac pacemaker  12/19/17    History of cardioversion  9/11/20    S/P ablation of atrial fibrillation  10/29/20    Status post ablation of ventricular arrhythmia  2/14/19        MEDICATIONS  (STANDING):  atorvastatin 40 milliGRAM(s) Oral at bedtime  buMETAnide Infusion 1 mG/Hr (5 mL/Hr) IV Continuous <Continuous>  hydrALAZINE 25 milliGRAM(s) Oral three times a day  levothyroxine 50 MICROGram(s) Oral daily  metoprolol succinate ER 50 milliGRAM(s) Oral daily  milrinone Infusion 0.125 MICROgram(s)/kG/Min (2.78 mL/Hr) IV Continuous <Continuous>  pantoprazole    Tablet 40 milliGRAM(s) Oral before breakfast  polyethylene glycol 3350 17 Gram(s) Oral daily  potassium bicarbonate/potassium citrate 25 milliEquivalent(s) Oral once  potassium chloride    Tablet ER 40 milliEquivalent(s) Oral two times a day  rivaroxaban 15 milliGRAM(s) Oral daily  senna 2 Tablet(s) Oral at bedtime  sodium chloride 0.9% lock flush 3 milliLiter(s) IV Push every 8 hours    MEDICATIONS  (PRN):  acetaminophen     Tablet .. 650 milliGRAM(s) Oral every 6 hours PRN Temp greater or equal to 38C (100.4F), Mild Pain (1 - 3)  aluminum hydroxide/magnesium hydroxide/simethicone Suspension 30 milliLiter(s) Oral every 4 hours PRN Dyspepsia  melatonin 3 milliGRAM(s) Oral at bedtime PRN Insomnia  ondansetron Injectable 4 milliGRAM(s) IV Push every 8 hours PRN Nausea and/or Vomiting    T(F): 97.5 (08-21-23 @ 04:20), Max: 97.7 (08-20-23 @ 21:08)  HR: 96 (08-21-23 @ 12:55) (80 - 96)  BP: 94/64 (08-21-23 @ 12:55) (90/53 - 94/66)  BP(mean): 67 (08-21-23 @ 05:40) (66 - 75)  ABP: --  ABP(mean): --  RR: 18 (08-21-23 @ 12:55) (18 - 18)  SpO2: 95% (08-21-23 @ 12:55) (94% - 97%)    I/O Detail 24H    20 Aug 2023 07:01  -  21 Aug 2023 07:00  --------------------------------------------------------  IN:    Bumetanide: 110 mL    Milrinone: 61.6 mL    Oral Fluid: 420 mL  Total IN: 591.6 mL    OUT:    Voided (mL): 2650 mL  Total OUT: 2650 mL    Total NET: -2058.4 mL      21 Aug 2023 07:01  -  21 Aug 2023 16:57  --------------------------------------------------------  IN:    Bumetanide: 40 mL    Milrinone: 22.4 mL    Oral Fluid: 320 mL  Total IN: 382.4 mL    OUT:    Voided (mL): 900 mL  Total OUT: 900 mL    Total NET: -517.6 mL          PHYSICAL EXAM:  GEN: NAD  HEENT: EOMI, +JVD/HJR  RESP: Bibasilar crackles  CV: RRR. Normal S1/S2. No m/r/g.  ABD: soft, non-distended  EXT: Warm, 2+ pitting edema  NEURO: alert and attentive    LABS:  CBC 08-21-23 @ 05:32                        11.3   5.28  )-----------( 121                   35.0       Hgb trend: 11.3 <-- , 11.5 <-- , 11.9 <--   WBC trend: 5.28 <-- , 6.62 <-- , 5.25 <--       CMP 08-21-23 @ 05:33    139  |  96  |  95<H>  ----------------------------<  115<H>  3.8   |  29  |  2.95<H>    Ca    9.5      08-21-23 @ 05:33    TPro  6.9  /  Alb  4.0  /  TBili  1.5<H>  /  DBili  x   /  AST  26  /  ALT  23  /  AlkPhos  201<H>  08-21      Serum Cr trend: 2.95 <-- , 3.01 <-- , 2.65 <-- , 2.69 <--         Cardiac Markers           STUDIES:

## 2023-08-21 NOTE — PROGRESS NOTE ADULT - ASSESSMENT
Mr. Sher is an 83 y/o M w/ h/o CAD c/b IWMI (1994) s/p angioplasty, aortic stenosis s/p bioAVR 2004), VT arrest (2017) s/p ICD, afib s/p multiple DCCV and ablation x2 (2020 and 2023; on AC), prior Ao aneurysm s/p [place] (2021), severe MR with MAC, atrophic kidney with CKD (b/l Cr 1.9; GFR 34), HFrEF (35-40%; LVEDD 6.7 cm) who is being considered for APOLLO (TMVR) trial but needs further optimization as has pulmonary HTN and concern was severe RV dysfunction and pulmonary HTN, who presents in [facility] and [facility].    Diuresing well to intermittent IV Bumex though remains volume expanded. Recent invasive hemodynamics in May with marginal CI. RHC on 8/18/23 below, with elevated filling pressures and low CI. Started on low-dose milrinone and bumex gtt on 8/20.    Cardiac Studies  8/18/23 RHC: RA 19, RV 65/4, PA 65/33/47, PCWP 25, sat 52.9%, CO/CI 3.6/1.9, BP 90/61/72 SVR 1178 dsc, PVR 5  TTE 8/17/23: Severe LVH. LVEF 36%. G2DD. RV mildly enlarged with reduced function. Biatrial dilation. BioAVR (MG 12) w/mild intravalvular regurgitation. Severe MR at BP 91/60. Mod-severe TR.   4/10/23 - TTE - EF 38%, LVEDD 6.7 cm, severe MR with systolic reversal in pulmonic vein, severe TR, RVSP 49; IVC dilated  3/27/23 ([facility]) - EF 35-40%, paradoxical septal motion, RV enlargement/dysfunction, bioAVR (mean gradient 10) with mild AI, marked MAC with posterior leaflet immobility, mild prolapse of anterior leaflet with torn chords, severe posterior directed MR w/ systolic reversal in PV, moderate TR; no intracardiac clot    5/24/23 - pLAD 40%; mRCA 40%; RA 16, RV 76/19, PA 77/34/50; PCWP 25 (v 35); /67/84; CO/CI 3.5/1.86; SVR 1560; PVR 7.1 (driven by low CI)

## 2023-08-22 DIAGNOSIS — N17.9 ACUTE KIDNEY FAILURE, UNSPECIFIED: ICD-10-CM

## 2023-08-22 LAB
ANION GAP SERPL CALC-SCNC: 17 MMOL/L — SIGNIFICANT CHANGE UP (ref 5–17)
BUN SERPL-MCNC: 86 MG/DL — HIGH (ref 7–23)
CALCIUM SERPL-MCNC: 10 MG/DL — SIGNIFICANT CHANGE UP (ref 8.4–10.5)
CHLORIDE SERPL-SCNC: 95 MMOL/L — LOW (ref 96–108)
CO2 SERPL-SCNC: 28 MMOL/L — SIGNIFICANT CHANGE UP (ref 22–31)
CREAT SERPL-MCNC: 2.54 MG/DL — HIGH (ref 0.5–1.3)
EGFR: 25 ML/MIN/1.73M2 — LOW
GLUCOSE SERPL-MCNC: 94 MG/DL — SIGNIFICANT CHANGE UP (ref 70–99)
HCT VFR BLD CALC: 37.6 % — LOW (ref 39–50)
HGB BLD-MCNC: 12.3 G/DL — LOW (ref 13–17)
MCHC RBC-ENTMCNC: 29.1 PG — SIGNIFICANT CHANGE UP (ref 27–34)
MCHC RBC-ENTMCNC: 32.7 GM/DL — SIGNIFICANT CHANGE UP (ref 32–36)
MCV RBC AUTO: 88.9 FL — SIGNIFICANT CHANGE UP (ref 80–100)
NRBC # BLD: 0 /100 WBCS — SIGNIFICANT CHANGE UP (ref 0–0)
PLATELET # BLD AUTO: 142 K/UL — LOW (ref 150–400)
POTASSIUM SERPL-MCNC: 4 MMOL/L — SIGNIFICANT CHANGE UP (ref 3.5–5.3)
POTASSIUM SERPL-SCNC: 4 MMOL/L — SIGNIFICANT CHANGE UP (ref 3.5–5.3)
RBC # BLD: 4.23 M/UL — SIGNIFICANT CHANGE UP (ref 4.2–5.8)
RBC # FLD: 19.2 % — HIGH (ref 10.3–14.5)
SODIUM SERPL-SCNC: 140 MMOL/L — SIGNIFICANT CHANGE UP (ref 135–145)
WBC # BLD: 6.53 K/UL — SIGNIFICANT CHANGE UP (ref 3.8–10.5)
WBC # FLD AUTO: 6.53 K/UL — SIGNIFICANT CHANGE UP (ref 3.8–10.5)

## 2023-08-22 PROCEDURE — 99233 SBSQ HOSP IP/OBS HIGH 50: CPT | Mod: GC

## 2023-08-22 PROCEDURE — 99232 SBSQ HOSP IP/OBS MODERATE 35: CPT | Mod: FS

## 2023-08-22 RX ADMIN — PANTOPRAZOLE SODIUM 40 MILLIGRAM(S): 20 TABLET, DELAYED RELEASE ORAL at 05:25

## 2023-08-22 RX ADMIN — Medication 25 MILLIGRAM(S): at 21:52

## 2023-08-22 RX ADMIN — Medication 25 MILLIGRAM(S): at 13:50

## 2023-08-22 RX ADMIN — SODIUM CHLORIDE 3 MILLILITER(S): 9 INJECTION INTRAMUSCULAR; INTRAVENOUS; SUBCUTANEOUS at 21:50

## 2023-08-22 RX ADMIN — POLYETHYLENE GLYCOL 3350 17 GRAM(S): 17 POWDER, FOR SOLUTION ORAL at 13:50

## 2023-08-22 RX ADMIN — RIVAROXABAN 15 MILLIGRAM(S): KIT at 13:50

## 2023-08-22 RX ADMIN — SODIUM CHLORIDE 3 MILLILITER(S): 9 INJECTION INTRAMUSCULAR; INTRAVENOUS; SUBCUTANEOUS at 06:20

## 2023-08-22 RX ADMIN — SODIUM CHLORIDE 3 MILLILITER(S): 9 INJECTION INTRAMUSCULAR; INTRAVENOUS; SUBCUTANEOUS at 13:29

## 2023-08-22 RX ADMIN — Medication 50 MILLIGRAM(S): at 05:25

## 2023-08-22 RX ADMIN — Medication 40 MILLIEQUIVALENT(S): at 05:25

## 2023-08-22 RX ADMIN — ATORVASTATIN CALCIUM 40 MILLIGRAM(S): 80 TABLET, FILM COATED ORAL at 21:52

## 2023-08-22 RX ADMIN — Medication 25 MILLIGRAM(S): at 05:25

## 2023-08-22 RX ADMIN — SENNA PLUS 2 TABLET(S): 8.6 TABLET ORAL at 21:52

## 2023-08-22 RX ADMIN — Medication 40 MILLIEQUIVALENT(S): at 17:39

## 2023-08-22 RX ADMIN — Medication 50 MICROGRAM(S): at 05:25

## 2023-08-22 NOTE — PROGRESS NOTE ADULT - ASSESSMENT
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  Patient reports worsening SOB x 2 days, with associated symptoms of fatigue, deconditioning and inability to walk long distance.  Presents today as a direct admission for CHF exacerbation and requiring medical optimization.     Hospital Course:   8/16 Admit to 2 [facility] Telemetry Floor.  Heart Failure consult called.  CXR ordered.  D/C'd Torsemide and started on Bumex 4 mg IV BID. Per Dr. Valentino hold Xarelto for today for possible RHC in AM.  Awaiting HF reccs.     8/17 VSS - bun 92 this am from 102 - as per Dr. Fenton - renal called - pt seen by renal & HF team - will continue Bumex 4mg IV bid-   8/18 RHC today for diagnostic eval Continue aggressive diuresis renal following  Replete potassium.  8/19 VVS; Potassium 3.5 --> Supplemented. Started om KCL 40 meq PO BID.  Heart Failure and Renal following.  Pre albumin ordered: 20 Continue with current medication regimen.  Sorbitol 30 mg PO x 1.   8/20 VSS + BM  seen by HF now for BUMEX GTT 1mg/hr  and milrinone for inotropic support 125mcg/kg/min    8/21 VSS: continue primacor gttp .125 and bumex gttp 1 mg/ hr; CHF following; resume xarelto for hx afib   discharge planning- home when medically optimized and f/u at [facility] for Tendem trial  83 yo Male with PMHx of  HTN, HLD, GERD, CHF, moderately reduced EF 35-40%, AFIB (on Xarelto) s/p ablation, CAD with h/o Atherectomy,  IWMI 1994, VT ARREST IN 12/2017, AICD, s/p AVR in 2004, s/p AORTIC ANEURYSM AND MV repair with Dr Fenton 6/2/21, and now severe MR.  Patient reports worsening SOB x 2 days, with associated symptoms of fatigue, deconditioning and inability to walk long distance.  Presents today as a direct admission for CHF exacerbation and requiring medical optimization.     Hospital Course:   8/16 Admit to 2 [facility] Telemetry Floor.  Heart Failure consult called.  CXR ordered.  D/C'd Torsemide and started on Bumex 4 mg IV BID. Per Dr. Valentino hold Xarelto for today for possible RHC in AM.  Awaiting HF reccs.     8/17 VSS - bun 92 this am from 102 - as per Dr. Fenton - renal called - pt seen by renal & HF team - will continue Bumex 4mg IV bid-   8/18 RHC today for diagnostic eval Continue aggressive diuresis renal following  Replete potassium.  8/19 VVS; Potassium 3.5 --> Supplemented. Started om KCL 40 meq PO BID.  Heart Failure and Renal following.  Pre albumin ordered: 20 Continue with current medication regimen.  Sorbitol 30 mg PO x 1.   8/20 VSS + BM  seen by HF now for BUMEX GTT 1mg/hr  and milrinone for inotropic support 125mcg/kg/min    8/21 VSS: continue primacor gttp .125 and bumex gttp 1 mg/ hr; CHF following; resume xarelto for hx afib   8/22 VSS; continue primacor gttp and bumex gttp as per CHF team; AC with xarelto   discharge planning- home when medically optimized and f/u at [facility] for Tendem trial

## 2023-08-22 NOTE — PROGRESS NOTE ADULT - SUBJECTIVE AND OBJECTIVE BOX
[facility]   [phone]  RENAL FOLLOW UP NOTE  --------------------------------------------------------------------------------  Chief Complaint:    24 hour events/subjective:    seen earlier   states breathing is better   wants to go home     PAST HISTORY  --------------------------------------------------------------------------------  No significant changes to PMH, PSH, FHx, SHx, unless otherwise noted    ALLERGIES & MEDICATIONS  --------------------------------------------------------------------------------  Allergies    No Known Allergies    Intolerances      Standing Inpatient Medications  atorvastatin 40 milliGRAM(s) Oral at bedtime  buMETAnide Infusion 1 mG/Hr IV Continuous <Continuous>  hydrALAZINE 25 milliGRAM(s) Oral three times a day  levothyroxine 50 MICROGram(s) Oral daily  metoprolol succinate ER 50 milliGRAM(s) Oral daily  milrinone Infusion 0.125 MICROgram(s)/kG/Min IV Continuous <Continuous>  pantoprazole    Tablet 40 milliGRAM(s) Oral before breakfast  polyethylene glycol 3350 17 Gram(s) Oral daily  potassium chloride    Tablet ER 40 milliEquivalent(s) Oral two times a day  rivaroxaban 15 milliGRAM(s) Oral daily  senna 2 Tablet(s) Oral at bedtime  sodium chloride 0.9% lock flush 3 milliLiter(s) IV Push every 8 hours    PRN Inpatient Medications  acetaminophen     Tablet .. 650 milliGRAM(s) Oral every 6 hours PRN  aluminum hydroxide/magnesium hydroxide/simethicone Suspension 30 milliLiter(s) Oral every 4 hours PRN  melatonin 3 milliGRAM(s) Oral at bedtime PRN  ondansetron Injectable 4 milliGRAM(s) IV Push every 8 hours PRN      REVIEW OF SYSTEMS  --------------------------------------------------------------------------------    Gen: denies  fevers/chills,  CVS: denies chest pain/palpitations  Resp: denies SOB/Cough  GI: Denies N/V/Abd pain  : Denies dysuria or decrease urination     VITALS/PHYSICAL EXAM  --------------------------------------------------------------------------------  T(C): 36.3 (08-22-23 @ 19:44), Max: 36.4 (08-22-23 @ 04:29)  HR: 92 (08-22-23 @ 19:44) (80 - 92)  BP: 106/67 (08-22-23 @ 19:44) (94/59 - 108/71)  RR: 18 (08-22-23 @ 19:44) (18 - 18)  SpO2: 95% (08-22-23 @ 19:44) (93% - 98%)  Wt(kg): --        08-21-23 @ 07:01  -  08-22-23 @ 07:00  --------------------------------------------------------  IN: 687.2 mL / OUT: 3500 mL / NET: -2812.8 mL    08-22-23 @ 07:01  -  08-22-23 @ 20:59  --------------------------------------------------------  IN: 780 mL / OUT: 1550 mL / NET: -770 mL      Physical Exam:  	    Gen: Non toxic comfortable appearing, on RA    	Pulm: decrease bs  no rales or ronchi or wheezing  	CV: +JVD. RRR, S1S2; no rub II/VI M   	Abd: +BS, soft, nontender/nondistended  	UE: Warm, no cyanosis  no clubbing,  no edema; no asterixis  	LE: Warm, no cyanosis  no clubbing, 1-2    edema  	Neuro: alert and oriented. speech coherent     LABS/STUDIES  --------------------------------------------------------------------------------              12.3   6.53  >-----------<  142      [08-22-23 @ 06:28]              37.6     140  |  95  |  86  ----------------------------<  94      [08-22-23 @ 06:28]  4.0   |  28  |  2.54        Ca     10.0     [08-22-23 @ 06:28]    TPro  6.9  /  Alb  4.0  /  TBili  1.5  /  DBili  x   /  AST  26  /  ALT  23  /  AlkPhos  201  [08-21-23 @ 05:33]          Creatinine Trend:  SCr 2.54 [08-22 @ 06:28]  SCr 2.95 [08-21 @ 05:33]  SCr 3.01 [08-20 @ 18:33]  SCr 2.65 [08-20 @ 07:02]  SCr 2.69 [08-19 @ 06:37]              Urinalysis - [08-22-23 @ 06:28]      Color  / Appearance  / SG  / pH       Gluc 94 / Ketone   / Bili  / Urobili        Blood  / Protein  / Leuk Est  / Nitrite       RBC  / WBC  / Hyaline  / Gran  / Sq Epi  / Non Sq Epi  / Bacteria     Urinalysis - [08-22-23 @ 06:28]      Color  / Appearance  / SG  / pH       Gluc 94 / Ketone   / Bili  / Urobili        Blood  / Protein  / Leuk Est  / Nitrite       RBC  / WBC  / Hyaline  / Gran  / Sq Epi  / Non Sq Epi  / Bacteria       TSH 5.96      [08-16-23 @ 16:55]

## 2023-08-22 NOTE — PROGRESS NOTE ADULT - PROBLEM SELECTOR PLAN 1
decrease Toprol 50 daily per HF team     Bumex gtt 1mg/hr  milrinone 125 mcg/kg/min  Activity as tolerated  Heart Failure following   Structural Team following  discharge planning- home when medically optimized and f/u at [facility] for Tendem trial

## 2023-08-22 NOTE — PROGRESS NOTE ADULT - PROBLEM SELECTOR PLAN 3
continue Toprol 50 mg PO daily  monitor and supplement electrolytes  resume AC xarelnona - d/w Dr. Valentino continue Toprol 50 mg PO daily  monitor and supplement electrolytes  continue AC xarelto - d/w Dr. Valentino

## 2023-08-22 NOTE — PROGRESS NOTE ADULT - SUBJECTIVE AND OBJECTIVE BOX
INTERVAL EVENTS:  -Denies CP, SOB  -Walking with dyspnea    PAST MEDICAL & SURGICAL HISTORY:  Aortic stenosis    AICD (automatic cardioverter/defibrillator) present    Aortic valve regurgitation    Ascending aorta dilation    H/O paroxysmal supraventricular tachycardia    HLD (hyperlipidemia)    Mitral valve regurgitation    S/P ablation of atrial fibrillation    Cardiac arrest    Severe mitral regurgitation    S/P aortic valve replacement  3/13/2004    S/P hernia repair  2002    History of tonsillectomy and adenoidectomy    S/P TURP  1996    S/P colonoscopy  2001, 2002, 2006, 2011, 2016    S/P endoscopy  2006    S/P cardiac cath  1994, 1995, 1999, 2002, 2004    AICD (automatic cardioverter/defibrillator) present  12/19/17    Cardiac pacemaker  12/19/17    History of cardioversion  9/11/20    S/P ablation of atrial fibrillation  10/29/20    Status post ablation of ventricular arrhythmia  2/14/19        MEDICATIONS  (STANDING):  atorvastatin 40 milliGRAM(s) Oral at bedtime  buMETAnide Infusion 1 mG/Hr (5 mL/Hr) IV Continuous <Continuous>  hydrALAZINE 25 milliGRAM(s) Oral three times a day  levothyroxine 50 MICROGram(s) Oral daily  metoprolol succinate ER 50 milliGRAM(s) Oral daily  milrinone Infusion 0.125 MICROgram(s)/kG/Min (2.78 mL/Hr) IV Continuous <Continuous>  pantoprazole    Tablet 40 milliGRAM(s) Oral before breakfast  polyethylene glycol 3350 17 Gram(s) Oral daily  potassium chloride    Tablet ER 40 milliEquivalent(s) Oral two times a day  rivaroxaban 15 milliGRAM(s) Oral daily  senna 2 Tablet(s) Oral at bedtime  sodium chloride 0.9% lock flush 3 milliLiter(s) IV Push every 8 hours    MEDICATIONS  (PRN):  acetaminophen     Tablet .. 650 milliGRAM(s) Oral every 6 hours PRN Temp greater or equal to 38C (100.4F), Mild Pain (1 - 3)  aluminum hydroxide/magnesium hydroxide/simethicone Suspension 30 milliLiter(s) Oral every 4 hours PRN Dyspepsia  melatonin 3 milliGRAM(s) Oral at bedtime PRN Insomnia  ondansetron Injectable 4 milliGRAM(s) IV Push every 8 hours PRN Nausea and/or Vomiting    T(F): 97.5 (08-22-23 @ 11:09), Max: 97.9 (08-21-23 @ 20:48)  HR: 80 (08-22-23 @ 11:09) (80 - 89)  BP: 94/59 (08-22-23 @ 11:09) (94/59 - 108/71)  BP(mean): 84 (08-22-23 @ 04:29) (84 - 84)  ABP: --  ABP(mean): --  RR: 18 (08-22-23 @ 11:09) (18 - 18)  SpO2: 98% (08-22-23 @ 11:09) (93% - 98%)    I/O Detail 24H    21 Aug 2023 07:01  -  22 Aug 2023 07:00  --------------------------------------------------------  IN:    Bumetanide: 120 mL    Milrinone: 67.2 mL    Oral Fluid: 500 mL  Total IN: 687.2 mL    OUT:    Voided (mL): 3500 mL  Total OUT: 3500 mL    Total NET: -2812.8 mL      22 Aug 2023 07:01  -  22 Aug 2023 15:27  --------------------------------------------------------  IN:    Oral Fluid: 540 mL  Total IN: 540 mL    OUT:    Voided (mL): 750 mL  Total OUT: 750 mL    Total NET: -210 mL          PHYSICAL EXAM:  GEN: NAD  HEENT: EOMI, +JVD    RESP: CTA b/l  CV: RRR. Normal S1/S2. No m/r/g.  ABD: soft, non-distended  EXT: warm, 2+ pitting edema  NEURO: alert and attentive    LABS:  CBC 08-22-23 @ 06:28                        12.3   6.53  )-----------( 142                   37.6       Hgb trend: 12.3 <-- , 11.3 <-- , 11.5 <--   WBC trend: 6.53 <-- , 5.28 <-- , 6.62 <--       CMP 08-22-23 @ 06:28    140  |  95<L>  |  86<H>  ----------------------------<  94  4.0   |  28  |  2.54<H>    Ca    10.0      08-22-23 @ 06:28    TPro  6.9  /  Alb  4.0  /  TBili  1.5<H>  /  DBili  x   /  AST  26  /  ALT  23  /  AlkPhos  201<H>  08-21      Serum Cr trend: 2.54 <-- , 2.95 <-- , 3.01 <-- , 2.65 <--         Cardiac Markers           STUDIES:         INTERVAL EVENTS:  -Denies CP, SOB  -Walking without dyspnea    PAST MEDICAL & SURGICAL HISTORY:  Aortic stenosis    AICD (automatic cardioverter/defibrillator) present    Aortic valve regurgitation    Ascending aorta dilation    H/O paroxysmal supraventricular tachycardia    HLD (hyperlipidemia)    Mitral valve regurgitation    S/P ablation of atrial fibrillation    Cardiac arrest    Severe mitral regurgitation    S/P aortic valve replacement  3/13/2004    S/P hernia repair  2002    History of tonsillectomy and adenoidectomy    S/P TURP  1996    S/P colonoscopy  2001, 2002, 2006, 2011, 2016    S/P endoscopy  2006    S/P cardiac cath  1994, 1995, 1999, 2002, 2004    AICD (automatic cardioverter/defibrillator) present  12/19/17    Cardiac pacemaker  12/19/17    History of cardioversion  9/11/20    S/P ablation of atrial fibrillation  10/29/20    Status post ablation of ventricular arrhythmia  2/14/19        MEDICATIONS  (STANDING):  atorvastatin 40 milliGRAM(s) Oral at bedtime  buMETAnide Infusion 1 mG/Hr (5 mL/Hr) IV Continuous <Continuous>  hydrALAZINE 25 milliGRAM(s) Oral three times a day  levothyroxine 50 MICROGram(s) Oral daily  metoprolol succinate ER 50 milliGRAM(s) Oral daily  milrinone Infusion 0.125 MICROgram(s)/kG/Min (2.78 mL/Hr) IV Continuous <Continuous>  pantoprazole    Tablet 40 milliGRAM(s) Oral before breakfast  polyethylene glycol 3350 17 Gram(s) Oral daily  potassium chloride    Tablet ER 40 milliEquivalent(s) Oral two times a day  rivaroxaban 15 milliGRAM(s) Oral daily  senna 2 Tablet(s) Oral at bedtime  sodium chloride 0.9% lock flush 3 milliLiter(s) IV Push every 8 hours    MEDICATIONS  (PRN):  acetaminophen     Tablet .. 650 milliGRAM(s) Oral every 6 hours PRN Temp greater or equal to 38C (100.4F), Mild Pain (1 - 3)  aluminum hydroxide/magnesium hydroxide/simethicone Suspension 30 milliLiter(s) Oral every 4 hours PRN Dyspepsia  melatonin 3 milliGRAM(s) Oral at bedtime PRN Insomnia  ondansetron Injectable 4 milliGRAM(s) IV Push every 8 hours PRN Nausea and/or Vomiting    T(F): 97.5 (08-22-23 @ 11:09), Max: 97.9 (08-21-23 @ 20:48)  HR: 80 (08-22-23 @ 11:09) (80 - 89)  BP: 94/59 (08-22-23 @ 11:09) (94/59 - 108/71)  BP(mean): 84 (08-22-23 @ 04:29) (84 - 84)  ABP: --  ABP(mean): --  RR: 18 (08-22-23 @ 11:09) (18 - 18)  SpO2: 98% (08-22-23 @ 11:09) (93% - 98%)    I/O Detail 24H    21 Aug 2023 07:01  -  22 Aug 2023 07:00  --------------------------------------------------------  IN:    Bumetanide: 120 mL    Milrinone: 67.2 mL    Oral Fluid: 500 mL  Total IN: 687.2 mL    OUT:    Voided (mL): 3500 mL  Total OUT: 3500 mL    Total NET: -2812.8 mL      22 Aug 2023 07:01  -  22 Aug 2023 15:27  --------------------------------------------------------  IN:    Oral Fluid: 540 mL  Total IN: 540 mL    OUT:    Voided (mL): 750 mL  Total OUT: 750 mL    Total NET: -210 mL          PHYSICAL EXAM:  GEN: NAD  HEENT: EOMI, +JVD    RESP: CTA b/l  CV: RRR. Normal S1/S2. No m/r/g.  ABD: soft, non-distended  EXT: warm, 2+ pitting edema  NEURO: alert and attentive    LABS:  CBC 08-22-23 @ 06:28                        12.3   6.53  )-----------( 142                   37.6       Hgb trend: 12.3 <-- , 11.3 <-- , 11.5 <--   WBC trend: 6.53 <-- , 5.28 <-- , 6.62 <--       CMP 08-22-23 @ 06:28    140  |  95<L>  |  86<H>  ----------------------------<  94  4.0   |  28  |  2.54<H>    Ca    10.0      08-22-23 @ 06:28    TPro  6.9  /  Alb  4.0  /  TBili  1.5<H>  /  DBili  x   /  AST  26  /  ALT  23  /  AlkPhos  201<H>  08-21      Serum Cr trend: 2.54 <-- , 2.95 <-- , 3.01 <-- , 2.65 <--         Cardiac Markers           STUDIES:

## 2023-08-22 NOTE — PROGRESS NOTE ADULT - SUBJECTIVE AND OBJECTIVE BOX
VITAL SIGNS    Telemetry:    Vital Signs Last 24 Hrs  T(C): 36.4 (08-22-23 @ 04:29), Max: 36.6 (08-21-23 @ 20:48)  T(F): 97.5 (08-22-23 @ 04:29), Max: 97.9 (08-21-23 @ 20:48)  HR: 89 (08-22-23 @ 04:29) (81 - 96)  BP: 108/71 (08-22-23 @ 04:29) (94/64 - 108/71)  RR: 18 (08-22-23 @ 04:29) (18 - 18)  SpO2: 93% (08-22-23 @ 04:29) (93% - 95%)            08-21 @ 07:01  -  08-22 @ 07:00  --------------------------------------------------------  IN: 687.2 mL / OUT: 3500 mL / NET: -2812.8 mL    08-22 @ 07:01  -  08-22 @ 10:24  --------------------------------------------------------  IN: 240 mL / OUT: 400 mL / NET: -160 mL       Daily     Daily   Admit Wt: Drug Dosing Weight  Height (cm): 175.3 (16 Aug 2023 16:36)  Weight (kg): 74 (16 Aug 2023 16:36)  BMI (kg/m2): 24.1 (16 Aug 2023 16:36)  BSA (m2): 1.89 (16 Aug 2023 16:36)      CAPILLARY BLOOD GLUCOSE              LABS:    08-21 @ 07:01  -  08-22 @ 07:00  --------------------------------------------------------  IN: 687.2 mL / OUT: 3500 mL / NET: -2812.8 mL    08-22 @ 07:01  -  08-22 @ 10:24  --------------------------------------------------------  IN: 240 mL / OUT: 400 mL / NET: -160 mL    cret                        12.3   6.53  )-----------( 142      ( 22 Aug 2023 06:28 )             37.6     08-22    140  |  95<L>  |  86<H>  ----------------------------<  94  4.0   |  28  |  2.54<H>    Ca    10.0      22 Aug 2023 06:28    TPro  6.9  /  Alb  4.0  /  TBili  1.5<H>  /  DBili  x   /  AST  26  /  ALT  23  /  AlkPhos  201<H>  08-21        acetaminophen     Tablet .. 650 milliGRAM(s) Oral every 6 hours PRN  aluminum hydroxide/magnesium hydroxide/simethicone Suspension 30 milliLiter(s) Oral every 4 hours PRN  atorvastatin 40 milliGRAM(s) Oral at bedtime  buMETAnide Infusion 1 mG/Hr IV Continuous <Continuous>  hydrALAZINE 25 milliGRAM(s) Oral three times a day  levothyroxine 50 MICROGram(s) Oral daily  melatonin 3 milliGRAM(s) Oral at bedtime PRN  metoprolol succinate ER 50 milliGRAM(s) Oral daily  milrinone Infusion 0.125 MICROgram(s)/kG/Min IV Continuous <Continuous>  ondansetron Injectable 4 milliGRAM(s) IV Push every 8 hours PRN  pantoprazole    Tablet 40 milliGRAM(s) Oral before breakfast  polyethylene glycol 3350 17 Gram(s) Oral daily  potassium chloride    Tablet ER 40 milliEquivalent(s) Oral two times a day  rivaroxaban 15 milliGRAM(s) Oral daily  senna 2 Tablet(s) Oral at bedtime  sodium chloride 0.9% lock flush 3 milliLiter(s) IV Push every 8 hours      PHYSICAL EXAM    Subjective: "Hi.   Neurology: alert and oriented x 3, nonfocal, no gross deficits  CV : tele:  RSR  Sternal Wound :  CDI with dressing , Stable  Lungs: clear. RR easy, unlabored   Abdomen: soft, nontender, nondistended, positive bowel sounds, bowel movement   Neg N/V/D   :  pt voiding without difficulty   Extremities:   BRAVO; edema, neg calf tenderness.   PPP bilaterally      PW:  Chest tubes:                 VITAL SIGNS    Telemetry:  V.paced 80-90  Vital Signs Last 24 Hrs  T(C): 36.4 (08-22-23 @ 04:29), Max: 36.6 (08-21-23 @ 20:48)  T(F): 97.5 (08-22-23 @ 04:29), Max: 97.9 (08-21-23 @ 20:48)  HR: 89 (08-22-23 @ 04:29) (81 - 96)  BP: 108/71 (08-22-23 @ 04:29) (94/64 - 108/71)  RR: 18 (08-22-23 @ 04:29) (18 - 18)  SpO2: 93% (08-22-23 @ 04:29) (93% - 95%)            08-21 @ 07:01  -  08-22 @ 07:00  --------------------------------------------------------  IN: 687.2 mL / OUT: 3500 mL / NET: -2812.8 mL    08-22 @ 07:01  -  08-22 @ 10:24  --------------------------------------------------------  IN: 240 mL / OUT: 400 mL / NET: -160 mL       Daily     Daily   Admit Wt: Drug Dosing Weight  Height (cm): 175.3 (16 Aug 2023 16:36)  Weight (kg): 74 (16 Aug 2023 16:36)  BMI (kg/m2): 24.1 (16 Aug 2023 16:36)  BSA (m2): 1.89 (16 Aug 2023 16:36)      CAPILLARY BLOOD GLUCOSE              LABS:    08-21 @ 07:01  -  08-22 @ 07:00  --------------------------------------------------------  IN: 687.2 mL / OUT: 3500 mL / NET: -2812.8 mL    08-22 @ 07:01  -  08-22 @ 10:24  --------------------------------------------------------  IN: 240 mL / OUT: 400 mL / NET: -160 mL    cret                        12.3   6.53  )-----------( 142      ( 22 Aug 2023 06:28 )             37.6     08-22    140  |  95<L>  |  86<H>  ----------------------------<  94  4.0   |  28  |  2.54<H>    Ca    10.0      22 Aug 2023 06:28    TPro  6.9  /  Alb  4.0  /  TBili  1.5<H>  /  DBili  x   /  AST  26  /  ALT  23  /  AlkPhos  201<H>  08-21        acetaminophen     Tablet .. 650 milliGRAM(s) Oral every 6 hours PRN  aluminum hydroxide/magnesium hydroxide/simethicone Suspension 30 milliLiter(s) Oral every 4 hours PRN  atorvastatin 40 milliGRAM(s) Oral at bedtime  buMETAnide Infusion 1 mG/Hr IV Continuous <Continuous>  hydrALAZINE 25 milliGRAM(s) Oral three times a day  levothyroxine 50 MICROGram(s) Oral daily  melatonin 3 milliGRAM(s) Oral at bedtime PRN  metoprolol succinate ER 50 milliGRAM(s) Oral daily  milrinone Infusion 0.125 MICROgram(s)/kG/Min IV Continuous <Continuous>  ondansetron Injectable 4 milliGRAM(s) IV Push every 8 hours PRN  pantoprazole    Tablet 40 milliGRAM(s) Oral before breakfast  polyethylene glycol 3350 17 Gram(s) Oral daily  potassium chloride    Tablet ER 40 milliEquivalent(s) Oral two times a day  rivaroxaban 15 milliGRAM(s) Oral daily  senna 2 Tablet(s) Oral at bedtime  sodium chloride 0.9% lock flush 3 milliLiter(s) IV Push every 8 hours          PHYSICAL EXAM    Subjective: "I feel fine."   Neurology: alert and oriented x 3, nonfocal, no gross deficits  CV : tele:  V. paced @ 80  Lungs: clear. RR easy, unlabored   Abdomen: soft, nontender, nondistended, positive bowel sounds, +bowel movement   Neg N/V/D   :  pt voiding without difficulty   Extremities:   BRAVO; trace LE edema, neg calf tenderness.   PPP bilaterally; chronic venous stasis discoloration

## 2023-08-22 NOTE — CHART NOTE - NSCHARTNOTEFT_GEN_A_CORE
Nutrition Follow Up Note  Patient seen for: follow up    Source: [x] Patient       [x] Medical Record        [] RN        [] Family at bedside       [] Other:    -If unable to interview patient: [] Trach/Vent/BiPAP  [] Disoriented/confused/inappropriate to interview    Diet Order:   Diet, Regular:   Low Sodium (23)    - Is current order appropriate/adequate? [x] Yes  []  No:     - PO intake :   [x] >75%  Adequate    [] 50-75%  Fair       [] <50%  Poor  Appetite has much improved since initial assessment and post diet liberalization     Nutrition-related concerns:  -BUN/Cr elevated. Nephrology following, SURESH on CKDIV.   -Heart Failure following, CHF.   -Mg elevated .     GI:  Last BM . Bowel Regimen? [x] Yes   [] No    Weights:   Dosing weight 74kg  Daily Weight in k (), Weight in k.3 (), Weight in k.5 (), Weight in k (), Weight in k ()  weight fluctuations expected in setting of CHF. RD will continue to monitor trends.     Nutritionally Pertinent MEDICATIONS  (STANDING):  atorvastatin  buMETAnide Infusion  hydrALAZINE  levothyroxine  metoprolol succinate ER  milrinone Infusion  pantoprazole    Tablet  polyethylene glycol 3350  potassium chloride    Tablet ER  senna  sodium chloride 0.9% lock flush    Pertinent Labs:  @ 06:28: Na 140, BUN 86<H>, Cr 2.54<H>, BG 94, K+ 4.0, Phos --, Mg --, Alk Phos --, ALT/SGPT --, AST/SGOT --, HbA1c --    A1C with Estimated Average Glucose Result: 5.7 % (23 @ 16:56)    Skin per nursing documentation: No pressure injuries noted.  Edema per nursing documentation: 1+ sumi leg     Estimated Needs:   [x] no change since previous assessment  [] recalculated:     Previous Nutrition Diagnosis: severe chronic malnutrition  Nutrition Diagnosis is: [x] ongoing  [] resolved [] not applicable     Nutrition Care Plan:  [x] In Progress  [] Achieved  [] Not applicable    New Nutrition Diagnosis: [x] Not applicable    Nutrition Interventions:  Discussed Na restriction, foods high in Na to avoid, reading food labels, tips for limiting Na in your diet. Reviewed daily weights, Wt gain parameters to contact MD. Discussed Na intake in relation to fluid retention, edema and Wt gain. Continued to encourage PO intake due to recent poor PO intake.     Recommendations:      -Continue current diet. Monitor renal biomarkers for need to add restrictions to diet.   -Defer fluid needs to team.     Monitoring and Evaluation:   Continue to monitor nutritional intake, tolerance to diet prescription, weights, labs, skin integrity    RD remains available upon request and will follow up per protocol  Karla Smith MS, RD, CDN

## 2023-08-22 NOTE — PROGRESS NOTE ADULT - PROBLEM SELECTOR PLAN 2
Patient well known to Structural team  Based on initial discussion, current milrinone requirement and SURESH exclude pt from eligibility from TMVR with Tendyne. Presence of MAC and leaflet calcification precludes KRISTIE.   - Treatment of CHF as above. Following euvolemia will attempt de-escalation of inotrope.   - Will reassess MR once euvolemic  - May benefit from CRT if dyssynchrony contributing to regurgitation

## 2023-08-22 NOTE — PROGRESS NOTE ADULT - PROBLEM SELECTOR PLAN 1
RHC 8/17 with elevated biventricular filling pressures, low CI, SVR ~1200s, and cpcPH.   - Etiology: ICM with possible nonischemic component  - GDMT: C/w Toprol 50mg, hydralazine 25mg TID. No ACEi/ARB/MRA due to SURESH on CKD and in past, did not tolerate SGLT2-i due to back pain and SOB  - Diuretics: C/w bumex 1mg/hr. If pt develops worsening hand cramping (can be side effect of bumex), can switch to lasix 20mg/hr  - C/w milrinone at 0.125 mcg/kg/min  - check daily lactate, BMP, LFTs  - c/w KCL 40 meq PO BID  - Replete electrolytes to target K+>4.0 and Mg >2.0  - Daily standing weight/strict IO  - Fluid restriction 1500cc/day

## 2023-08-22 NOTE — PROGRESS NOTE ADULT - ATTENDING COMMENTS
Severely elevated filling pressures on RHC 8/18 and minimal change in weight in the interim. Bumex infusion started Sunday with low dose milrinone, marginal BPs, and severe MR/TR. Chronic RV pacing. SURESH on CKD.    Diuresing well with improvement in SCr. Would continue current therapies.  He remains fluid overloaded.  Daily standing weights, please.  Goal is euvolemia and then consider CRT upgrade if chronic pacing is contributing to dyssynchrony as it may help the MR if secondary.

## 2023-08-22 NOTE — PROGRESS NOTE ADULT - ASSESSMENT
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 Patient reports worsening SOB x 2 days, with associated symptoms of fatigue, deconditioning and inability to walk long distance.  Presents today as a direct admission for CHF exacerbation and requiring medical optimization.       1- SURESH on CKDIV  2- CHF  3- severe MR  4- hypokalemia k is better   5- HTN       SURESH in setting of decompensated CHF  cr is still high. he also remains with high BUN   on bumex 1 mg .hr   cont diuresis and monitor for worsening renal function cr so far steady and bun steady at this high level   keep K > 3.5 kcl supplement   hydralazine 25 mg TID  strict I/O  keep O>I  daily standing weight  2G sodium diet  trend creatinine and electrolytes closely

## 2023-08-23 LAB
ANION GAP SERPL CALC-SCNC: 17 MMOL/L — SIGNIFICANT CHANGE UP (ref 5–17)
BUN SERPL-MCNC: 79 MG/DL — HIGH (ref 7–23)
CALCIUM SERPL-MCNC: 10.3 MG/DL — SIGNIFICANT CHANGE UP (ref 8.4–10.5)
CHLORIDE SERPL-SCNC: 95 MMOL/L — LOW (ref 96–108)
CO2 SERPL-SCNC: 28 MMOL/L — SIGNIFICANT CHANGE UP (ref 22–31)
CREAT SERPL-MCNC: 2.56 MG/DL — HIGH (ref 0.5–1.3)
EGFR: 24 ML/MIN/1.73M2 — LOW
GLUCOSE SERPL-MCNC: 115 MG/DL — HIGH (ref 70–99)
HCT VFR BLD CALC: 36.9 % — LOW (ref 39–50)
HGB BLD-MCNC: 12.2 G/DL — LOW (ref 13–17)
MCHC RBC-ENTMCNC: 29.1 PG — SIGNIFICANT CHANGE UP (ref 27–34)
MCHC RBC-ENTMCNC: 33.1 GM/DL — SIGNIFICANT CHANGE UP (ref 32–36)
MCV RBC AUTO: 88.1 FL — SIGNIFICANT CHANGE UP (ref 80–100)
NRBC # BLD: 0 /100 WBCS — SIGNIFICANT CHANGE UP (ref 0–0)
PLATELET # BLD AUTO: 138 K/UL — LOW (ref 150–400)
POTASSIUM SERPL-MCNC: 4 MMOL/L — SIGNIFICANT CHANGE UP (ref 3.5–5.3)
POTASSIUM SERPL-SCNC: 4 MMOL/L — SIGNIFICANT CHANGE UP (ref 3.5–5.3)
RBC # BLD: 4.19 M/UL — LOW (ref 4.2–5.8)
RBC # FLD: 18.9 % — HIGH (ref 10.3–14.5)
SODIUM SERPL-SCNC: 140 MMOL/L — SIGNIFICANT CHANGE UP (ref 135–145)
WBC # BLD: 7.26 K/UL — SIGNIFICANT CHANGE UP (ref 3.8–10.5)
WBC # FLD AUTO: 7.26 K/UL — SIGNIFICANT CHANGE UP (ref 3.8–10.5)

## 2023-08-23 PROCEDURE — 99232 SBSQ HOSP IP/OBS MODERATE 35: CPT | Mod: FS

## 2023-08-23 PROCEDURE — 99233 SBSQ HOSP IP/OBS HIGH 50: CPT | Mod: GC

## 2023-08-23 RX ORDER — HYDRALAZINE HCL 50 MG
35 TABLET ORAL THREE TIMES A DAY
Refills: 0 | Status: DISCONTINUED | OUTPATIENT
Start: 2023-08-23 | End: 2023-08-26

## 2023-08-23 RX ORDER — ISOSORBIDE DINITRATE 5 MG/1
10 TABLET ORAL THREE TIMES A DAY
Refills: 0 | Status: DISCONTINUED | OUTPATIENT
Start: 2023-08-23 | End: 2023-08-24

## 2023-08-23 RX ORDER — ISOSORBIDE DINITRATE 5 MG/1
10 TABLET ORAL THREE TIMES A DAY
Refills: 0 | Status: DISCONTINUED | OUTPATIENT
Start: 2023-08-23 | End: 2023-08-23

## 2023-08-23 RX ORDER — HYDRALAZINE HCL 50 MG
35 TABLET ORAL THREE TIMES A DAY
Refills: 0 | Status: DISCONTINUED | OUTPATIENT
Start: 2023-08-23 | End: 2023-08-23

## 2023-08-23 RX ADMIN — POLYETHYLENE GLYCOL 3350 17 GRAM(S): 17 POWDER, FOR SOLUTION ORAL at 12:05

## 2023-08-23 RX ADMIN — Medication 25 MILLIGRAM(S): at 05:24

## 2023-08-23 RX ADMIN — Medication 3 MILLIGRAM(S): at 21:22

## 2023-08-23 RX ADMIN — RIVAROXABAN 15 MILLIGRAM(S): KIT at 12:06

## 2023-08-23 RX ADMIN — PANTOPRAZOLE SODIUM 40 MILLIGRAM(S): 20 TABLET, DELAYED RELEASE ORAL at 05:24

## 2023-08-23 RX ADMIN — SODIUM CHLORIDE 3 MILLILITER(S): 9 INJECTION INTRAMUSCULAR; INTRAVENOUS; SUBCUTANEOUS at 13:46

## 2023-08-23 RX ADMIN — SENNA PLUS 2 TABLET(S): 8.6 TABLET ORAL at 21:22

## 2023-08-23 RX ADMIN — Medication 40 MILLIEQUIVALENT(S): at 17:35

## 2023-08-23 RX ADMIN — Medication 50 MILLIGRAM(S): at 05:23

## 2023-08-23 RX ADMIN — Medication 50 MICROGRAM(S): at 05:24

## 2023-08-23 RX ADMIN — BUMETANIDE 5 MG/HR: 0.25 INJECTION INTRAMUSCULAR; INTRAVENOUS at 12:04

## 2023-08-23 RX ADMIN — ISOSORBIDE DINITRATE 10 MILLIGRAM(S): 5 TABLET ORAL at 16:21

## 2023-08-23 RX ADMIN — Medication 35 MILLIGRAM(S): at 21:26

## 2023-08-23 RX ADMIN — SODIUM CHLORIDE 3 MILLILITER(S): 9 INJECTION INTRAMUSCULAR; INTRAVENOUS; SUBCUTANEOUS at 21:31

## 2023-08-23 RX ADMIN — Medication 40 MILLIEQUIVALENT(S): at 05:24

## 2023-08-23 RX ADMIN — ATORVASTATIN CALCIUM 40 MILLIGRAM(S): 80 TABLET, FILM COATED ORAL at 21:26

## 2023-08-23 RX ADMIN — BUMETANIDE 5 MG/HR: 0.25 INJECTION INTRAMUSCULAR; INTRAVENOUS at 23:24

## 2023-08-23 RX ADMIN — SODIUM CHLORIDE 3 MILLILITER(S): 9 INJECTION INTRAMUSCULAR; INTRAVENOUS; SUBCUTANEOUS at 05:22

## 2023-08-23 NOTE — PROGRESS NOTE ADULT - PROBLEM SELECTOR PLAN 1
RHC 8/17 with elevated biventricular filling pressures, low CI, SVR ~1200s, and cpcPH.   - Etiology: ICM with possible nonischemic component  - GDMT: C/w Toprol 50mg. Increase hydralazine to 35mg TID. Add Isordil 10mg TID. No ACEi/ARB/MRA due to SURESH on CKD and in past, did not tolerate SGLT2-i due to back pain and SOB.   - Diuretics: C/w bumex 1mg/hr. If pt develops worsening hand cramping (can be side effect of bumex), can switch to lasix 20mg/hr  - C/w milrinone at 0.125 mcg/kg/min  - check daily lactate, BMP, LFTs  - c/w KCL 40 meq PO BID  - Replete electrolytes to target K+>4.0 and Mg >2.0  - Daily standing weight/strict IO  - Fluid restriction 1500cc/day RHC 8/17 with elevated biventricular filling pressures, low CI, SVR ~1200s, and cpcPH.   - Etiology: ICM with possible nonischemic component  - GDMT: C/w Toprol 50mg. Increase hydralazine to 35mg TID. Add Isordil 10mg TID. No ACEi/ARB/MRA due to SURESH on CKD and in past, did not tolerate SGLT2-i due to back pain and SOB.   - Diuretics: C/w bumex 1mg/hr.  - C/w milrinone at 0.125 mcg/kg/min  - c/w KCL 40 meq PO BID  - Replete electrolytes to target K+>4.0 and Mg >2.0  - Daily standing weight/strict IO  - Fluid restriction 1500cc/day

## 2023-08-23 NOTE — SBIRT NOTE ADULT - NSSBIRTDRGBRIEFINTDET_GEN_A_CORE
LMSW provided psychoeducation regarding dangers/risks of use of all substances, including alcohol. Specifically, utilized together.

## 2023-08-23 NOTE — PROGRESS NOTE ADULT - PROBLEM SELECTOR PLAN 1
decrease Toprol 50 daily per HF team     Bumex gtt 1mg/hr  milrinone 125 mcg/kg/min  Activity as tolerated  Heart Failure following   Structural Team following  discharge planning- home when medically optimized and f/u at [facility] for Tendem trial decrease Toprol 50 daily per HF team     Bumex gtt 1mg/hr  milrinone 125 mcg/kg/min  continue diuresis; add isordil 10 tid and increase hydral 35 mg po tid  1500 cc F. R   Activity as tolerated  Heart Failure following   Structural Team following  discharge planning- home when medically optimized and f/u at [facility] for Tendem trial

## 2023-08-23 NOTE — PROGRESS NOTE ADULT - SUBJECTIVE AND OBJECTIVE BOX
[facility]   [phone]  RENAL FOLLOW UP NOTE  --------------------------------------------------------------------------------  Chief Complaint:    24 hour events/subjective:    seen earlier   family at bedside   denies worsening sob     PAST HISTORY  --------------------------------------------------------------------------------  No significant changes to PMH, PSH, FHx, SHx, unless otherwise noted    ALLERGIES & MEDICATIONS  --------------------------------------------------------------------------------  Allergies    No Known Allergies    Intolerances      Standing Inpatient Medications  atorvastatin 40 milliGRAM(s) Oral at bedtime  buMETAnide Infusion 1 mG/Hr IV Continuous <Continuous>  hydrALAZINE 35 milliGRAM(s) Oral three times a day  isosorbide   dinitrate Tablet (ISORDIL) 10 milliGRAM(s) Oral three times a day  levothyroxine 50 MICROGram(s) Oral daily  metoprolol succinate ER 50 milliGRAM(s) Oral daily  milrinone Infusion 0.125 MICROgram(s)/kG/Min IV Continuous <Continuous>  pantoprazole    Tablet 40 milliGRAM(s) Oral before breakfast  polyethylene glycol 3350 17 Gram(s) Oral daily  potassium chloride    Tablet ER 40 milliEquivalent(s) Oral two times a day  rivaroxaban 15 milliGRAM(s) Oral daily  senna 2 Tablet(s) Oral at bedtime  sodium chloride 0.9% lock flush 3 milliLiter(s) IV Push every 8 hours    PRN Inpatient Medications  acetaminophen     Tablet .. 650 milliGRAM(s) Oral every 6 hours PRN  aluminum hydroxide/magnesium hydroxide/simethicone Suspension 30 milliLiter(s) Oral every 4 hours PRN  melatonin 3 milliGRAM(s) Oral at bedtime PRN  ondansetron Injectable 4 milliGRAM(s) IV Push every 8 hours PRN      REVIEW OF SYSTEMS  --------------------------------------------------------------------------------    Gen: denies  fevers/chills,  CVS: denies chest pain/palpitations  Resp: denies SOB/Cough  GI: Denies N/V/Abd pain  : Denies dysuria    VITALS/PHYSICAL EXAM  --------------------------------------------------------------------------------  T(C): 36.6 (08-23-23 @ 19:33), Max: 36.6 (08-23-23 @ 19:33)  HR: 85 (08-23-23 @ 19:33) (84 - 85)  BP: 96/60 (08-23-23 @ 19:33) (96/60 - 103/66)  RR: 18 (08-23-23 @ 19:33) (18 - 18)  SpO2: 98% (08-23-23 @ 19:33) (92% - 98%)  Wt(kg): --        08-22-23 @ 07:01  -  08-23-23 @ 07:00  --------------------------------------------------------  IN: 780 mL / OUT: 3850 mL / NET: -3070 mL    08-23-23 @ 07:01  -  08-23-23 @ 22:34  --------------------------------------------------------  IN: 925.8 mL / OUT: 2050 mL / NET: -1124.2 mL      Physical Exam:  	  Gen: Non toxic comfortable appearing, on RA    	Pulm: decrease bs  no rales or ronchi or wheezing  	CV: +JVD. RRR, S1S2; no rub II/VI M   	Abd: +BS, soft, nontender/nondistended  	UE: Warm, no cyanosis  no clubbing,  no edema; no asterixis  	LE: Warm, no cyanosis  no clubbing, 1-2    edema  	Neuro: alert and oriented. speech coherent     LABS/STUDIES  --------------------------------------------------------------------------------              12.2   7.26  >-----------<  138      [08-23-23 @ 05:51]              36.9     140  |  95  |  79  ----------------------------<  115      [08-23-23 @ 05:52]  4.0   |  28  |  2.56        Ca     10.3     [08-23-23 @ 05:52]    Creatinine Trend:  SCr 2.56 [08-23 @ 05:52]  SCr 2.54 [08-22 @ 06:28]  SCr 2.95 [08-21 @ 05:33]  SCr 3.01 [08-20 @ 18:33]  SCr 2.65 [08-20 @ 07:02]              Urinalysis - [08-23-23 @ 05:52]      Color  / Appearance  / SG  / pH       Gluc 115 / Ketone   / Bili  / Urobili        Blood  / Protein  / Leuk Est  / Nitrite       RBC  / WBC  / Hyaline  / Gran  / Sq Epi  / Non Sq Epi  / Bacteria     Urinalysis - [08-23-23 @ 05:52]      Color  / Appearance  / SG  / pH       Gluc 115 / Ketone   / Bili  / Urobili        Blood  / Protein  / Leuk Est  / Nitrite       RBC  / WBC  / Hyaline  / Gran  / Sq Epi  / Non Sq Epi  / Bacteria       TSH 5.96      [08-16-23 @ 16:55]

## 2023-08-23 NOTE — PROGRESS NOTE ADULT - SUBJECTIVE AND OBJECTIVE BOX
INTERVAL EVENTS:  -Denies CP, SOB    PAST MEDICAL & SURGICAL HISTORY:  Aortic stenosis    AICD (automatic cardioverter/defibrillator) present    Aortic valve regurgitation    Ascending aorta dilation    H/O paroxysmal supraventricular tachycardia    HLD (hyperlipidemia)    Mitral valve regurgitation    S/P ablation of atrial fibrillation    Cardiac arrest    Severe mitral regurgitation    S/P aortic valve replacement  3/13/2004    S/P hernia repair  2002    History of tonsillectomy and adenoidectomy    S/P TURP  1996    S/P colonoscopy  2001, 2002, 2006, 2011, 2016    S/P endoscopy  2006    S/P cardiac cath  1994, 1995, 1999, 2002, 2004    AICD (automatic cardioverter/defibrillator) present  12/19/17    Cardiac pacemaker  12/19/17    History of cardioversion  9/11/20    S/P ablation of atrial fibrillation  10/29/20    Status post ablation of ventricular arrhythmia  2/14/19        MEDICATIONS  (STANDING):  atorvastatin 40 milliGRAM(s) Oral at bedtime  buMETAnide Infusion 1 mG/Hr (5 mL/Hr) IV Continuous <Continuous>  hydrALAZINE 25 milliGRAM(s) Oral three times a day  levothyroxine 50 MICROGram(s) Oral daily  metoprolol succinate ER 50 milliGRAM(s) Oral daily  milrinone Infusion 0.125 MICROgram(s)/kG/Min (2.78 mL/Hr) IV Continuous <Continuous>  pantoprazole    Tablet 40 milliGRAM(s) Oral before breakfast  polyethylene glycol 3350 17 Gram(s) Oral daily  potassium chloride    Tablet ER 40 milliEquivalent(s) Oral two times a day  rivaroxaban 15 milliGRAM(s) Oral daily  senna 2 Tablet(s) Oral at bedtime  sodium chloride 0.9% lock flush 3 milliLiter(s) IV Push every 8 hours    MEDICATIONS  (PRN):  acetaminophen     Tablet .. 650 milliGRAM(s) Oral every 6 hours PRN Temp greater or equal to 38C (100.4F), Mild Pain (1 - 3)  aluminum hydroxide/magnesium hydroxide/simethicone Suspension 30 milliLiter(s) Oral every 4 hours PRN Dyspepsia  melatonin 3 milliGRAM(s) Oral at bedtime PRN Insomnia  ondansetron Injectable 4 milliGRAM(s) IV Push every 8 hours PRN Nausea and/or Vomiting    T(F): 97.2 (08-23-23 @ 12:35), Max: 97.7 (08-23-23 @ 04:45)  HR: 84 (08-23-23 @ 12:35) (84 - 92)  BP: 101/69 (08-23-23 @ 12:35) (101/69 - 106/67)  BP(mean): --  ABP: --  ABP(mean): --  RR: 18 (08-23-23 @ 12:35) (18 - 18)  SpO2: 95% (08-23-23 @ 12:35) (92% - 95%)    I/O Detail 24H    22 Aug 2023 07:01  -  23 Aug 2023 07:00  --------------------------------------------------------  IN:    Oral Fluid: 780 mL  Total IN: 780 mL    OUT:    Voided (mL): 3850 mL  Total OUT: 3850 mL    Total NET: -3070 mL      23 Aug 2023 07:01  -  23 Aug 2023 13:16  --------------------------------------------------------  IN:    Oral Fluid: 360 mL  Total IN: 360 mL    OUT:    Voided (mL): 950 mL  Total OUT: 950 mL    Total NET: -590 mL          PHYSICAL EXAM:  GEN: NAD  HEENT: EOMI, +JVD  RESP: CTA b/l  CV: RRR. Normal S1/S2. No m/r/g.  ABD: soft, non-distended  EXT: Warm, 2+ pitting edema bilaterally   NEURO: alert and attentive    LABS:  CBC 08-23-23 @ 05:51                        12.2   7.26  )-----------( 138                   36.9       Hgb trend: 12.2 <-- , 12.3 <-- , 11.3 <--   WBC trend: 7.26 <-- , 6.53 <-- , 5.28 <--       CMP 08-23-23 @ 05:52    140  |  95<L>  |  79<H>  ----------------------------<  115<H>  4.0   |  28  |  2.56<H>    Ca    10.3      08-23-23 @ 05:52        Serum Cr trend: 2.56 <-- , 2.54 <-- , 2.95 <-- , 3.01 <--         Cardiac Markers           STUDIES:

## 2023-08-23 NOTE — PROGRESS NOTE ADULT - PROBLEM SELECTOR PLAN 3
continue Toprol 50 mg PO daily  monitor and supplement electrolytes  continue AC xarelto - d/w Dr. Valentino

## 2023-08-23 NOTE — PROGRESS NOTE ADULT - ATTENDING COMMENTS
Responding well to the bumex infusion with 3.5L urine output, but only down 1 kg. Persistent JVP > 16 cm H2O with large v-waves and tense BLE edema.     Severely elevated filling pressures on [facility] 8/18 and minimal change in weight in the interim. Bumex infusion started Sunday with low dose milrinone, marginal BPs, and severe MR/TR. Chronic RV pacing. SURESH on CKD.    Would continue current therapies and make sure fluid intake is limited to max 1.5L.  Increase HDZN to 35 mg po TID and add ISDN 10 mg po TID. Hold for SBP < 90.  Daily standing weights, please.  Goal is euvolemia and then consider CRT upgrade if chronic pacing is contributing to dyssynchrony as it may help the MR if secondary.

## 2023-08-23 NOTE — PROGRESS NOTE ADULT - ASSESSMENT
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 Patient reports worsening SOB x 2 days, with associated symptoms of fatigue, deconditioning and inability to walk long distance.  Presents today as a direct admission for CHF exacerbation and requiring medical optimization.       1- SURESH on CKDIV  2- CHF  3- severe MR  4- hypokalemia k is better   5- HTN       SURESH in setting of decompensated CHF  cr is still high. he also remains with high BUN but improving   on bumex 1 mg .hr   cont diuresis   keep K > 3.5 kcl supplement   hydralazine 25 mg TID  strict I/O  keep O>I  daily standing weight  2G sodium diet  trend creatinine and electrolytes closely

## 2023-08-23 NOTE — PROGRESS NOTE ADULT - PROBLEM SELECTOR PLAN 2
Heart Failure following  8/20 decrease Toprol 50 daily per HF team   8/20 milrinone 125 mcg/kg/min  8/20 Bumex gtt 1mg/hr   Continue on Hydralazine 25 mg PO TID Heart Failure following  8/20 decrease Toprol 50 daily per HF team   8/20 milrinone 125 mcg/kg/min  8/20 Bumex gtt 1mg/hr   continue diuresis; add isordil 10 tid and increase hydral 35 mg po tid  1500 cc F. R

## 2023-08-23 NOTE — PROGRESS NOTE ADULT - ASSESSMENT
83 yo Male with PMHx of  HTN, HLD, GERD, CHF, moderately reduced EF 35-40%, AFIB (on Xarelto) s/p ablation, CAD with h/o Atherectomy,  IWMI 1994, [place] ARREST IN 12/2017, AICD, s/p AVR in 2004, s/p AORTIC ANEURYSM AND MV repair with Dr Fenton 6/2/21, and now severe MR.  Patient reports worsening SOB x 2 days, with associated symptoms of fatigue, deconditioning and inability to walk long distance.  Presents today as a direct admission for CHF exacerbation and requiring medical optimization.     Hospital Course:   8/16 Admit to 2 [facility] Telemetry Floor.  Heart Failure consult called.  CXR ordered.  D/C'd Torsemide and started on Bumex 4 mg IV BID. Per Dr. Valentino hold Xarelto for today for possible RHC in AM.  Awaiting HF reccs.     8/17 VSS - bun 92 this am from 102 - as per Dr. Fenton - renal called - pt seen by renal & HF team - will continue Bumex 4mg IV bid-   8/18 RHC today for diagnostic eval Continue aggressive diuresis renal following  Replete potassium.  8/19 VVS; Potassium 3.5 --> Supplemented. Started om KCL 40 meq PO BID.  Heart Failure and Renal following.  Pre albumin ordered: 20 Continue with current medication regimen.  Sorbitol 30 mg PO x 1.   8/20 VSS + BM  seen by HF now for BUMEX GTT 1mg/hr  and milrinone for inotropic support 125mcg/kg/min    8/21 VSS: continue primacor gttp .125 and bumex gttp 1 mg/ hr; CHF following; resume xarelto for hx afib   8/22 VSS; continue primacor gttp and bumex gttp as per CHF team; AC with xarelto   discharge planning- home when medically optimized and f/u at [facility] for Tendem trial  81 yo Male with PMHx of  HTN, HLD, GERD, CHF, moderately reduced EF 35-40%, AFIB (on Xarelto) s/p ablation, CAD with h/o Atherectomy,  IWMI 1994, VT ARREST IN 12/2017, AICD, s/p AVR in 2004, s/p AORTIC ANEURYSM AND MV repair with Dr Fenton 6/2/21, and now severe MR.  Patient reports worsening SOB x 2 days, with associated symptoms of fatigue, deconditioning and inability to walk long distance.  Presents today as a direct admission for CHF exacerbation and requiring medical optimization.     Hospital Course:   8/16 Admit to 2 [facility] Telemetry Floor.  Heart Failure consult called.  CXR ordered.  D/C'd Torsemide and started on Bumex 4 mg IV BID. Per Dr. Valentino hold Xarelto for today for possible RHC in AM.  Awaiting HF reccs.     8/17 VSS - bun 92 this am from 102 - as per Dr. Fenton - renal called - pt seen by renal & HF team - will continue Bumex 4mg IV bid-   8/18 RHC today for diagnostic eval Continue aggressive diuresis renal following  Replete potassium.  8/19 VVS; Potassium 3.5 --> Supplemented. Started om KCL 40 meq PO BID.  Heart Failure and Renal following.  Pre albumin ordered: 20 Continue with current medication regimen.  Sorbitol 30 mg PO x 1.   8/20 VSS + BM  seen by HF now for BUMEX GTT 1mg/hr  and milrinone for inotropic support 125mcg/kg/min    8/21 VSS: continue primacor gttp .125 and bumex gttp 1 mg/ hr; CHF following; resume xarelto for hx afib   8/22 VSS; continue primacor gttp and bumex gttp as per CHF team; AC with xarelto   8/23 VSS: afib v.paced; continue diuresis; add isordil 10 tid and increase hydral 35 mg po tid; 1500 cc F. R   discharge planning- home when medically optimized and f/u at [facility] for Tendem trial

## 2023-08-23 NOTE — PROGRESS NOTE ADULT - SUBJECTIVE AND OBJECTIVE BOX
VITAL SIGNS    Telemetry:    Vital Signs Last 24 Hrs  T(C): 36.5 (23 @ 04:45), Max: 36.5 (23 @ 04:45)  T(F): 97.7 (23 @ 04:45), Max: 97.7 (23 @ 04:45)  HR: 85 (23 @ 04:45) (80 - 92)  BP: 103/66 (23 @ 04:45) (94/59 - 106/67)  RR: 18 (23 @ 04:45) (18 - 18)  SpO2: 92% (23 @ 04:45) (92% - 98%)             @ 07:01  -   @ 07:00  --------------------------------------------------------  IN: 780 mL / OUT: 3850 mL / NET: -3070 mL     @ 07:01  -   @ 09:27  --------------------------------------------------------  IN: 360 mL / OUT: 600 mL / NET: -240 mL       Daily     Daily Weight in k.8 (23 Aug 2023 08:09)  Admit Wt: Drug Dosing Weight  Height (cm): 175.3 (16 Aug 2023 16:36)  Weight (kg): 74 (16 Aug 2023 16:36)  BMI (kg/m2): 24.1 (16 Aug 2023 16:36)  BSA (m2): 1.89 (16 Aug 2023 16:36)      CAPILLARY BLOOD GLUCOSE              LABS:     @ 07:01  -   @ 07:00  --------------------------------------------------------  IN: 780 mL / OUT: 3850 mL / NET: -3070 mL     @ 07:01  -   @ 09:27  --------------------------------------------------------  IN: 360 mL / OUT: 600 mL / NET: -240 mL    cret                        12.2   7.26  )-----------( 138      ( 23 Aug 2023 05:51 )             36.9         140  |  95<L>  |  79<H>  ----------------------------<  115<H>  4.0   |  28  |  2.56<H>    Ca    10.3      23 Aug 2023 05:52          acetaminophen     Tablet .. 650 milliGRAM(s) Oral every 6 hours PRN  aluminum hydroxide/magnesium hydroxide/simethicone Suspension 30 milliLiter(s) Oral every 4 hours PRN  atorvastatin 40 milliGRAM(s) Oral at bedtime  buMETAnide Infusion 1 mG/Hr IV Continuous <Continuous>  hydrALAZINE 25 milliGRAM(s) Oral three times a day  levothyroxine 50 MICROGram(s) Oral daily  melatonin 3 milliGRAM(s) Oral at bedtime PRN  metoprolol succinate ER 50 milliGRAM(s) Oral daily  milrinone Infusion 0.125 MICROgram(s)/kG/Min IV Continuous <Continuous>  ondansetron Injectable 4 milliGRAM(s) IV Push every 8 hours PRN  pantoprazole    Tablet 40 milliGRAM(s) Oral before breakfast  polyethylene glycol 3350 17 Gram(s) Oral daily  potassium chloride    Tablet ER 40 milliEquivalent(s) Oral two times a day  rivaroxaban 15 milliGRAM(s) Oral daily  senna 2 Tablet(s) Oral at bedtime  sodium chloride 0.9% lock flush 3 milliLiter(s) IV Push every 8 hours      PHYSICAL EXAM    Subjective: "Hi.   Neurology: alert and oriented x 3, nonfocal, no gross deficits  CV : tele:  RSR  Sternal Wound :  CDI with dressing , Stable  Lungs: clear. RR easy, unlabored   Abdomen: soft, nontender, nondistended, positive bowel sounds, bowel movement   Neg N/V/D   :  pt voiding without difficulty   Extremities:   BRAVO; edema, neg calf tenderness.   PPP bilaterally      PW:  Chest tubes:                 VITAL SIGNS    Telemetry:  AFIB V.PACED 70-90  Vital Signs Last 24 Hrs  T(C): 36.5 (23 @ 04:45), Max: 36.5 (23 @ 04:45)  T(F): 97.7 (23 @ 04:45), Max: 97.7 (23 @ 04:45)  HR: 85 (23 @ 04:45) (80 - 92)  BP: 103/66 (23 @ 04:45) (94/59 - 106/67)  RR: 18 (23 @ 04:45) (18 - 18)  SpO2: 92% (23 @ 04:45) (92% - 98%)             @ 07:01  -   @ 07:00  --------------------------------------------------------  IN: 780 mL / OUT: 3850 mL / NET: -3070 mL     @ 07:01  -   @ 09:27  --------------------------------------------------------  IN: 360 mL / OUT: 600 mL / NET: -240 mL       Daily     Daily Weight in k.8 (23 Aug 2023 08:09)  Admit Wt: Drug Dosing Weight  Height (cm): 175.3 (16 Aug 2023 16:36)  Weight (kg): 74 (16 Aug 2023 16:36)  BMI (kg/m2): 24.1 (16 Aug 2023 16:36)  BSA (m2): 1.89 (16 Aug 2023 16:36)      CAPILLARY BLOOD GLUCOSE              LABS:     @ 07:01  -   @ 07:00  --------------------------------------------------------  IN: 780 mL / OUT: 3850 mL / NET: -3070 mL     @ 07:01  -   @ 09:27  --------------------------------------------------------  IN: 360 mL / OUT: 600 mL / NET: -240 mL    cret                        12.2   7.26  )-----------( 138      ( 23 Aug 2023 05:51 )             36.9         140  |  95<L>  |  79<H>  ----------------------------<  115<H>  4.0   |  28  |  2.56<H>    Ca    10.3      23 Aug 2023 05:52          acetaminophen     Tablet .. 650 milliGRAM(s) Oral every 6 hours PRN  aluminum hydroxide/magnesium hydroxide/simethicone Suspension 30 milliLiter(s) Oral every 4 hours PRN  atorvastatin 40 milliGRAM(s) Oral at bedtime  buMETAnide Infusion 1 mG/Hr IV Continuous <Continuous>  hydrALAZINE 25 milliGRAM(s) Oral three times a day  levothyroxine 50 MICROGram(s) Oral daily  melatonin 3 milliGRAM(s) Oral at bedtime PRN  metoprolol succinate ER 50 milliGRAM(s) Oral daily  milrinone Infusion 0.125 MICROgram(s)/kG/Min IV Continuous <Continuous>  ondansetron Injectable 4 milliGRAM(s) IV Push every 8 hours PRN  pantoprazole    Tablet 40 milliGRAM(s) Oral before breakfast  polyethylene glycol 3350 17 Gram(s) Oral daily  potassium chloride    Tablet ER 40 milliEquivalent(s) Oral two times a day  rivaroxaban 15 milliGRAM(s) Oral daily  senna 2 Tablet(s) Oral at bedtime  sodium chloride 0.9% lock flush 3 milliLiter(s) IV Push every 8 hours           PHYSICAL EXAM    Subjective: "I feel good."   Neurology: alert and oriented x 3, nonfocal, no gross deficits  CV : tele:  AFIB V. paced @ 80  Lungs: clear. RR easy, unlabored   Abdomen: soft, nontender, nondistended, positive bowel sounds, +bowel movement   Neg N/V/D   :  pt voiding without difficulty   Extremities:   BRAVO; trace LE edema, neg calf tenderness.   PPP bilaterally; chronic venous stasis discoloration

## 2023-08-24 LAB
ANION GAP SERPL CALC-SCNC: 18 MMOL/L — HIGH (ref 5–17)
BUN SERPL-MCNC: 77 MG/DL — HIGH (ref 7–23)
CALCIUM SERPL-MCNC: 10.3 MG/DL — SIGNIFICANT CHANGE UP (ref 8.4–10.5)
CHLORIDE SERPL-SCNC: 97 MMOL/L — SIGNIFICANT CHANGE UP (ref 96–108)
CO2 SERPL-SCNC: 26 MMOL/L — SIGNIFICANT CHANGE UP (ref 22–31)
CREAT SERPL-MCNC: 2.49 MG/DL — HIGH (ref 0.5–1.3)
EGFR: 25 ML/MIN/1.73M2 — LOW
GLUCOSE SERPL-MCNC: 109 MG/DL — HIGH (ref 70–99)
HCT VFR BLD CALC: 35.8 % — LOW (ref 39–50)
HGB BLD-MCNC: 11.7 G/DL — LOW (ref 13–17)
MCHC RBC-ENTMCNC: 28.7 PG — SIGNIFICANT CHANGE UP (ref 27–34)
MCHC RBC-ENTMCNC: 32.7 GM/DL — SIGNIFICANT CHANGE UP (ref 32–36)
MCV RBC AUTO: 88 FL — SIGNIFICANT CHANGE UP (ref 80–100)
NRBC # BLD: 0 /100 WBCS — SIGNIFICANT CHANGE UP (ref 0–0)
PLATELET # BLD AUTO: 135 K/UL — LOW (ref 150–400)
POTASSIUM SERPL-MCNC: 4.1 MMOL/L — SIGNIFICANT CHANGE UP (ref 3.5–5.3)
POTASSIUM SERPL-SCNC: 4.1 MMOL/L — SIGNIFICANT CHANGE UP (ref 3.5–5.3)
RBC # BLD: 4.07 M/UL — LOW (ref 4.2–5.8)
RBC # FLD: 18.8 % — HIGH (ref 10.3–14.5)
SODIUM SERPL-SCNC: 141 MMOL/L — SIGNIFICANT CHANGE UP (ref 135–145)
WBC # BLD: 6.71 K/UL — SIGNIFICANT CHANGE UP (ref 3.8–10.5)
WBC # FLD AUTO: 6.71 K/UL — SIGNIFICANT CHANGE UP (ref 3.8–10.5)

## 2023-08-24 PROCEDURE — 99233 SBSQ HOSP IP/OBS HIGH 50: CPT | Mod: GC

## 2023-08-24 PROCEDURE — 99232 SBSQ HOSP IP/OBS MODERATE 35: CPT | Mod: FS

## 2023-08-24 RX ORDER — ISOSORBIDE DINITRATE 5 MG/1
10 TABLET ORAL
Refills: 0 | Status: DISCONTINUED | OUTPATIENT
Start: 2023-08-24 | End: 2023-08-26

## 2023-08-24 RX ADMIN — Medication 35 MILLIGRAM(S): at 21:22

## 2023-08-24 RX ADMIN — Medication 35 MILLIGRAM(S): at 13:07

## 2023-08-24 RX ADMIN — MILRINONE LACTATE 2.78 MICROGRAM(S)/KG/MIN: 1 INJECTION, SOLUTION INTRAVENOUS at 05:57

## 2023-08-24 RX ADMIN — ISOSORBIDE DINITRATE 10 MILLIGRAM(S): 5 TABLET ORAL at 21:22

## 2023-08-24 RX ADMIN — SODIUM CHLORIDE 3 MILLILITER(S): 9 INJECTION INTRAMUSCULAR; INTRAVENOUS; SUBCUTANEOUS at 13:07

## 2023-08-24 RX ADMIN — ISOSORBIDE DINITRATE 10 MILLIGRAM(S): 5 TABLET ORAL at 12:01

## 2023-08-24 RX ADMIN — Medication 50 MILLIGRAM(S): at 07:58

## 2023-08-24 RX ADMIN — ATORVASTATIN CALCIUM 40 MILLIGRAM(S): 80 TABLET, FILM COATED ORAL at 21:22

## 2023-08-24 RX ADMIN — Medication 40 MILLIEQUIVALENT(S): at 05:39

## 2023-08-24 RX ADMIN — PANTOPRAZOLE SODIUM 40 MILLIGRAM(S): 20 TABLET, DELAYED RELEASE ORAL at 05:40

## 2023-08-24 RX ADMIN — Medication 50 MICROGRAM(S): at 05:39

## 2023-08-24 RX ADMIN — SENNA PLUS 2 TABLET(S): 8.6 TABLET ORAL at 21:21

## 2023-08-24 RX ADMIN — Medication 40 MILLIEQUIVALENT(S): at 17:30

## 2023-08-24 RX ADMIN — SODIUM CHLORIDE 3 MILLILITER(S): 9 INJECTION INTRAMUSCULAR; INTRAVENOUS; SUBCUTANEOUS at 06:06

## 2023-08-24 RX ADMIN — SODIUM CHLORIDE 3 MILLILITER(S): 9 INJECTION INTRAMUSCULAR; INTRAVENOUS; SUBCUTANEOUS at 21:15

## 2023-08-24 RX ADMIN — Medication 35 MILLIGRAM(S): at 05:39

## 2023-08-24 RX ADMIN — Medication 3 MILLIGRAM(S): at 21:22

## 2023-08-24 RX ADMIN — MILRINONE LACTATE 5.55 MICROGRAM(S)/KG/MIN: 1 INJECTION, SOLUTION INTRAVENOUS at 15:46

## 2023-08-24 RX ADMIN — ISOSORBIDE DINITRATE 10 MILLIGRAM(S): 5 TABLET ORAL at 05:40

## 2023-08-24 RX ADMIN — RIVAROXABAN 15 MILLIGRAM(S): KIT at 12:01

## 2023-08-24 NOTE — PROGRESS NOTE ADULT - PROBLEM SELECTOR PLAN 2
Heart Failure following  8/20 decrease Toprol 50 daily per HF team   8/20 milrinone 125 mcg/kg/min  8/20 Bumex gtt 1mg/hr   continue diuresis; add isordil 10 tid and  hydral 35 mg po tid

## 2023-08-24 NOTE — PROGRESS NOTE ADULT - SUBJECTIVE AND OBJECTIVE BOX
INTERVAL EVENTS:  -No acute events  -No CP, SOB    PAST MEDICAL & SURGICAL HISTORY:  Aortic stenosis    AICD (automatic cardioverter/defibrillator) present    Aortic valve regurgitation    Ascending aorta dilation    H/O paroxysmal supraventricular tachycardia    HLD (hyperlipidemia)    Mitral valve regurgitation    S/P ablation of atrial fibrillation    Cardiac arrest    Severe mitral regurgitation    S/P aortic valve replacement  3/13/2004    S/P hernia repair  2002    History of tonsillectomy and adenoidectomy    S/P TURP  1996    S/P colonoscopy  2001, 2002, 2006, 2011, 2016    S/P endoscopy  2006    S/P cardiac cath  1994, 1995, 1999, 2002, 2004    AICD (automatic cardioverter/defibrillator) present  12/19/17    Cardiac pacemaker  12/19/17    History of cardioversion  9/11/20    S/P ablation of atrial fibrillation  10/29/20    Status post ablation of ventricular arrhythmia  2/14/19        MEDICATIONS  (STANDING):  atorvastatin 40 milliGRAM(s) Oral at bedtime  buMETAnide Infusion 1 mG/Hr (5 mL/Hr) IV Continuous <Continuous>  hydrALAZINE 35 milliGRAM(s) Oral three times a day  isosorbide   dinitrate Tablet (ISORDIL) 10 milliGRAM(s) Oral <User Schedule>  levothyroxine 50 MICROGram(s) Oral daily  metoprolol succinate ER 50 milliGRAM(s) Oral daily  milrinone Infusion 0.25 MICROgram(s)/kG/Min (5.55 mL/Hr) IV Continuous <Continuous>  pantoprazole    Tablet 40 milliGRAM(s) Oral before breakfast  polyethylene glycol 3350 17 Gram(s) Oral daily  potassium chloride    Tablet ER 40 milliEquivalent(s) Oral two times a day  rivaroxaban 15 milliGRAM(s) Oral daily  senna 2 Tablet(s) Oral at bedtime  sodium chloride 0.9% lock flush 3 milliLiter(s) IV Push every 8 hours    MEDICATIONS  (PRN):  acetaminophen     Tablet .. 650 milliGRAM(s) Oral every 6 hours PRN Temp greater or equal to 38C (100.4F), Mild Pain (1 - 3)  aluminum hydroxide/magnesium hydroxide/simethicone Suspension 30 milliLiter(s) Oral every 4 hours PRN Dyspepsia  melatonin 3 milliGRAM(s) Oral at bedtime PRN Insomnia  ondansetron Injectable 4 milliGRAM(s) IV Push every 8 hours PRN Nausea and/or Vomiting    T(F): 97.2 (08-24-23 @ 12:27), Max: 97.9 (08-23-23 @ 19:33)  HR: 93 (08-24-23 @ 12:27) (77 - 99)  BP: 94/63 (08-24-23 @ 12:27) (91/62 - 113/66)  BP(mean): --  ABP: --  ABP(mean): --  RR: 18 (08-24-23 @ 12:27) (18 - 18)  SpO2: 96% (08-24-23 @ 12:27) (93% - 98%)    I/O Detail 24H    23 Aug 2023 07:01  -  24 Aug 2023 07:00  --------------------------------------------------------  IN:    Bumetanide: 120 mL    Milrinone: 67.2 mL    Oral Fluid: 1220 mL  Total IN: 1407.2 mL    OUT:    Voided (mL): 3050 mL  Total OUT: 3050 mL    Total NET: -1642.8 mL      24 Aug 2023 07:01  -  24 Aug 2023 15:41  --------------------------------------------------------  IN:    Oral Fluid: 480 mL  Total IN: 480 mL    OUT:    Voided (mL): 750 mL  Total OUT: 750 mL    Total NET: -270 mL          PHYSICAL EXAM:  GEN: NAD  HEENT: EOMI, +JVD  RESP: CTA b/l  CV: RRR. Normal S1/S2. No m/r/g.  ABD: soft, non-distended  EXT: Warm, 2+ pitting edema  NEURO: alert and attentive    LABS:  CBC 08-24-23 @ 05:45                        11.7   6.71  )-----------( 135                   35.8       Hgb trend: 11.7 <-- , 12.2 <-- , 12.3 <--   WBC trend: 6.71 <-- , 7.26 <-- , 6.53 <--       CMP 08-24-23 @ 05:45    141  |  97  |  77<H>  ----------------------------<  109<H>  4.1   |  26  |  2.49<H>    Ca    10.3      08-24-23 @ 05:45        Serum Cr trend: 2.49 <-- , 2.56 <-- , 2.54 <--         Cardiac Markers           STUDIES:

## 2023-08-24 NOTE — PROGRESS NOTE ADULT - SUBJECTIVE AND OBJECTIVE BOX
Subjective:  "Hello"  OOB chair    Tele:      Afib/VPace 80s                          T(C): 36.6 (08-24-23 @ 04:42), Max: 36.6 (08-23-23 @ 19:33)  HR: 77 (08-24-23 @ 08:00) (77 - 99)  BP: 94/61 (08-24-23 @ 08:00) (91/62 - 101/69)  RR: 18 (08-24-23 @ 04:42) (18 - 18)  SpO2: 93% (08-24-23 @ 04:42) (93% - 98%)        08-24    141  |  97  |  77<H>  ----------------------------<  109<H>  4.1   |  26  |  2.49<H>    Ca    10.3      24 Aug 2023 05:45                                 11.7   6.71  )-----------( 135      ( 24 Aug 2023 05:45 )             35.8              Assessment  Neurology: alert and oriented x 3  CV : S1 S2 RRR  Lungs: clear. RR easy, unlabored   Abdomen: soft, nontender, nondistended, positive bowel sounds, +bowel movement   Neg N/V/D   :  pt voiding without difficulty   Extremities:   BRAVO; trace LE edema, neg calf tenderness.   PPP bilaterally; chronic venous stasis discoloration           MEDICATIONS  (STANDING):  atorvastatin 40 milliGRAM(s) Oral at bedtime  buMETAnide Infusion 1 mG/Hr (5 mL/Hr) IV Continuous <Continuous>  hydrALAZINE 35 milliGRAM(s) Oral three times a day  isosorbide   dinitrate Tablet (ISORDIL) 10 milliGRAM(s) Oral three times a day  levothyroxine 50 MICROGram(s) Oral daily  metoprolol succinate ER 50 milliGRAM(s) Oral daily  milrinone Infusion 0.125 MICROgram(s)/kG/Min (2.78 mL/Hr) IV Continuous <Continuous>  pantoprazole    Tablet 40 milliGRAM(s) Oral before breakfast  polyethylene glycol 3350 17 Gram(s) Oral daily  potassium chloride    Tablet ER 40 milliEquivalent(s) Oral two times a day  rivaroxaban 15 milliGRAM(s) Oral daily  senna 2 Tablet(s) Oral at bedtime  sodium chloride 0.9% lock flush 3 milliLiter(s) IV Push every 8 hours       PAST MEDICAL & SURGICAL HISTORY:  Aortic stenosis      AICD (automatic cardioverter/defibrillator) present      Aortic valve regurgitation      Ascending aorta dilation      H/O paroxysmal supraventricular tachycardia      HLD (hyperlipidemia)      Mitral valve regurgitation      Cardiac arrest      Severe mitral regurgitation      S/P aortic valve replacement  3/13/2004      S/P hernia repair  2002      History of tonsillectomy and adenoidectomy      S/P TURP  1996      S/P colonoscopy  2001, 2002, 2006, 2011, 2016      S/P endoscopy  2006      S/P cardiac cath  1994, 1995, 1999, 2002, 2004      AICD (automatic cardioverter/defibrillator) present  12/19/17      Cardiac pacemaker  12/19/17      History of cardioversion  9/11/20      S/P ablation of atrial fibrillation  10/29/20      Status post ablation of ventricular arrhythmia  2/14/19

## 2023-08-24 NOTE — PROGRESS NOTE ADULT - ATTENDING COMMENTS
5 Renal function continues to improve each day and he is down ~1.5 kg on current infusions.  Would increase milrinone to 0.25 mcg/kg/min now given improvement in SCr to see if this helps improve perfusion.  Consider Amiodarone for AFlutter, although currently rate controlled, but on inotropes.  JVP remains moderately elevated, but is much better than Monday. Persistent BLE tense edema.    Severe MR/TR. Chronic RV pacing. SURESH on CKD.  Hope to escalate HDZN and ISDN tomorrow. Please space dosing of ISDN further out to 6-7h apart.  Daily standing weights, please.  Check LFTs tomorrow.  Goal is euvolemia and then consider TTE/WARREN to assess valves.

## 2023-08-24 NOTE — PROGRESS NOTE ADULT - PROBLEM SELECTOR PLAN 1
decrease Toprol 50 daily per HF team     Bumex gtt 1mg/hr  milrinone 125 mcg/kg/min  continue diuresis; add isordil 10 tid and increase hydral 35 mg po tid  Activity as tolerated  Heart Failure following   Structural Team following  discharge planning- home when medically optimized and f/u at

## 2023-08-24 NOTE — PROGRESS NOTE ADULT - ASSESSMENT
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 Patient reports worsening SOB x 2 days, with associated symptoms of fatigue, deconditioning and inability to walk long distance.  Presents today as a direct admission for CHF exacerbation and requiring medical optimization.       1- SURESH on CKDIV  2- CHF  3- severe MR  4- hypokalemia   5- HTN       SURESH in setting of decompensated CHF  cr is still high. he also remains with high BUN but improving   on bumex 1 mg .hr   cont diuresis   keep K > 3.5 kcl supplement   hydralazine 25 mg TID  strict I/O  keep O>I  daily standing weight  2G sodium diet  trend creatinine and electrolytes closely

## 2023-08-24 NOTE — PROGRESS NOTE ADULT - PROBLEM SELECTOR PLAN 1
RHC 8/17 with elevated biventricular filling pressures, low CI, SVR ~1200s, and cpcPH.   - Etiology: ICM with possible nonischemic component  - GDMT: C/w Toprol 50mg, hydralazine 35mg TID. Re-time Isordil 10mg to 0500, 1300, and 2000 . No ACEi/ARB/MRA due to SURESH on CKD and in past, did not tolerate SGLT2-i due to back pain and SOB.   - Diuretics: C/w bumex 1mg/hr.  - Increase milrinone to 0.25 mcg/kg/min  - c/w KCL 40 meq PO BID  - Replete electrolytes to target K+>4.0 and Mg >2.0  - Daily standing weight/strict IO  - Fluid restriction 1500cc/day

## 2023-08-24 NOTE — PROGRESS NOTE ADULT - ASSESSMENT
83 yo Male with PMHx of  HTN, HLD, GERD, CHF, moderately reduced EF 35-40%, AFIB (on Xarelto) s/p ablation, CAD with h/o Atherectomy,  IWMI 1994, VT ARREST IN 12/2017, AICD, s/p AVR in 2004, s/p AORTIC ANEURYSM AND MV repair with Dr Fenton 6/2/21, and now severe MR.  Patient reports worsening SOB x 2 days, with associated symptoms of fatigue, deconditioning and inability to walk long distance.  Presents today as a direct admission for CHF exacerbation and requiring medical optimization.     Hospital Course:   8/16 Admit to 2 [facility] Telemetry Floor.  Heart Failure consult called.  CXR ordered.  D/C'd Torsemide and started on Bumex 4 mg IV BID. Per Dr. Valentino hold Xarelto for today for possible RHC in AM.  Awaiting HF reccs.     8/17 VSS - bun 92 this am from 102 - as per Dr. Fenton - renal called - pt seen by renal & HF team - will continue Bumex 4mg IV bid-   8/18 RHC today for diagnostic eval Continue aggressive diuresis renal following  Replete potassium.  8/19 VVS; Potassium 3.5 --> Supplemented. Started om KCL 40 meq PO BID.  Heart Failure and Renal following.  Pre albumin ordered: 20 Continue with current medication regimen.  Sorbitol 30 mg PO x 1.   8/20 VSS + BM  seen by HF now for BUMEX GTT 1mg/hr  and milrinone for inotropic support 125mcg/kg/min    8/21 VSS: continue primacor gttp .125 and bumex gttp 1 mg/ hr; CHF following; resume xarelto for hx afib   8/22 VSS; continue primacor gttp and bumex gttp as per CHF team; AC with xarelto   8/23 VSS: afib v.paced; continue diuresis; add isordil 10 tid and increase hydral 35 mg po tid; 1500 cc F. R   8/24 VSS  Aggresive diuresis Heart failure following  discharge planning- home when medically optimized

## 2023-08-24 NOTE — PROGRESS NOTE ADULT - SUBJECTIVE AND OBJECTIVE BOX
[facility]   [phone]  RENAL FOLLOW UP NOTE  --------------------------------------------------------------------------------  Chief Complaint:    24 hour events/subjective:    seen earlier   states is urinating well   denies worsening sob     PAST HISTORY  --------------------------------------------------------------------------------  No significant changes to PMH, PSH, FHx, SHx, unless otherwise noted    ALLERGIES & MEDICATIONS  --------------------------------------------------------------------------------  Allergies    No Known Allergies    Intolerances      Standing Inpatient Medications  atorvastatin 40 milliGRAM(s) Oral at bedtime  buMETAnide Infusion 1 mG/Hr IV Continuous <Continuous>  hydrALAZINE 35 milliGRAM(s) Oral three times a day  isosorbide   dinitrate Tablet (ISORDIL) 10 milliGRAM(s) Oral <User Schedule>  levothyroxine 50 MICROGram(s) Oral daily  metoprolol succinate ER 50 milliGRAM(s) Oral daily  milrinone Infusion 0.25 MICROgram(s)/kG/Min IV Continuous <Continuous>  pantoprazole    Tablet 40 milliGRAM(s) Oral before breakfast  polyethylene glycol 3350 17 Gram(s) Oral daily  potassium chloride    Tablet ER 40 milliEquivalent(s) Oral two times a day  rivaroxaban 15 milliGRAM(s) Oral daily  senna 2 Tablet(s) Oral at bedtime  sodium chloride 0.9% lock flush 3 milliLiter(s) IV Push every 8 hours    PRN Inpatient Medications  acetaminophen     Tablet .. 650 milliGRAM(s) Oral every 6 hours PRN  aluminum hydroxide/magnesium hydroxide/simethicone Suspension 30 milliLiter(s) Oral every 4 hours PRN  melatonin 3 milliGRAM(s) Oral at bedtime PRN  ondansetron Injectable 4 milliGRAM(s) IV Push every 8 hours PRN      REVIEW OF SYSTEMS  --------------------------------------------------------------------------------    Gen: denies  fevers/chills,  CVS: denies chest pain/palpitations  Resp: denies SOB/Cough  GI: Denies N/V/Abd pain  : Denies dysuria     VITALS/PHYSICAL EXAM  --------------------------------------------------------------------------------  T(C): 36.8 (08-24-23 @ 19:46), Max: 36.8 (08-24-23 @ 19:46)  HR: 83 (08-24-23 @ 19:46) (77 - 99)  BP: 90/49 (08-24-23 @ 19:46) (90/49 - 113/66)  RR: 18 (08-24-23 @ 19:46) (18 - 18)  SpO2: 95% (08-24-23 @ 19:46) (93% - 96%)  Wt(kg): --        08-23-23 @ 07:01  -  08-24-23 @ 07:00  --------------------------------------------------------  IN: 1407.2 mL / OUT: 3050 mL / NET: -1642.8 mL    08-24-23 @ 07:01  -  08-24-23 @ 21:28  --------------------------------------------------------  IN: 480 mL / OUT: 1350 mL / NET: -870 mL      Physical Exam:  	    Gen: Non toxic comfortable appearing, on RA    	Pulm: decrease bs  no rales or ronchi or wheezing  	CV: +JVD. RRR, S1S2; no rub II/VI M   	Abd: +BS, soft, nontender/nondistended  	UE: Warm, no cyanosis  no clubbing,  no edema; no asterixis  	LE: Warm, no cyanosis  no clubbing, 1-2    edema  	Neuro: alert and oriented. speech coherent       LABS/STUDIES  --------------------------------------------------------------------------------              11.7   6.71  >-----------<  135      [08-24-23 @ 05:45]              35.8     141  |  97  |  77  ----------------------------<  109      [08-24-23 @ 05:45]  4.1   |  26  |  2.49        Ca     10.3     [08-24-23 @ 05:45]            Creatinine Trend:  SCr 2.49 [08-24 @ 05:45]  SCr 2.56 [08-23 @ 05:52]  SCr 2.54 [08-22 @ 06:28]  SCr 2.95 [08-21 @ 05:33]  SCr 3.01 [08-20 @ 18:33]              Urinalysis - [08-24-23 @ 05:45]      Color  / Appearance  / SG  / pH       Gluc 109 / Ketone   / Bili  / Urobili        Blood  / Protein  / Leuk Est  / Nitrite       RBC  / WBC  / Hyaline  / Gran  / Sq Epi  / Non Sq Epi  / Bacteria     Urinalysis - [08-24-23 @ 05:45]      Color  / Appearance  / SG  / pH       Gluc 109 / Ketone   / Bili  / Urobili        Blood  / Protein  / Leuk Est  / Nitrite       RBC  / WBC  / Hyaline  / Gran  / Sq Epi  / Non Sq Epi  / Bacteria       TSH 5.96      [08-16-23 @ 16:55]

## 2023-08-25 ENCOUNTER — APPOINTMENT (OUTPATIENT)
Dept: HEART FAILURE | Facility: CLINIC | Age: 83
End: 2023-08-25

## 2023-08-25 DIAGNOSIS — I48.92 UNSPECIFIED ATRIAL FLUTTER: ICD-10-CM

## 2023-08-25 LAB
ALBUMIN SERPL ELPH-MCNC: 4.4 G/DL — SIGNIFICANT CHANGE UP (ref 3.3–5)
ALP SERPL-CCNC: 215 U/L — HIGH (ref 40–120)
ALT FLD-CCNC: 34 U/L — SIGNIFICANT CHANGE UP (ref 10–45)
ANION GAP SERPL CALC-SCNC: 20 MMOL/L — HIGH (ref 5–17)
AST SERPL-CCNC: 43 U/L — HIGH (ref 10–40)
BILIRUB SERPL-MCNC: 1.9 MG/DL — HIGH (ref 0.2–1.2)
BUN SERPL-MCNC: 82 MG/DL — HIGH (ref 7–23)
CALCIUM SERPL-MCNC: 10.7 MG/DL — HIGH (ref 8.4–10.5)
CHLORIDE SERPL-SCNC: 97 MMOL/L — SIGNIFICANT CHANGE UP (ref 96–108)
CO2 SERPL-SCNC: 25 MMOL/L — SIGNIFICANT CHANGE UP (ref 22–31)
CREAT SERPL-MCNC: 2.62 MG/DL — HIGH (ref 0.5–1.3)
EGFR: 24 ML/MIN/1.73M2 — LOW
GLUCOSE SERPL-MCNC: 105 MG/DL — HIGH (ref 70–99)
HCT VFR BLD CALC: 37.5 % — LOW (ref 39–50)
HGB BLD-MCNC: 12.1 G/DL — LOW (ref 13–17)
MCHC RBC-ENTMCNC: 28.7 PG — SIGNIFICANT CHANGE UP (ref 27–34)
MCHC RBC-ENTMCNC: 32.3 GM/DL — SIGNIFICANT CHANGE UP (ref 32–36)
MCV RBC AUTO: 89.1 FL — SIGNIFICANT CHANGE UP (ref 80–100)
NRBC # BLD: 0 /100 WBCS — SIGNIFICANT CHANGE UP (ref 0–0)
PLATELET # BLD AUTO: 158 K/UL — SIGNIFICANT CHANGE UP (ref 150–400)
POTASSIUM SERPL-MCNC: 4.2 MMOL/L — SIGNIFICANT CHANGE UP (ref 3.5–5.3)
POTASSIUM SERPL-SCNC: 4.2 MMOL/L — SIGNIFICANT CHANGE UP (ref 3.5–5.3)
PROT SERPL-MCNC: 7.7 G/DL — SIGNIFICANT CHANGE UP (ref 6–8.3)
RBC # BLD: 4.21 M/UL — SIGNIFICANT CHANGE UP (ref 4.2–5.8)
RBC # FLD: 18.7 % — HIGH (ref 10.3–14.5)
SODIUM SERPL-SCNC: 142 MMOL/L — SIGNIFICANT CHANGE UP (ref 135–145)
WBC # BLD: 7.67 K/UL — SIGNIFICANT CHANGE UP (ref 3.8–10.5)
WBC # FLD AUTO: 7.67 K/UL — SIGNIFICANT CHANGE UP (ref 3.8–10.5)

## 2023-08-25 PROCEDURE — 99232 SBSQ HOSP IP/OBS MODERATE 35: CPT | Mod: FS

## 2023-08-25 PROCEDURE — 99233 SBSQ HOSP IP/OBS HIGH 50: CPT | Mod: GC

## 2023-08-25 RX ORDER — MILRINONE LACTATE 1 MG/ML
0.12 INJECTION, SOLUTION INTRAVENOUS
Qty: 20 | Refills: 0 | Status: DISCONTINUED | OUTPATIENT
Start: 2023-08-25 | End: 2023-08-27

## 2023-08-25 RX ORDER — BUMETANIDE 0.25 MG/ML
0.5 INJECTION INTRAMUSCULAR; INTRAVENOUS
Refills: 0 | Status: DISCONTINUED | OUTPATIENT
Start: 2023-08-25 | End: 2023-08-27

## 2023-08-25 RX ORDER — AMIODARONE HYDROCHLORIDE 400 MG/1
200 TABLET ORAL EVERY 8 HOURS
Refills: 0 | Status: DISCONTINUED | OUTPATIENT
Start: 2023-08-25 | End: 2023-08-25

## 2023-08-25 RX ADMIN — ATORVASTATIN CALCIUM 40 MILLIGRAM(S): 80 TABLET, FILM COATED ORAL at 22:09

## 2023-08-25 RX ADMIN — Medication 3 MILLIGRAM(S): at 22:11

## 2023-08-25 RX ADMIN — MILRINONE LACTATE 4.83 MICROGRAM(S)/KG/MIN: 1 INJECTION, SOLUTION INTRAVENOUS at 13:35

## 2023-08-25 RX ADMIN — RIVAROXABAN 15 MILLIGRAM(S): KIT at 12:08

## 2023-08-25 RX ADMIN — PANTOPRAZOLE SODIUM 40 MILLIGRAM(S): 20 TABLET, DELAYED RELEASE ORAL at 06:22

## 2023-08-25 RX ADMIN — SENNA PLUS 2 TABLET(S): 8.6 TABLET ORAL at 22:09

## 2023-08-25 RX ADMIN — Medication 35 MILLIGRAM(S): at 13:35

## 2023-08-25 RX ADMIN — Medication 35 MILLIGRAM(S): at 06:26

## 2023-08-25 RX ADMIN — SODIUM CHLORIDE 3 MILLILITER(S): 9 INJECTION INTRAMUSCULAR; INTRAVENOUS; SUBCUTANEOUS at 13:32

## 2023-08-25 RX ADMIN — Medication 50 MICROGRAM(S): at 06:22

## 2023-08-25 RX ADMIN — Medication 35 MILLIGRAM(S): at 22:09

## 2023-08-25 RX ADMIN — Medication 40 MILLIEQUIVALENT(S): at 06:22

## 2023-08-25 RX ADMIN — BUMETANIDE 0.5 MILLIGRAM(S): 0.25 INJECTION INTRAMUSCULAR; INTRAVENOUS at 13:31

## 2023-08-25 RX ADMIN — SODIUM CHLORIDE 3 MILLILITER(S): 9 INJECTION INTRAMUSCULAR; INTRAVENOUS; SUBCUTANEOUS at 06:26

## 2023-08-25 RX ADMIN — ISOSORBIDE DINITRATE 10 MILLIGRAM(S): 5 TABLET ORAL at 19:17

## 2023-08-25 RX ADMIN — ISOSORBIDE DINITRATE 10 MILLIGRAM(S): 5 TABLET ORAL at 12:09

## 2023-08-25 RX ADMIN — Medication 50 MILLIGRAM(S): at 06:21

## 2023-08-25 NOTE — PROGRESS NOTE ADULT - ASSESSMENT
Mr. Sher is an 81 y/o M w/ h/o CAD c/b IWMI (1994) s/p angioplasty, aortic stenosis s/p bioAVR 2004), VT arrest (2017) s/p ICD, afib s/p multiple DCCV and ablation x2 (2020 and 2023; on AC), prior Ao aneurysm s/p [place] (2021), severe MR with MAC, atrophic kidney with CKD (b/l Cr 1.9; GFR 34), HFrEF (35-40%; LVEDD 6.7 cm) who is being considered for APOLLO (TMVR) trial but needs further optimization as has pulmonary HTN and concern was severe RV dysfunction and pulmonary HTN, who presents in ADHF and SURESH.    Diuresing well to intermittent IV Bumex though remains volume expanded. Recent invasive hemodynamics in May with marginal CI. RHC on 8/18/23 below, with elevated filling pressures and low CI. Started on low-dose milrinone and bumex gtt on 8/20.    Cardiac Studies  8/18/23 RHC: RA 19, RV 65/4, PA 65/33/47, PCWP 25, sat 52.9%, CO/CI 3.6/1.9, BP 90/61/72 SVR 1178 dsc, PVR 5  TTE 8/17/23: Severe LVH. LVEF 36%. G2DD. RV mildly enlarged with reduced function. Biatrial dilation. BioAVR (MG 12) w/mild intravalvular regurgitation. Severe MR at BP 91/60. Mod-severe TR.   4/10/23 - TTE - EF 38%, LVEDD 6.7 cm, severe MR with systolic reversal in pulmonic vein, severe TR, RVSP 49; IVC dilated  3/27/23 ([facility]) - EF 35-40%, paradoxical septal motion, RV enlargement/dysfunction, bioAVR (mean gradient 10) with mild AI, marked MAC with posterior leaflet immobility, mild prolapse of anterior leaflet with torn chords, severe posterior directed MR w/ systolic reversal in PV, moderate TR; no intracardiac clot    5/24/23 - pLAD 40%; mRCA 40%; RA 16, RV 76/19, PA 77/34/50; PCWP 25 (v 35); /67/84; CO/CI 3.5/1.86; SVR 1560; PVR 7.1 (driven by low CI)

## 2023-08-25 NOTE — PROGRESS NOTE ADULT - PROBLEM SELECTOR PLAN 3
Noted on tele  Rate controlled on beta blockers  -Discussed starting amio with patient but he has had adverse effects with this drug in the past. Will monitor for now.

## 2023-08-25 NOTE — PROGRESS NOTE ADULT - SUBJECTIVE AND OBJECTIVE BOX
Subjective:  "Hello"  Ambulating in larry    Tele:  AFib 60s/Paced 70s                              T(C): 36.8 (08-25-23 @ 11:45), Max: 36.8 (08-24-23 @ 19:46)  HR: 87 (08-25-23 @ 11:45) (80 - 87)  BP: 102/66 (08-25-23 @ 11:45) (90/49 - 102/66)  RR: 18 (08-25-23 @ 11:45) (18 - 18)  SpO2: 95% (08-25-23 @ 11:45) (95% - 96%)        08-25    142  |  97  |  82<H>  ----------------------------<  105<H>  4.2   |  25  |  2.62<H>    Ca    10.7<H>      25 Aug 2023 06:56    TPro  7.7  /  Alb  4.4  /  TBili  1.9<H>  /  DBili  x   /  AST  43<H>  /  ALT  34  /  AlkPhos  215<H>  08-25                               12.1   7.67  )-----------( 158      ( 25 Aug 2023 06:55 )             37.5             Assessment    Neurology: alert and oriented x 3  CV : S1 S2 RRR  Lungs: clear. RR easy, unlabored   Abdomen: soft, nontender, nondistended, positive bowel sounds, +bowel movement   Neg N/V/D   :  pt voiding without difficulty   Extremities:   BRAVO; trace LE edema, neg calf tenderness.   PPP bilaterally; chronic venous stasis discoloration           MEDICATIONS  (STANDING):  atorvastatin 40 milliGRAM(s) Oral at bedtime  buMETAnide 0.5 milliGRAM(s) Oral two times a day  hydrALAZINE 35 milliGRAM(s) Oral three times a day  isosorbide   dinitrate Tablet (ISORDIL) 10 milliGRAM(s) Oral <User Schedule>  levothyroxine 50 MICROGram(s) Oral daily  metoprolol succinate ER 50 milliGRAM(s) Oral daily  milrinone Infusion 0.25 MICROgram(s)/kG/Min (4.83 mL/Hr) IV Continuous <Continuous>  pantoprazole    Tablet 40 milliGRAM(s) Oral before breakfast  polyethylene glycol 3350 17 Gram(s) Oral daily  rivaroxaban 15 milliGRAM(s) Oral daily  senna 2 Tablet(s) Oral at bedtime  sodium chloride 0.9% lock flush 3 milliLiter(s) IV Push every 8 hours       PAST MEDICAL & SURGICAL HISTORY:  Aortic stenosis      AICD (automatic cardioverter/defibrillator) present      Aortic valve regurgitation      Ascending aorta dilation      H/O paroxysmal supraventricular tachycardia      HLD (hyperlipidemia)      Mitral valve regurgitation      Cardiac arrest      Severe mitral regurgitation      S/P aortic valve replacement  3/13/2004      S/P hernia repair  2002      History of tonsillectomy and adenoidectomy      S/P TURP  1996      S/P colonoscopy  2001, 2002, 2006, 2011, 2016      S/P endoscopy  2006      S/P cardiac cath  1994, 1995, 1999, 2002, 2004      AICD (automatic cardioverter/defibrillator) present  12/19/17      Cardiac pacemaker  12/19/17      History of cardioversion  9/11/20      S/P ablation of atrial fibrillation  10/29/20      Status post ablation of ventricular arrhythmia  2/14/19

## 2023-08-25 NOTE — PROGRESS NOTE ADULT - ASSESSMENT
2 year old Male with PMHx of HTN, HLD, GERD, CHF, moderately reduced EF 35-40%, AFIB (on Xarelto) s/p ablation, CAD with h/o Atherectomy, IWMI 1994, VT ARREST IN 12/2017, AICD, s/p AVR in 2004, s/p AORTIC ANEURYSM AND MV repair with Dr Fenton 6/2/21, and now severe MR. Patient reports worsening SOB x 2 days, with associated symptoms of fatigue, deconditioning and inability to walk long distance.  Presents today as a direct admission for CHF exacerbation and requiring medical optimization.       1- SURESH on CKDIV  2- CHF  3- severe MR  4- hypokalemia   5- HTN       SURESH in setting of decompensated CHF  cr is still high. he also remains with higher bun   on bumex 1 mg .hr  change to po bumex  given fluid status has improved   cont diuresis   keep K > 3.5 kcl supplement   hydralazine 35 mg TID  strict I/O  keep O>I  daily standing weight  2G sodium diet  trend creatinine and electrolytes closely

## 2023-08-25 NOTE — PROGRESS NOTE ADULT - ASSESSMENT
83 yo Male with PMHx of  HTN, HLD, GERD, CHF, moderately reduced EF 35-40%, AFIB (on Xarelto) s/p ablation, CAD with h/o Atherectomy,  IWMI 1994, VT ARREST IN 12/2017, AICD, s/p AVR in 2004, s/p AORTIC ANEURYSM AND MV repair with Dr Fenton 6/2/21, and now severe MR.  Patient reports worsening SOB x 2 days, with associated symptoms of fatigue, deconditioning and inability to walk long distance.  Presents today as a direct admission for CHF exacerbation and requiring medical optimization.     Hospital Course:   8/16 Admit to 2 [facility] Telemetry Floor.  Heart Failure consult called.  CXR ordered.  D/C'd Torsemide and started on Bumex 4 mg IV BID. Per Dr. Valentino hold Xarelto for today for possible RHC in AM.  Awaiting HF reccs.     8/17 VSS - bun 92 this am from 102 - as per Dr. Fenton - renal called - pt seen by renal & HF team - will continue Bumex 4mg IV bid-   8/18 RHC today for diagnostic eval Continue aggressive diuresis renal following  Replete potassium.  8/19 VVS; Potassium 3.5 --> Supplemented. Started om KCL 40 meq PO BID.  Heart Failure and Renal following.  Pre albumin ordered: 20 Continue with current medication regimen.  Sorbitol 30 mg PO x 1.   8/20 VSS + BM  seen by HF now for BUMEX GTT 1mg/hr  and milrinone for inotropic support 125mcg/kg/min    8/21 VSS: continue primacor gttp .125 and bumex gttp 1 mg/ hr; CHF following; resume xarelto for hx afib   8/22 VSS; continue primacor gttp and bumex gttp as per CHF team; AC with xarelto   8/23 VSS: afib v.paced; continue diuresis; add isordil 10 tid and increase hydral 35 mg po tid; 1500 cc F. R   8/24 VSS  Aggressive diuresis Heart failure following  8/25 VSS change to oral Bumex  Heart failure following>milrinone wean start tomorrow  discharge planning- home when medically optimized

## 2023-08-25 NOTE — PROGRESS NOTE ADULT - SUBJECTIVE AND OBJECTIVE BOX
INTERVAL EVENTS:  -No CP, SOB, LUA, dizziness    PAST MEDICAL & SURGICAL HISTORY:  Aortic stenosis    AICD (automatic cardioverter/defibrillator) present    Aortic valve regurgitation    Ascending aorta dilation    H/O paroxysmal supraventricular tachycardia    HLD (hyperlipidemia)    Mitral valve regurgitation    S/P ablation of atrial fibrillation    Cardiac arrest    Severe mitral regurgitation    S/P aortic valve replacement  3/13/2004    S/P hernia repair  2002    History of tonsillectomy and adenoidectomy    S/P TURP  1996    S/P colonoscopy  2001, 2002, 2006, 2011, 2016    S/P endoscopy  2006    S/P cardiac cath  1994, 1995, 1999, 2002, 2004    AICD (automatic cardioverter/defibrillator) present  12/19/17    Cardiac pacemaker  12/19/17    History of cardioversion  9/11/20    S/P ablation of atrial fibrillation  10/29/20    Status post ablation of ventricular arrhythmia  2/14/19        MEDICATIONS  (STANDING):  atorvastatin 40 milliGRAM(s) Oral at bedtime  buMETAnide 0.5 milliGRAM(s) Oral two times a day  hydrALAZINE 35 milliGRAM(s) Oral three times a day  isosorbide   dinitrate Tablet (ISORDIL) 10 milliGRAM(s) Oral <User Schedule>  levothyroxine 50 MICROGram(s) Oral daily  metoprolol succinate ER 50 milliGRAM(s) Oral daily  milrinone Infusion 0.25 MICROgram(s)/kG/Min (4.83 mL/Hr) IV Continuous <Continuous>  pantoprazole    Tablet 40 milliGRAM(s) Oral before breakfast  polyethylene glycol 3350 17 Gram(s) Oral daily  rivaroxaban 15 milliGRAM(s) Oral daily  senna 2 Tablet(s) Oral at bedtime  sodium chloride 0.9% lock flush 3 milliLiter(s) IV Push every 8 hours    MEDICATIONS  (PRN):  acetaminophen     Tablet .. 650 milliGRAM(s) Oral every 6 hours PRN Temp greater or equal to 38C (100.4F), Mild Pain (1 - 3)  aluminum hydroxide/magnesium hydroxide/simethicone Suspension 30 milliLiter(s) Oral every 4 hours PRN Dyspepsia  melatonin 3 milliGRAM(s) Oral at bedtime PRN Insomnia  ondansetron Injectable 4 milliGRAM(s) IV Push every 8 hours PRN Nausea and/or Vomiting    T(F): 98.2 (08-25-23 @ 11:45), Max: 98.3 (08-24-23 @ 19:46)  HR: 80 (08-25-23 @ 13:30) (80 - 87)  BP: 101/69 (08-25-23 @ 13:30) (90/49 - 102/66)  BP(mean): --  ABP: --  ABP(mean): --  RR: 18 (08-25-23 @ 11:45) (18 - 18)  SpO2: 95% (08-25-23 @ 11:45) (95% - 96%)    I/O Detail 24H    24 Aug 2023 07:01  -  25 Aug 2023 07:00  --------------------------------------------------------  IN:    Bumetanide: 60 mL    Milrinone: 71.5 mL    Oral Fluid: 1040 mL  Total IN: 1171.5 mL    OUT:    Voided (mL): 1350 mL  Total OUT: 1350 mL    Total NET: -178.5 mL      25 Aug 2023 07:01  -  25 Aug 2023 13:58  --------------------------------------------------------  IN:    Oral Fluid: 240 mL  Total IN: 240 mL    OUT:    Voided (mL): 500 mL  Total OUT: 500 mL    Total NET: -260 mL          PHYSICAL EXAM:  GEN: NAD  HEENT: EOMI   RESP: CTA b/l  CV: RRR. Normal S1/S2. No m/r/g.  ABD: soft, non-distended  EXT: Warm, trace edema  NEURO: alert and attentive    LABS:  CBC 08-25-23 @ 06:55                        12.1   7.67  )-----------( 158                   37.5       Hgb trend: 12.1 <-- , 11.7 <-- , 12.2 <--   WBC trend: 7.67 <-- , 6.71 <-- , 7.26 <--       CMP 08-25-23 @ 06:56    142  |  97  |  82<H>  ----------------------------<  105<H>  4.2   |  25  |  2.62<H>    Ca    10.7<H>      08-25-23 @ 06:56    TPro  7.7  /  Alb  4.4  /  TBili  1.9<H>  /  DBili  x   /  AST  43<H>  /  ALT  34  /  AlkPhos  215<H>  08-25      Serum Cr trend: 2.62 <-- , 2.49 <-- , 2.56 <--         Cardiac Markers           STUDIES:

## 2023-08-25 NOTE — PROGRESS NOTE ADULT - SUBJECTIVE AND OBJECTIVE BOX
[facility]   [phone]  RENAL FOLLOW UP NOTE  --------------------------------------------------------------------------------  Chief Complaint:    24 hour events/subjective:    seen earlier   states breathing is better   states edema is better     PAST HISTORY  --------------------------------------------------------------------------------  No significant changes to PMH, PSH, FHx, SHx, unless otherwise noted    ALLERGIES & MEDICATIONS  --------------------------------------------------------------------------------  Allergies    No Known Allergies    Intolerances      Standing Inpatient Medications  atorvastatin 40 milliGRAM(s) Oral at bedtime  buMETAnide 0.5 milliGRAM(s) Oral two times a day  hydrALAZINE 35 milliGRAM(s) Oral three times a day  isosorbide   dinitrate Tablet (ISORDIL) 10 milliGRAM(s) Oral <User Schedule>  levothyroxine 50 MICROGram(s) Oral daily  metoprolol succinate ER 50 milliGRAM(s) Oral daily  milrinone Infusion 0.25 MICROgram(s)/kG/Min IV Continuous <Continuous>  pantoprazole    Tablet 40 milliGRAM(s) Oral before breakfast  polyethylene glycol 3350 17 Gram(s) Oral daily  rivaroxaban 15 milliGRAM(s) Oral daily  senna 2 Tablet(s) Oral at bedtime  sodium chloride 0.9% lock flush 3 milliLiter(s) IV Push every 8 hours    PRN Inpatient Medications  acetaminophen     Tablet .. 650 milliGRAM(s) Oral every 6 hours PRN  aluminum hydroxide/magnesium hydroxide/simethicone Suspension 30 milliLiter(s) Oral every 4 hours PRN  melatonin 3 milliGRAM(s) Oral at bedtime PRN  ondansetron Injectable 4 milliGRAM(s) IV Push every 8 hours PRN      REVIEW OF SYSTEMS  --------------------------------------------------------------------------------    Gen: denies  fevers/chills,  CVS: denies chest pain/palpitations  Resp: denies SOB/Cough  GI: Denies N/V/Abd pain  : Denies dysuria    VITALS/PHYSICAL EXAM  --------------------------------------------------------------------------------  T(C): 36.7 (08-25-23 @ 19:30), Max: 36.8 (08-25-23 @ 04:36)  HR: 85 (08-25-23 @ 19:30) (80 - 87)  BP: 93/48 (08-25-23 @ 19:30) (93/48 - 102/66)  RR: 18 (08-25-23 @ 19:30) (18 - 18)  SpO2: 96% (08-25-23 @ 19:30) (95% - 96%)  Wt(kg): --    Weight (kg): 64.4 (08-25-23 @ 11:45)      08-24-23 @ 07:01  -  08-25-23 @ 07:00  --------------------------------------------------------  IN: 1171.5 mL / OUT: 1350 mL / NET: -178.5 mL    08-25-23 @ 07:01  -  08-25-23 @ 22:12  --------------------------------------------------------  IN: 325.9 mL / OUT: 1150 mL / NET: -824.1 mL      Physical Exam:  	    Gen: Non toxic comfortable appearing, on RA    	Pulm: decrease bs  no rales or ronchi or wheezing  	CV:  - JVD. RRR, S1S2; no rub II/VI M   	Abd: +BS, soft, nontender/nondistended  	UE: Warm, no cyanosis  no clubbing,  no edema; no asterixis  	LE: Warm, no cyanosis  no clubbing, 1 +    edema  	Neuro: alert and oriented. speech coherent       LABS/STUDIES  --------------------------------------------------------------------------------              12.1   7.67  >-----------<  158      [08-25-23 @ 06:55]              37.5     142  |  97  |  82  ----------------------------<  105      [08-25-23 @ 06:56]  4.2   |  25  |  2.62        Ca     10.7     [08-25-23 @ 06:56]    TPro  7.7  /  Alb  4.4  /  TBili  1.9  /  DBili  x   /  AST  43  /  ALT  34  /  AlkPhos  215  [08-25-23 @ 06:56]          Creatinine Trend:  SCr 2.62 [08-25 @ 06:56]  SCr 2.49 [08-24 @ 05:45]  SCr 2.56 [08-23 @ 05:52]  SCr 2.54 [08-22 @ 06:28]  SCr 2.95 [08-21 @ 05:33]              Urinalysis - [08-25-23 @ 06:56]      Color  / Appearance  / SG  / pH       Gluc 105 / Ketone   / Bili  / Urobili        Blood  / Protein  / Leuk Est  / Nitrite       RBC  / WBC  / Hyaline  / Gran  / Sq Epi  / Non Sq Epi  / Bacteria     Urinalysis - [08-25-23 @ 06:56]      Color  / Appearance  / SG  / pH       Gluc 105 / Ketone   / Bili  / Urobili        Blood  / Protein  / Leuk Est  / Nitrite       RBC  / WBC  / Hyaline  / Gran  / Sq Epi  / Non Sq Epi  / Bacteria       TSH 5.96      [08-16-23 @ 16:55]

## 2023-08-25 NOTE — PROGRESS NOTE ADULT - PROBLEM SELECTOR PLAN 1
decrease Toprol 50 daily per HF team     Bumex gtt changed to oral  milrinone .25 mcg/kg/min  continue diuresis; add isordil 10 tid and  hydral 35 mg po tid  Activity as tolerated  Heart Failure following   Structural Team following  discharge planning- home when medically optimized and f/u at

## 2023-08-25 NOTE — PROGRESS NOTE ADULT - PROBLEM SELECTOR PLAN 1
RHC 8/17 with elevated biventricular filling pressures, low CI, SVR ~1200s, and cpcPH.   - Etiology: ICM with possible nonischemic component  - GDMT: C/w Toprol 50mg, hydralazine 35mg TID, Isordil 10mg. No ACEi/ARB/MRA due to SURESH on CKD and in past, did not tolerate SGLT2-i due to back pain and SOB.   - Diuretics: Stop bumex gtt. Switch to bumex PO 0.5mg BID (7am, 3pm)  - C/w milrinone to 0.25 mcg/kg/min. Will consider weaning tomorrow.   - May not require standing KCl, would give PRN. Replete electrolytes to target K+>4.0 and Mg >2.0  - Daily standing weight/strict IO  - Fluid restriction 1500cc/day

## 2023-08-25 NOTE — PROGRESS NOTE ADULT - ATTENDING COMMENTS
JVP is now ~8-10 cm H2O and he looks overall much better. BLE has improved, as well. Down ~14# overall.  He is back in AFib/Flutter with V-pacing 80-90.  No apparent improvement with milrinone increase, but will watch now on oral diuretics with plan to wean milrinone over the weekend.    Stop bumex infusion now.  Start bumex 0.5 mg po BID (7AM, 3 PM) starting today at 3 PM.  Will stop KCl 40 mEq po BID now and look to add spironolactone tomorrow, if SCr is stable/improved.  Mild bump in LFTs of unclear etiology. Will monitor.  Patient declines oral amiodarone due to prior reaction.      Severe MR/TR. Chronic RV pacing. SURESH on CKD.  Continue current HDZN/ISDN. Will see if we can increase slightly tomorrow.  Daily standing weights, please.  Possible discharge Monday if on stable oral diuretics and off inotrope.

## 2023-08-26 LAB
ALBUMIN SERPL ELPH-MCNC: 4.4 G/DL — SIGNIFICANT CHANGE UP (ref 3.3–5)
ALP SERPL-CCNC: 200 U/L — HIGH (ref 40–120)
ALT FLD-CCNC: 36 U/L — SIGNIFICANT CHANGE UP (ref 10–45)
ANION GAP SERPL CALC-SCNC: 20 MMOL/L — HIGH (ref 5–17)
AST SERPL-CCNC: 43 U/L — HIGH (ref 10–40)
BILIRUB SERPL-MCNC: 1.8 MG/DL — HIGH (ref 0.2–1.2)
BUN SERPL-MCNC: 81 MG/DL — HIGH (ref 7–23)
CALCIUM SERPL-MCNC: 10.4 MG/DL — SIGNIFICANT CHANGE UP (ref 8.4–10.5)
CHLORIDE SERPL-SCNC: 96 MMOL/L — SIGNIFICANT CHANGE UP (ref 96–108)
CO2 SERPL-SCNC: 25 MMOL/L — SIGNIFICANT CHANGE UP (ref 22–31)
CREAT SERPL-MCNC: 2.55 MG/DL — HIGH (ref 0.5–1.3)
EGFR: 24 ML/MIN/1.73M2 — LOW
GLUCOSE SERPL-MCNC: 108 MG/DL — HIGH (ref 70–99)
HCT VFR BLD CALC: 36.5 % — LOW (ref 39–50)
HGB BLD-MCNC: 11.8 G/DL — LOW (ref 13–17)
MCHC RBC-ENTMCNC: 28.8 PG — SIGNIFICANT CHANGE UP (ref 27–34)
MCHC RBC-ENTMCNC: 32.3 GM/DL — SIGNIFICANT CHANGE UP (ref 32–36)
MCV RBC AUTO: 89 FL — SIGNIFICANT CHANGE UP (ref 80–100)
NRBC # BLD: 0 /100 WBCS — SIGNIFICANT CHANGE UP (ref 0–0)
PLATELET # BLD AUTO: 145 K/UL — LOW (ref 150–400)
POTASSIUM SERPL-MCNC: 3.8 MMOL/L — SIGNIFICANT CHANGE UP (ref 3.5–5.3)
POTASSIUM SERPL-SCNC: 3.8 MMOL/L — SIGNIFICANT CHANGE UP (ref 3.5–5.3)
PROT SERPL-MCNC: 7.4 G/DL — SIGNIFICANT CHANGE UP (ref 6–8.3)
RBC # BLD: 4.1 M/UL — LOW (ref 4.2–5.8)
RBC # FLD: 18.4 % — HIGH (ref 10.3–14.5)
SODIUM SERPL-SCNC: 141 MMOL/L — SIGNIFICANT CHANGE UP (ref 135–145)
WBC # BLD: 6.46 K/UL — SIGNIFICANT CHANGE UP (ref 3.8–10.5)
WBC # FLD AUTO: 6.46 K/UL — SIGNIFICANT CHANGE UP (ref 3.8–10.5)

## 2023-08-26 PROCEDURE — 99232 SBSQ HOSP IP/OBS MODERATE 35: CPT | Mod: FS

## 2023-08-26 PROCEDURE — 99233 SBSQ HOSP IP/OBS HIGH 50: CPT

## 2023-08-26 RX ORDER — ISOSORBIDE DINITRATE 5 MG/1
10 TABLET ORAL
Refills: 0 | Status: DISCONTINUED | OUTPATIENT
Start: 2023-08-26 | End: 2023-08-26

## 2023-08-26 RX ORDER — SPIRONOLACTONE 25 MG/1
12.5 TABLET, FILM COATED ORAL DAILY
Refills: 0 | Status: DISCONTINUED | OUTPATIENT
Start: 2023-08-26 | End: 2023-08-27

## 2023-08-26 RX ORDER — ISOSORBIDE DINITRATE 5 MG/1
10 TABLET ORAL
Refills: 0 | Status: DISCONTINUED | OUTPATIENT
Start: 2023-08-26 | End: 2023-08-30

## 2023-08-26 RX ORDER — HYDRALAZINE HCL 50 MG
35 TABLET ORAL
Refills: 0 | Status: DISCONTINUED | OUTPATIENT
Start: 2023-08-26 | End: 2023-08-27

## 2023-08-26 RX ORDER — METOPROLOL TARTRATE 50 MG
50 TABLET ORAL DAILY
Refills: 0 | Status: DISCONTINUED | OUTPATIENT
Start: 2023-08-26 | End: 2023-08-28

## 2023-08-26 RX ADMIN — SODIUM CHLORIDE 3 MILLILITER(S): 9 INJECTION INTRAMUSCULAR; INTRAVENOUS; SUBCUTANEOUS at 00:04

## 2023-08-26 RX ADMIN — Medication 3 MILLIGRAM(S): at 21:52

## 2023-08-26 RX ADMIN — SODIUM CHLORIDE 3 MILLILITER(S): 9 INJECTION INTRAMUSCULAR; INTRAVENOUS; SUBCUTANEOUS at 06:07

## 2023-08-26 RX ADMIN — BUMETANIDE 0.5 MILLIGRAM(S): 0.25 INJECTION INTRAMUSCULAR; INTRAVENOUS at 06:10

## 2023-08-26 RX ADMIN — SODIUM CHLORIDE 3 MILLILITER(S): 9 INJECTION INTRAMUSCULAR; INTRAVENOUS; SUBCUTANEOUS at 23:01

## 2023-08-26 RX ADMIN — RIVAROXABAN 15 MILLIGRAM(S): KIT at 12:55

## 2023-08-26 RX ADMIN — SPIRONOLACTONE 12.5 MILLIGRAM(S): 25 TABLET, FILM COATED ORAL at 12:54

## 2023-08-26 RX ADMIN — Medication 35 MILLIGRAM(S): at 23:00

## 2023-08-26 RX ADMIN — ISOSORBIDE DINITRATE 10 MILLIGRAM(S): 5 TABLET ORAL at 15:46

## 2023-08-26 RX ADMIN — Medication 50 MILLIGRAM(S): at 12:54

## 2023-08-26 RX ADMIN — BUMETANIDE 0.5 MILLIGRAM(S): 0.25 INJECTION INTRAMUSCULAR; INTRAVENOUS at 15:45

## 2023-08-26 RX ADMIN — PANTOPRAZOLE SODIUM 40 MILLIGRAM(S): 20 TABLET, DELAYED RELEASE ORAL at 06:10

## 2023-08-26 RX ADMIN — SODIUM CHLORIDE 3 MILLILITER(S): 9 INJECTION INTRAMUSCULAR; INTRAVENOUS; SUBCUTANEOUS at 15:31

## 2023-08-26 RX ADMIN — Medication 50 MICROGRAM(S): at 06:10

## 2023-08-26 RX ADMIN — POLYETHYLENE GLYCOL 3350 17 GRAM(S): 17 POWDER, FOR SOLUTION ORAL at 12:55

## 2023-08-26 RX ADMIN — SENNA PLUS 2 TABLET(S): 8.6 TABLET ORAL at 21:53

## 2023-08-26 RX ADMIN — ATORVASTATIN CALCIUM 40 MILLIGRAM(S): 80 TABLET, FILM COATED ORAL at 21:53

## 2023-08-26 RX ADMIN — MILRINONE LACTATE 2.42 MICROGRAM(S)/KG/MIN: 1 INJECTION, SOLUTION INTRAVENOUS at 12:55

## 2023-08-26 NOTE — PROGRESS NOTE ADULT - ASSESSMENT
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 Patient reports worsening SOB x 2 days, with associated symptoms of fatigue, deconditioning and inability to walk long distance.  Presents today as a direct admission for CHF exacerbation and requiring medical optimization.       1- SURESH on CKDIV  2- CHF  3- severe MR  4- hypokalemia   5- HTN       SURESH in setting of decompensated CHF  cr is still high. he also remains with higher bun  but stable at this levle   on bumex 1 mg .hr  changed to po bumex  given fluid status has improved   cont diuresis   keep K > 3.5 kcl supplement   hydralazine 35 mg TID  strict I/O  keep O>I  daily standing weight  2G sodium diet  trend creatinine and electrolytes closely

## 2023-08-26 NOTE — PROGRESS NOTE ADULT - SUBJECTIVE AND OBJECTIVE BOX
Subjective:  ISDN 10 mg held at 5 AM & HDZN 35 mg held at 6 AM - SBP < 90  Bumex infusion stopped yesterday    Medications:  acetaminophen     Tablet .. 650 milliGRAM(s) Oral every 6 hours PRN  aluminum hydroxide/magnesium hydroxide/simethicone Suspension 30 milliLiter(s) Oral every 4 hours PRN  atorvastatin 40 milliGRAM(s) Oral at bedtime  buMETAnide 0.5 milliGRAM(s) Oral two times a day  hydrALAZINE 35 milliGRAM(s) Oral three times a day  isosorbide   dinitrate Tablet (ISORDIL) 10 milliGRAM(s) Oral <User Schedule>  levothyroxine 50 MICROGram(s) Oral daily  melatonin 3 milliGRAM(s) Oral at bedtime PRN  metoprolol succinate ER 50 milliGRAM(s) Oral daily  milrinone Infusion 0.25 MICROgram(s)/kG/Min IV Continuous <Continuous>  ondansetron Injectable 4 milliGRAM(s) IV Push every 8 hours PRN  pantoprazole    Tablet 40 milliGRAM(s) Oral before breakfast  polyethylene glycol 3350 17 Gram(s) Oral daily  rivaroxaban 15 milliGRAM(s) Oral daily  senna 2 Tablet(s) Oral at bedtime  sodium chloride 0.9% lock flush 3 milliLiter(s) IV Push every 8 hours    Vitals:  T(C): 36.6 (23 @ 04:49), Max: 36.8 (23 @ 11:45)  HR: 88 (23 @ 04:49) (80 - 88)  BP: 84/51 (23 @ 04:49) (84/51 - 102/66)  RR: 18 (23 @ 04:49) (18 - 18)  SpO2: 96% (23 @ 04:49) (95% - 96%)    Daily     Daily Weight in k.5 (25 Aug 2023 08:30)  Weight (kg): 64.4 (23 @ 11:45)    I&O's Summary    25 Aug 2023 07:01  -  26 Aug 2023 07:00  --------------------------------------------------------  IN: 643.5 mL / OUT: 1875 mL / NET: -1231.5 mL        Physical Exam:  Appearance: No Acute Distress  HEENT: JVP ___ cm H2O, no HJR  Cardiovascular: RRR, Normal S1 S2, No murmurs/rubs/gallops  Respiratory: Clear to auscultation bilaterally  Gastrointestinal: soft, non-tender, non-distended	  Skin: no skin lesions  Neurologic: Non-focal  Extremities: No LE edema, warm and well perfused  Psychiatry: A & O x 3, Mood & affect appropriate      Labs:                        11.8   6.46  )-----------( 145      ( 26 Aug 2023 06:16 )             36.5         141  |  96  |  81<H>  ----------------------------<  108<H>  3.8   |  25  |  2.55<H>    Ca    10.4      26 Aug 2023 06:16    TPro  7.4  /  Alb  4.4  /  TBili  1.8<H>  /  DBili  x   /  AST  43<H>  /  ALT  36  /  AlkPhos  200<H>        TELEMETRY: AFib/Flutter     Subjective:  ISDN 10 mg held at 5 AM & HDZN 35 mg held at 6 AM - SBP < 90  Bumex infusion stopped yesterday    Medications:  acetaminophen     Tablet .. 650 milliGRAM(s) Oral every 6 hours PRN  aluminum hydroxide/magnesium hydroxide/simethicone Suspension 30 milliLiter(s) Oral every 4 hours PRN  atorvastatin 40 milliGRAM(s) Oral at bedtime  buMETAnide 0.5 milliGRAM(s) Oral two times a day  hydrALAZINE 35 milliGRAM(s) Oral three times a day  isosorbide   dinitrate Tablet (ISORDIL) 10 milliGRAM(s) Oral <User Schedule>  levothyroxine 50 MICROGram(s) Oral daily  melatonin 3 milliGRAM(s) Oral at bedtime PRN  metoprolol succinate ER 50 milliGRAM(s) Oral daily  milrinone Infusion 0.25 MICROgram(s)/kG/Min IV Continuous <Continuous>  ondansetron Injectable 4 milliGRAM(s) IV Push every 8 hours PRN  pantoprazole    Tablet 40 milliGRAM(s) Oral before breakfast  polyethylene glycol 3350 17 Gram(s) Oral daily  rivaroxaban 15 milliGRAM(s) Oral daily  senna 2 Tablet(s) Oral at bedtime  sodium chloride 0.9% lock flush 3 milliLiter(s) IV Push every 8 hours    Vitals:  T(C): 36.6 (23 @ 04:49), Max: 36.8 (23 @ 11:45)  HR: 88 (23 @ 04:49) (80 - 88)  BP: 84/51 (23 @ 04:49) (84/51 - 102/66)  RR: 18 (23 @ 04:49) (18 - 18)  SpO2: 96% (23 @ 04:49) (95% - 96%)    Daily     Daily Weight in k.5 (25 Aug 2023 08:30)  Weight (kg): 64.4 (23 @ 11:45)    I&O's Summary    25 Aug 2023 07:01  -  26 Aug 2023 07:00  --------------------------------------------------------  IN: 643.5 mL / OUT: 1875 mL / NET: -1231.5 mL        Physical Exam:  Appearance: No Acute Distress  HEENT: JVP 8 cm H2O, moderate v-wave  Cardiovascular: Irregularly irregular S1 S2, III/VI GEOFFREY across left precordium, no rubs or gallops  Respiratory: Clear to auscultation bilaterally  Gastrointestinal: soft, non-tender, non-distended	  Skin: scattered ecchymoses  Neurologic: Non-focal  Extremities: trace BLE edema, warm and well perfused  Psychiatry: A & O x 3, Mood & affect appropriate      Labs:                        11.8   6.46  )-----------( 145      ( 26 Aug 2023 06:16 )             36.5         141  |  96  |  81<H>  ----------------------------<  108<H>  3.8   |  25  |  2.55<H>    Ca    10.4      26 Aug 2023 06:16    TPro  7.4  /  Alb  4.4  /  TBili  1.8<H>  /  DBili  x   /  AST  43<H>  /  ALT  36  /  AlkPhos  200<H>        TELEMETRY: AFib/Flutter

## 2023-08-26 NOTE — PROGRESS NOTE ADULT - PROBLEM SELECTOR PLAN 2
- Not a candidate for KRISTIE per Dr. Valentino given severe MAC  - Being considered for Tendyne at [facility] with Dr. Pierre  - Will ask EP to review potential benefit of CRT upgrade given sig RV pacing burden to see if this could improve MR despite sig MAC.

## 2023-08-26 NOTE — PROGRESS NOTE ADULT - PROBLEM SELECTOR PLAN 3
- Rate controlled on beta blockers  - Patient describes bad reaction in past to Amio (he does not recall details) so it was not started

## 2023-08-26 NOTE — PROGRESS NOTE ADULT - ASSESSMENT
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  Patient reports worsening SOB x 2 days, with associated symptoms of fatigue, deconditioning and inability to walk long distance.  Presents today as a direct admission for CHF exacerbation and requiring medical optimization.     Hospital Course:   8/16 Admit to 2 [facility] Telemetry Floor.  Heart Failure consult called.  CXR ordered.  D/C'd Torsemide and started on Bumex 4 mg IV BID. Per Dr. Valentino hold Xarelto for today for possible RHC in AM.  Awaiting HF reccs.     8/17 VSS - bun 92 this am from 102 - as per Dr. Fenton - renal called - pt seen by renal & HF team - will continue Bumex 4mg IV bid-   8/18 RHC today for diagnostic eval Continue aggressive diuresis renal following  Replete potassium.  8/19 VVS; Potassium 3.5 --> Supplemented. Started om KCL 40 meq PO BID.  Heart Failure and Renal following.  Pre albumin ordered: 20 Continue with current medication regimen.  Sorbitol 30 mg PO x 1.   8/20 VSS + BM  seen by HF now for BUMEX GTT 1mg/hr  and milrinone for inotropic support 125mcg/kg/min    8/21 VSS: continue primacor gttp .125 and bumex gttp 1 mg/ hr; CHF following; resume xarelto for hx afib   8/22 VSS; continue primacor gttp and bumex gttp as per CHF team; AC with xarelto   8/23 VSS: afib v.paced; continue diuresis; add isordil 10 tid and increase hydral 35 mg po tid; 1500 cc F. R   8/24 VSS  Aggressive diuresis Heart failure following  8/25 VSS change to oral Bumex  Heart failure following>milrinone wean start tomorrow  8/26 VSS  discharge planning- home when medically optimized  81 yo Male with PMHx of  HTN, HLD, GERD, CHF, moderately reduced EF 35-40%, AFIB (on Xarelto) s/p ablation, CAD with h/o Atherectomy,  IWMI 1994, [place] ARREST IN 12/2017, AICD, s/p AVR in 2004, s/p AORTIC ANEURYSM AND MV repair with Dr Fenton 6/2/21, and now severe MR.  Patient reports worsening SOB x 2 days, with associated symptoms of fatigue, deconditioning and inability to walk long distance.  Presents today as a direct admission for CHF exacerbation and requiring medical optimization.     Hospital Course:   8/16 Admit to 2 [facility] Telemetry Floor.  Heart Failure consult called.  CXR ordered.  D/C'd Torsemide and started on Bumex 4 mg IV BID. Per Dr. Valentino hold Xarelto for today for possible RHC in AM.  Awaiting HF reccs.     8/17 VSS - bun 92 this am from 102 - as per Dr. Fenton - renal called - pt seen by renal & HF team - will continue Bumex 4mg IV bid-   8/18 RHC today for diagnostic eval Continue aggressive diuresis renal following  Replete potassium.  8/19 VVS; Potassium 3.5 --> Supplemented. Started om KCL 40 meq PO BID.  Heart Failure and Renal following.  Pre albumin ordered: 20 Continue with current medication regimen.  Sorbitol 30 mg PO x 1.   8/20 VSS + BM  seen by HF now for BUMEX GTT 1mg/hr  and milrinone for inotropic support 125mcg/kg/min    8/21 VSS: continue primacor gttp .125 and bumex gttp 1 mg/ hr; CHF following; resume xarelto for hx afib   8/22 VSS; continue primacor gttp and bumex gttp as per CHF team; AC with xarelto   8/23 VSS: afib v.paced; continue diuresis; add isordil 10 tid and increase hydral 35 mg po tid; 1500 cc F. R   8/24 VSS  Aggressive diuresis Heart failure following  8/25 VSS change to oral Bumex  Heart failure following>milrinone wean start tomorrow  8/26 VSS, continue wean milrinone drip as per HF, Neg -1230 diurese on PO Bumex.  discharge planning- home when medically optimized  81 yo Male with PMHx of  HTN, HLD, GERD, CHF, moderately reduced EF 35-40%, AFIB (on Xarelto) s/p ablation, CAD with h/o Atherectomy,  IWMI 1994, VT ARREST IN 12/2017, AICD, s/p AVR in 2004, s/p AORTIC ANEURYSM AND MV repair with Dr Fenton 6/2/21, and now severe MR.  Patient reports worsening SOB x 2 days, with associated symptoms of fatigue, deconditioning and inability to walk long distance.  Presents today as a direct admission for CHF exacerbation and requiring medical optimization.     Hospital Course:   8/16 Admit to 2 [facility] Telemetry Floor.  Heart Failure consult called.  CXR ordered.  D/C'd Torsemide and started on Bumex 4 mg IV BID. Per Dr. Valentino hold Xarelto for today for possible RHC in AM.  Awaiting HF reccs.     8/17 VSS - bun 92 this am from 102 - as per Dr. Fenton - renal called - pt seen by renal & HF team - will continue Bumex 4mg IV bid-   8/18 RHC today for diagnostic eval Continue aggressive diuresis renal following  Replete potassium.  8/19 VVS; Potassium 3.5 --> Supplemented. Started om KCL 40 meq PO BID.  Heart Failure and Renal following.  Pre albumin ordered: 20 Continue with current medication regimen.  Sorbitol 30 mg PO x 1.   8/20 VSS + BM  seen by HF now for BUMEX GTT 1mg/hr  and milrinone for inotropic support 125mcg/kg/min    8/21 VSS: continue primacor gttp .125 and bumex gttp 1 mg/ hr; CHF following; resume xarelto for hx afib   8/22 VSS; continue primacor gttp and bumex gttp as per CHF team; AC with xarelto   8/23 VSS: afib v.paced; continue diuresis; add isordil 10 tid and increase hydral 35 mg po tid; 1500 cc F. R   8/24 VSS  Aggressive diuresis Heart failure following  8/25 VSS change to oral Bumex  Heart failure following>milrinone wean start tomorrow  8/26 VSS, continue wean milrinone drip as per HF, Neg -1230 diurese on PO Bumex. Aldactone 12.5 daily added per HF. Plan for d/c home Mon.   discharge planning- home when medically optimized

## 2023-08-26 NOTE — PROGRESS NOTE ADULT - SUBJECTIVE AND OBJECTIVE BOX
Subjective: Pt states "Hello" denies any CP or SOB. No acute events overnight.     Telemetry:  Aflutter/VPaced 80 - 100  Vital Signs Last 24 Hrs  T(C): 36.6 (23 @ 04:49), Max: 36.8 (23 @ 11:45)  T(F): 97.9 (23 @ 04:49), Max: 98.2 (23 @ 11:45)  HR: 88 (23 @ 04:49) (80 - 88)  BP: 84/51 (23 @ 04:49) (84/51 - 102/66)  RR: 18 (23 @ 04:49) (18 - 18)  SpO2: 96% (23 @ 04:49) (95% - 96%)              @ 07:01  -   @ 07:00  --------------------------------------------------------  IN: 643.5 mL / OUT: 1875 mL / NET: -1231.5 mL      Daily Weight in k.5 (25 Aug 2023 08:30)                        11.8   6.46  )-----------( 145      ( 26 Aug 2023 06:16 )             36.5     141  |  96  |  81<H>  ----------------------------<  108<H>  3.8   |  25  |  2.55<H>            PHYSICAL EXAM  Neurology: A&Ox3, NAD  CV : RRR+S1S2  Lungs: Respirations non-labored, B/L BS CTA  Abdomen: Soft, NT/ND, +BSx4Q, +BM   (-)N/V/D  : Voiding without difficulty  Extremities: B/L LE edema, negative calf tenderness, +PP           MEDICATIONS  acetaminophen     Tablet .. 650 milliGRAM(s) Oral every 6 hours PRN  aluminum hydroxide/magnesium hydroxide/simethicone Suspension 30 milliLiter(s) Oral every 4 hours PRN  atorvastatin 40 milliGRAM(s) Oral at bedtime  buMETAnide 0.5 milliGRAM(s) Oral two times a day  hydrALAZINE 35 milliGRAM(s) Oral three times a day  isosorbide   dinitrate Tablet (ISORDIL) 10 milliGRAM(s) Oral <User Schedule>  levothyroxine 50 MICROGram(s) Oral daily  melatonin 3 milliGRAM(s) Oral at bedtime PRN  metoprolol succinate ER 50 milliGRAM(s) Oral daily  milrinone Infusion 0.25 MICROgram(s)/kG/Min IV Continuous <Continuous>  ondansetron Injectable 4 milliGRAM(s) IV Push every 8 hours PRN  pantoprazole    Tablet 40 milliGRAM(s) Oral before breakfast  polyethylene glycol 3350 17 Gram(s) Oral daily  rivaroxaban 15 milliGRAM(s) Oral daily  senna 2 Tablet(s) Oral at bedtime  sodium chloride 0.9% lock flush 3 milliLiter(s) IV Push every 8 hours        Discussed with Cardiothoracic Team at AM rounds. Subjective: Pt states "Hello" denies any CP or SOB. No acute events overnight.     Telemetry:  Aflutter/VPaced 80 - 100  Vital Signs Last 24 Hrs  T(C): 36.6 (23 @ 04:49), Max: 36.8 (23 @ 11:45)  T(F): 97.9 (23 @ 04:49), Max: 98.2 (23 @ 11:45)  HR: 88 (23 @ 04:49) (80 - 88)  BP: 84/51 (23 @ 04:49) (84/51 - 102/66)  RR: 18 (23 @ 04:49) (18 - 18)  SpO2: 96% (23 @ 04:49) (95% - 96%)              @ 07:01  -   @ 07:00  --------------------------------------------------------  IN: 643.5 mL / OUT: 1875 mL / NET: -1231.5 mL      Daily Weight in k.5 (25 Aug 2023 08:30)                        11.8   6.46  )-----------( 145      ( 26 Aug 2023 06:16 )             36.5     141  |  96  |  81<H>  ----------------------------<  108<H>  3.8   |  25  |  2.55<H>            PHYSICAL EXAM  Neurology: A&Ox3, NAD  CV : RRR+S1S2  Lungs: Respirations non-labored, B/L BS CTA  Abdomen: Soft, NT/ND, +BSx4Q, +BM (-)N/V/D  : Voiding without difficulty  Extremities: B/L LE trace edema, negative calf tenderness, +PP  +venous stasis changes      MEDICATIONS  acetaminophen     Tablet .. 650 milliGRAM(s) Oral every 6 hours PRN  aluminum hydroxide/magnesium hydroxide/simethicone Suspension 30 milliLiter(s) Oral every 4 hours PRN  atorvastatin 40 milliGRAM(s) Oral at bedtime  buMETAnide 0.5 milliGRAM(s) Oral two times a day  hydrALAZINE 35 milliGRAM(s) Oral three times a day  isosorbide   dinitrate Tablet (ISORDIL) 10 milliGRAM(s) Oral <User Schedule>  levothyroxine 50 MICROGram(s) Oral daily  melatonin 3 milliGRAM(s) Oral at bedtime PRN  metoprolol succinate ER 50 milliGRAM(s) Oral daily  milrinone Infusion 0.25 MICROgram(s)/kG/Min IV Continuous <Continuous>  ondansetron Injectable 4 milliGRAM(s) IV Push every 8 hours PRN  pantoprazole    Tablet 40 milliGRAM(s) Oral before breakfast  polyethylene glycol 3350 17 Gram(s) Oral daily  rivaroxaban 15 milliGRAM(s) Oral daily  senna 2 Tablet(s) Oral at bedtime  sodium chloride 0.9% lock flush 3 milliLiter(s) IV Push every 8 hours        Discussed with Cardiothoracic Team at AM rounds.

## 2023-08-26 NOTE — PROGRESS NOTE ADULT - PROBLEM SELECTOR PLAN 2
Heart Failure following  8/20 decrease Toprol 50 daily per HF team   8/20 milrinone 125 mcg/kg/min  8/20 Bumex gtt 1mg/hr   continue diuresis; add isordil 10 tid and  hydral 35 mg po tid Heart Failure following   Continue Toprol 50 daily per HF team     continue diuresis on PO Bumex 0.5 BID  Strict I & Os, daily standing weights  Fluid restriction 1500cc/day  Continue milrinone @ 0.125 mcg/kg/min - plan to wean off Sunday per HF  Continue isordil 10 tid and hydralazine 35 mg po tid Heart Failure following   Continue Toprol 50 daily per HF team     continue diuresis on PO Bumex 0.5 BID  Strict I & Os, daily standing weights  Fluid restriction 1500cc/day  Continue milrinone @ 0.125 mcg/kg/min - plan to wean off Sunday per HF  Continue isordil 10 tid and hydralazine 35 mg po tid  Continue aldactone 12.5 daily

## 2023-08-26 NOTE — PROGRESS NOTE ADULT - SUBJECTIVE AND OBJECTIVE BOX
[facility]   [phone]  RENAL FOLLOW UP NOTE  --------------------------------------------------------------------------------  Chief Complaint:    24 hour events/subjective:    seen earlier   states feels well and has no sob ambulating     PAST HISTORY  --------------------------------------------------------------------------------  No significant changes to PMH, PSH, FHx, SHx, unless otherwise noted    ALLERGIES & MEDICATIONS  --------------------------------------------------------------------------------  Allergies    No Known Allergies    Intolerances      Standing Inpatient Medications  atorvastatin 40 milliGRAM(s) Oral at bedtime  buMETAnide 0.5 milliGRAM(s) Oral two times a day  hydrALAZINE 35 milliGRAM(s) Oral <User Schedule>  isosorbide   dinitrate Tablet (ISORDIL) 10 milliGRAM(s) Oral <User Schedule>  levothyroxine 50 MICROGram(s) Oral daily  metoprolol succinate ER 50 milliGRAM(s) Oral daily  milrinone Infusion 0.125 MICROgram(s)/kG/Min IV Continuous <Continuous>  pantoprazole    Tablet 40 milliGRAM(s) Oral before breakfast  polyethylene glycol 3350 17 Gram(s) Oral daily  rivaroxaban 15 milliGRAM(s) Oral daily  senna 2 Tablet(s) Oral at bedtime  sodium chloride 0.9% lock flush 3 milliLiter(s) IV Push every 8 hours  spironolactone 12.5 milliGRAM(s) Oral daily    PRN Inpatient Medications  acetaminophen     Tablet .. 650 milliGRAM(s) Oral every 6 hours PRN  aluminum hydroxide/magnesium hydroxide/simethicone Suspension 30 milliLiter(s) Oral every 4 hours PRN  melatonin 3 milliGRAM(s) Oral at bedtime PRN  ondansetron Injectable 4 milliGRAM(s) IV Push every 8 hours PRN      REVIEW OF SYSTEMS  --------------------------------------------------------------------------------    Gen: denies  fevers/chills,  CVS: denies chest pain/palpitations  Resp: denies SOB/Cough  GI: Denies N/V/Abd pain  : Denies dysuria/oliguria/hematuria      VITALS/PHYSICAL EXAM  --------------------------------------------------------------------------------  T(C): 36.5 (08-26-23 @ 19:49), Max: 36.7 (08-26-23 @ 12:13)  HR: 81 (08-26-23 @ 19:49) (60 - 88)  BP: 88/51 (08-26-23 @ 19:49) (84/51 - 92/57)  RR: 18 (08-26-23 @ 19:49) (18 - 18)  SpO2: 95% (08-26-23 @ 19:49) (95% - 98%)  Wt(kg): --    Weight (kg): 64.4 (08-25-23 @ 11:45)      08-25-23 @ 07:01  -  08-26-23 @ 07:00  --------------------------------------------------------  IN: 643.5 mL / OUT: 1875 mL / NET: -1231.5 mL    08-26-23 @ 07:01  -  08-26-23 @ 21:01  --------------------------------------------------------  IN: 708.8 mL / OUT: 1100 mL / NET: -391.2 mL      Physical Exam:  	    Gen: Non toxic comfortable appearing, on RA    	Pulm: decrease bs  no rales or ronchi or wheezing  	CV:  - JVD. RRR, S1S2; no rub II/VI M   	Abd: +BS, soft, nontender/nondistended  	UE: Warm, no cyanosis  no clubbing,  no edema; no asterixis  	LE: Warm, no cyanosis  no clubbing, 1 +    edema  	Neuro: alert and oriented. speech coherent       LABS/STUDIES  --------------------------------------------------------------------------------              11.8   6.46  >-----------<  145      [08-26-23 @ 06:16]              36.5     141  |  96  |  81  ----------------------------<  108      [08-26-23 @ 06:16]  3.8   |  25  |  2.55        Ca     10.4     [08-26-23 @ 06:16]    TPro  7.4  /  Alb  4.4  /  TBili  1.8  /  DBili  x   /  AST  43  /  ALT  36  /  AlkPhos  200  [08-26-23 @ 06:16]          Creatinine Trend:  SCr 2.55 [08-26 @ 06:16]  SCr 2.62 [08-25 @ 06:56]  SCr 2.49 [08-24 @ 05:45]  SCr 2.56 [08-23 @ 05:52]  SCr 2.54 [08-22 @ 06:28]              Urinalysis - [08-26-23 @ 06:16]      Color  / Appearance  / SG  / pH       Gluc 108 / Ketone   / Bili  / Urobili        Blood  / Protein  / Leuk Est  / Nitrite       RBC  / WBC  / Hyaline  / Gran  / Sq Epi  / Non Sq Epi  / Bacteria     Urinalysis - [08-26-23 @ 06:16]      Color  / Appearance  / SG  / pH       Gluc 108 / Ketone   / Bili  / Urobili        Blood  / Protein  / Leuk Est  / Nitrite       RBC  / WBC  / Hyaline  / Gran  / Sq Epi  / Non Sq Epi  / Bacteria       TSH 5.96      [08-16-23 @ 16:55]

## 2023-08-26 NOTE — PROGRESS NOTE ADULT - PROBLEM SELECTOR PLAN 3
continue Toprol 50 mg PO daily  monitor and supplement electrolytes  continue AC xarelto - d/w Dr. Valentino continue Toprol 50 mg PO daily  Daily BMP -monitor and supplement electrolytes  continue AC on Xarelto 15 daily - d/w Dr. Valentino

## 2023-08-26 NOTE — PROGRESS NOTE ADULT - PROBLEM SELECTOR PLAN 1
- Etiology: ICM with valvular disease  - GDMT: Continue Toprol XL 50 mg daily - please remove BP hold as it will not lower his BP. Continue hydralazine 35 mg PO TID & Isordil 10 mg po TID - hold for SBP < 90. Please change timing to start after he has had breakfast as his SBP at 5 am and 6 am is much lower than when awake. No ACEi/ARB/MRA due to SURESH on CKD and intolerant of SGLT2-i due to "back pain and SOB"  - Diuretics: Continue bumex 0.5 mg PO BID (7am, 3pm)  - REDUCE milrinone to 0.125 mcg/kg/min today with goal to turn off tomorrow.   - ADD aldactone 12.5 mg once daily TODAY.  - Daily standing weight/strict IO  - Fluid restriction 1500cc/day

## 2023-08-26 NOTE — PROGRESS NOTE ADULT - PROBLEM SELECTOR PLAN 1
decrease Toprol 50 daily per HF team     Bumex gtt changed to oral  milrinone .25 mcg/kg/min  continue diuresis; add isordil 10 tid and  hydral 35 mg po tid  Activity as tolerated  Heart Failure following   Structural Team following  discharge planning- home when medically optimized and f/u at [facility]- Not a candidate for KRISTIE per Dr. Valentino given severe MAC  OOB to chair, ambulate  discharge planning- home when medically optimized [facility]- Not a candidate for KRISTIE per Dr. Valentino given severe MAC  OOB to chair, ambulate  discharge planning- home Monday when medically optimized

## 2023-08-27 LAB
ALBUMIN SERPL ELPH-MCNC: 4.1 G/DL — SIGNIFICANT CHANGE UP (ref 3.3–5)
ALP SERPL-CCNC: 191 U/L — HIGH (ref 40–120)
ALT FLD-CCNC: 43 U/L — SIGNIFICANT CHANGE UP (ref 10–45)
ANION GAP SERPL CALC-SCNC: 17 MMOL/L — SIGNIFICANT CHANGE UP (ref 5–17)
AST SERPL-CCNC: 52 U/L — HIGH (ref 10–40)
BILIRUB SERPL-MCNC: 1.6 MG/DL — HIGH (ref 0.2–1.2)
BUN SERPL-MCNC: 78 MG/DL — HIGH (ref 7–23)
CALCIUM SERPL-MCNC: 9.9 MG/DL — SIGNIFICANT CHANGE UP (ref 8.4–10.5)
CHLORIDE SERPL-SCNC: 93 MMOL/L — LOW (ref 96–108)
CO2 SERPL-SCNC: 25 MMOL/L — SIGNIFICANT CHANGE UP (ref 22–31)
CREAT SERPL-MCNC: 2.39 MG/DL — HIGH (ref 0.5–1.3)
EGFR: 26 ML/MIN/1.73M2 — LOW
GLUCOSE SERPL-MCNC: 85 MG/DL — SIGNIFICANT CHANGE UP (ref 70–99)
HCT VFR BLD CALC: 34.2 % — LOW (ref 39–50)
HGB BLD-MCNC: 11.3 G/DL — LOW (ref 13–17)
MCHC RBC-ENTMCNC: 29.3 PG — SIGNIFICANT CHANGE UP (ref 27–34)
MCHC RBC-ENTMCNC: 33 GM/DL — SIGNIFICANT CHANGE UP (ref 32–36)
MCV RBC AUTO: 88.6 FL — SIGNIFICANT CHANGE UP (ref 80–100)
NRBC # BLD: 0 /100 WBCS — SIGNIFICANT CHANGE UP (ref 0–0)
PLATELET # BLD AUTO: 129 K/UL — LOW (ref 150–400)
POTASSIUM SERPL-MCNC: 3.7 MMOL/L — SIGNIFICANT CHANGE UP (ref 3.5–5.3)
POTASSIUM SERPL-SCNC: 3.7 MMOL/L — SIGNIFICANT CHANGE UP (ref 3.5–5.3)
PROT SERPL-MCNC: 7.2 G/DL — SIGNIFICANT CHANGE UP (ref 6–8.3)
RBC # BLD: 3.86 M/UL — LOW (ref 4.2–5.8)
RBC # FLD: 18 % — HIGH (ref 10.3–14.5)
SODIUM SERPL-SCNC: 135 MMOL/L — SIGNIFICANT CHANGE UP (ref 135–145)
WBC # BLD: 5.73 K/UL — SIGNIFICANT CHANGE UP (ref 3.8–10.5)
WBC # FLD AUTO: 5.73 K/UL — SIGNIFICANT CHANGE UP (ref 3.8–10.5)

## 2023-08-27 PROCEDURE — 99232 SBSQ HOSP IP/OBS MODERATE 35: CPT | Mod: FS

## 2023-08-27 PROCEDURE — 99233 SBSQ HOSP IP/OBS HIGH 50: CPT

## 2023-08-27 RX ORDER — SPIRONOLACTONE 25 MG/1
25 TABLET, FILM COATED ORAL DAILY
Refills: 0 | Status: DISCONTINUED | OUTPATIENT
Start: 2023-08-28 | End: 2023-08-30

## 2023-08-27 RX ORDER — SPIRONOLACTONE 25 MG/1
12.5 TABLET, FILM COATED ORAL ONCE
Refills: 0 | Status: COMPLETED | OUTPATIENT
Start: 2023-08-27 | End: 2023-08-27

## 2023-08-27 RX ORDER — BUMETANIDE 0.25 MG/ML
1 INJECTION INTRAMUSCULAR; INTRAVENOUS
Refills: 0 | Status: DISCONTINUED | OUTPATIENT
Start: 2023-08-27 | End: 2023-08-28

## 2023-08-27 RX ORDER — BUMETANIDE 0.25 MG/ML
0.5 INJECTION INTRAMUSCULAR; INTRAVENOUS ONCE
Refills: 0 | Status: COMPLETED | OUTPATIENT
Start: 2023-08-27 | End: 2023-08-27

## 2023-08-27 RX ORDER — HYDRALAZINE HCL 50 MG
25 TABLET ORAL
Refills: 0 | Status: DISCONTINUED | OUTPATIENT
Start: 2023-08-27 | End: 2023-08-30

## 2023-08-27 RX ADMIN — PANTOPRAZOLE SODIUM 40 MILLIGRAM(S): 20 TABLET, DELAYED RELEASE ORAL at 05:35

## 2023-08-27 RX ADMIN — SPIRONOLACTONE 12.5 MILLIGRAM(S): 25 TABLET, FILM COATED ORAL at 11:39

## 2023-08-27 RX ADMIN — SODIUM CHLORIDE 3 MILLILITER(S): 9 INJECTION INTRAMUSCULAR; INTRAVENOUS; SUBCUTANEOUS at 14:49

## 2023-08-27 RX ADMIN — MILRINONE LACTATE 2.42 MICROGRAM(S)/KG/MIN: 1 INJECTION, SOLUTION INTRAVENOUS at 07:00

## 2023-08-27 RX ADMIN — BUMETANIDE 1 MILLIGRAM(S): 0.25 INJECTION INTRAMUSCULAR; INTRAVENOUS at 17:05

## 2023-08-27 RX ADMIN — MILRINONE LACTATE 2.42 MICROGRAM(S)/KG/MIN: 1 INJECTION, SOLUTION INTRAVENOUS at 00:00

## 2023-08-27 RX ADMIN — SODIUM CHLORIDE 3 MILLILITER(S): 9 INJECTION INTRAMUSCULAR; INTRAVENOUS; SUBCUTANEOUS at 20:25

## 2023-08-27 RX ADMIN — Medication 50 MILLIGRAM(S): at 05:36

## 2023-08-27 RX ADMIN — ATORVASTATIN CALCIUM 40 MILLIGRAM(S): 80 TABLET, FILM COATED ORAL at 21:39

## 2023-08-27 RX ADMIN — Medication 3 MILLIGRAM(S): at 21:39

## 2023-08-27 RX ADMIN — SPIRONOLACTONE 12.5 MILLIGRAM(S): 25 TABLET, FILM COATED ORAL at 05:32

## 2023-08-27 RX ADMIN — BUMETANIDE 0.5 MILLIGRAM(S): 0.25 INJECTION INTRAMUSCULAR; INTRAVENOUS at 05:36

## 2023-08-27 RX ADMIN — Medication 50 MICROGRAM(S): at 05:36

## 2023-08-27 RX ADMIN — Medication 25 MILLIGRAM(S): at 14:12

## 2023-08-27 RX ADMIN — RIVAROXABAN 15 MILLIGRAM(S): KIT at 11:38

## 2023-08-27 RX ADMIN — POLYETHYLENE GLYCOL 3350 17 GRAM(S): 17 POWDER, FOR SOLUTION ORAL at 11:38

## 2023-08-27 RX ADMIN — SODIUM CHLORIDE 3 MILLILITER(S): 9 INJECTION INTRAMUSCULAR; INTRAVENOUS; SUBCUTANEOUS at 07:25

## 2023-08-27 RX ADMIN — BUMETANIDE 0.5 MILLIGRAM(S): 0.25 INJECTION INTRAMUSCULAR; INTRAVENOUS at 14:06

## 2023-08-27 RX ADMIN — Medication 35 MILLIGRAM(S): at 07:33

## 2023-08-27 NOTE — PROGRESS NOTE ADULT - ASSESSMENT
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 Patient reports worsening SOB x 2 days, with associated symptoms of fatigue, deconditioning and inability to walk long distance.  Presents today as a direct admission for CHF exacerbation and requiring medical optimization.       1- SURESH on CKDIV  2- CHF  3- severe MR  4- hypokalemia   5- HTN       SURESH in setting of decompensated CHF  cr is still high. he also remains with higher bun  but stable at this levle   cont diuresis  fluid  status worsening again  increase bumex to 1 mg po bid and aldactone 25 mg daily   keep K > 3.5   hydralazine 35 mg TID  strict I/O  keep O>I  daily standing weight  2G sodium diet  trend creatinine and electrolytes closely

## 2023-08-27 NOTE — PROGRESS NOTE ADULT - PROBLEM SELECTOR PLAN 1
[facility]- Not a candidate for KRISTIE per Dr. Valentino given severe MAC  OOB to chair, ambulate  discharge planning- Home Monday when medically optimized

## 2023-08-27 NOTE — PROGRESS NOTE ADULT - ASSESSMENT
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  Patient reports worsening SOB x 2 days, with associated symptoms of fatigue, deconditioning and inability to walk long distance.  Presents today as a direct admission for CHF exacerbation and requiring medical optimization.     Hospital Course:   8/16 Admit to 2 [facility] Telemetry Floor.  Heart Failure consult called.  CXR ordered.  D/C'd Torsemide and started on Bumex 4 mg IV BID. Per Dr. Valentino hold Xarelto for today for possible RHC in AM.  Awaiting HF reccs.     8/17 VSS - bun 92 this am from 102 - as per Dr. Fenton - renal called - pt seen by renal & HF team - will continue Bumex 4mg IV bid-   8/18 RHC today for diagnostic eval Continue aggressive diuresis renal following  Replete potassium.  8/19 VVS; Potassium 3.5 --> Supplemented. Started om KCL 40 meq PO BID.  Heart Failure and Renal following.  Pre albumin ordered: 20 Continue with current medication regimen.  Sorbitol 30 mg PO x 1.   8/20 VSS + BM  seen by HF now for BUMEX GTT 1mg/hr  and milrinone for inotropic support 125mcg/kg/min    8/21 VSS: continue primacor gttp .125 and bumex gttp 1 mg/ hr; CHF following; resume xarelto for hx afib   8/22 VSS; continue primacor gttp and bumex gttp as per CHF team; AC with xarelto   8/23 VSS: afib v.paced; continue diuresis; add isordil 10 tid and increase hydral 35 mg po tid; 1500 cc F. R   8/24 VSS  Aggressive diuresis Heart failure following  8/25 VSS change to oral Bumex  Heart failure following>milrinone wean start tomorrow  8/26 VSS, continue wean milrinone drip as per HF, Neg -1230 diurese on PO Bumex. Aldactone 12.5 daily added per HF. Plan for d/c home Mon. 8/27 VSS, milrinone weaned off per HF, increased Bumex to 1mg BID for peripheral edema, Aldactone increased to 25 daily and hydralazine decreased to 25 TID. BUN/Cr 78/2.39.  discharge planning- home when medically optimized

## 2023-08-27 NOTE — PROGRESS NOTE ADULT - PROBLEM SELECTOR PLAN 2
Heart Failure following   Continue Toprol 50 daily per HF team     continue diuresis on PO Bumex 1mg BID  Strict I & Os, daily standing weights  Fluid restriction 1500cc/day  Weaned off Milrinone gtt 8/27  Continue isordil 10 tid and hydralazine 25 mg po tid  Continue aldactone 25 daily

## 2023-08-27 NOTE — PROGRESS NOTE ADULT - PROBLEM SELECTOR PLAN 2
- Not a candidate for KRISTIE per Dr. Valentino given severe MAC  - Being considered for Tendyne at [facility] with Dr. Pierre  - Will ask EP to review potential benefit of CRT upgrade given sig RV pacing burden to see if this could improve MR despite sig MAC.

## 2023-08-27 NOTE — PROGRESS NOTE ADULT - TIME BILLING
patient education, coordination of care, chart review
patient education, coordination of care, chart review
- Review of notes/records, telemetry, vital signs and daily labs.   - General and cardiovascular physical examination.  - Generation of cardiovascular treatment plan.  - Coordination of care with primary team.
patient education, coordination of care, chart review

## 2023-08-27 NOTE — PROGRESS NOTE ADULT - SUBJECTIVE AND OBJECTIVE BOX
Subjective: Pt states "Hello" denies any CP or SOB. No acute events overnight.     Telemetry:  VPaced 80   Vital Signs Last 24 Hrs  T(C): 36.7 (23 @ 15:43), Max: 36.8 (23 @ 08:00)  T(F): 98.1 (23 @ 15:43), Max: 98.2 (23 @ 08:00)  HR: 80 (23 @ 15:43) (73 - 81)  BP: 84/53 (23 @ 15:43) (84/53 - 96/63)  RR: 18 (23 @ 15:43) (18 - 18)  SpO2: 98% (23 @ 15:43) (94% - 98%)              @ 07:01  -   @ 07:00  --------------------------------------------------------  IN: 1017.6 mL / OUT: 1950 mL / NET: -932.4 mL      Daily Weight in k.5 (27 Aug 2023 07:51)                        11.3   5.73  )-----------( 129      ( 27 Aug 2023 06:20 )             34.2     135  |  93<L>  |  78<H>  ----------------------------<  85  3.7   |  25  |  2.39<H>    AST  52<H>  /  ALT  43  /  AlkPhos  191<H>            PHYSICAL EXAM  Neurology: A&Ox3, NAD  CV : RRR+S1S2  Lungs: Respirations non-labored, B/L BS CTA  Abdomen: Soft, NT/ND, +BSx4Q, +BM (-)N/V/D  : Voiding without difficulty  Extremities: B/L LE +1 edema, negative calf tenderness, +PP  +venous stasis changes          MEDICATIONS  acetaminophen     Tablet .. 650 milliGRAM(s) Oral every 6 hours PRN  aluminum hydroxide/magnesium hydroxide/simethicone Suspension 30 milliLiter(s) Oral every 4 hours PRN  atorvastatin 40 milliGRAM(s) Oral at bedtime  buMETAnide 1 milliGRAM(s) Oral <User Schedule>  hydrALAZINE 25 milliGRAM(s) Oral <User Schedule>  isosorbide   dinitrate Tablet (ISORDIL) 10 milliGRAM(s) Oral <User Schedule>  levothyroxine 50 MICROGram(s) Oral daily  melatonin 3 milliGRAM(s) Oral at bedtime PRN  metoprolol succinate ER 50 milliGRAM(s) Oral daily  ondansetron Injectable 4 milliGRAM(s) IV Push every 8 hours PRN  pantoprazole    Tablet 40 milliGRAM(s) Oral before breakfast  polyethylene glycol 3350 17 Gram(s) Oral daily  rivaroxaban 15 milliGRAM(s) Oral daily  senna 2 Tablet(s) Oral at bedtime  sodium chloride 0.9% lock flush 3 milliLiter(s) IV Push every 8 hours        Discussed with Cardiothoracic Team at AM rounds.

## 2023-08-27 NOTE — PROGRESS NOTE ADULT - PROBLEM SELECTOR PLAN 1
- Etiology: ICM with valvular disease  - Continue Toprol XL 50 mg daily  - Reduce HDZN back to 25 mg po TID to see if we have to hold fewer doses. Hold SBP < 90.  - Continue Isordil 10 mg po TID - hold for SBP < 90.   - No ACEi/ARB/MRA due to SURESH on CKD and intolerant of SGLT2-i due to "back pain and SOB"  - Diuretics: Continue bumex 0.5 mg PO BID (7am, 3pm)  - STOP milrinone 0.125 mcg/kg/min now.   - INCREASE aldactone to 25 mg daily. Please give additional 12.5 mg once NOW.  - Daily standing weight/strict IO  - Fluid restriction 1500cc/day - Etiology: ICM with valvular disease  - Continue Toprol XL 50 mg daily  - Reduce HDZN back to 25 mg po TID to see if we have to hold fewer doses. Hold SBP < 90.  - Continue Isordil 10 mg po TID - hold for SBP < 90.   - No ACEi/ARB/MRA due to SURESH on CKD and intolerant of SGLT2-i due to "back pain and SOB"  - Diuretics: Increase bumex to 1 mg PO BID (7am, 3pm); would give extra 0.5 mg po now.  - STOP milrinone 0.125 mcg/kg/min now.   - INCREASE aldactone to 25 mg daily. Please give additional 12.5 mg once NOW.  - Daily standing weight/strict IO  - Fluid restriction 1500cc/day

## 2023-08-27 NOTE — PROGRESS NOTE ADULT - SUBJECTIVE AND OBJECTIVE BOX
[facility]   [phone]  RENAL FOLLOW UP NOTE  --------------------------------------------------------------------------------  Chief Complaint:    24 hour events/subjective:    seen earlier     PAST HISTORY  --------------------------------------------------------------------------------  No significant changes to PMH, PSH, FHx, SHx, unless otherwise noted    ALLERGIES & MEDICATIONS  --------------------------------------------------------------------------------  Allergies    No Known Allergies    Intolerances      Standing Inpatient Medications  atorvastatin 40 milliGRAM(s) Oral at bedtime  buMETAnide 1 milliGRAM(s) Oral <User Schedule>  hydrALAZINE 25 milliGRAM(s) Oral <User Schedule>  isosorbide   dinitrate Tablet (ISORDIL) 10 milliGRAM(s) Oral <User Schedule>  levothyroxine 50 MICROGram(s) Oral daily  metoprolol succinate ER 50 milliGRAM(s) Oral daily  pantoprazole    Tablet 40 milliGRAM(s) Oral before breakfast  polyethylene glycol 3350 17 Gram(s) Oral daily  rivaroxaban 15 milliGRAM(s) Oral daily  senna 2 Tablet(s) Oral at bedtime  sodium chloride 0.9% lock flush 3 milliLiter(s) IV Push every 8 hours    PRN Inpatient Medications  acetaminophen     Tablet .. 650 milliGRAM(s) Oral every 6 hours PRN  aluminum hydroxide/magnesium hydroxide/simethicone Suspension 30 milliLiter(s) Oral every 4 hours PRN  melatonin 3 milliGRAM(s) Oral at bedtime PRN  ondansetron Injectable 4 milliGRAM(s) IV Push every 8 hours PRN      REVIEW OF SYSTEMS  --------------------------------------------------------------------------------    Gen: denies  fevers/chills,  CVS: denies chest pain/palpitations  Resp: denies SOB/Cough  GI: Denies N/V/Abd pain  : Denies dysuria\    VITALS/PHYSICAL EXAM  --------------------------------------------------------------------------------  T(C): 36.7 (08-27-23 @ 20:05), Max: 36.8 (08-27-23 @ 08:00)  HR: 99 (08-27-23 @ 20:05) (73 - 99)  BP: 88/56 (08-27-23 @ 20:05) (84/53 - 96/63)  RR: 18 (08-27-23 @ 20:05) (18 - 18)  SpO2: 96% (08-27-23 @ 20:05) (94% - 98%)  Wt(kg): --        08-26-23 @ 07:01  -  08-27-23 @ 07:00  --------------------------------------------------------  IN: 1017.6 mL / OUT: 1950 mL / NET: -932.4 mL    08-27-23 @ 07:01  -  08-27-23 @ 21:56  --------------------------------------------------------  IN: 629.6 mL / OUT: 950 mL / NET: -320.4 mL      Physical Exam:  	  	    Gen: Non toxic comfortable appearing, on RA    	Pulm: decrease bs  no rales or ronchi or wheezing  	CV:  - JVD. RRR, S1S2; no rub II/VI M   	Abd: +BS, soft, nontender/nondistended  	UE: Warm, no cyanosis  no clubbing,  no edema; no asterixis  	LE: Warm, no cyanosis  no clubbing, 1 +    edema  	Neuro: alert and oriented. speech coherent         LABS/STUDIES  --------------------------------------------------------------------------------              11.3   5.73  >-----------<  129      [08-27-23 @ 06:20]              34.2     135  |  93  |  78  ----------------------------<  85      [08-27-23 @ 06:19]  3.7   |  25  |  2.39        Ca     9.9     [08-27-23 @ 06:19]    TPro  7.2  /  Alb  4.1  /  TBili  1.6  /  DBili  x   /  AST  52  /  ALT  43  /  AlkPhos  191  [08-27-23 @ 06:19]          Creatinine Trend:  SCr 2.39 [08-27 @ 06:19]  SCr 2.55 [08-26 @ 06:16]  SCr 2.62 [08-25 @ 06:56]  SCr 2.49 [08-24 @ 05:45]  SCr 2.56 [08-23 @ 05:52]              Urinalysis - [08-27-23 @ 06:19]      Color  / Appearance  / SG  / pH       Gluc 85 / Ketone   / Bili  / Urobili        Blood  / Protein  / Leuk Est  / Nitrite       RBC  / WBC  / Hyaline  / Gran  / Sq Epi  / Non Sq Epi  / Bacteria     Urinalysis - [08-27-23 @ 06:19]      Color  / Appearance  / SG  / pH       Gluc 85 / Ketone   / Bili  / Urobili        Blood  / Protein  / Leuk Est  / Nitrite       RBC  / WBC  / Hyaline  / Gran  / Sq Epi  / Non Sq Epi  / Bacteria       TSH 5.96      [08-16-23 @ 16:55]

## 2023-08-27 NOTE — PROGRESS NOTE ADULT - SUBJECTIVE AND OBJECTIVE BOX
Subjective:  Milrinone reduced yesterday and renal function stable/improved and TB also coming down.  Weight stable from 2d ago on current dose of oral bumex    Medications:  acetaminophen     Tablet .. 650 milliGRAM(s) Oral every 6 hours PRN  aluminum hydroxide/magnesium hydroxide/simethicone Suspension 30 milliLiter(s) Oral every 4 hours PRN  atorvastatin 40 milliGRAM(s) Oral at bedtime  buMETAnide 0.5 milliGRAM(s) Oral two times a day  hydrALAZINE 35 milliGRAM(s) Oral <User Schedule>  isosorbide   dinitrate Tablet (ISORDIL) 10 milliGRAM(s) Oral <User Schedule>  levothyroxine 50 MICROGram(s) Oral daily  melatonin 3 milliGRAM(s) Oral at bedtime PRN  metoprolol succinate ER 50 milliGRAM(s) Oral daily  milrinone Infusion 0.125 MICROgram(s)/kG/Min IV Continuous <Continuous>  ondansetron Injectable 4 milliGRAM(s) IV Push every 8 hours PRN  pantoprazole    Tablet 40 milliGRAM(s) Oral before breakfast  polyethylene glycol 3350 17 Gram(s) Oral daily  rivaroxaban 15 milliGRAM(s) Oral daily  senna 2 Tablet(s) Oral at bedtime  sodium chloride 0.9% lock flush 3 milliLiter(s) IV Push every 8 hours  spironolactone 12.5 milliGRAM(s) Oral daily    Vitals:  T(C): 36.7 (23 @ 05:27), Max: 36.7 (23 @ 12:13)  HR: 73 (23 @ 05:27) (60 - 81)  BP: 92/55 (23 @ 05:27) (88/51 - 92/57)  BP(mean): 68 (23 @ 05:27) (68 - 68)  RR: 18 (23 @ 05:27) (18 - 18)  SpO2: 94% (23 @ 05:27) (94% - 98%)    Daily     Daily Weight in k.5 (27 Aug 2023 07:51)  Weight (kg): 64.4 (23 @ 11:45)    I&O's Summary    26 Aug 2023 07:01  -  27 Aug 2023 07:00  --------------------------------------------------------  IN: 1017.6 mL / OUT: 1950 mL / NET: -932.4 mL        Physical Exam:  Appearance: No Acute Distress  HEENT: JVP ___ cm H2O, no HJR  Cardiovascular: RRR, Normal S1 S2, No murmurs/rubs/gallops  Respiratory: Clear to auscultation bilaterally  Gastrointestinal: soft, non-tender, non-distended	  Skin: no skin lesions  Neurologic: Non-focal  Extremities: No LE edema, warm and well perfused  Psychiatry: A & O x 3, Mood & affect appropriate      Labs:                        11.3   5.73  )-----------( 129      ( 27 Aug 2023 06:20 )             34.2         135  |  93<L>  |  78<H>  ----------------------------<  85  3.7   |  25  |  2.39<H>    Ca    9.9      27 Aug 2023 06:19    TPro  7.2  /  Alb  4.1  /  TBili  1.6<H>  /  DBili  x   /  AST  52<H>  /  ALT  43  /  AlkPhos  191<H>        TELEMETRY:     Subjective:  Milrinone reduced yesterday and renal function stable/improved and TB also coming down.  Weight stable from 2d ago on current dose of oral bumex  He feels well    Medications:  acetaminophen     Tablet .. 650 milliGRAM(s) Oral every 6 hours PRN  aluminum hydroxide/magnesium hydroxide/simethicone Suspension 30 milliLiter(s) Oral every 4 hours PRN  atorvastatin 40 milliGRAM(s) Oral at bedtime  buMETAnide 0.5 milliGRAM(s) Oral two times a day  hydrALAZINE 35 milliGRAM(s) Oral <User Schedule>  isosorbide   dinitrate Tablet (ISORDIL) 10 milliGRAM(s) Oral <User Schedule>  levothyroxine 50 MICROGram(s) Oral daily  melatonin 3 milliGRAM(s) Oral at bedtime PRN  metoprolol succinate ER 50 milliGRAM(s) Oral daily  milrinone Infusion 0.125 MICROgram(s)/kG/Min IV Continuous <Continuous>  ondansetron Injectable 4 milliGRAM(s) IV Push every 8 hours PRN  pantoprazole    Tablet 40 milliGRAM(s) Oral before breakfast  polyethylene glycol 3350 17 Gram(s) Oral daily  rivaroxaban 15 milliGRAM(s) Oral daily  senna 2 Tablet(s) Oral at bedtime  sodium chloride 0.9% lock flush 3 milliLiter(s) IV Push every 8 hours  spironolactone 12.5 milliGRAM(s) Oral daily    Vitals:  T(C): 36.7 (23 @ 05:27), Max: 36.7 (23 @ 12:13)  HR: 73 (23 @ 05:27) (60 - 81)  BP: 92/55 (23 @ 05:27) (88/51 - 92/57)  BP(mean): 68 (23 @ 05:27) (68 - 68)  RR: 18 (23 @ 05:27) (18 - 18)  SpO2: 94% (23 @ 05:27) (94% - 98%)    Daily     Daily Weight in k.5 (27 Aug 2023 07:51)  Weight (kg): 64.4 (23 @ 11:45)    I&O's Summary    26 Aug 2023 07:01  -  27 Aug 2023 07:00  --------------------------------------------------------  IN: 1017.6 mL / OUT: 1950 mL / NET: -932.4 mL        Physical Exam:  Appearance: No Acute Distress  HEENT: JVP 8-10 cm H2O, large v-waves b/l  Cardiovascular: RRR, Normal S1 S2, III/VI GEOFFREY across left precordium, no rubs or gallops  Respiratory: Clear to auscultation bilaterally  Gastrointestinal: soft, non-tender, non-distended	  Skin: scattered ecchymoses  Neurologic: Non-focal  Extremities: 1+ BLE edema, warm and well perfused  Psychiatry: A & O x 3, Mood & affect appropriate      Labs:                        11.3   5.73  )-----------( 129      ( 27 Aug 2023 06:20 )             34.2         135  |  93<L>  |  78<H>  ----------------------------<  85  3.7   |  25  |  2.39<H>    Ca    9.9      27 Aug 2023 06:19    TPro  7.2  /  Alb  4.1  /  TBili  1.6<H>  /  DBili  x   /  AST  52<H>  /  ALT  43  /  AlkPhos  191<H>        TELEMETRY: V-paced, pAFib

## 2023-08-27 NOTE — PROGRESS NOTE ADULT - PROBLEM SELECTOR PLAN 3
continue Toprol 50 mg PO daily  Daily BMP -monitor and supplement electrolytes  continue AC on Xarelto 15 daily - d/w Dr. Valentino

## 2023-08-27 NOTE — PROGRESS NOTE ADULT - ASSESSMENT
83 y/o M with ICM, LVEF 35-40% (LVIDd 6.7 cm), CAD c/b IWMI (1994) s/p angioplasty, aortic stenosis s/p bio-AVR 2004, VT arrest (2017) s/p ICD, Afib s/p multiple DCCV and ablation x2 (2020 and 2023; on AC), prior Ao aneurysm s/p Bentall (2021), severe MR with MAC (precludes KRISTIE), atrophic kidney with CKD (b/l Cr 1.9), admitted for acute on chronic HFrEF & SURESH on CKD. He is being considered for Tendyne clinical trial with Dr. Rico at [facility]. Clinically improved with inotrope-assisted diuresis, now off bumex infusion and weaning milrinone.    Cardiac Studies  8/18/23 [facility]: RA 19, RV 65/4, PA 65/33/47, PCWP 25, PA 52.9%, CO/CI 3.6/1.9, BP 90/61/72 SVR 1178 dsc, PVR 6.1  5/24/23 - pLAD 40%; mRCA 40%; RA 16, RV 76/19, PA 77/34/50; PCWP 25 (v 35); /67/84; CO/CI 3.5/1.86; SVR 1560; PVR 7.1    8/17/23 TTE: LVEF 36%. G2DD. RV mildly enlarged with reduced function. TENNILLE, BioAVR (MG 12) w/mild intravalvular regurgitation. Severe MR at BP 91/60. Mod-severe TR. LVH was called, but over-measured on review.  4/10/23 TTE: EF 38%, LVEDD 6.7 cm, severe MR with systolic reversal in pulmonic vein, severe TR, RVSP 49; IVC dilated  3/27/23 [facility] (at ): EF 35-40%, paradoxical septal motion, RV enlargement/dysfunction, bioAVR (mean gradient 10) with mild AI, marked MAC with posterior leaflet immobility, mild prolapse of anterior leaflet with torn chords, severe posterior directed MR w/ systolic reversal in PV, moderate TR; no intracardiac clot

## 2023-08-28 LAB
ALBUMIN SERPL ELPH-MCNC: 4 G/DL — SIGNIFICANT CHANGE UP (ref 3.3–5)
ALP SERPL-CCNC: 200 U/L — HIGH (ref 40–120)
ALT FLD-CCNC: 42 U/L — SIGNIFICANT CHANGE UP (ref 10–45)
ANION GAP SERPL CALC-SCNC: 16 MMOL/L — SIGNIFICANT CHANGE UP (ref 5–17)
AST SERPL-CCNC: 50 U/L — HIGH (ref 10–40)
BILIRUB SERPL-MCNC: 1.6 MG/DL — HIGH (ref 0.2–1.2)
BUN SERPL-MCNC: 87 MG/DL — HIGH (ref 7–23)
CALCIUM SERPL-MCNC: 9.5 MG/DL — SIGNIFICANT CHANGE UP (ref 8.4–10.5)
CHLORIDE SERPL-SCNC: 93 MMOL/L — LOW (ref 96–108)
CO2 SERPL-SCNC: 26 MMOL/L — SIGNIFICANT CHANGE UP (ref 22–31)
CREAT SERPL-MCNC: 2.99 MG/DL — HIGH (ref 0.5–1.3)
EGFR: 20 ML/MIN/1.73M2 — LOW
GLUCOSE SERPL-MCNC: 72 MG/DL — SIGNIFICANT CHANGE UP (ref 70–99)
HCT VFR BLD CALC: 36 % — LOW (ref 39–50)
HGB BLD-MCNC: 11.9 G/DL — LOW (ref 13–17)
LACTATE SERPL-SCNC: 1 MMOL/L — SIGNIFICANT CHANGE UP (ref 0.5–2)
MCHC RBC-ENTMCNC: 29.3 PG — SIGNIFICANT CHANGE UP (ref 27–34)
MCHC RBC-ENTMCNC: 33.1 GM/DL — SIGNIFICANT CHANGE UP (ref 32–36)
MCV RBC AUTO: 88.7 FL — SIGNIFICANT CHANGE UP (ref 80–100)
NRBC # BLD: 0 /100 WBCS — SIGNIFICANT CHANGE UP (ref 0–0)
NT-PROBNP SERPL-SCNC: HIGH PG/ML (ref 0–300)
PLATELET # BLD AUTO: 129 K/UL — LOW (ref 150–400)
POTASSIUM SERPL-MCNC: 4 MMOL/L — SIGNIFICANT CHANGE UP (ref 3.5–5.3)
POTASSIUM SERPL-SCNC: 4 MMOL/L — SIGNIFICANT CHANGE UP (ref 3.5–5.3)
PROT SERPL-MCNC: 6.9 G/DL — SIGNIFICANT CHANGE UP (ref 6–8.3)
RBC # BLD: 4.06 M/UL — LOW (ref 4.2–5.8)
RBC # FLD: 17.9 % — HIGH (ref 10.3–14.5)
SODIUM SERPL-SCNC: 135 MMOL/L — SIGNIFICANT CHANGE UP (ref 135–145)
WBC # BLD: 6.08 K/UL — SIGNIFICANT CHANGE UP (ref 3.8–10.5)
WBC # FLD AUTO: 6.08 K/UL — SIGNIFICANT CHANGE UP (ref 3.8–10.5)

## 2023-08-28 PROCEDURE — 99223 1ST HOSP IP/OBS HIGH 75: CPT | Mod: FS

## 2023-08-28 PROCEDURE — 93289 INTERROG DEVICE EVAL HEART: CPT | Mod: 26

## 2023-08-28 PROCEDURE — ZZZZZ: CPT

## 2023-08-28 PROCEDURE — 99232 SBSQ HOSP IP/OBS MODERATE 35: CPT | Mod: FS

## 2023-08-28 RX ORDER — ALBUMIN HUMAN 25 %
100 VIAL (ML) INTRAVENOUS ONCE
Refills: 0 | Status: COMPLETED | OUTPATIENT
Start: 2023-08-28 | End: 2023-08-28

## 2023-08-28 RX ORDER — BUMETANIDE 0.25 MG/ML
2 INJECTION INTRAMUSCULAR; INTRAVENOUS DAILY
Refills: 0 | Status: DISCONTINUED | OUTPATIENT
Start: 2023-08-29 | End: 2023-08-30

## 2023-08-28 RX ORDER — METOPROLOL TARTRATE 50 MG
25 TABLET ORAL DAILY
Refills: 0 | Status: DISCONTINUED | OUTPATIENT
Start: 2023-08-29 | End: 2023-08-30

## 2023-08-28 RX ORDER — BUMETANIDE 0.25 MG/ML
2 INJECTION INTRAMUSCULAR; INTRAVENOUS ONCE
Refills: 0 | Status: COMPLETED | OUTPATIENT
Start: 2023-08-28 | End: 2023-08-28

## 2023-08-28 RX ADMIN — SODIUM CHLORIDE 3 MILLILITER(S): 9 INJECTION INTRAMUSCULAR; INTRAVENOUS; SUBCUTANEOUS at 21:24

## 2023-08-28 RX ADMIN — ISOSORBIDE DINITRATE 10 MILLIGRAM(S): 5 TABLET ORAL at 13:49

## 2023-08-28 RX ADMIN — SODIUM CHLORIDE 3 MILLILITER(S): 9 INJECTION INTRAMUSCULAR; INTRAVENOUS; SUBCUTANEOUS at 05:02

## 2023-08-28 RX ADMIN — ATORVASTATIN CALCIUM 40 MILLIGRAM(S): 80 TABLET, FILM COATED ORAL at 21:33

## 2023-08-28 RX ADMIN — Medication 50 MICROGRAM(S): at 06:43

## 2023-08-28 RX ADMIN — Medication 3 MILLIGRAM(S): at 21:34

## 2023-08-28 RX ADMIN — Medication 50 MILLILITER(S): at 08:23

## 2023-08-28 RX ADMIN — SPIRONOLACTONE 25 MILLIGRAM(S): 25 TABLET, FILM COATED ORAL at 13:49

## 2023-08-28 RX ADMIN — SODIUM CHLORIDE 3 MILLILITER(S): 9 INJECTION INTRAMUSCULAR; INTRAVENOUS; SUBCUTANEOUS at 13:40

## 2023-08-28 RX ADMIN — ISOSORBIDE DINITRATE 10 MILLIGRAM(S): 5 TABLET ORAL at 21:33

## 2023-08-28 RX ADMIN — PANTOPRAZOLE SODIUM 40 MILLIGRAM(S): 20 TABLET, DELAYED RELEASE ORAL at 06:43

## 2023-08-28 RX ADMIN — RIVAROXABAN 15 MILLIGRAM(S): KIT at 13:49

## 2023-08-28 RX ADMIN — SENNA PLUS 2 TABLET(S): 8.6 TABLET ORAL at 21:33

## 2023-08-28 RX ADMIN — BUMETANIDE 2 MILLIGRAM(S): 0.25 INJECTION INTRAMUSCULAR; INTRAVENOUS at 11:59

## 2023-08-28 NOTE — PROGRESS NOTE ADULT - PROBLEM SELECTOR PLAN 2
Heart Failure following   decrease toprol 25 qd   ck pro- bnp today  give additional diuretics bumex 2 mg po now and increase 2 mg po qd   Strict I & Os, daily standing weights  Fluid restriction 1500cc/day  Weaned off Milrinone gtt 8/27  Continue isordil 10 tid and hydralazine 25 mg po tid  Continue aldactone 25 daily  discharge planning- home when cleared by CHF team

## 2023-08-28 NOTE — PROGRESS NOTE ADULT - PROBLEM SELECTOR PLAN 3
Currently in atrial flutter - rate controlled  - Patient describes bad reaction in past to Amio (he does not recall details) so it was not started

## 2023-08-28 NOTE — PROGRESS NOTE ADULT - SUBJECTIVE AND OBJECTIVE BOX
VITAL SIGNS    Telemetry:  v. paced @ 80  Vital Signs Last 24 Hrs  T(C): 36.7 (23 @ 11:59), Max: 36.7 (23 @ 15:43)  T(F): 98 (23 @ 11:59), Max: 98.1 (23 @ 15:43)  HR: 84 (23 @ 13:50) (80 - 99)  BP: 92/64 (23 @ 13:50) (78/52 - 96/63)  RR: 18 (23 @ 11:59) (18 - 18)  SpO2: 95% (23 @ 11:59) (92% - 98%)             @ 07:01  -   @ 07:00  --------------------------------------------------------  IN: 729.6 mL / OUT: 1850 mL / NET: -1120.4 mL     @ 07:01  -   @ 14:12  --------------------------------------------------------  IN: 240 mL / OUT: 300 mL / NET: -60 mL       Daily     Daily Weight in k.4 (28 Aug 2023 08:28)  Admit Wt: Drug Dosing Weight  Height (cm): 175.3 (16 Aug 2023 16:36)  Weight (kg): 64.4 (25 Aug 2023 11:45)  BMI (kg/m2): 21 (25 Aug 2023 11:45)  BSA (m2): 1.79 (25 Aug 2023 11:45)    Bilirubin Total: 1.6 mg/dL ( @ 07:31)    CAPILLARY BLOOD GLUCOSE              LABS:     @ 07:01  -   @ 07:00  --------------------------------------------------------  IN: 729.6 mL / OUT: 1850 mL / NET: -1120.4 mL     @ 07:01  -   @ 14:12  --------------------------------------------------------  IN: 240 mL / OUT: 300 mL / NET: -60 mL    cret                        11.9   6.08  )-----------( 129      ( 28 Aug 2023 07:31 )             36.0         135  |  93<L>  |  87<H>  ----------------------------<  72  4.0   |  26  |  2.99<H>    Ca    9.5      28 Aug 2023 07:31    TPro  6.9  /  Alb  4.0  /  TBili  1.6<H>  /  DBili  x   /  AST  50<H>  /  ALT  42  /  AlkPhos  200<H>          acetaminophen     Tablet .. 650 milliGRAM(s) Oral every 6 hours PRN  aluminum hydroxide/magnesium hydroxide/simethicone Suspension 30 milliLiter(s) Oral every 4 hours PRN  atorvastatin 40 milliGRAM(s) Oral at bedtime  hydrALAZINE 25 milliGRAM(s) Oral <User Schedule>  isosorbide   dinitrate Tablet (ISORDIL) 10 milliGRAM(s) Oral <User Schedule>  levothyroxine 50 MICROGram(s) Oral daily  melatonin 3 milliGRAM(s) Oral at bedtime PRN  ondansetron Injectable 4 milliGRAM(s) IV Push every 8 hours PRN  pantoprazole    Tablet 40 milliGRAM(s) Oral before breakfast  polyethylene glycol 3350 17 Gram(s) Oral daily  rivaroxaban 15 milliGRAM(s) Oral daily  senna 2 Tablet(s) Oral at bedtime  sodium chloride 0.9% lock flush 3 milliLiter(s) IV Push every 8 hours  spironolactone 25 milliGRAM(s) Oral daily        PHYSICAL EXAM    Subjective: "What's the plan for today?"   Neurology: alert and oriented x 3, nonfocal, no gross deficits  CV : tele:  V. paced @ 80; +murmur   Lungs: clear. RR easy, unlabored   Abdomen: soft, nontender, nondistended, positive bowel sounds, + bowel movement   Neg N/V/D   :  pt voiding without difficulty   Extremities:   BRAVO; Trace LE edema, neg calf tenderness.   PPP bilaterally; chronic venous stasis discoloration

## 2023-08-28 NOTE — PROGRESS NOTE ADULT - ATTENDING COMMENTS
83 y/o M with ICM/HFrEF (now 20-25%; LVIDd 6.7 cm; prev 35%), CAD c/b IWMI (1994) s/p angioplasty, aortic stenosis s/p bio-AVR 2004, VT arrest (2017) s/p ICD, Afib s/p multiple DCCV and ablation x2 (2020 and 2023; on AC), prior Ao aneurysm s/p Bentall (2021), severe MR with MAC (precludes KRISTIE), atrophic kidney with CKD (b/l Cr 1.9), admitted on 8/16 for acute on chronic heart failure and acute on chronic kidney dysfunction. Was being diuresed with borderline low BP. Underwent RHC which showed elevated filling pressures and low CI so started on milrinone with improvement. Milrinone was weaned off over weekend with worsening renal function today. Also notably RV pacing and in aflutter which is new. Being considered for tendyne as APOLLO is paused. On exam, NAD, JVP approx 12 cm w/ v waves, RRR, grade IV/  VI systolic murmur in L axilla, CTAB, nontender abdomen, b/l trace edema. Labs reviewed - BUN/Cr 87/2.99 (uptrenidng; parag 2.39), Tbili 1.6 (improved from 1.9), lactate negative. TTE reviewed - EF 25%, LVEDD 4.95 cm, severe MR, mod-severe TR.   - appreciate EP input; plan for [facility]ARREN/DCCV tomorrow; may have precipitated acute worsening  - discussed at length with patient and his wife current situation and that he may require inotropes if no significant improvement with restoration of sinus rhythm  - increase bumex to 2 mg daily
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
## 2023-08-28 NOTE — PROGRESS NOTE ADULT - SUBJECTIVE AND OBJECTIVE BOX
INTERVAL EVENTS:    PAST MEDICAL & SURGICAL HISTORY:  Aortic stenosis    AICD (automatic cardioverter/defibrillator) present    Aortic valve regurgitation    Ascending aorta dilation    H/O paroxysmal supraventricular tachycardia    HLD (hyperlipidemia)    Mitral valve regurgitation    S/P ablation of atrial fibrillation    Cardiac arrest    Severe mitral regurgitation    S/P aortic valve replacement  3/13/2004    S/P hernia repair  2002    History of tonsillectomy and adenoidectomy    S/P TURP  1996    S/P colonoscopy  2001, 2002, 2006, 2011, 2016    S/P endoscopy  2006    S/P cardiac cath  1994, 1995, 1999, 2002, 2004    AICD (automatic cardioverter/defibrillator) present  12/19/17    Cardiac pacemaker  12/19/17    History of cardioversion  9/11/20    S/P ablation of atrial fibrillation  10/29/20    Status post ablation of ventricular arrhythmia  2/14/19        MEDICATIONS  (STANDING):  albumin human 25% IVPB 100 milliLiter(s) IV Intermittent once  atorvastatin 40 milliGRAM(s) Oral at bedtime  buMETAnide 1 milliGRAM(s) Oral <User Schedule>  hydrALAZINE 25 milliGRAM(s) Oral <User Schedule>  isosorbide   dinitrate Tablet (ISORDIL) 10 milliGRAM(s) Oral <User Schedule>  levothyroxine 50 MICROGram(s) Oral daily  metoprolol succinate ER 50 milliGRAM(s) Oral daily  pantoprazole    Tablet 40 milliGRAM(s) Oral before breakfast  polyethylene glycol 3350 17 Gram(s) Oral daily  rivaroxaban 15 milliGRAM(s) Oral daily  senna 2 Tablet(s) Oral at bedtime  sodium chloride 0.9% lock flush 3 milliLiter(s) IV Push every 8 hours  spironolactone 25 milliGRAM(s) Oral daily    MEDICATIONS  (PRN):  acetaminophen     Tablet .. 650 milliGRAM(s) Oral every 6 hours PRN Temp greater or equal to 38C (100.4F), Mild Pain (1 - 3)  aluminum hydroxide/magnesium hydroxide/simethicone Suspension 30 milliLiter(s) Oral every 4 hours PRN Dyspepsia  melatonin 3 milliGRAM(s) Oral at bedtime PRN Insomnia  ondansetron Injectable 4 milliGRAM(s) IV Push every 8 hours PRN Nausea and/or Vomiting    T(F): 97.9 (08-28-23 @ 04:58), Max: 98.2 (08-27-23 @ 08:00)  HR: 86 (08-28-23 @ 06:30) (80 - 99)  BP: 78/52 (08-28-23 @ 06:30) (78/52 - 96/63)  BP(mean): 66 (08-27-23 @ 20:05) (64 - 74)  ABP: --  ABP(mean): --  RR: 18 (08-28-23 @ 04:58) (18 - 18)  SpO2: 92% (08-28-23 @ 04:58) (92% - 98%)    I/O Detail 24H    27 Aug 2023 07:01  -  28 Aug 2023 07:00  --------------------------------------------------------  IN:    Milrinone: 9.6 mL    Oral Fluid: 720 mL  Total IN: 729.6 mL    OUT:    Blood Loss (mL): 200 mL    Voided (mL): 1650 mL  Total OUT: 1850 mL    Total NET: -1120.4 mL          PHYSICAL EXAM:  GEN: NAD  HEENT: EOMI   RESP: CTA b/l  CV: RRR. Normal S1/S2. No m/r/g.  ABD: soft, non-distended  EXT: No edema   NEURO: alert and attentive    LABS:  CBC 08-27-23 @ 06:20                        11.3   5.73  )-----------( 129                   34.2       Hgb trend: 11.3 <-- , 11.8 <--   WBC trend: 5.73 <-- , 6.46 <--       CMP 08-27-23 @ 06:19    135  |  93<L>  |  78<H>  ----------------------------<  85  3.7   |  25  |  2.39<H>      TPro  7.2  /  Alb  4.1  /  TBili  1.6<H>  /  DBili  x   /  AST  52<H>  /  ALT  43  /  AlkPhos  191<H>  08-27      Serum Cr trend: 2.39 <-- , 2.55 <--         Cardiac Markers           STUDIES:           INTERVAL EVENTS: no acute events, patient ambulating    PAST MEDICAL & SURGICAL HISTORY:  Aortic stenosis    AICD (automatic cardioverter/defibrillator) present    Aortic valve regurgitation    Ascending aorta dilation    H/O paroxysmal supraventricular tachycardia    HLD (hyperlipidemia)    Mitral valve regurgitation    S/P ablation of atrial fibrillation    Cardiac arrest    Severe mitral regurgitation    S/P aortic valve replacement  3/13/2004    S/P hernia repair  2002    History of tonsillectomy and adenoidectomy    S/P TURP  1996    S/P colonoscopy  2001, 2002, 2006, 2011, 2016    S/P endoscopy  2006    S/P cardiac cath  1994, 1995, 1999, 2002, 2004    AICD (automatic cardioverter/defibrillator) present  12/19/17    Cardiac pacemaker  12/19/17    History of cardioversion  9/11/20    S/P ablation of atrial fibrillation  10/29/20    Status post ablation of ventricular arrhythmia  2/14/19        MEDICATIONS  (STANDING):  albumin human 25% IVPB 100 milliLiter(s) IV Intermittent once  atorvastatin 40 milliGRAM(s) Oral at bedtime  buMETAnide 1 milliGRAM(s) Oral <User Schedule>  hydrALAZINE 25 milliGRAM(s) Oral <User Schedule>  isosorbide   dinitrate Tablet (ISORDIL) 10 milliGRAM(s) Oral <User Schedule>  levothyroxine 50 MICROGram(s) Oral daily  metoprolol succinate ER 50 milliGRAM(s) Oral daily  pantoprazole    Tablet 40 milliGRAM(s) Oral before breakfast  polyethylene glycol 3350 17 Gram(s) Oral daily  rivaroxaban 15 milliGRAM(s) Oral daily  senna 2 Tablet(s) Oral at bedtime  sodium chloride 0.9% lock flush 3 milliLiter(s) IV Push every 8 hours  spironolactone 25 milliGRAM(s) Oral daily    MEDICATIONS  (PRN):  acetaminophen     Tablet .. 650 milliGRAM(s) Oral every 6 hours PRN Temp greater or equal to 38C (100.4F), Mild Pain (1 - 3)  aluminum hydroxide/magnesium hydroxide/simethicone Suspension 30 milliLiter(s) Oral every 4 hours PRN Dyspepsia  melatonin 3 milliGRAM(s) Oral at bedtime PRN Insomnia  ondansetron Injectable 4 milliGRAM(s) IV Push every 8 hours PRN Nausea and/or Vomiting    T(F): 97.9 (08-28-23 @ 04:58), Max: 98.2 (08-27-23 @ 08:00)  HR: 86 (08-28-23 @ 06:30) (80 - 99)  BP: 78/52 (08-28-23 @ 06:30) (78/52 - 96/63)  BP(mean): 66 (08-27-23 @ 20:05) (64 - 74)  ABP: --  ABP(mean): --  RR: 18 (08-28-23 @ 04:58) (18 - 18)  SpO2: 92% (08-28-23 @ 04:58) (92% - 98%)    I/O Detail 24H    27 Aug 2023 07:01  -  28 Aug 2023 07:00  --------------------------------------------------------  IN:    Milrinone: 9.6 mL    Oral Fluid: 720 mL  Total IN: 729.6 mL    OUT:    Blood Loss (mL): 200 mL    Voided (mL): 1650 mL  Total OUT: 1850 mL    Total NET: -1120.4 mL          PHYSICAL EXAM:  GEN: NAD  HEENT: EOMI   RESP: CTA b/l  CV: RRR. Normal S1/S2. Holosystolic murmur at apex. Elevated JVD.   ABD: soft, non-distended  EXT: No edema   NEURO: alert and attentive    LABS:  CBC 08-27-23 @ 06:20                        11.3   5.73  )-----------( 129                   34.2       Hgb trend: 11.3 <-- , 11.8 <--   WBC trend: 5.73 <-- , 6.46 <--       CMP 08-27-23 @ 06:19    135  |  93<L>  |  78<H>  ----------------------------<  85  3.7   |  25  |  2.39<H>      TPro  7.2  /  Alb  4.1  /  TBili  1.6<H>  /  DBili  x   /  AST  52<H>  /  ALT  43  /  AlkPhos  191<H>  08-27      Serum Cr trend: 2.39 <-- , 2.55 <--         Cardiac Markers           STUDIES:

## 2023-08-28 NOTE — PROCEDURE NOTE - INTERROGATION NOTE: RATE
A spot has been held on 11.24.21 @ 2 pm with antonio @ [facility]. I did leave this message with daughter to inform pt. Also, to have pt call office back to verify. 60/100

## 2023-08-28 NOTE — PROCEDURE NOTE - ADDITIONAL PROCEDURE DETAILS
Reason for interrogation: HF  Date of implant: 12/29/17  Implanting & following physician: Dr. Tsang [facility]  Battery: 2 years  Presenting rhythm: AsVp  Underlying rhythm: Atrial flutter with 3:1 conduction, ventricular rate of 68bpm Reason for interrogation: HF  Date of implant: 12/29/17  Implanting & following physician: Dr. Tsang [facility]  Battery: 2 years  Presenting rhythm: AsVp  Underlying rhythm: Atrial flutter with ventricular rate of 68 bpm  Ap: 60% : 81%  Jayden parameters: DDDR 60/100  Tachy parameters:  bpm (monitor), VT-2 181 bpm (ATPx3, 30J, 36J, 36Jx2),  bpm (ATPx1, 30J, 36J, 36J x4)  Events: AMS episodes consistent with atrial flutter. Patient in persistent flutter since 8/3/23. [place] is 23%  Normal device function  Not pacer dependent Reason for interrogation: HF  Date of implant: 12/29/17  Implanting & following physician: Dr. Tsang [facility]  Battery: 2 years  Presenting rhythm: AsVp  Underlying rhythm: Atrial flutter with ventricular rate of 68 bpm  Ap: 60% : 81%  Jayden parameters: DDDR 60/100  Tachy parameters:  bpm (monitor), VT-2 181 bpm (ATPx3, 30J, 36J, 36Jx2),  bpm (ATPx1, 30J, 36J, 36J x4)  AMS base rate: 80 bpm  Events: AMS episodes consistent with atrial flutter. Patient in persistent flutter since 8/3/23. [place] is 23%  Normal device function  Not pacer dependent Reason for interrogation: HF  Date of implant: 12/29/17  Implanting & following physician: Dr. Tsang [facility]  Battery: 2 years  Presenting rhythm: AsVp  Underlying rhythm: Atrial flutter with ventricular rate of 68 bpm  Ap: 60% : 81%  Jayden parameters: DDDR 60/100  Tachy parameters:  bpm (monitor), VT-2 181 bpm (ATPx3, 30J, 36J, 36Jx2),  bpm (ATPx1, 30J, 36J, 36J x4)  AMS base rate: 80 bpm  Events: AMS episodes consistent with atrial flutter. Patient in persistent flutter since 8/3/23. [place] is 23%  Changes: AMS base rate lowered to 60 bpm to minimize v-pacing  Normal device function  Not pacer dependent

## 2023-08-28 NOTE — PROGRESS NOTE ADULT - PROBLEM SELECTOR PLAN 1
- Etiology: ICM with valvular disease  - GDMT: decrease toprol 50mg QD to toprol 25mg QD, continue hydral 25mg TID and isordil 10mg TID, continue spironolactone 25mg QD; will defer ARNI/SGLT2  - Diuretics: would give additional 2mg PO Bumex x1 and increase to Bumex 2mg PO QD  - Device: ICD interrogation shows atrial flutter with significant RV pacing with plans for WARREN/DCCV tomorrow, appreciate EP reccs  - Valve: being considered for Tendyne at [facility] with Dr. Pierre   - AT: limited options, would need repeat RHC to assess for palliative inotrope dependence if not a

## 2023-08-28 NOTE — PROGRESS NOTE ADULT - ASSESSMENT
81 y/o M with ICM, LVEF 35-40% (LVIDd 6.7 cm), CAD c/b IWMI (1994) s/p angioplasty, aortic stenosis s/p bio-AVR 2004, VT arrest (2017) s/p ICD, Afib s/p multiple DCCV and ablation x2 (2020 and 2023; on AC), prior Ao aneurysm s/p [place] (2021), severe MR with MAC (precludes KRISTIE), atrophic kidney with CKD (b/l Cr 1.9), admitted for acute on chronic HFrEF & SURESH on CKD, being considered for tendyne, clinically improved with inotrope-assisted diuresis, now showing some signs of worsening congestion and perfusion.    Cardiac Studies  8/18/23 RHC: RA 19, RV 65/4, PA 65/33/47, PCWP 25, PA 52.9%, CO/CI 3.6/1.9, BP 90/61/72 SVR 1178 dsc, PVR 6.1  5/24/23 - pLAD 40%; mRCA 40%; RA 16, RV 76/19, PA 77/34/50; PCWP 25 (v 35); /67/84; CO/CI 3.5/1.86; SVR 1560; PVR 7.1    8/17/23 TTE: LVEF 36%. G2DD. RV mildly enlarged with reduced function. TENNILLE, BioAVR (MG 12) w/mild intravalvular regurgitation. Severe MR at BP 91/60. Mod-severe TR. LVH was called, but over-measured on review.  4/10/23 TTE: EF 38%, LVEDD 6.7 cm, severe MR with systolic reversal in pulmonic vein, severe TR, RVSP 49; IVC dilated  3/27/23 WARREN (at [facility]): EF 35-40%, paradoxical septal motion, RV enlargement/dysfunction, bioAVR (mean gradient 10) with mild AI, marked MAC with posterior leaflet immobility, mild prolapse of anterior leaflet with torn chords, severe posterior directed MR w/ systolic reversal in PV, moderate TR; no intracardiac clot

## 2023-08-28 NOTE — PROGRESS NOTE ADULT - ASSESSMENT
83 yo Male with PMHx of  HTN, HLD, GERD, CHF, moderately reduced EF 35-40%, AFIB (on Xarelto) s/p ablation, CAD with h/o Atherectomy,  IWMI 1994, VT ARREST IN 12/2017, AICD, s/p AVR in 2004, s/p AORTIC ANEURYSM AND MV repair with Dr Fenton 6/2/21, and now severe MR.  Patient reports worsening SOB x 2 days, with associated symptoms of fatigue, deconditioning and inability to walk long distance.  Presents today as a direct admission for CHF exacerbation and requiring medical optimization.     Hospital Course:   8/16 Admit to 2 [facility] Telemetry Floor.  Heart Failure consult called.  CXR ordered.  D/C'd Torsemide and started on Bumex 4 mg IV BID. Per Dr. Valentino hold Xarelto for today for possible RHC in AM.  Awaiting HF reccs.     8/17 VSS - bun 92 this am from 102 - as per Dr. Fenton - renal called - pt seen by renal & HF team - will continue Bumex 4mg IV bid-   8/18 RHC today for diagnostic eval Continue aggressive diuresis renal following  Replete potassium.  8/19 VVS; Potassium 3.5 --> Supplemented. Started om KCL 40 meq PO BID.  Heart Failure and Renal following.  Pre albumin ordered: 20 Continue with current medication regimen.  Sorbitol 30 mg PO x 1.   8/20 VSS + BM  seen by HF now for BUMEX GTT 1mg/hr  and milrinone for inotropic support 125mcg/kg/min    8/21 VSS: continue primacor gttp .125 and bumex gttp 1 mg/ hr; CHF following; resume xarelto for hx afib   8/22 VSS; continue primacor gttp and bumex gttp as per CHF team; AC with xarelto   8/23 VSS: afib v.paced; continue diuresis; add isordil 10 tid and increase hydral 35 mg po tid; 1500 cc F. R   8/24 VSS  Aggressive diuresis Heart failure following  8/25 VSS change to oral Bumex  Heart failure following>milrinone wean start tomorrow  8/26 VSS, continue wean milrinone drip as per HF, Neg -1230 diurese on PO Bumex. Aldactone 12.5 daily added per HF. Plan for d/c home Mon.   8/27 VSS, milrinone weaned off per HF, increased Bumex to 1mg BID for peripheral edema, Aldactone increased to 25 daily and hydralazine decreased to 25 TID. BUN/Cr 78/2.39.  8/28 V. paced @ 80; + hypotension noted this am --> sbp 70's; pt asymptomatic; albumin given; bp responded 90's; lactate level nl 1.0 bun/cr increased to 87/2.9; give additional bumex 2 mg and start bumex 2 mg po qd on tue; decrease toprol 25 qd; pro-bnp level 16,901  discharge planning- home when stable and cleared by CHF   discharge planning- home when medically optimized  83 yo Male with PMHx of  HTN, HLD, GERD, CHF, moderately reduced EF 35-40%, AFIB (on Xarelto) s/p ablation, CAD with h/o Atherectomy,  IWMI 1994, VT ARREST IN 12/2017, AICD, s/p AVR in 2004, s/p AORTIC ANEURYSM AND MV repair with Dr Fenton 6/2/21, and now severe MR.  Patient reports worsening SOB x 2 days, with associated symptoms of fatigue, deconditioning and inability to walk long distance.  Presents today as a direct admission for CHF exacerbation and requiring medical optimization.     Hospital Course:   8/16 Admit to 2 [facility] Telemetry Floor.  Heart Failure consult called.  CXR ordered.  D/C'd Torsemide and started on Bumex 4 mg IV BID. Per Dr. Valentino hold Xarelto for today for possible RHC in AM.  Awaiting HF reccs.     8/17 VSS - bun 92 this am from 102 - as per Dr. Fenton - renal called - pt seen by renal & HF team - will continue Bumex 4mg IV bid-   8/18 RHC today for diagnostic eval Continue aggressive diuresis renal following  Replete potassium.  8/19 VVS; Potassium 3.5 --> Supplemented. Started om KCL 40 meq PO BID.  Heart Failure and Renal following.  Pre albumin ordered: 20 Continue with current medication regimen.  Sorbitol 30 mg PO x 1.   8/20 VSS + BM  seen by HF now for BUMEX GTT 1mg/hr  and milrinone for inotropic support 125mcg/kg/min    8/21 VSS: continue primacor gttp .125 and bumex gttp 1 mg/ hr; CHF following; resume xarelto for hx afib   8/22 VSS; continue primacor gttp and bumex gttp as per CHF team; AC with xarelto   8/23 VSS: afib v.paced; continue diuresis; add isordil 10 tid and increase hydral 35 mg po tid; 1500 cc F. R   8/24 VSS  Aggressive diuresis Heart failure following  8/25 VSS change to oral Bumex  Heart failure following>milrinone wean start tomorrow  8/26 VSS, continue wean milrinone drip as per HF, Neg -1230 diurese on PO Bumex. Aldactone 12.5 daily added per HF. Plan for d/c home Mon.   8/27 VSS, milrinone weaned off per HF, increased Bumex to 1mg BID for peripheral edema, Aldactone increased to 25 daily and hydralazine decreased to 25 TID. BUN/Cr 78/2.39.  8/28 V. paced @ 80; + hypotension noted this am --> sbp 70's; pt asymptomatic; albumin given; bp responded 90's; lactate level nl 1.0 bun/cr increased to 87/2.9; give additional bumex 2 mg and start bumex 2 mg po qd on tue; decrease toprol 25 qd; pro-bnp level 16,901  EP consulted; AICD interregation; WARREN/ CV in am tue as per EP; d/w CHF and Dr. Fenton   discharge planning- home when stable and cleared by CHF

## 2023-08-28 NOTE — CONSULT NOTE ADULT - ASSESSMENT
83 y/o male with PMH of HTN, HLD, GERD, HFrEF, EF of 35-40%, CAD, ICM, bioprosthetic aortic valve replacement in 2004, VT/VF arrest in 2017 s/p Abbott dual chamber ICD implant, prior VT ablation at [facility], atrophic kidney, atrial fibrillation, s/p ablation, on Xarelto, aortic aneurysm and mitral valve repair in June 2021, who was admitted to the hospital for CHF exacerbation, requiring inotropic support. The patient had his anticoagulation held from 8/16-8/21 for a right heart cath, which showed increased biventricular filling pressures. The patient was diuresed and transitioned to PO diuretics. Inotropes have been weaned off and patient currently reports improvement in his breathing. Echo this admission EF of 36%, severe MR, mod-severe TR and severely dilated LA. EP was consulted due to concern for frequent v-pacing seen on telemetry and management of atrial flutter.    Atrial flutter    - Interrogation of patient's ICD shows underlying rhythm of atrial flutter with ventricular rate of 68 bpm. Please see interrogation report for further details  - Patient is currently v-pacing because the AMS backup rate on his device is set to 80 bpm. Patient has been in persistent flutter since 8/3/23  - Recommend restoration of sinus rhythm through WARREN/DCCV. WARREN indicated as patient was off of his anticoagulation from 8/16-8/21  - Needs to be NPO after midnight  - Antiarrhythmic therapy to be determined after cardioversion  - Will need to be on uninterrupted AC for at least 30 days post cardioversion  - Continue telemetry monitoring  - Monitor electrolytes and replete as needed    Discussed plan with Dr. Gonzalez 81 y/o male with PMH of HTN, HLD, GERD, HFrEF, EF of 35-40%, CAD, ICM, bioprosthetic aortic valve replacement in 2004, VT/VF arrest in 2017 s/p Abbott dual chamber ICD implant, prior VT ablation at [facility], atrophic kidney, atrial fibrillation, s/p ablation, on Xarelto, aortic aneurysm and mitral valve repair in June 2021, who was admitted to the hospital for CHF exacerbation, requiring inotropic support. The patient had his anticoagulation held from 8/16-8/21 for a right heart cath, which showed increased biventricular filling pressures. The patient was diuresed and transitioned to PO diuretics. Inotropes have been weaned off and patient currently reports improvement in his breathing. Echo this admission EF of 36%, severe MR, mod-severe TR and severely dilated LA. EP was consulted due to concern for frequent v-pacing seen on telemetry and management of atrial flutter.    1. Atrial flutter  2. ICM, EF of 36%    - Interrogation of patient's ICD shows underlying rhythm of atrial flutter with ventricular rate of 68 bpm. Please see interrogation report for further details  - Patient is currently v-pacing because the AMS backup rate on his device is set to 80 bpm. Patient has been in persistent flutter since 8/3/23  - Recommend restoration of sinus rhythm through WARREN/DCCV. WARREN indicated as patient was off of his anticoagulation from 8/16-8/21  - Needs to be NPO after midnight  - Antiarrhythmic therapy to be determined after cardioversion  - Will need to be on uninterrupted AC for at least 30 days post cardioversion  - Continue telemetry monitoring  - Monitor electrolytes and replete as needed    Discussed plan with Dr. Gonzalez 83 y/o male with PMH of HTN, HLD, GERD, HFrEF, EF of 35-40%, CAD, ICM, bioprosthetic aortic valve replacement in 2004, VT/VF arrest in 2017 s/p Abbott dual chamber ICD implant, prior VT ablation x 3 at [facility] (2/14/19, 10/22/19, 7/29/22), atrophic kidney, atrial fibrillation, s/p ablation on 10/29/20, and cardioversion x 6 (9/11/20, 7/30/21, 2/14/22, 10/25/22, 12/14/22 and 2/24/23) on Xarelto, aortic aneurysm and mitral valve repair in June 2021, who was admitted to the hospital for CHF exacerbation, requiring inotropic support. The patient had his anticoagulation held from 8/16-8/21 for a right heart cath, which showed increased biventricular filling pressures. The patient was diuresed and transitioned to PO diuretics. Inotropes have been weaned off and patient currently reports improvement in his breathing. Echo this admission EF of 36%, severe MR, mod-severe TR and severely dilated LA. EP was consulted due to concern for frequent v-pacing seen on telemetry and management of atrial flutter.    1. Atrial flutter  2. ICM, EF of 36%    - Interrogation of patient's ICD shows underlying rhythm of atrial flutter with ventricular rate of 68 bpm. Please see interrogation report for further details  - Patient is currently v-pacing because the AMS backup rate on his device is set to 80 bpm. Patient has been in persistent flutter since 8/3/23. AMS base rate lowered to 60 bpm to minimize v-pacing  - Recommend restoration of sinus rhythm through WARREN/DCCV. WARREN indicated as patient was off of his anticoagulation from 8/16-8/21  - Needs to be NPO after midnight  - Antiarrhythmic therapy to be determined after cardioversion.  - Will need to be on uninterrupted AC for at least 30 days post cardioversion  - Continue telemetry monitoring  - Monitor electrolytes and replete as needed    Patient was seen with Dr. Gonzalez and plan outlined as above 83 y/o male with PMH of HTN, HLD, GERD, HFrEF, EF of 35-40%, CAD, ICM, bioprosthetic aortic valve replacement in 2004, VT/VF arrest in 2017 s/p Abbott dual chamber ICD implant, prior VT ablation x 3 at [facility] (2/14/19, 10/22/19, 7/29/22), atrophic kidney, atrial fibrillation, s/p ablation on 10/29/20, and cardioversion x 6 (9/11/20, 7/30/21, 2/14/22, 10/25/22, 12/14/22 and 2/24/23) on Xarelto, aortic aneurysm and mitral valve repair in June 2021, who was admitted to the hospital for CHF exacerbation, requiring inotropic support. The patient had his anticoagulation held from 8/16-8/21 for a right heart cath, which showed increased biventricular filling pressures. The patient was diuresed and transitioned to PO diuretics. Inotropes have been weaned off and patient currently reports improvement in his breathing. Echo this admission EF of 36%, severe MR, mod-severe TR and severely dilated LA. EP was consulted due to concern for frequent v-pacing seen on telemetry and management of atrial flutter.    1. Atrial flutter  2. ICM, EF of 36%    - Interrogation of patient's ICD shows underlying rhythm of atrial flutter with ventricular rate of 68 bpm. Please see interrogation report for further details  - Patient is currently v-pacing because the AMS backup rate on his device is set to 80 bpm. Patient has been in persistent flutter since 8/3/23. AMS base rate lowered to 60 bpm to minimize v-pacing  - Recommend restoration of sinus rhythm through WARREN/DCCV. WARREN indicated as patient was off of his anticoagulation from 8/16-8/21  - Needs to be NPO after midnight  - Antiarrhythmic therapy to be determined after cardioversion. Will likely need to be on amio  - Will need to be on uninterrupted AC for at least 30 days post cardioversion  - Continue telemetry monitoring  - Monitor electrolytes and replete as needed    Patient was seen with Dr. Gonzalez and plan outlined as above 81 y/o male with PMH of HTN, HLD, GERD, HFrEF, EF of 35-40%, CAD, ICM, bioprosthetic aortic valve replacement in 2004, VT/VF arrest in 2017 s/p Abbott dual chamber ICD implant, prior VT ablation x 3 at [facility] (2/14/19, 10/22/19, 7/29/22), atrophic kidney, atrial fibrillation, s/p ablation on 10/29/20, and cardioversion x 6 (9/11/20, 7/30/21, 2/14/22, 10/25/22, 12/14/22 and 2/24/23) on Xarelto, aortic aneurysm and mitral valve repair in June 2021, who was admitted to the hospital for CHF exacerbation, requiring inotropic support. The patient had his anticoagulation held from 8/16-8/21 for a right heart cath, which showed increased biventricular filling pressures. The patient was diuresed and transitioned to PO diuretics. Inotropes have been weaned off and patient currently reports improvement in his breathing. Echo this admission EF of 36%, severe MR, mod-severe TR and severely dilated LA. EP was consulted due to concern for frequent v-pacing seen on telemetry and management of atrial flutter.    1. Atrial flutter  2. ICM, EF of 36%    - Interrogation of patient's ICD shows underlying rhythm of atrial flutter with ventricular rate of 68 bpm. Please see interrogation report for further details  - Patient is currently v-pacing because the AMS backup rate on his device is set to 80 bpm. Patient has been in persistent flutter since 8/3/23. AMS base rate lowered to 60 bpm to minimize v-pacing  - Recommend restoration of sinus rhythm through WARREN/DCCV. WARREN indicated as patient was off of his anticoagulation from 8/16-8/21  - Needs to be NPO after midnight  - Start Amio post cardioversion  - Will need to be on uninterrupted AC for at least 30 days post cardioversion  - Continue telemetry monitoring  - Monitor electrolytes and replete as needed    Patient was seen with Dr. Gonzalez and plan outlined as above

## 2023-08-28 NOTE — CONSULT NOTE ADULT - NS ATTEND AMEND GEN_ALL_CORE FT
Seen, examined with, formulated plan with and  agree with above as scribed by NP Anabelle [Frances]     pt with hx of ckd baseline cr 1.9 range now with admission for sob.     + JVD  heart RRR II/VI M   lungs decrease bs no rales   ext 2+  edema     SURESH on CKD   CHF  MVR     cr high in setting of decompensated chf   diurese   keep O> I   admission bun has been high on admission as well   bumex 2 mg iv  bid given concern for very high bun leading to pre renal azotemia   strict I/O  d/w CTS team
The patient's admission ECG demonstrated atrial flutter. The mode switch LRL on his device was 80bpm. This was changed to 60bpm today. The patient would benefit from a rhythm control strategy for his atrial flutter (TCL: 300ms). The patient has been in AFl since 8/3/2023 - this likely contributed significantly to his presentation. The patient was previously treated with Amiodarone - he reports he felt fatigued on this medication. Given his cardiomyopathy and renal function Amiodarone is the only AAD available. The patient has severe MR and a severely dilated LA (FANY: 80). I spoke to his Electrophysiologist from [facility] - Dr. Tsang who has performed two prior VT ablations on the patient (7/29/2022 and 2/14/2019) and one AF ablation (10/29/2020) along with four cardioversions for AF since 2/2022. He agreed with resumption of Amiodarone.    Please keep the pt NPO after MN for WARREN (as a/c was held this admission for a RHC for 5 days) and DCCV with a plan to reinitiate Amiodarone post-DCCV.    KAYLAN Gonzalez
82/M with CAD s/p PCI, HFrEF ef 35-40%, Bio AVR, Aortic Aneurysm repair in 2021, CKD 3(single kidney), with severe MR being evaluated for APOLLO (TMVR trial) not admitted with worsening SOB, LE swelling.   Pt reports last week his Leg swelling hasn't decreased even with increased dose of torsemide. Has been sleeping in recliner due to orthopnea.     with Bumex IV 4 mg overnight had u/O > 2 lits and reports he walked 2 laps in the hallway w/o much SOB. this is much improved compared to prior.     Exam:  AAOx3.   JVP ~15cms  b/l creakles +  s1s2 Holosystolic murmur +  b/l 2+ pitting edema extending to b/l Knees    Labs/cxr noted.     Plan:  good response to Diuretics  will start Bumex IV 4mg BID  replete K > 4 and Mg >2  cont hydral/ metoprolol home dose  off [place] due to single kidney and CKD 3  will consider RHC once little more diuresed or if doest respond to diuresis    SURESH on CKD  creat up from 1.9-2.2 to 2.8  diuresis as above    plan d/w Structural and CTS  HF will cont follow

## 2023-08-28 NOTE — PROGRESS NOTE ADULT - PROBLEM SELECTOR PLAN 2
- Not a candidate for KRISTIE per Dr. Valentino given severe MAC  - Being considered for Tendyne at [facility] with Dr. Pierre

## 2023-08-28 NOTE — PROGRESS NOTE ADULT - ASSESSMENT
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 Patient reports worsening SOB x 2 days, with associated symptoms of fatigue, deconditioning and inability to walk long distance.  Presents today as a direct admission for CHF exacerbation and requiring medical optimization.       1- SURESH on CKDIV  2- CHF  3- severe MR  4- hypokalemia   5- Hypotension       SURESH in setting of decompensated CHF  cr is  worsened likely due to lower bp   however concern for decrease cardiac output   chf team follow up   iv albumin given today with improvement in bp   keep K > 3.5   hydralazine 35 mg TID  strict I/O  keep O>I  daily standing weight  2G sodium diet  trend creatinine and electrolytes closely

## 2023-08-28 NOTE — PROGRESS NOTE ADULT - PROBLEM SELECTOR PLAN 3
decrease toprol 25 qd   Daily BMP -monitor and supplement electrolytes  continue AC on Xarelto 15 daily - d/w Dr. Valentino decrease toprol 25 qd   Daily BMP -monitor and supplement electrolytes  continue AC on Xarelto 15 daily - d/w Dr. Valentino  EP consulted; AICD interregation; WARREN/ CV in am tue as per EP; d/w CHF and Dr. Fenton

## 2023-08-28 NOTE — CONSULT NOTE ADULT - SUBJECTIVE AND OBJECTIVE BOX
02/25/2020:  Continue monitoring blood count and platelet levels.  Monitor for bleeding  Consider Hematology-Oncology consult if patient does not improve or worsens  Repeat labs in the a.m. to include CBC, CMP, magnesium, phosphate     CHIEF COMPLAINT: Shortness of breath    HISTORY OF PRESENT ILLNESS:  Patient is an 83 y/o male with PMH of HTN, HLD, GERD, HFrEF, EF of 35-40%, CAD, ICM, bioprosthetic aortic valve replacement in 2004, VT/VF arrest in 2017 s/p Abbott dual chamber ICD implant, prior VT ablation at [facility], atrophic kidney, atrial fibrillation, s/p ablation, on Xarelto, aortic aneurysm and mitral valve repair in June 2021, who was admitted to the hospital for CHF exacerbation, requiring inotropic support. The patient had his anticoagulation held from 8/16-8/21 for a right heart cath, which showed increased biventricular filling pressures.    Allergies  No Known Allergies  Intolerances      MEDICATIONS:  hydrALAZINE 25 milliGRAM(s) Oral <User Schedule>  isosorbide   dinitrate Tablet (ISORDIL) 10 milliGRAM(s) Oral <User Schedule>  rivaroxaban 15 milliGRAM(s) Oral daily  spironolactone 25 milliGRAM(s) Oral daily  acetaminophen     Tablet .. 650 milliGRAM(s) Oral every 6 hours PRN  melatonin 3 milliGRAM(s) Oral at bedtime PRN  ondansetron Injectable 4 milliGRAM(s) IV Push every 8 hours PRN  aluminum hydroxide/magnesium hydroxide/simethicone Suspension 30 milliLiter(s) Oral every 4 hours PRN  pantoprazole    Tablet 40 milliGRAM(s) Oral before breakfast  polyethylene glycol 3350 17 Gram(s) Oral daily  senna 2 Tablet(s) Oral at bedtime  atorvastatin 40 milliGRAM(s) Oral at bedtime  levothyroxine 50 MICROGram(s) Oral daily  sodium chloride 0.9% lock flush 3 milliLiter(s) IV Push every 8 hours    PAST MEDICAL & SURGICAL HISTORY:  Aortic stenosis  AICD (automatic cardioverter/defibrillator) present  Aortic valve regurgitation  Ascending aorta dilation  H/O paroxysmal supraventricular tachycardia  HLD (hyperlipidemia)  Mitral valve regurgitation  Cardiac arrest  Severe mitral regurgitation  S/P aortic valve replacement  3/13/2004  S/P hernia repair  2002  History of tonsillectomy and adenoidectomy  S/P TURP  1996  S/P colonoscopy  2001, 2002, 2006, 2011, 2016  S/P endoscopy  2006  S/P cardiac cath  1994, 1995, 1999, 2002, 2004  AICD (automatic cardioverter/defibrillator) present  12/19/17  Cardiac pacemaker  12/19/17  History of cardioversion  9/11/20  S/P ablation of atrial fibrillation  10/29/20  Status post ablation of ventricular arrhythmia  2/14/19    REVIEW OF SYSTEMS:  See HPI. All ROS negative unless otherwise noted    PHYSICAL EXAM:  T(C): 36.7 (08-28-23 @ 11:59), Max: 36.7 (08-27-23 @ 15:43)  HR: 80 (08-28-23 @ 11:59) (80 - 99)  BP: 94/62 (08-28-23 @ 11:59) (78/52 - 96/63)  RR: 18 (08-28-23 @ 11:59) (18 - 18)  SpO2: 95% (08-28-23 @ 11:59) (92% - 98%)  Wt(kg): --  I&O's Summary    27 Aug 2023 07:01  -  28 Aug 2023 07:00  --------------------------------------------------------  IN: 729.6 mL / OUT: 1850 mL / NET: -1120.4 mL    28 Aug 2023 07:01  -  28 Aug 2023 13:50  --------------------------------------------------------  IN: 240 mL / OUT: 300 mL / NET: -60 mL    Appearance: Alert. NAD	  HEENT:   NC/AT	  Cardiovascular: +S1S2 RRR  Respiratory: CTA B/L	  Psychiatry: A & O x 3, Mood & affect appropriate  Neurologic: Non-focal  Extremities: No edema BLE  Vascular: Peripheral pulses palpable 2+ bilaterally    LABS:	 	  CBC Full  -  ( 28 Aug 2023 07:31 )  WBC Count : 6.08 K/uL  Hemoglobin : 11.9 g/dL  Hematocrit : 36.0 %  Platelet Count - Automated : 129 K/uL  Mean Cell Volume : 88.7 fl  Mean Cell Hemoglobin : 29.3 pg  Mean Cell Hemoglobin Concentration : 33.1 gm/dL  Auto Neutrophil # : x  Auto Lymphocyte # : x  Auto Monocyte # : x  Auto Eosinophil # : x  Auto Basophil # : x  Auto Neutrophil % : x  Auto Lymphocyte % : x  Auto Monocyte % : x  Auto Eosinophil % : x  Auto Basophil % : x    08-28    135  |  93<L>  |  87<H>  ----------------------------<  72  4.0   |  26  |  2.99<H>  08-27    135  |  93<L>  |  78<H>  ----------------------------<  85  3.7   |  25  |  2.39<H>    Ca    9.5      28 Aug 2023 07:31  Ca    9.9      27 Aug 2023 06:19    TPro  6.9  /  Alb  4.0  /  TBili  1.6<H>  /  DBili  x   /  AST  50<H>  /  ALT  42  /  AlkPhos  200<H>  08-28  TPro  7.2  /  Alb  4.1  /  TBili  1.6<H>  /  DBili  x   /  AST  52<H>  /  ALT  43  /  AlkPhos  191<H>  08-27    TELEMETRY: Aflutter, V paced 80 bpm	      PREVIOUS DIAGNOSTIC TESTING:    Echocardiogram:  TRANSTHORACIC ECHOCARDIOGRAM REPORT  ________________________________________________________________________________                                      _______       Pt. Name:       YAYA BHATIA Study Date:    8/17/2023  MRN:            [id number]       YOB: 1940  Accession #:    [id number]       Age:           82 years  Account#:       [id number]    Gender:        M  Heart Rate:     80 bpm          Height:        69.00 in (175.26 cm)  Rhythm:                         Weight:        162.00 lb (73.48 kg)  Blood Pressure: 91/60 mmHg      BSA/BMI:       1.89 m² / 23.92 kg/m²  ________________________________________________________________________________________  Referring Physician:    [id number] Noe Fenton  Interpreting Physician: Britton Carrizales  Primary Sonographer:    Nolberto Waite DANAE    CPT:               ECHO TTE WO CON COMP W DOPP - 68893.m; MYOCARDIAL STRAIN                     IMAGING - 69981.m  Indication(s):     Nonrheumatic aortic valve disorder, unspecified - I35.9  Procedure:         Transthoracic echocardiogram with 2-D, M-mode and complete                     spectral and color flow Doppler. Strain imaging performed for                     evaluation of regional and global myocardial shapeand                     dimensions.  Ordering Location: [facility]  Admission Status:  Inpatient  Study Information: Image quality for this study is good.    _______________________________________________________________________________________     CONCLUSIONS:      1. Left ventricular systolic function is severely decreased with an ejection fraction of 36 % by 3D.   2. There is moderate (grade 2) left ventricular diastolic dysfunction.   3. Left ventricular global longitudinal strain is -8.5 % is abnormal (> -16%). Images were acquired on a [employer] ultrasound system and processed using [employer] strain analysis software with a heart rate of 80 bpm and a blood pressure of 91/60 mmHg.   4. Mildly enlarged right ventricular cavity size and reduced systolic right ventricular function. The tricuspid annular plane systolic excursion (TAPSE) is 1.6 cm (normal >=1.7 cm).   5. Device lead is visualized in the right ventricle.   6. A bioprosthetic valve replacement present in the aortic position. Mild intravalvular regurgitation No paravalvular regurgitation.   7. Severe mitral regurgitation at a blood pressure of 91/60 mmHg.   8. Moderate-severe tricuspid regurgitation.   9. No pericardial effusion seen.  10. Pacer lead noted in the right atrium.  11. Compared to the transthoracic echocardiogram performed on 4/10/2023 there have been no significant interval changes.    ________________________________________________________________________________________  FINDINGS:     Left Ventricle:  Abnormal (paradoxical) septal motion consistent with right ventricular pacemaker. Left ventricular systolic function is severely decreased with a calculated ejection fraction of 36 % by 3D36 % by 3D. There is moderate (grade 2) left ventricular diastolic dysfunction. Severe left ventricular hypertrophy. Left ventricular global longitudinal strain is -8.5 % is abnormal (> -16%). Images were acquired on a [employer] ultrasound system and processed using [employer] strain analysis software with a heart rate of 80bpm and a blood pressure of 91/60 mmHg.     Right Ventricle:  Mildly enlarged right ventricular cavity size and reduced right ventricular systolic function. Tricuspid annular plane systolic excursion (TAPSE) is 1.6 cm (normal >=1.7 cm). A device leadis visualized in the right ventricle.     Left Atrium:  The left atrium is severely dilated in size with an indexed volume of 80.48 ml/m².     Right Atrium:  The right atrium is dilated in size. There is a pacer lead seen in the right atrium.     Aortic Valve:  A bioprosthetic valve replacement present in the aortic position. The effective orifice area (by Continuity Equation) is 0.88 cm² (indexed area = 0.46 cm²/m²) with a Doppler velocity index of 0.30. The peak veloicty is 2.34 m/s, peak gradient is 21.9 mmHg and mean gradient is 12.0 mmHg. There is mild intravalvular regurgitation. There is no paravalvular regurgitation.     Mitral Valve:  There is mitral valve thickening of the anterior and posterior leaflets. There is moderate calcification of the mitral valve annulus. There is severe mitral regurgitation at a blood pressure of 91/60 mmHg.     Tricuspid Valve:  There is moderate-severe tricuspid regurgitation. The PA systolic pressure is at least10 mmHg.     Pulmonic Valve:  There is mild pulmonic regurgitation.     Pericardium:  No pericardial effusion seen.  ____________________________________________________________________  Quantitative Data:  Left Ventricle Measurements: (Indexed to BSA)     IVSd (2D):   1.4 cm  LVPWd (2D):  1.6 cm  LVIDd (2D):  5.0 cm  LVIDs (2D):  4.4 cm  LV Mass:     316 g  167.4 g/m²  3D LV EF%: 36 %     MV E Vmax:    1.77 m/s  MV A Vmax:    0.78 m/s  MV E/A:       2.26  e' lateral:   12.00 cm/s  e' medial:    7.36 cm/s  E/e' lateral: 14.75  E/e' medial:  24.05  E/e' Average: 18.29  MV DT:        229 msec  IVRT:         151.0 msec    Aorta Measurements: (normal range) (Indexed to BSA)     Sinuses of Valsalva: 3.55 cm (3.1 - 3.7 cm)cm  Ao ST Junct:         3.0 cm       Left Atrium Measurements: (Indexed to BSA)  LA Diam 2D: 5.20 cm    Right Ventricle Measurements: Right Atrial Measurements:     TAPSE:            1.6 cm      RA Randall Axis A4C: 7.5  TV [place]. S':       7.29 cm/s  RV Base (RVID1):  5.1 cm  RV Mid (RVID2):   4.4 cm  RV Major (RVID3): 7.7 cm       LVOT / RVOT/ Qp/Qs Data: (Indexed to BSA)  LVOT Diameter: 2.00 cm  LVOT Vmax:     0.71 m/s  LVOT VTI:      13.10 cm  LVOT SV:       41.2 ml  21.79 ml/m²    Aortic Valve Measurements:  AV Vmax:           2.3 m/s  AV Peak Gradient:  21.9 mmHg  AV Mean Gradient:  12.0 mmHg  AV VTI:            47.0 cm  AV VTI Ratio:      0.28  AoV EOA, Contin:   0.88 cm²  AoV EOA, Contin i: 0.46 cm²/m²    Mitral Valve Measurements:     MV Vmax:      1.77 m/s     MR Vmax:          3.88 m/s  MV Mean Grad:4.00 mmHg    MR Peak Gradient: 60.2 mmHg  MV Peak Grad: 12.5 mmHg  MV E Vmax:    1.8 m/s  MV A Vmax:    0.8 m/s  MV E/A:       2.3       Tricuspid Valve Measurements:     TR Vmax:          3.9 m/s  TR Peak Gradient: 59.6 mmHg       ________________________________________________________________________________________  Electronically signed on 8/20/2023 at 10:29:14 PM by Britton Carrizales   CHIEF COMPLAINT: Shortness of breath    HISTORY OF PRESENT ILLNESS:  Patient is an 83 y/o male with PMH of HTN, HLD, GERD, HFrEF, EF of 35-40%, CAD, ICM, bioprosthetic aortic valve replacement in 2004, VT/VF arrest in 2017 s/p Abbott dual chamber ICD implant, prior VT ablation at [facility], atrophic kidney, atrial fibrillation, s/p ablation, on Xarelto, aortic aneurysm and mitral valve repair in June 2021, who was admitted to the hospital for CHF exacerbation, requiring inotropic support. The patient had his anticoagulation held from 8/16-8/21 for a right heart cath, which showed increased biventricular filling pressures. The patient was diuresed and transitioned to PO diuretics. Inotropes have been weaned off and patient currently reports improvement in his breathing. Echo this admission EF of 36%, severe MR, mod-severe TR and severely dilated LA. EP was consulted due to concern for frequent v-pacing seen on telemetry and management of atrial flutter.    Allergies  No Known Allergies  Intolerances    MEDICATIONS:  hydrALAZINE 25 milliGRAM(s) Oral <User Schedule>  isosorbide   dinitrate Tablet (ISORDIL) 10 milliGRAM(s) Oral <User Schedule>  rivaroxaban 15 milliGRAM(s) Oral daily  spironolactone 25 milliGRAM(s) Oral daily  acetaminophen     Tablet .. 650 milliGRAM(s) Oral every 6 hours PRN  melatonin 3 milliGRAM(s) Oral at bedtime PRN  ondansetron Injectable 4 milliGRAM(s) IV Push every 8 hours PRN  aluminum hydroxide/magnesium hydroxide/simethicone Suspension 30 milliLiter(s) Oral every 4 hours PRN  pantoprazole    Tablet 40 milliGRAM(s) Oral before breakfast  polyethylene glycol 3350 17 Gram(s) Oral daily  senna 2 Tablet(s) Oral at bedtime  atorvastatin 40 milliGRAM(s) Oral at bedtime  levothyroxine 50 MICROGram(s) Oral daily  sodium chloride 0.9% lock flush 3 milliLiter(s) IV Push every 8 hours    PAST MEDICAL & SURGICAL HISTORY:  Aortic stenosis  AICD (automatic cardioverter/defibrillator) present  Aortic valve regurgitation  Ascending aorta dilation  H/O paroxysmal supraventricular tachycardia  HLD (hyperlipidemia)  Mitral valve regurgitation  Cardiac arrest  Severe mitral regurgitation  S/P aortic valve replacement  3/13/2004  S/P hernia repair  2002  History of tonsillectomy and adenoidectomy  S/P TURP  1996  S/P colonoscopy  2001, 2002, 2006, 2011, 2016  S/P endoscopy  2006  S/P cardiac cath  1994, 1995, 1999, 2002, 2004  AICD (automatic cardioverter/defibrillator) present  12/19/17  Cardiac pacemaker  12/19/17  History of cardioversion  9/11/20  S/P ablation of atrial fibrillation  10/29/20  Status post ablation of ventricular arrhythmia  2/14/19    REVIEW OF SYSTEMS:  See HPI. All ROS negative unless otherwise noted    PHYSICAL EXAM:  T(C): 36.7 (08-28-23 @ 11:59), Max: 36.7 (08-27-23 @ 15:43)  HR: 80 (08-28-23 @ 11:59) (80 - 99)  BP: 94/62 (08-28-23 @ 11:59) (78/52 - 96/63)  RR: 18 (08-28-23 @ 11:59) (18 - 18)  SpO2: 95% (08-28-23 @ 11:59) (92% - 98%)  Wt(kg): --  I&O's Summary    27 Aug 2023 07:01  -  28 Aug 2023 07:00  --------------------------------------------------------  IN: 729.6 mL / OUT: 1850 mL / NET: -1120.4 mL    28 Aug 2023 07:01  -  28 Aug 2023 13:50  --------------------------------------------------------  IN: 240 mL / OUT: 300 mL / NET: -60 mL    Appearance: Alert. NAD	  HEENT:   NC/AT	  Cardiovascular: +S1S2 RRR  Respiratory: CTA B/L	  Psychiatry: A & O x 3, Mood & affect appropriate  Neurologic: Non-focal  Extremities: No edema BLE  Vascular: Peripheral pulses palpable 2+ bilaterally    LABS:	 	  CBC Full  -  ( 28 Aug 2023 07:31 )  WBC Count : 6.08 K/uL  Hemoglobin : 11.9 g/dL  Hematocrit : 36.0 %  Platelet Count - Automated : 129 K/uL  Mean Cell Volume : 88.7 fl  Mean Cell Hemoglobin : 29.3 pg  Mean Cell Hemoglobin Concentration : 33.1 gm/dL  Auto Neutrophil # : x  Auto Lymphocyte # : x  Auto Monocyte # : x  Auto Eosinophil # : x  Auto Basophil # : x  Auto Neutrophil % : x  Auto Lymphocyte % : x  Auto Monocyte % : x  Auto Eosinophil % : x  Auto Basophil % : x    08-28    135  |  93<L>  |  87<H>  ----------------------------<  72  4.0   |  26  |  2.99<H>  08-27    135  |  93<L>  |  78<H>  ----------------------------<  85  3.7   |  25  |  2.39<H>    Ca    9.5      28 Aug 2023 07:31  Ca    9.9      27 Aug 2023 06:19    TPro  6.9  /  Alb  4.0  /  TBili  1.6<H>  /  DBili  x   /  AST  50<H>  /  ALT  42  /  AlkPhos  200<H>  08-28  TPro  7.2  /  Alb  4.1  /  TBili  1.6<H>  /  DBili  x   /  AST  52<H>  /  ALT  43  /  AlkPhos  191<H>  08-27    TELEMETRY: Aflutter, V paced 80 bpm	      PREVIOUS DIAGNOSTIC TESTING:    Echocardiogram:  TRANSTHORACIC ECHOCARDIOGRAM REPORT  ________________________________________________________________________________                                      _______       Pt. Name:       YAYA BHATIA Study Date:    8/17/2023  MRN:            [id number]       YOB: 1940  Accession #:    [id number]       Age:           82 years  Account#:       [id number]    Gender:        M  Heart Rate:     80 bpm          Height:        69.00 in (175.26 cm)  Rhythm:                         Weight:        162.00 lb (73.48 kg)  Blood Pressure: 91/60 mmHg      BSA/BMI:       1.89 m² / 23.92 kg/m²  ________________________________________________________________________________________  Referring Physician:    [id number] Noe Fenton  Interpreting Physician: Britton Carrizales  Primary Sonographer:    Nolberto Waite RDCS    CPT:               ECHO TTE WO CON COMP W DOPP - 22744.m; MYOCARDIAL STRAIN                     IMAGING - 76156.m  Indication(s):     Nonrheumatic aortic valve disorder, unspecified - I35.9  Procedure:         Transthoracic echocardiogram with 2-D, M-mode and complete                     spectral and color flow Doppler. Strain imaging performed for                     evaluation of regional and global myocardial shapeand                     dimensions.  Ordering Location: [facility]  Admission Status:  Inpatient  Study Information: Image quality for this study is good.    _______________________________________________________________________________________     CONCLUSIONS:      1. Left ventricular systolic function is severely decreased with an ejection fraction of 36 % by 3D.   2. There is moderate (grade 2) left ventricular diastolic dysfunction.   3. Left ventricular global longitudinal strain is -8.5 % is abnormal (> -16%). Images were acquired on a [employer] ultrasound system and processed using [employer] strain analysis software with a heart rate of 80 bpm and a blood pressure of 91/60 mmHg.   4. Mildly enlarged right ventricular cavity size and reduced systolic right ventricular function. The tricuspid annular plane systolic excursion (TAPSE) is 1.6 cm (normal >=1.7 cm).   5. Device lead is visualized in the right ventricle.   6. A bioprosthetic valve replacement present in the aortic position. Mild intravalvular regurgitation No paravalvular regurgitation.   7. Severe mitral regurgitation at a blood pressure of 91/60 mmHg.   8. Moderate-severe tricuspid regurgitation.   9. No pericardial effusion seen.  10. Pacer lead noted in the right atrium.  11. Compared to the transthoracic echocardiogram performed on 4/10/2023 there have been no significant interval changes.    ________________________________________________________________________________________  FINDINGS:     Left Ventricle:  Abnormal (paradoxical) septal motion consistent with right ventricular pacemaker. Left ventricular systolic function is severely decreased with a calculated ejection fraction of 36 % by 3D36 % by 3D. There is moderate (grade 2) left ventricular diastolic dysfunction. Severe left ventricular hypertrophy. Left ventricular global longitudinal strain is -8.5 % is abnormal (> -16%). Images were acquired on a [employer] ultrasound system and processed using [employer] strain analysis software with a heart rate of 80bpm and a blood pressure of 91/60 mmHg.     Right Ventricle:  Mildly enlarged right ventricular cavity size and reduced right ventricular systolic function. Tricuspid annular plane systolic excursion (TAPSE) is 1.6 cm (normal >=1.7 cm). A device leadis visualized in the right ventricle.     Left Atrium:  The left atrium is severely dilated in size with an indexed volume of 80.48 ml/m².     Right Atrium:  The right atrium is dilated in size. There is a pacer lead seen in the right atrium.     Aortic Valve:  A bioprosthetic valve replacement present in the aortic position. The effective orifice area (by Continuity Equation) is 0.88 cm² (indexed area = 0.46 cm²/m²) with a Doppler velocity index of 0.30. The peak veloicty is 2.34 m/s, peak gradient is 21.9 mmHg and mean gradient is 12.0 mmHg. There is mild intravalvular regurgitation. There is no paravalvular regurgitation.     Mitral Valve:  There is mitral valve thickening of the anterior and posterior leaflets. There is moderate calcification of the mitral valve annulus. There is severe mitral regurgitation at a blood pressure of 91/60 mmHg.     Tricuspid Valve:  There is moderate-severe tricuspid regurgitation. The PA systolic pressure is at least10 mmHg.     Pulmonic Valve:  There is mild pulmonic regurgitation.     Pericardium:  No pericardial effusion seen.  ____________________________________________________________________  Quantitative Data:  Left Ventricle Measurements: (Indexed to BSA)     IVSd (2D):   1.4 cm  LVPWd (2D):  1.6 cm  LVIDd (2D):  5.0 cm  LVIDs (2D):  4.4 cm  LV Mass:     316 g  167.4 g/m²  3D LV EF%: 36 %     MV E Vmax:    1.77 m/s  MV A Vmax:    0.78 m/s  MV E/A:       2.26  e' lateral:   12.00 cm/s  e' medial:    7.36 cm/s  E/e' lateral: 14.75  E/e' medial:  24.05  E/e' Average: 18.29  MV DT:        229 msec  IVRT:         151.0 msec    Aorta Measurements: (normal range) (Indexed to BSA)     Sinuses of Valsalva: 3.55 cm (3.1 - 3.7 cm)cm  Ao ST Junct:         3.0 cm       Left Atrium Measurements: (Indexed to BSA)  LA Diam 2D: 5.20 cm    Right Ventricle Measurements: Right Atrial Measurements:     TAPSE:            1.6 cm      RA Randall Axis A4C: 7.5  TV [place]. S':       7.29 cm/s  RV Base (RVID1):  5.1 cm  RV Mid (RVID2):   4.4 cm  RV Major (RVID3): 7.7 cm       LVOT / RVOT/ Qp/Qs Data: (Indexed to BSA)  LVOT Diameter: 2.00 cm  LVOT Vmax:     0.71 m/s  LVOT VTI:      13.10 cm  LVOT SV:       41.2 ml  21.79 ml/m²    Aortic Valve Measurements:  AV Vmax:           2.3 m/s  AV Peak Gradient:  21.9 mmHg  AV Mean Gradient:  12.0 mmHg  AV VTI:            47.0 cm  AV VTI Ratio:      0.28  AoV EOA, Contin:   0.88 cm²  AoV EOA, Contin i: 0.46 cm²/m²    Mitral Valve Measurements:     MV Vmax:      1.77 m/s     MR Vmax:          3.88 m/s  MV Mean Grad:4.00 mmHg    MR Peak Gradient: 60.2 mmHg  MV Peak Grad: 12.5 mmHg  MV E Vmax:    1.8 m/s  MV A Vmax:    0.8 m/s  MV E/A:       2.3       Tricuspid Valve Measurements:     TR Vmax:          3.9 m/s  TR Peak Gradient: 59.6 mmHg       ________________________________________________________________________________________  Electronically signed on 8/20/2023 at 10:29:14 PM by Britton Carrizales   CHIEF COMPLAINT: Shortness of breath    HISTORY OF PRESENT ILLNESS:  Patient is an 83 y/o male with PMH of HTN, HLD, GERD, HFrEF, EF of 35-40%, CAD, ICM, bioprosthetic aortic valve replacement in 2004, VT/VF arrest in 2017 s/p Abbott dual chamber ICD implant, prior VT ablation x 3 at [facility] (2/14/19, 10/22/19, 7/29/22), atrophic kidney, atrial fibrillation, s/p ablation on 10/29/20, and cardioversion x 6 (9/11/20, 7/30/21, 2/14/22, 10/25/22, 12/14/22 and 2/24/23) on Xarelto, aortic aneurysm and mitral valve repair in June 2021, who was admitted to the hospital for CHF exacerbation, requiring inotropic support. The patient had his anticoagulation held from 8/16-8/21 for a right heart cath, which showed increased biventricular filling pressures. The patient was diuresed and transitioned to PO diuretics. Inotropes have been weaned off and patient currently reports improvement in his breathing. Echo this admission EF of 36%, severe MR, mod-severe TR and severely dilated LA. EP was consulted due to concern for frequent v-pacing seen on telemetry and management of atrial flutter.    Allergies  No Known Allergies  Intolerances    MEDICATIONS:  hydrALAZINE 25 milliGRAM(s) Oral <User Schedule>  isosorbide   dinitrate Tablet (ISORDIL) 10 milliGRAM(s) Oral <User Schedule>  rivaroxaban 15 milliGRAM(s) Oral daily  spironolactone 25 milliGRAM(s) Oral daily  acetaminophen     Tablet .. 650 milliGRAM(s) Oral every 6 hours PRN  melatonin 3 milliGRAM(s) Oral at bedtime PRN  ondansetron Injectable 4 milliGRAM(s) IV Push every 8 hours PRN  aluminum hydroxide/magnesium hydroxide/simethicone Suspension 30 milliLiter(s) Oral every 4 hours PRN  pantoprazole    Tablet 40 milliGRAM(s) Oral before breakfast  polyethylene glycol 3350 17 Gram(s) Oral daily  senna 2 Tablet(s) Oral at bedtime  atorvastatin 40 milliGRAM(s) Oral at bedtime  levothyroxine 50 MICROGram(s) Oral daily  sodium chloride 0.9% lock flush 3 milliLiter(s) IV Push every 8 hours    PAST MEDICAL & SURGICAL HISTORY:  Aortic stenosis  AICD (automatic cardioverter/defibrillator) present  Aortic valve regurgitation  Ascending aorta dilation  H/O paroxysmal supraventricular tachycardia  HLD (hyperlipidemia)  Mitral valve regurgitation  Cardiac arrest  Severe mitral regurgitation  S/P aortic valve replacement  3/13/2004  S/P hernia repair  2002  History of tonsillectomy and adenoidectomy  S/P TURP  1996  S/P colonoscopy  2001, 2002, 2006, 2011, 2016  S/P endoscopy  2006  S/P cardiac cath  1994, 1995, 1999, 2002, 2004  AICD (automatic cardioverter/defibrillator) present  12/19/17  Cardiac pacemaker  12/19/17  History of cardioversion  9/11/20  S/P ablation of atrial fibrillation  10/29/20  Status post ablation of ventricular arrhythmia  2/14/19    REVIEW OF SYSTEMS:  See HPI. All ROS negative unless otherwise noted    PHYSICAL EXAM:  T(C): 36.7 (08-28-23 @ 11:59), Max: 36.7 (08-27-23 @ 15:43)  HR: 80 (08-28-23 @ 11:59) (80 - 99)  BP: 94/62 (08-28-23 @ 11:59) (78/52 - 96/63)  RR: 18 (08-28-23 @ 11:59) (18 - 18)  SpO2: 95% (08-28-23 @ 11:59) (92% - 98%)  Wt(kg): --  I&O's Summary    27 Aug 2023 07:01  -  28 Aug 2023 07:00  --------------------------------------------------------  IN: 729.6 mL / OUT: 1850 mL / NET: -1120.4 mL    28 Aug 2023 07:01  -  28 Aug 2023 13:50  --------------------------------------------------------  IN: 240 mL / OUT: 300 mL / NET: -60 mL    Appearance: Alert. NAD	  HEENT:   NC/AT	  Cardiovascular: +S1S2 RRR  Respiratory: CTA B/L	  Psychiatry: A & O x 3, Mood & affect appropriate  Neurologic: Non-focal  Extremities: No edema BLE  Vascular: Peripheral pulses palpable 2+ bilaterally    LABS:	 	  CBC Full  -  ( 28 Aug 2023 07:31 )  WBC Count : 6.08 K/uL  Hemoglobin : 11.9 g/dL  Hematocrit : 36.0 %  Platelet Count - Automated : 129 K/uL  Mean Cell Volume : 88.7 fl  Mean Cell Hemoglobin : 29.3 pg  Mean Cell Hemoglobin Concentration : 33.1 gm/dL  Auto Neutrophil # : x  Auto Lymphocyte # : x  Auto Monocyte # : x  Auto Eosinophil # : x  Auto Basophil # : x  Auto Neutrophil % : x  Auto Lymphocyte % : x  Auto Monocyte % : x  Auto Eosinophil % : x  Auto Basophil % : x    08-28    135  |  93<L>  |  87<H>  ----------------------------<  72  4.0   |  26  |  2.99<H>  08-27    135  |  93<L>  |  78<H>  ----------------------------<  85  3.7   |  25  |  2.39<H>    Ca    9.5      28 Aug 2023 07:31  Ca    9.9      27 Aug 2023 06:19    TPro  6.9  /  Alb  4.0  /  TBili  1.6<H>  /  DBili  x   /  AST  50<H>  /  ALT  42  /  AlkPhos  200<H>  08-28  TPro  7.2  /  Alb  4.1  /  TBili  1.6<H>  /  DBili  x   /  AST  52<H>  /  ALT  43  /  AlkPhos  191<H>  08-27    TELEMETRY: Aflutter, V paced 80 bpm	      PREVIOUS DIAGNOSTIC TESTING:    Echocardiogram:  TRANSTHORACIC ECHOCARDIOGRAM REPORT  ________________________________________________________________________________                                      _______       Pt. Name:       YAYA BHATIA Study Date:    8/17/2023  MRN:            [id number]       YOB: 1940  Accession #:    [id number]       Age:           82 years  Account#:       [id number]    Gender:        M  Heart Rate:     80 bpm          Height:        69.00 in (175.26 cm)  Rhythm:                         Weight:        162.00 lb (73.48 kg)  Blood Pressure: 91/60 mmHg      BSA/BMI:       1.89 m² / 23.92 kg/m²  ________________________________________________________________________________________  Referring Physician:    [id number] Noe Fenton  Interpreting Physician: Britton Carrizales  Primary Sonographer:    Nolberto Waite RDCS    CPT:               ECHO TTE WO CON COMP W DOPP - 44897.m; MYOCARDIAL STRAIN                     IMAGING - 00720.m  Indication(s):     Nonrheumatic aortic valve disorder, unspecified - I35.9  Procedure:         Transthoracic echocardiogram with 2-D, M-mode and complete                     spectral and color flow Doppler. Strain imaging performed for                     evaluation of regional and global myocardial shapeand                     dimensions.  Ordering Location: [facility]  Admission Status:  Inpatient  Study Information: Image quality for this study is good.    _______________________________________________________________________________________     CONCLUSIONS:      1. Left ventricular systolic function is severely decreased with an ejection fraction of 36 % by 3D.   2. There is moderate (grade 2) left ventricular diastolic dysfunction.   3. Left ventricular global longitudinal strain is -8.5 % is abnormal (> -16%). Images were acquired on a "[employer]" ultrasound system and processed using [employer] strain analysis software with a heart rate of 80 bpm and a blood pressure of 91/60 mmHg.   4. Mildly enlarged right ventricular cavity size and reduced systolic right ventricular function. The tricuspid annular plane systolic excursion (TAPSE) is 1.6 cm (normal >=1.7 cm).   5. Device lead is visualized in the right ventricle.   6. A bioprosthetic valve replacement present in the aortic position. Mild intravalvular regurgitation No paravalvular regurgitation.   7. Severe mitral regurgitation at a blood pressure of 91/60 mmHg.   8. Moderate-severe tricuspid regurgitation.   9. No pericardial effusion seen.  10. Pacer lead noted in the right atrium.  11. Compared to the transthoracic echocardiogram performed on 4/10/2023 there have been no significant interval changes.    ________________________________________________________________________________________  FINDINGS:     Left Ventricle:  Abnormal (paradoxical) septal motion consistent with right ventricular pacemaker. Left ventricular systolic function is severely decreased with a calculated ejection fraction of 36 % by 3D36 % by 3D. There is moderate (grade 2) left ventricular diastolic dysfunction. Severe left ventricular hypertrophy. Left ventricular global longitudinal strain is -8.5 % is abnormal (> -16%). Images were acquired on a [employer] ultrasound system and processed using [employer] strain analysis software with a heart rate of 80bpm and a blood pressure of 91/60 mmHg.     Right Ventricle:  Mildly enlarged right ventricular cavity size and reduced right ventricular systolic function. Tricuspid annular plane systolic excursion (TAPSE) is 1.6 cm (normal >=1.7 cm). A device leadis visualized in the right ventricle.     Left Atrium:  The left atrium is severely dilated in size with an indexed volume of 80.48 ml/m².     Right Atrium:  The right atrium is dilated in size. There is a pacer lead seen in the right atrium.     Aortic Valve:  A bioprosthetic valve replacement present in the aortic position. The effective orifice area (by Continuity Equation) is 0.88 cm² (indexed area = 0.46 cm²/m²) with a Doppler velocity index of 0.30. The peak veloicty is 2.34 m/s, peak gradient is 21.9 mmHg and mean gradient is 12.0 mmHg. There is mild intravalvular regurgitation. There is no paravalvular regurgitation.     Mitral Valve:  There is mitral valve thickening of the anterior and posterior leaflets. There is moderate calcification of the mitral valve annulus. There is severe mitral regurgitation at a blood pressure of 91/60 mmHg.     Tricuspid Valve:  There is moderate-severe tricuspid regurgitation. The PA systolic pressure is at least10 mmHg.     Pulmonic Valve:  There is mild pulmonic regurgitation.     Pericardium:  No pericardial effusion seen.  ____________________________________________________________________  Quantitative Data:  Left Ventricle Measurements: (Indexed to BSA)     IVSd (2D):   1.4 cm  LVPWd (2D):  1.6 cm  LVIDd (2D):  5.0 cm  LVIDs (2D):  4.4 cm  LV Mass:     316 g  167.4 g/m²  3D LV EF%: 36 %     MV E Vmax:    1.77 m/s  MV A Vmax:    0.78 m/s  MV E/A:       2.26  e' lateral:   12.00 cm/s  e' medial:    7.36 cm/s  E/e' lateral: 14.75  E/e' medial:  24.05  E/e' Average: 18.29  MV DT:        229 msec  IVRT:         151.0 msec    Aorta Measurements: (normal range) (Indexed to BSA)     Sinuses of Valsalva: 3.55 cm (3.1 - 3.7 cm)cm  Ao ST Junct:         3.0 cm       Left Atrium Measurements: (Indexed to BSA)  LA Diam 2D: 5.20 cm    Right Ventricle Measurements: Right Atrial Measurements:     TAPSE:            1.6 cm      RA Randall Axis A4C: 7.5  TV [place]. S':       7.29 cm/s  RV Base (RVID1):  5.1 cm  RV Mid (RVID2):   4.4 cm  RV Major (RVID3): 7.7 cm       LVOT / RVOT/ Qp/Qs Data: (Indexed to BSA)  LVOT Diameter: 2.00 cm  LVOT Vmax:     0.71 m/s  LVOT VTI:      13.10 cm  LVOT SV:       41.2 ml  21.79 ml/m²    Aortic Valve Measurements:  AV Vmax:           2.3 m/s  AV Peak Gradient:  21.9 mmHg  AV Mean Gradient:  12.0 mmHg  AV VTI:            47.0 cm  AV VTI Ratio:      0.28  AoV EOA, Contin:   0.88 cm²  AoV EOA, Contin i: 0.46 cm²/m²    Mitral Valve Measurements:     MV Vmax:      1.77 m/s     MR Vmax:          3.88 m/s  MV Mean Grad:4.00 mmHg    MR Peak Gradient: 60.2 mmHg  MV Peak Grad: 12.5 mmHg  MV E Vmax:    1.8 m/s  MV A Vmax:    0.8 m/s  MV E/A:       2.3       Tricuspid Valve Measurements:     TR Vmax:          3.9 m/s  TR Peak Gradient: 59.6 mmHg       ________________________________________________________________________________________  Electronically signed on 8/20/2023 at 10:29:14 PM by Britton Carrizales   CHIEF COMPLAINT: Shortness of breath    HISTORY OF PRESENT ILLNESS:  Patient is an 81 y/o male with PMH of HTN, HLD, GERD, HFrEF, EF of 35-40%, CAD, ICM, bioprosthetic aortic valve replacement in 2004, VT/VF arrest in 2017 s/p Abbott dual chamber ICD implant, prior VT ablation x 3 at [facility] (2/14/19, 10/22/19, 7/29/22), atrophic kidney, atrial fibrillation, s/p ablation on 10/29/20, and cardioversion x 6 (9/11/20, 7/30/21, 2/14/22, 10/25/22, 12/14/22 and 2/24/23) on Xarelto, aortic aneurysm and mitral valve repair in June 2021, who was admitted to the hospital for CHF exacerbation, requiring inotropic support. The patient had his anticoagulation held from 8/16-8/21 for a right heart cath, which showed increased biventricular filling pressures. The patient was diuresed and transitioned to PO diuretics. Inotropes have been weaned off and patient currently reports improvement in his breathing. Echo this admission EF of 36%, severe MR, mod-severe TR and severely dilated LA. EP was consulted due to concern for "frequent ventricular pacing seen on telemetry".    Allergies  No Known Allergies  Intolerances    MEDICATIONS:  hydrALAZINE 25 milliGRAM(s) Oral <User Schedule>  isosorbide   dinitrate Tablet (ISORDIL) 10 milliGRAM(s) Oral <User Schedule>  rivaroxaban 15 milliGRAM(s) Oral daily  spironolactone 25 milliGRAM(s) Oral daily  acetaminophen     Tablet .. 650 milliGRAM(s) Oral every 6 hours PRN  melatonin 3 milliGRAM(s) Oral at bedtime PRN  ondansetron Injectable 4 milliGRAM(s) IV Push every 8 hours PRN  aluminum hydroxide/magnesium hydroxide/simethicone Suspension 30 milliLiter(s) Oral every 4 hours PRN  pantoprazole    Tablet 40 milliGRAM(s) Oral before breakfast  polyethylene glycol 3350 17 Gram(s) Oral daily  senna 2 Tablet(s) Oral at bedtime  atorvastatin 40 milliGRAM(s) Oral at bedtime  levothyroxine 50 MICROGram(s) Oral daily  sodium chloride 0.9% lock flush 3 milliLiter(s) IV Push every 8 hours    PAST MEDICAL & SURGICAL HISTORY:  Aortic stenosis  AICD (automatic cardioverter/defibrillator) present  Aortic valve regurgitation  Ascending aorta dilation  H/O paroxysmal supraventricular tachycardia  HLD (hyperlipidemia)  Mitral valve regurgitation  Cardiac arrest  Severe mitral regurgitation  S/P aortic valve replacement  3/13/2004  S/P hernia repair  2002  History of tonsillectomy and adenoidectomy  S/P TURP  1996  S/P colonoscopy  2001, 2002, 2006, 2011, 2016  S/P endoscopy  2006  S/P cardiac cath  1994, 1995, 1999, 2002, 2004  AICD (automatic cardioverter/defibrillator) present  12/19/17  Cardiac pacemaker  12/19/17  History of cardioversion  9/11/20  S/P ablation of atrial fibrillation  10/29/20  Status post ablation of ventricular arrhythmia  2/14/19    REVIEW OF SYSTEMS:  See HPI. All ROS negative unless otherwise noted    PHYSICAL EXAM:  T(C): 36.7 (08-28-23 @ 11:59), Max: 36.7 (08-27-23 @ 15:43)  HR: 80 (08-28-23 @ 11:59) (80 - 99)  BP: 94/62 (08-28-23 @ 11:59) (78/52 - 96/63)  RR: 18 (08-28-23 @ 11:59) (18 - 18)  SpO2: 95% (08-28-23 @ 11:59) (92% - 98%)  Wt(kg): --  I&O's Summary    27 Aug 2023 07:01  -  28 Aug 2023 07:00  --------------------------------------------------------  IN: 729.6 mL / OUT: 1850 mL / NET: -1120.4 mL    28 Aug 2023 07:01  -  28 Aug 2023 13:50  --------------------------------------------------------  IN: 240 mL / OUT: 300 mL / NET: -60 mL    Appearance: Alert. NAD	  HEENT:   NC/AT	  Cardiovascular: +S1S2 RRR  Respiratory: CTA B/L	  Psychiatry: A & O x 3, Mood & affect appropriate  Neurologic: Non-focal  Extremities: No edema BLE  Vascular: Peripheral pulses palpable 2+ bilaterally    LABS:	 	  CBC Full  -  ( 28 Aug 2023 07:31 )  WBC Count : 6.08 K/uL  Hemoglobin : 11.9 g/dL  Hematocrit : 36.0 %  Platelet Count - Automated : 129 K/uL  Mean Cell Volume : 88.7 fl  Mean Cell Hemoglobin : 29.3 pg  Mean Cell Hemoglobin Concentration : 33.1 gm/dL    08-28    135  |  93<L>  |  87<H>  ----------------------------<  72  4.0   |  26  |  2.99<H>  08-27    135  |  93<L>  |  78<H>  ----------------------------<  85  3.7   |  25  |  2.39<H>    Ca    9.5      28 Aug 2023 07:31  Ca    9.9      27 Aug 2023 06:19    TPro  6.9  /  Alb  4.0  /  TBili  1.6<H>  /  DBili  x   /  AST  50<H>  /  ALT  42  /  AlkPhos  200<H>  08-28  TPro  7.2  /  Alb  4.1  /  TBili  1.6<H>  /  DBili  x   /  AST  52<H>  /  ALT  43  /  AlkPhos  191<H>  08-27    TELEMETRY: Aflutter, V paced 80 bpm	      PREVIOUS DIAGNOSTIC TESTING:    Echocardiogram:  TRANSTHORACIC ECHOCARDIOGRAM REPORT  ________________________________________________________________________________                                      _______       Pt. Name:       YAYA BHATIA Study Date:    8/17/2023  MRN:            [id number]       YOB: 1940  Accession #:    [id number]       Age:           82 years  Account#:       [id number]    Gender:        M  Heart Rate:     80 bpm          Height:        69.00 in (175.26 cm)  Rhythm:                         Weight:        162.00 lb (73.48 kg)  Blood Pressure: 91/60 mmHg      BSA/BMI:       1.89 m² / 23.92 kg/m²  ________________________________________________________________________________________  Referring Physician:    [id number] Noe Fenton  Interpreting Physician: Britton Carrizales  Primary Sonographer:    Nolberto Waite RDCS    CPT:               ECHO TTE WO CON COMP W DOPP - 92531.m; MYOCARDIAL STRAIN                     IMAGING - 30715.m  Indication(s):     Nonrheumatic aortic valve disorder, unspecified - I35.9  Procedure:         Transthoracic echocardiogram with 2-D, M-mode and complete                     spectral and color flow Doppler. Strain imaging performed for                     evaluation of regional and global myocardial shapeand                     dimensions.  Ordering Location: [facility]  Admission Status:  Inpatient  Study Information: Image quality for this study is good.    _______________________________________________________________________________________     CONCLUSIONS:      1. Left ventricular systolic function is severely decreased with an ejection fraction of 36 % by 3D.   2. There is moderate (grade 2) left ventricular diastolic dysfunction.   3. Left ventricular global longitudinal strain is -8.5 % is abnormal (> -16%). Images were acquired on a [employer] ultrasound system and processed using [employer] strain analysis software with a heart rate of 80 bpm and a blood pressure of 91/60 mmHg.   4. Mildly enlarged right ventricular cavity size and reduced systolic right ventricular function. The tricuspid annular plane systolic excursion (TAPSE) is 1.6 cm (normal >=1.7 cm).   5. Device lead is visualized in the right ventricle.   6. A bioprosthetic valve replacement present in the aortic position. Mild intravalvular regurgitation No paravalvular regurgitation.   7. Severe mitral regurgitation at a blood pressure of 91/60 mmHg.   8. Moderate-severe tricuspid regurgitation.   9. No pericardial effusion seen.  10. Pacer lead noted in the right atrium.  11. Compared to the transthoracic echocardiogram performed on 4/10/2023 there have been no significant interval changes.    ________________________________________________________________________________________  FINDINGS:     Left Ventricle:  Abnormal (paradoxical) septal motion consistent with right ventricular pacemaker. Left ventricular systolic function is severely decreased with a calculated ejection fraction of 36 % by 3D36 % by 3D. There is moderate (grade 2) left ventricular diastolic dysfunction. Severe left ventricular hypertrophy. Left ventricular global longitudinal strain is -8.5 % is abnormal (> -16%). Images were acquired on a [employer] ultrasound system and processed using [employer] strain analysis software with a heart rate of 80bpm and a blood pressure of 91/60 mmHg.     Right Ventricle:  Mildly enlarged right ventricular cavity size and reduced right ventricular systolic function. Tricuspid annular plane systolic excursion (TAPSE) is 1.6 cm (normal >=1.7 cm). A device leadis visualized in the right ventricle.     Left Atrium:  The left atrium is severely dilated in size with an indexed volume of 80.48 ml/m².     Right Atrium:  The right atrium is dilated in size. There is a pacer lead seen in the right atrium.     Aortic Valve:  A bioprosthetic valve replacement present in the aortic position. The effective orifice area (by Continuity Equation) is 0.88 cm² (indexed area = 0.46 cm²/m²) with a Doppler velocity index of 0.30. The peak veloicty is 2.34 m/s, peak gradient is 21.9 mmHg and mean gradient is 12.0 mmHg. There is mild intravalvular regurgitation. There is no paravalvular regurgitation.     Mitral Valve:  There is mitral valve thickening of the anterior and posterior leaflets. There is moderate calcification of the mitral valve annulus. There is severe mitral regurgitation at a blood pressure of 91/60 mmHg.     Tricuspid Valve:  There is moderate-severe tricuspid regurgitation. The PA systolic pressure is at least10 mmHg.     Pulmonic Valve:  There is mild pulmonic regurgitation.     Pericardium:  No pericardial effusion seen.  ____________________________________________________________________  Quantitative Data:  Left Ventricle Measurements: (Indexed to BSA)     IVSd (2D):   1.4 cm  LVPWd (2D):  1.6 cm  LVIDd (2D):  5.0 cm  LVIDs (2D):  4.4 cm  LV Mass:     316 g  167.4 g/m²  3D LV EF%: 36 %     MV E Vmax:    1.77 m/s  MV A Vmax:    0.78 m/s  MV E/A:       2.26  e' lateral:   12.00 cm/s  e' medial:    7.36 cm/s  E/e' lateral: 14.75  E/e' medial:  24.05  E/e' Average: 18.29  MV DT:        229 msec  IVRT:         151.0 msec    Aorta Measurements: (normal range) (Indexed to BSA)     Sinuses of Valsalva: 3.55 cm (3.1 - 3.7 cm)cm  Ao ST Junct:         3.0 cm       Left Atrium Measurements: (Indexed to BSA)  LA Diam 2D: 5.20 cm    Right Ventricle Measurements: Right Atrial Measurements:     TAPSE:            1.6 cm      RA Randall Axis A4C: 7.5  TV [place]. S':       7.29 cm/s  RV Base (RVID1):  5.1 cm  RV Mid (RVID2):   4.4 cm  RV Major (RVID3): 7.7 cm       LVOT / RVOT/ Qp/Qs Data: (Indexed to BSA)  LVOT Diameter: 2.00 cm  LVOT Vmax:     0.71 m/s  LVOT VTI:      13.10 cm  LVOT SV:       41.2 ml  21.79 ml/m²    Aortic Valve Measurements:  AV Vmax:           2.3 m/s  AV Peak Gradient:  21.9 mmHg  AV Mean Gradient:  12.0 mmHg  AV VTI:            47.0 cm  AV VTI Ratio:      0.28  AoV EOA, Contin:   0.88 cm²  AoV EOA, Contin i: 0.46 cm²/m²    Mitral Valve Measurements:     MV Vmax:      1.77 m/s     MR Vmax:          3.88 m/s  MV Mean Grad:4.00 mmHg    MR Peak Gradient: 60.2 mmHg  MV Peak Grad: 12.5 mmHg  MV E Vmax:    1.8 m/s  MV A Vmax:    0.8 m/s  MV E/A:       2.3       Tricuspid Valve Measurements:     TR Vmax:          3.9 m/s  TR Peak Gradient: 59.6 mmHg       ________________________________________________________________________________________  Electronically signed on 8/20/2023 at 10:29:14 PM by Britton Carrizales

## 2023-08-28 NOTE — PROGRESS NOTE ADULT - PROBLEM SELECTOR PLAN 1
Structural Heart- Not a candidate for KRISTIE per Dr. Valentino given severe MAC  diuresis as per CHF team   OOB to chair, ambulate  discharge planning- Home when

## 2023-08-29 ENCOUNTER — NON-APPOINTMENT (OUTPATIENT)
Age: 83
End: 2023-08-29

## 2023-08-29 LAB
ANION GAP SERPL CALC-SCNC: 15 MMOL/L — SIGNIFICANT CHANGE UP (ref 5–17)
BUN SERPL-MCNC: 85 MG/DL — HIGH (ref 7–23)
CALCIUM SERPL-MCNC: 9.5 MG/DL — SIGNIFICANT CHANGE UP (ref 8.4–10.5)
CHLORIDE SERPL-SCNC: 94 MMOL/L — LOW (ref 96–108)
CO2 SERPL-SCNC: 24 MMOL/L — SIGNIFICANT CHANGE UP (ref 22–31)
CREAT SERPL-MCNC: 2.82 MG/DL — HIGH (ref 0.5–1.3)
EGFR: 22 ML/MIN/1.73M2 — LOW
GLUCOSE SERPL-MCNC: 85 MG/DL — SIGNIFICANT CHANGE UP (ref 70–99)
HCT VFR BLD CALC: 31.8 % — LOW (ref 39–50)
HGB BLD-MCNC: 10.7 G/DL — LOW (ref 13–17)
MCHC RBC-ENTMCNC: 29.2 PG — SIGNIFICANT CHANGE UP (ref 27–34)
MCHC RBC-ENTMCNC: 33.6 GM/DL — SIGNIFICANT CHANGE UP (ref 32–36)
MCV RBC AUTO: 86.6 FL — SIGNIFICANT CHANGE UP (ref 80–100)
NRBC # BLD: 0 /100 WBCS — SIGNIFICANT CHANGE UP (ref 0–0)
PLATELET # BLD AUTO: 101 K/UL — LOW (ref 150–400)
POTASSIUM SERPL-MCNC: 3.7 MMOL/L — SIGNIFICANT CHANGE UP (ref 3.5–5.3)
POTASSIUM SERPL-SCNC: 3.7 MMOL/L — SIGNIFICANT CHANGE UP (ref 3.5–5.3)
RBC # BLD: 3.67 M/UL — LOW (ref 4.2–5.8)
RBC # FLD: 17.7 % — HIGH (ref 10.3–14.5)
SODIUM SERPL-SCNC: 133 MMOL/L — LOW (ref 135–145)
WBC # BLD: 4.93 K/UL — SIGNIFICANT CHANGE UP (ref 3.8–10.5)
WBC # FLD AUTO: 4.93 K/UL — SIGNIFICANT CHANGE UP (ref 3.8–10.5)

## 2023-08-29 PROCEDURE — 93283 PRGRMG EVAL IMPLANTABLE DFB: CPT | Mod: 26,59

## 2023-08-29 PROCEDURE — 93010 ELECTROCARDIOGRAM REPORT: CPT | Mod: 59

## 2023-08-29 PROCEDURE — 93320 DOPPLER ECHO COMPLETE: CPT | Mod: 26

## 2023-08-29 PROCEDURE — 92960 CARDIOVERSION ELECTRIC EXT: CPT

## 2023-08-29 PROCEDURE — 93312 ECHO TRANSESOPHAGEAL: CPT | Mod: 26

## 2023-08-29 PROCEDURE — 99233 SBSQ HOSP IP/OBS HIGH 50: CPT | Mod: GC

## 2023-08-29 PROCEDURE — 93325 DOPPLER ECHO COLOR FLOW MAPG: CPT | Mod: 26

## 2023-08-29 PROCEDURE — 99024 POSTOP FOLLOW-UP VISIT: CPT

## 2023-08-29 RX ORDER — AMIODARONE HYDROCHLORIDE 400 MG/1
TABLET ORAL
Refills: 0 | Status: DISCONTINUED | OUTPATIENT
Start: 2023-08-29 | End: 2023-08-30

## 2023-08-29 RX ORDER — BUMETANIDE 0.25 MG/ML
2 INJECTION INTRAMUSCULAR; INTRAVENOUS ONCE
Refills: 0 | Status: COMPLETED | OUTPATIENT
Start: 2023-08-29 | End: 2023-08-29

## 2023-08-29 RX ORDER — AMIODARONE HYDROCHLORIDE 400 MG/1
400 TABLET ORAL DAILY
Refills: 0 | Status: CANCELLED | OUTPATIENT
Start: 2023-09-05 | End: 2023-08-30

## 2023-08-29 RX ORDER — AMIODARONE HYDROCHLORIDE 400 MG/1
200 TABLET ORAL DAILY
Refills: 0 | Status: CANCELLED | OUTPATIENT
Start: 2023-09-13 | End: 2023-08-30

## 2023-08-29 RX ORDER — AMIODARONE HYDROCHLORIDE 400 MG/1
400 TABLET ORAL
Refills: 0 | Status: DISCONTINUED | OUTPATIENT
Start: 2023-08-29 | End: 2023-08-30

## 2023-08-29 RX ADMIN — RIVAROXABAN 15 MILLIGRAM(S): KIT at 13:51

## 2023-08-29 RX ADMIN — SODIUM CHLORIDE 3 MILLILITER(S): 9 INJECTION INTRAMUSCULAR; INTRAVENOUS; SUBCUTANEOUS at 05:02

## 2023-08-29 RX ADMIN — Medication 25 MILLIGRAM(S): at 04:54

## 2023-08-29 RX ADMIN — ISOSORBIDE DINITRATE 10 MILLIGRAM(S): 5 TABLET ORAL at 15:34

## 2023-08-29 RX ADMIN — SODIUM CHLORIDE 3 MILLILITER(S): 9 INJECTION INTRAMUSCULAR; INTRAVENOUS; SUBCUTANEOUS at 14:13

## 2023-08-29 RX ADMIN — SPIRONOLACTONE 25 MILLIGRAM(S): 25 TABLET, FILM COATED ORAL at 04:54

## 2023-08-29 RX ADMIN — BUMETANIDE 2 MILLIGRAM(S): 0.25 INJECTION INTRAMUSCULAR; INTRAVENOUS at 04:53

## 2023-08-29 RX ADMIN — POLYETHYLENE GLYCOL 3350 17 GRAM(S): 17 POWDER, FOR SOLUTION ORAL at 13:51

## 2023-08-29 RX ADMIN — BUMETANIDE 2 MILLIGRAM(S): 0.25 INJECTION INTRAMUSCULAR; INTRAVENOUS at 17:10

## 2023-08-29 RX ADMIN — AMIODARONE HYDROCHLORIDE 400 MILLIGRAM(S): 400 TABLET ORAL at 21:45

## 2023-08-29 RX ADMIN — SENNA PLUS 2 TABLET(S): 8.6 TABLET ORAL at 21:45

## 2023-08-29 RX ADMIN — ATORVASTATIN CALCIUM 40 MILLIGRAM(S): 80 TABLET, FILM COATED ORAL at 21:46

## 2023-08-29 RX ADMIN — Medication 25 MILLIGRAM(S): at 13:52

## 2023-08-29 RX ADMIN — Medication 50 MICROGRAM(S): at 04:53

## 2023-08-29 RX ADMIN — SODIUM CHLORIDE 3 MILLILITER(S): 9 INJECTION INTRAMUSCULAR; INTRAVENOUS; SUBCUTANEOUS at 21:39

## 2023-08-29 RX ADMIN — PANTOPRAZOLE SODIUM 40 MILLIGRAM(S): 20 TABLET, DELAYED RELEASE ORAL at 04:54

## 2023-08-29 NOTE — PROGRESS NOTE ADULT - PROBLEM SELECTOR PLAN 2
Heart Failure following   decrease toprol 25 qd   bnp 68669 improved from 72405  give additional diuretics bumex 2 mg po now and increase 2 mg po qd   Strict I & Os, daily standing weights  Fluid restriction 1500cc/day  Weaned off Milrinone gtt 8/27  Continue isordil 10 tid and hydralazine 25 mg po tid  Continue aldactone 25 daily  discharge planning- home when cleared by CHF team

## 2023-08-29 NOTE — PROGRESS NOTE ADULT - PROBLEM SELECTOR PLAN 3
decrease toprol 25 qd   Daily BMP -monitor and supplement electrolytes  continue AC on Xarelto 15 daily - d/w Dr. Valentino  EP consulted; AICD interregation; WARREN/ CV in am tue as per EP; d/w CHF and Dr. Fenton

## 2023-08-29 NOTE — PROGRESS NOTE ADULT - ASSESSMENT
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  Patient reports worsening SOB x 2 days, with associated symptoms of fatigue, deconditioning and inability to walk long distance.  Presents today as a direct admission for CHF exacerbation and requiring medical optimization.     Hospital Course:   8/16 Admit to 2 [facility] Telemetry Floor.  Heart Failure consult called.  CXR ordered.  D/C'd Torsemide and started on Bumex 4 mg IV BID. Per Dr. Valentino hold Xarelto for today for possible RHC in AM.  Awaiting HF reccs.     8/17 VSS - bun 92 this am from 102 - as per Dr. Fenton - renal called - pt seen by renal & HF team - will continue Bumex 4mg IV bid-   8/18 RHC today for diagnostic eval Continue aggressive diuresis renal following  Replete potassium.  8/19 VVS; Potassium 3.5 --> Supplemented. Started om KCL 40 meq PO BID.  Heart Failure and Renal following.  Pre albumin ordered: 20 Continue with current medication regimen.  Sorbitol 30 mg PO x 1.   8/20 VSS + BM  seen by HF now for BUMEX GTT 1mg/hr  and milrinone for inotropic support 125mcg/kg/min    8/21 VSS: continue primacor gttp .125 and bumex gttp 1 mg/ hr; CHF following; resume xarelto for hx afib   8/22 VSS; continue primacor gttp and bumex gttp as per CHF team; AC with xarelto   8/23 VSS: afib v.paced; continue diuresis; add isordil 10 tid and increase hydral 35 mg po tid; 1500 cc F. R   8/24 VSS  Aggressive diuresis Heart failure following  8/25 VSS change to oral Bumex  Heart failure following>milrinone wean start tomorrow  8/26 VSS, continue wean milrinone drip as per HF, Neg -1230 diurese on PO Bumex. Aldactone 12.5 daily added per HF. Plan for d/c home Mon.   8/27 VSS, milrinone weaned off per HF, increased Bumex to 1mg BID for peripheral edema, Aldactone increased to 25 daily and hydralazine decreased to 25 TID. BUN/Cr 78/2.39.  8/28 V. paced @ 80; + hypotension noted this am --> sbp 70's; pt asymptomatic; albumin given; bp responded 90's; lactate level nl 1.0 bun/cr increased to 87/2.9; give additional bumex 2 mg and start bumex 2 mg po qd on tue; decrease toprol 25 qd; pro-bnp level 16,901  EP consulted; AICD interregation; WARREN/ CV in am tue as per EP; d/w CHF and Dr. Fenton   discharge planning- home when stable and cleared by CHF

## 2023-08-29 NOTE — PROGRESS NOTE ADULT - ASSESSMENT
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 Patient reports worsening SOB x 2 days, with associated symptoms of fatigue, deconditioning and inability to walk long distance.  Presents today as a direct admission for CHF exacerbation and requiring medical optimization.       1- SURESH on CKDIV  2- CHF  3- severe MR  4- hypokalemia   5- Hypotension       SURESH in setting of decompensated CHF  cr is  worsened likely due to lower bp   however concern for decrease cardiac output   chf team follow up   bumex 2 mg po daily and monitor   keep K > 3.5   hydralazine 25 mg TID  strict I/O  keep O>I  daily standing weight  2G sodium diet  trend creatinine and electrolytes closely

## 2023-08-29 NOTE — PROCEDURE NOTE - ADDITIONAL PROCEDURE DETAILS
Indication: Decrease VPacing    Estimated remaining battery longevity ~ 2.1 years    Underlying rhythm: AS-VS with AV delays varying from ~310 to 350ms.     Paced AV delay changed to 350ms (from 250ms), sensed AV delay changed to 325ms (from 200ms), VIP is ON and search cycles extended to 3    Counters cleared today.     - Matilda Tam PA-C

## 2023-08-29 NOTE — PROGRESS NOTE ADULT - SUBJECTIVE AND OBJECTIVE BOX
[facility]   [phone]  RENAL FOLLOW UP NOTE  --------------------------------------------------------------------------------  Chief Complaint:    24 hour events/subjective:    seen earlier   states breathing is better and no sob     PAST HISTORY  --------------------------------------------------------------------------------  No significant changes to PMH, PSH, FHx, SHx, unless otherwise noted    ALLERGIES & MEDICATIONS  --------------------------------------------------------------------------------  Allergies    No Known Allergies    Intolerances      Standing Inpatient Medications  aMIOdarone    Tablet   Oral   aMIOdarone    Tablet 400 milliGRAM(s) Oral two times a day  atorvastatin 40 milliGRAM(s) Oral at bedtime  buMETAnide 2 milliGRAM(s) Oral daily  hydrALAZINE 25 milliGRAM(s) Oral <User Schedule>  isosorbide   dinitrate Tablet (ISORDIL) 10 milliGRAM(s) Oral <User Schedule>  levothyroxine 50 MICROGram(s) Oral daily  metoprolol succinate ER 25 milliGRAM(s) Oral daily  pantoprazole    Tablet 40 milliGRAM(s) Oral before breakfast  polyethylene glycol 3350 17 Gram(s) Oral daily  rivaroxaban 15 milliGRAM(s) Oral daily  senna 2 Tablet(s) Oral at bedtime  sodium chloride 0.9% lock flush 3 milliLiter(s) IV Push every 8 hours  spironolactone 25 milliGRAM(s) Oral daily    PRN Inpatient Medications  acetaminophen     Tablet .. 650 milliGRAM(s) Oral every 6 hours PRN  aluminum hydroxide/magnesium hydroxide/simethicone Suspension 30 milliLiter(s) Oral every 4 hours PRN  melatonin 3 milliGRAM(s) Oral at bedtime PRN  ondansetron Injectable 4 milliGRAM(s) IV Push every 8 hours PRN      REVIEW OF SYSTEMS  --------------------------------------------------------------------------------    Gen: denies  fevers/chills,  CVS: denies chest pain/palpitations  Resp: denies SOB/Cough  GI: Denies N/V/Abd pain  : Denies dysuria/oliguria/    VITALS/PHYSICAL EXAM  --------------------------------------------------------------------------------  T(C): 36.7 (08-29-23 @ 20:34), Max: 36.7 (08-29-23 @ 04:51)  HR: 79 (08-29-23 @ 20:34) (58 - 81)  BP: 88/58 (08-29-23 @ 20:34) (88/58 - 105/72)  RR: 18 (08-29-23 @ 20:34) (18 - 18)  SpO2: 98% (08-29-23 @ 20:34) (94% - 99%)  Wt(kg): --  Height (cm): 175.3 (08-29-23 @ 10:28)  Weight (kg): 64.4 (08-29-23 @ 10:28)  BMI (kg/m2): 21 (08-29-23 @ 10:28)  BSA (m2): 1.79 (08-29-23 @ 10:28)      08-28-23 @ 07:01  -  08-29-23 @ 07:00  --------------------------------------------------------  IN: 690 mL / OUT: 1600 mL / NET: -910 mL    08-29-23 @ 07:01  -  08-29-23 @ 21:38  --------------------------------------------------------  IN: 120 mL / OUT: 1350 mL / NET: -1230 mL      Physical Exam:  	    Gen: Non toxic comfortable appearing, on RA    	Pulm: decrease bs  no rales or ronchi or wheezing  	CV:  - JVD. RRR, S1S2; no rub II/VI M   	Abd: +BS, soft, nontender/nondistended  	UE: Warm, no cyanosis  no clubbing,  no edema; no asterixis  	LE: Warm, no cyanosis  no clubbing, 1 +    edema  	Neuro: alert and oriented. speech coherent       LABS/STUDIES  --------------------------------------------------------------------------------              10.7   4.93  >-----------<  101      [08-29-23 @ 05:18]              31.8     133  |  94  |  85  ----------------------------<  85      [08-29-23 @ 05:17]  3.7   |  24  |  2.82        Ca     9.5     [08-29-23 @ 05:17]    TPro  6.9  /  Alb  4.0  /  TBili  1.6  /  DBili  x   /  AST  50  /  ALT  42  /  AlkPhos  200  [08-28-23 @ 07:31]          Creatinine Trend:  SCr 2.82 [08-29 @ 05:17]  SCr 2.99 [08-28 @ 07:31]  SCr 2.39 [08-27 @ 06:19]  SCr 2.55 [08-26 @ 06:16]  SCr 2.62 [08-25 @ 06:56]              TSH 5.96      [08-16-23 @ 16:55]

## 2023-08-29 NOTE — PROGRESS NOTE ADULT - SUBJECTIVE AND OBJECTIVE BOX
VITAL SIGNS    Telemetry:    Vital Signs Last 24 Hrs  T(C): 36.7 (23 @ 04:51), Max: 36.7 (23 @ 11:59)  T(F): 98 (23 @ 04:51), Max: 98 (23 @ 11:59)  HR: 67 (23 @ 04:51) (66 - 84)  BP: 94/53 (23 @ 04:51) (92/63 - 94/62)  RR: 18 (23 @ 04:51) (18 - 18)  SpO2: 94% (23 @ 04:51) (94% - 95%)             @ 07:01  -   @ 07:00  --------------------------------------------------------  IN: 690 mL / OUT: 1600 mL / NET: -910 mL       Daily     Daily Weight in k.4 (28 Aug 2023 08:28)  Admit Wt: Drug Dosing Weight  Height (cm): 175.3 (16 Aug 2023 16:36)  Weight (kg): 64.4 (25 Aug 2023 11:45)  BMI (kg/m2): 21 (25 Aug 2023 11:45)  BSA (m2): 1.79 (25 Aug 2023 11:45)     Daily Weight in k.4 (23 @ 08:28)    LABS      133<L>  |  94<L>  |  85<H>  ----------------------------<  85  3.7   |  24  |  2.82<H>    Ca    9.5      29 Aug 2023 05:17    TPro  6.9  /  Alb  4.0  /  TBili  1.6<H>  /  DBili  x   /  AST  50<H>  /  ALT  42  /  AlkPhos  200<H>                                   10.7   4.93  )-----------( 101      ( 29 Aug 2023 05:18 )             31.8                  Bilirubin Total: 1.6 mg/dL ( @ 07:31)    CAPILLARY BLOOD GLUCOSE              Drains:     MS       [  ]   [  ]            L Pleural [  ]            R Pleural  [  ]            RAYMUNDO  [  ]           Royal  [  ]    Pacing Wires      [  ]   Settings:                                  Isolated  [  ]                    CXR:      MEDICATIONS  acetaminophen     Tablet .. 650 milliGRAM(s) Oral every 6 hours PRN  aluminum hydroxide/magnesium hydroxide/simethicone Suspension 30 milliLiter(s) Oral every 4 hours PRN  atorvastatin 40 milliGRAM(s) Oral at bedtime  buMETAnide 2 milliGRAM(s) Oral daily  hydrALAZINE 25 milliGRAM(s) Oral <User Schedule>  isosorbide   dinitrate Tablet (ISORDIL) 10 milliGRAM(s) Oral <User Schedule>  levothyroxine 50 MICROGram(s) Oral daily  melatonin 3 milliGRAM(s) Oral at bedtime PRN  metoprolol succinate ER 25 milliGRAM(s) Oral daily  ondansetron Injectable 4 milliGRAM(s) IV Push every 8 hours PRN  pantoprazole    Tablet 40 milliGRAM(s) Oral before breakfast  polyethylene glycol 3350 17 Gram(s) Oral daily  rivaroxaban 15 milliGRAM(s) Oral daily  senna 2 Tablet(s) Oral at bedtime  sodium chloride 0.9% lock flush 3 milliLiter(s) IV Push every 8 hours  spironolactone 25 milliGRAM(s) Oral daily      PHYSICAL EXAM      Neurology: alert and oriented x 3, nonfocal, no gross deficits  CV :S1S2  Sternal Wound :  CDI , Stable  Lungs:   Abdomen: soft, nontender, nondistended, positive bowel sounds, last bowel movement   :       Extremities:                  PAST MEDICAL & SURGICAL HISTORY:  Aortic stenosis      AICD (automatic cardioverter/defibrillator) present      Aortic valve regurgitation      Ascending aorta dilation      H/O paroxysmal supraventricular tachycardia      HLD (hyperlipidemia)      Mitral valve regurgitation      Cardiac arrest      Severe mitral regurgitation      S/P aortic valve replacement  3/13/2004      S/P hernia repair        History of tonsillectomy and adenoidectomy      S/P TURP        S/P colonoscopy  , , , , 2016      S/P endoscopy        S/P cardiac cath  , , , ,       AICD (automatic cardioverter/defibrillator) present  17      Cardiac pacemaker  17      History of cardioversion  20      S/P ablation of atrial fibrillation  10/29/20      Status post ablation of ventricular arrhythmia  19                     Discussed with Cardiothoracic Team at AM rounds.

## 2023-08-29 NOTE — PROGRESS NOTE ADULT - ATTENDING COMMENTS
83 y/o M with ICM/HFrEF (now 20-25%; LVIDd 6.7 cm; prev 35%), CAD c/b IWMI (1994) s/p angioplasty, aortic stenosis s/p bio-AVR 2004, VT arrest (2017) s/p ICD, Afib s/p multiple DCCV and ablation x2 (2020 and 2023; on AC), prior Ao aneurysm s/p Bentall (2021), severe MR prior MVr (2021) with MAC (precludes KRISTIE), atrophic kidney with CKD (b/l Cr 1.9), admitted on 8/16 for acute on chronic heart failure and acute on chronic kidney dysfunction. Was being diuresed with borderline low BP. Underwent RHC which showed elevated filling pressures and low CI so started on milrinone with improvement. Milrinone was weaned off over weekend with worsening renal function. Also notably RV pacing and in aflutter which is new. Being considered for tendyne as APOLLO is paused. Successfully cardioverted today and placed on amio. Still RV pacing. On exam, NAD, JVP approx 12 cm w/ v waves, RRR, grade IV/VI systolic murmur in L axilla, CTAB, nontender abdomen, b/l trace edema. Labs reviewed - BUN/Cr 85/2.82 (slight improvement; parag 2.39), Tbili 1.6 (improved from 1.6), lactate negative. TTE reviewed - EF 25%, LVEDD 4.95 cm, severe MR, mod-severe TR.   - appreciate EP input; device adjusted to minimize RV pacing  - discussed at length with patient and his wife current situation  - c/w bumex 2 mg daily; give additional 2 mg today  - possible d/c if renal function improves tomorrow; goal weight 144 pounds 83 y/o M with ICM/HFrEF (now 20-25%; LVIDd 6.7 cm; prev 35%), CAD c/b IWMI (1994) s/p angioplasty, aortic stenosis s/p bio-AVR 2004, VT arrest (2017) s/p ICD, Afib s/p multiple DCCV and ablation x2 (2020 and 2023; on AC), prior Ao aneurysm s/p Bentall (2021), severe MR prior MVr (2021) with MAC (precludes KRISTIE), atrophic kidney with CKD (b/l Cr 1.9), admitted on 8/16 for acute on chronic heart failure and acute on chronic kidney dysfunction. Was being diuresed with borderline low BP. Underwent RHC which showed elevated filling pressures and low CI so started on milrinone with improvement. Milrinone was weaned off over weekend with worsening renal function. Also notably RV pacing and in aflutter which is new. Being considered for tendyne as APOLLO is paused. Successfully cardioverted today and placed on amio. Still RV pacing. On exam, NAD, JVP approx 12 cm w/ v waves, RRR, grade IV/VI systolic murmur in L axilla, CTAB, nontender abdomen, b/l trace edema. Labs reviewed - BUN/Cr 85/2.82 (slight improvement; parag 2.39), Tbili 1.6 (improved from 1.6), lactate negative. TTE reviewed - EF 25%, LVEDD 4.95 cm, severe MR, mod-severe TR.   - appreciate EP input; device adjusted to minimize RV pacing  - discussed at length with patient and his wife current situation  - c/w bumex 2 mg daily; give additional 2 mg today  - decrease hydral to 10 mg; please ensure he receives hydral and isordil as long as SBP >90  - possible d/c if renal function improves tomorrow; goal weight 144 pounds

## 2023-08-29 NOTE — PRE-ANESTHESIA EVALUATION ADULT - NSANTHPMHFT_GEN_ALL_CORE
Complex medical history reviewed  Admitted with CHF exacerbation 2 weeks ago, now improved s/p diuresis, now on RA, breathing well

## 2023-08-29 NOTE — PROGRESS NOTE ADULT - ASSESSMENT
81 y/o M with ICM, LVEF 35-40% (LVIDd 6.7 cm), CAD c/b IWMI (1994) s/p angioplasty, aortic stenosis s/p bio-AVR 2004, VT arrest (2017) s/p ICD, Afib s/p multiple DCCV and ablation x2 (2020 and 2023; on AC), prior Ao aneurysm s/p Bentall (2021), severe MR with MAC (precludes KRISTIE), atrophic kidney with CKD (b/l Cr 1.9), admitted for acute on chronic HFrEF & SURESH on CKD, being considered for tendyne, clinically improved with inotrope-assisted diuresis and found to be in atrial flutter s/p successful WARREN/DCCV.     Cardiac Studies  8/18/23 RHC: RA 19, RV 65/4, PA 65/33/47, PCWP 25, PA 52.9%, CO/CI 3.6/1.9, BP 90/61/72 SVR 1178 dsc, PVR 6.1  5/24/23 - pLAD 40%; mRCA 40%; RA 16, RV 76/19, PA 77/34/50; PCWP 25 (v 35); /67/84; CO/CI 3.5/1.86; SVR 1560; PVR 7.1    8/17/23 TTE: LVEF 36%. G2DD. RV mildly enlarged with reduced function. TENNILLE, BioAVR (MG 12) w/mild intravalvular regurgitation. Severe MR at BP 91/60. Mod-severe TR. LVH was called, but over-measured on review.  4/10/23 TTE: EF 38%, LVEDD 6.7 cm, severe MR with systolic reversal in pulmonic vein, severe TR, RVSP 49; IVC dilated  3/27/23 WARREN (at [facility]): EF 35-40%, paradoxical septal motion, RV enlargement/dysfunction, bioAVR (mean gradient 10) with mild AI, marked MAC with posterior leaflet immobility, mild prolapse of anterior leaflet with torn chords, severe posterior directed MR w/ systolic reversal in PV, moderate TR; no intracardiac clot

## 2023-08-29 NOTE — PROGRESS NOTE ADULT - ASSESSMENT
81 y/o male with PMH of HTN, HLD, GERD, HFrEF, EF of 35-40%, CAD, ICM, bioprosthetic aortic valve replacement in 2004, VT/VF arrest in 2017 s/p Abbott dual chamber ICD implant, prior VT ablation x 3 at [facility] (2/14/19, 10/22/19, 7/29/22), atrophic kidney, atrial fibrillation, s/p ablation on 10/29/20, and cardioversion x 6 (9/11/20, 7/30/21, 2/14/22, 10/25/22, 12/14/22 and 2/24/23) on Xarelto, aortic aneurysm and mitral valve repair in June 2021, who was admitted to the hospital for CHF exacerbation, requiring inotropic support. The patient had his anticoagulation held from 8/16-8/21 for a right heart cath, which showed increased biventricular filling pressures. The patient was diuresed and transitioned to PO diuretics. Inotropes have been weaned off and patient currently reports improvement in his breathing. Echo this admission EF of 36%, severe MR, mod-severe TR and severely dilated LA. EP was consulted due to concern for frequent v-pacing seen on telemetry and management of atrial flutter.    1. Atrial flutter  2. ICM, EF of 36%    - Interrogation of patient's ICD shows underlying rhythm of atrial flutter with ventricular rate of 68 bpm, persistent since 8/3/23. Please see interrogation report for further details. Currently VPacing ~60-80s.   - NPO for WARREN/DCCV today. Consent in chart.   - Now back on Xarelto 15mg once daily, continue. Pt will need to be on uninterrupted AC minimum 30 days post.   - Will plan for Amio post DCCV. Only AAD available given his CKD and cardiomyopathy  - Plan above was discussed with patient's EP Dr. Tsang at [place].   - Keep on tele. Keep K>4, Mg>2  - Discussed with EP attending and primary team.    81 y/o male with PMH of HTN, HLD, GERD, HFrEF, EF of 35-40%, CAD, ICM, bioprosthetic aortic valve replacement in 2004, VT/VF arrest in 2017 s/p Abbott dual chamber ICD implant, prior VT ablation x 3 at [facility] (2/14/19, 10/22/19, 7/29/22), atrophic kidney, atrial fibrillation, s/p ablation on 10/29/20, and cardioversion x 6 (9/11/20, 7/30/21, 2/14/22, 10/25/22, 12/14/22 and 2/24/23) on Xarelto, aortic aneurysm and mitral valve repair in June 2021, who was admitted to the hospital for CHF exacerbation, requiring inotropic support. The patient had his anticoagulation held from 8/16-8/21 for a right heart cath, which showed increased biventricular filling pressures. The patient was diuresed and transitioned to PO diuretics. Inotropes have been weaned off and patient currently reports improvement in his breathing. Echo this admission EF of 36%, severe MR, mod-severe TR and severely dilated LA. EP was consulted due to concern for frequent v-pacing seen on telemetry and management of atrial flutter.    1. Atrial flutter  2. ICM, EF of 36%    - Interrogation of patient's ICD shows underlying rhythm of atrial flutter with ventricular rate of 68 bpm, persistent since 8/3/23. Please see interrogation report for further details. Currently VPacing ~60-80s.   - NPO for WARREN/DCCV today. Consent in chart.   - Now back on Xarelto 15mg once daily, continue. Pt will need to be on uninterrupted AC minimum 30 days post.   - Will plan for Amio post DCCV. Only AAD available given his CKD and cardiomyopathy  - Plan above was discussed with patient's EP Dr. Tsang at [place].   - Keep on tele. Keep K>4, Mg>2  - Discussed with EP attending and primary team.     Addendum:  - ATP attempted to terminate rhythm, unsuccessful. He is s/p WARREN/DCCV to AV paced rates ~60s.   - Please start Amiodarone 400mg BID x 1 week, 400mg qD x 1 week, then 200mg once daily thereafter. Black Box Warnings were discussed with patient.   - Continue AC with Xarelto.   - Discussed with EP attending and primary team.

## 2023-08-29 NOTE — PROGRESS NOTE ADULT - PROBLEM SELECTOR PLAN 1
- Etiology: ICM with valvular disease  - GDMT: c/w toprol 25mg QD, continue hydral 25mg TID and isordil 10mg TID (can change hold parameters to SBP <90), continue spironolactone 25mg QD; will defer ARNI/SGLT2  - Diuretics: would give additional 2mg PO Bumex x1 today, otherwise continue Bumex 2mg PO QD  - Device: ICD in place, currently V-paced @60  - Rhythm/Valve: being considered for Tendyne at [facility] with Dr. Pierre, s/p WARREN/DCCV today now in sinus rhythm but will have EP evaluate to lengthen AV delay to reduce RV pacing burden   - AT: limited options, will possibly need home inotropes if worsening

## 2023-08-29 NOTE — PROGRESS NOTE ADULT - SUBJECTIVE AND OBJECTIVE BOX
INTERVAL EVENTS:    PAST MEDICAL & SURGICAL HISTORY:  Aortic stenosis    AICD (automatic cardioverter/defibrillator) present    Aortic valve regurgitation    Ascending aorta dilation    H/O paroxysmal supraventricular tachycardia    HLD (hyperlipidemia)    Mitral valve regurgitation    S/P ablation of atrial fibrillation    Cardiac arrest    Severe mitral regurgitation    S/P aortic valve replacement  3/13/2004    S/P hernia repair  2002    History of tonsillectomy and adenoidectomy    S/P TURP  1996    S/P colonoscopy  2001, 2002, 2006, 2011, 2016    S/P endoscopy  2006    S/P cardiac cath  1994, 1995, 1999, 2002, 2004    AICD (automatic cardioverter/defibrillator) present  12/19/17    Cardiac pacemaker  12/19/17    History of cardioversion  9/11/20    S/P ablation of atrial fibrillation  10/29/20    Status post ablation of ventricular arrhythmia  2/14/19        MEDICATIONS  (STANDING):  atorvastatin 40 milliGRAM(s) Oral at bedtime  buMETAnide 2 milliGRAM(s) Oral daily  hydrALAZINE 25 milliGRAM(s) Oral <User Schedule>  isosorbide   dinitrate Tablet (ISORDIL) 10 milliGRAM(s) Oral <User Schedule>  levothyroxine 50 MICROGram(s) Oral daily  metoprolol succinate ER 25 milliGRAM(s) Oral daily  pantoprazole    Tablet 40 milliGRAM(s) Oral before breakfast  polyethylene glycol 3350 17 Gram(s) Oral daily  rivaroxaban 15 milliGRAM(s) Oral daily  senna 2 Tablet(s) Oral at bedtime  sodium chloride 0.9% lock flush 3 milliLiter(s) IV Push every 8 hours  spironolactone 25 milliGRAM(s) Oral daily    MEDICATIONS  (PRN):  acetaminophen     Tablet .. 650 milliGRAM(s) Oral every 6 hours PRN Temp greater or equal to 38C (100.4F), Mild Pain (1 - 3)  aluminum hydroxide/magnesium hydroxide/simethicone Suspension 30 milliLiter(s) Oral every 4 hours PRN Dyspepsia  melatonin 3 milliGRAM(s) Oral at bedtime PRN Insomnia  ondansetron Injectable 4 milliGRAM(s) IV Push every 8 hours PRN Nausea and/or Vomiting    T(F): 98 (08-29-23 @ 04:51), Max: 98 (08-28-23 @ 11:59)  HR: 67 (08-29-23 @ 04:51) (66 - 84)  BP: 94/53 (08-29-23 @ 04:51) (92/63 - 94/62)  BP(mean): 66 (08-29-23 @ 04:51) (66 - 66)  ABP: --  ABP(mean): --  RR: 18 (08-29-23 @ 04:51) (18 - 18)  SpO2: 94% (08-29-23 @ 04:51) (94% - 95%)    I/O Detail 24H    28 Aug 2023 07:01  -  29 Aug 2023 07:00  --------------------------------------------------------  IN:    Oral Fluid: 690 mL  Total IN: 690 mL    OUT:    Voided (mL): 1600 mL  Total OUT: 1600 mL    Total NET: -910 mL          PHYSICAL EXAM:  GEN: NAD  HEENT: EOMI   RESP: CTA b/l  CV: RRR. Normal S1/S2. No m/r/g.  ABD: soft, non-distended  EXT: No edema   NEURO: alert and attentive    LABS:  CBC 08-29-23 @ 05:18                        10.7   4.93  )-----------( 101                   31.8       Hgb trend: 10.7 <-- , 11.9 <-- , 11.3 <--   WBC trend: 4.93 <-- , 6.08 <-- , 5.73 <--       CMP 08-29-23 @ 05:17    133<L>  |  94<L>  |  85<H>  ----------------------------<  85  3.7   |  24  |  2.82<H>    Ca    9.5      08-29-23 @ 05:17    TPro  6.9  /  Alb  4.0  /  TBili  1.6<H>  /  DBili  x   /  AST  50<H>  /  ALT  42  /  AlkPhos  200<H>  08-28      Serum Cr trend: 2.82 <-- , 2.99 <-- , 2.39 <--         Cardiac Markers           STUDIES:           INTERVAL EVENTS: pending WARREN/DCCV today    PAST MEDICAL & SURGICAL HISTORY:  Aortic stenosis    AICD (automatic cardioverter/defibrillator) present    Aortic valve regurgitation    Ascending aorta dilation    H/O paroxysmal supraventricular tachycardia    HLD (hyperlipidemia)    Mitral valve regurgitation    S/P ablation of atrial fibrillation    Cardiac arrest    Severe mitral regurgitation    S/P aortic valve replacement  3/13/2004    S/P hernia repair  2002    History of tonsillectomy and adenoidectomy    S/P TURP  1996    S/P colonoscopy  2001, 2002, 2006, 2011, 2016    S/P endoscopy  2006    S/P cardiac cath  1994, 1995, 1999, 2002, 2004    AICD (automatic cardioverter/defibrillator) present  12/19/17    Cardiac pacemaker  12/19/17    History of cardioversion  9/11/20    S/P ablation of atrial fibrillation  10/29/20    Status post ablation of ventricular arrhythmia  2/14/19        MEDICATIONS  (STANDING):  atorvastatin 40 milliGRAM(s) Oral at bedtime  buMETAnide 2 milliGRAM(s) Oral daily  hydrALAZINE 25 milliGRAM(s) Oral <User Schedule>  isosorbide   dinitrate Tablet (ISORDIL) 10 milliGRAM(s) Oral <User Schedule>  levothyroxine 50 MICROGram(s) Oral daily  metoprolol succinate ER 25 milliGRAM(s) Oral daily  pantoprazole    Tablet 40 milliGRAM(s) Oral before breakfast  polyethylene glycol 3350 17 Gram(s) Oral daily  rivaroxaban 15 milliGRAM(s) Oral daily  senna 2 Tablet(s) Oral at bedtime  sodium chloride 0.9% lock flush 3 milliLiter(s) IV Push every 8 hours  spironolactone 25 milliGRAM(s) Oral daily    MEDICATIONS  (PRN):  acetaminophen     Tablet .. 650 milliGRAM(s) Oral every 6 hours PRN Temp greater or equal to 38C (100.4F), Mild Pain (1 - 3)  aluminum hydroxide/magnesium hydroxide/simethicone Suspension 30 milliLiter(s) Oral every 4 hours PRN Dyspepsia  melatonin 3 milliGRAM(s) Oral at bedtime PRN Insomnia  ondansetron Injectable 4 milliGRAM(s) IV Push every 8 hours PRN Nausea and/or Vomiting    T(F): 98 (08-29-23 @ 04:51), Max: 98 (08-28-23 @ 11:59)  HR: 67 (08-29-23 @ 04:51) (66 - 84)  BP: 94/53 (08-29-23 @ 04:51) (92/63 - 94/62)  BP(mean): 66 (08-29-23 @ 04:51) (66 - 66)  ABP: --  ABP(mean): --  RR: 18 (08-29-23 @ 04:51) (18 - 18)  SpO2: 94% (08-29-23 @ 04:51) (94% - 95%)    I/O Detail 24H    28 Aug 2023 07:01  -  29 Aug 2023 07:00  --------------------------------------------------------  IN:    Oral Fluid: 690 mL  Total IN: 690 mL    OUT:    Voided (mL): 1600 mL  Total OUT: 1600 mL    Total NET: -910 mL          PHYSICAL EXAM:  GEN: NAD  HEENT: EOMI   RESP: CTA b/l  CV: RRR. Normal S1/S2. 4/6 Holosystolic murmur at apex. Elevated JVD.   ABD: soft, non-distended  EXT: No edema   NEURO: alert and attentive    LABS:  CBC 08-29-23 @ 05:18                        10.7   4.93  )-----------( 101                   31.8       Hgb trend: 10.7 <-- , 11.9 <-- , 11.3 <--   WBC trend: 4.93 <-- , 6.08 <-- , 5.73 <--       CMP 08-29-23 @ 05:17    133<L>  |  94<L>  |  85<H>  ----------------------------<  85  3.7   |  24  |  2.82<H>    Ca    9.5      08-29-23 @ 05:17    TPro  6.9  /  Alb  4.0  /  TBili  1.6<H>  /  DBili  x   /  AST  50<H>  /  ALT  42  /  AlkPhos  200<H>  08-28      Serum Cr trend: 2.82 <-- , 2.99 <-- , 2.39 <--         Cardiac Markers           STUDIES:

## 2023-08-29 NOTE — PROGRESS NOTE ADULT - PROBLEM SELECTOR PLAN 2
- Not a candidate for KRISTIE per Dr. Valentino given severe MAC  - Being considered for Tendyne at [facility] with Dr. Pierre

## 2023-08-29 NOTE — PROGRESS NOTE ADULT - SUBJECTIVE AND OBJECTIVE BOX
24H hour events: Tele AFL Vpaced ~60-80s. Pt feeling well. NPO for WARREN/DCCV today    MEDICATIONS:  buMETAnide 2 milliGRAM(s) Oral daily  hydrALAZINE 25 milliGRAM(s) Oral <User Schedule>  isosorbide   dinitrate Tablet (ISORDIL) 10 milliGRAM(s) Oral <User Schedule>  metoprolol succinate ER 25 milliGRAM(s) Oral daily  rivaroxaban 15 milliGRAM(s) Oral daily  spironolactone 25 milliGRAM(s) Oral daily  acetaminophen     Tablet .. 650 milliGRAM(s) Oral every 6 hours PRN  melatonin 3 milliGRAM(s) Oral at bedtime PRN  ondansetron Injectable 4 milliGRAM(s) IV Push every 8 hours PRN  aluminum hydroxide/magnesium hydroxide/simethicone Suspension 30 milliLiter(s) Oral every 4 hours PRN  pantoprazole    Tablet 40 milliGRAM(s) Oral before breakfast  polyethylene glycol 3350 17 Gram(s) Oral daily  senna 2 Tablet(s) Oral at bedtime  atorvastatin 40 milliGRAM(s) Oral at bedtime  levothyroxine 50 MICROGram(s) Oral daily  sodium chloride 0.9% lock flush 3 milliLiter(s) IV Push every 8 hours      REVIEW OF SYSTEMS:  Complete 12point ROS negative.    PHYSICAL EXAM:  T(C): 36.7 (08-29-23 @ 04:51), Max: 36.7 (08-28-23 @ 11:59)  HR: 67 (08-29-23 @ 04:51) (66 - 84)  BP: 94/53 (08-29-23 @ 04:51) (92/63 - 94/62)  RR: 18 (08-29-23 @ 04:51) (18 - 18)  SpO2: 94% (08-29-23 @ 04:51) (94% - 95%)  Wt(kg): --  I&O's Summary    28 Aug 2023 07:01  -  29 Aug 2023 07:00  --------------------------------------------------------  IN: 690 mL / OUT: 1600 mL / NET: -910 mL    29 Aug 2023 07:01  -  29 Aug 2023 09:14  --------------------------------------------------------  IN: 0 mL / OUT: 400 mL / NET: -400 mL        Appearance: Normal	  HEENT: NC/AT  Cardiovascular: rregular rates  Respiratory: Lungs clear to auscultation	  Psychiatry: A & O x 3, Mood & affect appropriate  Neurologic: Non-focal  Extremities: No BLE edema        LABS:	 	    CBC Full  -  ( 29 Aug 2023 05:18 )  WBC Count : 4.93 K/uL  Hemoglobin : 10.7 g/dL  Hematocrit : 31.8 %  Platelet Count - Automated : 101 K/uL  Mean Cell Volume : 86.6 fl  Mean Cell Hemoglobin : 29.2 pg  Mean Cell Hemoglobin Concentration : 33.6 gm/dL    08-29    133<L>  |  94<L>  |  85<H>  ----------------------------<  85  3.7   |  24  |  2.82<H>  08-28    135  |  93<L>  |  87<H>  ----------------------------<  72  4.0   |  26  |  2.99<H>    Ca    9.5      29 Aug 2023 05:17  Ca    9.5      28 Aug 2023 07:31    TPro  6.9  /  Alb  4.0  /  TBili  1.6<H>  /  DBili  x   /  AST  50<H>  /  ALT  42  /  AlkPhos  200<H>  08-28      proBNP: Pro-Brain Natriuretic Peptide (08.28.23 @ 11:56)    Pro-Brain Natriuretic Peptide: 30474 pg/mL    TSH: Free Thyroxine, Serum in AM (08.16.23 @ 16:55)    Free Thyroxine, Serum: 1.4 ng/dL      CARDIAC MARKERS: Troponin T, High Sensitivity (06.06.21 @ 03:07)    Troponin T, High Sensitivity Result: 266: Specimen not hemolyzed  *  *  Rapid upward or downward changes in high-sensitivity troponin levels  suggest acute myocardial injury. Renal impairment may cause sustained  troponin elevations.  Normal: <6 - 14 ng/L  Indeterminate: 15-51 ng/L  Elevated: > 51 ng/L  See [email or web address]/test/[email or web address] on the [facility] intranet for more  information ng/L        RADIOLOGY: < from: Xray Chest 1 View- PORTABLE-Urgent (Xray Chest 1 View- PORTABLE-Urgent .) (08.21.23 @ 07:26) >  FINDINGS:  Left chest wall ICD with leads terminating in right atrium and right   ventricle without evidence of lead fracture. Sternotomy wires overlying   midline, unchanged.  Cardiopericardial silhouette is enlarged, stable.  No focal consolidation. Minimal patchy opacity at the right costophrenic   angle.  There is no pneumothorax.  No acute bony abnormality.    IMPRESSION:  Minimal patchy opacity at the right costophrenic angle likely atelectasis   or trace effusion. Stable cardiomegaly.    < end of copied text >      PREVIOUS DIAGNOSTIC TESTING:    [ ] Echocardiogram: < from: TTE W or WO Ultrasound Enhancing Agent (08.17.23 @ 16:56) >  CONCLUSIONS:      1. Left ventricular systolic function is severely decreased with an ejection fraction of 36 % by 3D.   2. There is moderate (grade 2) left ventricular diastolic dysfunction.   3. Left ventricular global longitudinal strain is -8.5 % is abnormal (> -16%). Images were acquired on a [employer] ultrasound system and processed using [employer] strain analysis software with a heart rate of 80 bpm and a blood pressure of 91/60 mmHg.   4. Mildly enlarged right ventricular cavity size and reduced systolic right ventricular function. The tricuspid annular plane systolic excursion (TAPSE) is 1.6 cm (normal >=1.7 cm).   5. Device lead is visualized in the right ventricle.   6. A bioprosthetic valve replacement present in the aortic position. Mild intravalvular regurgitation No paravalvular regurgitation.   7. Severe mitral regurgitation at a blood pressure of 91/60 mmHg.   8. Moderate-severe tricuspid regurgitation.   9. No pericardial effusion seen.  10. Pacer lead noted in the right atrium.  11. Compared to the transthoracic echocardiogram performed on 4/10/2023 there have been no significant interval changes.    < end of copied text >    [ ]  Catheterization: < from: Cardiac Catheterization (05.24.23 @ 15:57) >  Coronary Angiography   The coronary circulation is right dominant.      LM   Left main artery: Angiography shows mild atherosclerosis.      LAD   Proximal left anterior descending: There is a 40 % stenosis. First  diagonal: There is a 30 % stenosis.    CX   Circumflex: Angiography shows mild atherosclerosis.      RCA   Mid right coronary artery: There is a 40 % stenosis. First right  posterolateral: There is a 50 % stenosis.    < end of copied text >  < from: Cardiac Catheterization (05.24.23 @ 15:57) >  Diagnostic Conclusions:     Mild to moderate nonobstructive CAD.  Proceed with TMVR       < end of copied text >

## 2023-08-30 ENCOUNTER — TRANSCRIPTION ENCOUNTER (OUTPATIENT)
Age: 83
End: 2023-08-30

## 2023-08-30 VITALS
TEMPERATURE: 97 F | OXYGEN SATURATION: 99 % | DIASTOLIC BLOOD PRESSURE: 72 MMHG | SYSTOLIC BLOOD PRESSURE: 110 MMHG | HEART RATE: 74 BPM | RESPIRATION RATE: 18 BRPM

## 2023-08-30 PROBLEM — I34.0 NONRHEUMATIC MITRAL (VALVE) INSUFFICIENCY: Chronic | Status: ACTIVE | Noted: 2023-08-16

## 2023-08-30 LAB
ANION GAP SERPL CALC-SCNC: 18 MMOL/L — HIGH (ref 5–17)
BUN SERPL-MCNC: 98 MG/DL — HIGH (ref 7–23)
CALCIUM SERPL-MCNC: 9.6 MG/DL — SIGNIFICANT CHANGE UP (ref 8.4–10.5)
CHLORIDE SERPL-SCNC: 95 MMOL/L — LOW (ref 96–108)
CO2 SERPL-SCNC: 22 MMOL/L — SIGNIFICANT CHANGE UP (ref 22–31)
CREAT SERPL-MCNC: 2.72 MG/DL — HIGH (ref 0.5–1.3)
EGFR: 23 ML/MIN/1.73M2 — LOW
GLUCOSE SERPL-MCNC: 79 MG/DL — SIGNIFICANT CHANGE UP (ref 70–99)
HCT VFR BLD CALC: 33.3 % — LOW (ref 39–50)
HGB BLD-MCNC: 11.2 G/DL — LOW (ref 13–17)
MCHC RBC-ENTMCNC: 29.3 PG — SIGNIFICANT CHANGE UP (ref 27–34)
MCHC RBC-ENTMCNC: 33.6 GM/DL — SIGNIFICANT CHANGE UP (ref 32–36)
MCV RBC AUTO: 87.2 FL — SIGNIFICANT CHANGE UP (ref 80–100)
NRBC # BLD: 0 /100 WBCS — SIGNIFICANT CHANGE UP (ref 0–0)
PLATELET # BLD AUTO: 111 K/UL — LOW (ref 150–400)
POTASSIUM SERPL-MCNC: 4.2 MMOL/L — SIGNIFICANT CHANGE UP (ref 3.5–5.3)
POTASSIUM SERPL-SCNC: 4.2 MMOL/L — SIGNIFICANT CHANGE UP (ref 3.5–5.3)
RBC # BLD: 3.82 M/UL — LOW (ref 4.2–5.8)
RBC # FLD: 17.7 % — HIGH (ref 10.3–14.5)
SODIUM SERPL-SCNC: 135 MMOL/L — SIGNIFICANT CHANGE UP (ref 135–145)
WBC # BLD: 4.99 K/UL — SIGNIFICANT CHANGE UP (ref 3.8–10.5)
WBC # FLD AUTO: 4.99 K/UL — SIGNIFICANT CHANGE UP (ref 3.8–10.5)

## 2023-08-30 PROCEDURE — 93320 DOPPLER ECHO COMPLETE: CPT

## 2023-08-30 PROCEDURE — C1894: CPT

## 2023-08-30 PROCEDURE — 80048 BASIC METABOLIC PNL TOTAL CA: CPT

## 2023-08-30 PROCEDURE — 84134 ASSAY OF PREALBUMIN: CPT

## 2023-08-30 PROCEDURE — 85730 THROMBOPLASTIN TIME PARTIAL: CPT

## 2023-08-30 PROCEDURE — 86901 BLOOD TYPING SEROLOGIC RH(D): CPT

## 2023-08-30 PROCEDURE — 71045 X-RAY EXAM CHEST 1 VIEW: CPT

## 2023-08-30 PROCEDURE — 84439 ASSAY OF FREE THYROXINE: CPT

## 2023-08-30 PROCEDURE — 84481 FREE ASSAY (FT-3): CPT

## 2023-08-30 PROCEDURE — 83880 ASSAY OF NATRIURETIC PEPTIDE: CPT

## 2023-08-30 PROCEDURE — 99232 SBSQ HOSP IP/OBS MODERATE 35: CPT | Mod: FS

## 2023-08-30 PROCEDURE — 93010 ELECTROCARDIOGRAM REPORT: CPT

## 2023-08-30 PROCEDURE — 92960 CARDIOVERSION ELECTRIC EXT: CPT

## 2023-08-30 PROCEDURE — 87641 MR-STAPH DNA AMP PROBE: CPT

## 2023-08-30 PROCEDURE — ZZZZZ: CPT

## 2023-08-30 PROCEDURE — 93312 ECHO TRANSESOPHAGEAL: CPT

## 2023-08-30 PROCEDURE — 85027 COMPLETE CBC AUTOMATED: CPT

## 2023-08-30 PROCEDURE — C1769: CPT

## 2023-08-30 PROCEDURE — 80053 COMPREHEN METABOLIC PANEL: CPT

## 2023-08-30 PROCEDURE — 81003 URINALYSIS AUTO W/O SCOPE: CPT

## 2023-08-30 PROCEDURE — P9047: CPT

## 2023-08-30 PROCEDURE — 86900 BLOOD TYPING SEROLOGIC ABO: CPT

## 2023-08-30 PROCEDURE — 85025 COMPLETE CBC W/AUTO DIFF WBC: CPT

## 2023-08-30 PROCEDURE — 99238 HOSP IP/OBS DSCHRG MGMT 30/<: CPT

## 2023-08-30 PROCEDURE — 93306 TTE W/DOPPLER COMPLETE: CPT

## 2023-08-30 PROCEDURE — 93005 ELECTROCARDIOGRAM TRACING: CPT

## 2023-08-30 PROCEDURE — 93356 MYOCRD STRAIN IMG SPCKL TRCK: CPT

## 2023-08-30 PROCEDURE — C1887: CPT

## 2023-08-30 PROCEDURE — 93325 DOPPLER ECHO COLOR FLOW MAPG: CPT

## 2023-08-30 PROCEDURE — 93451 RIGHT HEART CATH: CPT

## 2023-08-30 PROCEDURE — 36415 COLL VENOUS BLD VENIPUNCTURE: CPT

## 2023-08-30 PROCEDURE — 83735 ASSAY OF MAGNESIUM: CPT

## 2023-08-30 PROCEDURE — 82803 BLOOD GASES ANY COMBINATION: CPT

## 2023-08-30 PROCEDURE — 86850 RBC ANTIBODY SCREEN: CPT

## 2023-08-30 PROCEDURE — 85610 PROTHROMBIN TIME: CPT

## 2023-08-30 PROCEDURE — 84443 ASSAY THYROID STIM HORMONE: CPT

## 2023-08-30 PROCEDURE — 87640 STAPH A DNA AMP PROBE: CPT

## 2023-08-30 PROCEDURE — 83605 ASSAY OF LACTIC ACID: CPT

## 2023-08-30 PROCEDURE — 83036 HEMOGLOBIN GLYCOSYLATED A1C: CPT

## 2023-08-30 RX ORDER — SPIRONOLACTONE 25 MG/1
1 TABLET, FILM COATED ORAL
Qty: 30 | Refills: 0
Start: 2023-08-30 | End: 2023-09-28

## 2023-08-30 RX ORDER — AMIODARONE HYDROCHLORIDE 400 MG/1
2 TABLET ORAL
Qty: 160 | Refills: 0
Start: 2023-08-30 | End: 2023-10-08

## 2023-08-30 RX ORDER — SENNA PLUS 8.6 MG/1
2 TABLET ORAL
Qty: 0 | Refills: 0 | DISCHARGE
Start: 2023-08-30

## 2023-08-30 RX ORDER — LEVOTHYROXINE SODIUM 125 MCG
1.5 TABLET ORAL
Refills: 0 | DISCHARGE
Start: 2023-08-30

## 2023-08-30 RX ORDER — HYDRALAZINE HCL 50 MG
10 TABLET ORAL
Refills: 0 | Status: DISCONTINUED | OUTPATIENT
Start: 2023-08-30 | End: 2023-08-30

## 2023-08-30 RX ORDER — METOPROLOL TARTRATE 50 MG
1 TABLET ORAL
Refills: 0 | DISCHARGE

## 2023-08-30 RX ORDER — METOLAZONE 5 MG/1
1 TABLET ORAL
Refills: 0 | DISCHARGE

## 2023-08-30 RX ORDER — ATORVASTATIN CALCIUM 80 MG/1
1 TABLET, FILM COATED ORAL
Qty: 0 | Refills: 0 | DISCHARGE
Start: 2023-08-30

## 2023-08-30 RX ORDER — POLYETHYLENE GLYCOL 3350 17 G/17G
17 POWDER, FOR SOLUTION ORAL
Qty: 0 | Refills: 0 | DISCHARGE
Start: 2023-08-30

## 2023-08-30 RX ORDER — METOPROLOL TARTRATE 50 MG
1 TABLET ORAL
Qty: 30 | Refills: 0
Start: 2023-08-30 | End: 2023-09-28

## 2023-08-30 RX ORDER — LEVOTHYROXINE SODIUM 125 MCG
1 TABLET ORAL
Qty: 0 | Refills: 0 | DISCHARGE

## 2023-08-30 RX ORDER — PANTOPRAZOLE SODIUM 20 MG/1
1 TABLET, DELAYED RELEASE ORAL
Qty: 0 | Refills: 0 | DISCHARGE
Start: 2023-08-30

## 2023-08-30 RX ORDER — ATORVASTATIN CALCIUM 80 MG/1
1 TABLET, FILM COATED ORAL
Qty: 0 | Refills: 0 | DISCHARGE

## 2023-08-30 RX ORDER — LANOLIN ALCOHOL/MO/W.PET/CERES
1 CREAM (GRAM) TOPICAL
Qty: 0 | Refills: 0 | DISCHARGE
Start: 2023-08-30

## 2023-08-30 RX ORDER — HYDRALAZINE HCL 50 MG
1 TABLET ORAL
Refills: 0 | DISCHARGE

## 2023-08-30 RX ORDER — HYDRALAZINE HCL 50 MG
1 TABLET ORAL
Qty: 90 | Refills: 0
Start: 2023-08-30 | End: 2023-09-28

## 2023-08-30 RX ORDER — BUMETANIDE 0.25 MG/ML
1 INJECTION INTRAMUSCULAR; INTRAVENOUS
Qty: 60 | Refills: 0
Start: 2023-08-30 | End: 2023-09-28

## 2023-08-30 RX ORDER — ACETAMINOPHEN 500 MG
2 TABLET ORAL
Qty: 0 | Refills: 0 | DISCHARGE
Start: 2023-08-30

## 2023-08-30 RX ORDER — ISOSORBIDE DINITRATE 5 MG/1
1 TABLET ORAL
Qty: 90 | Refills: 0
Start: 2023-08-30 | End: 2023-09-28

## 2023-08-30 RX ADMIN — ISOSORBIDE DINITRATE 10 MILLIGRAM(S): 5 TABLET ORAL at 07:05

## 2023-08-30 RX ADMIN — SPIRONOLACTONE 25 MILLIGRAM(S): 25 TABLET, FILM COATED ORAL at 06:01

## 2023-08-30 RX ADMIN — POLYETHYLENE GLYCOL 3350 17 GRAM(S): 17 POWDER, FOR SOLUTION ORAL at 10:59

## 2023-08-30 RX ADMIN — AMIODARONE HYDROCHLORIDE 400 MILLIGRAM(S): 400 TABLET ORAL at 10:56

## 2023-08-30 RX ADMIN — Medication 25 MILLIGRAM(S): at 07:05

## 2023-08-30 RX ADMIN — Medication 25 MILLIGRAM(S): at 06:00

## 2023-08-30 RX ADMIN — RIVAROXABAN 15 MILLIGRAM(S): KIT at 10:59

## 2023-08-30 RX ADMIN — PANTOPRAZOLE SODIUM 40 MILLIGRAM(S): 20 TABLET, DELAYED RELEASE ORAL at 06:01

## 2023-08-30 RX ADMIN — BUMETANIDE 2 MILLIGRAM(S): 0.25 INJECTION INTRAMUSCULAR; INTRAVENOUS at 06:01

## 2023-08-30 RX ADMIN — Medication 50 MICROGRAM(S): at 06:00

## 2023-08-30 RX ADMIN — SODIUM CHLORIDE 3 MILLILITER(S): 9 INJECTION INTRAMUSCULAR; INTRAVENOUS; SUBCUTANEOUS at 06:26

## 2023-08-30 NOTE — PROGRESS NOTE ADULT - REASON FOR ADMISSION
Severe MR
Worsening SOB x 2 days

## 2023-08-30 NOTE — PROGRESS NOTE ADULT - ASSESSMENT
81 y/o male with PMH of HTN, HLD, GERD, HFrEF, EF of 35-40%, CAD, ICM, bioprosthetic aortic valve replacement in 2004, VT/VF arrest in 2017 s/p Abbott dual chamber ICD implant, prior VT ablation x 3 at [facility] (2/14/19, 10/22/19, 7/29/22), atrophic kidney, atrial fibrillation, s/p ablation on 10/29/20, and cardioversion x 6 (9/11/20, 7/30/21, 2/14/22, 10/25/22, 12/14/22 and 2/24/23) on Xarelto, aortic aneurysm and mitral valve repair in June 2021, who was admitted to the hospital for CHF exacerbation, requiring inotropic support. The patient had his anticoagulation held from 8/16-8/21 for a right heart cath, which showed increased biventricular filling pressures. The patient was diuresed and transitioned to PO diuretics. Inotropes have been weaned off and patient currently reports improvement in his breathing. Echo this admission EF of 36%, severe MR, mod-severe TR and severely dilated LA. EP was consulted due to concern for frequent v-pacing seen on telemetry and management of atrial flutter.    1. Atrial flutter  2. ICM, EF of 36%    - Interrogation of patient's ICD shows underlying rhythm of atrial flutter with ventricular rate of 68 bpm, persistent since 8/3/23.   - Pt is s/p WARREN/DCCV 8/29/23 (unsuccessful ATP).   - Started on Amiodarone 400mg BID x 1 week, 400mg qD x 1 week, then 200mg once daily thereafter. Black Box Warnings were discussed with patient; he will need routine TFTs/LFTs/PFTs/opthalmology eval while on Amiodarone. He is s/p 400mg thus far   - Continue AC with Xarelto, pt must be on uninterrupted AC minimum 30 days post DCCV.   - GDMT per HF  - Keep on tele. Keep K>4, Mg>2  - Pt has f/u with Dr. Tsang scheduled.   - EP will sign off, please reconsult with any questions.   - Discussed with EP attending and primary team.      81 y/o male with PMH of HTN, HLD, GERD, HFrEF, EF of 35-40%, CAD, ICM, bioprosthetic aortic valve replacement in 2004, VT/VF arrest in 2017 s/p Abbott dual chamber ICD implant, prior VT ablation x 3 at [facility] (2/14/19, 10/22/19, 7/29/22), atrophic kidney, atrial fibrillation, s/p ablation on 10/29/20, and cardioversion x 6 (9/11/20, 7/30/21, 2/14/22, 10/25/22, 12/14/22 and 2/24/23) on Xarelto, aortic aneurysm and mitral valve repair in June 2021, who was admitted to the hospital for CHF exacerbation, requiring inotropic support. The patient had his anticoagulation held from 8/16-8/21 for a right heart cath, which showed increased biventricular filling pressures. The patient was diuresed and transitioned to PO diuretics. Inotropes have been weaned off and patient currently reports improvement in his breathing. Echo this admission EF of 36%, severe MR, mod-severe TR and severely dilated LA. EP was consulted due to concern for frequent v-pacing seen on telemetry and management of atrial flutter.    1. Atrial flutter  2. ICM, EF of 36%    - Interrogation of patient's ICD shows underlying rhythm of atrial flutter with ventricular rate of 68 bpm, persistent since 8/3/23.   - Pt is s/p WARREN/DCCV 8/29/23 (unsuccessful ATP). Tele AP-VS with intermittent  rates ~60-80s; long GA  - Started on Amiodarone 400mg BID x 1 week, 400mg qD x 1 week, then 200mg once daily thereafter. Black Box Warnings were discussed with patient; he will need routine TFTs/LFTs/PFTs/opthalmology eval while on Amiodarone. He is s/p 400mg thus far   - Continue AC with Xarelto, pt must be on uninterrupted AC minimum 30 days post DCCV.   - GDMT per HF  - Keep on tele. Keep K>4, Mg>2  - Pt has f/u with Dr. Tsang scheduled.   - EP will sign off, please reconsult with any questions.   - Discussed with EP attending and primary team.      83 y/o male with PMH of HTN, HLD, GERD, HFrEF, EF of 35-40%, CAD, ICM, bioprosthetic aortic valve replacement in 2004, VT/VF arrest in 2017 s/p Abbott dual chamber ICD implant, prior VT ablation x 3 at [facility] (2/14/19, 10/22/19, 7/29/22), atrophic kidney, atrial fibrillation, s/p ablation on 10/29/20, and cardioversion x 6 (9/11/20, 7/30/21, 2/14/22, 10/25/22, 12/14/22 and 2/24/23) on Xarelto, aortic aneurysm and mitral valve repair in June 2021, who was admitted to the hospital for CHF exacerbation, requiring inotropic support. The patient had his anticoagulation held from 8/16-8/21 for a right heart cath, which showed increased biventricular filling pressures. The patient was diuresed and transitioned to PO diuretics. Inotropes have been weaned off and patient currently reports improvement in his breathing. Echo this admission EF of 36%, severe MR, mod-severe TR and severely dilated LA. EP was consulted due to concern for frequent v-pacing seen on telemetry and management of atrial flutter.    1. Atrial flutter  2. ICM, EF of 36%    - Interrogation of patient's ICD shows underlying rhythm of atrial flutter with ventricular rate of 68 bpm, persistent since 8/3/23.   - Pt is s/p WARREN/DCCV 8/29/23 (unsuccessful manual atrial ATP prior to DCCV). Tele AP-VS with intermittent  rates ~60-80s; long CT  - Started on Amiodarone 400mg BID x 1 week, 400mg qD x 1 week, then 200mg once daily thereafter. Black Box Warnings were discussed with patient; he will need routine TFTs/LFTs/PFTs/opthalmology eval while on Amiodarone. He is s/p 400mg thus far   - Continue AC with Xarelto, pt must be on uninterrupted AC minimum 30 days post DCCV.   - GDMT per HF  - Keep on tele. Keep K>4, Mg>2  - Pt has f/u with Dr. Tsang scheduled.   - EP will sign off, please reconsult with any questions.   - Discussed with EP attending and primary team.

## 2023-08-30 NOTE — DISCHARGE NOTE PROVIDER - NSDCFUSCHEDAPPT_GEN_ALL_CORE_FT
[facility]  CARDIOLOGY [address]  Scheduled Appointment: 09/15/2023

## 2023-08-30 NOTE — PROGRESS NOTE ADULT - PROBLEM SELECTOR PLAN 4
- Mixed pre- and post-capillary PH  - Defer repeat RHC and WARREN to Structural Heart team at [facility] as he is being considered for Tendyne
- PVR 7 however driven by low CO/CI as well; TPG 25; DPG 9  - will reassess with further optimization.
Single kidney with prior baseline reported SCr 1.9. Today slight improvement with sCr   - Suspect worsening SURESH related to hypoperfusion   - Daily CMP, avoid nephrotoxic agents   - Nephrology following, appreciate reccs.
- Pre and post capillary  - Continue diuresis
Single kidney with prior baseline reported SCr 1.9 - improved with inotrope assisted diuresis but now worsening   - Suspect worsening SURESH related to hypoperfusion   - Daily CMP, avoid nephrotoxic agents   - Nephrology following, appreciate reccs.
- Mixed pre- and post-capillary PH  - Defer repeat RHC and WARREN to Structural Heart team at [facility] as he is being considered for Tendyne
Baseline Cr 1.9  SURESH on admission, likely cardiorenal, now improving s/p diuresis  -Diuretics as above
Single kidney with prior baseline reported SCr 1.9. Today slight improvement with sCr   - Suspect worsening SURESH related to hypoperfusion   - Daily CMP, avoid nephrotoxic agents   - Nephrology following, appreciate reccs.

## 2023-08-30 NOTE — PROGRESS NOTE ADULT - PROBLEM SELECTOR PROBLEM 3
Afib
Afib
Pulmonary HTN
Pulmonary HTN
Afib
Paroxysmal atrial flutter
Pulmonary HTN
Paroxysmal atrial flutter
Pulmonary HTN
Afib
Paroxysmal atrial flutter
Afib
Afib
Pulmonary HTN
Paroxysmal atrial flutter

## 2023-08-30 NOTE — PROGRESS NOTE ADULT - PROBLEM SELECTOR PROBLEM 4
Acute renal failure superimposed on chronic kidney disease, unspecified CKD stage, unspecified acute renal failure type
Pulmonary HTN
Acute renal failure superimposed on chronic kidney disease, unspecified CKD stage, unspecified acute renal failure type
Pulmonary HTN
Pulmonary HTN
Acute renal failure superimposed on chronic kidney disease, unspecified CKD stage, unspecified acute renal failure type
Acute renal failure superimposed on chronic kidney disease, unspecified CKD stage, unspecified acute renal failure type
Pulmonary HTN

## 2023-08-30 NOTE — PROGRESS NOTE ADULT - PROBLEM SELECTOR PROBLEM 5
Pulmonary HTN
Pulmonary HTN
Acute renal failure superimposed on chronic kidney disease, unspecified CKD stage, unspecified acute renal failure type
Pulmonary HTN
Acute renal failure superimposed on chronic kidney disease, unspecified CKD stage, unspecified acute renal failure type
Acute renal failure superimposed on chronic kidney disease, unspecified CKD stage, unspecified acute renal failure type

## 2023-08-30 NOTE — CHART NOTE - NSCHARTNOTEFT_GEN_A_CORE
Nutrition Follow Up Note  Patient seen for: follow up    Source: [x] Patient       [x] Medical Record        [] RN        [] Family at bedside       [] Other:    -If unable to interview patient: [] Trach/Vent/BiPAP  [] Disoriented/confused/inappropriate to interview    Diet Order:   Diet, Regular:   1500mL Fluid Restriction ([id number])  Low Sodium (23)    - Is current order appropriate/adequate? [x] Yes  []  No:     - PO intake :   [x] >75%  Adequate    [] 50-75%  Fair       [] <50%  Poor    Nutrition-related concerns:  -BUN/Cr elevated. Nephrology following. SURESH on CKD IV.   -s/p WARREN/ CV   -Heart Failure following, CHF.     GI:  Last BM .  Bowel Regimen? [x] Yes   [] No    Weights:   Daily Weight in k.4 (), Weight in k (), Weight in k.4 (), Weight in k.5 (), Weight in k.6 (), Weight in k.5 (), Weight in k.1 ()    Nutritionally Pertinent MEDICATIONS  (STANDING):  aMIOdarone    Tablet  aMIOdarone    Tablet  atorvastatin  buMETAnide  hydrALAZINE  isosorbide   dinitrate Tablet (ISORDIL)  levothyroxine  metoprolol succinate ER  pantoprazole    Tablet  polyethylene glycol 3350  senna  sodium chloride 0.9% lock flush  spironolactone    Pertinent Labs:  @ 06:21: Na 135, BUN 98<H>, Cr 2.72<H>, BG 79, K+ 4.2, Phos --, Mg --, Alk Phos --, ALT/SGPT --, AST/SGOT --, HbA1c --    A1C with Estimated Average Glucose Result: 5.7 % (23 @ 16:56)    Skin per nursing documentation: No pressure injuries noted.  Edema per nursing documentation: 2+ sumi foot sumi ankle     Estimated Needs:   [x] no change since previous assessment  [] recalculated:     Previous Nutrition Diagnosis: severe chronic malnutrition  Nutrition Diagnosis is: [x] ongoing  [] resolved [] not applicable     Nutrition Care Plan:  [x] In Progress  [] Achieved  [] Not applicable    New Nutrition Diagnosis: [x] Not applicable    Nutrition Interventions:  : Discussed Na restriction, foods high in Na to avoid, reading food labels, tips for limiting Na in your diet. Reviewed daily weights, Wt gain parameters to contact MD. Discussed Na intake in relation to fluid retention, edema and Wt gain. Continued to encourage PO intake due to recent poor PO intake.     Recommendations:      -Continue current diet, defer fluid needs to team  -Monitor renal biomarkers for need to add restrictions to diet.     Monitoring and Evaluation:   Continue to monitor nutritional intake, tolerance to diet prescription, weights, labs, skin integrity    RD remains available upon request and will follow up per protocol  Karla Smith, MS, RD, CDN

## 2023-08-30 NOTE — PROGRESS NOTE ADULT - NS ATTEND AMEND GEN_ALL_CORE FT
Patient seen at bedside this morning on 2 [place]. s/p WARREN/DCCV today for an atypical AFl. Start Amio. Outpatient follow-up with Dr. Ana Maria Tsang (Electrophysiologist from [facility]).    KAYLAN Gonzalez
Seen, examined with, formulated plan with and  agree with above as scribed by NP Anabelle [Frances]     states ambulating better and is not short of breath   NO JVD  heart s1/s2 rrr  lungs decrease bs no rales   ext 1+ edema      SURESH  on CKD   CHF    cr is stabilizing at this high leve  fluid status is improving   bumex 2 mg po qd  aldactone 25 mg daily
Patient seen at bedside this morning on 2 Casanova. s/p WARREN/DCCV on 8/28 for an atypical AFl. ICD reprogrammed following DCCV to extend AV delays (patient with first degree AV delay at baseline). Continue with Amiodarone. Outpatient follow-up with Dr. Ana Maria Tsang (Electrophysiologist from [facility]). No further workup from an EP perspective as an inpatient.    KAYLAN Gonzalez
82/M with CAD s/p PCI, HFrEF ef 35-40%, Bio AVR, Aortic Aneurysm repair in 2021, CKD 3(single kidney), with severe MR being evaluated for APOLLO (TMVR trial) not admitted with worsening SOB, LE swelling.   Pt reports last week his Leg swelling hasn't decreased even with increased dose of torsemide. Has been sleeping in recliner due to orthopnea.     lost 6lbs since admission, reports good urine outpt on bumex IV  K low on labs this AM    Exam:  AAOx3.   JVP ~13cms  b/l creakles +  s1s2 Holosystolic murmur +  b/l 2+ pitting edema extending to b/l Knees    Labs/cxr noted.     Plan:  good response to Diuretics  will cont Bumex IV 4mg BID  replete K > 4 and Mg >2  start Po KCL 40meq BID  cont hydral/ metoprolol home dose  off [place] due to single kidney and CKD 3    Plan for RHC today to assess CO/CI and need for inotrope    SURESH on CKD  creat up from 1.9-2.2 to 2.8  diuresis as above    plan d/w Structural and CTS  HF will cont follow

## 2023-08-30 NOTE — PROGRESS NOTE ADULT - PROBLEM SELECTOR PROBLEM 1
Severe mitral regurgitation
CHF, acute on chronic
Severe mitral regurgitation
CHF, acute on chronic
Severe mitral regurgitation
Severe mitral regurgitation
CHF, acute on chronic
Severe mitral regurgitation
Severe mitral regurgitation
CHF, acute on chronic
Severe mitral regurgitation
CHF, acute on chronic
Severe mitral regurgitation
CHF, acute on chronic
Severe mitral regurgitation
Severe mitral regurgitation
CHF, acute on chronic
Severe mitral regurgitation
CHF, acute on chronic

## 2023-08-30 NOTE — PROGRESS NOTE ADULT - ATTENDING COMMENTS
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 Was being diuresed with borderline low BP. Underwent RHC which showed elevated filling pressures and low CI so started on milrinone with improvement. Milrinone was weaned off over weekend with worsening renal function. Also notably RV pacing and in aflutter which is new. Being considered for tendyne as APOLLO is paused. Successfully cardioverted 8/29 and placed on amio. Was RV pacing and device adjusted to minimize RV pacing with good result. On exam, NAD, JVP approx 8 cm w/ v waves, RRR, grade IV/VI systolic murmur in L axilla, CTAB, nontender abdomen, b/l trace edema. Labs reviewed - BUN/Cr 98/2.72 (slight improvement; parag 2.39), BNP 16k (from 28k), lactate negative. TTE reviewed - EF 25%, LVEDD 4.95 cm, severe MR, mod-severe TR.   - appreciate EP input; device adjusted to minimize RV pacing  - discussed at length with patient and his wife current situation  - d/c on bumex 2 mg twice/day; goal weight 144-146 pounds  - decrease hydral to 10 mg; d/c on hydral and isordil 10 mg q8h  - c/w toprol 25 mg daily  - c/w franklin 25 mg daily   - stable for d/c; close f/u arranged

## 2023-08-30 NOTE — PROGRESS NOTE ADULT - SUBJECTIVE AND OBJECTIVE BOX
24H hour events: Tele AP w/ intermittent VPacing rates ~60-80s. Pt feeling well. Denies chest pain/palpitations/SOB.     MEDICATIONS:  aMIOdarone    Tablet   Oral   aMIOdarone    Tablet 400 milliGRAM(s) Oral two times a day  buMETAnide 2 milliGRAM(s) Oral daily  hydrALAZINE 10 milliGRAM(s) Oral <User Schedule>  isosorbide   dinitrate Tablet (ISORDIL) 10 milliGRAM(s) Oral <User Schedule>  metoprolol succinate ER 25 milliGRAM(s) Oral daily  rivaroxaban 15 milliGRAM(s) Oral daily  spironolactone 25 milliGRAM(s) Oral daily  acetaminophen     Tablet .. 650 milliGRAM(s) Oral every 6 hours PRN  melatonin 3 milliGRAM(s) Oral at bedtime PRN  ondansetron Injectable 4 milliGRAM(s) IV Push every 8 hours PRN  aluminum hydroxide/magnesium hydroxide/simethicone Suspension 30 milliLiter(s) Oral every 4 hours PRN  pantoprazole    Tablet 40 milliGRAM(s) Oral before breakfast  polyethylene glycol 3350 17 Gram(s) Oral daily  senna 2 Tablet(s) Oral at bedtime  atorvastatin 40 milliGRAM(s) Oral at bedtime  levothyroxine 50 MICROGram(s) Oral daily  sodium chloride 0.9% lock flush 3 milliLiter(s) IV Push every 8 hours      REVIEW OF SYSTEMS:  Complete 12point ROS negative.    PHYSICAL EXAM:  T(C): 36.6 (08-30-23 @ 04:55), Max: 36.7 (08-29-23 @ 10:28)  HR: 76 (08-30-23 @ 04:55) (58 - 81)  BP: 95/61 (08-30-23 @ 04:55) (88/58 - 105/72)  RR: 18 (08-30-23 @ 04:55) (18 - 18)  SpO2: 93% (08-30-23 @ 04:55) (93% - 99%)  Wt(kg): --  I&O's Summary    29 Aug 2023 07:01  -  30 Aug 2023 07:00  --------------------------------------------------------  IN: 480 mL / OUT: 2000 mL / NET: -1520 mL    30 Aug 2023 07:01  -  30 Aug 2023 09:16  --------------------------------------------------------  IN: 0 mL / OUT: 300 mL / NET: -300 mL        Appearance: Normal	  HEENT: NC/AT  Cardiovascular: Normal S1 S2  Respiratory: Lungs clear to auscultation	  Psychiatry: A & O x 3, Mood & affect appropriate  Neurologic: Non-focal  Extremities: No BLE edema        LABS:	 	    CBC Full  -  ( 30 Aug 2023 06:19 )  WBC Count : 4.99 K/uL  Hemoglobin : 11.2 g/dL  Hematocrit : 33.3 %  Platelet Count - Automated : 111 K/uL  Mean Cell Volume : 87.2 fl  Mean Cell Hemoglobin : 29.3 pg  Mean Cell Hemoglobin Concentration : 33.6 gm/dL    08-30    135  |  95<L>  |  98<H>  ----------------------------<  79  4.2   |  22  |  2.72<H>  08-29    133<L>  |  94<L>  |  85<H>  ----------------------------<  85  3.7   |  24  |  2.82<H>    Ca    9.6      30 Aug 2023 06:21  Ca    9.5      29 Aug 2023 05:17        proBNP: Pro-Brain Natriuretic Peptide (08.28.23 @ 11:56)    Pro-Brain Natriuretic Peptide: 66774 pg/mL    TSH: Free Thyroxine, Serum in AM (08.16.23 @ 16:55)    Free Thyroxine, Serum: 1.4 ng/dL      CARDIAC MARKERS: Troponin T, High Sensitivity (06.06.21 @ 03:07)    Troponin T, High Sensitivity Result: 266: Specimen not hemolyzed  *  *  Rapid upward or downward changes in high-sensitivity troponin levels  suggest acute myocardial injury. Renal impairment may cause sustained  troponin elevations.  Normal: <6 - 14 ng/L  Indeterminate: 15-51 ng/L  Elevated: > 51 ng/L  See [email or web address]/[email or web address] on the Health system intranet for more  information ng/L      RADIOLOGY: < from: Xray Chest 1 View- PORTABLE-Urgent (Xray Chest 1 View- PORTABLE-Urgent .) (08.21.23 @ 07:26) >    FINDINGS:  Left chest wall ICD with leads terminating in right atrium and right   ventricle without evidence of lead fracture. Sternotomy wires overlying   midline, unchanged.  Cardiopericardial silhouette is enlarged, stable.  No focal consolidation. Minimal patchy opacity at the right costophrenic   angle.  There is no pneumothorax.  No acute bony abnormality.    IMPRESSION:  Minimal patchy opacity at the right costophrenic angle likely atelectasis   or trace effusion. Stable cardiomegaly.      < end of copied text >      PREVIOUS DIAGNOSTIC TESTING:    [ ] Echocardiogram: < from: WARREN W or WO Ultrasound Enhancing Agent (08.29.23 @ 10:38) >     CONCLUSIONS:      1. Moderately enlarged right ventricular cavity size and moderately reduced right ventricular systolic function.   2. Severe mitral regurgitation at a blood pressure of 97/61 mmHg.   3. No pericardial effusion seen.   4. Estimated pulmonary artery systolic pressure is 49 mmHg, consistent with moderate pulmonary hypertension.   5. Moderate tricuspid regurgitation.    < end of copied text >      [ ]  Catheterization: < from: Cardiac Catheterization (05.24.23 @ 15:57) >  Diagnostic Conclusions:     Mild to moderate nonobstructive CAD.  Proceed with TMVR     < end of copied text >   24H hour events: Tele AP w/ intermittent VPacing rates ~60-80s. Pt feeling well. Denies chest pain/palpitations/SOB.     MEDICATIONS:  aMIOdarone    Tablet 400 milliGRAM(s) Oral two times a day  buMETAnide 2 milliGRAM(s) Oral daily  hydrALAZINE 10 milliGRAM(s) Oral <User Schedule>  isosorbide   dinitrate Tablet (ISORDIL) 10 milliGRAM(s) Oral <User Schedule>  metoprolol succinate ER 25 milliGRAM(s) Oral daily  rivaroxaban 15 milliGRAM(s) Oral daily  spironolactone 25 milliGRAM(s) Oral daily  acetaminophen     Tablet .. 650 milliGRAM(s) Oral every 6 hours PRN  melatonin 3 milliGRAM(s) Oral at bedtime PRN  ondansetron Injectable 4 milliGRAM(s) IV Push every 8 hours PRN  aluminum hydroxide/magnesium hydroxide/simethicone Suspension 30 milliLiter(s) Oral every 4 hours PRN  pantoprazole    Tablet 40 milliGRAM(s) Oral before breakfast  polyethylene glycol 3350 17 Gram(s) Oral daily  senna 2 Tablet(s) Oral at bedtime  atorvastatin 40 milliGRAM(s) Oral at bedtime  levothyroxine 50 MICROGram(s) Oral daily  sodium chloride 0.9% lock flush 3 milliLiter(s) IV Push every 8 hours    REVIEW OF SYSTEMS:  Complete 12point ROS negative.    PHYSICAL EXAM:  T(C): 36.6 (08-30-23 @ 04:55), Max: 36.7 (08-29-23 @ 10:28)  HR: 76 (08-30-23 @ 04:55) (58 - 81)  BP: 95/61 (08-30-23 @ 04:55) (88/58 - 105/72)  RR: 18 (08-30-23 @ 04:55) (18 - 18)  SpO2: 93% (08-30-23 @ 04:55) (93% - 99%)  Wt(kg): --  I&O's Summary    29 Aug 2023 07:01  -  30 Aug 2023 07:00  --------------------------------------------------------  IN: 480 mL / OUT: 2000 mL / NET: -1520 mL    30 Aug 2023 07:01  -  30 Aug 2023 09:16  --------------------------------------------------------  IN: 0 mL / OUT: 300 mL / NET: -300 mL    Appearance: Normal	  HEENT: NC/AT  Cardiovascular: Normal S1 S2  Respiratory: Lungs clear to auscultation	  Psychiatry: A & O x 3, Mood & affect appropriate  Neurologic: Non-focal  Extremities: No BLE edema    LABS:	 	    CBC Full  -  ( 30 Aug 2023 06:19 )  WBC Count : 4.99 K/uL  Hemoglobin : 11.2 g/dL  Hematocrit : 33.3 %  Platelet Count - Automated : 111 K/uL  Mean Cell Volume : 87.2 fl  Mean Cell Hemoglobin : 29.3 pg  Mean Cell Hemoglobin Concentration : 33.6 gm/dL    08-30    135  |  95<L>  |  98<H>  ----------------------------<  79  4.2   |  22  |  2.72<H>  08-29    133<L>  |  94<L>  |  85<H>  ----------------------------<  85  3.7   |  24  |  2.82<H>    Ca    9.6      30 Aug 2023 06:21  Ca    9.5      29 Aug 2023 05:17        proBNP: Pro-Brain Natriuretic Peptide (08.28.23 @ 11:56)    Pro-Brain Natriuretic Peptide: 78007 pg/mL    TSH: Free Thyroxine, Serum in AM (08.16.23 @ 16:55)    Free Thyroxine, Serum: 1.4 ng/dL      CARDIAC MARKERS: Troponin T, High Sensitivity (06.06.21 @ 03:07)    Troponin T, High Sensitivity Result: 266: Specimen not hemolyzed  *  *  Rapid upward or downward changes in high-sensitivity troponin levels  suggest acute myocardial injury. Renal impairment may cause sustained  troponin elevations.  Normal: <6 - 14 ng/L  Indeterminate: 15-51 ng/L  Elevated: > 51 ng/L  See [email or web address]/[email or web address]/[email or web address] on the [facility] intranet for more  information ng/L      RADIOLOGY: < from: Xray Chest 1 View- PORTABLE-Urgent (Xray Chest 1 View- PORTABLE-Urgent .) (08.21.23 @ 07:26) >    FINDINGS:  Left chest wall ICD with leads terminating in right atrium and right   ventricle without evidence of lead fracture. Sternotomy wires overlying   midline, unchanged.  Cardiopericardial silhouette is enlarged, stable.  No focal consolidation. Minimal patchy opacity at the right costophrenic   angle.  There is no pneumothorax.  No acute bony abnormality.    IMPRESSION:  Minimal patchy opacity at the right costophrenic angle likely atelectasis   or trace effusion. Stable cardiomegaly.      < end of copied text >      PREVIOUS DIAGNOSTIC TESTING:    [ ] Echocardiogram: < from: WARREN W or WO Ultrasound Enhancing Agent (08.29.23 @ 10:38) >     CONCLUSIONS:      1. Moderately enlarged right ventricular cavity size and moderately reduced right ventricular systolic function.   2. Severe mitral regurgitation at a blood pressure of 97/61 mmHg.   3. No pericardial effusion seen.   4. Estimated pulmonary artery systolic pressure is 49 mmHg, consistent with moderate pulmonary hypertension.   5. Moderate tricuspid regurgitation.    < end of copied text >      [ ]  Catheterization: < from: Cardiac Catheterization (05.24.23 @ 15:57) >  Diagnostic Conclusions:     Mild to moderate nonobstructive CAD.  Proceed with TMVR     < end of copied text >

## 2023-08-30 NOTE — DISCHARGE NOTE PROVIDER - NSDCMRMEDTOKEN_GEN_ALL_CORE_FT
atorvastatin 40 mg oral tablet: 1 tab(s) orally once a day (at bedtime)  CoQ10 300 mg oral capsule: 1 orally once a day (at bedtime)  hydrALAZINE 25 mg oral tablet: 1 orally 3 times a day  levothyroxine 50 mcg (0.05 mg) oral tablet: 1 tab(s) orally once a day  metOLazone 2.5 mg oral tablet: 1 orally once a day  pantoprazole 40 mg oral delayed release tablet: 1 tab(s) orally once a day (before a meal)  polyethylene glycol 3350 oral powder for reconstitution: 17 gram(s) orally once a day  Presser Vision Areds 2:   Toprol- mg oral tablet, extended release: 1 orally once a day  torsemide 20 mg oral tablet: 1 orally once a day  Xarelto 15 mg oral tablet: 1 orally   acetaminophen 325 mg oral tablet: 2 tab(s) orally every 6 hours As needed Temp greater or equal to 38C (100.4F), Mild Pain (1 - 3)  aluminum hydroxide-magnesium hydroxide 200 mg-200 mg/5 mL oral suspension: 30 milliliter(s) orally every 4 hours As needed Dyspepsia  amiodarone 200 mg oral tablet: 2 tab(s) orally 2 times a day take 2 tabs - total 400 mg twice a day until 9/4 then decrease to 400 mg oral daily until 9/12 then decrease to 200 mg oral daily starting on sept 13th  atorvastatin 40 mg oral tablet: 1 tab(s) orally once a day (at bedtime)  bumetanide 2 mg oral tablet: 1 tab(s) orally 2 times a day  CoQ10 300 mg oral capsule: 1 orally once a day (at bedtime)  hydrALAZINE 10 mg oral tablet: 1 tab(s) orally 3 times a day  isosorbide dinitrate 10 mg oral tablet: 1 tab(s) orally 3 times a day  levothyroxine 50 mcg (0.05 mg) oral tablet: 1 tab(s) orally once a day  melatonin 3 mg oral tablet: 1 tab(s) orally once a day (at bedtime) As needed Insomnia  metoprolol succinate 25 mg oral tablet, extended release: 1 tab(s) orally once a day  pantoprazole 40 mg oral delayed release tablet: 1 tab(s) orally once a day (before a meal)  polyethylene glycol 3350 oral powder for reconstitution: 17 gram(s) orally once a day  Presser Vision Areds 2:   senna leaf extract oral tablet: 2 tab(s) orally once a day (at bedtime)  spironolactone 25 mg oral tablet: 1 tab(s) orally once a day  Xarelto 15 mg oral tablet: 1 tablet orally once a day

## 2023-08-30 NOTE — PROGRESS NOTE ADULT - NUTRITIONAL ASSESSMENT
This patient has been assessed with a concern for Malnutrition and has been determined to have a diagnosis/diagnoses of Severe protein-calorie malnutrition.    This patient is being managed with:   Diet Regular-  1500mL Fluid Restriction ([id number])  Low Sodium  Entered: Aug 23 2023  1:42PM  
This patient has been assessed with a concern for Malnutrition and has been determined to have a diagnosis/diagnoses of Severe protein-calorie malnutrition.    This patient is being managed with:   Diet Regular-  Low Sodium  Entered: Aug 17 2023  1:51PM  
This patient has been assessed with a concern for Malnutrition and has been determined to have a diagnosis/diagnoses of Severe protein-calorie malnutrition.    This patient is being managed with:   Diet NPO after Midnight-     NPO Start Date: 28-Aug-2023   NPO Start Time: 23:59  Entered: Aug 28 2023  3:45PM    Diet Regular-  1500mL Fluid Restriction ([id number])  Low Sodium  Entered: Aug 23 2023  1:42PM  
This patient has been assessed with a concern for Malnutrition and has been determined to have a diagnosis/diagnoses of Severe protein-calorie malnutrition.    This patient is being managed with:   Diet NPO after Midnight-     NPO Start Date: 28-Aug-2023   NPO Start Time: 23:59  Entered: Aug 28 2023  3:45PM    Diet Regular-  1500mL Fluid Restriction ([id number])  Low Sodium  Entered: Aug 23 2023  1:42PM  
This patient has been assessed with a concern for Malnutrition and has been determined to have a diagnosis/diagnoses of Severe protein-calorie malnutrition.    This patient is being managed with:   Diet Regular-  1500mL Fluid Restriction ([id number])  Low Sodium  Entered: Aug 23 2023  1:42PM  
This patient has been assessed with a concern for Malnutrition and has been determined to have a diagnosis/diagnoses of Severe protein-calorie malnutrition.    This patient is being managed with:   Diet Regular-  1500mL Fluid Restriction ([id number])  Low Sodium  Entered: Aug 23 2023  1:42PM  
This patient has been assessed with a concern for Malnutrition and has been determined to have a diagnosis/diagnoses of Severe protein-calorie malnutrition.    This patient is being managed with:   Diet Regular-  1500mL Fluid Restriction ([id number])  Low Sodium  Entered: Aug 23 2023  1:42PM  
This patient has been assessed with a concern for Malnutrition and has been determined to have a diagnosis/diagnoses of Severe protein-calorie malnutrition.    This patient is being managed with:   Diet Regular-  1500mL Fluid Restriction ([id number])  Low Sodium  Entered: Aug 23 2023  1:42PM  
This patient has been assessed with a concern for Malnutrition and has been determined to have a diagnosis/diagnoses of Severe protein-calorie malnutrition.    This patient is being managed with:   Diet Regular-  Low Sodium  Entered: Aug 17 2023  1:51PM  
This patient has been assessed with a concern for Malnutrition and has been determined to have a diagnosis/diagnoses of Severe protein-calorie malnutrition.    This patient is being managed with:   Diet Regular-  1500mL Fluid Restriction ([id number])  Low Sodium  Entered: Aug 23 2023  1:42PM  
This patient has been assessed with a concern for Malnutrition and has been determined to have a diagnosis/diagnoses of Severe protein-calorie malnutrition.    This patient is being managed with:   Diet Regular-  Low Sodium  Entered: Aug 17 2023  1:51PM  
This patient has been assessed with a concern for Malnutrition and has been determined to have a diagnosis/diagnoses of Severe protein-calorie malnutrition.    This patient is being managed with:   Diet NPO after Midnight-     NPO Start Date: 28-Aug-2023   NPO Start Time: 23:59  Entered: Aug 28 2023  3:45PM    Diet Regular-  1500mL Fluid Restriction ([id number])  Low Sodium  Entered: Aug 23 2023  1:42PM  
This patient has been assessed with a concern for Malnutrition and has been determined to have a diagnosis/diagnoses of Severe protein-calorie malnutrition.    This patient is being managed with:   Diet Regular-  1500mL Fluid Restriction ([id number])  Low Sodium  Entered: Aug 23 2023  1:42PM  
This patient has been assessed with a concern for Malnutrition and has been determined to have a diagnosis/diagnoses of Severe protein-calorie malnutrition.    This patient is being managed with:   Diet Regular-  Low Sodium  Entered: Aug 17 2023  1:51PM  
This patient has been assessed with a concern for Malnutrition and has been determined to have a diagnosis/diagnoses of Severe protein-calorie malnutrition.    This patient is being managed with:   Diet Regular-  Low Sodium  Entered: Aug 17 2023  1:51PM  
This patient has been assessed with a concern for Malnutrition and has been determined to have a diagnosis/diagnoses of Severe protein-calorie malnutrition.    This patient is being managed with:   Diet Regular-  1500mL Fluid Restriction ([id number])  Low Sodium  Entered: Aug 23 2023  1:42PM  
This patient has been assessed with a concern for Malnutrition and has been determined to have a diagnosis/diagnoses of Severe protein-calorie malnutrition.    This patient is being managed with:   Diet Regular-  Low Sodium  Entered: Aug 17 2023  1:51PM  
This patient has been assessed with a concern for Malnutrition and has been determined to have a diagnosis/diagnoses of Severe protein-calorie malnutrition.    This patient is being managed with:   Diet NPO-  NPO for Procedure/Test     NPO Start Date: 18-Aug-2023   NPO Start Time: 09:50  Except Medications  Entered: Aug 18 2023  9:46AM    Diet Regular-  Low Sodium  Entered: Aug 17 2023  1:51PM  
This patient has been assessed with a concern for Malnutrition and has been determined to have a diagnosis/diagnoses of Severe protein-calorie malnutrition.    This patient is being managed with:   Diet Regular-  1500mL Fluid Restriction ([id number])  Low Sodium  Entered: Aug 23 2023  1:42PM  
This patient has been assessed with a concern for Malnutrition and has been determined to have a diagnosis/diagnoses of Severe protein-calorie malnutrition.    This patient is being managed with:   Diet Regular-  1500mL Fluid Restriction ([id number])  Low Sodium  Entered: Aug 23 2023  1:42PM  
This patient has been assessed with a concern for Malnutrition and has been determined to have a diagnosis/diagnoses of Severe protein-calorie malnutrition.    This patient is being managed with:   Diet Regular-  Low Sodium  Entered: Aug 17 2023  1:51PM  
This patient has been assessed with a concern for Malnutrition and has been determined to have a diagnosis/diagnoses of Severe protein-calorie malnutrition.    This patient is being managed with:   Diet Regular-  Low Sodium  Entered: Aug 17 2023  1:51PM  
This patient has been assessed with a concern for Malnutrition and has been determined to have a diagnosis/diagnoses of Severe protein-calorie malnutrition.    This patient is being managed with:   Diet Regular-  1500mL Fluid Restriction ([id number])  Low Sodium  Entered: Aug 23 2023  1:42PM  
This patient has been assessed with a concern for Malnutrition and has been determined to have a diagnosis/diagnoses of Severe protein-calorie malnutrition.    This patient is being managed with:   Diet Regular-  1500mL Fluid Restriction ([id number])  Low Sodium  Entered: Aug 23 2023  1:42PM

## 2023-08-30 NOTE — DISCHARGE NOTE PROVIDER - CARE PROVIDERS DIRECT ADDRESSES
,[email or web address]

## 2023-08-30 NOTE — PROGRESS NOTE ADULT - PROVIDER SPECIALTY LIST ADULT
CT Surgery
Heart Failure
Heart Failure
Nephrology
CT Surgery
Heart Failure
Nephrology
Nephrology
Electrophysiology
Nephrology
Electrophysiology
Heart Failure
Heart Failure
Nephrology
CT Surgery
Heart Failure
CT Surgery
Heart Failure
CT Surgery
Heart Failure
CT Surgery
Heart Failure
Heart Failure
CT Surgery
Heart Failure
Heart Failure

## 2023-08-30 NOTE — DISCHARGE NOTE PROVIDER - HOSPITAL COURSE
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  Patient reports worsening SOB x 2 days, with associated symptoms of fatigue, deconditioning and inability to walk long distance.  Presents today as a direct admission for CHF exacerbation and requiring medical optimization.     Hospital Course:   8/16 Admit to 2 [facility] Telemetry Floor.  Heart Failure consult called.  CXR ordered.  D/C'd Torsemide and started on Bumex 4 mg IV BID. Per Dr. Valentino hold Xarelto for today for possible RHC in AM.  Awaiting HF reccs.     8/17 VSS - bun 92 this am from 102 - as per Dr. Fenton - renal called - pt seen by renal & HF team - will continue Bumex 4mg IV bid-   8/18 RHC today for diagnostic eval Continue aggressive diuresis renal following  Replete potassium.  8/19 VVS; Potassium 3.5 --> Supplemented. Started om KCL 40 meq PO BID.  Heart Failure and Renal following.  Pre albumin ordered: 20 Continue with current medication regimen.  Sorbitol 30 mg PO x 1.   8/20 VSS + BM  seen by HF now for BUMEX GTT 1mg/hr  and milrinone for inotropic support 125mcg/kg/min    8/21 VSS: continue primacor gttp .125 and bumex gttp 1 mg/ hr; CHF following; resume xarelto for hx afib   8/22 VSS; continue primacor gttp and bumex gttp as per CHF team; AC with xarelto   8/23 VSS: afib v.paced; continue diuresis; add isordil 10 tid and increase hydral 35 mg po tid; 1500 cc F. R   8/24 VSS  Aggressive diuresis Heart failure following  8/25 VSS change to oral Bumex  Heart failure following>milrinone wean start tomorrow  8/26 VSS, continue wean milrinone drip as per HF, Neg -1230 diurese on PO Bumex. Aldactone 12.5 daily added per HF. Plan for d/c home Mon.   8/27 VSS, milrinone weaned off per HF, increased Bumex to 1mg BID for peripheral edema, Aldactone increased to 25 daily and hydralazine decreased to 25 TID. BUN/Cr 78/2.39.  8/28 V. paced @ 80; + hypotension noted this am --> sbp 70's; pt asymptomatic; albumin given; bp responded 90's; lactate level nl 1.0 bun/cr increased to 87/2.9; give additional bumex 2 mg and start bumex 2 mg po qd on tue; decrease toprol 25 qd; pro-bnp level 16,901  EP consulted; AICD interregation; WARREN/ CV in am tue as per EP; d/w CHF and Dr. Fenton   discharge planning- home when stable and cleared by CHF          81 yo Male with PMHx of  HTN, HLD, GERD, CHF, moderately reduced EF 35-40%, AFIB (on Xarelto) s/p ablation, CAD with h/o Atherectomy,  IWMI 1994, [place] ARREST IN 12/2017, AICD, s/p AVR in 2004, s/p AORTIC ANEURYSM AND MV repair with Dr Fenton 6/2/21, and now severe MR.  Patient reports worsening SOB x 2 days, with associated symptoms of fatigue, deconditioning and inability to walk long distance.  Presents today as a direct admission for CHF exacerbation and requiring medical optimization.     Hospital Course:   8/16 Admit to 2 [facility] Telemetry Floor.  Heart Failure consult called.  CXR ordered.  D/C'd Torsemide and started on Bumex 4 mg IV BID. Per Dr. Valentino hold Xarelto for today for possible RHC in AM.  Awaiting HF reccs.     8/17 VSS - bun 92 this am from 102 - as per Dr. Fenton - renal called - pt seen by renal & HF team - will continue Bumex 4mg IV bid-   8/18 RHC today for diagnostic eval Continue aggressive diuresis renal following  Replete potassium.  8/19 VVS; Potassium 3.5 --> Supplemented. Started om KCL 40 meq PO BID.  Heart Failure and Renal following.  Pre albumin ordered: 20 Continue with current medication regimen.  Sorbitol 30 mg PO x 1.   8/20 VSS + BM  seen by HF now for BUMEX GTT 1mg/hr  and milrinone for inotropic support 125mcg/kg/min    8/21 VSS: continue primacor gttp .125 and bumex gttp 1 mg/ hr; CHF following; resume xarelto for hx afib   8/22 VSS; continue primacor gttp and bumex gttp as per CHF team; AC with xarelto   8/23 VSS: afib v.paced; continue diuresis; add isordil 10 tid and increase hydral 35 mg po tid; 1500 cc F. R   8/24 VSS  Aggressive diuresis Heart failure following  8/25 VSS change to oral Bumex  Heart failure following>milrinone wean start tomorrow  8/26 VSS, continue wean milrinone drip as per HF, Neg -1230 diurese on PO Bumex. Aldactone 12.5 daily added per HF. Plan for d/c home Mon.   8/27 VSS, milrinone weaned off per HF, increased Bumex to 1mg BID for peripheral edema, Aldactone increased to 25 daily and hydralazine decreased to 25 TID. BUN/Cr 78/2.39.  8/28 V. paced @ 80; + hypotension noted this am --> sbp 70's; pt asymptomatic; albumin given; bp responded 90's; lactate level nl 1.0 bun/cr increased to 87/2.9; give additional bumex 2 mg and start bumex 2 mg po qd on tue; decrease toprol 25 qd; pro-bnp level 16,901  EP consulted; AICD interregation; WARREN/ CV in am tue as per EP; d/w CHF and Dr. Fenton   8/29 VSS; WARREN-CV-RSR 70's; amio taper as per EP; continue xarelto for AC   8/30 VSS - pt currently rsr; discharge pt home today as per chf/ Dr. Fenton and renal ; d/c on bumex 2 mg po bid as per CHF; f/u outpatient with Dr. Ruiz

## 2023-08-30 NOTE — PROGRESS NOTE ADULT - PROBLEM SELECTOR PLAN 5
Baseline Cr 1.9  SURESH on admission, likely cardiorenal, now improving s/p diuresis  -Diuretics as above
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
- Single kidney with prior baseline reported SCr 1.9  - Peaked at 3.0, now improving  - Monitor as wean milrinone  - Try to increase HDZN/ISDN, as tolerated by BP for cardiorenal benefit  - Intolerant to SGLT2-i previously; unclear if he will allow rechallenge
- Single kidney with prior baseline reported SCr 1.9  - Peaked at 3.0, now improving  - Monitor as wean milrinone  - Continue HDZN/ISDN, as tolerated by BP for cardiorenal benefit  - Intolerant to SGLT2-i previously; unclear if he will allow rechallenge

## 2023-08-30 NOTE — DISCHARGE NOTE NURSING/CASE MANAGEMENT/SOCIAL WORK - PATIENT PORTAL LINK FT
You can access the FollowMyHealth Patient Portal offered by [facility] by registering at the following website: [email or web address]. By joining [facility]’s FollowMyHealth portal, you will also be able to view your health information using other applications (apps) compatible with our system.

## 2023-08-30 NOTE — DISCHARGE NOTE NURSING/CASE MANAGEMENT/SOCIAL WORK - NSDCPEFALRISK_GEN_ALL_CORE
For information on Fall & Injury Prevention, visit: [email or web address] OR  [email or web address] OR  https://www.cdc.gov/steadi/patient.html

## 2023-08-30 NOTE — DISCHARGE NOTE PROVIDER - NSDCCPCAREPLAN_GEN_ALL_CORE_FT
PRINCIPAL DISCHARGE DIAGNOSIS  Diagnosis: Severe mitral regurgitation  Assessment and Plan of Treatment: shower daily  weigh yourself daily  continue current prescriptions as ordered  increase activity as tolerated   no added salt; low fat; low cholesterol, low salt diet.   follow up with Cardiologist in 1-2 weeks. Call to schedule appointment.  follow up with cardiac surgeon       administer in food/crush pills for administration PRINCIPAL DISCHARGE DIAGNOSIS  Diagnosis: Severe mitral regurgitation  Assessment and Plan of Treatment: shower daily  weigh yourself daily  continue current prescriptions as ordered  increase activity as tolerated   no added salt; low fat; low cholesterol, low salt diet.   follow up with Cardiologist, DR. Enmanuel Ruiz,  in 1-2 weeks. Call to schedule appointment.  follow up with nephrologist, Dr. Lala in 1-2 weeks; Call to schedule appointment.

## 2023-08-30 NOTE — DISCHARGE NOTE PROVIDER - NSDCPNSUBOBJ_GEN_ALL_CORE
PHYSICAL EXAM    Subjective: "What's the plan for today?"   Neurology: alert and oriented x 3, nonfocal, no gross deficits  CV : tele:  V. paced @ 80; +murmur   Lungs: clear. RR easy, unlabored   Abdomen: soft, nontender, nondistended, positive bowel sounds, + bowel movement   Neg N/V/D   :  pt voiding without difficulty   Extremities:   BRAVO; Trace LE edema, neg calf tenderness.   PPP bilaterally; chronic venous stasis discoloration        discharge pt home today 8/30 as per Dr. Fenton and CHF team        PHYSICAL EXAM    Subjective: "What's the plan for today?"   Neurology: alert and oriented x 3, nonfocal, no gross deficits  CV : tele:  rsr 70's  +murmur   Lungs: clear. RR easy, unlabored   Abdomen: soft, nontender, nondistended, positive bowel sounds, + bowel movement   Neg N/V/D   :  pt voiding without difficulty   Extremities:   BRAVO; Trace LE edema, neg calf tenderness. PPP bilaterally; chronic venous stasis discoloration        discharge pt home today 8/30 as per Dr. Fenton and CHF team

## 2023-08-30 NOTE — PROGRESS NOTE ADULT - PROBLEM SELECTOR PLAN 2
- Not a candidate for KRISTIE per Dr. Valentino given severe MAC  - Being considered for Tendyne at [facility] with Dr. Pierre

## 2023-08-30 NOTE — PROGRESS NOTE ADULT - SUBJECTIVE AND OBJECTIVE BOX
INTERVAL EVENTS:    PAST MEDICAL & SURGICAL HISTORY:  Aortic stenosis    AICD (automatic cardioverter/defibrillator) present    Aortic valve regurgitation    Ascending aorta dilation    H/O paroxysmal supraventricular tachycardia    HLD (hyperlipidemia)    Mitral valve regurgitation    S/P ablation of atrial fibrillation    Cardiac arrest    Severe mitral regurgitation    S/P aortic valve replacement  3/13/2004    S/P hernia repair  2002    History of tonsillectomy and adenoidectomy    S/P TURP  1996    S/P colonoscopy  2001, 2002, 2006, 2011, 2016    S/P endoscopy  2006    S/P cardiac cath  1994, 1995, 1999, 2002, 2004    AICD (automatic cardioverter/defibrillator) present  12/19/17    Cardiac pacemaker  12/19/17    History of cardioversion  9/11/20    S/P ablation of atrial fibrillation  10/29/20    Status post ablation of ventricular arrhythmia  2/14/19        MEDICATIONS  (STANDING):  aMIOdarone    Tablet   Oral   aMIOdarone    Tablet 400 milliGRAM(s) Oral two times a day  atorvastatin 40 milliGRAM(s) Oral at bedtime  buMETAnide 2 milliGRAM(s) Oral daily  hydrALAZINE 25 milliGRAM(s) Oral <User Schedule>  isosorbide   dinitrate Tablet (ISORDIL) 10 milliGRAM(s) Oral <User Schedule>  levothyroxine 50 MICROGram(s) Oral daily  metoprolol succinate ER 25 milliGRAM(s) Oral daily  pantoprazole    Tablet 40 milliGRAM(s) Oral before breakfast  polyethylene glycol 3350 17 Gram(s) Oral daily  rivaroxaban 15 milliGRAM(s) Oral daily  senna 2 Tablet(s) Oral at bedtime  sodium chloride 0.9% lock flush 3 milliLiter(s) IV Push every 8 hours  spironolactone 25 milliGRAM(s) Oral daily    MEDICATIONS  (PRN):  acetaminophen     Tablet .. 650 milliGRAM(s) Oral every 6 hours PRN Temp greater or equal to 38C (100.4F), Mild Pain (1 - 3)  aluminum hydroxide/magnesium hydroxide/simethicone Suspension 30 milliLiter(s) Oral every 4 hours PRN Dyspepsia  melatonin 3 milliGRAM(s) Oral at bedtime PRN Insomnia  ondansetron Injectable 4 milliGRAM(s) IV Push every 8 hours PRN Nausea and/or Vomiting    T(F): 97.9 (08-30-23 @ 04:55), Max: 98 (08-29-23 @ 15:39)  HR: 76 (08-30-23 @ 04:55) (58 - 81)  BP: 95/61 (08-30-23 @ 04:55) (88/58 - 105/72)  BP(mean): 68 (08-29-23 @ 20:34) (66 - 70)  ABP: --  ABP(mean): --  RR: 18 (08-30-23 @ 04:55) (18 - 18)  SpO2: 93% (08-30-23 @ 04:55) (93% - 99%)    I/O Detail 24H    29 Aug 2023 07:01  -  30 Aug 2023 07:00  --------------------------------------------------------  IN:    Oral Fluid: 480 mL  Total IN: 480 mL    OUT:    Voided (mL): 2000 mL  Total OUT: 2000 mL    Total NET: -1520 mL          PHYSICAL EXAM:  GEN: NAD  HEENT: EOMI   RESP: CTA b/l  CV: RRR. Normal S1/S2. No m/r/g.  ABD: soft, non-distended  EXT: No edema   NEURO: alert and attentive    LABS:  CBC 08-30-23 @ 06:19                        11.2   4.99  )-----------( 111                   33.3       Hgb trend: 11.2 <-- , 10.7 <-- , 11.9 <--   WBC trend: 4.99 <-- , 4.93 <-- , 6.08 <--       CMP 08-30-23 @ 06:21    135  |  95<L>  |  98<H>  ----------------------------<  79  4.2   |  22  |  2.72<H>    Ca    9.6      08-30-23 @ 06:21        Serum Cr trend: 2.72 <-- , 2.82 <-- , 2.99 <--         Cardiac Markers           STUDIES:           INTERVAL EVENTS: no acute events, patient feeling better    PAST MEDICAL & SURGICAL HISTORY:  Aortic stenosis    AICD (automatic cardioverter/defibrillator) present    Aortic valve regurgitation    Ascending aorta dilation    H/O paroxysmal supraventricular tachycardia    HLD (hyperlipidemia)    Mitral valve regurgitation    S/P ablation of atrial fibrillation    Cardiac arrest    Severe mitral regurgitation    S/P aortic valve replacement  3/13/2004    S/P hernia repair  2002    History of tonsillectomy and adenoidectomy    S/P TURP  1996    S/P colonoscopy  2001, 2002, 2006, 2011, 2016    S/P endoscopy  2006    S/P cardiac cath  1994, 1995, 1999, 2002, 2004    AICD (automatic cardioverter/defibrillator) present  12/19/17    Cardiac pacemaker  12/19/17    History of cardioversion  9/11/20    S/P ablation of atrial fibrillation  10/29/20    Status post ablation of ventricular arrhythmia  2/14/19        MEDICATIONS  (STANDING):  aMIOdarone    Tablet   Oral   aMIOdarone    Tablet 400 milliGRAM(s) Oral two times a day  atorvastatin 40 milliGRAM(s) Oral at bedtime  buMETAnide 2 milliGRAM(s) Oral daily  hydrALAZINE 25 milliGRAM(s) Oral <User Schedule>  isosorbide   dinitrate Tablet (ISORDIL) 10 milliGRAM(s) Oral <User Schedule>  levothyroxine 50 MICROGram(s) Oral daily  metoprolol succinate ER 25 milliGRAM(s) Oral daily  pantoprazole    Tablet 40 milliGRAM(s) Oral before breakfast  polyethylene glycol 3350 17 Gram(s) Oral daily  rivaroxaban 15 milliGRAM(s) Oral daily  senna 2 Tablet(s) Oral at bedtime  sodium chloride 0.9% lock flush 3 milliLiter(s) IV Push every 8 hours  spironolactone 25 milliGRAM(s) Oral daily    MEDICATIONS  (PRN):  acetaminophen     Tablet .. 650 milliGRAM(s) Oral every 6 hours PRN Temp greater or equal to 38C (100.4F), Mild Pain (1 - 3)  aluminum hydroxide/magnesium hydroxide/simethicone Suspension 30 milliLiter(s) Oral every 4 hours PRN Dyspepsia  melatonin 3 milliGRAM(s) Oral at bedtime PRN Insomnia  ondansetron Injectable 4 milliGRAM(s) IV Push every 8 hours PRN Nausea and/or Vomiting    T(F): 97.9 (08-30-23 @ 04:55), Max: 98 (08-29-23 @ 15:39)  HR: 76 (08-30-23 @ 04:55) (58 - 81)  BP: 95/61 (08-30-23 @ 04:55) (88/58 - 105/72)  BP(mean): 68 (08-29-23 @ 20:34) (66 - 70)  ABP: --  ABP(mean): --  RR: 18 (08-30-23 @ 04:55) (18 - 18)  SpO2: 93% (08-30-23 @ 04:55) (93% - 99%)    I/O Detail 24H    29 Aug 2023 07:01  -  30 Aug 2023 07:00  --------------------------------------------------------  IN:    Oral Fluid: 480 mL  Total IN: 480 mL    OUT:    Voided (mL): 2000 mL  Total OUT: 2000 mL    Total NET: -1520 mL          PHYSICAL EXAM:  GEN: NAD  HEENT: EOMI   RESP: CTA b/l  CV: RRR. Normal S1/S2. 4/6 Holosystolic murmur at apex. No JVD.   ABD: soft, non-distended  EXT: No edema   NEURO: alert and attentive    LABS:  CBC 08-30-23 @ 06:19                        11.2   4.99  )-----------( 111                   33.3       Hgb trend: 11.2 <-- , 10.7 <-- , 11.9 <--   WBC trend: 4.99 <-- , 4.93 <-- , 6.08 <--       CMP 08-30-23 @ 06:21    135  |  95<L>  |  98<H>  ----------------------------<  79  4.2   |  22  |  2.72<H>    Ca    9.6      08-30-23 @ 06:21        Serum Cr trend: 2.72 <-- , 2.82 <-- , 2.99 <--         Cardiac Markers           STUDIES:

## 2023-08-30 NOTE — DISCHARGE NOTE PROVIDER - PROVIDER TOKENS
PROVIDER:[TOKEN:[[id number]]],PROVIDER:[TOKEN:[[id number]]] PROVIDER:[TOKEN:[[id number]]],PROVIDER:[TOKEN:[[id number]]],PROVIDER:[TOKEN:[[id number]]]

## 2023-08-30 NOTE — DISCHARGE NOTE PROVIDER - CARE PROVIDER_API CALL
Noe Fenton  Thoracic and Cardiac Surgery  [address] [place] [address]  Phone: [phone]  Fax: [phone]  Follow Up Time:     Enmanuel Ruiz  Adv Heart Fail Trnspl[facility] Cardio  [address] [place] [address]  Phone: [phone]  Fax: [phone]  Follow Up Time:    Noe Fenton  Thoracic and Cardiac Surgery  [address] [place] [address]  Phone: [phone]  Fax: [phone]  Follow Up Time:     Enmanuel Ruiz  Adv Heart Fail Trnspl[facility] Cardio  [address] [place] [address]  Phone: [phone]  Fax: [phone]  Follow Up Time:     Christine Lala  Nephrology  [address] [facility] [address]  [place] [address]  Phone: [phone]  Fax: [phone]  Follow Up Time:

## 2023-08-30 NOTE — PROGRESS NOTE ADULT - PROBLEM SELECTOR PLAN 1
- Etiology: ICM with valvular disease  - GDMT: c/w toprol 25mg QD, please decrease hydralazine from 25mg TID to 10mg TID, continue isordil 10mg TID (hold parameters to SBP <90); will defer ARNI/SGLT2  - Diuretics: continue Bumex 2mg PO BID  - Device: ICD in place, intermittently in sinus rhythm and occasional A pacing but reduced RV pacing  - Rhythm/Valve: being considered for Tendyne at [facility] with Dr. Pierre, s/p WARREN/DCCV today now in sinus rhythm with AV delay lengthened by EP   - AT: limited options, will possibly need home inotropes if worsening - Etiology: ICM with valvular disease  - GDMT: c/w toprol 25mg QD, please decrease hydralazine from 25mg TID to 10mg TID, continue isordil 10mg TID (hold parameters to SBP <90); will defer ARNI/SGLT2  - Diuretics: continue Bumex 2mg PO BID  - Device: ICD in place, intermittently in sinus rhythm and occasional A pacing but reduced RV pacing  - Rhythm/Valve: being considered for Tendyne at [facility] with Dr. Pierre, s/p WARREN/DCCV today now in sinus rhythm with AV delay lengthened by EP   - AT: limited options at this time    Patient has follow up with Dr. Ruiz on September 15th 9AM at [address] [place] [address] ([facility]).

## 2023-08-30 NOTE — DISCHARGE NOTE PROVIDER - DETAILS OF MALNUTRITION DIAGNOSIS/DIAGNOSES
This patient has been assessed with a concern for Malnutrition and was treated during this hospitalization for the following Nutrition diagnosis/diagnoses:     -  08/17/2023: Severe protein-calorie malnutrition

## 2023-08-30 NOTE — PROGRESS NOTE ADULT - SUBJECTIVE AND OBJECTIVE BOX
[facility]   [phone]  RENAL FOLLOW UP NOTE  --------------------------------------------------------------------------------  Chief Complaint:    24 hour events/subjective:    patient seen and examined.   states he is breathing well    PAST HISTORY  --------------------------------------------------------------------------------  No significant changes to PMH, PSH, FHx, SHx, unless otherwise noted    ALLERGIES & MEDICATIONS  --------------------------------------------------------------------------------  Allergies    No Known Allergies    Intolerances      Standing Inpatient Medications  aMIOdarone    Tablet   Oral   aMIOdarone    Tablet 400 milliGRAM(s) Oral two times a day  atorvastatin 40 milliGRAM(s) Oral at bedtime  buMETAnide 2 milliGRAM(s) Oral daily  hydrALAZINE 10 milliGRAM(s) Oral <User Schedule>  isosorbide   dinitrate Tablet (ISORDIL) 10 milliGRAM(s) Oral <User Schedule>  levothyroxine 50 MICROGram(s) Oral daily  metoprolol succinate ER 25 milliGRAM(s) Oral daily  pantoprazole    Tablet 40 milliGRAM(s) Oral before breakfast  polyethylene glycol 3350 17 Gram(s) Oral daily  rivaroxaban 15 milliGRAM(s) Oral daily  senna 2 Tablet(s) Oral at bedtime  sodium chloride 0.9% lock flush 3 milliLiter(s) IV Push every 8 hours  spironolactone 25 milliGRAM(s) Oral daily    PRN Inpatient Medications  acetaminophen     Tablet .. 650 milliGRAM(s) Oral every 6 hours PRN  aluminum hydroxide/magnesium hydroxide/simethicone Suspension 30 milliLiter(s) Oral every 4 hours PRN  melatonin 3 milliGRAM(s) Oral at bedtime PRN  ondansetron Injectable 4 milliGRAM(s) IV Push every 8 hours PRN      REVIEW OF SYSTEMS  --------------------------------------------------------------------------------    Gen: denies fevers/chills,  CVS: denies chest pain/palpitations  Resp: denies SOB/Cough  GI: Denies N/V/Abd pain  : Denies dysuria/oliguria/hematuria    VITALS/PHYSICAL EXAM  --------------------------------------------------------------------------------  T(C): 36.3 (08-30-23 @ 12:18), Max: 36.7 (08-29-23 @ 15:39)  HR: 74 (08-30-23 @ 12:18) (74 - 81)  BP: 110/72 (08-30-23 @ 12:18) (88/58 - 110/72)  RR: 18 (08-30-23 @ 12:18) (18 - 18)  SpO2: 99% (08-30-23 @ 12:18) (93% - 99%)  Wt(kg): --  Height (cm): 175.3 (08-29-23 @ 10:28)  Weight (kg): 64.4 (08-29-23 @ 10:28)  BMI (kg/m2): 21 (08-29-23 @ 10:28)  BSA (m2): 1.79 (08-29-23 @ 10:28)      08-29-23 @ 07:01  -  08-30-23 @ 07:00  --------------------------------------------------------  IN: 480 mL / OUT: 2000 mL / NET: -1520 mL    08-30-23 @ 07:01  -  08-30-23 @ 12:51  --------------------------------------------------------  IN: 200 mL / OUT: 300 mL / NET: -100 mL      Physical Exam:  	              Gen: Non toxic comfortable appearing, on RA    	Pulm: decrease bs  no rales or ronchi or wheezing  	CV: Mild JVD. RRR, S1S2; no rub II/VI M   	Abd: +BS, soft, nontender/nondistended  	UE: Warm, no cyanosis  no clubbing,  no edema;   	LE: Warm, no cyanosis  no clubbing, 1- edema    LABS/STUDIES  --------------------------------------------------------------------------------              11.2   4.99  >-----------<  111      [08-30-23 @ 06:19]              33.3     135  |  95  |  98  ----------------------------<  79      [08-30-23 @ 06:21]  4.2   |  22  |  2.72        Ca     9.6     [08-30-23 @ 06:21]            Creatinine Trend:  SCr 2.72 [08-30 @ 06:21]  SCr 2.82 [08-29 @ 05:17]  SCr 2.99 [08-28 @ 07:31]  SCr 2.39 [08-27 @ 06:19]  SCr 2.55 [08-26 @ 06:16]              TSH 5.96      [08-16-23 @ 16:55]

## 2023-08-30 NOTE — PROGRESS NOTE ADULT - ASSESSMENT
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 Patient reports worsening SOB x 2 days, with associated symptoms of fatigue, deconditioning and inability to walk long distance.  Presents today as a direct admission for CHF exacerbation and requiring medical optimization.       1- SURESH on CKDIV  2- CHF  3- severe MR  4- hypokalemia   5- Hypotension       SURESH in setting of decompensated CHF  creatinine is slowly downtrending   bumex 2 mg po daily  aldactone 25 mg daily  keep K > 3.5   hydralazine 25 mg TID  metoprolol 25 mg daily  strict I/O  keep O>I  daily standing weight  2G sodium diet  trend creatinine and electrolytes

## 2023-08-30 NOTE — PROGRESS NOTE ADULT - PROBLEM SELECTOR PROBLEM 2
CHF, acute on chronic
Severe mitral regurgitation
CHF, acute on chronic
CHF, acute on chronic
Severe mitral regurgitation
CHF, acute on chronic
Severe mitral regurgitation
Severe mitral regurgitation
CHF, acute on chronic
Severe mitral regurgitation
Severe mitral regurgitation
CHF, acute on chronic
Severe mitral regurgitation
Severe mitral regurgitation
CHF, acute on chronic
Severe mitral regurgitation

## 2023-08-31 ENCOUNTER — TRANSCRIPTION ENCOUNTER (OUTPATIENT)
Age: 83
End: 2023-08-31

## 2023-09-01 ENCOUNTER — TRANSCRIPTION ENCOUNTER (OUTPATIENT)
Age: 83
End: 2023-09-01

## 2023-09-05 ENCOUNTER — TRANSCRIPTION ENCOUNTER (OUTPATIENT)
Age: 83
End: 2023-09-05

## 2023-09-12 ENCOUNTER — TRANSCRIPTION ENCOUNTER (OUTPATIENT)
Age: 83
End: 2023-09-12

## 2023-09-14 PROBLEM — T82.897A: Status: ACTIVE | Noted: 2018-06-08

## 2023-09-14 PROBLEM — Z98.890 S/P ABLATION OF ATRIAL FIBRILLATION: Status: ACTIVE | Noted: 2020-12-03

## 2023-09-15 ENCOUNTER — APPOINTMENT (OUTPATIENT)
Dept: CARDIOLOGY | Facility: CLINIC | Age: 83
End: 2023-09-15
Payer: MEDICARE

## 2023-09-15 VITALS
BODY MASS INDEX: 21.77 KG/M2 | WEIGHT: 147 LBS | DIASTOLIC BLOOD PRESSURE: 68 MMHG | SYSTOLIC BLOOD PRESSURE: 105 MMHG | HEART RATE: 60 BPM | OXYGEN SATURATION: 99 % | HEIGHT: 69 IN

## 2023-09-15 PROCEDURE — 99213 OFFICE O/P EST LOW 20 MIN: CPT

## 2023-09-15 RX ORDER — TORSEMIDE 20 MG/1
20 TABLET ORAL
Qty: 360 | Refills: 1 | Status: DISCONTINUED | COMMUNITY
End: 2023-09-15

## 2023-09-15 RX ORDER — HYDRALAZINE HYDROCHLORIDE 25 MG/1
25 TABLET ORAL 3 TIMES DAILY
Qty: 270 | Refills: 1 | Status: DISCONTINUED | COMMUNITY
End: 2023-09-15

## 2023-09-15 RX ORDER — METOPROLOL SUCCINATE 100 MG/1
100 TABLET, EXTENDED RELEASE ORAL DAILY
Qty: 90 | Refills: 2 | Status: DISCONTINUED | COMMUNITY
Start: 2021-07-19 | End: 2023-09-15

## 2023-09-15 RX ORDER — METOLAZONE 2.5 MG/1
2.5 TABLET ORAL
Refills: 0 | Status: DISCONTINUED | COMMUNITY
End: 2023-09-15

## 2023-09-15 NOTE — CONSULT NOTE ADULT - CONSULT REQUESTED DATE/TIME
28-Aug-2023 13:50
Anayeli Ledbetter III  : 1958  Primary: Medicare Part A (Medicare)  Secondary: Inge Gosselin [address]  [address] [place] [id number]  Phone: [phone]  Fax: [phone]    OT Visit Info:  Plan Frequency: 2x/week for 90 days  Certification Period Expiration Date: 23  Total # of Visits Approved: 30  Total # of Visits to Date: 1  Progress Note Counter: 1     Visit Count: Visit count could not be calculated. Make sure you are using a visit which is associated with an episode. OUTPATIENT OCCUPATIONAL THERAPY:OP NOTE TYPE: Initial Assessment 9/15/2023  Episode: (LE edema)   Appt Desk        Treatment Diagnosis:    Localized edema  Lymphedema, not elsewhere classified  Medical/Referring Diagnosis:  Primary osteoarthritis, unspecified ankle and foot [M19.079]  Other hammer toe(s) (acquired), left foot [M20.42]  Referring Physician:  Devon Mtz MD MD Orders:  OT Eval and Treat   Return MD Appt:  unknown  Date of Onset:  Onset Date: 23 (Ankle fusion; non weight bearing for 6 weeks)     Allergies:  Fenofibrate micronized and Statins  Restrictions/Precautions:  WBAT  No data recordedNo data recorded  Medications Last Reviewed:  9/15/2023     SUBJECTIVE   History of Injury/Illness (Reason for Referral):  Patient presents s/p left foot surgery with edema in left LE that has improved over the past week but has not resolved. Patient Stated Goal(s): \"To get the swelling down in this leg. \"  Initial: Left Foot 6/10 Post Session: Left Foot 10  Past Medical History/Comorbidities:   Mr. Ivette Mitchell  has a past medical history of Chronic pain, COVID-19 vaccine series completed, Diabetes ([address]), GERD (gastroesophageal reflux disease), History of basal cell cancer, Hypercholesteremia, Hypertension, and RA (rheumatoid arthritis) ([address]).   Mr. Ivette Mitchell  has a past surgical history that includes lumbar laminectomy; orthopedic surgery (Right, );
17-Aug-2023 13:59
17-Aug-2023 12:28
17-Aug-2023 13:00

## 2023-09-19 ENCOUNTER — NON-APPOINTMENT (OUTPATIENT)
Age: 83
End: 2023-09-19

## 2023-09-19 ENCOUNTER — APPOINTMENT (OUTPATIENT)
Dept: CARDIOTHORACIC SURGERY | Facility: CLINIC | Age: 83
End: 2023-09-19
Payer: MEDICARE

## 2023-09-19 VITALS
RESPIRATION RATE: 16 BRPM | BODY MASS INDEX: 21.18 KG/M2 | HEIGHT: 69 IN | WEIGHT: 143 LBS | OXYGEN SATURATION: 99 % | HEART RATE: 60 BPM | DIASTOLIC BLOOD PRESSURE: 70 MMHG | TEMPERATURE: 98.2 F | SYSTOLIC BLOOD PRESSURE: 116 MMHG

## 2023-09-19 DIAGNOSIS — I35.1 NONRHEUMATIC AORTIC (VALVE) INSUFFICIENCY: ICD-10-CM

## 2023-09-19 DIAGNOSIS — Z98.890 OTHER SPECIFIED POSTPROCEDURAL STATES: ICD-10-CM

## 2023-09-19 DIAGNOSIS — T82.897A OTHER SPECIFIED COMPLICATION OF CARDIAC PROSTHETIC DEVICES, IMPLANTS AND GRAFTS, INITIAL ENCOUNTER: ICD-10-CM

## 2023-09-19 DIAGNOSIS — I35.0 NONRHEUMATIC AORTIC (VALVE) STENOSIS: ICD-10-CM

## 2023-09-19 DIAGNOSIS — Z86.79 OTHER SPECIFIED POSTPROCEDURAL STATES: ICD-10-CM

## 2023-09-19 DIAGNOSIS — I77.810 THORACIC AORTIC ECTASIA: ICD-10-CM

## 2023-09-19 DIAGNOSIS — J90 PLEURAL EFFUSION, NOT ELSEWHERE CLASSIFIED: ICD-10-CM

## 2023-09-19 PROCEDURE — 99214 OFFICE O/P EST MOD 30 MIN: CPT

## 2023-09-19 RX ORDER — MELATONIN/PYRIDOXINE HCL (B6) 10 MG-10MG
10-10 TABLET,IMMED, EXTENDED RELEASE, BIPHASIC ORAL
Refills: 0 | Status: ACTIVE | COMMUNITY

## 2023-09-21 RX ORDER — ISOSORBIDE MONONITRATE 10 MG/1
10 TABLET ORAL TWICE DAILY
Refills: 0 | Status: DISCONTINUED | COMMUNITY
End: 2023-09-21

## 2023-09-21 RX ORDER — BUMETANIDE 2 MG/1
2 TABLET ORAL TWICE DAILY
Qty: 60 | Refills: 3 | Status: ACTIVE | COMMUNITY
Start: 1900-01-01 | End: 1900-01-01

## 2023-09-21 RX ORDER — HYDRALAZINE HYDROCHLORIDE 10 MG/1
10 TABLET ORAL EVERY 8 HOURS
Qty: 90 | Refills: 3 | Status: ACTIVE | COMMUNITY

## 2023-09-21 RX ORDER — ISOSORBIDE DINITRATE 10 MG/1
10 TABLET ORAL EVERY 8 HOURS
Qty: 90 | Refills: 3 | Status: ACTIVE | COMMUNITY
Start: 2023-09-21 | End: 1900-01-01

## 2023-09-21 RX ORDER — METOPROLOL SUCCINATE 25 MG/1
25 TABLET, EXTENDED RELEASE ORAL DAILY
Qty: 30 | Refills: 5 | Status: ACTIVE | COMMUNITY

## 2023-09-21 RX ORDER — SPIRONOLACTONE 25 MG/1
25 TABLET ORAL DAILY
Qty: 30 | Refills: 3 | Status: ACTIVE | COMMUNITY

## 2023-09-27 ENCOUNTER — TRANSCRIPTION ENCOUNTER (OUTPATIENT)
Age: 83
End: 2023-09-27

## 2023-10-05 ENCOUNTER — NON-APPOINTMENT (OUTPATIENT)
Age: 83
End: 2023-10-05

## 2023-10-13 ENCOUNTER — APPOINTMENT (OUTPATIENT)
Dept: CARDIOLOGY | Facility: CLINIC | Age: 83
End: 2023-10-13

## 2023-10-18 ENCOUNTER — OUTPATIENT (OUTPATIENT)
Dept: OUTPATIENT SERVICES | Facility: HOSPITAL | Age: 83
LOS: 1 days | End: 2023-10-18
Payer: MEDICARE

## 2023-10-18 DIAGNOSIS — Z98.890 OTHER SPECIFIED POSTPROCEDURAL STATES: Chronic | ICD-10-CM

## 2023-10-18 DIAGNOSIS — Z95.0 PRESENCE OF CARDIAC PACEMAKER: Chronic | ICD-10-CM

## 2023-10-18 DIAGNOSIS — Z90.79 ACQUIRED ABSENCE OF OTHER GENITAL ORGAN(S): Chronic | ICD-10-CM

## 2023-10-18 DIAGNOSIS — Z95.2 PRESENCE OF PROSTHETIC HEART VALVE: Chronic | ICD-10-CM

## 2023-10-18 DIAGNOSIS — Z95.810 PRESENCE OF AUTOMATIC (IMPLANTABLE) CARDIAC DEFIBRILLATOR: Chronic | ICD-10-CM

## 2023-10-18 DIAGNOSIS — I34.0 NONRHEUMATIC MITRAL (VALVE) INSUFFICIENCY: ICD-10-CM

## 2023-10-18 PROCEDURE — 75574 CT ANGIO HRT W/3D IMAGE: CPT

## 2023-10-18 PROCEDURE — 75574 CT ANGIO HRT W/3D IMAGE: CPT | Mod: 26,MH

## 2023-11-02 LAB
CK MB BLD-MCNC: 4 NG/ML
TROPONIN-T, HIGH SENSITIVITY: 42 NG/L

## 2023-11-06 LAB
ALBUMIN SERPL ELPH-MCNC: 4.7 G/DL
ALP BLD-CCNC: 224 U/L
ALT SERPL-CCNC: 38 U/L
ANION GAP SERPL CALC-SCNC: 16 MMOL/L
APTT BLD: 46.9 SEC
AST SERPL-CCNC: 39 U/L
BILIRUB SERPL-MCNC: 0.7 MG/DL
BUN SERPL-MCNC: 109 MG/DL
CALCIUM SERPL-MCNC: 9.6 MG/DL
CHLORIDE SERPL-SCNC: 97 MMOL/L
CO2 SERPL-SCNC: 24 MMOL/L
CREAT SERPL-MCNC: 3.22 MG/DL
EGFR: 18 ML/MIN/1.73M2
GLUCOSE SERPL-MCNC: 102 MG/DL
HCT VFR BLD CALC: 35.7 %
HGB BLD-MCNC: 11.4 G/DL
INR PPP: 2.56 RATIO
MCHC RBC-ENTMCNC: 30.2 PG
MCHC RBC-ENTMCNC: 31.9 GM/DL
MCV RBC AUTO: 94.7 FL
NT-PROBNP SERPL-MCNC: 6153 PG/ML
PLATELET # BLD AUTO: 240 K/UL
POTASSIUM SERPL-SCNC: 5.4 MMOL/L
PROT SERPL-MCNC: 7.6 G/DL
PT BLD: 28.2 SEC
RBC # BLD: 3.77 M/UL
RBC # FLD: 16.1 %
SODIUM SERPL-SCNC: 136 MMOL/L
WBC # FLD AUTO: 6.34 K/UL

## 2023-11-28 ENCOUNTER — APPOINTMENT (OUTPATIENT)
Dept: CARDIOTHORACIC SURGERY | Facility: CLINIC | Age: 83
End: 2023-11-28
Payer: MEDICARE

## 2023-11-28 PROCEDURE — 99447 NTRPROF PH1/NTRNET/EHR 11-20: CPT

## 2024-01-18 PROBLEM — I11.9 HYPERTENSIVE HEART DISEASE: Status: ACTIVE | Noted: 2018-06-08

## 2024-01-18 PROBLEM — Z95.810 AICD (AUTOMATIC CARDIOVERTER/DEFIBRILLATOR) PRESENT: Status: ACTIVE | Noted: 2018-06-08

## 2024-01-18 PROBLEM — Z86.79 HISTORY OF PAROXYSMAL SUPRAVENTRICULAR TACHYCARDIA: Status: ACTIVE | Noted: 2018-06-08

## 2024-01-18 PROBLEM — K21.9 GERD (GASTROESOPHAGEAL REFLUX DISEASE): Status: ACTIVE | Noted: 2021-06-15

## 2024-01-18 PROBLEM — Z86.79 HISTORY OF ISCHEMIC CARDIOMYOPATHY: Status: ACTIVE | Noted: 2018-06-08

## 2024-01-18 PROBLEM — I50.20 HFREF (HEART FAILURE WITH REDUCED EJECTION FRACTION): Status: ACTIVE | Noted: 2023-09-21

## 2024-01-18 PROBLEM — Z98.890 S/P MITRAL VALVE REPAIR: Status: ACTIVE | Noted: 2021-06-17

## 2024-01-18 PROBLEM — E78.5 HYPERLIPIDEMIA: Status: ACTIVE | Noted: 2018-06-08

## 2024-01-18 PROBLEM — Z98.890 HISTORY OF AORTIC ROOT REPAIR: Status: ACTIVE | Noted: 2021-06-17

## 2024-01-22 ENCOUNTER — APPOINTMENT (OUTPATIENT)
Dept: CARDIOTHORACIC SURGERY | Facility: CLINIC | Age: 84
End: 2024-01-22
Payer: MEDICARE

## 2024-01-22 VITALS
OXYGEN SATURATION: 99 % | TEMPERATURE: 98.8 F | SYSTOLIC BLOOD PRESSURE: 96 MMHG | RESPIRATION RATE: 16 BRPM | DIASTOLIC BLOOD PRESSURE: 65 MMHG | HEIGHT: 69 IN | HEART RATE: 72 BPM

## 2024-01-22 VITALS — WEIGHT: 141 LBS | BODY MASS INDEX: 20.82 KG/M2

## 2024-01-22 DIAGNOSIS — I50.20 UNSPECIFIED SYSTOLIC (CONGESTIVE) HEART FAILURE: ICD-10-CM

## 2024-01-22 DIAGNOSIS — Z98.890 OTHER SPECIFIED POSTPROCEDURAL STATES: ICD-10-CM

## 2024-01-22 DIAGNOSIS — I11.9 HYPERTENSIVE HEART DISEASE W/OUT HEART FAILURE: ICD-10-CM

## 2024-01-22 DIAGNOSIS — E78.5 HYPERLIPIDEMIA, UNSPECIFIED: ICD-10-CM

## 2024-01-22 DIAGNOSIS — Z86.79 PERSONAL HISTORY OF OTHER DISEASES OF THE CIRCULATORY SYSTEM: ICD-10-CM

## 2024-01-22 DIAGNOSIS — Z95.810 PRESENCE OF AUTOMATIC (IMPLANTABLE) CARDIAC DEFIBRILLATOR: ICD-10-CM

## 2024-01-22 DIAGNOSIS — K21.9 GASTRO-ESOPHAGEAL REFLUX DISEASE W/OUT ESOPHAGITIS: ICD-10-CM

## 2024-01-22 PROCEDURE — 99213 OFFICE O/P EST LOW 20 MIN: CPT

## 2024-01-22 RX ORDER — AMIODARONE HYDROCHLORIDE 200 MG/1
200 TABLET ORAL DAILY
Qty: 15 | Refills: 0 | Status: COMPLETED | COMMUNITY
End: 2024-01-22

## 2024-01-22 RX ORDER — AMIODARONE HYDROCHLORIDE 200 MG/1
200 TABLET ORAL DAILY
Qty: 15 | Refills: 0 | Status: ACTIVE | COMMUNITY
Start: 2024-01-22

## 2024-01-22 NOTE — CONSULT LETTER
[Dear  ___] : Dear  [unfilled], [Courtesy Letter:] : I had the pleasure of seeing your patient, [unfilled], in my office today. [Please see my note below.] : Please see my note below. [Consult Closing:] : Thank you very much for allowing me to participate in the care of this patient.  If you have any questions, please do not hesitate to contact me. [Sincerely,] : Sincerely, [FreeTextEntry2] : Dr.Giridhar Romero, [FreeTextEntry3] : Noe Fenton MD  &   Cardiovascular & Thoracic Surgery [place] [phone]

## 2024-01-22 NOTE — ASSESSMENT
[FreeTextEntry1] : Mr. YAYA BHATIA is an 83 year old male with extensive past medical history including an IWMI in 1994, [place] Arrest while in [facility] on a cruise in December 2017, AICD in January 2018, AFib (s/p ablation in 2020 and again on 3/29/23), maintained on Xarelto, AVR in 2004 (#23 Bovine Pericardial Valve), and a prior Aortic Aneurysm for which he underwent a Bentall with a #32 Hemashield graft (attached to prior AVR) and MV repair with Dr. Fenton on 6/2/21, GERD, HLD, renal atrophy with single kidney, pleural effusion with thoracentesis. He was evaluated by Dr. Valentino for M-KRISTIE  but his anatomy was not amendable due to calcified MV anatomy, MAC, and very short posterior leaflet. He was evaluated for  SUMMIT (Tendyne valve) Trial for a transcatheter mitral valve replacement by Dr. Rico but was denied by [employer].    Today, he reports having shortness of breath and fatigue but it is much better than last time he got admitted. He goes to the [facility] every day. He is active and independent with ADLs. Denies any chest pain, palpitations, dizziness or pedal edema.    Plan 1. No intervention at this time 2. If his symptoms are getting worse, all us back 3. Follow up with cardiologist and PCP 4. Continue current medication regimen  5. TTE in April 2024 in Dr. Romero's office , will follow up the results

## 2024-01-22 NOTE — PHYSICAL EXAM
[General Appearance - Alert] : alert [General Appearance - In No Acute Distress] : in no acute distress [General Appearance - Well Nourished] : well nourished [General Appearance - Well Developed] : well developed [Sclera] : the sclera and conjunctiva were normal [PERRL With Normal Accommodation] : pupils were equal in size, round, and reactive to light [Outer Ear] : the ears and nose were normal in appearance [Hearing Threshold Finger Rub Not [place]] : hearing was normal [Both Tympanic Membranes Were Examined] : both tympanic membranes were normal [Neck Appearance] : the appearance of the neck was normal [] : no respiratory distress [Respiration, Rhythm And Depth] : normal respiratory rhythm and effort [Auscultation Breath Sounds / Voice Sounds] : lungs were clear to auscultation bilaterally [Apical Impulse] : the apical impulse was normal [Heart Sounds] : normal S1 and S2 [Heart Rate And Rhythm] : heart rate was normal and rhythm regular [Systolic grade ___/6] : A grade [unfilled]/6 systolic murmur was heard. [Examination Of The Chest] : the chest was normal in appearance [2+] : left 2+ [Breast Appearance] : normal in appearance [Abdomen Soft] : soft [Bowel Sounds] : normal bowel sounds [No CVA Tenderness] : no ~M costovertebral angle tenderness [Abnormal Walk] : normal gait [Involuntary Movements] : no involuntary movements were seen [Skin Color & Pigmentation] : normal skin color and pigmentation [Skin Turgor] : normal skin turgor [No Focal Deficits] : no focal deficits [Oriented To Time, Place, And Person] : oriented to person, place, and time [Impaired Insight] : insight and judgment were intact [Affect] : the affect was normal [Mood] : the mood was normal [Memory Recent] : recent memory was not impaired [Memory Remote] : remote memory was not impaired [FreeTextEntry1] : Deffered

## 2024-01-22 NOTE — DATA REVIEWED
[FreeTextEntry1] : 10/18/23 CTA Heart structural revealed Triple vessel dense coronary artery calcifications. Patient is status post Bentall procedure. There are complex left ventricular inferior wall aneurysm measuring 3.2 cm,  and 1.5 cm.  8/29/23 WARREN revealed . Moderately enlarged right ventricular cavity size and moderately reduced right ventricular systolic function.  2. Severe mitral regurgitation at a blood pressure of 97/61 mmHg.  3. No pericardial effusion seen.  4. Estimated pulmonary artery systolic pressure is 49 mmHg, consistent with moderate pulmonary hypertension.  5. Moderate tricuspid regurgitation.

## 2024-01-22 NOTE — END OF VISIT
[FreeTextEntry3] :  I, ISAIAS Santos , personally performed the evaluation and management (E/M) services for this new  patient. That E/M includes conducting the initial examination, assessing all conditions, and establishing the plan of care. Today, TORI GOLDSTEIN  was here to observe my evaluation and management services for this patient.

## 2024-02-01 ENCOUNTER — NON-APPOINTMENT (OUTPATIENT)
Age: 84
End: 2024-02-01

## 2024-02-20 ENCOUNTER — INPATIENT (INPATIENT)
Facility: HOSPITAL | Age: 84
LOS: 7 days | Discharge: HOME | DRG: 291 | End: 2024-02-28
Attending: THORACIC SURGERY (CARDIOTHORACIC VASCULAR SURGERY) | Admitting: THORACIC SURGERY (CARDIOTHORACIC VASCULAR SURGERY)
Payer: MEDICARE

## 2024-02-20 ENCOUNTER — APPOINTMENT (OUTPATIENT)
Dept: CARDIOTHORACIC SURGERY | Facility: CLINIC | Age: 84
End: 2024-02-20
Payer: MEDICARE

## 2024-02-20 VITALS
OXYGEN SATURATION: 100 % | HEART RATE: 65 BPM | DIASTOLIC BLOOD PRESSURE: 69 MMHG | BODY MASS INDEX: 23.4 KG/M2 | WEIGHT: 158 LBS | HEIGHT: 69 IN | TEMPERATURE: 98.3 F | RESPIRATION RATE: 16 BRPM | SYSTOLIC BLOOD PRESSURE: 101 MMHG

## 2024-02-20 VITALS
TEMPERATURE: 97 F | DIASTOLIC BLOOD PRESSURE: 68 MMHG | HEIGHT: 69 IN | HEART RATE: 58 BPM | WEIGHT: 157.63 LBS | SYSTOLIC BLOOD PRESSURE: 100 MMHG | OXYGEN SATURATION: 98 % | RESPIRATION RATE: 18 BRPM

## 2024-02-20 DIAGNOSIS — N17.9 ACUTE KIDNEY FAILURE, UNSPECIFIED: ICD-10-CM

## 2024-02-20 DIAGNOSIS — N26.1 ATROPHY OF KIDNEY (TERMINAL): ICD-10-CM

## 2024-02-20 DIAGNOSIS — Z98.890 OTHER SPECIFIED POSTPROCEDURAL STATES: Chronic | ICD-10-CM

## 2024-02-20 DIAGNOSIS — I34.0 NONRHEUMATIC MITRAL (VALVE) INSUFFICIENCY: ICD-10-CM

## 2024-02-20 DIAGNOSIS — Z95.810 PRESENCE OF AUTOMATIC (IMPLANTABLE) CARDIAC DEFIBRILLATOR: Chronic | ICD-10-CM

## 2024-02-20 DIAGNOSIS — I50.22 CHRONIC SYSTOLIC (CONGESTIVE) HEART FAILURE: ICD-10-CM

## 2024-02-20 DIAGNOSIS — E87.70 FLUID OVERLOAD, UNSPECIFIED: ICD-10-CM

## 2024-02-20 DIAGNOSIS — Z90.79 ACQUIRED ABSENCE OF OTHER GENITAL ORGAN(S): Chronic | ICD-10-CM

## 2024-02-20 DIAGNOSIS — Z95.0 PRESENCE OF CARDIAC PACEMAKER: Chronic | ICD-10-CM

## 2024-02-20 DIAGNOSIS — R06.02 SHORTNESS OF BREATH: ICD-10-CM

## 2024-02-20 DIAGNOSIS — Z95.2 PRESENCE OF PROSTHETIC HEART VALVE: Chronic | ICD-10-CM

## 2024-02-20 LAB
ACANTHOCYTES BLD QL SMEAR: SLIGHT — SIGNIFICANT CHANGE UP
ALBUMIN SERPL ELPH-MCNC: 4.3 G/DL — SIGNIFICANT CHANGE UP (ref 3.3–5)
ALP SERPL-CCNC: 217 U/L — HIGH (ref 40–120)
ALT FLD-CCNC: 27 U/L — SIGNIFICANT CHANGE UP (ref 10–45)
ANION GAP SERPL CALC-SCNC: 16 MMOL/L — SIGNIFICANT CHANGE UP (ref 5–17)
ANISOCYTOSIS BLD QL: SLIGHT — SIGNIFICANT CHANGE UP
APPEARANCE UR: CLEAR — SIGNIFICANT CHANGE UP
APTT BLD: 41.3 SEC — HIGH (ref 24.5–35.6)
AST SERPL-CCNC: 35 U/L — SIGNIFICANT CHANGE UP (ref 10–40)
BASOPHILS # BLD AUTO: 0 K/UL — SIGNIFICANT CHANGE UP (ref 0–0.2)
BASOPHILS NFR BLD AUTO: 0 % — SIGNIFICANT CHANGE UP (ref 0–2)
BILIRUB SERPL-MCNC: 1.3 MG/DL — HIGH (ref 0.2–1.2)
BILIRUB UR-MCNC: NEGATIVE — SIGNIFICANT CHANGE UP
BLD GP AB SCN SERPL QL: NEGATIVE — SIGNIFICANT CHANGE UP
BUN SERPL-MCNC: 111 MG/DL — HIGH (ref 7–23)
BURR CELLS BLD QL SMEAR: PRESENT — SIGNIFICANT CHANGE UP
CALCIUM SERPL-MCNC: 9.3 MG/DL — SIGNIFICANT CHANGE UP (ref 8.4–10.5)
CHLORIDE SERPL-SCNC: 95 MMOL/L — LOW (ref 96–108)
CO2 SERPL-SCNC: 17 MMOL/L — LOW (ref 22–31)
COLOR SPEC: YELLOW — SIGNIFICANT CHANGE UP
CREAT SERPL-MCNC: 3.51 MG/DL — HIGH (ref 0.5–1.3)
DACRYOCYTES BLD QL SMEAR: SLIGHT — SIGNIFICANT CHANGE UP
DIFF PNL FLD: NEGATIVE — SIGNIFICANT CHANGE UP
EGFR: 17 ML/MIN/1.73M2 — LOW
ELLIPTOCYTES BLD QL SMEAR: SLIGHT — SIGNIFICANT CHANGE UP
EOSINOPHIL # BLD AUTO: 0 K/UL — SIGNIFICANT CHANGE UP (ref 0–0.5)
EOSINOPHIL NFR BLD AUTO: 0 % — SIGNIFICANT CHANGE UP (ref 0–6)
GLUCOSE SERPL-MCNC: 100 MG/DL — HIGH (ref 70–99)
GLUCOSE UR QL: NEGATIVE MG/DL — SIGNIFICANT CHANGE UP
HCT VFR BLD CALC: 37.2 % — LOW (ref 39–50)
HGB BLD-MCNC: 12 G/DL — LOW (ref 13–17)
INR BLD: 2.98 RATIO — HIGH (ref 0.85–1.18)
KETONES UR-MCNC: NEGATIVE MG/DL — SIGNIFICANT CHANGE UP
LEUKOCYTE ESTERASE UR-ACNC: NEGATIVE — SIGNIFICANT CHANGE UP
LYMPHOCYTES # BLD AUTO: 0.37 K/UL — LOW (ref 1–3.3)
LYMPHOCYTES # BLD AUTO: 6.1 % — LOW (ref 13–44)
MACROCYTES BLD QL: SLIGHT — SIGNIFICANT CHANGE UP
MANUAL SMEAR VERIFICATION: SIGNIFICANT CHANGE UP
MCHC RBC-ENTMCNC: 31.7 PG — SIGNIFICANT CHANGE UP (ref 27–34)
MCHC RBC-ENTMCNC: 32.3 GM/DL — SIGNIFICANT CHANGE UP (ref 32–36)
MCV RBC AUTO: 98.2 FL — SIGNIFICANT CHANGE UP (ref 80–100)
MICROCYTES BLD QL: SLIGHT — SIGNIFICANT CHANGE UP
MONOCYTES # BLD AUTO: 0.69 K/UL — SIGNIFICANT CHANGE UP (ref 0–0.9)
MONOCYTES NFR BLD AUTO: 11.4 % — SIGNIFICANT CHANGE UP (ref 2–14)
NEUTROPHILS # BLD AUTO: 4.97 K/UL — SIGNIFICANT CHANGE UP (ref 1.8–7.4)
NEUTROPHILS NFR BLD AUTO: 82.5 % — HIGH (ref 43–77)
NITRITE UR-MCNC: NEGATIVE — SIGNIFICANT CHANGE UP
NT-PROBNP SERPL-SCNC: HIGH PG/ML (ref 0–300)
OVALOCYTES BLD QL SMEAR: SLIGHT — SIGNIFICANT CHANGE UP
PH UR: 5 — SIGNIFICANT CHANGE UP (ref 5–8)
PLAT MORPH BLD: ABNORMAL
PLATELET # BLD AUTO: 138 K/UL — LOW (ref 150–400)
POLYCHROMASIA BLD QL SMEAR: SLIGHT — SIGNIFICANT CHANGE UP
POTASSIUM SERPL-MCNC: 4.5 MMOL/L — SIGNIFICANT CHANGE UP (ref 3.5–5.3)
POTASSIUM SERPL-SCNC: 4.5 MMOL/L — SIGNIFICANT CHANGE UP (ref 3.5–5.3)
PROT SERPL-MCNC: 7.3 G/DL — SIGNIFICANT CHANGE UP (ref 6–8.3)
PROT UR-MCNC: NEGATIVE MG/DL — SIGNIFICANT CHANGE UP
PROTHROM AB SERPL-ACNC: 30.3 SEC — HIGH (ref 9.5–13)
RBC # BLD: 3.79 M/UL — LOW (ref 4.2–5.8)
RBC # FLD: 16 % — HIGH (ref 10.3–14.5)
RBC BLD AUTO: ABNORMAL
RH IG SCN BLD-IMP: POSITIVE — SIGNIFICANT CHANGE UP
SCHISTOCYTES BLD QL AUTO: SLIGHT — SIGNIFICANT CHANGE UP
SODIUM SERPL-SCNC: 128 MMOL/L — LOW (ref 135–145)
SP GR SPEC: 1.01 — SIGNIFICANT CHANGE UP (ref 1–1.03)
TARGETS BLD QL SMEAR: SLIGHT — SIGNIFICANT CHANGE UP
UROBILINOGEN FLD QL: 0.2 MG/DL — SIGNIFICANT CHANGE UP (ref 0.2–1)
WBC # BLD: 6.03 K/UL — SIGNIFICANT CHANGE UP (ref 3.8–10.5)
WBC # FLD AUTO: 6.03 K/UL — SIGNIFICANT CHANGE UP (ref 3.8–10.5)

## 2024-02-20 PROCEDURE — 99222 1ST HOSP IP/OBS MODERATE 55: CPT

## 2024-02-20 PROCEDURE — 99213 OFFICE O/P EST LOW 20 MIN: CPT

## 2024-02-20 PROCEDURE — 71045 X-RAY EXAM CHEST 1 VIEW: CPT | Mod: 26

## 2024-02-20 RX ORDER — LANOLIN ALCOHOL/MO/W.PET/CERES
3 CREAM (GRAM) TOPICAL AT BEDTIME
Refills: 0 | Status: DISCONTINUED | OUTPATIENT
Start: 2024-02-20 | End: 2024-02-28

## 2024-02-20 RX ORDER — ISOSORBIDE DINITRATE 5 MG/1
10 TABLET ORAL THREE TIMES A DAY
Refills: 0 | Status: DISCONTINUED | OUTPATIENT
Start: 2024-02-20 | End: 2024-02-28

## 2024-02-20 RX ORDER — SPIRONOLACTONE 25 MG/1
25 TABLET, FILM COATED ORAL DAILY
Refills: 0 | Status: DISCONTINUED | OUTPATIENT
Start: 2024-02-20 | End: 2024-02-28

## 2024-02-20 RX ORDER — BUMETANIDE 0.25 MG/ML
2 INJECTION INTRAMUSCULAR; INTRAVENOUS
Qty: 20 | Refills: 0 | Status: DISCONTINUED | OUTPATIENT
Start: 2024-02-20 | End: 2024-02-20

## 2024-02-20 RX ORDER — PANTOPRAZOLE SODIUM 20 MG/1
40 TABLET, DELAYED RELEASE ORAL
Refills: 0 | Status: DISCONTINUED | OUTPATIENT
Start: 2024-02-20 | End: 2024-02-28

## 2024-02-20 RX ORDER — SODIUM CHLORIDE 9 MG/ML
3 INJECTION INTRAMUSCULAR; INTRAVENOUS; SUBCUTANEOUS EVERY 8 HOURS
Refills: 0 | Status: DISCONTINUED | OUTPATIENT
Start: 2024-02-20 | End: 2024-02-28

## 2024-02-20 RX ORDER — BUMETANIDE 0.25 MG/ML
4 INJECTION INTRAMUSCULAR; INTRAVENOUS ONCE
Refills: 0 | Status: COMPLETED | OUTPATIENT
Start: 2024-02-20 | End: 2024-02-20

## 2024-02-20 RX ORDER — ACETAMINOPHEN 500 MG
650 TABLET ORAL EVERY 6 HOURS
Refills: 0 | Status: DISCONTINUED | OUTPATIENT
Start: 2024-02-20 | End: 2024-02-28

## 2024-02-20 RX ORDER — HYDRALAZINE HCL 50 MG
10 TABLET ORAL THREE TIMES A DAY
Refills: 0 | Status: DISCONTINUED | OUTPATIENT
Start: 2024-02-20 | End: 2024-02-28

## 2024-02-20 RX ORDER — LEVOTHYROXINE SODIUM 125 MCG
75 TABLET ORAL DAILY
Refills: 0 | Status: DISCONTINUED | OUTPATIENT
Start: 2024-02-20 | End: 2024-02-28

## 2024-02-20 RX ORDER — BUMETANIDE 0.25 MG/ML
1 INJECTION INTRAMUSCULAR; INTRAVENOUS
Qty: 20 | Refills: 0 | Status: DISCONTINUED | OUTPATIENT
Start: 2024-02-20 | End: 2024-02-26

## 2024-02-20 RX ORDER — SENNA PLUS 8.6 MG/1
2 TABLET ORAL AT BEDTIME
Refills: 0 | Status: DISCONTINUED | OUTPATIENT
Start: 2024-02-20 | End: 2024-02-28

## 2024-02-20 RX ORDER — POLYETHYLENE GLYCOL 3350 17 G/17G
17 POWDER, FOR SOLUTION ORAL DAILY
Refills: 0 | Status: DISCONTINUED | OUTPATIENT
Start: 2024-02-20 | End: 2024-02-28

## 2024-02-20 RX ORDER — ATORVASTATIN CALCIUM 80 MG/1
40 TABLET, FILM COATED ORAL AT BEDTIME
Refills: 0 | Status: DISCONTINUED | OUTPATIENT
Start: 2024-02-20 | End: 2024-02-28

## 2024-02-20 RX ADMIN — Medication 10 MILLIGRAM(S): at 21:04

## 2024-02-20 RX ADMIN — BUMETANIDE 5 MG/HR: 0.25 INJECTION INTRAMUSCULAR; INTRAVENOUS at 21:04

## 2024-02-20 RX ADMIN — BUMETANIDE 5 MG/HR: 0.25 INJECTION INTRAMUSCULAR; INTRAVENOUS at 16:41

## 2024-02-20 RX ADMIN — SENNA PLUS 2 TABLET(S): 8.6 TABLET ORAL at 21:05

## 2024-02-20 RX ADMIN — Medication 3 MILLIGRAM(S): at 23:55

## 2024-02-20 RX ADMIN — ATORVASTATIN CALCIUM 40 MILLIGRAM(S): 80 TABLET, FILM COATED ORAL at 21:04

## 2024-02-20 RX ADMIN — SODIUM CHLORIDE 3 MILLILITER(S): 9 INJECTION INTRAMUSCULAR; INTRAVENOUS; SUBCUTANEOUS at 21:30

## 2024-02-20 RX ADMIN — BUMETANIDE 132 MILLIGRAM(S): 0.25 INJECTION INTRAMUSCULAR; INTRAVENOUS at 16:38

## 2024-02-20 NOTE — H&P ADULT - ASSESSMENT
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 Fenton 56/2/21, GERD, HLD, renal atrophy with single kidney, pleural effusion with Thoracentesis. He was evaluated for M-KRISTIE but was not a candidate d/t his anatomy.  He was evaluated for SUMMIT (Tendyne valve). trial for a transcatheter MVR but was denied by [email or web address].    Pt admitted from cardiac surgery office for medical optimization.  HF team consulted (well-known to patient), Dr. Lala - renal consulted (known to pt  from previous admission).  pt c/o sob & recent 20 pound weight gain.   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 Fenton 56/2/21, GERD, HLD, renal atrophy with single kidney, pleural effusion with Thoracentesis. He was evaluated for M-KRISTIE but was not a candidate d/t his anatomy.  He was evaluated for SUMMIT (Tendyne valve) trial for a transcatheter MVR but was denied by [employer].    Pt presented to CTS office for c/o worsening SOB and recent weight gain of 20 pounds. Patient was admitted for further medical management and medical optimization.      Hospital Course:    Admit to 2 [facility] Telemetry floor.  CHF team consulted and appreciated.  Bumex 4 mg IVPB now. Started on Bumex gtt 1 mg / hr.  Creatinine 4;  SURESH on CKD --> Dr. Lala - renal consulted (known to pt from previous admission). Pre op Cardiac surgery work up initiated.

## 2024-02-20 NOTE — H&P ADULT - PROBLEM SELECTOR PLAN 1
Pre op Cardiac surgery work up initiated    Hold Xarelto AC therapy   Continue with Toprol 25 mg PO daily   Continue on Aldactone 25 mg PO daily   Continue with Atorvastatin 40 mg PO HS   MRAS / MSSA Nasal Swab   Type and Screen x 2   Plan for Cardiac Surgery  with Dr. Fenton OR annette ARMSTRONG

## 2024-02-20 NOTE — H&P ADULT - PROBLEM SELECTOR PLAN 2
CHF consult called and appreciated  Bumex 4 mg IVPB now.   Started on Bumex gtt 1 mg / hr  Continue with Aldactone 25 mg PO daily   Strict SU  Continuo on Isosorbide 10 mg PO TID   Continue on Hydralazine 10 mg PO TID

## 2024-02-20 NOTE — PATIENT PROFILE ADULT - FALL HARM RISK - HARM RISK INTERVENTIONS

## 2024-02-20 NOTE — PHYSICAL EXAM
[Sclera] : the sclera and conjunctiva were normal [] : no respiratory distress [Respiration, Rhythm And Depth] : normal respiratory rhythm and effort [Exaggerated Use Of Accessory Muscles For Inspiration] : no accessory muscle use [Auscultation Breath Sounds / Voice Sounds] : lungs were clear to auscultation bilaterally [Apical Impulse] : the apical impulse was normal [Heart Rate And Rhythm] : heart rate was normal and rhythm regular [Heart Sounds] : normal S1 and S2 [Abdomen Soft] : soft [Abdomen Tenderness] : non-tender [Involuntary Movements] : no involuntary movements were seen [No Focal Deficits] : no focal deficits [Oriented To Time, Place, And Person] : oriented to person, place, and time [Impaired Insight] : insight and judgment were intact [Affect] : the affect was normal [Mood] : the mood was normal

## 2024-02-20 NOTE — H&P ADULT - NSHPPHYSICALEXAM_GEN_ALL_CORE
Physical Exam:     GENERAL: Elderly female, in no acute distress, well-developed  HEAD:  Atraumatic, Normocephalic  ENT: EOMI, PERRLA, conjunctiva and sclera clear, Neck supple, No JVD, moist mucosa, no pharyngeal erythema, no tonsillar enlargement or exudate  CHEST/LUNG: diminished bs bases; No wheeze, no rhonchi or rales, no crackles, equal breath sounds bilaterally   HEART: Regular rate and rhythm; No murmurs, rubs, or gallops  ABDOMEN: Soft, Nontender, Nondistended; Bowel sounds present, no organomegaly  EXTREMITIES:  No clubbing, cyanosis, +2 pitting edema b/l   PSYCH: Nl behavior, nl affect  NEUROLOGY: AAOx3, non-focal, cranial nerves intact  SKIN: Normal color, No rashes or lesions

## 2024-02-20 NOTE — H&P ADULT - HISTORY OF PRESENT ILLNESS
83M w/ PMH: IWMI '94, [place] Arrest while in [facility] with AICD placement 1/18, afib -s/p RFA '20 & 3/29/23, AVR-T '04, Bentall w/ Hemashield graft (attached to prior AVR) and MV repair w/ Dr. Fenton 56/2/21, GERD, HLD, renal atrophy with single kidney, pleural effusion with Thoracentesis. He was evaluated for M-KRISTIE but was not a candidate d/t his anatomy.  He was evaluated for SUMMIT (Tendyne valve). trial for a transcatheter MVR but was denied by [employer].    Pt admitted from cardiac surgery office for medical optimization.  HF team consulted (well-known to patient), Dr. Lala - renal consulted (known to pt  from previous admission).  pt c/o sob & recent 20 pound weight gain.   83M w/ PMH: IWMI '94, [place] Arrest [facility] with AICD placement 1/18, afib -s/p RFA '20 & 3/29/23, AVR-T '04, Bentall w/ Hemashield graft (attached to prior AVR) and MV repair w/ Dr. Fenton 56/2/21, GERD, HLD, renal atrophy with single kidney, pleural effusion with Thoracentesis. He was evaluated for M-KRISTIE but was not a candidate d/t his anatomy.  He was evaluated for SUMMIT (Tendyne valve) trial for a transcatheter MVR but was denied by [employer].    Pt presented to CTS office for c/o worsening SOB and recent weight gain of 20 pounds. Patient was admitted for further medical management and medical optimization.  HF team consulted (well-known to patient) Dr. Lala - renal consulted (known to pt from previous admission).                           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 Fenton 56/2/21, GERD, HLD, renal atrophy with single kidney, pleural effusion with Thoracentesis. He was evaluated for M-KRISTIE but was not a candidate d/t his anatomy.  He was evaluated for SUMMIT (Tendyne valve) trial for a transcatheter MVR but was denied by [employer].    Pt presented to CTS office for c/o worsening SOB and recent weight gain of 20 pounds. Patient was admitted for further medical management and medical optimization.  HF team consulted (well-known to patient) Dr. Lala - renal consulted (known to pt from previous admission).

## 2024-02-20 NOTE — CONSULT NOTE ADULT - ASSESSMENT
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 Fenton 56/2/21, GERD, HLD, renal atrophy with single kidney, pleural effusion with Thoracentesis. He was evaluated for M-KRISTIE but was not a candidate d/t his anatomy.  He was evaluated for SUMMIT (Tendyne valve) trial for a transcatheter MVR but was denied by [employer].    Pt presented to CTS office for c/o worsening SOB and recent weight gain of 20 pounds. Patient was admitted for further medical management and medical optimization.    pt with ree with cr 3.5 and bun 111.      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

## 2024-02-20 NOTE — PATIENT PROFILE ADULT - NSPROMEDSBROUGHTTOHOSP_GEN_A_NUR
Alert-The patient is alert, awake and responds to voice. The patient is oriented to time, place, and person. The triage nurse is able to obtain subjective information. Allergies:-  No Known Allergies       no

## 2024-02-20 NOTE — H&P ADULT - NSHPSOCIALHISTORY_GEN_ALL_CORE
SOCIAL HISTORY:  Smoker: [ ] Yes  [X ] No        PACK YEARS:                         WHEN QUIT? Denies   ETOH use: [ ] Yes  [X] No              FREQUENCY / QUANTITY: Denies   Ilicit Drug use:  [ ] Yes  [X ] No Denies   Occupation: Retired   Live with: with wife   Assist device use: denies

## 2024-02-20 NOTE — H&P ADULT - NSHPREVIEWOFSYSTEMS_GEN_ALL_CORE
General:  no weakness, fatigue, fevers or chills  Skin: no itching, burning, rashes, or lesions   HEENT: no visual changes;  no headache, no vertigo, no recent colds, nasal stuffiness or sore throat   Neck: no pain, stiffness or swollen glands  Respiratory: no cough, sputum, wheezing, hemoptysis; no shortness of breath  Cardiovascular: no chest pain, dyspnea or palpitations  GI: no abdominal or epigastric pain. no heartburn, nausea, vomiting, or hematemesis; no diarrhea or constipation. no melena or hematochezia  : no dysuria, frequency or hematuria  Peripheral Vascular: no intermittent claudication, leg cramps, varicose veins, swelling or swelling with redness and tenderness  Musculoskeletal:   Neuro: no changes in orientation, memory, insight or judgment, no changes in mood, attention or speech.  no dizziness, vertigo or fainting, numbness, tingling or weakness

## 2024-02-20 NOTE — H&P ADULT - PROBLEM SELECTOR PLAN 3
Renal Consult called and appreciated (Dr. Lala)  Bumex 4 mg IVPB now.   Started on Bumex gtt 1 mg / hr  Strict SU  Daily BMP   Daily weight   Avoid Nephrotoxic Agent

## 2024-02-20 NOTE — H&P ADULT - NSICDXPASTSURGICALHX_GEN_ALL_CORE_FT
PAST SURGICAL HISTORY:  AICD (automatic cardioverter/defibrillator) present 12/19/17    Cardiac pacemaker 12/19/17    History of cardioversion 9/11/20    History of tonsillectomy and adenoidectomy     S/P ablation of atrial fibrillation 10/29/20    S/P aortic valve replacement 3/13/2004    S/P cardiac cath 1994, 1995, 1999, 2002, 2004    S/P colonoscopy 2001, 2002, 2006, 2011, 2016    S/P endoscopy 2006    S/P hernia repair 2002    S/P TURP 1996    Status post ablation of ventricular arrhythmia 2/14/19     Normal vision: sees adequately in most situations; can see medication labels, newsprint

## 2024-02-20 NOTE — ASSESSMENT
[FreeTextEntry1] : .Admit to inpatient today given worsening symptoms, worsening renal function and weight gain.

## 2024-02-20 NOTE — HISTORY OF PRESENT ILLNESS
[FreeTextEntry1] : Mr. Sher is an 83 year old male who presents today for follow up.  To review, PMH is extensiveand includes an IWMI in 1994, [place], AICD in January 2018, AFib (s/p ablation in 2020 and again on 3/29/23), maintained on Xarelto, AVR in 2004 (#23 Bovine Pericardial Valve), and a prior Aortic Aneurysm for which he underwent a Bentall with a #32 Hemashield graft (attached to prior AVR) and MV repair with Dr. Fenton on 6/2/21, GERD, HLD, renal atrophy with single kidney, pleural effusion with thoracentesis. He was evaluated by Dr. Valentino for M-KRISTIE but his anatomy was not amendable due to calcified MV anatomy, MAC, and very short posterior leaflet. He was evaluated for SUMMIT (Tendyne valve) Trial for a transcatheter mitral valve replacement by Dr. Rico but was denied by [employer].  He was seen in our office last month at which time he endorsed shortness of breath and fatigue. At last visist he was evaluated and no surgery or intervention was recommended. He was to fu with cardiologist  Dr. Romero and PCP and follow up if symptoms got worse.   Presents today with his wife and daughter.  He reports saw his nephrologist today, Dr Tomlinson.  Since his last visit with us he reports more SOB and worsening of symptoms of fatigue (can hardly walk to take out trash).  He gained 17 lbs since his last visit (161 lbs today on the scale in office).  He reports he has watched his weight gradually go up since last month. Maintained Bumex 2mg BID and still gaining weight.  He has solotary kidney and reports his latest Creatinine today was 4 at his nephrologist's office. Reports he has been making little urine.

## 2024-02-20 NOTE — REVIEW OF SYSTEMS
[Feeling Poorly] : feeling poorly [Feeling Tired] : feeling tired [As noted in HPI] : as noted in HPI [Shortness Of Breath] : shortness of breath [SOB on Exertion] : shortness of breath during exertion [As Noted in HPI] : as noted in HPI [Negative] : Heme/Lymph

## 2024-02-20 NOTE — CONSULT NOTE ADULT - SUBJECTIVE AND OBJECTIVE BOX
[facility]  [phone]  NEPHROLOGY      INITIAL CONSULT NOTE  --------------------------------------------------------------------------------  HPI:      83M w/ PMH: IWMI '94, [place] Arrest while in [facility] with AICD placement 1/18, afib -s/p RFA '20 & 3/29/23, AVR-T '04, Bentall w/ Hemashield graft (attached to prior AVR) and MV repair w/ Dr. Fenton 56/2/21, GERD, HLD, renal atrophy with single kidney, pleural effusion with Thoracentesis. He was evaluated for M-KRISTIE but was not a candidate d/t his anatomy.  He was evaluated for SUMMIT (Tendyne valve) trial for a transcatheter MVR but was denied by [employer].    Pt presented to CTS office for c/o worsening SOB and recent weight gain of 20 pounds. Patient was admitted for further medical management and medical optimization.    pt with ree with cr 3.5 and bun 111. renal consult called.          PAST HISTORY  --------------------------------------------------------------------------------  PAST MEDICAL & SURGICAL HISTORY:  Aortic stenosis      AICD (automatic cardioverter/defibrillator) present      Aortic valve regurgitation      Ascending aorta dilation      H/O paroxysmal supraventricular tachycardia      HLD (hyperlipidemia)      Mitral valve regurgitation      Cardiac arrest      Severe mitral regurgitation      S/P aortic valve replacement  3/13/2004      S/P hernia repair  2002      History of tonsillectomy and adenoidectomy      S/P TURP  1996      S/P colonoscopy  2001, 2002, 2006, 2011, 2016      S/P endoscopy  2006      S/P cardiac cath  1994, 1995, 1999, 2002, 2004      AICD (automatic cardioverter/defibrillator) present  12/19/17      Cardiac pacemaker  12/19/17      History of cardioversion  9/11/20      S/P ablation of atrial fibrillation  10/29/20      Status post ablation of ventricular arrhythmia  2/14/19        FAMILY HISTORY:    PAST SOCIAL HISTORY:    ALLERGIES & MEDICATIONS  --------------------------------------------------------------------------------  Allergies    No Known Allergies    Intolerances      Standing Inpatient Medications  atorvastatin 40 milliGRAM(s) Oral at bedtime  buMETAnide Infusion 1 mG/Hr IV Continuous <Continuous>  hydrALAZINE 10 milliGRAM(s) Oral three times a day  isosorbide   dinitrate Tablet (ISORDIL) 10 milliGRAM(s) Oral three times a day  levothyroxine 75 MICROGram(s) Oral daily  pantoprazole    Tablet 40 milliGRAM(s) Oral before breakfast  polyethylene glycol 3350 17 Gram(s) Oral daily  senna 2 Tablet(s) Oral at bedtime  sodium chloride 0.9% lock flush 3 milliLiter(s) IV Push every 8 hours  spironolactone 25 milliGRAM(s) Oral daily    PRN Inpatient Medications  acetaminophen     Tablet .. 650 milliGRAM(s) Oral every 6 hours PRN  melatonin 3 milliGRAM(s) Oral at bedtime PRN      REVIEW OF SYSTEMS  --------------------------------------------------------------------------------  Gen: No  fevers/chills   Skin: No rashes  Head/Eyes/Ears/Mouth: No headache; Normal hearing;  No sinus pain/discomfort, sore throat  Respiratory: + SOb  +  cough, - wheezing, hemoptysis  CV: No chest pain, or palp   GI: No abdominal pain, diarrhea, nausea, vomiting  : No dysuria, decrease urination or hesitancy urinating  hematuria, nocturia  MSK: No joint pain/swelling; no back pain  Neuro: No dizziness/lightheadedness  also with  +  edema         VITALS/PHYSICAL EXAM  --------------------------------------------------------------------------------  T(C): 36.3 (02-20-24 @ 18:58), Max: 36.3 (02-20-24 @ 15:30)  HR: 60 (02-20-24 @ 18:58) (58 - 60)  BP: 92/61 (02-20-24 @ 18:58) (92/61 - 100/68)  RR: 18 (02-20-24 @ 18:58) (18 - 18)  SpO2: 96% (02-20-24 @ 18:58) (96% - 98%)  Wt(kg): --  Height (cm): 175.3 (02-20-24 @ 15:30)  Weight (kg): 71.5 (02-20-24 @ 15:30)  BMI (kg/m2): 23.3 (02-20-24 @ 15:30)  BSA (m2): 1.87 (02-20-24 @ 15:30)      02-20-24 @ 07:01  -  02-20-24 @ 22:39  --------------------------------------------------------  IN: 90 mL / OUT: 1 mL / NET: 89 mL      Physical Exam:  	Gen: Non toxic comfortable appearing   	+ JVD  	Pulm: decrease bs  no rales or ronchi or wheezing  	CV: RRR, S1S2; no rub III/VI M   	Abd: +BS, soft, nontender/nondistended  	: No suprapubic tenderness  	UE: Warm, no cyanosis  no clubbing,  no edema  	LE: Warm, no cyanosis  no clubbing, 4+  edema  	Neuro: alert and oriented. speech coherent   	Psych: Normal affect and mood  	Skin: Warm, no decrease skin turgor   	  \  LABS/STUDIES  --------------------------------------------------------------------------------              12.0   6.03  >-----------<  138      [02-20-24 @ 15:54]              37.2     128  |  95  |  111  ----------------------------<  100      [02-20-24 @ 15:54]  4.5   |  17  |  3.51        Ca     9.3     [02-20-24 @ 15:54]    TPro  7.3  /  Alb  4.3  /  TBili  1.3  /  DBili  x   /  AST  35  /  ALT  27  /  AlkPhos  217  [02-20-24 @ 15:54]    PT/INR: PT 30.3 , INR 2.98       [02-20-24 @ 15:54]  PTT: 41.3       [02-20-24 @ 15:54]      Creatinine Trend:  SCr 3.51 [02-20 @ 15:54]            TSH 5.96      [08-16-23 @ 16:55]

## 2024-02-21 DIAGNOSIS — I48.0 PAROXYSMAL ATRIAL FIBRILLATION: ICD-10-CM

## 2024-02-21 DIAGNOSIS — I34.0 NONRHEUMATIC MITRAL (VALVE) INSUFFICIENCY: ICD-10-CM

## 2024-02-21 DIAGNOSIS — I50.9 HEART FAILURE, UNSPECIFIED: ICD-10-CM

## 2024-02-21 LAB
A1C WITH ESTIMATED AVERAGE GLUCOSE RESULT: 5.6 % — SIGNIFICANT CHANGE UP (ref 4–5.6)
ALBUMIN SERPL ELPH-MCNC: 4 G/DL — SIGNIFICANT CHANGE UP (ref 3.3–5)
ALP SERPL-CCNC: 186 U/L — HIGH (ref 40–120)
ALT FLD-CCNC: 29 U/L — SIGNIFICANT CHANGE UP (ref 10–45)
ANION GAP SERPL CALC-SCNC: 17 MMOL/L — SIGNIFICANT CHANGE UP (ref 5–17)
AST SERPL-CCNC: 28 U/L — SIGNIFICANT CHANGE UP (ref 10–40)
BASOPHILS # BLD AUTO: 0.03 K/UL — SIGNIFICANT CHANGE UP (ref 0–0.2)
BASOPHILS NFR BLD AUTO: 0.5 % — SIGNIFICANT CHANGE UP (ref 0–2)
BILIRUB SERPL-MCNC: 1.4 MG/DL — HIGH (ref 0.2–1.2)
BUN SERPL-MCNC: 114 MG/DL — HIGH (ref 7–23)
CALCIUM SERPL-MCNC: 9.4 MG/DL — SIGNIFICANT CHANGE UP (ref 8.4–10.5)
CHLORIDE SERPL-SCNC: 96 MMOL/L — SIGNIFICANT CHANGE UP (ref 96–108)
CO2 SERPL-SCNC: 19 MMOL/L — LOW (ref 22–31)
CREAT SERPL-MCNC: 3.67 MG/DL — HIGH (ref 0.5–1.3)
EGFR: 16 ML/MIN/1.73M2 — LOW
EOSINOPHIL # BLD AUTO: 0.06 K/UL — SIGNIFICANT CHANGE UP (ref 0–0.5)
EOSINOPHIL NFR BLD AUTO: 1 % — SIGNIFICANT CHANGE UP (ref 0–6)
ESTIMATED AVERAGE GLUCOSE: 114 MG/DL — SIGNIFICANT CHANGE UP (ref 68–114)
GLUCOSE SERPL-MCNC: 89 MG/DL — SIGNIFICANT CHANGE UP (ref 70–99)
HCT VFR BLD CALC: 33.2 % — LOW (ref 39–50)
HGB BLD-MCNC: 11 G/DL — LOW (ref 13–17)
IMM GRANULOCYTES NFR BLD AUTO: 0.7 % — SIGNIFICANT CHANGE UP (ref 0–0.9)
LYMPHOCYTES # BLD AUTO: 0.46 K/UL — LOW (ref 1–3.3)
LYMPHOCYTES # BLD AUTO: 7.9 % — LOW (ref 13–44)
MCHC RBC-ENTMCNC: 31.5 PG — SIGNIFICANT CHANGE UP (ref 27–34)
MCHC RBC-ENTMCNC: 33.1 GM/DL — SIGNIFICANT CHANGE UP (ref 32–36)
MCV RBC AUTO: 95.1 FL — SIGNIFICANT CHANGE UP (ref 80–100)
MONOCYTES # BLD AUTO: 0.75 K/UL — SIGNIFICANT CHANGE UP (ref 0–0.9)
MONOCYTES NFR BLD AUTO: 12.9 % — SIGNIFICANT CHANGE UP (ref 2–14)
MRSA PCR RESULT.: SIGNIFICANT CHANGE UP
NEUTROPHILS # BLD AUTO: 4.49 K/UL — SIGNIFICANT CHANGE UP (ref 1.8–7.4)
NEUTROPHILS NFR BLD AUTO: 77 % — SIGNIFICANT CHANGE UP (ref 43–77)
NRBC # BLD: 0 /100 WBCS — SIGNIFICANT CHANGE UP (ref 0–0)
PLATELET # BLD AUTO: 129 K/UL — LOW (ref 150–400)
POTASSIUM SERPL-MCNC: 4.3 MMOL/L — SIGNIFICANT CHANGE UP (ref 3.5–5.3)
POTASSIUM SERPL-SCNC: 4.3 MMOL/L — SIGNIFICANT CHANGE UP (ref 3.5–5.3)
PREALB SERPL-MCNC: 20 MG/DL — SIGNIFICANT CHANGE UP (ref 20–40)
PROT SERPL-MCNC: 6.7 G/DL — SIGNIFICANT CHANGE UP (ref 6–8.3)
RBC # BLD: 3.49 M/UL — LOW (ref 4.2–5.8)
RBC # FLD: 15.9 % — HIGH (ref 10.3–14.5)
S AUREUS DNA NOSE QL NAA+PROBE: SIGNIFICANT CHANGE UP
SODIUM SERPL-SCNC: 132 MMOL/L — LOW (ref 135–145)
TSH SERPL-MCNC: 2.03 UIU/ML — SIGNIFICANT CHANGE UP (ref 0.27–4.2)
WBC # BLD: 5.83 K/UL — SIGNIFICANT CHANGE UP (ref 3.8–10.5)
WBC # FLD AUTO: 5.83 K/UL — SIGNIFICANT CHANGE UP (ref 3.8–10.5)

## 2024-02-21 PROCEDURE — 99232 SBSQ HOSP IP/OBS MODERATE 35: CPT | Mod: FS

## 2024-02-21 PROCEDURE — 99223 1ST HOSP IP/OBS HIGH 75: CPT

## 2024-02-21 PROCEDURE — 93010 ELECTROCARDIOGRAM REPORT: CPT

## 2024-02-21 RX ORDER — SODIUM CHLORIDE 5 G/100ML
150 INJECTION, SOLUTION INTRAVENOUS
Refills: 0 | Status: DISCONTINUED | OUTPATIENT
Start: 2024-02-21 | End: 2024-02-28

## 2024-02-21 RX ORDER — HEPARIN SODIUM 5000 [USP'U]/ML
5000 INJECTION INTRAVENOUS; SUBCUTANEOUS EVERY 12 HOURS
Refills: 0 | Status: DISCONTINUED | OUTPATIENT
Start: 2024-02-21 | End: 2024-02-25

## 2024-02-21 RX ORDER — SODIUM CHLORIDE 5 G/100ML
150 INJECTION, SOLUTION INTRAVENOUS
Refills: 0 | Status: DISCONTINUED | OUTPATIENT
Start: 2024-02-21 | End: 2024-02-21

## 2024-02-21 RX ADMIN — ATORVASTATIN CALCIUM 40 MILLIGRAM(S): 80 TABLET, FILM COATED ORAL at 21:13

## 2024-02-21 RX ADMIN — SODIUM CHLORIDE 300 MILLILITER(S): 5 INJECTION, SOLUTION INTRAVENOUS at 21:12

## 2024-02-21 RX ADMIN — SPIRONOLACTONE 25 MILLIGRAM(S): 25 TABLET, FILM COATED ORAL at 05:57

## 2024-02-21 RX ADMIN — Medication 10 MILLIGRAM(S): at 11:10

## 2024-02-21 RX ADMIN — Medication 10 MILLIGRAM(S): at 05:35

## 2024-02-21 RX ADMIN — HEPARIN SODIUM 5000 UNIT(S): 5000 INJECTION INTRAVENOUS; SUBCUTANEOUS at 17:55

## 2024-02-21 RX ADMIN — ISOSORBIDE DINITRATE 10 MILLIGRAM(S): 5 TABLET ORAL at 05:36

## 2024-02-21 RX ADMIN — Medication 75 MICROGRAM(S): at 05:36

## 2024-02-21 RX ADMIN — ISOSORBIDE DINITRATE 10 MILLIGRAM(S): 5 TABLET ORAL at 11:10

## 2024-02-21 RX ADMIN — SODIUM CHLORIDE 3 MILLILITER(S): 9 INJECTION INTRAMUSCULAR; INTRAVENOUS; SUBCUTANEOUS at 05:57

## 2024-02-21 RX ADMIN — SODIUM CHLORIDE 3 MILLILITER(S): 9 INJECTION INTRAMUSCULAR; INTRAVENOUS; SUBCUTANEOUS at 14:09

## 2024-02-21 RX ADMIN — PANTOPRAZOLE SODIUM 40 MILLIGRAM(S): 20 TABLET, DELAYED RELEASE ORAL at 05:36

## 2024-02-21 RX ADMIN — SODIUM CHLORIDE 3 MILLILITER(S): 9 INJECTION INTRAMUSCULAR; INTRAVENOUS; SUBCUTANEOUS at 21:22

## 2024-02-21 NOTE — CONSULT NOTE ADULT - PROBLEM SELECTOR RECOMMENDATION 3
§ Take 1 packet/capful of Miralax in 4 ounces of liquid every hour until you have 2 good bowel movements.  Then start 1 packet miralax daily-can increase to one packet/capful twice daily.  Make sure to drink plenty of fluids.      Please return to the Emergency Department for any new or worsening symptoms including: fever, chest pain, shortness of breath, loss of consciousness, dizziness, weakness, or any other concerns.     § Schedule an appointment for follow up with your Primary Care Doctor and Gastroenterology as soon as possible for a recheck of your symptoms. If you do not have one, contact the one listed on your discharge paperwork or call the [facility] Appointment Desk at [phone] to schedule an appointment.     § If you require follow up care from a specialist and are unable to schedule an appointment with them directly, please contact your Primary Care Provider on the next business day to set up a referral.      § Please take all medication as prescribed.     Likely cardiorenal   cont to monitor serum creatinine   avoid nephrotoxic drugs  Renal dose meds  Renal on board

## 2024-02-21 NOTE — PROGRESS NOTE ADULT - ASSESSMENT
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 Fenton 56/2/21, GERD, HLD, renal atrophy with single kidney, pleural effusion with Thoracentesis. He was evaluated for M-KRISTIE but was not a candidate d/t his anatomy.  He was evaluated for SUMMIT (Tendyne valve) trial for a transcatheter MVR but was denied by [employer].    Pt presented to CTS office for c/o worsening SOB and recent weight gain of 20 pounds. Patient was admitted for further medical management and medical optimization.    pt with ree with cr 3.5 and bun 111.      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

## 2024-02-21 NOTE — PROGRESS NOTE ADULT - NSPROGADDITIONALINFOA_GEN_ALL_CORE
Jazmin Baez, AGACNP-BC  Cardiovascular & Thoracic Surgery  [address] [place] [address]  Office: [phone]    Available on Microsoft Teams  Spectra: [id number]  New Consults: [phone]

## 2024-02-21 NOTE — PROGRESS NOTE ADULT - SUBJECTIVE AND OBJECTIVE BOX
[facility]   [phone]  RENAL FOLLOW UP NOTE  --------------------------------------------------------------------------------  Chief Complaint:    24 hour events/subjective:    seen earlier   states breathing is better   states urinating well     PAST HISTORY  --------------------------------------------------------------------------------  No significant changes to PMH, PSH, FHx, SHx, unless otherwise noted    ALLERGIES & MEDICATIONS  --------------------------------------------------------------------------------  Allergies    No Known Allergies    Intolerances      Standing Inpatient Medications  atorvastatin 40 milliGRAM(s) Oral at bedtime  buMETAnide Infusion 1 mG/Hr IV Continuous <Continuous>  heparin   Injectable 5000 Unit(s) SubCutaneous every 12 hours  hydrALAZINE 10 milliGRAM(s) Oral three times a day  isosorbide   dinitrate Tablet (ISORDIL) 10 milliGRAM(s) Oral three times a day  levothyroxine 75 MICROGram(s) Oral daily  pantoprazole    Tablet 40 milliGRAM(s) Oral before breakfast  polyethylene glycol 3350 17 Gram(s) Oral daily  senna 2 Tablet(s) Oral at bedtime  sodium chloride 0.9% lock flush 3 milliLiter(s) IV Push every 8 hours  sodium chloride 3% Bolus 150 milliLiter(s) IV Bolus <User Schedule>  spironolactone 25 milliGRAM(s) Oral daily    PRN Inpatient Medications  acetaminophen     Tablet .. 650 milliGRAM(s) Oral every 6 hours PRN  melatonin 3 milliGRAM(s) Oral at bedtime PRN      REVIEW OF SYSTEMS  --------------------------------------------------------------------------------    Gen: denies  fevers/chills,  CVS: denies chest pain/palpitations  Resp: denies SOB/Cough  GI: Denies N/V/Abd pain  : Denies dysuria/oliguria/hematuria        VITALS/PHYSICAL EXAM  --------------------------------------------------------------------------------  T(C): 36.3 (02-21-24 @ 19:36), Max: 37.1 (02-21-24 @ 04:38)  HR: 77 (02-21-24 @ 19:36) (63 - 77)  BP: 98/61 (02-21-24 @ 19:36) (90/52 - 103/65)  RR: 18 (02-21-24 @ 19:36) (18 - 18)  SpO2: 98% (02-21-24 @ 19:36) (97% - 98%)  Wt(kg): --  Height (cm): 175.3 (02-20-24 @ 15:30)  Weight (kg): 71.5 (02-20-24 @ 15:30)  BMI (kg/m2): 23.3 (02-20-24 @ 15:30)  BSA (m2): 1.87 (02-20-24 @ 15:30)      02-20-24 @ 07:01  -  02-21-24 @ 07:00  --------------------------------------------------------  IN: 250 mL / OUT: 1150 mL / NET: -900 mL    02-21-24 @ 07:01  -  02-21-24 @ 21:17  --------------------------------------------------------  IN: 250 mL / OUT: 1920 mL / NET: -1670 mL      Physical Exam:  	    	Gen: Non toxic comfortable appearing   	+ JVD  	Pulm: decrease bs  no rales or ronchi or wheezing  	CV: RRR, S1S2; no rub III/VI M   	Abd: +BS, soft, nontender/nondistended  	: No suprapubic tenderness  	UE: Warm, no cyanosis  no clubbing,  no edema  	LE: Warm, no cyanosis  no clubbing, 3+  edema  	Neuro: alert and oriented. speech coherent     LABS/STUDIES  --------------------------------------------------------------------------------              11.0   5.83  >-----------<  129      [02-21-24 @ 05:52]              33.2     132  |  96  |  114  ----------------------------<  89      [02-21-24 @ 05:53]  4.3   |  19  |  3.67        Ca     9.4     [02-21-24 @ 05:53]    TPro  6.7  /  Alb  4.0  /  TBili  1.4  /  DBili  x   /  AST  28  /  ALT  29  /  AlkPhos  186  [02-21-24 @ 05:53]    PT/INR: PT 30.3 , INR 2.98       [02-20-24 @ 15:54]  PTT: 41.3       [02-20-24 @ 15:54]      Creatinine Trend:  SCr 3.67 [02-21 @ 05:53]  SCr 3.51 [02-20 @ 15:54]      TSH 2.03      [02-20-24 @ 15:55]

## 2024-02-21 NOTE — PROGRESS NOTE ADULT - ASSESSMENT
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 Fenton 56/2/21, GERD, HLD, renal atrophy with single kidney, pleural effusion with Thoracentesis. He was evaluated for M-KRISTIE but was not a candidate d/t his anatomy.  He was evaluated for SUMMIT (Tendyne valve) trial for a transcatheter MVR but was denied by [employer].    Pt presented to CTS office for c/o worsening SOB and recent weight gain of 20 pounds. Patient was admitted for further medical management and medical optimization.      Hospital Course:    Admit to 2 [facility] Telemetry floor.  CHF team consulted and appreciated.  Bumex 4 mg IVPB now. Started on Bumex gtt 1 mg / hr.  Creatinine 4;  SURESH on CKD --> Dr. Lala - renal consulted (known to pt from previous admission). Pre op Cardiac surgery work up initiated.    2/21 Bumex gtt continued, strict I&Os, preop w/u in progress, OR TBD

## 2024-02-21 NOTE — CONSULT NOTE ADULT - SUBJECTIVE AND OBJECTIVE BOX
HPI:  81 y/o M with ICM, LVEF 35-40% (LVIDd 6.7 cm), CAD c/b IWMI () s/p angioplasty, aortic stenosis s/p bio-AVR , VT arrest () s/p ICD, Afib s/p multiple DCCV and ablation x2 ( and ; on AC), prior Ao aneurysm s/p Bentall (), severe MR with MAC (precludes KRISTIE), atrophic kidney with CKD (b/l Cr 1.9),  Pt presented to CTS office for c/o worsening SOB and recent weight gain of 20 pounds. Patient was admitted for further medical management and medical optimization.   In the ER patient is vitally stable. labs were remarkable for BUN/Creatinine 114/3.67, BNP 15693. CXR congested. HF consulted for further care.         PAST MEDICAL & SURGICAL HISTORY  Aortic stenosis    AICD (automatic cardioverter/defibrillator) present    Aortic valve regurgitation    Ascending aorta dilation    H/O paroxysmal supraventricular tachycardia    HLD (hyperlipidemia)    Mitral valve regurgitation    Cardiac arrest    Severe mitral regurgitation    S/P aortic valve replacement  3/13/2004    S/P hernia repair      History of tonsillectomy and adenoidectomy    S/P TURP      S/P colonoscopy  , , , ,     S/P endoscopy      S/P cardiac cath  , , , ,     AICD (automatic cardioverter/defibrillator) present  17    Cardiac pacemaker  17    History of cardioversion  20    S/P ablation of atrial fibrillation  10/29/20    Status post ablation of ventricular arrhythmia  19        FAMILY HISTORY:  FAMILY HISTORY:      SOCIAL HISTORY:  []smoker  []Alcohol  []Drug    ALLERGIES:  No Known Allergies      MEDICATIONS:  MEDICATIONS  (STANDING):  atorvastatin 40 milliGRAM(s) Oral at bedtime  buMETAnide Infusion 1 mG/Hr (5 mL/Hr) IV Continuous <Continuous>  heparin   Injectable 5000 Unit(s) SubCutaneous every 12 hours  hydrALAZINE 10 milliGRAM(s) Oral three times a day  isosorbide   dinitrate Tablet (ISORDIL) 10 milliGRAM(s) Oral three times a day  levothyroxine 75 MICROGram(s) Oral daily  pantoprazole    Tablet 40 milliGRAM(s) Oral before breakfast  polyethylene glycol 3350 17 Gram(s) Oral daily  senna 2 Tablet(s) Oral at bedtime  sodium chloride 0.9% lock flush 3 milliLiter(s) IV Push every 8 hours  spironolactone 25 milliGRAM(s) Oral daily    MEDICATIONS  (PRN):  acetaminophen     Tablet .. 650 milliGRAM(s) Oral every 6 hours PRN Mild Pain (1 - 3)  melatonin 3 milliGRAM(s) Oral at bedtime PRN Insomnia      HOME MEDICATIONS:  Home Medications:  acetaminophen 325 mg oral tablet: 2 tab(s) orally every 6 hours As needed Temp greater or equal to 38C (100.4F), Mild Pain (1 - 3) (2024 16:03)  atorvastatin 40 mg oral tablet: 1 tab(s) orally once a day (at bedtime) (2024 16:03)  CoQ10 300 mg oral capsule: 1 orally once a day (at bedtime) (2024 16:03)  levothyroxine 50 mcg (0.05 mg) oral tablet: 1.5 tab(s) orally once a day (2024 16:02)  melatonin 3 mg oral tablet: 1 tab(s) orally once a day (at bedtime) As needed Insomnia (2024 16:03)  pantoprazole 40 mg oral delayed release tablet: 1 tab(s) orally once a day (before a meal) (2024 16:03)  polyethylene glycol 3350 oral powder for reconstitution: 17 gram(s) orally once a day (2024 16:03)  Presser Vision Areds 2:  (2024 16:03)  senna leaf extract oral tablet: 2 tab(s) orally once a day (at bedtime) (2024 16:03)  Xarelto 15 mg oral tablet: 1 tablet orally once a day (2024 16:03)      VITALS:   T(F): 97.5 ( @ 11:51), Max: 98.7 ( @ 04:38)  HR: 65 ( @ 11:51) (58 - 65)  BP: 103/65 ( @ 11:10) (92/61 - 103/65)  BP(mean): --  RR: 18 ( @ 11:51) (18 - 18)  SpO2: 97% ( @ 11:51) (96% - 98%)    I&O's Summary    2024 07:01  -  2024 07:00  --------------------------------------------------------  IN: 250 mL / OUT: 1150 mL / NET: -900 mL    2024 07:01  -  2024 13:53  --------------------------------------------------------  IN: 10 mL / OUT: 870 mL / NET: -860 mL        REVIEW OF SYSTEMS:  CONSTITUTIONAL: Positive for fatigue   EYES: No visual changes  ENT: No vertigo or throat pain   NECK: No pain or stiffness  RESPIRATORY: Positive for SOB  CARDIOVASCULAR: No chest pain or palpitations  GASTROINTESTINAL: No abdominal or epigastric pain. No nausea, vomiting, or hematemesis; No diarrhea or constipation. No melena or hematochezia.  GENITOURINARY: No dysuria, frequency or hematuria  NEUROLOGICAL: No numbness or weakness  SKIN: No itching, no rashes  MSK: No pain    PHYSICAL EXAM:  NEURO: patient is awake , alert and oriented  GEN: Not in acute distress  NECK: no thyroid enlargement, no JVD  LUNGS: decrease b/l breath sounds  CARDIOVASCULAR: S1/S2 present, RRR , no murmurs or rubs, no carotid bruits,  + PP bilaterally  ABD: Soft, non-tender, non-distended, +BS  EXT: b/l LE edema   SKIN: Intact    LABS:                        11.0   5.83  )-----------( 129      ( 2024 05:52 )             33.2         132<L>  |  96  |  114<H>  ----------------------------<  89  4.3   |  19<L>  |  3.67<H>    Ca    9.4      2024 05:53    TPro  6.7  /  Alb  4.0  /  TBili  1.4<H>  /  DBili  x   /  AST  28  /  ALT  29  /  AlkPhos  186<H>  02-21    PT/INR - ( 2024 15:54 )   PT: 30.3 sec;   INR: 2.98 ratio         PTT - ( 2024 15:54 )  PTT:41.3 sec          RADIOLOGY:  -CXR:  pulmonary congestion     -TTE:    < from: WARREN W or WO Ultrasound Enhancing Agent (23 @ 10:38) >  CONCLUSIONS:      1. Moderately enlarged right ventricular cavity size and moderately reduced right ventricular systolic function.   2. Severe mitral regurgitation at a blood pressure of 97/61 mmHg.   3. No pericardial effusion seen.   4. Estimated pulmonary artery systolic pressure is 49 mmHg, consistent with moderate pulmonary hypertension.   5. Moderate tricuspid regurgitation.    < end of copied text >      < from: TTE W or WO Ultrasound Enhancing Agent (23 @ 16:56) >     CONCLUSIONS:      1. Left ventricular systolic function is severely decreased with an ejection fraction of 36 % by 3D.   2. There is moderate (grade 2) left ventricular diastolic dysfunction.   3. Left ventricular global longitudinal strain is -8.5 % is abnormal (> -16%). Images were acquired on a [employer] ultrasound system and processed using [employer] strain analysis software with a heart rate of 80 bpm and a blood pressure of 91/60 mmHg.   4. Mildly enlarged right ventricular cavity size and reduced systolic right ventricular function. The tricuspid annular plane systolic excursion (TAPSE) is 1.6 cm (normal >=1.7 cm).   5. Device lead is visualized in the right ventricle.   6. A bioprosthetic valve replacement present in the aortic position. Mild intravalvular regurgitation No paravalvular regurgitation.   7. Severe mitral regurgitation at a blood pressure of 91/60 mmHg.   8. Moderate-severe tricuspid regurgitation.   9. No pericardial effusion seen.  10. Pacer lead noted in the right atrium.  11. Compared to the transthoracic echocardiogram performed on 4/10/2023 there have been no significant interval changes.    ________________________________________________________________________________________    < end of copied text >      -CATHETERIZATION:  < from: Cardiac Catheterization (23 @ 15:57) >  Diagnostic Conclusions:     Mild to moderate nonobstructive CAD.    < end of copied text >      EC Lead ECG:   Ventricular Rate 63 BPM    Atrial Rate 63 BPM    QRS Duration 190 ms    Q-T Interval 476 ms    QTC Calculation(Bazett) 487 ms    R Axis -21 degrees    T Axis -49 degrees    Diagnosis Line WIDE QRS RHYTHM WITH FREQUENT ventricular-paced complexes  RIGHT BUNDLE BRANCH BLOCK  POSSIBLE LATERAL INFARCT , AGE UNDETERMINED  INFERIOR INFARCT , AGE UNDETERMINED  ABNORMAL ECG  WHEN COMPARED WITH ECG OF 30-AUG-2023 10:40,  SIGNIFICANT CHANGES HAVE OCCURRED  Confirmed by MD BENITO, JONATHON ([id number]) on 2024 11:53:35 AM (24 @ 08:25)

## 2024-02-21 NOTE — CONSULT NOTE ADULT - PROBLEM SELECTOR RECOMMENDATION 9
-ACC/ AHA stage C/D heart failure, NYHA class III   -HFrEF EF 36%, grade II DD  -Etiology ischemic and complicated by severe MR  -Monitor Intake and output , Daily weight   -Monitor electrolytes and replace as needed , with goal of potassium above 4 and mag above 2  -goal is to keep negative balance   -cont with IV Bumex ggt at 2 mg   -Start 3% hypertonic saline 150 mg BID  -GDMT with hydralazine 10 mg TID, -HFrEF LVEF 36%, grade II DD - ACC/ AHA stage C/D heart failure, NYHA class III   -Etiology ischemic and complicated by severe MR  -Monitor Intake and output , Daily weight   -Monitor electrolytes and replace as needed , with goal of potassium above 4 and mag above 2  -goal is to keep negative balance   -cont with IV Bumex ggt at 2 mg   -Start 3% hypertonic saline 150 mg BID  -GDMT with hydralazine 10 mg TID, Isordil 10 mg TID and aldactone 25 daily   -reintroduce BB when more euvolemic -HFrEF LVEF 36%, grade II DD - ACC/ AHA stage C/D heart failure, NYHA class III   -Etiology ischemic and complicated by severe MR  -Monitor Intake and output , Daily weight   -Monitor electrolytes and replace as needed , with goal of potassium above 4 and mag above 2  -goal is to keep negative balance   -cont with IV Bumex drip at 1 mg   -Start 150 ml of  3% hypertonic saline BID  -GDMT with hydralazine 10 mg TID, Isordil 10 mg TID and aldactone 25 daily   -reintroduce BB when more euvolemic

## 2024-02-21 NOTE — CONSULT NOTE ADULT - ASSESSMENT
83 y/o M with ICM, LVEF 35-40% (LVIDd 6.7 cm), CAD c/b IWMI (1994) s/p angioplasty, aortic stenosis s/p bio-AVR 2004, VT arrest (2017) s/p ICD, Afib s/p multiple DCCV and ablation x2 (2020 and 2023; on AC), prior Ao aneurysm s/p Bentall (2021), severe MR with MAC (precludes KRISTIE), atrophic kidney with CKD (b/l Cr 1.9) presented from CTS office with decompensated heart failure.   In the ER patient is vitally stable. labs were remarkable for BUN/Creatinine 114/3.67, BNP 72172. CXR congested. HF consulted for further care.      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   In the ER patient is vitally stable. labs were remarkable for BUN/Creatinine 114/3.67, BNP 93620. CXR congested. HF consulted for further care.      Cardiac studies   BNP 81602  WARREN 8/23 Severe MR and moderate TR  TTE 8/23 EF 36%, grade II DD, mildly enlarged RV with reduced function, severe MR and Moderate to severe TR

## 2024-02-21 NOTE — CONSULT NOTE ADULT - ATTENDING COMMENTS
83 y/o M with ICM, chronic systolic HF wit LVEF 35-40%, VT arrest, afib with multiple DCCV/ablation, severe MR with MAC, not a candidate for KRISTIE, CKD admitted with worsening SOB and recent weight gain. Patient reports he takes Bumex BID at home and monitors his weight daily. Reportedly, he has been taking extra doses of diuretics, however, according to his wife, he's been non compliant with meds. He is grossly overloaded on exam with BUN and Creat worsening that previous admission.     Agree with Bumex gtt at 1 mg/hr   Add 3% hypertonic saline 150 ml BID   Strict I/Os   Daily weight   Continue hydralazine/ISDN   Continue spironolactone 25 mg daily   CT surgery to evaluate for possible MVR   If poor response to aggressive diuresis or significant worsening of renal function, he would benefit from RRT prior to surgical intervention   Appreciate nephrology input   Discussed with Dr. Fenton

## 2024-02-21 NOTE — CONSULT NOTE ADULT - PROBLEM SELECTOR RECOMMENDATION 2
-WARREN 8/23 with severe MR  -He was evaluated for M-KRISTIE but was not a candidate d/t his anatomy.  He was evaluated for SUMMIT (Tendyne valve) trial for a transcatheter MVR but was denied by [facility] surgery on board for planned surgical intervention

## 2024-02-21 NOTE — PROGRESS NOTE ADULT - SUBJECTIVE AND OBJECTIVE BOX
VITAL SIGNS    Telemetry:  [facility] 60-70  Overnight Events: no acute events    Vital Signs Last 24 Hrs  T(C): 36.4 (24 @ 11:51), Max: 37.1 (24 @ 04:38)  T(F): 97.5 (24 @ 11:51), Max: 98.7 (24 @ 04:38)  HR: 65 (24 @ 11:51) (58 - 65)  BP: 103/65 (24 @ 11:10) (92/61 - 103/65)  RR: 18 (24 @ 11:51) (18 - 18)  SpO2: 97% (24 @ 11:51) (96% - 98%)             @ 07:01  -   @ 07:00  --------------------------------------------------------  IN: 250 mL / OUT: 1150 mL / NET: -900 mL     @ 07:01  -   @ 13:18  --------------------------------------------------------  IN: 10 mL / OUT: 870 mL / NET: -860 mL       Daily Height in cm: 175.26 (2024 15:30)    Daily Weight in k.4 (2024 09:30)  Admit Wt: Drug Dosing Weight  Height (cm): 175.3 (2024 15:30)  Weight (kg): 71.5 (2024 15:30)  BMI (kg/m2): 23.3 (2024 15:30)  BSA (m2): 1.87 (2024 15:30)    Bilirubin Total: 1.4 mg/dL ( @ 05:53)  Bilirubin Total: 1.3 mg/dL (02-20 @ 15:54)    CAPILLARY BLOOD GLUCOSE              acetaminophen     Tablet .. 650 milliGRAM(s) Oral every 6 hours PRN  atorvastatin 40 milliGRAM(s) Oral at bedtime  buMETAnide Infusion 1 mG/Hr IV Continuous <Continuous>  hydrALAZINE 10 milliGRAM(s) Oral three times a day  isosorbide   dinitrate Tablet (ISORDIL) 10 milliGRAM(s) Oral three times a day  levothyroxine 75 MICROGram(s) Oral daily  melatonin 3 milliGRAM(s) Oral at bedtime PRN  pantoprazole    Tablet 40 milliGRAM(s) Oral before breakfast  polyethylene glycol 3350 17 Gram(s) Oral daily  senna 2 Tablet(s) Oral at bedtime  sodium chloride 0.9% lock flush 3 milliLiter(s) IV Push every 8 hours  spironolactone 25 milliGRAM(s) Oral daily                            11.0   5.83  )-----------( 129      ( 2024 05:52 )             33.2     02-    132<L>  |  96  |  114<H>  ----------------------------<  89  4.3   |  19<L>  |  3.67<H>    Ca    9.4      2024 05:53    TPro  6.7  /  Alb  4.0  /  TBili  1.4<H>  /  DBili  x   /  AST  28  /  ALT  29  /  AlkPhos  186<H>  -21    PT/INR - ( 2024 15:54 )   PT: 30.3 sec;   INR: 2.98 ratio         PTT - ( 2024 15:54 )  PTT:41.3 sec    PHYSICAL EXAM    Subjective: "Hi."   General: well appearing male, in no acute distress, ambulating.   Neurology: alert and oriented x 3, nonfocal, no gross deficits  CV : S1/S2, no rubs/gallops, +systolic murmur  Lungs: clear. RR easy, unlabored   Abdomen: soft, nontender, nondistended, positive bowel sounds, +bowel movement, Neg N/V/D   :  pt voiding without difficulty   Extremities: BRAVO; +2 edema, neg calf tenderness. PPP bilaterally, groins stable          Coumadin    [ ] YES          [x]      NO         Eliquis    [ ] YES          [x]      NO       Heparin gtt   [  ] YES              [x]   NO  Dose:    Disposition:  Home [   ]     Rehab [   ]       OR Date:  TBD  Plan of care discussed with Cardiothoracic Team at AM rounds.

## 2024-02-22 ENCOUNTER — RESULT REVIEW (OUTPATIENT)
Age: 84
End: 2024-02-22

## 2024-02-22 LAB
ALBUMIN SERPL ELPH-MCNC: 4.1 G/DL — SIGNIFICANT CHANGE UP (ref 3.3–5)
ALP SERPL-CCNC: 206 U/L — HIGH (ref 40–120)
ALT FLD-CCNC: 27 U/L — SIGNIFICANT CHANGE UP (ref 10–45)
ANION GAP SERPL CALC-SCNC: 19 MMOL/L — HIGH (ref 5–17)
AST SERPL-CCNC: 30 U/L — SIGNIFICANT CHANGE UP (ref 10–40)
BILIRUB SERPL-MCNC: 1.3 MG/DL — HIGH (ref 0.2–1.2)
BUN SERPL-MCNC: 112 MG/DL — HIGH (ref 7–23)
CALCIUM SERPL-MCNC: 8.9 MG/DL — SIGNIFICANT CHANGE UP (ref 8.4–10.5)
CHLORIDE SERPL-SCNC: 96 MMOL/L — SIGNIFICANT CHANGE UP (ref 96–108)
CO2 SERPL-SCNC: 20 MMOL/L — LOW (ref 22–31)
CREAT SERPL-MCNC: 3.67 MG/DL — HIGH (ref 0.5–1.3)
EGFR: 16 ML/MIN/1.73M2 — LOW
GLUCOSE SERPL-MCNC: 113 MG/DL — HIGH (ref 70–99)
HCT VFR BLD CALC: 34.2 % — LOW (ref 39–50)
HGB BLD-MCNC: 11.3 G/DL — LOW (ref 13–17)
MCHC RBC-ENTMCNC: 31.5 PG — SIGNIFICANT CHANGE UP (ref 27–34)
MCHC RBC-ENTMCNC: 33 GM/DL — SIGNIFICANT CHANGE UP (ref 32–36)
MCV RBC AUTO: 95.3 FL — SIGNIFICANT CHANGE UP (ref 80–100)
NRBC # BLD: 0 /100 WBCS — SIGNIFICANT CHANGE UP (ref 0–0)
PLATELET # BLD AUTO: 125 K/UL — LOW (ref 150–400)
POTASSIUM SERPL-MCNC: 3.7 MMOL/L — SIGNIFICANT CHANGE UP (ref 3.5–5.3)
POTASSIUM SERPL-SCNC: 3.7 MMOL/L — SIGNIFICANT CHANGE UP (ref 3.5–5.3)
PROT SERPL-MCNC: 6.8 G/DL — SIGNIFICANT CHANGE UP (ref 6–8.3)
RBC # BLD: 3.59 M/UL — LOW (ref 4.2–5.8)
RBC # FLD: 15.9 % — HIGH (ref 10.3–14.5)
SODIUM SERPL-SCNC: 135 MMOL/L — SIGNIFICANT CHANGE UP (ref 135–145)
WBC # BLD: 6.57 K/UL — SIGNIFICANT CHANGE UP (ref 3.8–10.5)
WBC # FLD AUTO: 6.57 K/UL — SIGNIFICANT CHANGE UP (ref 3.8–10.5)

## 2024-02-22 PROCEDURE — 76376 3D RENDER W/INTRP POSTPROCES: CPT | Mod: 26

## 2024-02-22 PROCEDURE — 99233 SBSQ HOSP IP/OBS HIGH 50: CPT

## 2024-02-22 PROCEDURE — 93306 TTE W/DOPPLER COMPLETE: CPT | Mod: 26

## 2024-02-22 PROCEDURE — 99232 SBSQ HOSP IP/OBS MODERATE 35: CPT

## 2024-02-22 RX ADMIN — Medication 10 MILLIGRAM(S): at 16:11

## 2024-02-22 RX ADMIN — SODIUM CHLORIDE 3 MILLILITER(S): 9 INJECTION INTRAMUSCULAR; INTRAVENOUS; SUBCUTANEOUS at 05:50

## 2024-02-22 RX ADMIN — ATORVASTATIN CALCIUM 40 MILLIGRAM(S): 80 TABLET, FILM COATED ORAL at 21:20

## 2024-02-22 RX ADMIN — PANTOPRAZOLE SODIUM 40 MILLIGRAM(S): 20 TABLET, DELAYED RELEASE ORAL at 05:19

## 2024-02-22 RX ADMIN — POLYETHYLENE GLYCOL 3350 17 GRAM(S): 17 POWDER, FOR SOLUTION ORAL at 11:33

## 2024-02-22 RX ADMIN — Medication 10 MILLIGRAM(S): at 21:20

## 2024-02-22 RX ADMIN — HEPARIN SODIUM 5000 UNIT(S): 5000 INJECTION INTRAVENOUS; SUBCUTANEOUS at 17:25

## 2024-02-22 RX ADMIN — SPIRONOLACTONE 25 MILLIGRAM(S): 25 TABLET, FILM COATED ORAL at 05:20

## 2024-02-22 RX ADMIN — ISOSORBIDE DINITRATE 10 MILLIGRAM(S): 5 TABLET ORAL at 11:34

## 2024-02-22 RX ADMIN — Medication 75 MICROGRAM(S): at 05:20

## 2024-02-22 RX ADMIN — SODIUM CHLORIDE 300 MILLILITER(S): 5 INJECTION, SOLUTION INTRAVENOUS at 05:20

## 2024-02-22 RX ADMIN — HEPARIN SODIUM 5000 UNIT(S): 5000 INJECTION INTRAVENOUS; SUBCUTANEOUS at 05:20

## 2024-02-22 RX ADMIN — ISOSORBIDE DINITRATE 10 MILLIGRAM(S): 5 TABLET ORAL at 17:25

## 2024-02-22 RX ADMIN — SODIUM CHLORIDE 3 MILLILITER(S): 9 INJECTION INTRAMUSCULAR; INTRAVENOUS; SUBCUTANEOUS at 21:27

## 2024-02-22 RX ADMIN — SENNA PLUS 2 TABLET(S): 8.6 TABLET ORAL at 21:21

## 2024-02-22 RX ADMIN — SODIUM CHLORIDE 3 MILLILITER(S): 9 INJECTION INTRAMUSCULAR; INTRAVENOUS; SUBCUTANEOUS at 14:33

## 2024-02-22 RX ADMIN — SODIUM CHLORIDE 300 MILLILITER(S): 5 INJECTION, SOLUTION INTRAVENOUS at 17:25

## 2024-02-22 NOTE — DIETITIAN INITIAL EVALUATION ADULT - REASON INDICATOR FOR ASSESSMENT
Nutrition Consult for MST score 2 or > (Unintentional weight loss & decreased appetite).   Source: Pt, Previous RD notes, Electronic Medical Record.   Chart reviewed, events noted.

## 2024-02-22 NOTE — PROGRESS NOTE ADULT - PROBLEM SELECTOR PLAN 2
-WARREN 8/23 with severe MR  -He was evaluated for M-KRISTIE but was not a candidate d/t his anatomy.  He was evaluated for SUMMIT (Tendyne valve) trial for a transcatheter MVR but was denied by [facility] surgery on board for planned surgical intervention.

## 2024-02-22 NOTE — PROGRESS NOTE ADULT - ASSESSMENT
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   In the ER patient is vitally stable. labs were remarkable for BUN/Creatinine 114/3.67, BNP 11811. CXR congested. HF consulted for further care.      Cardiac studies   BNP 23444  WARREN 8/23 Severe MR and moderate TR  TTE 8/23 EF 36%, grade II DD, mildly enlarged RV with reduced function, severe MR and Moderate to severe TR

## 2024-02-22 NOTE — PROGRESS NOTE ADULT - SUBJECTIVE AND OBJECTIVE BOX
CHIEF COMPLAINT:  Patient is a 83y old  Male who presents with a chief complaint of Fluid Overload (21 Feb 2024 21:16)      INTERVAL HISTORY/OVERNIGHT EVENTS:      ======================  MEDICATIONS:  atorvastatin 40 milliGRAM(s) Oral at bedtime  heparin   Injectable 5000 Unit(s) SubCutaneous every 12 hours  hydrALAZINE 10 milliGRAM(s) Oral three times a day  isosorbide   dinitrate Tablet (ISORDIL) 10 milliGRAM(s) Oral three times a day  levothyroxine 75 MICROGram(s) Oral daily  pantoprazole    Tablet 40 milliGRAM(s) Oral before breakfast  polyethylene glycol 3350 17 Gram(s) Oral daily  senna 2 Tablet(s) Oral at bedtime  sodium chloride 0.9% lock flush 3 milliLiter(s) IV Push every 8 hours  sodium chloride 3% Bolus 150 milliLiter(s) IV Bolus <User Schedule>  spironolactone 25 milliGRAM(s) Oral daily    DRIPS:  buMETAnide Infusion 1 mG/Hr (5 mL/Hr) IV Continuous <Continuous>    PRN:         ======================  OBJECTIVE:    T(F): 97.2 (02-22 @ 04:12), Max: 97.5 (02-21 @ 11:51)  HR: 82 (02-22 @ 04:12) (65 - 82)  BP: 95/61 (02-22 @ 04:12) (90/52 - 103/65)  RR: 18 (02-22 @ 04:12) (17 - 18)  SpO2: 97% (02-22 @ 04:12) (97% - 98%)      I/O:      02-20 @ 07:01  -  02-21 @ 07:00  --------------------------------------------------------  IN: 250 mL / OUT: 1150 mL / NET: -900 mL    02-21 @ 07:01  -  02-22 @ 07:00  --------------------------------------------------------  IN: 1085 mL / OUT: 3070 mL / NET: -1985 mL    02-22 @ 07:01  -  02-22 @ 08:04  --------------------------------------------------------  IN: 0 mL / OUT: 900 mL / NET: -900 mL        Weight trend:  Weight (kg): 71.5 (02-20)      PHYSICAL EXAMINATION:  NEURO: patient is awake , alert and oriented  GEN: Not in acute distress  NECK: no thyroid enlargement, no JVD  LUNGS: Clear to auscultation bilaterally   CARDIOVASCULAR: S1/S2 present, RRR , no murmurs or rubs, no carotid bruits,  + PP bilaterally  ABD: Soft, non-tender, non-distended, +BS  EXT: No FADIA  SKIN: Intact    ======================    LABS:                          11.3   6.57  )-----------( 125      ( 22 Feb 2024 06:38 )             34.2     02-21    132<L>  |  96  |  114<H>  ----------------------------<  89  4.3   |  19<L>  |  3.67<H>    Ca    9.4      21 Feb 2024 05:53    TPro  6.7  /  Alb  4.0  /  TBili  1.4<H>  /  DBili  x   /  AST  28  /  ALT  29  /  AlkPhos  186<H>  02-21    LIVER FUNCTIONS - ( 21 Feb 2024 05:53 )  Alb: 4.0 g/dL / Pro: 6.7 g/dL / ALK PHOS: 186 U/L / ALT: 29 U/L / AST: 28 U/L / GGT: x           PT/INR - ( 20 Feb 2024 15:54 )   PT: 30.3 sec;   INR: 2.98 ratio         PTT - ( 20 Feb 2024 15:54 )  PTT:41.3 sec          Urinalysis Basic - ( 21 Feb 2024 05:53 )    Color: x / Appearance: x / SG: x / pH: x  Gluc: 89 mg/dL / Ketone: x  / Bili: x / Urobili: x   Blood: x / Protein: x / Nitrite: x   Leuk Esterase: x / RBC: x / WBC x   Sq Epi: x / Non Sq Epi: x / Bacteria: x      RADIOLOGY:  -CXR:  pulmonary congestion     -TTE:    < from: WARREN W or WO Ultrasound Enhancing Agent (08.29.23 @ 10:38) >  CONCLUSIONS:      1. Moderately enlarged right ventricular cavity size and moderately reduced right ventricular systolic function.   2. Severe mitral regurgitation at a blood pressure of 97/61 mmHg.   3. No pericardial effusion seen.   4. Estimated pulmonary artery systolic pressure is 49 mmHg, consistent with moderate pulmonary hypertension.   5. Moderate tricuspid regurgitation.    < end of copied text >      < from: TTE W or WO Ultrasound Enhancing Agent (08.17.23 @ 16:56) >     CONCLUSIONS:      1. Left ventricular systolic function is severely decreased with an ejection fraction of 36 % by 3D.   2. There is moderate (grade 2) left ventricular diastolic dysfunction.   3. Left ventricular global longitudinal strain is -8.5 % is abnormal (> -16%). Images were acquired on a [employer] ultrasound system and processed using [employer] strain analysis software with a heart rate of 80 bpm and a blood pressure of 91/60 mmHg.   4. Mildly enlarged right ventricular cavity size and reduced systolic right ventricular function. The tricuspid annular plane systolic excursion (TAPSE) is 1.6 cm (normal >=1.7 cm).   5. Device lead is visualized in the right ventricle.   6. A bioprosthetic valve replacement present in the aortic position. Mild intravalvular regurgitation No paravalvular regurgitation.   7. Severe mitral regurgitation at a blood pressure of 91/60 mmHg.   8. Moderate-severe tricuspid regurgitation.   9. No pericardial effusion seen.  10. Pacer lead noted in the right atrium.  11. Compared to the transthoracic echocardiogram performed on 4/10/2023 there have been no significant interval changes.    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    < end of copied text >          12 Lead ECG:   Ventricular Rate 63 BPM    Atrial Rate 63 BPM    QRS Duration 190 ms    Q-T Interval 476 ms    QTC Calculation(Bazett) 487 ms    R Axis -21 degrees    T Axis -49 degrees    Diagnosis Line WIDE QRS RHYTHM WITH FREQUENT ventricular-paced complexes  RIGHT BUNDLE BRANCH BLOCK  POSSIBLE LATERAL INFARCT , AGE UNDETERMINED  INFERIOR INFARCT , AGE UNDETERMINED  ABNORMAL ECG  WHEN COMPARED WITH ECG OF 30-AUG-2023 10:40,  SIGNIFICANT CHANGES HAVE OCCURRED  Confirmed by MD BENITO, JONATHON ([id number]) on 2/21/2024 11:53:35 AM (02-21-24 @ 08:25)           CHIEF COMPLAINT:  Patient is a 83y old  Male who presents with a chief complaint of Fluid Overload (21 Feb 2024 21:16)      INTERVAL HISTORY/OVERNIGHT EVENTS:  patient seen and examined   no acute overnight events   feels better     ======================  MEDICATIONS:  atorvastatin 40 milliGRAM(s) Oral at bedtime  heparin   Injectable 5000 Unit(s) SubCutaneous every 12 hours  hydrALAZINE 10 milliGRAM(s) Oral three times a day  isosorbide   dinitrate Tablet (ISORDIL) 10 milliGRAM(s) Oral three times a day  levothyroxine 75 MICROGram(s) Oral daily  pantoprazole    Tablet 40 milliGRAM(s) Oral before breakfast  polyethylene glycol 3350 17 Gram(s) Oral daily  senna 2 Tablet(s) Oral at bedtime  sodium chloride 0.9% lock flush 3 milliLiter(s) IV Push every 8 hours  sodium chloride 3% Bolus 150 milliLiter(s) IV Bolus <User Schedule>  spironolactone 25 milliGRAM(s) Oral daily    DRIPS:  buMETAnide Infusion 1 mG/Hr (5 mL/Hr) IV Continuous <Continuous>    PRN:         ======================  OBJECTIVE:    T(F): 97.2 (02-22 @ 04:12), Max: 97.5 (02-21 @ 11:51)  HR: 82 (02-22 @ 04:12) (65 - 82)  BP: 95/61 (02-22 @ 04:12) (90/52 - 103/65)  RR: 18 (02-22 @ 04:12) (17 - 18)  SpO2: 97% (02-22 @ 04:12) (97% - 98%)      I/O:      02-20 @ 07:01  -  02-21 @ 07:00  --------------------------------------------------------  IN: 250 mL / OUT: 1150 mL / NET: -900 mL    02-21 @ 07:01  -  02-22 @ 07:00  --------------------------------------------------------  IN: 1085 mL / OUT: 3070 mL / NET: -1985 mL    02-22 @ 07:01  -  02-22 @ 08:04  --------------------------------------------------------  IN: 0 mL / OUT: 900 mL / NET: -900 mL        Weight trend:  Weight (kg): 71.5 (02-20)      PHYSICAL EXAMINATION:  NEURO: patient is awake , alert and oriented  GEN: Not in acute distress  NECK: no thyroid enlargement, no JVD  LUNGS: Clear to auscultation bilaterally   CARDIOVASCULAR: S1/S2 present, RRR , ejection systolic murmur   ABD: Soft, non-tender, non-distended, +BS  EXT: b/l LE edema   SKIN: Intact    ======================    LABS:                          11.3   6.57  )-----------( 125      ( 22 Feb 2024 06:38 )             34.2     02-21    132<L>  |  96  |  114<H>  ----------------------------<  89  4.3   |  19<L>  |  3.67<H>    Ca    9.4      21 Feb 2024 05:53    TPro  6.7  /  Alb  4.0  /  TBili  1.4<H>  /  DBili  x   /  AST  28  /  ALT  29  /  AlkPhos  186<H>  02-21    LIVER FUNCTIONS - ( 21 Feb 2024 05:53 )  Alb: 4.0 g/dL / Pro: 6.7 g/dL / ALK PHOS: 186 U/L / ALT: 29 U/L / AST: 28 U/L / GGT: x           PT/INR - ( 20 Feb 2024 15:54 )   PT: 30.3 sec;   INR: 2.98 ratio         PTT - ( 20 Feb 2024 15:54 )  PTT:41.3 sec          Urinalysis Basic - ( 21 Feb 2024 05:53 )    Color: x / Appearance: x / SG: x / pH: x  Gluc: 89 mg/dL / Ketone: x  / Bili: x / Urobili: x   Blood: x / Protein: x / Nitrite: x   Leuk Esterase: x / RBC: x / WBC x   Sq Epi: x / Non Sq Epi: x / Bacteria: x      RADIOLOGY:  -CXR:  pulmonary congestion     -TTE:    < from: WARREN W or WO Ultrasound Enhancing Agent (08.29.23 @ 10:38) >  CONCLUSIONS:      1. Moderately enlarged right ventricular cavity size and moderately reduced right ventricular systolic function.   2. Severe mitral regurgitation at a blood pressure of 97/61 mmHg.   3. No pericardial effusion seen.   4. Estimated pulmonary artery systolic pressure is 49 mmHg, consistent with moderate pulmonary hypertension.   5. Moderate tricuspid regurgitation.    < end of copied text >      < from: TTE W or WO Ultrasound Enhancing Agent (08.17.23 @ 16:56) >     CONCLUSIONS:      1. Left ventricular systolic function is severely decreased with an ejection fraction of 36 % by 3D.   2. There is moderate (grade 2) left ventricular diastolic dysfunction.   3. Left ventricular global longitudinal strain is -8.5 % is abnormal (> -16%). Images were acquired on a [employer] ultrasound system and processed using [employer] strain analysis software with a heart rate of 80 bpm and a blood pressure of 91/60 mmHg.   4. Mildly enlarged right ventricular cavity size and reduced systolic right ventricular function. The tricuspid annular plane systolic excursion (TAPSE) is 1.6 cm (normal >=1.7 cm).   5. Device lead is visualized in the right ventricle.   6. A bioprosthetic valve replacement present in the aortic position. Mild intravalvular regurgitation No paravalvular regurgitation.   7. Severe mitral regurgitation at a blood pressure of 91/60 mmHg.   8. Moderate-severe tricuspid regurgitation.   9. No pericardial effusion seen.  10. Pacer lead noted in the right atrium.  11. Compared to the transthoracic echocardiogram performed on 4/10/2023 there have been no significant interval changes.    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    < end of copied text >          12 Lead ECG:   Ventricular Rate 63 BPM    Atrial Rate 63 BPM    QRS Duration 190 ms    Q-T Interval 476 ms    QTC Calculation(Bazett) 487 ms    R Axis -21 degrees    T Axis -49 degrees    Diagnosis Line WIDE QRS RHYTHM WITH FREQUENT ventricular-paced complexes  RIGHT BUNDLE BRANCH BLOCK  POSSIBLE LATERAL INFARCT , AGE UNDETERMINED  INFERIOR INFARCT , AGE UNDETERMINED  ABNORMAL ECG  WHEN COMPARED WITH ECG OF 30-AUG-2023 10:40,  SIGNIFICANT CHANGES HAVE OCCURRED  Confirmed by MD BENITO, JONATHON ([id number]) on 2/21/2024 11:53:35 AM (02-21-24 @ 08:25)

## 2024-02-22 NOTE — DIETITIAN INITIAL EVALUATION ADULT - PERSON TAUGHT/METHOD
Encouraged adequate consumption of meals/supplements to optimize protein-energy intake. Encouraged small/frequent meals, nutrient dense snacks, prioritizing protein foods at meal time. Pt made aware RD remains available PRN. RD remains available for HF education PRN; not appropriate at this time./verbal instruction/patient instructed

## 2024-02-22 NOTE — DIETITIAN INITIAL EVALUATION ADULT - ETIOLOGY
decreased ability to consume adequate protein-energy in setting of increased nutrient needs  Increased physiological demand for nutrients

## 2024-02-22 NOTE — DIETITIAN INITIAL EVALUATION ADULT - NS FNS DIET ORDER
Diet, DASH/TLC:   Sodium & Cholesterol Restricted  Supplement Feeding Modality:  Oral  Nepro Cans or Servings Per Day:  3       Frequency:  Three Times a day (02-21-24 @ 08:00) [Active]

## 2024-02-22 NOTE — PROGRESS NOTE ADULT - ASSESSMENT
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 Fenton 56/2/21, GERD, HLD, renal atrophy with single kidney, pleural effusion with Thoracentesis. He was evaluated for M-KRISTIE but was not a candidate d/t his anatomy.  He was evaluated for SUMMIT (Tendyne valve) trial for a transcatheter MVR but was denied by [employer].    Pt presented to CTS office for c/o worsening SOB and recent weight gain of 20 pounds. Patient was admitted for further medical management and medical optimization.      Hospital Course:    Admit to 2 [facility] Telemetry floor.  CHF team consulted and appreciated.  Bumex 4 mg IVPB now. Started on Bumex gtt 1 mg / hr.  Creatinine 4;  SURESH on CKD --> Dr. Lala - renal consulted (known to pt from previous admission). Pre op Cardiac surgery work up initiated.    2/21 Bumex gtt continued, strict I&Os, preop w/u in progress, OR TBD  2/22 VSS  -1985cc/24hrs.  prealb 20.     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 Fenton 56/2/21, GERD, HLD, renal atrophy with single kidney, pleural effusion with Thoracentesis. He was evaluated for M-KRISTIE but was not a candidate d/t his anatomy.  He was evaluated for SUMMIT (Tendyne valve) trial for a transcatheter MVR but was denied by [employer].    Pt presented to CTS office for c/o worsening SOB and recent weight gain of 20 pounds. Patient was admitted for further medical management and medical optimization.      Hospital Course:    Admit to 2 [facility] Telemetry floor.  CHF team consulted and appreciated.  Bumex 4 mg IVPB now. Started on Bumex gtt 1 mg / hr.  Creatinine 4;  SURESH on CKD --> Dr. Lala - renal consulted (known to pt from previous admission). Pre op Cardiac surgery work up initiated.    2/21 Bumex gtt continued, strict I&Os, preop w/u in progress, OR TBD  2/22 VSS  -1985cc/24hrs.  prealb 20.  TTE severe MR EF 35%

## 2024-02-22 NOTE — PROGRESS NOTE ADULT - SUBJECTIVE AND OBJECTIVE BOX
S:  doing well.  no complaints    Telemetry:  afib /  -82    Vital Signs Last 24 Hrs  T(C): 36.6 (24 @ 11:13), Max: 36.6 (24 @ :13)  T(F): 97.9 (24 @ :13), Max: 97.9 (24 @ 11:13)  HR: 71 (24 @ 11:13) (71 - 82)  BP: 98/62 (24 @ 11:13) (90/52 - 98/62)  RR: 18 (24 @ 11:13) (17 - 18)  SpO2: 98% (24 @ 11:13) (97% - 98%)                    @ 07:01  -   @ 07:00  --------------------------------------------------------  IN: 1085 mL / OUT: 3070 mL / NET: -1985 mL     @ 07:01  -   @ 17:03  --------------------------------------------------------  IN: 600 mL / OUT: 2150 mL / NET: -1550 mL           Daily Weight in k.3 (2024 08:14)                            11.3   6.57  )-----------( 125      ( 2024 06:38 )             34.2           135  |  96  |  112<H>  ----------------------------<  113<H>  3.7   |  20<L>  |  3.67<H>    Ca    8.9      2024 06:38    TPro  6.8  /  Alb  4.1  /  TBili  1.3<H>  /  DBili  x   /  AST  30  /  ALT  27  /  AlkPhos  206<H>            PHYSICAL EXAM:    Neurology: alert and oriented x 3, nonfocal, no gross deficits    CV : S1S2 RRR    Lungs: clear to ausc.  no w/r/r    Abdomen: soft, nontender, nondistended, positive bowel sounds,  bowel movement            Extremities:    no edema           acetaminophen     Tablet .. 650 milliGRAM(s) Oral every 6 hours PRN  atorvastatin 40 milliGRAM(s) Oral at bedtime  buMETAnide Infusion 1 mG/Hr IV Continuous <Continuous>  heparin   Injectable 5000 Unit(s) SubCutaneous every 12 hours  hydrALAZINE 10 milliGRAM(s) Oral three times a day  isosorbide   dinitrate Tablet (ISORDIL) 10 milliGRAM(s) Oral three times a day  levothyroxine 75 MICROGram(s) Oral daily  melatonin 3 milliGRAM(s) Oral at bedtime PRN  pantoprazole    Tablet 40 milliGRAM(s) Oral before breakfast  polyethylene glycol 3350 17 Gram(s) Oral daily  senna 2 Tablet(s) Oral at bedtime  sodium chloride 0.9% lock flush 3 milliLiter(s) IV Push every 8 hours  sodium chloride 3% Bolus 150 milliLiter(s) IV Bolus <User Schedule>  spironolactone 25 milliGRAM(s) Oral daily                              Physical Therapy Rec:   Home  [  ]   Home w/ PT  [  ]  Rehab  [  ]    Discussed with Cardiothoracic Team at AM rounds.

## 2024-02-22 NOTE — DIETITIAN INITIAL EVALUATION ADULT - OTHER INFO
- UBW: ~141 pounds per pt; reports weight increased to ~155 pounds with fluid retention prompting his admission.   - Dosing wt: 157.6 pounds (2/20, standing scale)  - Wt hx per chart in pounds: 155.2 (2/21), 148.3 (2/22)  	- Weight fluctuations possible partially in setting of fluid shifts (pt diuresing with Bumex, Aldactone, with moderate edema).   - Wt hx per [facility] HIE in pounds: 148 (1/03), 142 (8/29/23), 161 (7/26/23), 161 (6/26/23), 160 (4/10/23).   	- Possible 13 lb (8%) weight loss x 8 months indicated - not clinically significant; monitor weight trends as able.   - RD to continue to monitor weight trends as able.   - Nutritionally Pertinent Meds in-house: IVF, atorvastatin, synthroid.   - Endo: elevated BG noted; A1c 5.6% (2/20) - indicates WNL.   - Cardio:   	- Acute decompensated HF.  	- Diuresing with IV Bumex and PO Aldactone.   - Neph:  	- SURESH on CKD IV.

## 2024-02-22 NOTE — DIETITIAN INITIAL EVALUATION ADULT - PERTINENT MEDS FT
MEDICATIONS  (STANDING):  atorvastatin 40 milliGRAM(s) Oral at bedtime  buMETAnide Infusion 1 mG/Hr (5 mL/Hr) IV Continuous <Continuous>  heparin   Injectable 5000 Unit(s) SubCutaneous every 12 hours  hydrALAZINE 10 milliGRAM(s) Oral three times a day  isosorbide   dinitrate Tablet (ISORDIL) 10 milliGRAM(s) Oral three times a day  levothyroxine 75 MICROGram(s) Oral daily  pantoprazole    Tablet 40 milliGRAM(s) Oral before breakfast  polyethylene glycol 3350 17 Gram(s) Oral daily  senna 2 Tablet(s) Oral at bedtime  sodium chloride 0.9% lock flush 3 milliLiter(s) IV Push every 8 hours  sodium chloride 3% Bolus 150 milliLiter(s) IV Bolus <User Schedule>  spironolactone 25 milliGRAM(s) Oral daily    MEDICATIONS  (PRN):  acetaminophen     Tablet .. 650 milliGRAM(s) Oral every 6 hours PRN Mild Pain (1 - 3)  melatonin 3 milliGRAM(s) Oral at bedtime PRN Insomnia

## 2024-02-22 NOTE — DIETITIAN INITIAL EVALUATION ADULT - ORAL INTAKE PTA/DIET HISTORY
Pt reports having a smaller appetite and PO intake PTA; reports appetite was declining for ~2 weeks PTA in setting of acuteness of illness. Follows regular diet; no therapeutic restrictions reported. Pt denies any known food allergies or intolerances; denies any micronutrient supplementation at home; denies any difficulty chewing/swallowing at this time.

## 2024-02-22 NOTE — DIETITIAN INITIAL EVALUATION ADULT - ADD RECOMMEND
1) Consider liberalizing to Low Sodium diet as tolerated. Defer texture/consistency to SLP/team.   2) Recommend nephro-sobia daily pending no medical contraindications.  3) Continue to monitor PO intake, weight, labs, skin, GI status, and diet.  4) Honor food preferences as able.   5) RD remains available for diet education PRN.  6) Malnutrition sticker placed in chart.

## 2024-02-22 NOTE — PROGRESS NOTE ADULT - PROBLEM SELECTOR PLAN 1
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

## 2024-02-22 NOTE — PROGRESS NOTE ADULT - PROBLEM SELECTOR PLAN 2
CHF consult called and appreciated  Started on Bumex gtt 1 mg / hr  Continue with Aldactone 25 mg PO daily   Strict SU  Continuo on Isosorbide 10 mg PO TID   Continue on Hydralazine 10 mg PO TID

## 2024-02-22 NOTE — DIETITIAN INITIAL EVALUATION ADULT - PERTINENT LABORATORY DATA
02-22    135  |  96  |  112<H>  ----------------------------<  113<H>  3.7   |  20<L>  |  3.67<H>    Ca    8.9      22 Feb 2024 06:38    TPro  6.8  /  Alb  4.1  /  TBili  1.3<H>  /  DBili  x   /  AST  30  /  ALT  27  /  AlkPhos  206<H>  02-22  A1C with Estimated Average Glucose Result: 5.6 % (02-20-24 @ 15:55)  A1C with Estimated Average Glucose Result: 5.7 % (08-16-23 @ 16:56)

## 2024-02-22 NOTE — DIETITIAN INITIAL EVALUATION ADULT - OTHER CALCULATIONS
Fluid needs deferred to team.  Estimated needs using driest weight in house (148.3 lb on 2/22) with consideration for HF, SURESH, Malnutrition factor.

## 2024-02-22 NOTE — PROGRESS NOTE ADULT - PROBLEM SELECTOR PLAN 3
Likely cardiorenal   cont to monitor serum creatinine   avoid nephrotoxic drugs  Renal dose meds  Renal on board.

## 2024-02-22 NOTE — PROGRESS NOTE ADULT - SUBJECTIVE AND OBJECTIVE BOX
[facility]   [phone]  RENAL FOLLOW UP NOTE  --------------------------------------------------------------------------------  Chief Complaint:    24 hour events/subjective:    seen earlier   states breathing is easier     PAST HISTORY  --------------------------------------------------------------------------------  No significant changes to PMH, PSH, FHx, SHx, unless otherwise noted    ALLERGIES & MEDICATIONS  --------------------------------------------------------------------------------  Allergies    No Known Allergies    Intolerances      Standing Inpatient Medications  atorvastatin 40 milliGRAM(s) Oral at bedtime  buMETAnide Infusion 1 mG/Hr IV Continuous <Continuous>  heparin   Injectable 5000 Unit(s) SubCutaneous every 12 hours  hydrALAZINE 10 milliGRAM(s) Oral three times a day  isosorbide   dinitrate Tablet (ISORDIL) 10 milliGRAM(s) Oral three times a day  levothyroxine 75 MICROGram(s) Oral daily  pantoprazole    Tablet 40 milliGRAM(s) Oral before breakfast  polyethylene glycol 3350 17 Gram(s) Oral daily  senna 2 Tablet(s) Oral at bedtime  sodium chloride 0.9% lock flush 3 milliLiter(s) IV Push every 8 hours  sodium chloride 3% Bolus 150 milliLiter(s) IV Bolus <User Schedule>  spironolactone 25 milliGRAM(s) Oral daily    PRN Inpatient Medications  acetaminophen     Tablet .. 650 milliGRAM(s) Oral every 6 hours PRN  melatonin 3 milliGRAM(s) Oral at bedtime PRN      REVIEW OF SYSTEMS  --------------------------------------------------------------------------------    Gen: denies  fevers/chills,  CVS: denies chest pain/palpitations  Resp: denies SOB/Cough  GI: Denies N/V/Abd pain  : Denies dysuria    VITALS/PHYSICAL EXAM  --------------------------------------------------------------------------------  T(C): 36.6 (02-22-24 @ 20:10), Max: 36.6 (02-22-24 @ 11:13)  HR: 95 (02-22-24 @ 20:10) (71 - 95)  BP: 98/65 (02-22-24 @ 20:10) (93/57 - 98/65)  RR: 18 (02-22-24 @ 20:10) (17 - 18)  SpO2: 98% (02-22-24 @ 20:10) (97% - 98%)  Wt(kg): --        02-21-24 @ 07:01  -  02-22-24 @ 07:00  --------------------------------------------------------  IN: 1085 mL / OUT: 3070 mL / NET: -1985 mL    02-22-24 @ 07:01  -  02-22-24 @ 20:54  --------------------------------------------------------  IN: 840 mL / OUT: 3250 mL / NET: -2410 mL      Physical Exam:  	    	    	Gen: Non toxic comfortable appearing   	+ JVD  	Pulm: decrease bs  no rales or ronchi or wheezing  	CV: RRR, S1S2; no rub III/VI M   	Abd: +BS, soft, nontender/nondistended  	: No suprapubic tenderness  	UE: Warm, no cyanosis  no clubbing,  no edema  	LE: Warm, no cyanosis  no clubbing, 3+  edema  	Neuro: alert and oriented. speech coherent 	          LABS/STUDIES  --------------------------------------------------------------------------------              11.3   6.57  >-----------<  125      [02-22-24 @ 06:38]              34.2     135  |  96  |  112  ----------------------------<  113      [02-22-24 @ 06:38]  3.7   |  20  |  3.67        Ca     8.9     [02-22-24 @ 06:38]    TPro  6.8  /  Alb  4.1  /  TBili  1.3  /  DBili  x   /  AST  30  /  ALT  27  /  AlkPhos  206  [02-22-24 @ 06:38]          Creatinine Trend:  SCr 3.67 [02-22 @ 06:38]  SCr 3.67 [02-21 @ 05:53]  SCr 3.51 [02-20 @ 15:54]        TSH 2.03      [02-20-24 @ 15:55]

## 2024-02-22 NOTE — PROGRESS NOTE ADULT - ASSESSMENT
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 Fenton 56/2/21, GERD, HLD, renal atrophy with single kidney, pleural effusion with Thoracentesis. He was evaluated for M-KRISTIE but was not a candidate d/t his anatomy.  He was evaluated for SUMMIT (Tendyne valve) trial for a transcatheter MVR but was denied by [employer].    Pt presented to CTS office for c/o worsening SOB and recent weight gain of 20 pounds. Patient was admitted for further medical management and medical optimization.    pt with ree with cr 3.5 and bun 111.      1- ERE on CKD IV   2- CHF   3- severe MR   4- hyponatremia due to #2    REE in setting of decompensated chf   bumex 1mg hr drip with goal to increase further if needed   strict I/O   keep O>I   O2 supplementation   echo   daily weights

## 2024-02-22 NOTE — DIETITIAN INITIAL EVALUATION ADULT - PROBLEM SELECTOR PLAN 1
Pre op Cardiac surgery work up initiated    Hold Xarelto AC therapy   Continue with Toprol 25 mg PO daily   Continue on Aldactone 25 mg PO daily   Continue with Atorvastatin 40 mg PO HS   MRAS / MSSA Nasal Swab   Type and Screen x 2   Plan for Cardiac Surgery  with Dr. Fenton OR anntete ARMSTRONG

## 2024-02-22 NOTE — PROGRESS NOTE ADULT - PROBLEM SELECTOR PLAN 3
Renal Consult called and appreciated (Dr. Lala)  Started on Bumex gtt 1 mg / hr  Strict SU  Daily BMP   Daily weight   Avoid Nephrotoxic Agent

## 2024-02-22 NOTE — DIETITIAN INITIAL EVALUATION ADULT - SIGNS/SYMPTOMS
HF, SURESH <50% intake of EER for >7 days, moderate muscle/fat loss observed, moderate edema <75% intake of EER for >7 days, moderate muscle/fat loss observed, moderate edema

## 2024-02-22 NOTE — DIETITIAN INITIAL EVALUATION ADULT - ENERGY INTAKE
In house, pt reports good appetite and PO intake; reports consuming most of each meal daily and enjoys the food in-house. Flowsheets indicate fair (50-75%) PO intake.  Fair (50-75%)

## 2024-02-23 LAB
ADD ON TEST-SPECIMEN IN LAB: SIGNIFICANT CHANGE UP
ANION GAP SERPL CALC-SCNC: 18 MMOL/L — HIGH (ref 5–17)
ANION GAP SERPL CALC-SCNC: 18 MMOL/L — HIGH (ref 5–17)
BUN SERPL-MCNC: 105 MG/DL — HIGH (ref 7–23)
BUN SERPL-MCNC: 116 MG/DL — HIGH (ref 7–23)
CALCIUM SERPL-MCNC: 10 MG/DL — SIGNIFICANT CHANGE UP (ref 8.4–10.5)
CALCIUM SERPL-MCNC: 10.1 MG/DL — SIGNIFICANT CHANGE UP (ref 8.4–10.5)
CHLORIDE SERPL-SCNC: 96 MMOL/L — SIGNIFICANT CHANGE UP (ref 96–108)
CHLORIDE SERPL-SCNC: 97 MMOL/L — SIGNIFICANT CHANGE UP (ref 96–108)
CO2 SERPL-SCNC: 24 MMOL/L — SIGNIFICANT CHANGE UP (ref 22–31)
CO2 SERPL-SCNC: 25 MMOL/L — SIGNIFICANT CHANGE UP (ref 22–31)
CREAT SERPL-MCNC: 3.26 MG/DL — HIGH (ref 0.5–1.3)
CREAT SERPL-MCNC: 3.57 MG/DL — HIGH (ref 0.5–1.3)
EGFR: 16 ML/MIN/1.73M2 — LOW
EGFR: 18 ML/MIN/1.73M2 — LOW
GLUCOSE SERPL-MCNC: 118 MG/DL — HIGH (ref 70–99)
GLUCOSE SERPL-MCNC: 118 MG/DL — HIGH (ref 70–99)
HCT VFR BLD CALC: 37.5 % — LOW (ref 39–50)
HGB BLD-MCNC: 12.1 G/DL — LOW (ref 13–17)
MAGNESIUM SERPL-MCNC: 2.5 MG/DL — SIGNIFICANT CHANGE UP (ref 1.6–2.6)
MCHC RBC-ENTMCNC: 30.9 PG — SIGNIFICANT CHANGE UP (ref 27–34)
MCHC RBC-ENTMCNC: 32.3 GM/DL — SIGNIFICANT CHANGE UP (ref 32–36)
MCV RBC AUTO: 95.9 FL — SIGNIFICANT CHANGE UP (ref 80–100)
NRBC # BLD: 0 /100 WBCS — SIGNIFICANT CHANGE UP (ref 0–0)
PLATELET # BLD AUTO: 154 K/UL — SIGNIFICANT CHANGE UP (ref 150–400)
POTASSIUM SERPL-MCNC: 3.8 MMOL/L — SIGNIFICANT CHANGE UP (ref 3.5–5.3)
POTASSIUM SERPL-MCNC: 4.1 MMOL/L — SIGNIFICANT CHANGE UP (ref 3.5–5.3)
POTASSIUM SERPL-SCNC: 3.8 MMOL/L — SIGNIFICANT CHANGE UP (ref 3.5–5.3)
POTASSIUM SERPL-SCNC: 4.1 MMOL/L — SIGNIFICANT CHANGE UP (ref 3.5–5.3)
RBC # BLD: 3.91 M/UL — LOW (ref 4.2–5.8)
RBC # FLD: 15.9 % — HIGH (ref 10.3–14.5)
SODIUM SERPL-SCNC: 139 MMOL/L — SIGNIFICANT CHANGE UP (ref 135–145)
SODIUM SERPL-SCNC: 139 MMOL/L — SIGNIFICANT CHANGE UP (ref 135–145)
WBC # BLD: 6.77 K/UL — SIGNIFICANT CHANGE UP (ref 3.8–10.5)
WBC # FLD AUTO: 6.77 K/UL — SIGNIFICANT CHANGE UP (ref 3.8–10.5)

## 2024-02-23 PROCEDURE — 99232 SBSQ HOSP IP/OBS MODERATE 35: CPT

## 2024-02-23 PROCEDURE — 99233 SBSQ HOSP IP/OBS HIGH 50: CPT

## 2024-02-23 RX ADMIN — Medication 75 MICROGRAM(S): at 04:41

## 2024-02-23 RX ADMIN — SPIRONOLACTONE 25 MILLIGRAM(S): 25 TABLET, FILM COATED ORAL at 04:40

## 2024-02-23 RX ADMIN — PANTOPRAZOLE SODIUM 40 MILLIGRAM(S): 20 TABLET, DELAYED RELEASE ORAL at 04:40

## 2024-02-23 RX ADMIN — SODIUM CHLORIDE 3 MILLILITER(S): 9 INJECTION INTRAMUSCULAR; INTRAVENOUS; SUBCUTANEOUS at 21:37

## 2024-02-23 RX ADMIN — Medication 10 MILLIGRAM(S): at 13:31

## 2024-02-23 RX ADMIN — HEPARIN SODIUM 5000 UNIT(S): 5000 INJECTION INTRAVENOUS; SUBCUTANEOUS at 04:39

## 2024-02-23 RX ADMIN — Medication 10 MILLIGRAM(S): at 04:40

## 2024-02-23 RX ADMIN — ATORVASTATIN CALCIUM 40 MILLIGRAM(S): 80 TABLET, FILM COATED ORAL at 21:30

## 2024-02-23 RX ADMIN — ISOSORBIDE DINITRATE 10 MILLIGRAM(S): 5 TABLET ORAL at 21:31

## 2024-02-23 RX ADMIN — POLYETHYLENE GLYCOL 3350 17 GRAM(S): 17 POWDER, FOR SOLUTION ORAL at 11:38

## 2024-02-23 RX ADMIN — SODIUM CHLORIDE 3 MILLILITER(S): 9 INJECTION INTRAMUSCULAR; INTRAVENOUS; SUBCUTANEOUS at 13:20

## 2024-02-23 RX ADMIN — SODIUM CHLORIDE 300 MILLILITER(S): 5 INJECTION, SOLUTION INTRAVENOUS at 04:39

## 2024-02-23 RX ADMIN — SODIUM CHLORIDE 3 MILLILITER(S): 9 INJECTION INTRAMUSCULAR; INTRAVENOUS; SUBCUTANEOUS at 04:52

## 2024-02-23 RX ADMIN — Medication 10 MILLIGRAM(S): at 21:30

## 2024-02-23 RX ADMIN — ISOSORBIDE DINITRATE 10 MILLIGRAM(S): 5 TABLET ORAL at 11:38

## 2024-02-23 RX ADMIN — SODIUM CHLORIDE 300 MILLILITER(S): 5 INJECTION, SOLUTION INTRAVENOUS at 17:50

## 2024-02-23 RX ADMIN — BUMETANIDE 5 MG/HR: 0.25 INJECTION INTRAMUSCULAR; INTRAVENOUS at 11:37

## 2024-02-23 RX ADMIN — ISOSORBIDE DINITRATE 10 MILLIGRAM(S): 5 TABLET ORAL at 04:39

## 2024-02-23 NOTE — PROGRESS NOTE ADULT - ATTENDING COMMENTS
Patient is responding well to aggressive diuresis, however, remains grossly overloaded. Renal function is stable. He reports feeling slightly better.     Continue Bumex gtt at 1 mg/hr + hypertonic saline   Strict I/Os   Monitor electrolytes and renal function closely   Continue low dose hydralazine, ISDN and spironolactone   PT follow up   CTS following for possible mitral valve intervention
Patient found in bed in NAD, he reports feeling better, however, he c/o muscle cramps. Renal function is stable. He is net negative 3L in the past 24 hrs but remains overloaded.     Continue Bumex gtt + hypertonic saline BID   Strict I/Os  Daily weight   Monitor electrolytes closely - keep Mg >2.2 and K 4-5   Continue hydralazine/ ISDN   Spironolactone 25 mg daily  Nephrology following   Aggressive PT

## 2024-02-23 NOTE — PROGRESS NOTE ADULT - ASSESSMENT
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 Fenton 56/2/21, GERD, HLD, renal atrophy with single kidney, pleural effusion with Thoracentesis. He was evaluated for M-KRISTIE but was not a candidate d/t his anatomy.  He was evaluated for SUMMIT (Tendyne valve) trial for a transcatheter MVR but was denied by [employer].    Pt presented to CTS office for c/o worsening SOB and recent weight gain of 20 pounds. Patient was admitted for further medical management and medical optimization.    pt with ree with cr 3.5 and bun 111.      1- REE on CKD IV   2- CHF   3- severe MR   4- hyponatremia due to #2    REE in setting of decompensated chf   bumex 1mg hr drip   strict I/O   keep O>I   O2 supplementation   echo   daily weights

## 2024-02-23 NOTE — PROGRESS NOTE ADULT - SUBJECTIVE AND OBJECTIVE BOX
VITAL SIGNS    Subjective: "I'm feeling better today" Denies CP, palpitation, SOB, LUA, HA, dizziness, N/V/D, fever or chills.  No acute event noted overnight.     Telemetry: Afib / Aflutter 70-90      Vital Signs Last 24 Hrs  T(C): 36.3 (24 @ 11:25), Max: 36.6 (24 @ 20:10)  T(F): 97.4 (24 @ 11:25), Max: 97.9 (24 @ 20:10)  HR: 98 (24 @ 11:25) (95 - 98)  BP: 100/67 (24 @ 11:25) (98/65 - 106/71)  RR: 18 (24 @ 11:25) (18 - 18)  SpO2: 95% (24 @ 11:25) (93% - 98%)            @ 07:01  -   @ 07:00  --------------------------------------------------------  IN: 1525 mL / OUT: 5050 mL / NET: -3525 mL     @ 07:01  -   @ 12:43  --------------------------------------------------------  IN: 200 mL / OUT: 1000 mL / NET: -800 mL    LABS      139  |  97  |  116<H>  ----------------------------<  118<H>  4.1   |  24  |  3.57<H>    Ca    10.0      2024 05:57    TPro  6.8  /  Alb  4.1  /  TBili  1.3<H>  /  DBili  x   /  AST  30  /  ALT  27  /  AlkPhos  206<H>  02-22                                 12.1   6.77  )-----------( 154      ( 2024 05:58 )             37.5         Daily     Daily Weight in k.9 (2024 08:39)    PHYSICAL EXAM    Neurology: alert and oriented x 3, nonfocal, no gross deficits    HEENT" (+) JVD    CV: (+) systolic murmur Irregular     Lungs: CTA B/L     Abdomen: soft, nontender, nondistended, positive bowel sounds, (+) Flatus; (+) BM     :  Voiding               Extremities:  B/L LE (+) 1 edema; negative calf tenderness; (+) 2 DP palpable        acetaminophen  Tablet .. 650 milliGRAM(s) Oral every 6 hours PRN  atorvastatin 40 milliGRAM(s) Oral at bedtime  buMETAnide Infusion 1 mG/Hr IV Continuous <Continuous>  heparin Injectable 5000 Unit(s) SubCutaneous every 12 hours  hydrALAZINE 10 milliGRAM(s) Oral three times a day  isosorbide dinitrate Tablet (ISORDIL) 10 milliGRAM(s) Oral three times a day  levothyroxine 75 MICROGram(s) Oral daily  melatonin 3 milliGRAM(s) Oral at bedtime PRN  pantoprazole  Tablet 40 milliGRAM(s) Oral before breakfast  polyethylene glycol 3350 17 Gram(s) Oral daily  senna 2 Tablet(s) Oral at bedtime  sodium chloride 0.9% lock flush 3 milliLiter(s) IV Push every 8 hours  sodium chloride 3% Bolus 150 milliLiter(s) IV Bolus <User Schedule>  spironolactone 25 milliGRAM(s) Oral daily    Physical Therapy Rec:   Home  [ X ]   Home w/ PT  [  ]  Rehab  [  ]    Discussed with Cardiothoracic Team at AM rounds.

## 2024-02-23 NOTE — PROGRESS NOTE ADULT - PROBLEM SELECTOR PLAN 2
CHF consult called and appreciated  Started on Bumex gtt 1 mg / hr  Continue with Aldactone 25 mg PO daily   Strict SU  Continuo on Isosorbide 10 mg PO TID   Continue on Hydralazine 10 mg PO TID  Repeat BMP and serum Mg level this afternoon

## 2024-02-23 NOTE — PROGRESS NOTE ADULT - PROBLEM SELECTOR PLAN 1
Pre op Cardiac surgery work up initiated    Hold Xarelto AC therapy   Continue with Toprol 25 mg PO daily   Continue on Aldactone 25 mg PO daily   Continue on Bumex gtt 1 mg / hr  Continue with Atorvastatin 40 mg PO HS   Continue with Nepro Supplements to optimize nutrition status    Strict SU's   Daily weights   Plan for Cardiac Surgery  with Dr. Fenton OR annette TBD

## 2024-02-23 NOTE — PROGRESS NOTE ADULT - SUBJECTIVE AND OBJECTIVE BOX
CHIEF COMPLAINT:  Patient is a 83y old  Male who presents with a chief complaint of Fluid Overload (22 Feb 2024 20:54)      INTERVAL HISTORY/OVERNIGHT EVENTS:      ======================  MEDICATIONS:  atorvastatin 40 milliGRAM(s) Oral at bedtime  heparin   Injectable 5000 Unit(s) SubCutaneous every 12 hours  hydrALAZINE 10 milliGRAM(s) Oral three times a day  isosorbide   dinitrate Tablet (ISORDIL) 10 milliGRAM(s) Oral three times a day  levothyroxine 75 MICROGram(s) Oral daily  pantoprazole    Tablet 40 milliGRAM(s) Oral before breakfast  polyethylene glycol 3350 17 Gram(s) Oral daily  senna 2 Tablet(s) Oral at bedtime  sodium chloride 0.9% lock flush 3 milliLiter(s) IV Push every 8 hours  sodium chloride 3% Bolus 150 milliLiter(s) IV Bolus <User Schedule>  spironolactone 25 milliGRAM(s) Oral daily    DRIPS:  buMETAnide Infusion 1 mG/Hr (5 mL/Hr) IV Continuous <Continuous>    PRN:         ======================  OBJECTIVE:  T(F): 97.5 (02-23 @ 04:18), Max: 97.9 (02-22 @ 11:13)  HR: 98 (02-23 @ 04:18) (71 - 98)  BP: 106/71 (02-23 @ 04:18) (98/62 - 106/71)  RR: 18 (02-23 @ 04:18) (18 - 18)  SpO2: 93% (02-23 @ 04:18) (93% - 98%)      I/O:      02-20 @ 07:01  -  02-21 @ 07:00  --------------------------------------------------------  IN: 250 mL / OUT: 1150 mL / NET: -900 mL    02-21 @ 07:01  -  02-22 @ 07:00  --------------------------------------------------------  IN: 1085 mL / OUT: 3070 mL / NET: -1985 mL    02-22 @ 07:01  -  02-23 @ 07:00  --------------------------------------------------------  IN: 1525 mL / OUT: 5050 mL / NET: -3525 mL        Weight trend:  Weight (kg): 71.5 (02-20)      PHYSICAL EXAMINATION:  NEURO: patient is awake , alert and oriented  GEN: Not in acute distress  NECK: no thyroid enlargement, no JVD  LUNGS: Clear to auscultation bilaterally   CARDIOVASCULAR: S1/S2 present, RRR , no murmurs or rubs, no carotid bruits,  + PP bilaterally  ABD: Soft, non-tender, non-distended, +BS  EXT: No FADIA  SKIN: Intact    ======================    LABS:                          12.1   6.77  )-----------( 154      ( 23 Feb 2024 05:58 )             37.5     02-23    139  |  97  |  116<H>  ----------------------------<  118<H>  4.1   |  24  |  3.57<H>    Ca    10.0      23 Feb 2024 05:57    TPro  6.8  /  Alb  4.1  /  TBili  1.3<H>  /  DBili  x   /  AST  30  /  ALT  27  /  AlkPhos  206<H>  02-22    LIVER FUNCTIONS - ( 22 Feb 2024 06:38 )  Alb: 4.1 g/dL / Pro: 6.8 g/dL / ALK PHOS: 206 U/L / ALT: 27 U/L / AST: 30 U/L / GGT: x                     Urinalysis Basic - ( 23 Feb 2024 05:57 )    Color: x / Appearance: x / SG: x / pH: x  Gluc: 118 mg/dL / Ketone: x  / Bili: x / Urobili: x   Blood: x / Protein: x / Nitrite: x   Leuk Esterase: x / RBC: x / WBC x   Sq Epi: x / Non Sq Epi: x / Bacteria: x          RADIOLOGY:  -CXR:  pulmonary congestion     -TTE:    < from: TTE W or WO Ultrasound Enhancing Agent (02.22.24 @ 07:53) >  CONCLUSIONS:      1. Left ventricular cavity is mildly dilated. Septal motion is abnormal consistent with previous cardiac surgery and The interventricular septum is flattened in systole and diastole consistent with right ventricular pressure and volume overload. Left ventricular systolic function is moderately to severely decreased with an ejection fraction of 35 % by 3D. Global left ventricular hypokinesis.   2. Multiple segmental abnormalities exist. See findings.   3. Moderately enlarged right ventricular cavity size and reduced systolic function.   4. Severe mitral regurgitation at a blood pressure of 95/61 mmHg. Mechanism of mitral regurgitation: Jordan Type IIIa (restricted leaflet motion secondary to thickening/fusion).Systolic flow reversal in the pulmonary veins, which is consistent with severe mitral regurgitation. MR ERO 0.54cm2 and RVol 80cc by PISA.   5. Significant mitral regurgitation contributes to the elevated transmitral gradient.   6. A bioprosthetic valve replacement is present in the aortic position. is well seated.. No prosthetic aortic stenosis. Mild-to-moderate intravalvular regurgitation.   7. Moderate tricuspid regurgitation.   8. There is severe posterior calcification and moderate anterior calcification of the mitral valve annulus.   9. Estimated pulmonary artery systolic pressure is 70 mmHg, consistent with severe pulmonary hypertension.  10. PA diastolic pressure 27mmHg (elevated).  11. No pericardial effusion seen.  12. Compared to the transesophageal echocardiogram performed on 8/29/2023, there have been no significant interval changes.    ________________________________________________________________________________________    < end of copied text >      < from: WARREN W or WO Ultrasound Enhancing Agent (08.29.23 @ 10:38) >  CONCLUSIONS:      1. Moderately enlarged right ventricular cavity size and moderately reduced right ventricular systolic function.   2. Severe mitral regurgitation at a blood pressure of 97/61 mmHg.   3. No pericardial effusion seen.   4. Estimated pulmonary artery systolic pressure is 49 mmHg, consistent with moderate pulmonary hypertension.   5. Moderate tricuspid regurgitation.    < end of copied text >      < from: TTE W or WO Ultrasound Enhancing Agent (08.17.23 @ 16:56) >     CONCLUSIONS:      1. Left ventricular systolic function is severely decreased with an ejection fraction of 36 % by 3D.   2. There is moderate (grade 2) left ventricular diastolic dysfunction.   3. Left ventricular global longitudinal strain is -8.5 % is abnormal (> -16%). Images were acquired on a [employer] ultrasound system and processed using KODA strain analysis software with a heart rate of 80 bpm and a blood pressure of 91/60 mmHg.   4. Mildly enlarged right ventricular cavity size and reduced systolic right ventricular function. The tricuspid annular plane systolic excursion (TAPSE) is 1.6 cm (normal >=1.7 cm).   5. Device lead is visualized in the right ventricle.   6. A bioprosthetic valve replacement present in the aortic position. Mild intravalvular regurgitation No paravalvular regurgitation.   7. Severe mitral regurgitation at a blood pressure of 91/60 mmHg.   8. Moderate-severe tricuspid regurgitation.   9. No pericardial effusion seen.  10. Pacer lead noted in the right atrium.  11. Compared to the transthoracic echocardiogram performed on 4/10/2023 there have been no significant interval changes.    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    < end of copied text >          12 Lead ECG:   Ventricular Rate 63 BPM    Atrial Rate 63 BPM    QRS Duration 190 ms    Q-T Interval 476 ms    QTC Calculation(Bazett) 487 ms    R Axis -21 degrees    T Axis -49 degrees    Diagnosis Line WIDE QRS RHYTHM WITH FREQUENT ventricular-paced complexes  RIGHT BUNDLE BRANCH BLOCK  POSSIBLE LATERAL INFARCT , AGE UNDETERMINED  INFERIOR INFARCT , AGE UNDETERMINED  ABNORMAL ECG  WHEN COMPARED WITH ECG OF 30-AUG-2023 10:40,  SIGNIFICANT CHANGES HAVE OCCURRED  Confirmed by MD OLIVER ANDREW ([id number]) on 2/21/2024 11:53:35 AM (02-21-24 @ 08:25)                 CHIEF COMPLAINT:  Patient is a 83y old  Male who presents with a chief complaint of Fluid Overload (22 Feb 2024 20:54)      INTERVAL HISTORY/OVERNIGHT EVENTS:  patient seen and examined   no acute overnight events   patient is -3525 mL and lost around 8 pounds  c/o of muscle cramps  able to walk around with no SOB or chest pain    ======================  MEDICATIONS:  atorvastatin 40 milliGRAM(s) Oral at bedtime  heparin   Injectable 5000 Unit(s) SubCutaneous every 12 hours  hydrALAZINE 10 milliGRAM(s) Oral three times a day  isosorbide   dinitrate Tablet (ISORDIL) 10 milliGRAM(s) Oral three times a day  levothyroxine 75 MICROGram(s) Oral daily  pantoprazole    Tablet 40 milliGRAM(s) Oral before breakfast  polyethylene glycol 3350 17 Gram(s) Oral daily  senna 2 Tablet(s) Oral at bedtime  sodium chloride 0.9% lock flush 3 milliLiter(s) IV Push every 8 hours  sodium chloride 3% Bolus 150 milliLiter(s) IV Bolus <User Schedule>  spironolactone 25 milliGRAM(s) Oral daily    DRIPS:  buMETAnide Infusion 1 mG/Hr (5 mL/Hr) IV Continuous <Continuous>    PRN:         ======================  OBJECTIVE:  T(F): 97.5 (02-23 @ 04:18), Max: 97.9 (02-22 @ 11:13)  HR: 98 (02-23 @ 04:18) (71 - 98)  BP: 106/71 (02-23 @ 04:18) (98/62 - 106/71)  RR: 18 (02-23 @ 04:18) (18 - 18)  SpO2: 93% (02-23 @ 04:18) (93% - 98%)      I/O:      02-20 @ 07:01  -  02-21 @ 07:00  --------------------------------------------------------  IN: 250 mL / OUT: 1150 mL / NET: -900 mL    02-21 @ 07:01  -  02-22 @ 07:00  --------------------------------------------------------  IN: 1085 mL / OUT: 3070 mL / NET: -1985 mL    02-22 @ 07:01  -  02-23 @ 07:00  --------------------------------------------------------  IN: 1525 mL / OUT: 5050 mL / NET: -3525 mL        Weight trend:  Weight (kg): 71.5 (02-20)      PHYSICAL EXAMINATION:  NEURO: patient is awake , alert and oriented  GEN: Not in acute distress  NECK: no thyroid enlargement, +ve JVD  LUNGS: Clear to auscultation bilaterally   CARDIOVASCULAR: S1/S2 present, RRR , ejection systolic murmur   ABD: Soft, non-tender, non-distended, +BS  EXT: b/l LE edema   SKIN: Intact    ======================    LABS:                          12.1   6.77  )-----------( 154      ( 23 Feb 2024 05:58 )             37.5     02-23    139  |  97  |  116<H>  ----------------------------<  118<H>  4.1   |  24  |  3.57<H>    Ca    10.0      23 Feb 2024 05:57    TPro  6.8  /  Alb  4.1  /  TBili  1.3<H>  /  DBili  x   /  AST  30  /  ALT  27  /  AlkPhos  206<H>  02-22    LIVER FUNCTIONS - ( 22 Feb 2024 06:38 )  Alb: 4.1 g/dL / Pro: 6.8 g/dL / ALK PHOS: 206 U/L / ALT: 27 U/L / AST: 30 U/L / GGT: x                     Urinalysis Basic - ( 23 Feb 2024 05:57 )    Color: x / Appearance: x / SG: x / pH: x  Gluc: 118 mg/dL / Ketone: x  / Bili: x / Urobili: x   Blood: x / Protein: x / Nitrite: x   Leuk Esterase: x / RBC: x / WBC x   Sq Epi: x / Non Sq Epi: x / Bacteria: x          RADIOLOGY:  -CXR:  pulmonary congestion     -TTE:    < from: TTE W or WO Ultrasound Enhancing Agent (02.22.24 @ 07:53) >  CONCLUSIONS:      1. Left ventricular cavity is mildly dilated. Septal motion is abnormal consistent with previous cardiac surgery and The interventricular septum is flattened in systole and diastole consistent with right ventricular pressure and volume overload. Left ventricular systolic function is moderately to severely decreased with an ejection fraction of 35 % by 3D. Global left ventricular hypokinesis.   2. Multiple segmental abnormalities exist. See findings.   3. Moderately enlarged right ventricular cavity size and reduced systolic function.   4. Severe mitral regurgitation at a blood pressure of 95/61 mmHg. Mechanism of mitral regurgitation: Jordan Type IIIa (restricted leaflet motion secondary to thickening/fusion).Systolic flow reversal in the pulmonary veins, which is consistent with severe mitral regurgitation. MR ERO 0.54cm2 and RVol 80cc by PISA.   5. Significant mitral regurgitation contributes to the elevated transmitral gradient.   6. A bioprosthetic valve replacement is present in the aortic position. is well seated.. No prosthetic aortic stenosis. Mild-to-moderate intravalvular regurgitation.   7. Moderate tricuspid regurgitation.   8. There is severe posterior calcification and moderate anterior calcification of the mitral valve annulus.   9. Estimated pulmonary artery systolic pressure is 70 mmHg, consistent with severe pulmonary hypertension.  10. PA diastolic pressure 27mmHg (elevated).  11. No pericardial effusion seen.  12. Compared to the transesophageal echocardiogram performed on 8/29/2023, there have been no significant interval changes.    ________________________________________________________________________________________    < end of copied text >      < from: WARREN W or WO Ultrasound Enhancing Agent (08.29.23 @ 10:38) >  CONCLUSIONS:      1. Moderately enlarged right ventricular cavity size and moderately reduced right ventricular systolic function.   2. Severe mitral regurgitation at a blood pressure of 97/61 mmHg.   3. No pericardial effusion seen.   4. Estimated pulmonary artery systolic pressure is 49 mmHg, consistent with moderate pulmonary hypertension.   5. Moderate tricuspid regurgitation.    < end of copied text >      < from: TTE W or WO Ultrasound Enhancing Agent (08.17.23 @ 16:56) >     CONCLUSIONS:      1. Left ventricular systolic function is severely decreased with an ejection fraction of 36 % by 3D.   2. There is moderate (grade 2) left ventricular diastolic dysfunction.   3. Left ventricular global longitudinal strain is -8.5 % is abnormal (> -16%). Images were acquired on a [employer] ultrasound system and processed using hhgregg strain analysis software with a heart rate of 80 bpm and a blood pressure of 91/60 mmHg.   4. Mildly enlarged right ventricular cavity size and reduced systolic right ventricular function. The tricuspid annular plane systolic excursion (TAPSE) is 1.6 cm (normal >=1.7 cm).   5. Device lead is visualized in the right ventricle.   6. A bioprosthetic valve replacement present in the aortic position. Mild intravalvular regurgitation No paravalvular regurgitation.   7. Severe mitral regurgitation at a blood pressure of 91/60 mmHg.   8. Moderate-severe tricuspid regurgitation.   9. No pericardial effusion seen.  10. Pacer lead noted in the right atrium.  11. Compared to the transthoracic echocardiogram performed on 4/10/2023 there have been no significant interval changes.    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    < end of copied text >          12 Lead ECG:   Ventricular Rate 63 BPM    Atrial Rate 63 BPM    QRS Duration 190 ms    Q-T Interval 476 ms    QTC Calculation(Bazett) 487 ms    R Axis -21 degrees    T Axis -49 degrees    Diagnosis Line WIDE QRS RHYTHM WITH FREQUENT ventricular-paced complexes  RIGHT BUNDLE BRANCH BLOCK  POSSIBLE LATERAL INFARCT , AGE UNDETERMINED  INFERIOR INFARCT , AGE UNDETERMINED  ABNORMAL ECG  WHEN COMPARED WITH ECG OF 30-AUG-2023 10:40,  SIGNIFICANT CHANGES HAVE OCCURRED  Confirmed by MD OLIVER ANDREW ([id number]) on 2/21/2024 11:53:35 AM (02-21-24 @ 08:25)

## 2024-02-23 NOTE — PROGRESS NOTE ADULT - ASSESSMENT
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 Fenton 56/2/21, GERD, HLD, renal atrophy with single kidney, pleural effusion with Thoracentesis. He was evaluated for M-KRISTIE but was not a candidate d/t his anatomy.  He was evaluated for SUMMIT (Tendyne valve) trial for a transcatheter MVR but was denied by [employer].    Pt presented to CTS office for c/o worsening SOB and recent weight gain of 20 pounds. Patient was admitted for further medical management and medical optimization.      Hospital Course:    Admit to 2 [facility] Telemetry floor.  CHF team consulted and appreciated.  Bumex 4 mg IVPB now. Started on Bumex gtt 1 mg / hr.  Creatinine 4;  SURESH on CKD --> Dr. Lala - renal consulted (known to pt from previous admission). Pre op Cardiac surgery work up initiated.    2/21 Bumex gtt continued, strict I&Os, preop w/u in progress, OR TBD  2/22 VSS  -1985cc/24hrs.  prealbumin 20.  TTE severe MR EF 35%.   2/23 VSS; Continue on Bumex gtt at 1 mg / hr. Net negative 3.5 L/24hr.  Per CHF Team will continue on current dose of diuretics.  Repeat BMP w/ Mg this afternoon.  OR date TBD.

## 2024-02-23 NOTE — PROGRESS NOTE ADULT - PROBLEM SELECTOR PLAN 2
-WARREN 8/23 with severe MR  -He was evaluated for M-KRISTIE but was not a candidate d/t his anatomy.  He was evaluated for SUMMIT (Tendyne valve) trial for a transcatheter MVR but was denied by [facility] surgery on board for planned surgical intervention.

## 2024-02-23 NOTE — PROGRESS NOTE ADULT - ASSESSMENT
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   In the ER patient is vitally stable. labs were remarkable for BUN/Creatinine 114/3.67, BNP 75045. CXR congested. HF consulted for further care.      Cardiac studies   BNP 62021  TTE 2/22  EF 35%, mildly enlarged RV with reduced function, severe MR and Moderate to severe TR. PASP 70mmhg  WARREN 8/23 Severe MR and moderate TR  TTE 8/23 EF 36%, grade II DD, mildly enlarged RV with reduced function, severe MR and Moderate to severe TR   83 y/o M with ICM, LVEF 35-40% (LVIDd 6.7 cm), CAD c/b IWMI (1994) s/p angioplasty, aortic stenosis s/p bio-AVR 2004, VT arrest (2017) s/p ICD, Afib s/p multiple DCCV and ablation x2 (2020 and 2023; on AC), prior Ao aneurysm s/p Bentall (2021), severe MR with MAC (precludes KRISTIE), atrophic kidney with CKD (b/l Cr 1.9) presented from CTS office with decompensated heart failure.   In the ER patient is vitally stable. labs were remarkable for BUN/Creatinine 114/3.67, BNP 76487. CXR congested. HF consulted for further care.  patient is being diuresed with bumex gtt. remains fluid overloaded. goal is to achieve euvolemia and further intervention for severe MR      Cardiac studies   BNP 09796  TTE 2/22  EF 35%, mildly enlarged RV with reduced function, severe MR and Moderate to severe TR. PASP 70mmhg  WARREN 8/23 Severe MR and moderate TR  TTE 8/23 EF 36%, grade II DD, mildly enlarged RV with reduced function, severe MR and Moderate to severe TR

## 2024-02-23 NOTE — PROGRESS NOTE ADULT - SUBJECTIVE AND OBJECTIVE BOX
[facility]   [phone]  RENAL FOLLOW UP NOTE  --------------------------------------------------------------------------------  Chief Complaint:    24 hour events/subjective:    seen states breathing is improving no worsening sob states edema is improving     PAST HISTORY  --------------------------------------------------------------------------------  No significant changes to PMH, PSH, FHx, SHx, unless otherwise noted    ALLERGIES & MEDICATIONS  --------------------------------------------------------------------------------  Allergies    No Known Allergies    Intolerances      Standing Inpatient Medications  atorvastatin 40 milliGRAM(s) Oral at bedtime  buMETAnide Infusion 1 mG/Hr IV Continuous <Continuous>  heparin   Injectable 5000 Unit(s) SubCutaneous every 12 hours  hydrALAZINE 10 milliGRAM(s) Oral three times a day  isosorbide   dinitrate Tablet (ISORDIL) 10 milliGRAM(s) Oral three times a day  levothyroxine 75 MICROGram(s) Oral daily  pantoprazole    Tablet 40 milliGRAM(s) Oral before breakfast  polyethylene glycol 3350 17 Gram(s) Oral daily  senna 2 Tablet(s) Oral at bedtime  sodium chloride 0.9% lock flush 3 milliLiter(s) IV Push every 8 hours  sodium chloride 3% Bolus 150 milliLiter(s) IV Bolus <User Schedule>  spironolactone 25 milliGRAM(s) Oral daily    PRN Inpatient Medications  acetaminophen     Tablet .. 650 milliGRAM(s) Oral every 6 hours PRN  melatonin 3 milliGRAM(s) Oral at bedtime PRN      REVIEW OF SYSTEMS  --------------------------------------------------------------------------------    Gen: denies  fevers/chills,  CVS: denies chest pain/palpitations  Resp: denies SOB/Cough  GI: Denies N/V/Abd pain  : Denies dysuria/oliguria/hematuria        VITALS/PHYSICAL EXAM  --------------------------------------------------------------------------------  T(C): 36.3 (02-23-24 @ 11:25), Max: 36.6 (02-22-24 @ 20:10)  HR: 99 (02-23-24 @ 13:26) (95 - 99)  BP: 97/65 (02-23-24 @ 13:26) (97/65 - 106/71)  RR: 18 (02-23-24 @ 11:25) (18 - 18)  SpO2: 95% (02-23-24 @ 11:25) (93% - 98%)  Wt(kg): --        02-22-24 @ 07:01  -  02-23-24 @ 07:00  --------------------------------------------------------  IN: 1525 mL / OUT: 5050 mL / NET: -3525 mL    02-23-24 @ 07:01  -  02-23-24 @ 16:58  --------------------------------------------------------  IN: 440 mL / OUT: 1350 mL / NET: -910 mL      Physical Exam:      	Gen: Non toxic comfortable appearing   	+ decreased  JVD  	Pulm: decrease bs  no rales or ronchi or wheezing  	CV: RRR, S1S2; no rub III/VI M   	Abd: +BS, soft, nontender/nondistended  	: No suprapubic tenderness  	UE: Warm, no cyanosis  no clubbing,  no edema  	LE: Warm, no cyanosis  no clubbing, 2+  edema  	Neuro: alert and oriented. speech coherent 	    	  LABS/STUDIES  --------------------------------------------------------------------------------              12.1   6.77  >-----------<  154      [02-23-24 @ 05:58]              37.5     x   |  x   |  105  ----------------------------<  x       [02-23-24 @ 14:17]  x    |  x   |  x         Ca     10.0     [02-23-24 @ 05:57]    TPro  6.8  /  Alb  4.1  /  TBili  1.3  /  DBili  x   /  AST  30  /  ALT  27  /  AlkPhos  206  [02-22-24 @ 06:38]    Basic Metabolic Panel in AM (02.23.24 @ 05:57)   Sodium: 139 mmol/L  Potassium: 4.1 mmol/L  Chloride: 97 mmol/L  Carbon Dioxide: 24 mmol/L  Anion Gap: 18 mmol/L  Blood Urea Nitrogen: 116 mg/dL  Creatinine: 3.57 mg/dL  Glucose: 118 mg/dL  Calcium: 10.0 mg/dL  eGFR: 16: The estimated glomerular filtration rate (eGFR) is calculated using the       Creatinine Trend:  SCr 3.57 [02-23 @ 05:57]  SCr 3.67 [02-22 @ 06:38]  SCr 3.67 [02-21 @ 05:53]  SCr 3.51 [02-20 @ 15:54]            TSH 2.03      [02-20-24 @ 15:55]

## 2024-02-24 LAB
ADD ON TEST-SPECIMEN IN LAB: SIGNIFICANT CHANGE UP
ANION GAP SERPL CALC-SCNC: 17 MMOL/L — SIGNIFICANT CHANGE UP (ref 5–17)
BUN SERPL-MCNC: 111 MG/DL — HIGH (ref 7–23)
CALCIUM SERPL-MCNC: 10 MG/DL — SIGNIFICANT CHANGE UP (ref 8.4–10.5)
CHLORIDE SERPL-SCNC: 95 MMOL/L — LOW (ref 96–108)
CO2 SERPL-SCNC: 26 MMOL/L — SIGNIFICANT CHANGE UP (ref 22–31)
CREAT SERPL-MCNC: 3.19 MG/DL — HIGH (ref 0.5–1.3)
EGFR: 19 ML/MIN/1.73M2 — LOW
GLUCOSE SERPL-MCNC: 101 MG/DL — HIGH (ref 70–99)
HCT VFR BLD CALC: 35.9 % — LOW (ref 39–50)
HGB BLD-MCNC: 11.7 G/DL — LOW (ref 13–17)
MCHC RBC-ENTMCNC: 31.3 PG — SIGNIFICANT CHANGE UP (ref 27–34)
MCHC RBC-ENTMCNC: 32.6 GM/DL — SIGNIFICANT CHANGE UP (ref 32–36)
MCV RBC AUTO: 96 FL — SIGNIFICANT CHANGE UP (ref 80–100)
NRBC # BLD: 0 /100 WBCS — SIGNIFICANT CHANGE UP (ref 0–0)
PLATELET # BLD AUTO: 163 K/UL — SIGNIFICANT CHANGE UP (ref 150–400)
POTASSIUM SERPL-MCNC: 3.7 MMOL/L — SIGNIFICANT CHANGE UP (ref 3.5–5.3)
POTASSIUM SERPL-SCNC: 3.7 MMOL/L — SIGNIFICANT CHANGE UP (ref 3.5–5.3)
RBC # BLD: 3.74 M/UL — LOW (ref 4.2–5.8)
RBC # FLD: 15.9 % — HIGH (ref 10.3–14.5)
SODIUM SERPL-SCNC: 138 MMOL/L — SIGNIFICANT CHANGE UP (ref 135–145)
WBC # BLD: 7.13 K/UL — SIGNIFICANT CHANGE UP (ref 3.8–10.5)
WBC # FLD AUTO: 7.13 K/UL — SIGNIFICANT CHANGE UP (ref 3.8–10.5)

## 2024-02-24 PROCEDURE — 99232 SBSQ HOSP IP/OBS MODERATE 35: CPT

## 2024-02-24 RX ORDER — METOPROLOL TARTRATE 50 MG
25 TABLET ORAL DAILY
Refills: 0 | Status: DISCONTINUED | OUTPATIENT
Start: 2024-02-24 | End: 2024-02-28

## 2024-02-24 RX ORDER — METOPROLOL TARTRATE 50 MG
75 TABLET ORAL EVERY 12 HOURS
Refills: 0 | Status: DISCONTINUED | OUTPATIENT
Start: 2024-02-24 | End: 2024-02-24

## 2024-02-24 RX ORDER — POTASSIUM CHLORIDE 20 MEQ
20 PACKET (EA) ORAL
Refills: 0 | Status: DISCONTINUED | OUTPATIENT
Start: 2024-02-24 | End: 2024-02-28

## 2024-02-24 RX ADMIN — SODIUM CHLORIDE 3 MILLILITER(S): 9 INJECTION INTRAMUSCULAR; INTRAVENOUS; SUBCUTANEOUS at 22:00

## 2024-02-24 RX ADMIN — HEPARIN SODIUM 5000 UNIT(S): 5000 INJECTION INTRAVENOUS; SUBCUTANEOUS at 17:24

## 2024-02-24 RX ADMIN — PANTOPRAZOLE SODIUM 40 MILLIGRAM(S): 20 TABLET, DELAYED RELEASE ORAL at 04:33

## 2024-02-24 RX ADMIN — SPIRONOLACTONE 25 MILLIGRAM(S): 25 TABLET, FILM COATED ORAL at 04:32

## 2024-02-24 RX ADMIN — ATORVASTATIN CALCIUM 40 MILLIGRAM(S): 80 TABLET, FILM COATED ORAL at 21:19

## 2024-02-24 RX ADMIN — ISOSORBIDE DINITRATE 10 MILLIGRAM(S): 5 TABLET ORAL at 17:24

## 2024-02-24 RX ADMIN — Medication 20 MILLIEQUIVALENT(S): at 10:14

## 2024-02-24 RX ADMIN — Medication 10 MILLIGRAM(S): at 21:19

## 2024-02-24 RX ADMIN — POLYETHYLENE GLYCOL 3350 17 GRAM(S): 17 POWDER, FOR SOLUTION ORAL at 11:21

## 2024-02-24 RX ADMIN — Medication 75 MICROGRAM(S): at 04:32

## 2024-02-24 RX ADMIN — SODIUM CHLORIDE 3 MILLILITER(S): 9 INJECTION INTRAMUSCULAR; INTRAVENOUS; SUBCUTANEOUS at 04:51

## 2024-02-24 RX ADMIN — Medication 10 MILLIGRAM(S): at 06:33

## 2024-02-24 RX ADMIN — SENNA PLUS 2 TABLET(S): 8.6 TABLET ORAL at 21:19

## 2024-02-24 RX ADMIN — SODIUM CHLORIDE 3 MILLILITER(S): 9 INJECTION INTRAMUSCULAR; INTRAVENOUS; SUBCUTANEOUS at 13:20

## 2024-02-24 RX ADMIN — HEPARIN SODIUM 5000 UNIT(S): 5000 INJECTION INTRAVENOUS; SUBCUTANEOUS at 04:35

## 2024-02-24 RX ADMIN — ISOSORBIDE DINITRATE 10 MILLIGRAM(S): 5 TABLET ORAL at 04:32

## 2024-02-24 RX ADMIN — SODIUM CHLORIDE 300 MILLILITER(S): 5 INJECTION, SOLUTION INTRAVENOUS at 17:25

## 2024-02-24 RX ADMIN — Medication 10 MILLIGRAM(S): at 13:42

## 2024-02-24 RX ADMIN — SODIUM CHLORIDE 300 MILLILITER(S): 5 INJECTION, SOLUTION INTRAVENOUS at 04:33

## 2024-02-24 NOTE — PROGRESS NOTE ADULT - SUBJECTIVE AND OBJECTIVE BOX
VITAL SIGNS    Subjective: "I'm feeling ok" Denies CP, palpitation, SOB, LUA, HA, dizziness, N/V/D, fever or chills.  No acute event noted overnight.     Telemetry: A-Flutter      Vital Signs Last 24 Hrs  T(C): 36.7 (24 @ 04:11), Max: 36.7 (24 @ 04:11)  T(F): 98.1 (24 @ 04:11), Max: 98.1 (24 @ 04:11)  HR: 102 (24 @ 06:20) (81 - 102)  BP: 105/73 (24 @ 06:20) (97/65 - 110/74)  RR: 18 (24 @ 04:11) (18 - 18)  SpO2: 97% (24 @ 04:11) (95% - 97%)            @ 07:01  -   @ 07:00  --------------------------------------------------------  IN: 1570 mL / OUT: 4720 mL / NET: -3150 mL    LABS      139  |  96  |  105<H>  ----------------------------<  118<H>  3.8   |  25  |  3.26<H>    Ca    10.1      2024 14:17  Mg     2.5                                        12.1   6.77  )-----------( 154      ( 2024 05:58 )             37.5          Daily     Daily Weight in k.9 (2024 08:39)     PHYSICAL EXAM    Neurology: alert and oriented x 3, nonfocal, no gross deficits    CV: (+) Systolic murmur     Lungs: CTA B/L     Abdomen: soft, nontender, nondistended, positive bowel sounds, (+) Flatus; (+) BM     :  Voiding               Extremities:  B/L LE (+) 1-2 pitting edema; negative calf tenderness; (+) 2 DP palpable        acetaminophen Tablet .. 650 milliGRAM(s) Oral every 6 hours PRN  atorvastatin 40 milliGRAM(s) Oral at bedtime  buMETAnide Infusion 1 mG/Hr IV Continuous <Continuous>  heparin Injectable 5000 Unit(s) SubCutaneous every 12 hours  hydrALAZINE 10 milliGRAM(s) Oral three times a day  isosorbide dinitrate Tablet (ISORDIL) 10 milliGRAM(s) Oral three times a day  levothyroxine 75 MICROGram(s) Oral daily  melatonin 3 milliGRAM(s) Oral at bedtime PRN  pantoprazole Tablet 40 milliGRAM(s) Oral before breakfast  polyethylene glycol 3350 17 Gram(s) Oral daily  senna 2 Tablet(s) Oral at bedtime  sodium chloride 0.9% lock flush 3 milliLiter(s) IV Push every 8 hours  sodium chloride 3% Bolus 150 milliLiter(s) IV Bolus <User Schedule>  spironolactone 25 milliGRAM(s) Oral daily    Physical Therapy Rec:   Home  [  ]   Home w/ PT  [  ]  Rehab  [  ]    Discussed with Cardiothoracic Team at AM rounds.     VITAL SIGNS    Subjective: "I'm feeling ok" Denies CP, palpitation, SOB, LUA, HA, dizziness, N/V/D, fever or chills.  No acute event noted overnight.     Telemetry: A-Flutter      Vital Signs Last 24 Hrs  T(C): 36.7 (24 @ 04:11), Max: 36.7 (24 @ 04:11)  T(F): 98.1 (24 @ 04:11), Max: 98.1 (24 @ 04:11)  HR: 102 (24 @ 06:20) (81 - 102)  BP: 105/73 (24 @ 06:20) (97/65 - 110/74)  RR: 18 (24 @ 04:11) (18 - 18)  SpO2: 97% (24 @ 04:11) (95% - 97%)            @ 07:01  -   @ 07:00  --------------------------------------------------------  IN: 1570 mL / OUT: 4720 mL / NET: -3150 mL    LABS      139  |  96  |  105<H>  ----------------------------<  118<H>  3.8   |  25  |  3.26<H>    Ca    10.1      2024 14:17  Mg     2.5                                        12.1   6.77  )-----------( 154      ( 2024 05:58 )             37.5          Daily     Daily Weight in k.9 (2024 08:39)     PHYSICAL EXAM    Neurology: alert and oriented x 3, nonfocal, no gross deficits    CV: (+) Systolic murmur     Lungs: CTA B/L     Abdomen: soft, nontender, nondistended, positive bowel sounds, (+) Flatus; (+) BM     :  Voiding               Extremities:  B/L LE (+) 1-2 pitting edema; negative calf tenderness; (+) 2 DP palpable        acetaminophen Tablet .. 650 milliGRAM(s) Oral every 6 hours PRN  atorvastatin 40 milliGRAM(s) Oral at bedtime  buMETAnide Infusion 1 mG/Hr IV Continuous <Continuous>  heparin Injectable 5000 Unit(s) SubCutaneous every 12 hours  hydrALAZINE 10 milliGRAM(s) Oral three times a day  isosorbide dinitrate Tablet (ISORDIL) 10 milliGRAM(s) Oral three times a day  levothyroxine 75 MICROGram(s) Oral daily  melatonin 3 milliGRAM(s) Oral at bedtime PRN  pantoprazole Tablet 40 milliGRAM(s) Oral before breakfast  polyethylene glycol 3350 17 Gram(s) Oral daily  senna 2 Tablet(s) Oral at bedtime  sodium chloride 0.9% lock flush 3 milliLiter(s) IV Push every 8 hours  sodium chloride 3% Bolus 150 milliLiter(s) IV Bolus <User Schedule>  spironolactone 25 milliGRAM(s) Oral daily    Physical Therapy Rec:   Home  [X ]   Home w/ PT  [  ]  Rehab  [  ]    Discussed with Cardiothoracic Team at AM rounds.

## 2024-02-24 NOTE — PROGRESS NOTE ADULT - ASSESSMENT
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 Fenton 56/2/21, GERD, HLD, renal atrophy with single kidney, pleural effusion with Thoracentesis. He was evaluated for M-KRISTIE but was not a candidate d/t his anatomy.  He was evaluated for SUMMIT (Tendyne valve) trial for a transcatheter MVR but was denied by [employer].    Pt presented to CTS office for c/o worsening SOB and recent weight gain of 20 pounds. Patient was admitted for further medical management and medical optimization.    pt with ree with cr 3.5 and bun 111.      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

## 2024-02-24 NOTE — PROGRESS NOTE ADULT - PROBLEM SELECTOR PLAN 2
CHF consult called and appreciated  Started on Bumex gtt 1 mg / hr  Continue with Aldactone 25 mg PO daily   Strict SU  Continuo on Isosorbide 10 mg PO TID   Continue on Hydralazine 10 mg PO TID  Daily BMP and serum Mg level CHF consult called and appreciated  Continue on Bumex gtt 1 mg / hr  Continue with Aldactone 25 mg PO daily   Strict SU  Continuo on Isosorbide 10 mg PO TID   Continue on Hydralazine 10 mg PO TID  Daily BMP and serum Mg level

## 2024-02-24 NOTE — PROGRESS NOTE ADULT - PROBLEM SELECTOR PLAN 1
Pre op Cardiac surgery work up initiated    Hold Xarelto AC therapy   Continue with Toprol 25 mg PO daily   Continue on Aldactone 25 mg PO daily   Continue on Bumex gtt 1 mg / hr  Continue with Atorvastatin 40 mg PO HS   Continue with Nepro Supplements to optimize nutrition status Nutrition and PT consult ordered     Strict SU's   Daily weights   Plan for Cardiac Surgery  with Dr. Fenton OR annette ARMSTRONG

## 2024-02-24 NOTE — PROGRESS NOTE ADULT - SUBJECTIVE AND OBJECTIVE BOX
[facility]   [phone]  RENAL FOLLOW UP NOTE  --------------------------------------------------------------------------------  Chief Complaint:    24 hour events/subjective:    seen earlier   states breathing is better     PAST HISTORY  --------------------------------------------------------------------------------  No significant changes to PMH, PSH, FHx, SHx, unless otherwise noted    ALLERGIES & MEDICATIONS  --------------------------------------------------------------------------------  Allergies    No Known Allergies    Intolerances      Standing Inpatient Medications  atorvastatin 40 milliGRAM(s) Oral at bedtime  buMETAnide Infusion 1 mG/Hr IV Continuous <Continuous>  heparin   Injectable 5000 Unit(s) SubCutaneous every 12 hours  hydrALAZINE 10 milliGRAM(s) Oral three times a day  isosorbide   dinitrate Tablet (ISORDIL) 10 milliGRAM(s) Oral three times a day  levothyroxine 75 MICROGram(s) Oral daily  metoprolol succinate ER 25 milliGRAM(s) Oral daily  pantoprazole    Tablet 40 milliGRAM(s) Oral before breakfast  polyethylene glycol 3350 17 Gram(s) Oral daily  potassium chloride    Tablet ER 20 milliEquivalent(s) Oral every 2 hours  senna 2 Tablet(s) Oral at bedtime  sodium chloride 0.9% lock flush 3 milliLiter(s) IV Push every 8 hours  sodium chloride 3% Bolus 150 milliLiter(s) IV Bolus <User Schedule>  spironolactone 25 milliGRAM(s) Oral daily    PRN Inpatient Medications  acetaminophen     Tablet .. 650 milliGRAM(s) Oral every 6 hours PRN  melatonin 3 milliGRAM(s) Oral at bedtime PRN      REVIEW OF SYSTEMS  --------------------------------------------------------------------------------    Gen: denies  fevers/chills,  CVS: denies chest pain/palpitations  Resp: denies SOB/Cough  GI: Denies N/V/Abd pain  : Denies dysuria    VITALS/PHYSICAL EXAM  --------------------------------------------------------------------------------  T(C): 36.3 (02-24-24 @ 11:20), Max: 36.7 (02-24-24 @ 04:11)  HR: 94 (02-24-24 @ 11:20) (81 - 102)  BP: 94/67 (02-24-24 @ 11:20) (94/67 - 110/74)  RR: 18 (02-24-24 @ 11:20) (18 - 18)  SpO2: 97% (02-24-24 @ 11:20) (95% - 97%)  Wt(kg): --        02-23-24 @ 07:01  -  02-24-24 @ 07:00  --------------------------------------------------------  IN: 1570 mL / OUT: 4720 mL / NET: -3150 mL    02-24-24 @ 07:01  -  02-24-24 @ 18:32  --------------------------------------------------------  IN: 440 mL / OUT: 1300 mL / NET: -860 mL      Physical Exam:  	    	Gen: Non toxic comfortable appearing   	+ decreased  JVD  	Pulm: decrease bs  no rales or ronchi or wheezing  	CV: RRR, S1S2; no rub III/VI M   	Abd: +BS, soft, nontender/nondistended  	: No suprapubic tenderness  	UE: Warm, no cyanosis  no clubbing,  no edema  	LE: Warm, no cyanosis  no clubbing, 2 -   edema  	Neuro: alert and oriented. speech coherent 	    LABS/STUDIES  --------------------------------------------------------------------------------              11.7   7.13  >-----------<  163      [02-24-24 @ 07:27]              35.9     138  |  95  |  111  ----------------------------<  101      [02-24-24 @ 07:27]  3.7   |  26  |  3.19        Ca     10.0     [02-24-24 @ 07:27]      Mg     2.5     [02-24-24 @ 07:27]            Creatinine Trend:  SCr 3.19 [02-24 @ 07:27]  SCr 3.26 [02-23 @ 14:17]  SCr 3.57 [02-23 @ 05:57]  SCr 3.67 [02-22 @ 06:38]  SCr 3.67 [02-21 @ 05:53]          TSH 2.03      [02-20-24 @ 15:55]

## 2024-02-24 NOTE — PROGRESS NOTE ADULT - ASSESSMENT
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 Fenton 56/2/21, GERD, HLD, renal atrophy with single kidney, pleural effusion with Thoracentesis. He was evaluated for M-KRISTIE but was not a candidate d/t his anatomy.  He was evaluated for SUMMIT (Tendyne valve) trial for a transcatheter MVR but was denied by [employer].    Pt presented to CTS office for c/o worsening SOB and recent weight gain of 20 pounds. Patient was admitted for further medical management and medical optimization.      Hospital Course:    Admit to 2 [facility] Telemetry floor.  CHF team consulted and appreciated.  Bumex 4 mg IVPB now. Started on Bumex gtt 1 mg / hr.  Creatinine 4;  SURESH on CKD --> Dr. Lala - renal consulted (known to pt from previous admission). Pre op Cardiac surgery work up initiated.    2/21 Bumex gtt continued, strict I&Os, preop w/u in progress, OR TBD  2/22 VSS  -1985cc/24hrs.  Prealbumin 20.  TTE severe MR EF 35%.   2/23 VSS; Continue on Bumex gtt at 1 mg / hr. Net negative 3.5 L/24hr.  Per CHF Team will continue on current dose of diuretics.  Repeat BMP w/ Mg this afternoon.  OR date TBD.    2/24 VSS; Continue with current medication regimen.  Negative 3150 cc/24hrs on fluid balance.  Nutrition and PT consult ordered.  OR date TBD.    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 Fenton 56/2/21, GERD, HLD, renal atrophy with single kidney, pleural effusion with Thoracentesis. He was evaluated for M-KRISTIE but was not a candidate d/t his anatomy.  He was evaluated for SUMMIT (Tendyne valve) trial for a transcatheter MVR but was denied by [employer].    Pt presented to CTS office for c/o worsening SOB and recent weight gain of 20 pounds. Patient was admitted for further medical management and medical optimization.      Hospital Course:    Admit to 2 [facility] Telemetry floor.  CHF team consulted and appreciated.  Bumex 4 mg IVPB now. Started on Bumex gtt 1 mg / hr.  Creatinine 4;  SURESH on CKD --> Dr. Lala - renal consulted (known to pt from previous admission). Pre op Cardiac surgery work up initiated.    2/21 Bumex gtt continued, strict I&Os, preop w/u in progress, OR TBD  2/22 VSS  -1985cc/24hrs.  Prealbumin 20.  TTE severe MR EF 35%.   2/23 VSS; Continue on Bumex gtt at 1 mg / hr. Net negative 3.5 L/24hr.  Per CHF Team will continue on current dose of diuretics.  Repeat BMP w/ Mg this afternoon.  OR date TBD.    2/24 VSS; Continue with current medication regimen.  Negative 3150 cc/24hrs on fluid balance.  Serum Potassium 3.7 --> Supplemented. Nutrition and PT consult ordered.  OR date TBD.

## 2024-02-25 LAB
ANION GAP SERPL CALC-SCNC: 16 MMOL/L — SIGNIFICANT CHANGE UP (ref 5–17)
BUN SERPL-MCNC: 116 MG/DL — HIGH (ref 7–23)
CALCIUM SERPL-MCNC: 10 MG/DL — SIGNIFICANT CHANGE UP (ref 8.4–10.5)
CHLORIDE SERPL-SCNC: 96 MMOL/L — SIGNIFICANT CHANGE UP (ref 96–108)
CO2 SERPL-SCNC: 26 MMOL/L — SIGNIFICANT CHANGE UP (ref 22–31)
CREAT SERPL-MCNC: 3.29 MG/DL — HIGH (ref 0.5–1.3)
EGFR: 18 ML/MIN/1.73M2 — LOW
GLUCOSE SERPL-MCNC: 131 MG/DL — HIGH (ref 70–99)
HCT VFR BLD CALC: 36 % — LOW (ref 39–50)
HGB BLD-MCNC: 12 G/DL — LOW (ref 13–17)
MAGNESIUM SERPL-MCNC: 2.5 MG/DL — SIGNIFICANT CHANGE UP (ref 1.6–2.6)
MCHC RBC-ENTMCNC: 32.1 PG — SIGNIFICANT CHANGE UP (ref 27–34)
MCHC RBC-ENTMCNC: 33.3 GM/DL — SIGNIFICANT CHANGE UP (ref 32–36)
MCV RBC AUTO: 96.3 FL — SIGNIFICANT CHANGE UP (ref 80–100)
NRBC # BLD: 0 /100 WBCS — SIGNIFICANT CHANGE UP (ref 0–0)
PLATELET # BLD AUTO: 163 K/UL — SIGNIFICANT CHANGE UP (ref 150–400)
POTASSIUM SERPL-MCNC: 4.3 MMOL/L — SIGNIFICANT CHANGE UP (ref 3.5–5.3)
POTASSIUM SERPL-SCNC: 4.3 MMOL/L — SIGNIFICANT CHANGE UP (ref 3.5–5.3)
RBC # BLD: 3.74 M/UL — LOW (ref 4.2–5.8)
RBC # FLD: 15.9 % — HIGH (ref 10.3–14.5)
SODIUM SERPL-SCNC: 138 MMOL/L — SIGNIFICANT CHANGE UP (ref 135–145)
WBC # BLD: 7.07 K/UL — SIGNIFICANT CHANGE UP (ref 3.8–10.5)
WBC # FLD AUTO: 7.07 K/UL — SIGNIFICANT CHANGE UP (ref 3.8–10.5)

## 2024-02-25 PROCEDURE — 99232 SBSQ HOSP IP/OBS MODERATE 35: CPT

## 2024-02-25 RX ORDER — RIVAROXABAN 15 MG-20MG
15 KIT ORAL
Refills: 0 | Status: DISCONTINUED | OUTPATIENT
Start: 2024-02-25 | End: 2024-02-28

## 2024-02-25 RX ADMIN — SODIUM CHLORIDE 300 MILLILITER(S): 5 INJECTION, SOLUTION INTRAVENOUS at 17:59

## 2024-02-25 RX ADMIN — HEPARIN SODIUM 5000 UNIT(S): 5000 INJECTION INTRAVENOUS; SUBCUTANEOUS at 05:20

## 2024-02-25 RX ADMIN — ATORVASTATIN CALCIUM 40 MILLIGRAM(S): 80 TABLET, FILM COATED ORAL at 21:41

## 2024-02-25 RX ADMIN — RIVAROXABAN 15 MILLIGRAM(S): KIT at 17:20

## 2024-02-25 RX ADMIN — ISOSORBIDE DINITRATE 10 MILLIGRAM(S): 5 TABLET ORAL at 21:41

## 2024-02-25 RX ADMIN — SODIUM CHLORIDE 3 MILLILITER(S): 9 INJECTION INTRAMUSCULAR; INTRAVENOUS; SUBCUTANEOUS at 21:34

## 2024-02-25 RX ADMIN — SODIUM CHLORIDE 3 MILLILITER(S): 9 INJECTION INTRAMUSCULAR; INTRAVENOUS; SUBCUTANEOUS at 06:11

## 2024-02-25 RX ADMIN — PANTOPRAZOLE SODIUM 40 MILLIGRAM(S): 20 TABLET, DELAYED RELEASE ORAL at 06:11

## 2024-02-25 RX ADMIN — Medication 10 MILLIGRAM(S): at 05:20

## 2024-02-25 RX ADMIN — ISOSORBIDE DINITRATE 10 MILLIGRAM(S): 5 TABLET ORAL at 11:11

## 2024-02-25 RX ADMIN — SODIUM CHLORIDE 3 MILLILITER(S): 9 INJECTION INTRAMUSCULAR; INTRAVENOUS; SUBCUTANEOUS at 13:43

## 2024-02-25 RX ADMIN — SPIRONOLACTONE 25 MILLIGRAM(S): 25 TABLET, FILM COATED ORAL at 05:20

## 2024-02-25 RX ADMIN — BUMETANIDE 5 MG/HR: 0.25 INJECTION INTRAMUSCULAR; INTRAVENOUS at 08:24

## 2024-02-25 RX ADMIN — SODIUM CHLORIDE 300 MILLILITER(S): 5 INJECTION, SOLUTION INTRAVENOUS at 06:11

## 2024-02-25 RX ADMIN — ISOSORBIDE DINITRATE 10 MILLIGRAM(S): 5 TABLET ORAL at 05:20

## 2024-02-25 RX ADMIN — Medication 10 MILLIGRAM(S): at 21:41

## 2024-02-25 RX ADMIN — Medication 75 MICROGRAM(S): at 05:20

## 2024-02-25 RX ADMIN — Medication 25 MILLIGRAM(S): at 05:29

## 2024-02-25 NOTE — PROGRESS NOTE ADULT - SUBJECTIVE AND OBJECTIVE BOX
SUBJECTIVE: "Hi." Denies acute chest pain, palpitations, or shortness of breath. No acute overnight events reported.    TELEMETRY:  Aflutter     Vital Signs Last 24 Hrs  T(C): 36.4 (02-25-24 @ 04:00), Max: 36.4 (02-24-24 @ 19:58)  T(F): 97.5 (02-25-24 @ 04:00), Max: 97.5 (02-24-24 @ 19:58)  HR: 69 (02-25-24 @ 07:40) (69 - 101)  BP: 100/66 (02-25-24 @ 04:00) (94/67 - 100/70)  RR: 18 (02-25-24 @ 04:00) (18 - 18)  SpO2: 96% (02-25-24 @ 04:00) (96% - 97%)           INPUT/OUTPUT:  02-24 @ 07:01  -  02-25 @ 07:00  --------------------------------------------------------  IN: 1185 mL / OUT: 4100 mL / NET: -2915 mL      LABS:  02-25    138  |  96  |  116<H>  ----------------------------<  131<H>  4.3   |  26  |  3.29<H>    Ca    10.0      25 Feb 2024 06:15  Mg     2.5     02-25               12.0   7.07  )-----------( 163      ( 25 Feb 2024 06:15 )             36.0            PHYSICAL EXAM:  Neurology: alert and oriented x 3, nonfocal, no gross deficits  CV: (+) Systolic murmur   Lungs: non-labored breathing with no use of accessory muscles, +Left ICD  Abdomen: soft, nontender, nondistended, positive bowel sounds, (+) Flatus; (+) BM   :  Voiding             Extremities:  B/L LE (+) 1-2 pitting edema; negative calf tenderness; (+) 2 DP palpable       ACTIVE MEDICATIONS:  acetaminophen     Tablet .. 650 milliGRAM(s) Oral every 6 hours PRN  atorvastatin 40 milliGRAM(s) Oral at bedtime  buMETAnide Infusion 1 mG/Hr IV Continuous <Continuous>  heparin   Injectable 5000 Unit(s) SubCutaneous every 12 hours  hydrALAZINE 10 milliGRAM(s) Oral three times a day  isosorbide   dinitrate Tablet (ISORDIL) 10 milliGRAM(s) Oral three times a day  levothyroxine 75 MICROGram(s) Oral daily  melatonin 3 milliGRAM(s) Oral at bedtime PRN  metoprolol succinate ER 25 milliGRAM(s) Oral daily  pantoprazole    Tablet 40 milliGRAM(s) Oral before breakfast  polyethylene glycol 3350 17 Gram(s) Oral daily  potassium chloride    Tablet ER 20 milliEquivalent(s) Oral every 2 hours  senna 2 Tablet(s) Oral at bedtime  sodium chloride 0.9% lock flush 3 milliLiter(s) IV Push every 8 hours  sodium chloride 3% Bolus 150 milliLiter(s) IV Bolus <User Schedule>  spironolactone 25 milliGRAM(s) Oral daily      Case discussed in detail with Cardiothoracic Team and Attending. Plan below as per discussion. SUBJECTIVE: "Hi." Denies acute chest pain, palpitations, or shortness of breath. No acute overnight events reported.    TELEMETRY:  Aflutter     Vital Signs Last 24 Hrs  T(C): 36.4 (02-25-24 @ 04:00), Max: 36.4 (02-24-24 @ 19:58)  T(F): 97.5 (02-25-24 @ 04:00), Max: 97.5 (02-24-24 @ 19:58)  HR: 69 (02-25-24 @ 07:40) (69 - 101)  BP: 100/66 (02-25-24 @ 04:00) (94/67 - 100/70)  RR: 18 (02-25-24 @ 04:00) (18 - 18)  SpO2: 96% (02-25-24 @ 04:00) (96% - 97%)           INPUT/OUTPUT:  02-24 @ 07:01  -  02-25 @ 07:00  --------------------------------------------------------  IN: 1185 mL / OUT: 4100 mL / NET: -2915 mL      LABS:  02-25    138  |  96  |  116<H>  ----------------------------<  131<H>  4.3   |  26  |  3.29<H>    Ca    10.0      25 Feb 2024 06:15  Mg     2.5     02-25               12.0   7.07  )-----------( 163      ( 25 Feb 2024 06:15 )             36.0            PHYSICAL EXAM:  Neurology: alert and oriented x 3, nonfocal, no gross deficits  CV: (+) Systolic murmur   Lungs: non-labored breathing with no use of accessory muscles, +Left ICD  Abdomen: soft, nontender, nondistended, positive bowel sounds, (+) Flatus; (+) BM   :  Voiding             Extremities:  B/L LE (+) 1-2 pitting edema; negative calf tenderness; (+) 2 DP palpable       ACTIVE MEDICATIONS:  acetaminophen     Tablet .. 650 milliGRAM(s) Oral every 6 hours PRN  atorvastatin 40 milliGRAM(s) Oral at bedtime  buMETAnide Infusion 1 mG/Hr IV Continuous <Continuous>  heparin   Injectable 5000 Unit(s) SubCutaneous every 12 hours  hydrALAZINE 10 milliGRAM(s) Oral three times a day  isosorbide   dinitrate Tablet (ISORDIL) 10 milliGRAM(s) Oral three times a day  levothyroxine 75 MICROGram(s) Oral daily  melatonin 3 milliGRAM(s) Oral at bedtime PRN  metoprolol succinate ER 25 milliGRAM(s) Oral daily  pantoprazole    Tablet 40 milliGRAM(s) Oral before breakfast  polyethylene glycol 3350 17 Gram(s) Oral daily  potassium chloride    Tablet ER 20 milliEquivalent(s) Oral every 2 hours  senna 2 Tablet(s) Oral at bedtime  sodium chloride 0.9% lock flush 3 milliLiter(s) IV Push every 8 hours  sodium chloride 3% Bolus 150 milliLiter(s) IV Bolus <User Schedule>  spironolactone 25 milliGRAM(s) Oral daily      Case discussed in detail with Cardiothoracic Team and Attending Dr. Fenton. Plan below as per discussion.

## 2024-02-25 NOTE — PROGRESS NOTE ADULT - PROBLEM SELECTOR PLAN 1
Pre op Cardiac surgery work up initiated    Hold Xarelto AC therapy   Continue with Toprol 25 mg PO daily   Continue on Aldactone 25 mg PO daily   Continue on Bumex gtt 1 mg / hr  Continue with Atorvastatin 40 mg PO HS   Continue with Nepro Supplements to optimize nutrition status Nutrition and PT consult ordered     Strict SU's   Daily weights   Plan for Cardiac Surgery with Dr. Fenton OR annette ARMSTRONG Pre op Cardiac surgery work up initiated    restarted Xarelto AC therapy today  Continue with Toprol 25 mg PO daily   Continue on Aldactone 25 mg PO daily   Continue on Bumex gtt 1 mg / hr  Continue with Atorvastatin 40 mg PO HS   Continue with Nepro Supplements to optimize nutrition status Nutrition and PT consult ordered     Strict SU's   Daily weights   Plan for Cardiac Surgery with Dr. Fenton OR annette ARMSTRONG

## 2024-02-25 NOTE — PROGRESS NOTE ADULT - PROBLEM SELECTOR PLAN 3
Renal Consult called and appreciated (Dr. Lala)  continue on Bumex gtt 1 mg / hr  Strict SU  Daily BMP   Daily weight   Avoid Nephrotoxic Agent

## 2024-02-25 NOTE — PROGRESS NOTE ADULT - SUBJECTIVE AND OBJECTIVE BOX
[facility]   [phone]  RENAL FOLLOW UP NOTE  --------------------------------------------------------------------------------  Chief Complaint:    24 hour events/subjective:    seen earlier   states breathing is improving   does get tired with exertion     PAST HISTORY  --------------------------------------------------------------------------------  No significant changes to PMH, PSH, FHx, SHx, unless otherwise noted    ALLERGIES & MEDICATIONS  --------------------------------------------------------------------------------  Allergies    No Known Allergies    Intolerances      Standing Inpatient Medications  atorvastatin 40 milliGRAM(s) Oral at bedtime  buMETAnide Infusion 1 mG/Hr IV Continuous <Continuous>  hydrALAZINE 10 milliGRAM(s) Oral three times a day  isosorbide   dinitrate Tablet (ISORDIL) 10 milliGRAM(s) Oral three times a day  levothyroxine 75 MICROGram(s) Oral daily  metoprolol succinate ER 25 milliGRAM(s) Oral daily  pantoprazole    Tablet 40 milliGRAM(s) Oral before breakfast  polyethylene glycol 3350 17 Gram(s) Oral daily  potassium chloride    Tablet ER 20 milliEquivalent(s) Oral every 2 hours  rivaroxaban 15 milliGRAM(s) Oral with dinner  senna 2 Tablet(s) Oral at bedtime  sodium chloride 0.9% lock flush 3 milliLiter(s) IV Push every 8 hours  sodium chloride 3% Bolus 150 milliLiter(s) IV Bolus <User Schedule>  spironolactone 25 milliGRAM(s) Oral daily    PRN Inpatient Medications  acetaminophen     Tablet .. 650 milliGRAM(s) Oral every 6 hours PRN  melatonin 3 milliGRAM(s) Oral at bedtime PRN      REVIEW OF SYSTEMS  --------------------------------------------------------------------------------    Gen: denies  fevers/chills,  CVS: denies chest pain/palpitations  Resp: denies SOB/Cough  GI: Denies N/V/Abd pain  : Denies dysuria    VITALS/PHYSICAL EXAM  --------------------------------------------------------------------------------  T(C): 36.3 (02-25-24 @ 19:31), Max: 36.4 (02-25-24 @ 04:00)  HR: 95 (02-25-24 @ 19:31) (69 - 100)  BP: 102/66 (02-25-24 @ 19:31) (91/59 - 102/66)  RR: 18 (02-25-24 @ 19:31) (18 - 18)  SpO2: 94% (02-25-24 @ 19:31) (94% - 99%)  Wt(kg): --        02-24-24 @ 07:01  -  02-25-24 @ 07:00  --------------------------------------------------------  IN: 1185 mL / OUT: 4100 mL / NET: -2915 mL    02-25-24 @ 07:01  -  02-25-24 @ 21:53  --------------------------------------------------------  IN: 800 mL / OUT: 1250 mL / NET: -450 mL      Physical Exam:  	    Gen: Non toxic comfortable appearing   	+ decreased  JVD  	Pulm: decrease bs  no rales or ronchi or wheezing  	CV: RRR, S1S2; no rub III/VI M   	Abd: +BS, soft, nontender/nondistended  	: No suprapubic tenderness  	UE: Warm, no cyanosis  no clubbing,  no edema  	LE: Warm, no cyanosis  no clubbing, 2 -   edema  	Neuro: alert and oriented. speech coherent 	      LABS/STUDIES  --------------------------------------------------------------------------------              12.0   7.07  >-----------<  163      [02-25-24 @ 06:15]              36.0     138  |  96  |  116  ----------------------------<  131      [02-25-24 @ 06:15]  4.3   |  26  |  3.29        Ca     10.0     [02-25-24 @ 06:15]      Mg     2.5     [02-25-24 @ 06:15]            Creatinine Trend:  SCr 3.29 [02-25 @ 06:15]  SCr 3.19 [02-24 @ 07:27]  SCr 3.26 [02-23 @ 14:17]  SCr 3.57 [02-23 @ 05:57]  SCr 3.67 [02-22 @ 06:38]          TSH 2.03      [02-20-24 @ 15:55]

## 2024-02-25 NOTE — PROGRESS NOTE ADULT - ASSESSMENT
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 Fenton 56/2/21, GERD, HLD, renal atrophy with single kidney, pleural effusion with Thoracentesis. He was evaluated for M-KRISTIE but was not a candidate d/t his anatomy.  He was evaluated for SUMMIT (Tendyne valve) trial for a transcatheter MVR but was denied by [employer].    Pt presented to CTS office for c/o worsening SOB and recent weight gain of 20 pounds. Patient was admitted for further medical management and medical optimization.      Hospital Course:    Admit to 2 [facility] Telemetry floor.  CHF team consulted and appreciated.  Bumex 4 mg IVPB now. Started on Bumex gtt 1 mg / hr.  Creatinine 4;  SURESH on CKD --> Dr. Lala - renal consulted (known to pt from previous admission). Pre op Cardiac surgery work up initiated.    2/21 Bumex gtt continued, strict I&Os, preop w/u in progress, OR TBD  2/22 VSS  -1985cc/24hrs.  Prealbumin 20.  TTE severe MR EF 35%.   2/23 VSS; Continue on Bumex gtt at 1 mg / hr. Net negative 3.5 L/24hr.  Per CHF Team will continue on current dose of diuretics.  Repeat BMP w/ Mg this afternoon.  OR date TBD.    2/24 VSS; Continue with current medication regimen.  Negative 3150 cc/24hrs on fluid balance.  Serum Potassium 3.7 --> Supplemented. Nutrition and PT consult ordered.  OR date TBD.   2/25 VSS; current medication regimen maintained. SURESH on CKD, for which renal Dr. Lala following - maintain on Bumex gtt. Monitor BMP. 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 Fenton 56/2/21, GERD, HLD, renal atrophy with single kidney, pleural effusion with Thoracentesis. He was evaluated for M-KRISTIE but was not a candidate d/t his anatomy.  He was evaluated for SUMMIT (Tendyne valve) trial for a transcatheter MVR but was denied by [facility].    Pt presented to CTS office for c/o worsening SOB and recent weight gain of 20 pounds. Patient was admitted for further medical management and medical optimization.      Hospital Course:    Admit to 2 [facility] Telemetry floor.  CHF team consulted and appreciated.  Bumex 4 mg IVPB now. Started on Bumex gtt 1 mg / hr.  Creatinine 4;  SURESH on CKD --> Dr. Lala - renal consulted (known to pt from previous admission). Pre op Cardiac surgery work up initiated.    2/21 Bumex gtt continued, strict I&Os, preop w/u in progress, OR TBD  2/22 VSS  -1985cc/24hrs.  Prealbumin 20.  TTE severe MR EF 35%.   2/23 VSS; Continue on Bumex gtt at 1 mg / hr. Net negative 3.5 L/24hr.  Per CHF Team will continue on current dose of diuretics.  Repeat BMP w/ Mg this afternoon.  OR date TBD.    2/24 VSS; Continue with current medication regimen.  Negative 3150 cc/24hrs on fluid balance.  Serum Potassium 3.7 --> Supplemented. Nutrition and PT consult ordered.  OR date TBD.   2/25 VSS; restarted on Xarelto for AC for persistent aflutter, per Dr. Fenton all other medication regimen maintained. SURESH on CKD, for which renal Dr. Lala following - maintain on Bumex gtt. Monitor BMP.

## 2024-02-25 NOTE — PROGRESS NOTE ADULT - ASSESSMENT
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 Fenton 56/2/21, GERD, HLD, renal atrophy with single kidney, pleural effusion with Thoracentesis. He was evaluated for M-KRISTIE but was not a candidate d/t his anatomy.  He was evaluated for SUMMIT (Tendyne valve) trial for a transcatheter MVR but was denied by [employer] [facility].    Pt presented to CTS office for c/o worsening SOB and recent weight gain of 20 pounds. Patient was admitted for further medical management and medical optimization.    pt with ree with cr 3.5 and bun 111.      1- REE on CKD IV   2- CHF   3- severe MR       REE in setting of decompensated chf   cr is improving bun still high   bumex 1mg hr drip to cont   na is in range   strict I/O   keep O>I   O2 supplementation   daily weights

## 2024-02-25 NOTE — PROGRESS NOTE ADULT - PROBLEM SELECTOR PLAN 2
CHF consult called and appreciated  Continue on Bumex gtt 1 mg / hr  Continue with Aldactone 25 mg PO daily   Strict SU  Continuo on Isosorbide 10 mg PO TID   Continue on Hydralazine 10 mg PO TID  Daily BMP and serum Mg level

## 2024-02-26 DIAGNOSIS — I48.91 UNSPECIFIED ATRIAL FIBRILLATION: ICD-10-CM

## 2024-02-26 LAB
ANION GAP SERPL CALC-SCNC: 18 MMOL/L — HIGH (ref 5–17)
BUN SERPL-MCNC: 106 MG/DL — HIGH (ref 7–23)
CALCIUM SERPL-MCNC: 9.8 MG/DL — SIGNIFICANT CHANGE UP (ref 8.4–10.5)
CHLORIDE SERPL-SCNC: 95 MMOL/L — LOW (ref 96–108)
CO2 SERPL-SCNC: 24 MMOL/L — SIGNIFICANT CHANGE UP (ref 22–31)
CREAT SERPL-MCNC: 3.16 MG/DL — HIGH (ref 0.5–1.3)
EGFR: 19 ML/MIN/1.73M2 — LOW
GLUCOSE SERPL-MCNC: 154 MG/DL — HIGH (ref 70–99)
HCT VFR BLD CALC: 34.8 % — LOW (ref 39–50)
HGB BLD-MCNC: 11.3 G/DL — LOW (ref 13–17)
MAGNESIUM SERPL-MCNC: 2.5 MG/DL — SIGNIFICANT CHANGE UP (ref 1.6–2.6)
MCHC RBC-ENTMCNC: 31.4 PG — SIGNIFICANT CHANGE UP (ref 27–34)
MCHC RBC-ENTMCNC: 32.5 GM/DL — SIGNIFICANT CHANGE UP (ref 32–36)
MCV RBC AUTO: 96.7 FL — SIGNIFICANT CHANGE UP (ref 80–100)
NRBC # BLD: 0 /100 WBCS — SIGNIFICANT CHANGE UP (ref 0–0)
PHOSPHATE SERPL-MCNC: 3.1 MG/DL — SIGNIFICANT CHANGE UP (ref 2.5–4.5)
PLATELET # BLD AUTO: 165 K/UL — SIGNIFICANT CHANGE UP (ref 150–400)
POTASSIUM SERPL-MCNC: 3.6 MMOL/L — SIGNIFICANT CHANGE UP (ref 3.5–5.3)
POTASSIUM SERPL-SCNC: 3.6 MMOL/L — SIGNIFICANT CHANGE UP (ref 3.5–5.3)
RBC # BLD: 3.6 M/UL — LOW (ref 4.2–5.8)
RBC # FLD: 15.5 % — HIGH (ref 10.3–14.5)
SODIUM SERPL-SCNC: 137 MMOL/L — SIGNIFICANT CHANGE UP (ref 135–145)
WBC # BLD: 6.95 K/UL — SIGNIFICANT CHANGE UP (ref 3.8–10.5)
WBC # FLD AUTO: 6.95 K/UL — SIGNIFICANT CHANGE UP (ref 3.8–10.5)

## 2024-02-26 PROCEDURE — 99233 SBSQ HOSP IP/OBS HIGH 50: CPT | Mod: FS

## 2024-02-26 PROCEDURE — 99223 1ST HOSP IP/OBS HIGH 75: CPT

## 2024-02-26 PROCEDURE — 93283 PRGRMG EVAL IMPLANTABLE DFB: CPT | Mod: 26

## 2024-02-26 PROCEDURE — 99232 SBSQ HOSP IP/OBS MODERATE 35: CPT | Mod: FS

## 2024-02-26 RX ORDER — BUMETANIDE 0.25 MG/ML
3 INJECTION INTRAMUSCULAR; INTRAVENOUS
Refills: 0 | Status: DISCONTINUED | OUTPATIENT
Start: 2024-02-26 | End: 2024-02-28

## 2024-02-26 RX ADMIN — SPIRONOLACTONE 25 MILLIGRAM(S): 25 TABLET, FILM COATED ORAL at 05:48

## 2024-02-26 RX ADMIN — SENNA PLUS 2 TABLET(S): 8.6 TABLET ORAL at 21:48

## 2024-02-26 RX ADMIN — Medication 650 MILLIGRAM(S): at 22:20

## 2024-02-26 RX ADMIN — Medication 75 MICROGRAM(S): at 05:48

## 2024-02-26 RX ADMIN — SODIUM CHLORIDE 3 MILLILITER(S): 9 INJECTION INTRAMUSCULAR; INTRAVENOUS; SUBCUTANEOUS at 13:09

## 2024-02-26 RX ADMIN — Medication 10 MILLIGRAM(S): at 17:59

## 2024-02-26 RX ADMIN — ATORVASTATIN CALCIUM 40 MILLIGRAM(S): 80 TABLET, FILM COATED ORAL at 21:47

## 2024-02-26 RX ADMIN — ISOSORBIDE DINITRATE 10 MILLIGRAM(S): 5 TABLET ORAL at 17:59

## 2024-02-26 RX ADMIN — PANTOPRAZOLE SODIUM 40 MILLIGRAM(S): 20 TABLET, DELAYED RELEASE ORAL at 05:49

## 2024-02-26 RX ADMIN — SODIUM CHLORIDE 300 MILLILITER(S): 5 INJECTION, SOLUTION INTRAVENOUS at 18:19

## 2024-02-26 RX ADMIN — RIVAROXABAN 15 MILLIGRAM(S): KIT at 18:00

## 2024-02-26 RX ADMIN — BUMETANIDE 3 MILLIGRAM(S): 0.25 INJECTION INTRAMUSCULAR; INTRAVENOUS at 17:59

## 2024-02-26 RX ADMIN — Medication 10 MILLIGRAM(S): at 21:48

## 2024-02-26 RX ADMIN — Medication 25 MILLIGRAM(S): at 05:48

## 2024-02-26 RX ADMIN — ISOSORBIDE DINITRATE 10 MILLIGRAM(S): 5 TABLET ORAL at 05:48

## 2024-02-26 RX ADMIN — SODIUM CHLORIDE 3 MILLILITER(S): 9 INJECTION INTRAMUSCULAR; INTRAVENOUS; SUBCUTANEOUS at 05:39

## 2024-02-26 RX ADMIN — SODIUM CHLORIDE 300 MILLILITER(S): 5 INJECTION, SOLUTION INTRAVENOUS at 06:01

## 2024-02-26 RX ADMIN — ISOSORBIDE DINITRATE 10 MILLIGRAM(S): 5 TABLET ORAL at 13:15

## 2024-02-26 RX ADMIN — Medication 650 MILLIGRAM(S): at 21:48

## 2024-02-26 RX ADMIN — Medication 10 MILLIGRAM(S): at 05:48

## 2024-02-26 RX ADMIN — SODIUM CHLORIDE 3 MILLILITER(S): 9 INJECTION INTRAMUSCULAR; INTRAVENOUS; SUBCUTANEOUS at 21:44

## 2024-02-26 NOTE — PROGRESS NOTE ADULT - ASSESSMENT
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 Fenton 56/2/21, GERD, HLD, renal atrophy with single kidney, pleural effusion with Thoracentesis. He was evaluated for M-KRISTIE but was not a candidate d/t his anatomy.  He was evaluated for SUMMIT (Tendyne valve) trial for a transcatheter MVR but was denied by [employer].    Pt presented to CTS office for c/o worsening SOB and recent weight gain of 20 pounds. Patient was admitted for further medical management and medical optimization.    pt with ree with cr 3.5 and bun 111.      1- REE on CKD IV   2- CHF   3- severe MR       REE in setting of decompensated chf   cr still remains elevated   change bumex to 3 mg po bid    strict I/O   keep O>I   O2 supplementation   daily weights

## 2024-02-26 NOTE — PROGRESS NOTE ADULT - PROBLEM SELECTOR PLAN 2
CHF consult called and appreciated  Continue on Bumex gtt 1 mg / hr  Continue with Aldactone 25 mg PO daily   Strict SU  Continuo on Isosorbide 10 mg PO TID   Continue on Hydralazine 10 mg PO TID  Daily BMP and serum Mg level CHF consult called and appreciated  d/c bumex gttp and change to bumex 3 mg po bid as per renal  Continue with Aldactone 25 mg PO daily   Continuo on Isosorbide 10 mg PO TID   Continue on Hydralazine 10 mg PO TID  Daily BMP and serum Mg level

## 2024-02-26 NOTE — CHART NOTE - NSCHARTNOTEFT_GEN_A_CORE
Nutrition Follow Up Note  Consult for: Assessment / Education.    Chart reviewed, events noted.     Source: [x] Patient       [x] Medical Record        [] RN        [] Family at bedside       [] Other:    Diet Order:   Diet, DASH/TLC:   Sodium & Cholesterol Restricted  Supplement Feeding Modality:  Oral  Nepro Cans or Servings Per Day:  3       Frequency:  Three Times a day (24)    - Is current order appropriate/adequate? [x] See recommendations below.     - PO intake :   [x] >75%  Adequate    [] 50-75%  Fair       [] <50%  Poor - pt reports good appetite and PO intake, reports consuming ~1 Nepro oral nutrition supplement daily.     - Nutrition-related concerns:      - Cardio: CHF; diuresing with IV Bumex, PO Aldactone.       - Neph: SURESH on CKD IV.       - Endo: elevated BG noted.       - GI: ordered for PPI.       - Nutritionally Pertinent Meds: IVF, KCl, atorvastatin, synthroid.   GI: Pt denies any current N/V/D/C; per chart, Last BM .  Bowel Regimen? [x] Yes: miralax, senna.       Weights:   - Dosing wt: 71.5 kg ()  - Daily Weight in k.9 (), Weight in k (), Weight in k.2 (), Weight in k.9 (), Weight in k.3 (), Weight in k.4 ()       -- Weight fluctuations possible partially in setting of fluid shifts (pt diuresing with IV Bumex & PO Aldactone, with mild-mod edema).   - RD to continue to monitor weight trends as able.       Nutritionally Pertinent MEDICATIONS  (STANDING):  atorvastatin  buMETAnide  hydrALAZINE  isosorbide   dinitrate Tablet (ISORDIL)  levothyroxine  metoprolol succinate ER  pantoprazole    Tablet  polyethylene glycol 3350  potassium chloride    Tablet ER  senna  sodium chloride 0.9% lock flush  sodium chloride 3% Bolus  spironolactone    Pertinent Labs:  @ 04:47: Na 137, <H>, Cr 3.16<H>, <H>, K+ 3.6, Phos 3.1, Mg 2.5, Alk Phos --, ALT/SGPT --, AST/SGOT --, HbA1c --    A1C with Estimated Average Glucose Result: 5.6 % (24 @ 15:55)        Skin per nursing documentation: No pressure injuries noted.  Edema per nursing documentation: +1 bilateral leg, +2 bilateral foot.     Estimated Needs:   [x] no change since previous assessment  Energy: 3859-5949 kcal/day (30-35 kcal/kg)  Protein: 80.64-94.08 g pro/day (1.2-1.4 g pro/kg)  Fluid needs deferred to team.   Wt used for estimating needs: 67.2 kg (dry wt on )    Previous Nutrition Diagnosis: 1) Severe Acute Malnutrition 2) Increased Nutrient Needs   Nutrition Diagnosis is: [x] ongoing  [] resolved [] not applicable     Nutrition Care Plan:  [x] In Progress - being addressed with PO diet, assistance/encouragement with PO intake, oral nutrition supplementation, micronutrient supplementation.   [] Achieved  [] Not applicable    New Nutrition Diagnosis: [x] Not applicable    Nutrition Interventions:     Education Provided   [x] Yes: Brief Heart failure education provided to the patient. Discussed heart failure nutrition therapy, sodium and fluid intake, importance of diet adherence, daily weights monitoring with the patient. Provided handouts on heart failure nutrition therapy, reading heart healthy nutrition labels, heart healthy shopping tips and sodium (salt) content of foods. Pt made aware RD remains available PRN.      -- Education Materials: Heart Failure Nutrition Therapy; Heart Healthy Label Reading Tips; Heart Healthy Shopping Tips; Sodium (salt) Content of Foods     Recommendations:      1) Continue current DASH diet as tolerated. Continue providing Nepro oral nutrition supplements TID (consider lowering to BID). Defer texture/consistency to SLP/team.   2) Continue to monitor PO intake, weight, labs, skin, GI status, and diet.  3) Provide assistance/encouragement with meals PRN to promote adequate PO intake.   4) RD remains available for diet education/review PRN.    Monitoring and Evaluation:   Continue to monitor nutritional intake, tolerance to diet prescription, weights, labs, skin integrity    RD remains available upon request and will follow up per protocol  Peyton Moncada RDN, CDN / TEAMS

## 2024-02-26 NOTE — CONSULT NOTE ADULT - SUBJECTIVE AND OBJECTIVE BOX
CHIEF COMPLAINT:    HISTORY OF PRESENT ILLNESS:      Allergies    No Known Allergies    Intolerances    	    MEDICATIONS:  buMETAnide 3 milliGRAM(s) Oral two times a day  hydrALAZINE 10 milliGRAM(s) Oral three times a day  isosorbide   dinitrate Tablet (ISORDIL) 10 milliGRAM(s) Oral three times a day  metoprolol succinate ER 25 milliGRAM(s) Oral daily  rivaroxaban 15 milliGRAM(s) Oral with dinner  spironolactone 25 milliGRAM(s) Oral daily        acetaminophen     Tablet .. 650 milliGRAM(s) Oral every 6 hours PRN  melatonin 3 milliGRAM(s) Oral at bedtime PRN    pantoprazole    Tablet 40 milliGRAM(s) Oral before breakfast  polyethylene glycol 3350 17 Gram(s) Oral daily  senna 2 Tablet(s) Oral at bedtime    atorvastatin 40 milliGRAM(s) Oral at bedtime  levothyroxine 75 MICROGram(s) Oral daily    potassium chloride    Tablet ER 20 milliEquivalent(s) Oral every 2 hours  sodium chloride 0.9% lock flush 3 milliLiter(s) IV Push every 8 hours  sodium chloride 3% Bolus 150 milliLiter(s) IV Bolus <User Schedule>      PAST MEDICAL & SURGICAL HISTORY:  Aortic stenosis      AICD (automatic cardioverter/defibrillator) present      Aortic valve regurgitation      Ascending aorta dilation      H/O paroxysmal supraventricular tachycardia      HLD (hyperlipidemia)      Mitral valve regurgitation      Cardiac arrest      Severe mitral regurgitation      S/P aortic valve replacement  3/13/2004      S/P hernia repair  2002      History of tonsillectomy and adenoidectomy      S/P TURP  1996      S/P colonoscopy  2001, 2002, 2006, 2011, 2016      S/P endoscopy  2006      S/P cardiac cath  1994, 1995, 1999, 2002, 2004      AICD (automatic cardioverter/defibrillator) present  12/19/17      Cardiac pacemaker  12/19/17      History of cardioversion  9/11/20      S/P ablation of atrial fibrillation  10/29/20      Status post ablation of ventricular arrhythmia  2/14/19          FAMILY HISTORY:      SOCIAL HISTORY:    [ ]Non-smoker  [ ] Smoker  [ ] Alcohol      REVIEW OF SYSTEMS:  See HPI. All ROS negative unless otherwise noted    PHYSICAL EXAM:  T(C): 36.3 (02-26-24 @ 11:56), Max: 37.2 (02-26-24 @ 04:04)  HR: 73 (02-26-24 @ 11:56) (73 - 98)  BP: 100/61 (02-26-24 @ 11:56) (93/63 - 108/71)  RR: 18 (02-26-24 @ 11:56) (18 - 18)  SpO2: 96% (02-26-24 @ 11:56) (92% - 96%)  Wt(kg): --  I&O's Summary    25 Feb 2024 07:01  -  26 Feb 2024 07:00  --------------------------------------------------------  IN: 800 mL / OUT: 2800 mL / NET: -2000 mL    26 Feb 2024 07:01  -  26 Feb 2024 14:42  --------------------------------------------------------  IN: 540 mL / OUT: 600 mL / NET: -60 mL        Appearance: Alert. NAD	  HEENT:   NC/AT	  Cardiovascular: +S1S2 RRR no m/g/r  Respiratory: CTA B/L	  Psychiatry: A & O x 3, Mood & affect appropriate  Gastrointestinal:  Soft, NT.ND., + BS	  Skin: No rashes	  Neurologic: Non-focal  Extremities: No edema BLE  Vascular: Peripheral pulses palpable 2+ bilaterally      LABS:	 	    CBC Full  -  ( 26 Feb 2024 04:47 )  WBC Count : 6.95 K/uL  Hemoglobin : 11.3 g/dL  Hematocrit : 34.8 %  Platelet Count - Automated : 165 K/uL  Mean Cell Volume : 96.7 fl  Mean Cell Hemoglobin : 31.4 pg  Mean Cell Hemoglobin Concentration : 32.5 gm/dL  Auto Neutrophil # : x  Auto Lymphocyte # : x  Auto Monocyte # : x  Auto Eosinophil # : x  Auto Basophil # : x  Auto Neutrophil % : x  Auto Lymphocyte % : x  Auto Monocyte % : x  Auto Eosinophil % : x  Auto Basophil % : x    02-26    137  |  95<L>  |  106<H>  ----------------------------<  154<H>  3.6   |  24  |  3.16<H>  02-25    138  |  96  |  116<H>  ----------------------------<  131<H>  4.3   |  26  |  3.29<H>    Ca    9.8      26 Feb 2024 04:47  Ca    10.0      25 Feb 2024 06:15  Phos  3.1     02-26  Mg     2.5     02-26  Mg     2.5     02-25        proBNP:   Lipid Profile:   HgA1c:   TSH:       CARDIAC MARKERS:                TELEMETRY: 	    ECG:  	  RADIOLOGY:  OTHER: 	    PREVIOUS DIAGNOSTIC TESTING:    Echocardiogram:    Catheterization:    Stress Test:  	  	  ASSESSMENT/PLAN: 	     CHIEF COMPLAINT: Dyspnea and 20lb weight gain    HISTORY OF PRESENT ILLNESS: 82 yo man with PMHx of HTN, HLD, GERD, HFrEF, EF of 35-40%, CAD, ICM, bioprosthetic aortic valve replacement in 2004, VT/VF arrest in 2017 s/p [employer] dual chamber ICD implant, prior VT ablation x 3 at [facility] (2/14/19, 10/22/19, 7/29/22), atrophic kidney, atrial fibrillation, s/p ablation on 10/29/20, and cardioversion x 7 (9/11/20, 7/30/21, 2/14/22, 10/25/22, 12/14/22,2/24/23, 8/2023) on Xarelto, aortic aneurysm and mitral valve repair in June 2021, who was admitted to the hospital for CHF exacerbation after presenting to CTS office for c/o worsening SOB and recent weight gain of 20 pounds. Patient was admitted for further medical management and medical optimization.  HF team consulted (well-known to patient) Dr. Lala - renal consulted (known to pt from previous admission).      Allergies    No Known Allergies    Intolerances    	    MEDICATIONS:  buMETAnide 3 milliGRAM(s) Oral two times a day  hydrALAZINE 10 milliGRAM(s) Oral three times a day  isosorbide   dinitrate Tablet (ISORDIL) 10 milliGRAM(s) Oral three times a day  metoprolol succinate ER 25 milliGRAM(s) Oral daily  rivaroxaban 15 milliGRAM(s) Oral with dinner  spironolactone 25 milliGRAM(s) Oral daily        acetaminophen     Tablet .. 650 milliGRAM(s) Oral every 6 hours PRN  melatonin 3 milliGRAM(s) Oral at bedtime PRN    pantoprazole    Tablet 40 milliGRAM(s) Oral before breakfast  polyethylene glycol 3350 17 Gram(s) Oral daily  senna 2 Tablet(s) Oral at bedtime    atorvastatin 40 milliGRAM(s) Oral at bedtime  levothyroxine 75 MICROGram(s) Oral daily    potassium chloride    Tablet ER 20 milliEquivalent(s) Oral every 2 hours  sodium chloride 0.9% lock flush 3 milliLiter(s) IV Push every 8 hours  sodium chloride 3% Bolus 150 milliLiter(s) IV Bolus <User Schedule>      PAST MEDICAL & SURGICAL HISTORY:  Aortic stenosis      AICD (automatic cardioverter/defibrillator) present      Aortic valve regurgitation      Ascending aorta dilation      H/O paroxysmal supraventricular tachycardia      HLD (hyperlipidemia)      Mitral valve regurgitation      Cardiac arrest      Severe mitral regurgitation      S/P aortic valve replacement  3/13/2004      S/P hernia repair  2002      History of tonsillectomy and adenoidectomy      S/P TURP  1996      S/P colonoscopy  2001, 2002, 2006, 2011, 2016      S/P endoscopy  2006      S/P cardiac cath  1994, 1995, 1999, 2002, 2004      AICD (automatic cardioverter/defibrillator) present  12/19/17      Cardiac pacemaker  12/19/17      History of cardioversion  9/11/20      S/P ablation of atrial fibrillation  10/29/20      Status post ablation of ventricular arrhythmia  2/14/19          FAMILY HISTORY:      SOCIAL HISTORY:    [ ]Non-smoker  [ ] Smoker  [ ] Alcohol      REVIEW OF SYSTEMS:  See HPI. All ROS negative unless otherwise noted    PHYSICAL EXAM:  T(C): 36.3 (02-26-24 @ 11:56), Max: 37.2 (02-26-24 @ 04:04)  HR: 73 (02-26-24 @ 11:56) (73 - 98)  BP: 100/61 (02-26-24 @ 11:56) (93/63 - 108/71)  RR: 18 (02-26-24 @ 11:56) (18 - 18)  SpO2: 96% (02-26-24 @ 11:56) (92% - 96%)  Wt(kg): --  I&O's Summary    25 Feb 2024 07:01  -  26 Feb 2024 07:00  --------------------------------------------------------  IN: 800 mL / OUT: 2800 mL / NET: -2000 mL    26 Feb 2024 07:01  -  26 Feb 2024 14:42  --------------------------------------------------------  IN: 540 mL / OUT: 600 mL / NET: -60 mL        Appearance: Alert. NAD	  HEENT:   NC/AT	  Cardiovascular: +S1S2 RRR no m/g/r  Respiratory: CTA B/L	  Psychiatry: A & O x 3, Mood & affect appropriate  Gastrointestinal:  Soft, NT.ND., + BS	  Skin: No rashes	  Neurologic: Non-focal  Extremities: No edema BLE  Vascular: Peripheral pulses palpable 2+ bilaterally      LABS:	 	    CBC Full  -  ( 26 Feb 2024 04:47 )  WBC Count : 6.95 K/uL  Hemoglobin : 11.3 g/dL  Hematocrit : 34.8 %  Platelet Count - Automated : 165 K/uL  Mean Cell Volume : 96.7 fl  Mean Cell Hemoglobin : 31.4 pg  Mean Cell Hemoglobin Concentration : 32.5 gm/dL  Auto Neutrophil # : x  Auto Lymphocyte # : x  Auto Monocyte # : x  Auto Eosinophil # : x  Auto Basophil # : x  Auto Neutrophil % : x  Auto Lymphocyte % : x  Auto Monocyte % : x  Auto Eosinophil % : x  Auto Basophil % : x    02-26    137  |  95<L>  |  106<H>  ----------------------------<  154<H>  3.6   |  24  |  3.16<H>  02-25    138  |  96  |  116<H>  ----------------------------<  131<H>  4.3   |  26  |  3.29<H>    Ca    9.8      26 Feb 2024 04:47  Ca    10.0      25 Feb 2024 06:15  Phos  3.1     02-26  Mg     2.5     02-26  Mg     2.5     02-25        proBNP:   Lipid Profile:   HgA1c:   TSH:       CARDIAC MARKERS:                TELEMETRY: 	    ECG:  	  RADIOLOGY:  OTHER: 	    PREVIOUS DIAGNOSTIC TESTING:    Echocardiogram:    Catheterization:    Stress Test:  	  	  ASSESSMENT/PLAN: 	     CHIEF COMPLAINT: Dyspnea and 20lb weight gain    HISTORY OF PRESENT ILLNESS: 84 yo man with PMHx of HTN, HLD, GERD, HFrEF, EF of 35-40%, CAD, ICM, bioprosthetic aortic valve replacement in 2004, VT/VF arrest in 2017 s/p [employer] dual chamber ICD implant, prior VT ablation x 3 at [facility] (2/14/19, 10/22/19, 7/29/22), atrophic kidney, atrial fibrillation, s/p ablation on 10/29/20, and cardioversion x 7 (9/11/20, 7/30/21, 2/14/22, 10/25/22, 12/14/22,2/24/23, 8/2023) on Xarelto, aortic aneurysm and mitral valve repair in June 2021, known severe MR (not a candidate for KRISTIE per Dr. Valentino given severe MAC, denied by [employer] for Tendyne valve), who was admitted to the hospital for CHF exacerbation after presenting to CTS office for c/o worsening SOB and recent weight gain of 20 pounds. Patient was admitted for further medical management and medical optimization.  He was seen by Nephrology and CHF teams and underwent diuresis with IV Bumex gtt. He has been in atrial flutter for which EP was consulted. His device was interrogated which revealed he has been in AFl since ~late January. On telemetry his rates are controlled 70-90s. He is currently on room air and feels well. His Xarelto was held initially on admission as there was consideration for MVR this admission. The plan per [facility] is for him to follow up outpatient with Dr Fenton for consideration of MVR down the line.       Allergies    No Known Allergies    Intolerances    	    MEDICATIONS:  buMETAnide 3 milliGRAM(s) Oral two times a day  hydrALAZINE 10 milliGRAM(s) Oral three times a day  isosorbide   dinitrate Tablet (ISORDIL) 10 milliGRAM(s) Oral three times a day  metoprolol succinate ER 25 milliGRAM(s) Oral daily  rivaroxaban 15 milliGRAM(s) Oral with dinner  spironolactone 25 milliGRAM(s) Oral daily        acetaminophen     Tablet .. 650 milliGRAM(s) Oral every 6 hours PRN  melatonin 3 milliGRAM(s) Oral at bedtime PRN    pantoprazole    Tablet 40 milliGRAM(s) Oral before breakfast  polyethylene glycol 3350 17 Gram(s) Oral daily  senna 2 Tablet(s) Oral at bedtime    atorvastatin 40 milliGRAM(s) Oral at bedtime  levothyroxine 75 MICROGram(s) Oral daily    potassium chloride    Tablet ER 20 milliEquivalent(s) Oral every 2 hours  sodium chloride 0.9% lock flush 3 milliLiter(s) IV Push every 8 hours  sodium chloride 3% Bolus 150 milliLiter(s) IV Bolus <User Schedule>      PAST MEDICAL & SURGICAL HISTORY:  Aortic stenosis      AICD (automatic cardioverter/defibrillator) present      Aortic valve regurgitation      Ascending aorta dilation      H/O paroxysmal supraventricular tachycardia      HLD (hyperlipidemia)      Mitral valve regurgitation      Cardiac arrest      Severe mitral regurgitation      S/P aortic valve replacement  3/13/2004      S/P hernia repair  2002      History of tonsillectomy and adenoidectomy      S/P TURP  1996      S/P colonoscopy  2001, 2002, 2006, 2011, 2016      S/P endoscopy  2006      S/P cardiac cath  1994, 1995, 1999, 2002, 2004      AICD (automatic cardioverter/defibrillator) present  12/19/17      Cardiac pacemaker  12/19/17      History of cardioversion  9/11/20      S/P ablation of atrial fibrillation  10/29/20      Status post ablation of ventricular arrhythmia  2/14/19          FAMILY HISTORY:      SOCIAL HISTORY:    [ ]Non-smoker  [ ] Smoker  [ ] Alcohol      REVIEW OF SYSTEMS:  See HPI. All ROS negative unless otherwise noted    PHYSICAL EXAM:  T(C): 36.3 (02-26-24 @ 11:56), Max: 37.2 (02-26-24 @ 04:04)  HR: 73 (02-26-24 @ 11:56) (73 - 98)  BP: 100/61 (02-26-24 @ 11:56) (93/63 - 108/71)  RR: 18 (02-26-24 @ 11:56) (18 - 18)  SpO2: 96% (02-26-24 @ 11:56) (92% - 96%)  Wt(kg): --  I&O's Summary    25 Feb 2024 07:01  -  26 Feb 2024 07:00  --------------------------------------------------------  IN: 800 mL / OUT: 2800 mL / NET: -2000 mL    26 Feb 2024 07:01  -  26 Feb 2024 14:42  --------------------------------------------------------  IN: 540 mL / OUT: 600 mL / NET: -60 mL        Appearance: Alert. NAD	  HEENT:   NC/AT	  Cardiovascular: +S1S2 irregular, +murmur  Respiratory: CTA B/L	  Psychiatry: A & O x 3, Mood & affect appropriate  Gastrointestinal:  Soft  Skin: No rashes	  Neurologic: Non-focal  Extremities: No edema BLE      LABS:	 	    CBC Full  -  ( 26 Feb 2024 04:47 )  WBC Count : 6.95 K/uL  Hemoglobin : 11.3 g/dL  Hematocrit : 34.8 %  Platelet Count - Automated : 165 K/uL  Mean Cell Volume : 96.7 fl  Mean Cell Hemoglobin : 31.4 pg  Mean Cell Hemoglobin Concentration : 32.5 gm/dL  Auto Neutrophil # : x  Auto Lymphocyte # : x  Auto Monocyte # : x  Auto Eosinophil # : x  Auto Basophil # : x  Auto Neutrophil % : x  Auto Lymphocyte % : x  Auto Monocyte % : x  Auto Eosinophil % : x  Auto Basophil % : x    02-26    137  |  95<L>  |  106<H>  ----------------------------<  154<H>  3.6   |  24  |  3.16<H>  02-25    138  |  96  |  116<H>  ----------------------------<  131<H>  4.3   |  26  |  3.29<H>    Ca    9.8      26 Feb 2024 04:47  Ca    10.0      25 Feb 2024 06:15  Phos  3.1     02-26  Mg     2.5     02-26  Mg     2.5     02-25        proBNP:   Pro-Brain Natriuretic Peptide (02.20.24 @ 15:54)    Pro-Brain Natriuretic Peptide: 67686 pg/mL    TSH: Thyroid Stimulating Hormone, Serum (02.20.24 @ 15:55)    Thyroid Stimulating Hormone, Serum: 2.03 uIU/mL          TELEMETRY: Aflutter rates 70-90s   	    ECG:  	Aflutter with V-pacing, intermittent variable conduction      RADIOLOGY:  < from: Xray Chest 1 View- PORTABLE-Routine (02.20.24 @ 17:48) >  FINDINGS/  IMPRESSION:    Support devices: Stable left ICD.    Cardiac/mediastinum/hilum: Stable enlarged cardiac silhouette and   postsurgical changes.    Lung parenchyma/Pleura: Congestive changes seen throughout the lungs   bilaterally. No definite pleural effusion. No pneumothorax.    < end of copied text >  PREVIOUS DIAGNOSTIC TESTING:    Echocardiogram:    < from: TTE W or WO Ultrasound Enhancing Agent (02.22.24 @ 07:53) >   1. Left ventricular cavity is mildly dilated. Septal motion is abnormal consistent with previous cardiac surgery and The interventricular septum is flattened in systole and diastole consistent with right ventricular pressure and volume overload. Left ventricular systolic function is moderately to severely decreased with an ejection fraction of 35 % by 3D. Global left ventricular hypokinesis.   2. Multiple segmental abnormalities exist. See findings.   3. Moderately enlarged right ventricular cavity size and reduced systolic function.   4. Severe mitral regurgitation at a blood pressure of 95/61 mmHg. Mechanism of mitral regurgitation: Jordan Type IIIa (restricted leaflet motion secondary to thickening/fusion).Systolic flow reversal in the pulmonary veins, which is consistent with severe mitral regurgitation. MR ERO 0.54cm2 and RVol 80cc by PISA.   5. Significant mitral regurgitation contributes to the elevated transmitral gradient.   6. A bioprosthetic valve replacement is present in the aortic position. is well seated.. No prosthetic aortic stenosis. Mild-to-moderate intravalvular regurgitation.   7. Moderate tricuspid regurgitation.   8. There is severe posterior calcification and moderate anterior calcification of the mitral valve annulus.   9. Estimated pulmonary artery systolic pressure is 70 mmHg, consistent with severe pulmonary hypertension.  10. PA diastolic pressure 27mmHg (elevated).  11. No pericardial effusion seen.  12. Compared to the transesophageal echocardiogram performed on 8/29/2023, there have been no significant interval changes.    < end of copied text >    	  	  ASSESSMENT/PLAN:

## 2024-02-26 NOTE — PROGRESS NOTE ADULT - PROBLEM SELECTOR PLAN 1
Pre op Cardiac surgery work up initiated    restarted Xarelto AC therapy today  Continue with Toprol 25 mg PO daily   Continue on Aldactone 25 mg PO daily   Continue on Bumex gtt 1 mg / hr  Continue with Atorvastatin 40 mg PO HS   Continue with Nepro Supplements to optimize nutrition status Nutrition and PT consult ordered     Strict SU's   Daily weights   Plan for Cardiac Surgery with Dr. Fenton OR annette ARMSTRONG Pre op Cardiac surgery work up initiated    restarted Xarelto AC therapy today  Continue with Toprol 25 mg PO daily   Continue on Aldactone 25 mg PO daily   d/c bumex gttp and change to bumex 3 mg po bid as per renal  Continue with Atorvastatin 40 mg PO HS   Continue with Nepro Supplements to optimize nutrition status Nutrition and PT consult ordered     Daily weights   discharge pt home in am and f/u with Dr. Fenton as an outpatient

## 2024-02-26 NOTE — PROGRESS NOTE ADULT - PROBLEM SELECTOR PLAN 3
Renal Consult called and appreciated (Dr. Lala)  continue on Bumex gtt 1 mg / hr  Strict SU  Daily BMP   Daily weight   Avoid Nephrotoxic Agent Renal following   d/c bumex gttp and change to bumex 3 mg po bid as per renal   Daily BMP   Daily weight   strict I &O's   Avoid Nephrotoxic Agents

## 2024-02-26 NOTE — PROCEDURE NOTE - ADDITIONAL PROCEDURE DETAILS
Indication: atrial flutter  Presenting rhythm: Atrial flutter with VHR 70-90s  Pt is not pacer dependent.  AP 68%,  41% since 8/29/2023.  AT/AF burden since 8/2023 26%.   1.9 years estimated battery longevity remaining.  Device function and measurements WNL. Atrial threshold not tested 2/2 atrial tachyarrhythmia.  Episodes reviewed, consistent with mode switch episodes in setting of atrial flutter.  AT/AF episode began ~1/2024.

## 2024-02-26 NOTE — PROGRESS NOTE ADULT - PROBLEM SELECTOR PLAN 1
- HFrEF LVEF 36%, grade II DD - ACC/ AHA stage C/D heart failure, NYHA class III   - Etiology ischemic and complicated by severe MR  - continue Bumex gtt at 1 mg/hr  - continue 3% hypertonic saline twice daily  - document daily standing weights and strict I&Os  - q12hrs BMP and Mg, replete electrolytes for K 4-4.5 and Mg 2-2.5  - continue HDZN 10 mg TID and ISDN 10 mg TID, hold for SBP <90  - continue Toprol XL 25 mg QD  - continue Spironolactone 25 mg QD  - has dual chamber ICD, interrogation 2/26 with  41% and episode of AT/AF that began 1/2024

## 2024-02-26 NOTE — PROGRESS NOTE ADULT - SUBJECTIVE AND OBJECTIVE BOX
VITAL SIGNS    Telemetry:    Vital Signs Last 24 Hrs  T(C): 37.2 (24 @ 04:04), Max: 37.2 (24 @ 04:04)  T(F): 99 (24 @ 04:04), Max: 99 (24 @ 04:04)  HR: 98 (24 @ 04:04) (71 - 98)  BP: 108/71 (24 @ 04:04) (91/59 - 108/71)  RR: 18 (24 @ 04:04) (18 - 18)  SpO2: 92% (24 @ 04:04) (92% - 99%)             @ 07:01  -   @ 07:00  --------------------------------------------------------  IN: 800 mL / OUT: 2800 mL / NET: -2000 mL     @ 07:01  -   @ 08:46  --------------------------------------------------------  IN: 360 mL / OUT: 400 mL / NET: -40 mL       Daily     Daily Weight in k.9 (2024 07:59)  Admit Wt: Drug Dosing Weight  Height (cm): 175.3 (2024 15:30)  Weight (kg): 71.5 (2024 15:30)  BMI (kg/m2): 23.3 (2024 15:30)  BSA (m2): 1.87 (2024 15:30)      CAPILLARY BLOOD GLUCOSE              LABS:     @ 07:01  -   @ 07:00  --------------------------------------------------------  IN: 800 mL / OUT: 2800 mL / NET: -2000 mL     @ 07:01  -   @ 08:46  --------------------------------------------------------  IN: 360 mL / OUT: 400 mL / NET: -40 mL    cret                        11.3   6.95  )-----------( 165      ( 2024 04:47 )             34.8         137  |  95<L>  |  106<H>  ----------------------------<  154<H>  3.6   |  24  |  3.16<H>    Ca    9.8      2024 04:47  Phos  3.1       Mg     2.5               acetaminophen     Tablet .. 650 milliGRAM(s) Oral every 6 hours PRN  atorvastatin 40 milliGRAM(s) Oral at bedtime  buMETAnide Infusion 1 mG/Hr IV Continuous <Continuous>  hydrALAZINE 10 milliGRAM(s) Oral three times a day  isosorbide   dinitrate Tablet (ISORDIL) 10 milliGRAM(s) Oral three times a day  levothyroxine 75 MICROGram(s) Oral daily  melatonin 3 milliGRAM(s) Oral at bedtime PRN  metoprolol succinate ER 25 milliGRAM(s) Oral daily  pantoprazole    Tablet 40 milliGRAM(s) Oral before breakfast  polyethylene glycol 3350 17 Gram(s) Oral daily  potassium chloride    Tablet ER 20 milliEquivalent(s) Oral every 2 hours  rivaroxaban 15 milliGRAM(s) Oral with dinner  senna 2 Tablet(s) Oral at bedtime  sodium chloride 0.9% lock flush 3 milliLiter(s) IV Push every 8 hours  sodium chloride 3% Bolus 150 milliLiter(s) IV Bolus <User Schedule>  spironolactone 25 milliGRAM(s) Oral daily      PHYSICAL EXAM    Subjective: "Hi.   Neurology: alert and oriented x 3, nonfocal, no gross deficits  CV : tele:  RSR  Sternal Wound :  CDI with dressing , Stable  Lungs: clear. RR easy, unlabored   Abdomen: soft, nontender, nondistended, positive bowel sounds, bowel movement   Neg N/V/D   :  pt voiding without difficulty   Extremities:   BRAVO; edema, neg calf tenderness.   PPP bilaterally      PW:  Chest tubes:                 VITAL SIGNS    Telemetry:  V.paced 70-80  Vital Signs Last 24 Hrs  T(C): 37.2 (24 @ 04:04), Max: 37.2 (24 @ 04:04)  T(F): 99 (24 @ 04:04), Max: 99 (24 @ 04:04)  HR: 98 (24 @ 04:04) (71 - 98)  BP: 108/71 (24 @ 04:04) (91/59 - 108/71)  RR: 18 (24 @ 04:04) (18 - 18)  SpO2: 92% (24 @ 04:04) (92% - 99%)             @ 07:01  -   @ 07:00  --------------------------------------------------------  IN: 800 mL / OUT: 2800 mL / NET: -2000 mL     @ 07:01  -   @ 08:46  --------------------------------------------------------  IN: 360 mL / OUT: 400 mL / NET: -40 mL       Daily     Daily Weight in k.9 (2024 07:59)  Admit Wt: Drug Dosing Weight  Height (cm): 175.3 (2024 15:30)  Weight (kg): 71.5 (2024 15:30)  BMI (kg/m2): 23.3 (2024 15:30)  BSA (m2): 1.87 (2024 15:30)      CAPILLARY BLOOD GLUCOSE              LABS:     @ 07:01  -   @ 07:00  --------------------------------------------------------  IN: 800 mL / OUT: 2800 mL / NET: -2000 mL     @ 07:01  -   @ 08:46  --------------------------------------------------------  IN: 360 mL / OUT: 400 mL / NET: -40 mL    cret                        11.3   6.95  )-----------( 165      ( 2024 04:47 )             34.8         137  |  95<L>  |  106<H>  ----------------------------<  154<H>  3.6   |  24  |  3.16<H>    Ca    9.8      2024 04:47  Phos  3.1       Mg     2.5               acetaminophen     Tablet .. 650 milliGRAM(s) Oral every 6 hours PRN  atorvastatin 40 milliGRAM(s) Oral at bedtime  buMETAnide Infusion 1 mG/Hr IV Continuous <Continuous>  hydrALAZINE 10 milliGRAM(s) Oral three times a day  isosorbide   dinitrate Tablet (ISORDIL) 10 milliGRAM(s) Oral three times a day  levothyroxine 75 MICROGram(s) Oral daily  melatonin 3 milliGRAM(s) Oral at bedtime PRN  metoprolol succinate ER 25 milliGRAM(s) Oral daily  pantoprazole    Tablet 40 milliGRAM(s) Oral before breakfast  polyethylene glycol 3350 17 Gram(s) Oral daily  potassium chloride    Tablet ER 20 milliEquivalent(s) Oral every 2 hours  rivaroxaban 15 milliGRAM(s) Oral with dinner  senna 2 Tablet(s) Oral at bedtime  sodium chloride 0.9% lock flush 3 milliLiter(s) IV Push every 8 hours  sodium chloride 3% Bolus 150 milliLiter(s) IV Bolus <User Schedule>  spironolactone 25 milliGRAM(s) Oral daily      PHYSICAL EXAM    Subjective: "Hi.   Neurology: alert and oriented x 3, nonfocal, no gross deficits  CV : tele:  RSR  Sternal Wound :  CDI with dressing , Stable  Lungs: clear. RR easy, unlabored   Abdomen: soft, nontender, nondistended, positive bowel sounds, bowel movement   Neg N/V/D   :  pt voiding without difficulty   Extremities:   BRAVO; edema, neg calf tenderness.   PPP bilaterally      PW:  Chest tubes:                 VITAL SIGNS    Telemetry:  V.paced 70-80  Vital Signs Last 24 Hrs  T(C): 37.2 (24 @ 04:04), Max: 37.2 (24 @ 04:04)  T(F): 99 (24 @ 04:04), Max: 99 (24 @ 04:04)  HR: 98 (24 @ 04:04) (71 - 98)  BP: 108/71 (24 @ 04:04) (91/59 - 108/71)  RR: 18 (24 @ 04:04) (18 - 18)  SpO2: 92% (24 @ 04:04) (92% - 99%)             @ 07:01  -   @ 07:00  --------------------------------------------------------  IN: 800 mL / OUT: 2800 mL / NET: -2000 mL     @ 07:01  -   @ 08:46  --------------------------------------------------------  IN: 360 mL / OUT: 400 mL / NET: -40 mL       Daily     Daily Weight in k.9 (2024 07:59)  Admit Wt: Drug Dosing Weight  Height (cm): 175.3 (2024 15:30)  Weight (kg): 71.5 (2024 15:30)  BMI (kg/m2): 23.3 (2024 15:30)  BSA (m2): 1.87 (2024 15:30)      CAPILLARY BLOOD GLUCOSE              LABS:     @ 07:01  -   @ 07:00  --------------------------------------------------------  IN: 800 mL / OUT: 2800 mL / NET: -2000 mL     @ 07:01  -   @ 08:46  --------------------------------------------------------  IN: 360 mL / OUT: 400 mL / NET: -40 mL    cret                        11.3   6.95  )-----------( 165      ( 2024 04:47 )             34.8         137  |  95<L>  |  106<H>  ----------------------------<  154<H>  3.6   |  24  |  3.16<H>    Ca    9.8      2024 04:47  Phos  3.1       Mg     2.5               acetaminophen     Tablet .. 650 milliGRAM(s) Oral every 6 hours PRN  atorvastatin 40 milliGRAM(s) Oral at bedtime  buMETAnide Infusion 1 mG/Hr IV Continuous <Continuous>  hydrALAZINE 10 milliGRAM(s) Oral three times a day  isosorbide   dinitrate Tablet (ISORDIL) 10 milliGRAM(s) Oral three times a day  levothyroxine 75 MICROGram(s) Oral daily  melatonin 3 milliGRAM(s) Oral at bedtime PRN  metoprolol succinate ER 25 milliGRAM(s) Oral daily  pantoprazole    Tablet 40 milliGRAM(s) Oral before breakfast  polyethylene glycol 3350 17 Gram(s) Oral daily  potassium chloride    Tablet ER 20 milliEquivalent(s) Oral every 2 hours  rivaroxaban 15 milliGRAM(s) Oral with dinner  senna 2 Tablet(s) Oral at bedtime  sodium chloride 0.9% lock flush 3 milliLiter(s) IV Push every 8 hours  sodium chloride 3% Bolus 150 milliLiter(s) IV Bolus <User Schedule>  spironolactone 25 milliGRAM(s) Oral daily      PHYSICAL EXAM    Subjective: "I can't wait to go home."   Neurology: alert and oriented x 3, nonfocal, no gross deficits  CV : tele: V. paced 70-80; +murmur   Sternal Wound :  CDI well- healed   Lungs: clear. RR easy, unlabored   Abdomen: soft, nontender, nondistended, positive bowel sounds, + bowel movement; Neg N/V/D   :  pt voiding without difficulty   Extremities:   BRAVO; trace LE edema, neg calf tenderness.   PPP bilaterally; chronic venous stasis discoloration bilaterally

## 2024-02-26 NOTE — PROCEDURE NOTE - NSINTBATTERYSTAT_CARD_ALL_CORE
67yo male with hx of DM II, presented to the ED with complaints of cough, myalgias, fever and progressively worsening sob for the past several days, noted to have acute respiratory failure with hypoxia likely due to viral Pneumonia 2/2 to COVID Good

## 2024-02-26 NOTE — CONSULT NOTE ADULT - ASSESSMENT
82 yo man with PMHx of HTN, HLD, GERD, HFrEF, EF of 35-40%, CAD, ICM, bioprosthetic aortic valve replacement in 2004, VT/VF arrest in 2017 s/p [employer] dual chamber ICD implant, prior VT ablation x 3 at [facility] (2/14/19, 10/22/19, 7/29/22), atrophic kidney, atrial fibrillation, s/p ablation on 10/29/20, and cardioversion x 7 (9/11/20, 7/30/21, 2/14/22, 10/25/22, 12/14/22,2/24/23, 8/2023) on Xarelto, aortic aneurysm and mitral valve repair in June 2021, known severe MR (not a candidate for KRISTIE per Dr. Valentino given severe MAC, denied by [employer] for Tendyne valve), who was admitted to the hospital for CHF exacerbation after presenting to CTS office for c/o worsening SOB and recent weight gain of 20 pounds. Patient was admitted for further medical management and medical optimization.  He was seen by Nephrology and CHF teams and underwent diuresis with IV Bumex gtt. He has been in atrial flutter for which EP was consulted. His device was interrogated which revealed he has been in AFl since ~late January. On telemetry his rates are controlled 70-90s. He is currently on room air and feels well. His Xarelto was held initially on admission as there was consideration for MVR this admission. The plan per [facility] is for him to follow up outpatient with Dr Fenton for ?consideration of MVR down the line.     1) Atrial flutter  2) Severe Mitral Regurgitation  3) HFrEF, LVEF 35%    Recommendations:  -NPO at [facility] for WARREN guided DCCV tomorrow 2/27/24 (pt was not on Xarelto for several days on admission)  -Continue Xarelto 15mg for OAC (CrCl 17), will need uninterrupted OAC for 1 month after DCCV  -Continue Toprol XL  -Consider re-initiation of amiodarone s/p DCCV after appendage cleared. Pt was taken off amiodarone Nov/Dec by his outpatient electrophysiologist (Dr Tsang)

## 2024-02-26 NOTE — PROGRESS NOTE ADULT - ASSESSMENT
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 Fenton 56/2/21, GERD, HLD, renal atrophy with single kidney, pleural effusion with Thoracentesis. He was evaluated for M-KRISTIE but was not a candidate d/t his anatomy.  He was evaluated for SUMMIT (Tendyne valve) trial for a transcatheter MVR but was denied by [facility].    Pt presented to CTS office for c/o worsening SOB and recent weight gain of 20 pounds. Patient was admitted for further medical management and medical optimization.      Hospital Course:    Admit to 2 [facility] Telemetry floor.  CHF team consulted and appreciated.  Bumex 4 mg IVPB now. Started on Bumex gtt 1 mg / hr.  Creatinine 4;  SURESH on CKD --> Dr. Lala - renal consulted (known to pt from previous admission). Pre op Cardiac surgery work up initiated.    2/21 Bumex gtt continued, strict I&Os, preop w/u in progress, OR TBD  2/22 VSS  -1985cc/24hrs.  Prealbumin 20.  TTE severe MR EF 35%.   2/23 VSS; Continue on Bumex gtt at 1 mg / hr. Net negative 3.5 L/24hr.  Per CHF Team will continue on current dose of diuretics.  Repeat BMP w/ Mg this afternoon.  OR date TBD.    2/24 VSS; Continue with current medication regimen.  Negative 3150 cc/24hrs on fluid balance.  Serum Potassium 3.7 --> Supplemented. Nutrition and PT consult ordered.  OR date TBD.   2/25 VSS; restarted on Xarelto for AC for persistent aflutter, per Dr. Fenton all other medication regimen maintained. SURESH on CKD, for which renal Dr. Lala following - maintain on Bumex gtt. Monitor BMP. 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 Fenton 56/2/21, GERD, HLD, renal atrophy with single kidney, pleural effusion with Thoracentesis. He was evaluated for M-KRISTIE but was not a candidate d/t his anatomy.  He was evaluated for SUMMIT (Tendyne valve) trial for a transcatheter MVR but was denied by [employer].    Pt presented to CTS office for c/o worsening SOB and recent weight gain of 20 pounds. Patient was admitted for further medical management and medical optimization.      Hospital Course:    Admit to 2 [facility] Telemetry floor.  CHF team consulted and appreciated.  Bumex 4 mg IVPB now. Started on Bumex gtt 1 mg / hr.  Creatinine 4;  SURESH on CKD --> Dr. Lala - renal consulted (known to pt from previous admission). Pre op Cardiac surgery work up initiated.    2/21 Bumex gtt continued, strict I&Os, preop w/u in progress, OR TBD  2/22 VSS  -1985cc/24hrs.  Prealbumin 20.  TTE severe MR EF 35%.   2/23 VSS; Continue on Bumex gtt at 1 mg / hr. Net negative 3.5 L/24hr.  Per CHF Team will continue on current dose of diuretics.  Repeat BMP w/ Mg this afternoon.  OR date TBD.    2/24 VSS; Continue with current medication regimen.  Negative 3150 cc/24hrs on fluid balance.  Serum Potassium 3.7 --> Supplemented. Nutrition and PT consult ordered.  OR date TBD.   2/25 VSS; restarted on Xarelto for AC for persistent aflutter, per Dr. Fenton all other medication regimen maintained. SURESH on CKD, for which renal Dr. Lala following - maintain on Bumex gtt. Monitor BMP.  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 Fenton 56/2/21, GERD, HLD, renal atrophy with single kidney, pleural effusion with Thoracentesis. He was evaluated for M-KRISTIE but was not a candidate d/t his anatomy.  He was evaluated for SUMMIT (Tendyne valve) trial for a transcatheter MVR but was denied by [employer].    Pt presented to CTS office for c/o worsening SOB and recent weight gain of 20 pounds. Patient was admitted for further medical management and medical optimization.      Hospital Course:    Admit to 2 [facility] Telemetry floor.  CHF team consulted and appreciated.  Bumex 4 mg IVPB now. Started on Bumex gtt 1 mg / hr.  Creatinine 4;  SURESH on CKD --> Dr. Lala - renal consulted (known to pt from previous admission). Pre op Cardiac surgery work up initiated.    2/21 Bumex gtt continued, strict I&Os, preop w/u in progress, OR TBD  2/22 VSS  -1985cc/24hrs.  Prealbumin 20.  TTE severe MR EF 35%.   2/23 VSS; Continue on Bumex gtt at 1 mg / hr. Net negative 3.5 L/24hr.  Per CHF Team will continue on current dose of diuretics.  Repeat BMP w/ Mg this afternoon.  OR date TBD.    2/24 VSS; Continue with current medication regimen.  Negative 3150 cc/24hrs on fluid balance.  Serum Potassium 3.7 --> Supplemented. Nutrition and PT consult ordered.  OR date TBD.   2/25 VSS; restarted on Xarelto for AC for persistent aflutter, per Dr. Fenton all other medication regimen maintained. SURESH on CKD, for which renal Dr. Lala following - maintain on Bumex gtt. Monitor BMP.  2/26 VSS: V.paced 70-80; bun/cr decreased this am 106/3.1---> change bumex gttp to bumex 3 mg po bid ; WARREN/ DCCV in am tue a per EP; npo after midnight   discharge planning- home tue if stable overnight

## 2024-02-26 NOTE — PROGRESS NOTE ADULT - SUBJECTIVE AND OBJECTIVE BOX
[facility]   [phone]  RENAL FOLLOW UP NOTE  --------------------------------------------------------------------------------  Chief Complaint:    24 hour events/subjective:    seen earlier   states feels well and breathing is better     PAST HISTORY  --------------------------------------------------------------------------------  No significant changes to PMH, PSH, FHx, SHx, unless otherwise noted    ALLERGIES & MEDICATIONS  --------------------------------------------------------------------------------  Allergies    No Known Allergies    Intolerances      Standing Inpatient Medications  atorvastatin 40 milliGRAM(s) Oral at bedtime  buMETAnide 3 milliGRAM(s) Oral two times a day  hydrALAZINE 10 milliGRAM(s) Oral three times a day  isosorbide   dinitrate Tablet (ISORDIL) 10 milliGRAM(s) Oral three times a day  levothyroxine 75 MICROGram(s) Oral daily  metoprolol succinate ER 25 milliGRAM(s) Oral daily  pantoprazole    Tablet 40 milliGRAM(s) Oral before breakfast  polyethylene glycol 3350 17 Gram(s) Oral daily  potassium chloride    Tablet ER 20 milliEquivalent(s) Oral every 2 hours  rivaroxaban 15 milliGRAM(s) Oral with dinner  senna 2 Tablet(s) Oral at bedtime  sodium chloride 0.9% lock flush 3 milliLiter(s) IV Push every 8 hours  sodium chloride 3% Bolus 150 milliLiter(s) IV Bolus <User Schedule>  spironolactone 25 milliGRAM(s) Oral daily    PRN Inpatient Medications  acetaminophen     Tablet .. 650 milliGRAM(s) Oral every 6 hours PRN  melatonin 3 milliGRAM(s) Oral at bedtime PRN      REVIEW OF SYSTEMS  --------------------------------------------------------------------------------    Gen: denies  fevers/chills,  CVS: denies chest pain/palpitations  Resp: denies SOB/Cough  GI: Denies N/V/Abd pain  : Denies dysuria       VITALS/PHYSICAL EXAM  --------------------------------------------------------------------------------  T(C): 36.3 (02-26-24 @ 11:56), Max: 37.2 (02-26-24 @ 04:04)  HR: 73 (02-26-24 @ 11:56) (73 - 98)  BP: 100/61 (02-26-24 @ 11:56) (100/61 - 108/71)  RR: 18 (02-26-24 @ 11:56) (18 - 18)  SpO2: 96% (02-26-24 @ 11:56) (92% - 96%)  Wt(kg): --        02-25-24 @ 07:01  -  02-26-24 @ 07:00  --------------------------------------------------------  IN: 800 mL / OUT: 2800 mL / NET: -2000 mL    02-26-24 @ 07:01  -  02-26-24 @ 19:59  --------------------------------------------------------  IN: 540 mL / OUT: 600 mL / NET: -60 mL      Physical Exam:  	      Gen: Non toxic comfortable appearing   	+ decreased  JVD  	Pulm: decrease bs  no rales or ronchi or wheezing  	CV: RRR, S1S2; no rub III/VI M   	Abd: +BS, soft, nontender/nondistended  	: No suprapubic tenderness  	UE: Warm, no cyanosis  no clubbing,  no edema  	LE: Warm, no cyanosis  no clubbing, 2- to lower calf area    edema  	Neuro: alert and oriented. speech coherent 	      LABS/STUDIES  --------------------------------------------------------------------------------              11.3   6.95  >-----------<  165      [02-26-24 @ 04:47]              34.8     137  |  95  |  106  ----------------------------<  154      [02-26-24 @ 04:47]  3.6   |  24  |  3.16        Ca     9.8     [02-26-24 @ 04:47]      Mg     2.5     [02-26-24 @ 04:47]      Phos  3.1     [02-26-24 @ 04:47]            Creatinine Trend:  SCr 3.16 [02-26 @ 04:47]  SCr 3.29 [02-25 @ 06:15]  SCr 3.19 [02-24 @ 07:27]  SCr 3.26 [02-23 @ 14:17]  SCr 3.57 [02-23 @ 05:57]        TSH 2.03      [02-20-24 @ 15:55]

## 2024-02-26 NOTE — PROGRESS NOTE ADULT - ASSESSMENT
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 Labs on admission remarkable for BUN/Creatinine 114/3.67. HF consulted for further care.    He remains volume expanded but urinating well to aggressive diuresis with Bumex gtt and twice daily hypertonic saline. He has ongoing SURESH. Continue to optimize. Eventual plan for possible surgical MVR for his severe MR.     Cardiac studies   TTE 2/22:  EF 35%, mildly enlarged RV with reduced function, severe MR and Moderate to severe TR. PASP 70mmhg  WARREN 8/23 Severe MR and moderate TR  TTE 8/23 EF 36%, grade II DD, mildly enlarged RV with reduced function, severe MR and Moderate to severe TR

## 2024-02-26 NOTE — PROGRESS NOTE ADULT - PROBLEM SELECTOR PLAN 2
- WARREN 2/22 with severe MR  - He was evaluated for M-KRISTIE but was not a candidate d/t his anatomy.  He was evaluated for [facility] (Tendyne valve) trial for a transcatheter MVR but was denied by [facility] surgery on board for planned surgical intervention.

## 2024-02-26 NOTE — PROGRESS NOTE ADULT - SUBJECTIVE AND OBJECTIVE BOX
Subjective:  - urinating but does not feel LE swelling is improving    Medications:  acetaminophen     Tablet .. 650 milliGRAM(s) Oral every 6 hours PRN  atorvastatin 40 milliGRAM(s) Oral at bedtime  buMETAnide 3 milliGRAM(s) Oral two times a day  hydrALAZINE 10 milliGRAM(s) Oral three times a day  isosorbide   dinitrate Tablet (ISORDIL) 10 milliGRAM(s) Oral three times a day  levothyroxine 75 MICROGram(s) Oral daily  melatonin 3 milliGRAM(s) Oral at bedtime PRN  metoprolol succinate ER 25 milliGRAM(s) Oral daily  pantoprazole    Tablet 40 milliGRAM(s) Oral before breakfast  polyethylene glycol 3350 17 Gram(s) Oral daily  potassium chloride    Tablet ER 20 milliEquivalent(s) Oral every 2 hours  rivaroxaban 15 milliGRAM(s) Oral with dinner  senna 2 Tablet(s) Oral at bedtime  sodium chloride 0.9% lock flush 3 milliLiter(s) IV Push every 8 hours  sodium chloride 3% Bolus 150 milliLiter(s) IV Bolus <User Schedule>  spironolactone 25 milliGRAM(s) Oral daily    Physical Exam:    Vitals:  Vital Signs Last 24 Hours  T(C): 36.3 (24 @ 11:56), Max: 37.2 (24 @ 04:04)  HR: 73 (24 @ 11:56) (73 - 98)  BP: 100/61 (24 @ 11:56) (100/61 - 108/71)  RR: 18 (24 @ 11:56) (18 - 18)  SpO2: 96% (24 @ 11:56) (92% - 96%)    Weight in k.9 ( @ 07:59)    I&O's Summary    2024 07:  -  2024 07:00  --------------------------------------------------------  IN: 800 mL / OUT: 2800 mL / NET: -2000 mL    2024 07:01  -  2024 19:56  --------------------------------------------------------  IN: 540 mL / OUT: 600 mL / NET: -60 mL    Tele: Paced/Aflu 80-90s    General: No distress. Comfortable.  HEENT: EOM intact.  Neck: JVP 20 cm H2O  Chest: Clear to auscultation bilaterally  CV: Irregularly irregular. Normal S1 and S2.   Abdomen: Soft, non-distended, non-tender  Extremities: Warm peripehrally. 2+ BLE edema   Neurology: Alert and oriented times three. Sensation intact  Psych: Affect normal    Labs:                        11.3   6.95  )-----------( 165      ( 2024 04:47 )             34.8         137  |  95<L>  |  106<H>  ----------------------------<  154<H>  3.6   |  24  |  3.16<H>    Ca    9.8      2024 04:47  Phos  3.1       Mg     2.5

## 2024-02-27 ENCOUNTER — RESULT REVIEW (OUTPATIENT)
Age: 84
End: 2024-02-27

## 2024-02-27 ENCOUNTER — TRANSCRIPTION ENCOUNTER (OUTPATIENT)
Age: 84
End: 2024-02-27

## 2024-02-27 LAB
ANION GAP SERPL CALC-SCNC: 17 MMOL/L — SIGNIFICANT CHANGE UP (ref 5–17)
BUN SERPL-MCNC: 114 MG/DL — HIGH (ref 7–23)
CALCIUM SERPL-MCNC: 9.7 MG/DL — SIGNIFICANT CHANGE UP (ref 8.4–10.5)
CHLORIDE SERPL-SCNC: 94 MMOL/L — LOW (ref 96–108)
CO2 SERPL-SCNC: 24 MMOL/L — SIGNIFICANT CHANGE UP (ref 22–31)
CREAT SERPL-MCNC: 3.66 MG/DL — HIGH (ref 0.5–1.3)
EGFR: 16 ML/MIN/1.73M2 — LOW
GLUCOSE SERPL-MCNC: 94 MG/DL — SIGNIFICANT CHANGE UP (ref 70–99)
HCT VFR BLD CALC: 33.8 % — LOW (ref 39–50)
HGB BLD-MCNC: 11.3 G/DL — LOW (ref 13–17)
INR BLD: 2.25 RATIO — HIGH (ref 0.85–1.18)
MCHC RBC-ENTMCNC: 31.9 PG — SIGNIFICANT CHANGE UP (ref 27–34)
MCHC RBC-ENTMCNC: 33.4 GM/DL — SIGNIFICANT CHANGE UP (ref 32–36)
MCV RBC AUTO: 95.5 FL — SIGNIFICANT CHANGE UP (ref 80–100)
NRBC # BLD: 0 /100 WBCS — SIGNIFICANT CHANGE UP (ref 0–0)
PLATELET # BLD AUTO: 152 K/UL — SIGNIFICANT CHANGE UP (ref 150–400)
POTASSIUM SERPL-MCNC: 4 MMOL/L — SIGNIFICANT CHANGE UP (ref 3.5–5.3)
POTASSIUM SERPL-SCNC: 4 MMOL/L — SIGNIFICANT CHANGE UP (ref 3.5–5.3)
PROTHROM AB SERPL-ACNC: 23.1 SEC — HIGH (ref 9.5–13)
RBC # BLD: 3.54 M/UL — LOW (ref 4.2–5.8)
RBC # FLD: 15.3 % — HIGH (ref 10.3–14.5)
SODIUM SERPL-SCNC: 135 MMOL/L — SIGNIFICANT CHANGE UP (ref 135–145)
WBC # BLD: 5.86 K/UL — SIGNIFICANT CHANGE UP (ref 3.8–10.5)
WBC # FLD AUTO: 5.86 K/UL — SIGNIFICANT CHANGE UP (ref 3.8–10.5)

## 2024-02-27 PROCEDURE — 99232 SBSQ HOSP IP/OBS MODERATE 35: CPT | Mod: FS

## 2024-02-27 PROCEDURE — 99232 SBSQ HOSP IP/OBS MODERATE 35: CPT

## 2024-02-27 PROCEDURE — 93010 ELECTROCARDIOGRAM REPORT: CPT

## 2024-02-27 PROCEDURE — 93320 DOPPLER ECHO COMPLETE: CPT | Mod: 26

## 2024-02-27 PROCEDURE — 93325 DOPPLER ECHO COLOR FLOW MAPG: CPT | Mod: 26

## 2024-02-27 PROCEDURE — 93312 ECHO TRANSESOPHAGEAL: CPT | Mod: 26

## 2024-02-27 PROCEDURE — 92960 CARDIOVERSION ELECTRIC EXT: CPT

## 2024-02-27 RX ORDER — AMIODARONE HYDROCHLORIDE 400 MG/1
200 TABLET ORAL DAILY
Refills: 0 | Status: CANCELLED | OUTPATIENT
Start: 2024-03-12 | End: 2024-02-28

## 2024-02-27 RX ORDER — AMIODARONE HYDROCHLORIDE 400 MG/1
400 TABLET ORAL
Refills: 0 | Status: DISCONTINUED | OUTPATIENT
Start: 2024-02-27 | End: 2024-02-28

## 2024-02-27 RX ORDER — AMIODARONE HYDROCHLORIDE 400 MG/1
200 TABLET ORAL
Refills: 0 | Status: CANCELLED | OUTPATIENT
Start: 2024-03-05 | End: 2024-02-28

## 2024-02-27 RX ORDER — AMIODARONE HYDROCHLORIDE 400 MG/1
TABLET ORAL
Refills: 0 | Status: DISCONTINUED | OUTPATIENT
Start: 2024-02-27 | End: 2024-02-28

## 2024-02-27 RX ADMIN — SODIUM CHLORIDE 300 MILLILITER(S): 5 INJECTION, SOLUTION INTRAVENOUS at 17:24

## 2024-02-27 RX ADMIN — Medication 25 MILLIGRAM(S): at 05:17

## 2024-02-27 RX ADMIN — RIVAROXABAN 15 MILLIGRAM(S): KIT at 18:19

## 2024-02-27 RX ADMIN — BUMETANIDE 3 MILLIGRAM(S): 0.25 INJECTION INTRAMUSCULAR; INTRAVENOUS at 05:17

## 2024-02-27 RX ADMIN — AMIODARONE HYDROCHLORIDE 400 MILLIGRAM(S): 400 TABLET ORAL at 18:18

## 2024-02-27 RX ADMIN — SODIUM CHLORIDE 3 MILLILITER(S): 9 INJECTION INTRAMUSCULAR; INTRAVENOUS; SUBCUTANEOUS at 05:22

## 2024-02-27 RX ADMIN — ATORVASTATIN CALCIUM 40 MILLIGRAM(S): 80 TABLET, FILM COATED ORAL at 21:35

## 2024-02-27 RX ADMIN — Medication 10 MILLIGRAM(S): at 05:18

## 2024-02-27 RX ADMIN — Medication 75 MICROGRAM(S): at 05:17

## 2024-02-27 RX ADMIN — ISOSORBIDE DINITRATE 10 MILLIGRAM(S): 5 TABLET ORAL at 18:19

## 2024-02-27 RX ADMIN — SODIUM CHLORIDE 300 MILLILITER(S): 5 INJECTION, SOLUTION INTRAVENOUS at 05:22

## 2024-02-27 RX ADMIN — SODIUM CHLORIDE 3 MILLILITER(S): 9 INJECTION INTRAMUSCULAR; INTRAVENOUS; SUBCUTANEOUS at 13:29

## 2024-02-27 RX ADMIN — BUMETANIDE 3 MILLIGRAM(S): 0.25 INJECTION INTRAMUSCULAR; INTRAVENOUS at 15:27

## 2024-02-27 RX ADMIN — Medication 10 MILLIGRAM(S): at 15:26

## 2024-02-27 RX ADMIN — ISOSORBIDE DINITRATE 10 MILLIGRAM(S): 5 TABLET ORAL at 13:28

## 2024-02-27 RX ADMIN — PANTOPRAZOLE SODIUM 40 MILLIGRAM(S): 20 TABLET, DELAYED RELEASE ORAL at 05:18

## 2024-02-27 RX ADMIN — Medication 10 MILLIGRAM(S): at 21:35

## 2024-02-27 RX ADMIN — ISOSORBIDE DINITRATE 10 MILLIGRAM(S): 5 TABLET ORAL at 05:17

## 2024-02-27 RX ADMIN — SODIUM CHLORIDE 3 MILLILITER(S): 9 INJECTION INTRAMUSCULAR; INTRAVENOUS; SUBCUTANEOUS at 21:05

## 2024-02-27 RX ADMIN — SPIRONOLACTONE 25 MILLIGRAM(S): 25 TABLET, FILM COATED ORAL at 05:17

## 2024-02-27 NOTE — PROGRESS NOTE ADULT - SUBJECTIVE AND OBJECTIVE BOX
VITAL SIGNS    Telemetry:  AF/AFL     Vital Signs Last 24 Hrs  T(C): 36.6 (24 @ 09:40), Max: 36.6 (24 @ 04:00)  T(F): 97.9 (24 @ 08:24), Max: 97.9 (24 @ 04:00)  HR: 71 (24 @ 09:40) (66 - 73)  BP: 92/57 (24 @ 09:40) (92/57 - 113/77)  RR: 18 (24 @ 09:40) (18 - 18)  SpO2: 93% (24 @ 09:40) (93% - 97%)             @ 07:01  -   @ 07:00  --------------------------------------------------------  IN: 780 mL / OUT: 2150 mL / NET: -1370 mL     @ 07:01  -   @ 09:59  --------------------------------------------------------  IN: 0 mL / OUT: 100 mL / NET: -100 mL       Daily Height in cm: 175.26 (2024 09:40)    Daily Weight in k.5 (2024 08:24)  Admit Wt: Drug Dosing Weight  Height (cm): 175.3 (2024 09:40)  Weight (kg): 71.5 (2024 09:40)  BMI (kg/m2): 23.3 (2024 09:40)  BSA (m2): 1.87 (2024 09:40)      CAPILLARY BLOOD GLUCOSE            MEDICATIONS  acetaminophen     Tablet .. 650 milliGRAM(s) Oral every 6 hours PRN  atorvastatin 40 milliGRAM(s) Oral at bedtime  buMETAnide 3 milliGRAM(s) Oral two times a day  hydrALAZINE 10 milliGRAM(s) Oral three times a day  isosorbide   dinitrate Tablet (ISORDIL) 10 milliGRAM(s) Oral three times a day  levothyroxine 75 MICROGram(s) Oral daily  melatonin 3 milliGRAM(s) Oral at bedtime PRN  metoprolol succinate ER 25 milliGRAM(s) Oral daily  pantoprazole    Tablet 40 milliGRAM(s) Oral before breakfast  polyethylene glycol 3350 17 Gram(s) Oral daily  potassium chloride    Tablet ER 20 milliEquivalent(s) Oral every 2 hours  rivaroxaban 15 milliGRAM(s) Oral with dinner  senna 2 Tablet(s) Oral at bedtime  sodium chloride 0.9% lock flush 3 milliLiter(s) IV Push every 8 hours  sodium chloride 3% Bolus 150 milliLiter(s) IV Bolus <User Schedule>  spironolactone 25 milliGRAM(s) Oral daily      >>> <<<  PHYSICAL EXAM  Subjective: " Hi"  Neurology: alert and oriented x 3, nonfocal, no gross deficits  CV : RRR S1S2, no murmurs, rubs or gallops   Lungs: CTA B/L, No wheezing, rales, rhonchi. Non labored respirations   Abdomen: soft, NT, ND, ( )BM  :  voiding  Extremities: No LE edema bilaterally, +DP, No calf tenderness     LABS      135  |  94<L>  |  114<H>  ----------------------------<  94  4.0   |  24  |  3.66<H>    Ca    9.7      2024 05:41  Phos  3.1       Mg     2.5                                        11.3   5.86  )-----------( 152      ( 2024 05:43 )             33.8          PT/INR - ( 2024 05:43 )   PT: 23.1 sec;   INR: 2.25 ratio               PAST MEDICAL & SURGICAL HISTORY:  Aortic stenosis    AICD (automatic cardioverter/defibrillator) present    Aortic valve regurgitation    Ascending aorta dilation    H/O paroxysmal supraventricular tachycardia    HLD (hyperlipidemia)    Mitral valve regurgitation    Cardiac arrest    Severe mitral regurgitation    S/P aortic valve replacement  3/13/2004    S/P hernia repair      History of tonsillectomy and adenoidectomy    S/P TURP      S/P colonoscopy  , , , ,     S/P endoscopy      S/P cardiac cath  , , , ,     AICD (automatic cardioverter/defibrillator) present  17    Cardiac pacemaker  17    History of cardioversion  20    S/P ablation of atrial fibrillation  10/29/20  Status post ablation of ventricular arrhythmia  19

## 2024-02-27 NOTE — PROGRESS NOTE ADULT - NUTRITIONAL ASSESSMENT
This patient has been assessed with a concern for Malnutrition and has been determined to have a diagnosis/diagnoses of Severe protein-calorie malnutrition.    This patient is being managed with:   Diet DASH/TLC-  Sodium & Cholesterol Restricted  Supplement Feeding Modality:  Oral  Nepro Cans or Servings Per Day:  3       Frequency:  Three Times a day  Entered: Feb 21 2024  7:59AM  
This patient has been assessed with a concern for Malnutrition and has been determined to have a diagnosis/diagnoses of Severe protein-calorie malnutrition.    This patient is being managed with:   Diet NPO after Midnight-     NPO Start Date: 26-Feb-2024   NPO Start Time: 23:59  Entered: Feb 26 2024  3:12PM    Diet DASH/TLC-  Sodium & Cholesterol Restricted  Supplement Feeding Modality:  Oral  Nepro Cans or Servings Per Day:  3       Frequency:  Three Times a day  Entered: Feb 21 2024  7:59AM  
This patient has been assessed with a concern for Malnutrition and has been determined to have a diagnosis/diagnoses of Severe protein-calorie malnutrition.    This patient is being managed with:   Diet DASH/TLC-  Sodium & Cholesterol Restricted  Supplement Feeding Modality:  Oral  Nepro Cans or Servings Per Day:  3       Frequency:  Three Times a day  Entered: Feb 21 2024  7:59AM  
This patient has been assessed with a concern for Malnutrition and has been determined to have a diagnosis/diagnoses of Severe protein-calorie malnutrition.    This patient is being managed with:   Diet DASH/TLC-  Sodium & Cholesterol Restricted  Supplement Feeding Modality:  Oral  Nepro Cans or Servings Per Day:  3       Frequency:  Three Times a day  Entered: Feb 21 2024  7:59AM

## 2024-02-27 NOTE — PROGRESS NOTE ADULT - SUBJECTIVE AND OBJECTIVE BOX
[facility]   [phone]  RENAL FOLLOW UP NOTE  --------------------------------------------------------------------------------  Chief Complaint:    24 hour events/subjective:    states has been urinating well no sob     PAST HISTORY  --------------------------------------------------------------------------------  No significant changes to PMH, PSH, FHx, SHx, unless otherwise noted    ALLERGIES & MEDICATIONS  --------------------------------------------------------------------------------  Allergies    No Known Allergies    Intolerances      Standing Inpatient Medications  aMIOdarone    Tablet 400 milliGRAM(s) Oral two times a day  aMIOdarone    Tablet   Oral   atorvastatin 40 milliGRAM(s) Oral at bedtime  buMETAnide 3 milliGRAM(s) Oral two times a day  hydrALAZINE 10 milliGRAM(s) Oral three times a day  isosorbide   dinitrate Tablet (ISORDIL) 10 milliGRAM(s) Oral three times a day  levothyroxine 75 MICROGram(s) Oral daily  metoprolol succinate ER 25 milliGRAM(s) Oral daily  pantoprazole    Tablet 40 milliGRAM(s) Oral before breakfast  polyethylene glycol 3350 17 Gram(s) Oral daily  potassium chloride    Tablet ER 20 milliEquivalent(s) Oral every 2 hours  rivaroxaban 15 milliGRAM(s) Oral with dinner  senna 2 Tablet(s) Oral at bedtime  sodium chloride 0.9% lock flush 3 milliLiter(s) IV Push every 8 hours  sodium chloride 3% Bolus 150 milliLiter(s) IV Bolus <User Schedule>  spironolactone 25 milliGRAM(s) Oral daily    PRN Inpatient Medications  acetaminophen     Tablet .. 650 milliGRAM(s) Oral every 6 hours PRN  melatonin 3 milliGRAM(s) Oral at bedtime PRN      REVIEW OF SYSTEMS  --------------------------------------------------------------------------------    Gen: denies  fevers/chills,  CVS: denies chest pain/palpitations  Resp: denies worsening SOB/Cough  GI: Denies N/V/Abd pain  : Denies dysuria/oliguria    VITALS/PHYSICAL EXAM  --------------------------------------------------------------------------------  T(C): 36.3 (02-27-24 @ 12:25), Max: 36.6 (02-27-24 @ 04:00)  HR: 93 (02-27-24 @ 18:14) (66 - 93)  BP: 97/63 (02-27-24 @ 18:14) (86/54 - 113/77)  RR: 18 (02-27-24 @ 18:14) (18 - 18)  SpO2: 94% (02-27-24 @ 18:14) (93% - 97%)  Wt(kg): --  Height (cm): 175.3 (02-27-24 @ 09:40)  Weight (kg): 71.5 (02-27-24 @ 09:40)  BMI (kg/m2): 23.3 (02-27-24 @ 09:40)  BSA (m2): 1.87 (02-27-24 @ 09:40)      02-26-24 @ 07:01  -  02-27-24 @ 07:00  --------------------------------------------------------  IN: 780 mL / OUT: 2150 mL / NET: -1370 mL    02-27-24 @ 07:01  -  02-27-24 @ 19:28  --------------------------------------------------------  IN: 150 mL / OUT: 450 mL / NET: -300 mL      Physical Exam:  	    Gen: Non toxic comfortable appearing   	+ decreased  JVD  	Pulm: decrease bs  no rales or ronchi or wheezing  	CV: RRR, S1S2; no rub III/VI M   	Abd: +BS, soft, nontender/nondistended  	: No suprapubic tenderness  	UE: Warm, no cyanosis  no clubbing,  no edema  	LE: Warm, no cyanosis  no clubbing, 1-2- to lower calf area    edema  	Neuro: alert and oriented. speech coherent 	    LABS/STUDIES  --------------------------------------------------------------------------------              11.3   5.86  >-----------<  152      [02-27-24 @ 05:43]              33.8     135  |  94  |  114  ----------------------------<  94      [02-27-24 @ 05:41]  4.0   |  24  |  3.66        Ca     9.7     [02-27-24 @ 05:41]      Mg     2.5     [02-26-24 @ 04:47]      Phos  3.1     [02-26-24 @ 04:47]      PT/INR: PT 23.1 , INR 2.25       [02-27-24 @ 05:43]      Creatinine Trend:  SCr 3.66 [02-27 @ 05:41]  SCr 3.16 [02-26 @ 04:47]  SCr 3.29 [02-25 @ 06:15]  SCr 3.19 [02-24 @ 07:27]  SCr 3.26 [02-23 @ 14:17]    TSH 2.03      [02-20-24 @ 15:55]

## 2024-02-27 NOTE — PRE-ANESTHESIA EVALUATION ADULT - NSANTHPMHFT_GEN_ALL_CORE
Admitted for heart failure, history of AVR, Bentall, AVr, with severe MR, severe pulmonary HTN, RV dilation and dysfunction, aflutter, history of VT and cardiac arrest

## 2024-02-27 NOTE — DISCHARGE NOTE NURSING/CASE MANAGEMENT/SOCIAL WORK - PATIENT PORTAL LINK FT
You can access the FollowMyHealth Patient Portal offered by [facility] by registering at the following website: [email or web address]. By joining [employer]’s FollowMyHealth portal, you will also be able to view your health information using other applications (apps) compatible with our system.

## 2024-02-27 NOTE — DISCHARGE NOTE NURSING/CASE MANAGEMENT/SOCIAL WORK - NSDCPEFALRISK_GEN_ALL_CORE
For information on Fall & Injury Prevention, visit: [email or web address] OR  [email or web address] OR  https://www.cdc.gov/steadi/patient.html

## 2024-02-27 NOTE — PROGRESS NOTE ADULT - PROBLEM SELECTOR PLAN 3
Renal following   d/c bumex gttp and change to bumex 3 mg po bid as per renal   Daily BMP   Daily weight   strict I &O's   Avoid Nephrotoxic Agents

## 2024-02-27 NOTE — PROGRESS NOTE ADULT - SUBJECTIVE AND OBJECTIVE BOX
Subjective:  Currently going for WARREN/DCCV today. No overnight event noted      Medications:  acetaminophen     Tablet .. 650 milliGRAM(s) Oral every 6 hours PRN  atorvastatin 40 milliGRAM(s) Oral at bedtime  buMETAnide 3 milliGRAM(s) Oral two times a day  hydrALAZINE 10 milliGRAM(s) Oral three times a day  isosorbide   dinitrate Tablet (ISORDIL) 10 milliGRAM(s) Oral three times a day  levothyroxine 75 MICROGram(s) Oral daily  melatonin 3 milliGRAM(s) Oral at bedtime PRN  metoprolol succinate ER 25 milliGRAM(s) Oral daily  pantoprazole    Tablet 40 milliGRAM(s) Oral before breakfast  polyethylene glycol 3350 17 Gram(s) Oral daily  potassium chloride    Tablet ER 20 milliEquivalent(s) Oral every 2 hours  rivaroxaban 15 milliGRAM(s) Oral with dinner  senna 2 Tablet(s) Oral at bedtime  sodium chloride 0.9% lock flush 3 milliLiter(s) IV Push every 8 hours  sodium chloride 3% Bolus 150 milliLiter(s) IV Bolus <User Schedule>  spironolactone 25 milliGRAM(s) Oral daily      Physical Exam:    Vitals:  Vital Signs Last 24 Hours  T(C): 36.6 (24 @ 10:25), Max: 36.6 (24 @ 04:00)  HR: 81 (24 @ 11:25) (66 - 82)  BP: 94/65 (24 @ 11:25) (90/58 - 113/77)  RR: 18 (24 @ 11:25) (18 - 18)  SpO2: 96% (24 @ 11:25) (93% - 97%)    Weight in k.5 ( @ 08:24)    I&O's Summary    2024 07:01  -  2024 07:00  --------------------------------------------------------  IN: 780 mL / OUT: 2150 mL / NET: -1370 mL    2024 07:01  -  2024 12:07  --------------------------------------------------------  IN: 0 mL / OUT: 100 mL / NET: -100 mL        Tele:    General: No distress. Comfortable.  HEENT: EOM intact.  Neck: Neck supple. JVP not elevated. No masses  Chest: Clear to auscultation bilaterally  CV: Normal S1 and S2. No murmurs, rub, or gallops. Radial pulses normal.  Abdomen: Soft, non-distended, non-tender  Skin: No rashes or skin breakdown  Neurology: Alert and oriented times three. Sensation intact  Psych: Affect normal    Labs:                        11.3   5.86  )-----------( 152      ( 2024 05:43 )             33.8     02    135  |  94<L>  |  114<H>  ----------------------------<  94  4.0   |  24  |  3.66<H>    Ca    9.7      2024 05:41  Phos  3.1       Mg     2.5           PT/INR - ( 2024 05:43 )   PT: 23.1 sec;   INR: 2.25 ratio

## 2024-02-27 NOTE — PROGRESS NOTE ADULT - ASSESSMENT
84 yo man with PMHx of HTN, HLD, GERD, HFrEF, EF of 35-40%, CAD, ICM, bioprosthetic aortic valve replacement in 2004, VT/VF arrest in 2017 s/p [employer] dual chamber ICD implant, [facility] 10/22/19, 7/29/22), atrophic kidney, atrial fibrillation, s/p ablation on 10/29/20, and cardioversion x 7 (9/11/20, 7/30/21, 2/14/22, 10/25/22, 12/14/22, 2/24/23, 8/2023) on Xarelto, aortic aneurysm and mitral valve repair in June 2021, known severe MR (not a candidate for KRISTIE per Dr. Valentino given severe MAC, denied by [employer] for Tendyne valve), who was admitted to the hospital for CHF exacerbation after presenting to CTS office for c/o worsening SOB and recent weight gain of 20 pounds. Patient was admitted for further medical management and medical optimization.  He was seen by Nephrology and CHF teams and underwent diuresis with IV Bumex gtt. He has been in atrial flutter for which EP was consulted. His device was interrogated which revealed he has been in AFl since ~late January. On telemetry his rates are controlled 70-90s. He is currently on room air and feels well. His Xarelto was held initially on admission as there was consideration for MVR this admission. The plan per [facility] is for him to follow up outpatient with Dr Fenton for ?consideration of MVR down the line.     1) Atrial flutter  2) Severe Mitral Regurgitation  3) HFrEF, LVEF 35%    Recommendations:  -NPO at [facility] for WARREN guided DCCV today (pt was not on Xarelto for several days on admission)  -Continue Xarelto 15mg for OAC (CrCl 17), will need uninterrupted OAC for 1 month after DCCV  -Continue Toprol XL  -Consider re-initiation of amiodarone s/p DCCV after appendage cleared. Pt was taken off amiodarone Nov/Dec by his outpatient electrophysiologist (Dr Tsang)   82 yo man with PMHx of HTN, HLD, GERD, HFrEF, EF of 35-40%, CAD, ICM, bioprosthetic aortic valve replacement in 2004, VT/VF arrest in 2017 s/p Abbott dual chamber ICD implant, prior VT ablation x 3 at [facility] (2/14/19, 10/22/19, 7/29/22), atrophic kidney, atrial fibrillation, s/p ablation on 10/29/20, and cardioversion x 7 (9/11/20, 7/30/21, 2/14/22, 10/25/22, 12/14/22, 2/24/23, 8/2023) on Xarelto, aortic aneurysm and mitral valve repair in June 2021, known severe MR (not a candidate for KRISTIE per Dr. Valentino given severe MAC, denied by [employer] for Tendyne valve), who was admitted to the hospital for CHF exacerbation after presenting to CTS office for c/o worsening SOB and recent weight gain of 20 pounds. Patient was admitted for further medical management and medical optimization.  He was seen by Nephrology and CHF teams and underwent diuresis with IV Bumex gtt. He has been in atrial flutter for which EP was consulted. His device was interrogated which revealed he has been in AFl since ~late January. On telemetry his rates are controlled 70-90s. He is currently on room air and feels well. His Xarelto was held initially on admission as there was consideration for MVR this admission. The plan per [facility] is for him to follow up outpatient with Dr Fenton for ?consideration of MVR down the line.     1) Atrial flutter  2) Severe Mitral Regurgitation  3) HFrEF, LVEF 35%    Recommendations:  -Pt underwent successful WARREN guided DCCV today (pt was not on Xarelto for several days on admission)  -Continue Xarelto 15mg for OAC (CrCl 17), will need uninterrupted OAC for 1 month after DCCV  -Continue Toprol XL  -Will place on amiodarone s/p DCCV. Pt was taken off amiodarone Nov/Dec by his outpatient electrophysiologist (Dr Tsang). Start amiodarone 400mg BID x 1 week, 200mg BID x 1 week then 200mg daily  -OP follow up with his electrophysiologist Dr Tsang  -Would recommend monitoring overnight inpatient and discharge tomorrow  -Discussed with primary team and EP attending 84 yo man with PMHx of HTN, HLD, GERD, HFrEF, EF of 35-40%, CAD, ICM, bioprosthetic aortic valve replacement in 2004, VT/VF arrest in 2017 s/p Abbott dual chamber ICD implant, prior VT ablation x 3 at [facility] (2/14/19, 10/22/19, 7/29/22), atrophic kidney, atrial fibrillation, s/p ablation on 10/29/20, and cardioversion x 7 (9/11/20, 7/30/21, 2/14/22, 10/25/22, 12/14/22, 2/24/23, 8/2023) on Xarelto, aortic aneurysm and mitral valve repair in June 2021, known severe MR (not a candidate for KRISTIE per Dr. Valentino given severe MAC, denied by [employer] for Tendyne valve), who was admitted to the hospital for CHF exacerbation after presenting to CTS office for c/o worsening SOB and recent weight gain of 20 pounds. Patient was admitted for further medical management and medical optimization.  He was seen by Nephrology and CHF teams and underwent diuresis with IV Bumex gtt. He has been in atrial flutter for which EP was consulted. His device was interrogated which revealed he has been in AFl since ~late January. On telemetry his rates are controlled 70-90s. He is currently on room air and feels well. His Xarelto was held initially on admission as there was consideration for MVR this admission. The plan per [facility] is for him to follow up outpatient with Dr Fenton for ?consideration of MVR down the line.     1) Atrial flutter  2) Severe Mitral Regurgitation  3) HFrEF, LVEF 35%    Recommendations:  -Pt underwent successful WARREN guided DCCV today (pt was not on Xarelto for several days on admission)  -Continue Xarelto 15mg for OAC (CrCl 17), will need uninterrupted OAC for 1 month after DCCV  -Continue Toprol XL  -Will place on amiodarone s/p DCCV. Pt was taken off amiodarone Nov/Dec by his outpatient electrophysiologist (Dr Tsang). Start amiodarone 400mg BID x 1 week, 200mg BID x 1 week then 200mg daily  -Counters cleared on device s/p DCCV. Atrial threshold checked after restoration of NSR: 0.75 at 0.5ms  -OP follow up with his electrophysiologist Dr Tsang  -Would recommend monitoring overnight inpatient and discharge tomorrow  -Discussed with primary team and EP attending

## 2024-02-27 NOTE — PROGRESS NOTE ADULT - ASSESSMENT
83 y/o M with ICM, LVEF 35-40% (LVIDd 6.7 cm), CAD c/b IWMI (1994) s/p angioplasty, aortic stenosis s/p bio-AVR 2004, VT arrest (2017) s/p ICD, Afib s/p multiple DCCV and ablation x2 (2020 and 2023; on AC), prior Ao aneurysm s/p Bentall (2021), severe MR with MAC (precludes KRISTIE), atrophic kidney with CKD (b/l Cr 1.9) presented from CTS office with decompensated heart failure. Labs on admission remarkable for BUN/Creatinine 114/3.67. HF consulted for further care.    He remains volume expanded but urinating well to aggressive diuresis with Bumex gtt and twice daily hypertonic saline. He has ongoing SURESH. Continue to optimize. Eventual plan for possible surgical MVR for his severe MR.     Cardiac studies   TTE 2/22:  EF 35%, mildly enlarged RV with reduced function, severe MR and Moderate to severe TR. PASP 70mmhg  WARREN 8/23 Severe MR and moderate TR  TTE 8/23 EF 36%, grade II DD, mildly enlarged RV with reduced function, severe MR and Moderate to severe TR

## 2024-02-27 NOTE — PRE-ANESTHESIA EVALUATION ADULT - NSANTHPEFT_GEN_ALL_CORE
Gen: WNWD, NAD   Neuro: AAOx4, moves all four extremities spontaneously  Resp: Unlabored breathing on room air, speaks in full sentences   CV: Irregular  Ext: WWP, 1+ bilateral pitting edema

## 2024-02-27 NOTE — PRE-ANESTHESIA EVALUATION ADULT - NSANTHDIETYNSD_GEN_ALL_CORE
[facility] Emergency Department  [address] [address]  Phone:  [phone]  Fax:  [phone]                                    Nicolasa Jalloh   MRN: [id number]    Department:  [facility] Emergency Department   Date of Visit:  12/16/2018           After Visit Summary Signature Page    I have received my discharge instructions, and my questions have been answered. I have discussed any challenges I see with this plan with the nurse or doctor.    ..........................................................................................................................................  Patient/Patient Representative Signature      ..........................................................................................................................................  Patient Representative Print Name and Relationship to Patient    ..................................................               ................................................  Date                                   Time    ..........................................................................................................................................  Reviewed by Signature/Title    ...................................................              ..............................................  Date                                               Time          22EPIC Rev 08/18       
Yes

## 2024-02-27 NOTE — PROGRESS NOTE ADULT - PROBLEM SELECTOR PLAN 1
Pre op Cardiac surgery work up initiated    Continue Xarelto AC therapy today  Continue with Toprol 25 mg PO daily   Continue on Aldactone 25 mg PO daily   d/c bumex gttp and change to bumex 3 mg po bid as per renal  Continue with Atorvastatin 40 mg PO HS   Continue with Nepro Supplements to optimize nutrition status Nutrition and PT consult ordered     Daily weights   discharge pt home WED am and f/u with Dr. Fenton as an outpatient

## 2024-02-27 NOTE — PRE PROCEDURE NOTE - PRE PROCEDURE EVALUATION
Pre Procedure Note:    Procedure Service:  Cardiology   Procedure Name: Transesophageal Echo  Pre Procedure Evaluation:    HPI: YAYA BHATIA is an 83 yo M  _ with history of_ ICM, LVEF 35-40% (LVIDd 6.7 cm), CAD c/b IWMI (1994) s/p angioplasty, aortic stenosis s/p bio-AVR 2004, VT arrest (2017) s/p ICD, Afib s/p multiple DCCV and ablation x2 (2020 and 2023; on AC), prior Ao aneurysm s/p Bentall (2021), severe MR with MAC (precludes KRISTIE), atrophic kidney with CKD (b/l Cr 1.9) _ presents for Transesophageal echocardiogram prior to dccv and to evaluate MV    RELEVANT PMH/PSH:    Any respiratory problems: Asthma, COPD, ANATOLIY, recent respiratory illness? NO    Any history of Atlantoaxial disease and severe generalized cervical arthritis? NO    Oral/Dental history: Any dentures, removable, loose, broken tooth/teeth, mouth sores? NO    Significant dysphagia and odynophagia? NO    Any GI history of: Esophageal surgeries, strictures, diverticula, masses, varices, diaphragmatic hernia, stomach ulcers, stomach surgeries, bariatric surgery, GI bleed? NO    SOCIAL HISTORY:   [ ] Non-smoker [ ] Smoker [ ] Alcohol  ALLERGIES: No Known Allergies  MEDICATION: REVIEWED  REVIEW OF SYSTEMS:  CONSTITUTIONAL: No fever, weight loss, or fatigue  HEENT: No eye issues, No sinus or throat pain; No pain or stiffness at neck  RESPIRATORY: No cough, wheezing, chills or hemoptysis; Shortness of Breath  CARDIOVASCULAR: No chest pain, palpitations, passing out, dizziness, or leg swelling  GASTROINTESTINAL: No abd pain, nausea, vomiting diarrhea constipation. No melena or hematochezia.  NEUROLOGICAL: No headaches, memory loss, acute change in mental status  MUSCULOSKELETAL: No significant muscle, back, or extremity pain  HEME/LYMPH: No easy bruising, or bleeding gums    PHYSICAL EXAM:  Vital Signs Last 24 Hrs  T(C): 36.6 (27 Feb 2024 08:24), Max: 36.6 (27 Feb 2024 04:00)  T(F): 97.9 (27 Feb 2024 08:24), Max: 97.9 (27 Feb 2024 04:00)  HR: 71 (27 Feb 2024 08:24) (66 - 73)  BP: 92/57 (27 Feb 2024 08:24) (92/57 - 113/77)  BP(mean): 89 (27 Feb 2024 04:00) (89 - 89)  RR: 18 (27 Feb 2024 08:24) (18 - 18)  SpO2: 93% (27 Feb 2024 08:24) (93% - 97%)    Parameters below as of 27 Feb 2024 08:24  Patient On (Oxygen Delivery Method): room air      Vital Signs:  BP    /      HR       O2 sat     Height      weight   Appearance: Normal	  HEENT:   Normal oral mucosa, PERRL, EOMI	  Cardiovascular: Normal S1 S2, No JVD, No murmurs, No edema  Respiratory: Lungs clear to auscultation	  Gastrointestinal:  Soft, Non-tender, + BS	  Neurologic/PSY: Non-focal, A&O x 3  Extremities: No clubbing, cyanosis or edema    LABS: reviewed  CARDIAC TESTING/STUDIES:    [ ] ECG  [ ] Echocardiogram:  [ ] Catheterization:  [ ] Stress Test:  	  [ ] PPM/AICD:	    Plan: 	  WARREN Procedure Candidate:	YES  Medical Records: YES  Available ECHO images and reports Reviewed: YES     Day of Procedure Attestation:  I have personally seen, examined, and participated in the care of this patient. I have reviewed all pertinent information, including history, physical exam, sedation plan, and agree except as noted.    Attestation Statements:  I have personally seen and examined the patient.  I fully participated in the care of this patient.  I have made amendments to the documentation where necessary, and agree with the history, physical exam, and plan as documented by the Fellow.   Patient seen and examined with the fellow, the note above has been edited to reflect my independent history, physical exam, assessment and plan.      Britton Mccabe MD, PhD  Cardiology Attending  [facility]/ [facility]    For day time coverage Mon-Fri see Non-Service Consult Attending on [email or web address], password: [email or web address]; daytime weekends covered by general cardiology consult service attending.)

## 2024-02-27 NOTE — PROGRESS NOTE ADULT - ASSESSMENT
83M w/ PMH: IWMI '94, VT Arrest while in [facility] with AICD placement 1/18, afib -s/p RFA '20 & 3/29/23, AVR-T '04, Bentall w/ Hemashield graft (attached to prior AVR) and MV repair w/ Dr. Fenton 56/2/21, GERD, HLD, renal atrophy with single kidney, pleural effusion with Thoracentesis. He was evaluated for M-KRISTIE but was not a candidate d/t his anatomy.  He was evaluated for SUMMIT (Tendyne valve) trial for a transcatheter MVR but was denied by [employer].    Pt presented to CTS office for c/o worsening SOB and recent weight gain of 20 pounds. Patient was admitted for further medical management and medical optimization.    pt with ree with cr 3.5 and bun 111.      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

## 2024-02-27 NOTE — PROGRESS NOTE ADULT - PROBLEM SELECTOR PLAN 2
- WARREN 2/22 with severe MR  - He was evaluated for M-KRISTIE but was not a candidate d/t his anatomy.  He was evaluated for SUMMIT (Tendyne valve) trial for a transcatheter MVR but was denied by [facility] surgery on board for planned surgical intervention.

## 2024-02-27 NOTE — DISCHARGE NOTE NURSING/CASE MANAGEMENT/SOCIAL WORK - NSTRANSFEREYEGLASSESPAIRS_GEN_A_NUR
How Severe Is Your Skin Lesion?: moderate
Has Your Skin Lesion Been Treated?: not been treated
Is This A New Presentation, Or A Follow-Up?: Skin Lesions
1 pair

## 2024-02-27 NOTE — PROGRESS NOTE ADULT - ASSESSMENT
83M w/ PMH: IWMI '94, VT Arrest while in \[facility]\"" with AICD placement 1/18, afib -s/p RFA '20 & 3/29/23, AVR-T '04, Bentall w/ Hemashield graft (attached to prior AVR) and MV repair w/ Dr. Fenton 56/2/21, GERD, HLD, renal atrophy with single kidney, pleural effusion with Thoracentesis. He was evaluated for M-KRISTIE but was not a candidate d/t his anatomy.  He was evaluated for SUMMIT (Tendyne valve) trial for a transcatheter MVR but was denied by [employer].    Pt presented to CTS office for c/o worsening SOB and recent weight gain of 20 pounds. Patient was admitted for further medical management and medical optimization.      Hospital Course:    Admit to 2 [facility] Telemetry floor.  CHF team consulted and appreciated.  Bumex 4 mg IVPB now. Started on Bumex gtt 1 mg / hr.  Creatinine 4;  SURESH on CKD --> Dr. Lala - renal consulted (known to pt from previous admission). Pre op Cardiac surgery work up initiated.    2/21 Bumex gtt continued, strict I&Os, preop w/u in progress, OR TBD  2/22 VSS  -1985cc/24hrs.  Prealbumin 20.  TTE severe MR EF 35%.   2/23 VSS; Continue on Bumex gtt at 1 mg / hr. Net negative 3.5 L/24hr.  Per CHF Team will continue on current dose of diuretics.  Repeat BMP w/ Mg this afternoon.  OR date TBD.    2/24 VSS; Continue with current medication regimen.  Negative 3150 cc/24hrs on fluid balance.  Serum Potassium 3.7 --> Supplemented. Nutrition and PT consult ordered.  OR date TBD.   2/25 VSS; restarted on Xarelto for AC for persistent aflutter, per Dr. Fenton all other medication regimen maintained. SURESH on CKD, for which renal Dr. Lala following - maintain on Bumex gtt. Monitor BMP.  2/26 VSS: V.paced 70-80; bun/cr decreased this am 106/3.1---> change bumex gttp to bumex 3 mg po bid ; WARREN/ DCCV in am tue a per EP; npo after midnight   discharge planning- home tue if stable overnight   2/27 VSS; NPO today for WARREN/DCCV

## 2024-02-27 NOTE — PROGRESS NOTE ADULT - SUBJECTIVE AND OBJECTIVE BOX
24H hour events: Pt without acute complaints    MEDICATIONS:  buMETAnide 3 milliGRAM(s) Oral two times a day  hydrALAZINE 10 milliGRAM(s) Oral three times a day  isosorbide   dinitrate Tablet (ISORDIL) 10 milliGRAM(s) Oral three times a day  metoprolol succinate ER 25 milliGRAM(s) Oral daily  rivaroxaban 15 milliGRAM(s) Oral with dinner  spironolactone 25 milliGRAM(s) Oral daily        acetaminophen     Tablet .. 650 milliGRAM(s) Oral every 6 hours PRN  melatonin 3 milliGRAM(s) Oral at bedtime PRN    pantoprazole    Tablet 40 milliGRAM(s) Oral before breakfast  polyethylene glycol 3350 17 Gram(s) Oral daily  senna 2 Tablet(s) Oral at bedtime    atorvastatin 40 milliGRAM(s) Oral at bedtime  levothyroxine 75 MICROGram(s) Oral daily    potassium chloride    Tablet ER 20 milliEquivalent(s) Oral every 2 hours  sodium chloride 0.9% lock flush 3 milliLiter(s) IV Push every 8 hours  sodium chloride 3% Bolus 150 milliLiter(s) IV Bolus <User Schedule>      REVIEW OF SYSTEMS:  See HPI, otherwise ROS negative.    PHYSICAL EXAM:  T(C): 36.6 (02-27-24 @ 08:24), Max: 36.6 (02-27-24 @ 04:00)  HR: 71 (02-27-24 @ 08:24) (66 - 73)  BP: 92/57 (02-27-24 @ 08:24) (92/57 - 113/77)  RR: 18 (02-27-24 @ 08:24) (18 - 18)  SpO2: 93% (02-27-24 @ 08:24) (93% - 97%)  Wt(kg): --  I&O's Summary    26 Feb 2024 07:01  -  27 Feb 2024 07:00  --------------------------------------------------------  IN: 780 mL / OUT: 2150 mL / NET: -1370 mL    27 Feb 2024 07:01  -  27 Feb 2024 09:20  --------------------------------------------------------  IN: 0 mL / OUT: 100 mL / NET: -100 mL        Appearance: Alert. NAD	  HEENT:   NC/AT	  Cardiovascular: +S1S2 irregular  Respiratory: CTA B/L	  Psychiatry: A & O x 3, Mood & affect appropriate  Gastrointestinal:  Soft	  Skin: No rashes	  Neurologic: Non-focal  Extremities: 2+ LE edema      LABS:	 	    CBC Full  -  ( 27 Feb 2024 05:43 )  WBC Count : 5.86 K/uL  Hemoglobin : 11.3 g/dL  Hematocrit : 33.8 %  Platelet Count - Automated : 152 K/uL  Mean Cell Volume : 95.5 fl  Mean Cell Hemoglobin : 31.9 pg  Mean Cell Hemoglobin Concentration : 33.4 gm/dL  Auto Neutrophil # : x  Auto Lymphocyte # : x  Auto Monocyte # : x  Auto Eosinophil # : x  Auto Basophil # : x  Auto Neutrophil % : x  Auto Lymphocyte % : x  Auto Monocyte % : x  Auto Eosinophil % : x  Auto Basophil % : x    02-27    135  |  94<L>  |  114<H>  ----------------------------<  94  4.0   |  24  |  3.66<H>  02-26    137  |  95<L>  |  106<H>  ----------------------------<  154<H>  3.6   |  24  |  3.16<H>    Ca    9.7      27 Feb 2024 05:41  Ca    9.8      26 Feb 2024 04:47  Phos  3.1     02-26  Mg     2.5     02-26              TELEMETRY: AF 60-90s, occasional V-pacing  	      	  ASSESSMENT/PLAN:

## 2024-02-27 NOTE — PROGRESS NOTE ADULT - PROBLEM SELECTOR PLAN 2
CHF consult called and appreciated  d/c bumex gttp and change to bumex 3 mg po bid as per renal  Continue with Aldactone 25 mg PO daily   Continuo on Isosorbide 10 mg PO TID   Continue on Hydralazine 10 mg PO TID  Daily BMP and serum Mg level

## 2024-02-28 ENCOUNTER — TRANSCRIPTION ENCOUNTER (OUTPATIENT)
Age: 84
End: 2024-02-28

## 2024-02-28 VITALS
TEMPERATURE: 98 F | DIASTOLIC BLOOD PRESSURE: 60 MMHG | OXYGEN SATURATION: 98 % | HEART RATE: 78 BPM | SYSTOLIC BLOOD PRESSURE: 94 MMHG | RESPIRATION RATE: 18 BRPM

## 2024-02-28 LAB
ADD ON TEST-SPECIMEN IN LAB: SIGNIFICANT CHANGE UP
ALBUMIN SERPL ELPH-MCNC: 3.7 G/DL — SIGNIFICANT CHANGE UP (ref 3.3–5)
ALP SERPL-CCNC: 193 U/L — HIGH (ref 40–120)
ALT FLD-CCNC: 30 U/L — SIGNIFICANT CHANGE UP (ref 10–45)
ANION GAP SERPL CALC-SCNC: 16 MMOL/L — SIGNIFICANT CHANGE UP (ref 5–17)
AST SERPL-CCNC: 36 U/L — SIGNIFICANT CHANGE UP (ref 10–40)
BILIRUB SERPL-MCNC: 1.6 MG/DL — HIGH (ref 0.2–1.2)
BUN SERPL-MCNC: 121 MG/DL — HIGH (ref 7–23)
CALCIUM SERPL-MCNC: 9.4 MG/DL — SIGNIFICANT CHANGE UP (ref 8.4–10.5)
CHLORIDE SERPL-SCNC: 94 MMOL/L — LOW (ref 96–108)
CO2 SERPL-SCNC: 23 MMOL/L — SIGNIFICANT CHANGE UP (ref 22–31)
CREAT SERPL-MCNC: 3.65 MG/DL — HIGH (ref 0.5–1.3)
EGFR: 16 ML/MIN/1.73M2 — LOW
GLUCOSE SERPL-MCNC: 109 MG/DL — HIGH (ref 70–99)
HCT VFR BLD CALC: 30.7 % — LOW (ref 39–50)
HGB BLD-MCNC: 10.2 G/DL — LOW (ref 13–17)
MCHC RBC-ENTMCNC: 31.6 PG — SIGNIFICANT CHANGE UP (ref 27–34)
MCHC RBC-ENTMCNC: 33.2 GM/DL — SIGNIFICANT CHANGE UP (ref 32–36)
MCV RBC AUTO: 95 FL — SIGNIFICANT CHANGE UP (ref 80–100)
NRBC # BLD: 0 /100 WBCS — SIGNIFICANT CHANGE UP (ref 0–0)
PLATELET # BLD AUTO: 134 K/UL — LOW (ref 150–400)
POTASSIUM SERPL-MCNC: 3.8 MMOL/L — SIGNIFICANT CHANGE UP (ref 3.5–5.3)
POTASSIUM SERPL-SCNC: 3.8 MMOL/L — SIGNIFICANT CHANGE UP (ref 3.5–5.3)
PROT SERPL-MCNC: 6.6 G/DL — SIGNIFICANT CHANGE UP (ref 6–8.3)
RBC # BLD: 3.23 M/UL — LOW (ref 4.2–5.8)
RBC # FLD: 15.4 % — HIGH (ref 10.3–14.5)
SODIUM SERPL-SCNC: 133 MMOL/L — LOW (ref 135–145)
WBC # BLD: 7.45 K/UL — SIGNIFICANT CHANGE UP (ref 3.8–10.5)
WBC # FLD AUTO: 7.45 K/UL — SIGNIFICANT CHANGE UP (ref 3.8–10.5)

## 2024-02-28 PROCEDURE — 84100 ASSAY OF PHOSPHORUS: CPT

## 2024-02-28 PROCEDURE — 84134 ASSAY OF PREALBUMIN: CPT

## 2024-02-28 PROCEDURE — 99233 SBSQ HOSP IP/OBS HIGH 50: CPT | Mod: FS

## 2024-02-28 PROCEDURE — 86901 BLOOD TYPING SEROLOGIC RH(D): CPT

## 2024-02-28 PROCEDURE — 93306 TTE W/DOPPLER COMPLETE: CPT

## 2024-02-28 PROCEDURE — 87640 STAPH A DNA AMP PROBE: CPT

## 2024-02-28 PROCEDURE — 84443 ASSAY THYROID STIM HORMONE: CPT

## 2024-02-28 PROCEDURE — 80053 COMPREHEN METABOLIC PANEL: CPT

## 2024-02-28 PROCEDURE — 93312 ECHO TRANSESOPHAGEAL: CPT

## 2024-02-28 PROCEDURE — 99238 HOSP IP/OBS DSCHRG MGMT 30/<: CPT

## 2024-02-28 PROCEDURE — 71045 X-RAY EXAM CHEST 1 VIEW: CPT

## 2024-02-28 PROCEDURE — 93325 DOPPLER ECHO COLOR FLOW MAPG: CPT

## 2024-02-28 PROCEDURE — 87641 MR-STAPH DNA AMP PROBE: CPT

## 2024-02-28 PROCEDURE — 80048 BASIC METABOLIC PNL TOTAL CA: CPT

## 2024-02-28 PROCEDURE — 83880 ASSAY OF NATRIURETIC PEPTIDE: CPT

## 2024-02-28 PROCEDURE — 36415 COLL VENOUS BLD VENIPUNCTURE: CPT

## 2024-02-28 PROCEDURE — 93320 DOPPLER ECHO COMPLETE: CPT

## 2024-02-28 PROCEDURE — 86900 BLOOD TYPING SEROLOGIC ABO: CPT

## 2024-02-28 PROCEDURE — 93005 ELECTROCARDIOGRAM TRACING: CPT

## 2024-02-28 PROCEDURE — 85730 THROMBOPLASTIN TIME PARTIAL: CPT

## 2024-02-28 PROCEDURE — 86850 RBC ANTIBODY SCREEN: CPT

## 2024-02-28 PROCEDURE — 92960 CARDIOVERSION ELECTRIC EXT: CPT

## 2024-02-28 PROCEDURE — 85025 COMPLETE CBC W/AUTO DIFF WBC: CPT

## 2024-02-28 PROCEDURE — 71045 X-RAY EXAM CHEST 1 VIEW: CPT | Mod: 26

## 2024-02-28 PROCEDURE — 76376 3D RENDER W/INTRP POSTPROCES: CPT

## 2024-02-28 PROCEDURE — 81003 URINALYSIS AUTO W/O SCOPE: CPT

## 2024-02-28 PROCEDURE — 85027 COMPLETE CBC AUTOMATED: CPT

## 2024-02-28 PROCEDURE — 83036 HEMOGLOBIN GLYCOSYLATED A1C: CPT

## 2024-02-28 PROCEDURE — 83735 ASSAY OF MAGNESIUM: CPT

## 2024-02-28 PROCEDURE — 85610 PROTHROMBIN TIME: CPT

## 2024-02-28 PROCEDURE — 93010 ELECTROCARDIOGRAM REPORT: CPT

## 2024-02-28 RX ORDER — AMIODARONE HYDROCHLORIDE 400 MG/1
2 TABLET ORAL
Qty: 120 | Refills: 0
Start: 2024-02-28 | End: 2024-03-28

## 2024-02-28 RX ORDER — AMIODARONE HYDROCHLORIDE 400 MG/1
2 TABLET ORAL
Qty: 28 | Refills: 0
Start: 2024-02-28 | End: 2024-03-05

## 2024-02-28 RX ORDER — BUMETANIDE 0.25 MG/ML
3 INJECTION INTRAMUSCULAR; INTRAVENOUS
Qty: 180 | Refills: 0
Start: 2024-02-28 | End: 2024-03-28

## 2024-02-28 RX ORDER — POTASSIUM BICARBONATE 978 MG/1
25 TABLET, EFFERVESCENT ORAL
Refills: 0 | Status: DISCONTINUED | OUTPATIENT
Start: 2024-02-28 | End: 2024-02-28

## 2024-02-28 RX ORDER — BUMETANIDE 0.25 MG/ML
4 INJECTION INTRAMUSCULAR; INTRAVENOUS
Qty: 240 | Refills: 0
Start: 2024-02-28 | End: 2024-03-28

## 2024-02-28 RX ADMIN — ISOSORBIDE DINITRATE 10 MILLIGRAM(S): 5 TABLET ORAL at 05:48

## 2024-02-28 RX ADMIN — SPIRONOLACTONE 25 MILLIGRAM(S): 25 TABLET, FILM COATED ORAL at 05:48

## 2024-02-28 RX ADMIN — Medication 10 MILLIGRAM(S): at 05:48

## 2024-02-28 RX ADMIN — BUMETANIDE 3 MILLIGRAM(S): 0.25 INJECTION INTRAMUSCULAR; INTRAVENOUS at 05:49

## 2024-02-28 RX ADMIN — SODIUM CHLORIDE 300 MILLILITER(S): 5 INJECTION, SOLUTION INTRAVENOUS at 05:49

## 2024-02-28 RX ADMIN — Medication 75 MICROGRAM(S): at 05:48

## 2024-02-28 RX ADMIN — POTASSIUM BICARBONATE 25 MILLIEQUIVALENT(S): 978 TABLET, EFFERVESCENT ORAL at 11:27

## 2024-02-28 RX ADMIN — SODIUM CHLORIDE 3 MILLILITER(S): 9 INJECTION INTRAMUSCULAR; INTRAVENOUS; SUBCUTANEOUS at 05:57

## 2024-02-28 RX ADMIN — PANTOPRAZOLE SODIUM 40 MILLIGRAM(S): 20 TABLET, DELAYED RELEASE ORAL at 05:48

## 2024-02-28 RX ADMIN — POLYETHYLENE GLYCOL 3350 17 GRAM(S): 17 POWDER, FOR SOLUTION ORAL at 11:28

## 2024-02-28 RX ADMIN — AMIODARONE HYDROCHLORIDE 400 MILLIGRAM(S): 400 TABLET ORAL at 05:47

## 2024-02-28 RX ADMIN — Medication 25 MILLIGRAM(S): at 05:48

## 2024-02-28 RX ADMIN — ISOSORBIDE DINITRATE 10 MILLIGRAM(S): 5 TABLET ORAL at 11:30

## 2024-02-28 NOTE — PROGRESS NOTE ADULT - ASSESSMENT
83 y/o M with ICM, LVEF 35-40% (LVIDd 6.7 cm), CAD c/b IWMI (1994) s/p angioplasty, aortic stenosis s/p bio-AVR 2004, VT arrest (2017) s/p ICD, Afib s/p multiple DCCV and ablation x2 (2020 and 2023; more recent 2/27/24 on AC), prior Ao aneurysm s/p Bentall (2021), severe MR with MAC (precludes KRISTIE), atrophic kidney with CKD (b/l Cr 1.9) presented from CTS office with decompensated heart failure. Labs on admission remarkable for BUN/Creatinine 114/3.67. HF consulted for further care.    Has an ongoing SURESH that will need to be further workup with possible need of HD in future. CT surgery was willing to perform procedure for the severe MR, however patient was reluctant of the procedure and would want to followup in the outpatient setting to schedule. From HF standpoint, he is eager to return home, but remains volume expanded. Will d/c him home on higher dose of diuretics.       Cardiac studies   WARREN 2/27/24: severe MR, DCCV SR  TTE 2/22/24: LVIDd: 5.8cm, LVEF 35%, mildly enlarged RV with reduced function, severe MR and Moderate to severe TR. PASP 70mmHg  WARREN 8/23 Severe MR and moderate TR  TTE 8/23 EF 36%, grade II DD, mildly enlarged RV with reduced function, severe MR and Moderate to severe TR

## 2024-02-28 NOTE — DISCHARGE NOTE PROVIDER - NSDCFUSCHEDAPPT_GEN_ALL_CORE_FT
Noe Fenton  [facility] Physician [facility]  CTSURG [address]  Scheduled Appointment: 03/13/2024

## 2024-02-28 NOTE — PROGRESS NOTE ADULT - ASSESSMENT
84 yo man with PMHx of HTN, HLD, GERD, HFrEF, EF of 35-40%, CAD, ICM, bioprosthetic aortic valve replacement in 2004, VT/VF arrest in 2017 s/p [employer] dual chamber ICD implant, prior VT ablation x 3 at [facility] (2/14/19, 10/22/19, 7/29/22), atrophic kidney, atrial fibrillation, s/p ablation on 10/29/20, and cardioversion x 7 (9/11/20, 7/30/21, 2/14/22, 10/25/22, 12/14/22, 2/24/23, 8/2023) on Xarelto, aortic aneurysm and mitral valve repair in June 2021, known severe MR (not a candidate for KRISTIE per Dr. Valentino given severe MAC, denied by [employer] for Tendyne valve), who was admitted to the hospital for CHF exacerbation after presenting to CTS office for c/o worsening SOB and recent weight gain of 20 pounds. Patient was admitted for further medical management and medical optimization.  He was seen by Nephrology and CHF teams and underwent diuresis with IV Bumex gtt. He has been in atrial flutter for which EP was consulted. His device was interrogated which revealed he has been in AFl since ~late January. On telemetry his rates are controlled 70-90s. He is currently on room air and feels well. His Xarelto was held initially on admission as there was consideration for MVR this admission. The plan per [facility] is for him to follow up outpatient with Dr Fenton for ?consideration of MVR down the line.     1) Atrial flutter  2) Severe Mitral Regurgitation  3) HFrEF, LVEF 35%    Recommendations:  -Pt underwent successful WARREN guided DCCV 2/27/24   -Continue Xarelto 15mg for OAC (CrCl 17), will need uninterrupted OAC for 1 month after DCCV  -Continue Toprol XL  -Will place on amiodarone s/p DCCV. Pt was taken off amiodarone Nov/Dec by his outpatient electrophysiologist (Dr Tsang). Start amiodarone 400mg BID x 1 week, 200mg BID x 1 week then 200mg daily  -Counters cleared on device s/p DCCV. Atrial threshold checked after restoration of NSR: 0.75 at 0.5ms  -OP follow up with his electrophysiologist Dr Tsang  -Discussed with primary team and EP attending  -Pt ok for discharge today from EP perspective

## 2024-02-28 NOTE — DISCHARGE NOTE PROVIDER - NSDCFUADDINST_GEN_ALL_CORE_FT
March 17, 2023       Dick Copeland MD  [address] [facility] [address]  Via In Basket      Patient: Desiree Cat   YOB: 1936   Date of Visit: 3/17/2023       Dear Dr. Copeland:    Thank you for referring Desiree Cat to me for evaluation. Below are my notes for this visit with her.    If you have questions, please do not hesitate to call me. I look forward to following your patient along with you.      Sincerely,        Logan Burgess MD        CC: No Recipients  Logan Burgess MD  3/17/2023 12:45 PM  Signed  [facility] Follow-up Note     Today's Date: 3/17/2023    PCP: Dick Copeland MD    Chief Complaint: Cardiology follow-up for paroxysmal atrial fibrillation, hypertension, and hyperlipidemia.    HPI: Patient denies chest pain or chest pressure.    She has no shortness of breath with regular activities.    She denies  dizziness or syncope.  She has palpitations which occur 1 or 2 times per month, last for 5 minutes, and resolve spontaneously      ROS: Gen: No fevers or chills,  weight loss,  or weight gain   HEENT: No visual changes, hearing loss, epistaxis, or  sore throat  CV: No palpitations,  CP,  dizziness, or  syncope  Pulm: No SOB, cough, or hemoptysis  Neuro: No seizures, headaches, or weakness  Skin: No rashes or  easy bruising  Psychiatric: No depression or anxiety      Current Medications:   Current Medications    ALLOPURINOL (ZYLOPRIM) 100 MG TABLET    Take 100 mg by mouth daily.    AMLODIPINE (NORVASC) 10 MG TABLET    TAKE 1 TABLET EVERY DAY    CHLORTHALIDONE (THALITONE) 25 MG TABLET    TAKE 1 TABLET EVERY DAY    COVID-19 MRNA, PFIZER, (PFIZER-Metrasens COVID-19 VACC) 30 MCG/0.3ML VACCINE    Inject 0.3 mLs into the muscle 1 time for 1 dose. Repeat dose in 21-28 days, for a total of 2 doses.    KETOTIFEN (ZADITOR) 0.025 % OPHTHALMIC SOLUTION    1 drop 2 times daily.    METOPROLOL SUCCINATE (TOPROL-XL) 100 MG 24 HR TABLET    TAKE 1 AND 1/2 TABLETS EVERY  DAY       Relevant Past Medical History:  1.  Hypertension  2.  Chronic renal insufficiency      Social History:   Tobacco use: None  Lives with granddaughter.  Tolerates activities of daily living      Family History:   Mother had CHF    Examination:   Blood pressure 134/72, pulse 68, height 5' 3\" (1.6 m), weight 75.6 kg (166 lb 10.7 oz).  Weight    03/17/23 1230   Weight: 75.6 kg (166 lb 10.7 oz)     General - alert and oriented x 3, well nourished, no distress  HENT -normocephalic ,  mucosa  moist   Eyes - pupils equal, round, and reactive to light, normal conjunctiva  Neck - JVD not elevated, carotids normal upstroke with no bruit   Respiratory - good air exchange with no crackles or wheezing   Cardiovascular - RRR, normal S1 and S2, soft HSM at LLSB. No edema of lower extremities  Gastrointestinal  - soft, nontender  Skin - warm, dry, no rash  Neurological  - alert, no focal deficits  Cognition & Speech - speech clear and coherent  Psychiatric - cooperative    Clinical Data:  The following were personally visualized and interpreted by me:    Echo: 6/21 LVH, normal LV systolic function, mild to moderate MR, PA =76mmHg    Holter: 6/21-sinus rhythm with paroxysmal atrial fibrillation, frequent PACs and PVCs.  No pauses    Labs: 9/20 ; 5/21 CR 1.4, TSH normal; 1/22 , CR 1.5    Problems/Plans:  1. Paroxysmal atrial fibrillation (CMS/HCC)   Chads-vasc score = 4    2. Mixed hyperlipidemia    3. Benign hypertension    4. Pulmonary hypertension (CMS/HCC)      Patient with no symptoms chest pain, shortness of breath, or dizziness.  She has palpitations which occur approximately 1 time per month, last 5 minutes, and resolve spontaneously.     Echocardiogram in 6/21 showed normal LV function with mild to moderate mitral regurgitation and PA pressure 76 mmHg.  Holter monitor in 6/21 showed sinus rhythm with paroxysmal atrial fibrillation.    Recommend continue current dose of amlodipine and metoprolol.    I  discussed the significance of atrial fibrillation with patient and her daughter.  At current time they defer anticoagulation.  Benefits of anticoagulation and stroke reduction were discussed in detail in layman's terms.  She also defers treatment for hyperlipidemia and any further work-up for pulmonary hypertension..    I encourage patient eat a low-fat, low-salt diet and to remain active.    Return to clinic in 6 months.  I asked her to call with any new symptoms.        Logan Burgess MD       The above note was created using dictation using voice recognition software.  While every effort was made to correct the dictation, an occasional error may have been missed.       Daily weight

## 2024-02-28 NOTE — DISCHARGE NOTE PROVIDER - NSDCMRMEDTOKEN_GEN_ALL_CORE_FT
acetaminophen 325 mg oral tablet: 2 tab(s) orally every 6 hours As needed Temp greater or equal to 38C (100.4F), Mild Pain (1 - 3)  atorvastatin 40 mg oral tablet: 1 tab(s) orally once a day (at bedtime)  bumetanide 2 mg oral tablet: 1 tab(s) orally 2 times a day  CoQ10 300 mg oral capsule: 1 orally once a day (at bedtime)  hydrALAZINE 10 mg oral tablet: 1 tab(s) orally 3 times a day  isosorbide dinitrate 10 mg oral tablet: 1 tab(s) orally 3 times a day  levothyroxine 50 mcg (0.05 mg) oral tablet: 1.5 tab(s) orally once a day  melatonin 3 mg oral tablet: 1 tab(s) orally once a day (at bedtime) As needed Insomnia  metoprolol succinate 25 mg oral tablet, extended release: 1 tab(s) orally once a day  pantoprazole 40 mg oral delayed release tablet: 1 tab(s) orally once a day (before a meal)  polyethylene glycol 3350 oral powder for reconstitution: 17 gram(s) orally once a day  Presser Vision Areds 2:   senna leaf extract oral tablet: 2 tab(s) orally once a day (at bedtime)  spironolactone 25 mg oral tablet: 1 tab(s) orally once a day  Xarelto 15 mg oral tablet: 1 tablet orally once a day   acetaminophen 325 mg oral tablet: 2 tab(s) orally every 6 hours As needed Temp greater or equal to 38C (100.4F), Mild Pain (1 - 3)  amiodarone 200 mg oral tablet: 2 tab(s) orally 2 times a day  amiodarone 200 mg oral tablet: 1 tab(s) orally 2 times a day  amiodarone 200 mg oral tablet: 1 tab(s) orally once a day  atorvastatin 40 mg oral tablet: 1 tab(s) orally once a day (at bedtime)  bumetanide 1 mg oral tablet: 3 tab(s) orally 2 times a day  CoQ10 300 mg oral capsule: 1 orally once a day (at bedtime)  hydrALAZINE 10 mg oral tablet: 1 tab(s) orally 3 times a day  isosorbide dinitrate 10 mg oral tablet: 1 tab(s) orally 3 times a day  levothyroxine 50 mcg (0.05 mg) oral tablet: 1.5 tab(s) orally once a day  melatonin 3 mg oral tablet: 1 tab(s) orally once a day (at bedtime) As needed Insomnia  metoprolol succinate 25 mg oral tablet, extended release: 1 tab(s) orally once a day  pantoprazole 40 mg oral delayed release tablet: 1 tab(s) orally once a day (before a meal)  polyethylene glycol 3350 oral powder for reconstitution: 17 gram(s) orally once a day  Potassium Chloride (Eqv-Klor-Con M20) 20 mEq oral tablet, extended release: 1 tab(s) orally once a day  Presser Vision Areds 2:   senna leaf extract oral tablet: 2 tab(s) orally once a day (at bedtime)  Xarelto 15 mg oral tablet: 1 tablet orally once a day   acetaminophen 325 mg oral tablet: 2 tab(s) orally every 6 hours As needed Temp greater or equal to 38C (100.4F), Mild Pain (1 - 3)  amiodarone 200 mg oral tablet: 2 tab(s) orally 2 times a day take 2 tabs  bid until 3/6   then take 1 tab bid until 3/13   then take 1tab daily   amiodarone taper per EP  atorvastatin 40 mg oral tablet: 1 tab(s) orally once a day (at bedtime)  bumetanide 1 mg oral tablet: 4 tab(s) orally 2 times a day  CoQ10 300 mg oral capsule: 1 orally once a day (at bedtime)  hydrALAZINE 10 mg oral tablet: 1 tab(s) orally 3 times a day  isosorbide dinitrate 10 mg oral tablet: 1 tab(s) orally 3 times a day  levothyroxine 50 mcg (0.05 mg) oral tablet: 1.5 tab(s) orally once a day  melatonin 3 mg oral tablet: 1 tab(s) orally once a day (at bedtime) As needed Insomnia  metoprolol succinate 25 mg oral tablet, extended release: 1 tab(s) orally once a day  pantoprazole 40 mg oral delayed release tablet: 1 tab(s) orally once a day (before a meal)  polyethylene glycol 3350 oral powder for reconstitution: 17 gram(s) orally once a day  Potassium Chloride (Eqv-Klor-Con M20) 20 mEq oral tablet, extended release: 1 tab(s) orally once a day  Presser Vision Areds 2:   senna leaf extract oral tablet: 2 tab(s) orally once a day (at bedtime)  Xarelto 15 mg oral tablet: 1 tablet orally once a day

## 2024-02-28 NOTE — PROGRESS NOTE ADULT - PROBLEM SELECTOR PLAN 1
- HFrEF LVEF 36%, grade II DD - ACC/ AHA stage C/D heart failure, NYHA class III   - Etiology: ischemic and complicated by severe MR  - GDMT: will increase bumex to 4mg BID. Continue with Toprol XL 25mg QD, HDZN 10mg TID and ISDN 10mg TID  - Will d/c spironolactone due to rise in Cr. Will supplement K+ with 20mEQ daily  - Will schedule to have a f/u visit with Dr. Ruiz in [place] [facility] on 3/12/24  - has dual chamber ICD, interrogation 2/26 with  41% and episode of AT/AF that began 1/2024

## 2024-02-28 NOTE — PROGRESS NOTE ADULT - NS ATTEND AMEND GEN_ALL_CORE FT
82 y/o M with ICM/HFrEF (now 30%; LVIDd 6.7 cm; prev req inotrope), CAD c/b IWMI (1994) s/p angioplasty, aortic stenosis s/p bio-AVR 2004, VT arrest (2017) s/p ICD, Afib s/p multiple DCCV and ablation x2 (2020 and 2023; on AC), Aflutter s/p WARREN/DCCV (8/23), prior Ao aneurysm s/p Bentall (2021), severe MR prior MVr (2021) with MAC (precludes KRISTIE; turned down for TMVR trials), atrophic kidney with CKD (b/l Cr 2.5-3), admitted on 2/20 with acute on chronic heart failure and acute on chronic kidney dysfunction. Initiated on bumex gtt with gradual improvement with Cr improving from 3.6 to 3.1. Feels improved. Being considered for MV re-op. On exam, bitemporal wasting, JVP elevated with v waves, RRR, grade III/VI systolic murmur, CTAB, nontender abdomen, b/l pitting edema, cool to palpation. Possibly aflutter with V pacing. Labs reviewed - BUN/Cr 106/3.16 (slight improvement; prior parag 2.3), BNP 14k  - appreciate EP input; found to be in flutter; WARREN/DCCV tomorrow  - discussed at length with patient situation  - c/w bumex gtt at 1mg/hr;g goal weight likely 136   - c/w hydral/isordil 10 mg q8h   - c/w toprol 25 mg daily  - c/w franklin 25 mg daily; low threshold to d/c  - prev intolerant of SGLT2i  - prognosis guarded
84 y/o M with ICM/HFrEF (now 30%; LVIDd 6.7 cm; prev req inotrope), CAD c/b IWMI (1994) s/p angioplasty, aortic stenosis s/p bio-AVR 2004, VT arrest (2017) s/p ICD, Afib s/p multiple DCCV and ablation x2 (2020 and 2023; on AC), Aflutter s/p WARREN/DCCV (8/23), prior Ao aneurysm s/p Bentall (2021), severe MR prior MVr (2021) with MAC (precludes KRISTIE; turned down for TMVR trials), atrophic kidney with CKD (b/l Cr 2.5-3), admitted on 2/20 with acute on chronic heart failure and acute on chronic kidney dysfunction. Initiated on bumex gtt with gradual improvement with Cr improving from 3.6 to 3.1. Feels improved. Being considered for MV re-op. Noted to be in flutter and underwent repeat WARREN/DCCV. On exam, bitemporal wasting, JVP elevated with v waves, RRR, grade III/VI systolic murmur, CTAB, nontender abdomen, b/l pitting edema, cool to palpation. Labs reviewed - BUN/Cr 121/3.68 (uptrend; prior parag 2.3), BNP 18k  - appreciate EP input; s/p DCCV; on amio  - discussed at length with patient situation  - increase bumex to 4 mg twice/day  - c/w hydral/isordil 10 mg q8h   - c/w toprol 25 mg daily  - d/c franklin   - prev intolerant of SGLT2i  - prognosis guarded; will f/u closely as outpatient

## 2024-02-28 NOTE — PROGRESS NOTE ADULT - SUBJECTIVE AND OBJECTIVE BOX
Subjective: no overnight events reported. He is eager to be discharged today.     Medications:  acetaminophen     Tablet .. 650 milliGRAM(s) Oral every 6 hours PRN  aMIOdarone    Tablet 400 milliGRAM(s) Oral two times a day  aMIOdarone    Tablet   Oral   atorvastatin 40 milliGRAM(s) Oral at bedtime  buMETAnide 3 milliGRAM(s) Oral two times a day  hydrALAZINE 10 milliGRAM(s) Oral three times a day  isosorbide   dinitrate Tablet (ISORDIL) 10 milliGRAM(s) Oral three times a day  levothyroxine 75 MICROGram(s) Oral daily  melatonin 3 milliGRAM(s) Oral at bedtime PRN  metoprolol succinate ER 25 milliGRAM(s) Oral daily  pantoprazole    Tablet 40 milliGRAM(s) Oral before breakfast  polyethylene glycol 3350 17 Gram(s) Oral daily  potassium bicarbonate/potassium citrate 25 milliEquivalent(s) Oral every 1 hour  potassium chloride    Tablet ER 20 milliEquivalent(s) Oral every 2 hours  rivaroxaban 15 milliGRAM(s) Oral with dinner  senna 2 Tablet(s) Oral at bedtime  sodium chloride 0.9% lock flush 3 milliLiter(s) IV Push every 8 hours  sodium chloride 3% Bolus 150 milliLiter(s) IV Bolus <User Schedule>      Physical Exam:    Vitals:  Vital Signs Last 24 Hours  T(C): 36.6 (24 @ 12:28), Max: 37 (24 @ 20:00)  HR: 78 (24 @ 12:28) (78 - 95)  BP: 94/60 (24 @ 12:28) (89/54 - 124/83)  RR: 18 (24 @ 12:28) (18 - 19)  SpO2: 98% (24 @ 12:28) (93% - 98%)    Weight in k.3 ( @ 08:00)    I&O's Summary    2024 07:01  -  2024 07:00  --------------------------------------------------------  IN: 150 mL / OUT: 1500 mL / NET: -1350 mL    2024 07:01  -  2024 14:30  --------------------------------------------------------  IN: 360 mL / OUT: 400 mL / NET: -40 mL        Tele: NSR in 70s    General: No distress. Comfortable.  HEENT: EOM intact.  Neck: JVP 16 cm H20  Chest: Clear to auscultation bilaterally  CV: Normal S1 and S2. No murmurs, rub, or gallops. Radial pulses normal.  Abdomen: Soft, non-distended, non-tender  Extremities: +2 edema on BLE edema  Neurology: Alert and oriented times three. Sensation intact  Psych: Affect normal    Labs:                        10.2   7.45  )-----------( 134      ( 2024 06:45 )             30.7         133<L>  |  94<L>  |  121<H>  ----------------------------<  109<H>  3.8   |  23  |  3.65<H>    Ca    9.4      2024 06:46    TPro  6.6  /  Alb  3.7  /  TBili  1.6<H>  /  DBili  x   /  AST  36  /  ALT  30  /  AlkPhos  193<H>      PT/INR - ( 2024 05:43 )   PT: 23.1 sec;   INR: 2.25 ratio

## 2024-02-28 NOTE — PROGRESS NOTE ADULT - PROBLEM SELECTOR PLAN 3
- Likely cardiorenal   - Cr today 3.65  - continue with diuresis as above. Will hold off MRA for rising Cr.   - will discuss with nephrology for repeat labs in 1 week post discharge  - will f/u nephrology to reevaluate for possible HD in the future

## 2024-02-28 NOTE — PROGRESS NOTE ADULT - PROBLEM SELECTOR PROBLEM 4
Paroxysmal atrial fibrillation
Paroxysmal atrial fibrillation
Afib
Paroxysmal atrial fibrillation
Afib

## 2024-02-28 NOTE — PROGRESS NOTE ADULT - SUBJECTIVE AND OBJECTIVE BOX
[facility]   [phone]  RENAL FOLLOW UP NOTE  --------------------------------------------------------------------------------  Chief Complaint:    24 hour events/subjective:    seen earlier   denies worsening sob     PAST HISTORY  --------------------------------------------------------------------------------  No significant changes to PMH, PSH, FHx, SHx, unless otherwise noted    ALLERGIES & MEDICATIONS  --------------------------------------------------------------------------------  Allergies    No Known Allergies    Intolerances      Standing Inpatient Medications  aMIOdarone    Tablet 400 milliGRAM(s) Oral two times a day  aMIOdarone    Tablet   Oral   atorvastatin 40 milliGRAM(s) Oral at bedtime  buMETAnide 3 milliGRAM(s) Oral two times a day  hydrALAZINE 10 milliGRAM(s) Oral three times a day  isosorbide   dinitrate Tablet (ISORDIL) 10 milliGRAM(s) Oral three times a day  levothyroxine 75 MICROGram(s) Oral daily  metoprolol succinate ER 25 milliGRAM(s) Oral daily  pantoprazole    Tablet 40 milliGRAM(s) Oral before breakfast  polyethylene glycol 3350 17 Gram(s) Oral daily  potassium bicarbonate/potassium citrate 25 milliEquivalent(s) Oral every 1 hour  potassium chloride    Tablet ER 20 milliEquivalent(s) Oral every 2 hours  rivaroxaban 15 milliGRAM(s) Oral with dinner  senna 2 Tablet(s) Oral at bedtime  sodium chloride 0.9% lock flush 3 milliLiter(s) IV Push every 8 hours  sodium chloride 3% Bolus 150 milliLiter(s) IV Bolus <User Schedule>    PRN Inpatient Medications  acetaminophen     Tablet .. 650 milliGRAM(s) Oral every 6 hours PRN  melatonin 3 milliGRAM(s) Oral at bedtime PRN      REVIEW OF SYSTEMS  --------------------------------------------------------------------------------    Gen: denies  fevers/chills,  CVS: denies chest pain/palpitations  Resp: denies SOB/Cough  GI: Denies N/V/Abd pain  : Denies dysuria    VITALS/PHYSICAL EXAM  --------------------------------------------------------------------------------  T(C): 36.6 (02-28-24 @ 12:28), Max: 36.7 (02-28-24 @ 05:36)  HR: 78 (02-28-24 @ 12:28) (78 - 95)  BP: 94/60 (02-28-24 @ 12:28) (94/60 - 124/83)  RR: 18 (02-28-24 @ 12:28) (18 - 19)  SpO2: 98% (02-28-24 @ 12:28) (93% - 98%)  Wt(kg): --  Height (cm): 175.3 (02-27-24 @ 09:40)  Weight (kg): 71.5 (02-27-24 @ 09:40)  BMI (kg/m2): 23.3 (02-27-24 @ 09:40)  BSA (m2): 1.87 (02-27-24 @ 09:40)      02-27-24 @ 07:01 - 02-28-24 @ 07:00  --------------------------------------------------------  IN: 150 mL / OUT: 1500 mL / NET: -1350 mL    02-28-24 @ 07:01  -  02-28-24 @ 21:12  --------------------------------------------------------  IN: 360 mL / OUT: 400 mL / NET: -40 mL      Physical Exam:  	    Gen: Non toxic comfortable appearing   	+ decreased  JVD  	Pulm: decrease bs  no rales or ronchi or wheezing  	CV: RRR, S1S2; no rub III/VI M   	Abd: +BS, soft, nontender/nondistended  	: No suprapubic tenderness  	UE: Warm, no cyanosis  no clubbing,  no edema  	LE: Warm, no cyanosis  no clubbing, 1 + to lower calf area    edema  	Neuro: alert and oriented. speech coherent 	      LABS/STUDIES  --------------------------------------------------------------------------------              10.2   7.45  >-----------<  134      [02-28-24 @ 06:45]              30.7     133  |  94  |  121  ----------------------------<  109      [02-28-24 @ 06:46]  3.8   |  23  |  3.65        Ca     9.4     [02-28-24 @ 06:46]    TPro  6.6  /  Alb  3.7  /  TBili  1.6  /  DBili  x   /  AST  36  /  ALT  30  /  AlkPhos  193  [02-28-24 @ 06:46]    PT/INR: PT 23.1 , INR 2.25       [02-27-24 @ 05:43]      Creatinine Trend:  SCr 3.65 [02-28 @ 06:46]  SCr 3.66 [02-27 @ 05:41]  SCr 3.16 [02-26 @ 04:47]  SCr 3.29 [02-25 @ 06:15]  SCr 3.19 [02-24 @ 07:27]        TSH 2.03      [02-20-24 @ 15:55]

## 2024-02-28 NOTE — PROGRESS NOTE ADULT - REASON FOR ADMISSION
Fluid Overload

## 2024-02-28 NOTE — PROGRESS NOTE ADULT - PROBLEM SELECTOR PROBLEM 1
MR (mitral regurgitation)
Acute decompensated heart failure
MR (mitral regurgitation)
Acute decompensated heart failure
Acute decompensated heart failure
MR (mitral regurgitation)
MR (mitral regurgitation)
Acute decompensated heart failure
Acute decompensated heart failure
MR (mitral regurgitation)

## 2024-02-28 NOTE — PROGRESS NOTE ADULT - SUBJECTIVE AND OBJECTIVE BOX
24H hour events: Pt feeling well without acute complaints. Maintaining NSR s/p DCCV yesterday    MEDICATIONS:  aMIOdarone    Tablet 400 milliGRAM(s) Oral two times a day  aMIOdarone    Tablet   Oral   buMETAnide 3 milliGRAM(s) Oral two times a day  hydrALAZINE 10 milliGRAM(s) Oral three times a day  isosorbide   dinitrate Tablet (ISORDIL) 10 milliGRAM(s) Oral three times a day  metoprolol succinate ER 25 milliGRAM(s) Oral daily  rivaroxaban 15 milliGRAM(s) Oral with dinner        acetaminophen     Tablet .. 650 milliGRAM(s) Oral every 6 hours PRN  melatonin 3 milliGRAM(s) Oral at bedtime PRN    pantoprazole    Tablet 40 milliGRAM(s) Oral before breakfast  polyethylene glycol 3350 17 Gram(s) Oral daily  senna 2 Tablet(s) Oral at bedtime    atorvastatin 40 milliGRAM(s) Oral at bedtime  levothyroxine 75 MICROGram(s) Oral daily    potassium bicarbonate/potassium citrate 25 milliEquivalent(s) Oral every 1 hour  potassium chloride    Tablet ER 20 milliEquivalent(s) Oral every 2 hours  sodium chloride 0.9% lock flush 3 milliLiter(s) IV Push every 8 hours  sodium chloride 3% Bolus 150 milliLiter(s) IV Bolus <User Schedule>      REVIEW OF SYSTEMS:  See HPI, otherwise ROS negative.    PHYSICAL EXAM:  T(C): 36.7 (02-28-24 @ 05:36), Max: 37 (02-27-24 @ 20:00)  HR: 84 (02-28-24 @ 05:36) (79 - 95)  BP: 124/83 (02-28-24 @ 05:36) (86/54 - 124/83)  RR: 18 (02-28-24 @ 05:36) (18 - 19)  SpO2: 95% (02-28-24 @ 05:36) (93% - 96%)  Wt(kg): --  I&O's Summary    27 Feb 2024 07:01  -  28 Feb 2024 07:00  --------------------------------------------------------  IN: 150 mL / OUT: 1500 mL / NET: -1350 mL    28 Feb 2024 07:01  -  28 Feb 2024 10:53  --------------------------------------------------------  IN: 360 mL / OUT: 400 mL / NET: -40 mL        Appearance: Alert. NAD	  HEENT:   NC/AT	  Cardiovascular: +S1S2 RRR   Respiratory: CTA B/L	  Psychiatry: A & O x 3, Mood & affect appropriate  Gastrointestinal:  Soft	  Skin: No rashes	  Neurologic: Non-focal  Extremities: 2+ LE edema      LABS:	 	    CBC Full  -  ( 28 Feb 2024 06:45 )  WBC Count : 7.45 K/uL  Hemoglobin : 10.2 g/dL  Hematocrit : 30.7 %  Platelet Count - Automated : 134 K/uL  Mean Cell Volume : 95.0 fl  Mean Cell Hemoglobin : 31.6 pg  Mean Cell Hemoglobin Concentration : 33.2 gm/dL  Auto Neutrophil # : x  Auto Lymphocyte # : x  Auto Monocyte # : x  Auto Eosinophil # : x  Auto Basophil # : x  Auto Neutrophil % : x  Auto Lymphocyte % : x  Auto Monocyte % : x  Auto Eosinophil % : x  Auto Basophil % : x    02-28    133<L>  |  94<L>  |  121<H>  ----------------------------<  109<H>  3.8   |  23  |  3.65<H>  02-27    135  |  94<L>  |  114<H>  ----------------------------<  94  4.0   |  24  |  3.66<H>    Ca    9.4      28 Feb 2024 06:46  Ca    9.7      27 Feb 2024 05:41    TPro  6.6  /  Alb  3.7  /  TBili  1.6<H>  /  DBili  x   /  AST  36  /  ALT  30  /  AlkPhos  193<H>  02-28            TELEMETRY: NSR 70-100s  	        	  ASSESSMENT/PLAN:

## 2024-02-28 NOTE — PROGRESS NOTE ADULT - PROBLEM SELECTOR PROBLEM 3
Acute on chronic kidney failure

## 2024-02-28 NOTE — PROGRESS NOTE ADULT - ASSESSMENT
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 Fenton 56/2/21, GERD, HLD, renal atrophy with single kidney, pleural effusion with Thoracentesis. He was evaluated for M-KRISTIE but was not a candidate d/t his anatomy.  He was evaluated for SUMMIT (Tendyne valve) trial for a transcatheter MVR but was denied by [email or web address].    Pt presented to CTS office for c/o worsening SOB and recent weight gain of 20 pounds. Patient was admitted for further medical management and medical optimization.    pt with ree with cr 3.5 and bun 111.      1- REE on CKD IV   2- CHF   3- severe MR       REE in setting of decompensated chf   cr is still remains elevated    change bumex to 4 mg po bid    dc aldactone   kcl 20 meq daily   strict I/O   keep O>I     daily weights

## 2024-02-28 NOTE — PROGRESS NOTE ADULT - PROVIDER SPECIALTY LIST ADULT
CT Surgery
Electrophysiology
Electrophysiology
Nephrology
Heart Failure
Nephrology
CT Surgery
Heart Failure
CT Surgery
Heart Failure
Nephrology
Nephrology
CT Surgery
Heart Failure
CT Surgery
CT Surgery
Heart Failure
CT Surgery

## 2024-02-28 NOTE — DISCHARGE NOTE PROVIDER - CARE PROVIDERS DIRECT ADDRESSES
,[email or web address]

## 2024-02-28 NOTE — DISCHARGE NOTE PROVIDER - NSDCCPCAREPLAN_GEN_ALL_CORE_FT
PRINCIPAL DISCHARGE DIAGNOSIS  Diagnosis: Chronic CHF  Assessment and Plan of Treatment: 1. Daily Shower  2. Weigh yourself daily and notify any weight gain greater than 2-3 pounds in 24 hours.  3. Regular diet - low fat, low cholesterol, no added salt.  air.    4. Call / Notify MD any fever greater than 101.0  5. Increase Activity as tolerated.Daily protein shakes  take all medications as prescribed  Follow up  with Dr Fenton on 3/13 at 3pm at [place] [address] [phone]  Follow with your  cardiologist  Dr Tsang within 2 weeks of discharge  On amiodarone TAPER post cardioversion 2/27  Follow with your nephrologist within one week of discharge  Call our offic  for fever chills or any concerns [phone]       PRINCIPAL DISCHARGE DIAGNOSIS  Diagnosis: Chronic CHF  Assessment and Plan of Treatment: 1. Daily Shower  2. Weigh yourself daily and notify any weight gain greater than 2-3 pounds in 24 hours.  3. Regular diet - low fat, low cholesterol, no added salt.  air.    4. Call / Notify MD any fever greater than 101.0  5. Increase Activity as tolerated.Daily protein shakes  take all medications as prescribed  Follow up  with Dr Fenton on 3/13 at 3pm at [place] [address] [phone]  Follow up with Dr Enmanuel Ruiz heart failure at  [facility] on 3/12 at 3 PM  Follow with your  cardiologist  Dr Tsang within 2 weeks of discharge  On amiodarone TAPER post cardioversion 2/27  Follow with your nephrologist within one week of discharge  Call our office  for fever chills or any concerns [phone]

## 2024-02-28 NOTE — DISCHARGE NOTE PROVIDER - HOSPITAL COURSE
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 Fenton 56/2/21, GERD, HLD, renal atrophy with single kidney, pleural effusion with Thoracentesis. He was evaluated for M-KRISTIE but was not a candidate d/t his anatomy.  He was evaluated for SUMMIT (Tendyne valve) trial for a transcatheter MVR but was denied by [email or web address].    Pt presented to CTS office for c/o worsening SOB and recent weight gain of 20 pounds. Patient was admitted for further medical management and medical optimization.      Hospital Course:    Admit to 2 [facility] Telemetry floor.  CHF team consulted and appreciated.  Bumex 4 mg IVPB now. Started on Bumex gtt 1 mg / hr.  Creatinine 4;  SURESH on CKD --> Dr. Lala - renal consulted (known to pt from previous admission). Pre op Cardiac surgery work up initiated.    2/21 Bumex gtt continued, strict I&Os, preop w/u in progress, OR TBD  2/22 VSS  -1985cc/24hrs.  Prealbumin 20.  TTE severe MR EF 35%.   2/23 VSS; Continue on Bumex gtt at 1 mg / hr. Net negative 3.5 L/24hr.  Per CHF Team will continue on current dose of diuretics.  Repeat BMP w/ Mg this afternoon.  OR date TBD.    2/24 VSS; Continue with current medication regimen.  Negative 3150 cc/24hrs on fluid balance.  Serum Potassium 3.7 --> Supplemented. Nutrition and PT consult ordered.  OR date TBD.   2/25 VSS; restarted on Xarelto for AC for persistent aflutter, per Dr. Fenton all other medication regimen maintained. SURESH on CKD, for which renal Dr. Lala following - maintain on Bumex gtt. Monitor BMP.  2/26 VSS: V.paced 70-80; bun/cr decreased this am 106/3.1---> change bumex gttp to bumex 3 mg po bid ; WARREN/ DCCV in am tue a per EP; npo after midnight   discharge planning- home tue if stable overnight   2/27 VSS; NPO today for WARREN/DCCV  2/28 VSS  NSR seen and examined in company of Dr Fenton stable and eager for discharge cleared by EP HF and nephrology

## 2024-02-28 NOTE — PROGRESS NOTE ADULT - PROBLEM SELECTOR PLAN 4
- DCCV on 2/27/24, rate controlled  - Today on tele: NSR in 70s  - On amio load  - continue AC with Xarelto and BB  - f/u with EP outpatient with Dr. Tsang
Hold xarelto for planned procedure  reintroduce BB when more euvolemic.
Hold xarelto for planned procedure  reintroduce BB when more euvolemic.
- rate controlled   - On AC with Xarelto
- rate controlled   - On AC with Xarelto
EP following  ac w/ xarelto  toprol 25 qd for HR control   WARREN/ DCCV tue as per EP
EP following  ac w/ xarelto  toprol 25 qd for HR control   npo after midnight- WARREN/ DCCV tue as per EP

## 2024-02-28 NOTE — DISCHARGE NOTE PROVIDER - PROVIDER TOKENS
PROVIDER:[TOKEN:[[id number]],SCHEDULEDAPPT:[03/13/2024],SCHEDULEDAPPTTIME:[03:00 PM]] PROVIDER:[TOKEN:[[id number]],SCHEDULEDAPPT:[03/13/2024],SCHEDULEDAPPTTIME:[03:00 PM]],PROVIDER:[TOKEN:[[id number]],SCHEDULEDAPPT:[03/12/2024],SCHEDULEDAPPTTIME:[03:00 PM]]

## 2024-02-28 NOTE — DISCHARGE NOTE PROVIDER - NSDCPNSUBOBJ_GEN_ALL_CORE
Axox3  NSR [id number]  lB/l  clear lungs   ab soft + Bm Voids   equal strength throughout    B/lle +DP no edema    T(C): 36.7 (02-28-24 @ 05:36), Max: 37 (02-27-24 @ 20:00)  T(F): 98.1 (02-28-24 @ 05:36), Max: 98.6 (02-27-24 @ 20:00)  HR: 84 (02-28-24 @ 05:36) (79 - 95)  BP: 124/83 (02-28-24 @ 05:36) (86/54 - 124/83)  RR: 18 (02-28-24 @ 05:36) (18 - 19)  SpO2: 95% (02-28-24 @ 05:36) (93% - 96%)                        10.2   7.45  )-----------( 134      ( 28 Feb 2024 06:45 )             30.7     02-28    133<L>  |  94<L>  |  121<H>  ----------------------------<  109<H>  3.8   |  23  |  3.65<H>    Ca    9.4      28 Feb 2024 06:46    TPro  6.6  /  Alb  3.7  /  TBili  1.6<H>  /  DBili  x   /  AST  36  /  ALT  30  /  AlkPhos  193<H>  02-28        greater then 45 minutes spent on discharge

## 2024-02-28 NOTE — PROGRESS NOTE ADULT - PROBLEM SELECTOR PLAN 2
- TTE 2/22 with severe MR  - He was evaluated for M-KRISTIE but was not a candidate d/t his anatomy.  He was evaluated for SUMMIT (Tendyne valve) trial for a transcatheter MVR but was denied by [employer]  - 1Lay f/u with Dr. López outpatient to schedule for MVR

## 2024-02-28 NOTE — PROGRESS NOTE ADULT - PROBLEM SELECTOR PROBLEM 2
Chronic systolic congestive heart failure
Chronic systolic congestive heart failure
Severe mitral regurgitation
Chronic systolic congestive heart failure
Chronic systolic congestive heart failure
Severe mitral regurgitation
Severe mitral regurgitation
Chronic systolic congestive heart failure
Chronic systolic congestive heart failure
Severe mitral regurgitation
Chronic systolic congestive heart failure
Severe mitral regurgitation

## 2024-02-29 RX ORDER — POTASSIUM CHLORIDE 20 MEQ
1 PACKET (EA) ORAL
Qty: 30 | Refills: 0
Start: 2024-02-29 | End: 2024-03-29

## 2024-03-04 ENCOUNTER — INPATIENT (INPATIENT)
Facility: HOSPITAL | Age: 84
LOS: 19 days | DRG: 293 | End: 2024-03-24
Attending: THORACIC SURGERY (CARDIOTHORACIC VASCULAR SURGERY) | Admitting: THORACIC SURGERY (CARDIOTHORACIC VASCULAR SURGERY)
Payer: MEDICARE

## 2024-03-04 VITALS
OXYGEN SATURATION: 99 % | TEMPERATURE: 98 F | HEART RATE: 77 BPM | WEIGHT: 149.47 LBS | DIASTOLIC BLOOD PRESSURE: 64 MMHG | SYSTOLIC BLOOD PRESSURE: 93 MMHG | RESPIRATION RATE: 20 BRPM | HEIGHT: 69 IN

## 2024-03-04 DIAGNOSIS — Z95.810 PRESENCE OF AUTOMATIC (IMPLANTABLE) CARDIAC DEFIBRILLATOR: Chronic | ICD-10-CM

## 2024-03-04 DIAGNOSIS — Z98.890 OTHER SPECIFIED POSTPROCEDURAL STATES: Chronic | ICD-10-CM

## 2024-03-04 DIAGNOSIS — Z95.2 PRESENCE OF PROSTHETIC HEART VALVE: Chronic | ICD-10-CM

## 2024-03-04 DIAGNOSIS — Z95.0 PRESENCE OF CARDIAC PACEMAKER: Chronic | ICD-10-CM

## 2024-03-04 DIAGNOSIS — I34.0 NONRHEUMATIC MITRAL (VALVE) INSUFFICIENCY: ICD-10-CM

## 2024-03-04 DIAGNOSIS — Z90.79 ACQUIRED ABSENCE OF OTHER GENITAL ORGAN(S): Chronic | ICD-10-CM

## 2024-03-04 DIAGNOSIS — N17.9 ACUTE KIDNEY FAILURE, UNSPECIFIED: ICD-10-CM

## 2024-03-04 DIAGNOSIS — I50.23 ACUTE ON CHRONIC SYSTOLIC (CONGESTIVE) HEART FAILURE: ICD-10-CM

## 2024-03-04 DIAGNOSIS — I50.9 HEART FAILURE, UNSPECIFIED: ICD-10-CM

## 2024-03-04 DIAGNOSIS — L03.90 CELLULITIS, UNSPECIFIED: ICD-10-CM

## 2024-03-04 LAB
A1C WITH ESTIMATED AVERAGE GLUCOSE RESULT: 5.7 % — HIGH (ref 4–5.6)
ALBUMIN SERPL ELPH-MCNC: 4.2 G/DL — SIGNIFICANT CHANGE UP (ref 3.3–5)
ALP SERPL-CCNC: 212 U/L — HIGH (ref 40–120)
ALT FLD-CCNC: 51 U/L — HIGH (ref 10–45)
ANION GAP SERPL CALC-SCNC: 17 MMOL/L — SIGNIFICANT CHANGE UP (ref 5–17)
ANION GAP SERPL CALC-SCNC: 18 MMOL/L — HIGH (ref 5–17)
APPEARANCE UR: CLEAR — SIGNIFICANT CHANGE UP
APTT BLD: 37.6 SEC — HIGH (ref 24.5–35.6)
AST SERPL-CCNC: 82 U/L — HIGH (ref 10–40)
BASOPHILS # BLD AUTO: 0.03 K/UL — SIGNIFICANT CHANGE UP (ref 0–0.2)
BASOPHILS NFR BLD AUTO: 0.4 % — SIGNIFICANT CHANGE UP (ref 0–2)
BILIRUB SERPL-MCNC: 2.2 MG/DL — HIGH (ref 0.2–1.2)
BILIRUB UR-MCNC: NEGATIVE — SIGNIFICANT CHANGE UP
BLD GP AB SCN SERPL QL: NEGATIVE — SIGNIFICANT CHANGE UP
BUN SERPL-MCNC: 143 MG/DL — HIGH (ref 7–23)
BUN SERPL-MCNC: 144 MG/DL — HIGH (ref 7–23)
CALCIUM SERPL-MCNC: 10.1 MG/DL — SIGNIFICANT CHANGE UP (ref 8.4–10.5)
CALCIUM SERPL-MCNC: 9.4 MG/DL — SIGNIFICANT CHANGE UP (ref 8.4–10.5)
CHLORIDE SERPL-SCNC: 90 MMOL/L — LOW (ref 96–108)
CHLORIDE SERPL-SCNC: 91 MMOL/L — LOW (ref 96–108)
CO2 SERPL-SCNC: 19 MMOL/L — LOW (ref 22–31)
CO2 SERPL-SCNC: 21 MMOL/L — LOW (ref 22–31)
COLOR SPEC: YELLOW — SIGNIFICANT CHANGE UP
CREAT SERPL-MCNC: 4.39 MG/DL — HIGH (ref 0.5–1.3)
CREAT SERPL-MCNC: 4.45 MG/DL — HIGH (ref 0.5–1.3)
DIFF PNL FLD: NEGATIVE — SIGNIFICANT CHANGE UP
EGFR: 12 ML/MIN/1.73M2 — LOW
EGFR: 13 ML/MIN/1.73M2 — LOW
EOSINOPHIL # BLD AUTO: 0.04 K/UL — SIGNIFICANT CHANGE UP (ref 0–0.5)
EOSINOPHIL NFR BLD AUTO: 0.5 % — SIGNIFICANT CHANGE UP (ref 0–6)
ESTIMATED AVERAGE GLUCOSE: 117 MG/DL — HIGH (ref 68–114)
GLUCOSE SERPL-MCNC: 110 MG/DL — HIGH (ref 70–99)
GLUCOSE SERPL-MCNC: 213 MG/DL — HIGH (ref 70–99)
GLUCOSE UR QL: NEGATIVE MG/DL — SIGNIFICANT CHANGE UP
HCT VFR BLD CALC: 35.5 % — LOW (ref 39–50)
HGB BLD-MCNC: 11.6 G/DL — LOW (ref 13–17)
IMM GRANULOCYTES NFR BLD AUTO: 0.5 % — SIGNIFICANT CHANGE UP (ref 0–0.9)
INR BLD: 2.3 RATIO — HIGH (ref 0.85–1.18)
KETONES UR-MCNC: NEGATIVE MG/DL — SIGNIFICANT CHANGE UP
LEUKOCYTE ESTERASE UR-ACNC: NEGATIVE — SIGNIFICANT CHANGE UP
LYMPHOCYTES # BLD AUTO: 0.64 K/UL — LOW (ref 1–3.3)
LYMPHOCYTES # BLD AUTO: 8.7 % — LOW (ref 13–44)
MCHC RBC-ENTMCNC: 31.3 PG — SIGNIFICANT CHANGE UP (ref 27–34)
MCHC RBC-ENTMCNC: 32.7 GM/DL — SIGNIFICANT CHANGE UP (ref 32–36)
MCV RBC AUTO: 95.7 FL — SIGNIFICANT CHANGE UP (ref 80–100)
MONOCYTES # BLD AUTO: 0.88 K/UL — SIGNIFICANT CHANGE UP (ref 0–0.9)
MONOCYTES NFR BLD AUTO: 12 % — SIGNIFICANT CHANGE UP (ref 2–14)
NEUTROPHILS # BLD AUTO: 5.72 K/UL — SIGNIFICANT CHANGE UP (ref 1.8–7.4)
NEUTROPHILS NFR BLD AUTO: 77.9 % — HIGH (ref 43–77)
NITRITE UR-MCNC: NEGATIVE — SIGNIFICANT CHANGE UP
NRBC # BLD: 0 /100 WBCS — SIGNIFICANT CHANGE UP (ref 0–0)
NT-PROBNP SERPL-SCNC: HIGH PG/ML (ref 0–300)
PH UR: 5.5 — SIGNIFICANT CHANGE UP (ref 5–8)
PLATELET # BLD AUTO: 199 K/UL — SIGNIFICANT CHANGE UP (ref 150–400)
POTASSIUM SERPL-MCNC: 4.9 MMOL/L — SIGNIFICANT CHANGE UP (ref 3.5–5.3)
POTASSIUM SERPL-MCNC: 6.3 MMOL/L — CRITICAL HIGH (ref 3.5–5.3)
POTASSIUM SERPL-SCNC: 4.9 MMOL/L — SIGNIFICANT CHANGE UP (ref 3.5–5.3)
POTASSIUM SERPL-SCNC: 6.3 MMOL/L — CRITICAL HIGH (ref 3.5–5.3)
PROT SERPL-MCNC: 7.9 G/DL — SIGNIFICANT CHANGE UP (ref 6–8.3)
PROT UR-MCNC: NEGATIVE MG/DL — SIGNIFICANT CHANGE UP
PROTHROM AB SERPL-ACNC: 23.6 SEC — HIGH (ref 9.5–13)
RBC # BLD: 3.71 M/UL — LOW (ref 4.2–5.8)
RBC # FLD: 15 % — HIGH (ref 10.3–14.5)
RH IG SCN BLD-IMP: POSITIVE — SIGNIFICANT CHANGE UP
SODIUM SERPL-SCNC: 127 MMOL/L — LOW (ref 135–145)
SODIUM SERPL-SCNC: 129 MMOL/L — LOW (ref 135–145)
SP GR SPEC: 1.01 — SIGNIFICANT CHANGE UP (ref 1–1.03)
UROBILINOGEN FLD QL: 1 MG/DL — SIGNIFICANT CHANGE UP (ref 0.2–1)
WBC # BLD: 7.35 K/UL — SIGNIFICANT CHANGE UP (ref 3.8–10.5)
WBC # FLD AUTO: 7.35 K/UL — SIGNIFICANT CHANGE UP (ref 3.8–10.5)

## 2024-03-04 PROCEDURE — 71045 X-RAY EXAM CHEST 1 VIEW: CPT | Mod: 26

## 2024-03-04 RX ORDER — INFLUENZA VIRUS VACCINE 15; 15; 15; 15 UG/.5ML; UG/.5ML; UG/.5ML; UG/.5ML
0.7 SUSPENSION INTRAMUSCULAR ONCE
Refills: 0 | Status: DISCONTINUED | OUTPATIENT
Start: 2024-03-04 | End: 2024-03-08

## 2024-03-04 RX ORDER — AMIODARONE HYDROCHLORIDE 400 MG/1
200 TABLET ORAL DAILY
Refills: 0 | Status: DISCONTINUED | OUTPATIENT
Start: 2024-03-14 | End: 2024-03-24

## 2024-03-04 RX ORDER — BUMETANIDE 0.25 MG/ML
1 INJECTION INTRAMUSCULAR; INTRAVENOUS
Qty: 20 | Refills: 0 | Status: DISCONTINUED | OUTPATIENT
Start: 2024-03-04 | End: 2024-03-08

## 2024-03-04 RX ORDER — AMIODARONE HYDROCHLORIDE 400 MG/1
400 TABLET ORAL
Refills: 0 | Status: COMPLETED | OUTPATIENT
Start: 2024-03-04 | End: 2024-03-06

## 2024-03-04 RX ORDER — ISOSORBIDE DINITRATE 5 MG/1
10 TABLET ORAL THREE TIMES A DAY
Refills: 0 | Status: DISCONTINUED | OUTPATIENT
Start: 2024-03-04 | End: 2024-03-05

## 2024-03-04 RX ORDER — HYDRALAZINE HCL 50 MG
10 TABLET ORAL EVERY 8 HOURS
Refills: 0 | Status: DISCONTINUED | OUTPATIENT
Start: 2024-03-04 | End: 2024-03-05

## 2024-03-04 RX ORDER — LEVOTHYROXINE SODIUM 125 MCG
75 TABLET ORAL DAILY
Refills: 0 | Status: DISCONTINUED | OUTPATIENT
Start: 2024-03-04 | End: 2024-03-18

## 2024-03-04 RX ORDER — POLYETHYLENE GLYCOL 3350 17 G/17G
17 POWDER, FOR SOLUTION ORAL DAILY
Refills: 0 | Status: DISCONTINUED | OUTPATIENT
Start: 2024-03-04 | End: 2024-03-24

## 2024-03-04 RX ORDER — CEFAZOLIN SODIUM 1 G
1000 VIAL (EA) INJECTION EVERY 24 HOURS
Refills: 0 | Status: COMPLETED | OUTPATIENT
Start: 2024-03-04 | End: 2024-03-10

## 2024-03-04 RX ORDER — IPRATROPIUM/ALBUTEROL SULFATE 18-103MCG
3 AEROSOL WITH ADAPTER (GRAM) INHALATION ONCE
Refills: 0 | Status: COMPLETED | OUTPATIENT
Start: 2024-03-04 | End: 2024-03-04

## 2024-03-04 RX ORDER — METOPROLOL TARTRATE 50 MG
25 TABLET ORAL DAILY
Refills: 0 | Status: DISCONTINUED | OUTPATIENT
Start: 2024-03-04 | End: 2024-03-05

## 2024-03-04 RX ORDER — SENNA PLUS 8.6 MG/1
2 TABLET ORAL AT BEDTIME
Refills: 0 | Status: DISCONTINUED | OUTPATIENT
Start: 2024-03-04 | End: 2024-03-24

## 2024-03-04 RX ORDER — DEXTROSE 50 % IN WATER 50 %
25 SYRINGE (ML) INTRAVENOUS ONCE
Refills: 0 | Status: COMPLETED | OUTPATIENT
Start: 2024-03-04 | End: 2024-03-04

## 2024-03-04 RX ORDER — AMIODARONE HYDROCHLORIDE 400 MG/1
200 TABLET ORAL
Refills: 0 | Status: COMPLETED | OUTPATIENT
Start: 2024-03-07 | End: 2024-03-13

## 2024-03-04 RX ORDER — SODIUM ZIRCONIUM CYCLOSILICATE 10 G/10G
10 POWDER, FOR SUSPENSION ORAL ONCE
Refills: 0 | Status: COMPLETED | OUTPATIENT
Start: 2024-03-04 | End: 2024-03-04

## 2024-03-04 RX ORDER — CALCIUM GLUCONATE 100 MG/ML
1 VIAL (ML) INTRAVENOUS ONCE
Refills: 0 | Status: DISCONTINUED | OUTPATIENT
Start: 2024-03-04 | End: 2024-03-04

## 2024-03-04 RX ORDER — RIVAROXABAN 15 MG-20MG
1 KIT ORAL
Refills: 0 | Status: DISCONTINUED | OUTPATIENT
Start: 2024-03-04 | End: 2024-03-04

## 2024-03-04 RX ORDER — PANTOPRAZOLE SODIUM 20 MG/1
40 TABLET, DELAYED RELEASE ORAL
Refills: 0 | Status: DISCONTINUED | OUTPATIENT
Start: 2024-03-04 | End: 2024-03-18

## 2024-03-04 RX ORDER — SODIUM CHLORIDE 9 MG/ML
3 INJECTION INTRAMUSCULAR; INTRAVENOUS; SUBCUTANEOUS EVERY 8 HOURS
Refills: 0 | Status: DISCONTINUED | OUTPATIENT
Start: 2024-03-04 | End: 2024-03-24

## 2024-03-04 RX ORDER — HEPARIN SODIUM 5000 [USP'U]/ML
900 INJECTION INTRAVENOUS; SUBCUTANEOUS
Qty: 25000 | Refills: 0 | Status: DISCONTINUED | OUTPATIENT
Start: 2024-03-04 | End: 2024-03-07

## 2024-03-04 RX ORDER — ATORVASTATIN CALCIUM 80 MG/1
40 TABLET, FILM COATED ORAL AT BEDTIME
Refills: 0 | Status: DISCONTINUED | OUTPATIENT
Start: 2024-03-04 | End: 2024-03-22

## 2024-03-04 RX ORDER — INSULIN HUMAN 100 [IU]/ML
10 INJECTION, SOLUTION SUBCUTANEOUS ONCE
Refills: 0 | Status: COMPLETED | OUTPATIENT
Start: 2024-03-04 | End: 2024-03-04

## 2024-03-04 RX ADMIN — SODIUM CHLORIDE 3 MILLILITER(S): 9 INJECTION INTRAMUSCULAR; INTRAVENOUS; SUBCUTANEOUS at 15:26

## 2024-03-04 RX ADMIN — Medication 100 MILLIGRAM(S): at 18:35

## 2024-03-04 RX ADMIN — Medication 25 MILLILITER(S): at 15:13

## 2024-03-04 RX ADMIN — BUMETANIDE 5 MG/HR: 0.25 INJECTION INTRAMUSCULAR; INTRAVENOUS at 18:36

## 2024-03-04 RX ADMIN — AMIODARONE HYDROCHLORIDE 400 MILLIGRAM(S): 400 TABLET ORAL at 18:35

## 2024-03-04 RX ADMIN — ATORVASTATIN CALCIUM 40 MILLIGRAM(S): 80 TABLET, FILM COATED ORAL at 22:06

## 2024-03-04 RX ADMIN — SODIUM CHLORIDE 3 MILLILITER(S): 9 INJECTION INTRAMUSCULAR; INTRAVENOUS; SUBCUTANEOUS at 21:57

## 2024-03-04 RX ADMIN — HEPARIN SODIUM 9 UNIT(S)/HR: 5000 INJECTION INTRAVENOUS; SUBCUTANEOUS at 19:58

## 2024-03-04 NOTE — H&P ADULT - NSHPLABSRESULTS_GEN_ALL_CORE
Vital Signs Last 24 Hrs  T(C): 36.8 (04 Mar 2024 12:00), Max: 36.8 (04 Mar 2024 12:00)  T(F): 98.2 (04 Mar 2024 12:00), Max: 98.2 (04 Mar 2024 12:00)  HR: 77 (04 Mar 2024 12:00) (77 - 77)  BP: 93/64 (04 Mar 2024 12:00) (93/64 - 93/64)  BP(mean): --  RR: 20 (04 Mar 2024 12:00) (20 - 20)  SpO2: 99% (04 Mar 2024 12:00) (99% - 99%)  Parameters below as of 04 Mar 2024 12:00 | Patient On (Oxygen Delivery Method): room air    Labs:                        11.6   7.35  )-----------( 199      ( 04 Mar 2024 13:11 )             35.5     03-04    129<L>  |  91<L>  |  144<H>  ----------------------------<  213<H>  4.9   |  21<L>  |  4.45<H>    Ca    9.4      04 Mar 2024 15:30    TPro  7.9  /  Alb  4.2  /  TBili  2.2<H>  /  DBili  x   /  AST  82<H>  /  ALT  51<H>  /  AlkPhos  212<H>  03-04

## 2024-03-04 NOTE — H&P ADULT - NSHPPHYSICALEXAM_GEN_ALL_CORE
Neurology: A&Ox3, no gross, focal neurological deficits at time of evaluation  CV : +S1S2  Lungs: Respirations non-labored, +diminished breath sounds bilaterally  Abdomen: Soft, NT/ND, +BSx4Q  Extremities: +B/L LE pitting edema (L >R), denies calf tenderness, +peripheral pulses intact bilaterally  SKIN: warm, dry, +LLE dorsal foot with erythema, swelling, warm-to-touch

## 2024-03-04 NOTE — CONSULT NOTE ADULT - ASSESSMENT
82 y/o M with ICM/HFrEF (now 30%; LVIDd 6.7 cm; prev req inotrope), CAD c/b IWMI (1994) s/p angioplasty, aortic stenosis s/p bio-AVR 2004, VT arrest (2017) s/p ICD, Afib s/p multiple DCCV and ablation x2 (2020 and 2023; on AC), Aflutter s/p WARREN/DCCV (8/23), prior Ao aneurysm s/p Bentall (2021), severe MR prior MVr (2021) with MAC (precludes M-KRISTIE d/t his anatomy; turned down for TMVR trials by [employer]), atrophic kidney with CKD (b/l Cr 2.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 Patient was initiated on bumex gtt with gradual improvement with Cr improving from 3.6 to 3.1. Patient was considered for MV re-op; however, patient was reluctant of the procedure and expressed wanting to follow up in the outpatient setting to schedule. During last admission, patient was also noted to be in flutter, EP was consulted, and patient underwent repeat WARREN/DCCV , on amiodarone. Patient was discharged home 2/28 on higher dose of diuretics, as per HF team. Today, patient was evaluated at CTS office four routine follow-up, pt c/o worsening shortness of breath and L>R LE edema, with redness/swelling on left dorsal aspect of the foot, readmitted to CTS service for further management.  noticed with rising creatinine and bun      1- SURESH   2- decompensated CHF  3- Mitral regurgitation   4- htn   5- hypothyroid     worsening renal function in setting of decompensated chf  bumex 1 mg hr iv   hold aldactone   cont hydralazine 10 mg tid   he will likely need renal replacement therapy if no improvement in fluid status/renal function with bumex drip   this was d/w pt as well   no blood work RUE  strict I/O  d/w CTS team

## 2024-03-04 NOTE — CONSULT NOTE ADULT - SUBJECTIVE AND OBJECTIVE BOX
[facility]  [phone]  NEPHROLOGY      INITIAL CONSULT NOTE  --------------------------------------------------------------------------------  HPI:      84 y/o M with ICM/HFrEF (now 30%; LVIDd 6.7 cm; prev req inotrope), CAD c/b IWMI (1994) s/p angioplasty, aortic stenosis s/p bio-AVR 2004, VT arrest (2017) s/p ICD, Afib s/p multiple DCCV and ablation x2 (2020 and 2023; on AC), Aflutter s/p WARREN/DCCV (8/23), prior Ao aneurysm s/p Bentall (2021), severe MR prior MVr (2021) with MAC (precludes M-KRISTIE d/t his anatomy; turned down for TMVR trials by [employer]), atrophic kidney with CKD (b/l Cr 2.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 Patient was initiated on bumex gtt with gradual improvement with Cr improving from 3.6 to 3.1. Patient was considered for MV re-op; however, patient was reluctant of the procedure and expressed wanting to follow up in the outpatient setting to schedule. During last admission, patient was also noted to be in flutter, EP was consulted, and patient underwent repeat WARREN/DCCV , on amiodarone. Patient was discharged home 2/28 on higher dose of diuretics, as per HF team. Today, patient was evaluated at CTS office four routine follow-up, pt c/o worsening shortness of breath and L>R LE edema, with redness/swelling on left dorsal aspect of the foot, readmitted to CTS service for further management.  noticed with rising creatinine and bun renal consult called.       PAST HISTORY  --------------------------------------------------------------------------------  PAST MEDICAL & SURGICAL HISTORY:  Aortic stenosis      AICD (automatic cardioverter/defibrillator) present      Aortic valve regurgitation      Ascending aorta dilation      H/O paroxysmal supraventricular tachycardia      HLD (hyperlipidemia)      Mitral valve regurgitation      Cardiac arrest      Severe mitral regurgitation      S/P aortic valve replacement  3/13/2004      S/P hernia repair  2002      History of tonsillectomy and adenoidectomy      S/P TURP  1996      S/P colonoscopy  2001, 2002, 2006, 2011, 2016      S/P endoscopy  2006      S/P cardiac cath  1994, 1995, 1999, 2002, 2004      AICD (automatic cardioverter/defibrillator) present  12/19/17      Cardiac pacemaker  12/19/17      History of cardioversion  9/11/20      S/P ablation of atrial fibrillation  10/29/20      Status post ablation of ventricular arrhythmia  2/14/19        FAMILY HISTORY:    PAST SOCIAL HISTORY:       ALLERGIES & MEDICATIONS  --------------------------------------------------------------------------------  Allergies    No Known Allergies    Intolerances      Standing Inpatient Medications  aMIOdarone    Tablet 400 milliGRAM(s) Oral two times a day  atorvastatin 40 milliGRAM(s) Oral at bedtime  buMETAnide Infusion 1 mG/Hr IV Continuous <Continuous>  ceFAZolin   IVPB 1000 milliGRAM(s) IV Intermittent every 24 hours  heparin  Infusion 900 Unit(s)/Hr IV Continuous <Continuous>  hydrALAZINE 10 milliGRAM(s) Oral every 8 hours  influenza  Vaccine (HIGH DOSE) 0.7 milliLiter(s) IntraMuscular once  isosorbide   dinitrate Tablet (ISORDIL) 10 milliGRAM(s) Oral three times a day  levothyroxine 75 MICROGram(s) Oral daily  metoprolol succinate ER 25 milliGRAM(s) Oral daily  pantoprazole    Tablet 40 milliGRAM(s) Oral before breakfast  polyethylene glycol 3350 17 Gram(s) Oral daily  senna 2 Tablet(s) Oral at bedtime  sodium chloride 0.9% lock flush 3 milliLiter(s) IV Push every 8 hours    PRN Inpatient Medications      REVIEW OF SYSTEMS  --------------------------------------------------------------------------------  Gen: No  fevers/chills   Skin: No rashes  Head/Eyes/Ears/Mouth: No headache; Normal hearing;  No sinus pain/discomfort, sore throat  Respiratory: + LUA + cough, wheezing  CV: No chest pain, orthopnea +   GI: No abdominal pain, diarrhea, nausea, vomiting, melena  : No dysuria, decrease urination or hesitancy urinating  hematuria,  MSK: No joint pain/swelling; no back pain  Neuro: No dizziness/lightheadedness  also with worsening  edema       VITALS/PHYSICAL EXAM  --------------------------------------------------------------------------------  T(C): 36.7 (03-04-24 @ 19:47), Max: 36.8 (03-04-24 @ 12:00)  HR: 60 (03-04-24 @ 19:47) (60 - 77)  BP: 93/61 (03-04-24 @ 19:47) (93/61 - 93/64)  RR: 21 (03-04-24 @ 19:47) (20 - 21)  SpO2: 100% (03-04-24 @ 19:47) (99% - 100%)  Wt(kg): --  Height (cm): 175.3 (03-04-24 @ 12:00)  Weight (kg): 67.8 (03-04-24 @ 12:00)  BMI (kg/m2): 22.1 (03-04-24 @ 12:00)  BSA (m2): 1.83 (03-04-24 @ 12:00)      03-04-24 @ 07:01  -  03-04-24 @ 22:22  --------------------------------------------------------  IN: 310 mL / OUT: 0 mL / NET: 310 mL      Physical Exam:  	Gen: Non toxic comfortable appearing   	 +  jvd  	Pulm: decrease bs  no rales or ronchi or wheezing  	CV: RRR, S1S2; no rub  	Abd: +BS, soft, nontender/nondistended  	: No suprapubic tenderness  	UE: Warm, no cyanosis  no clubbing,  no edema  	LE: Warm, no cyanosis  no clubbing, 2-  edema  	Neuro: alert and oriented. speech coherent   	Psych: Normal affect and mood  	Skin: Warm, no decrease skin turgor   	    LABS/STUDIES  --------------------------------------------------------------------------------              11.6   7.35  >-----------<  199      [03-04-24 @ 13:11]              35.5     129  |  91  |  144  ----------------------------<  213      [03-04-24 @ 15:30]  4.9   |  21  |  4.45        Ca     9.4     [03-04-24 @ 15:30]    TPro  7.9  /  Alb  4.2  /  TBili  2.2  /  DBili  x   /  AST  82  /  ALT  51  /  AlkPhos  212  [03-04-24 @ 13:11]    PT/INR: PT 23.6 , INR 2.30       [03-04-24 @ 13:11]  PTT: 37.6       [03-04-24 @ 13:11]      Creatinine Trend:  SCr 4.45 [03-04 @ 15:30]  SCr 4.39 [03-04 @ 13:11]  SCr 3.65 [02-28 @ 06:46]  SCr 3.66 [02-27 @ 05:41]  SCr 3.16 [02-26 @ 04:47]        TSH 2.03      [02-20-24 @ 15:55]    Urinalysis (03.04.24 @ 13:11)   pH Urine: 5.5  Glucose Qualitative, Urine: Negative mg/dL  Blood, Urine: Negative  Color: Yellow  Urine Appearance: Clear  Bilirubin: Negative  Ketone - Urine: Negative mg/dL  Specific Gravity: 1.010  Protein, Urine: Negative mg/dL  Urobilinogen: 1.0 mg/dL  Nitrite: Negative  Leukocyte Esterase Concentration: Negative

## 2024-03-04 NOTE — H&P ADULT - PROBLEM SELECTOR PLAN 2
- Admit to [facility] Dr. Fenton  -Telemetry  - He was evaluated for M-KRISTIE but was not a candidate d/t his anatomy.  He was evaluated for SUMMIT (Tendyne valve) trial for a transcatheter MVR but was denied by [employer].  - HF team (Dr. Enmanuel Ruiz) consulted and aware  - started on Bumex gtt per HF team  - monitor I/Os  - daily weights  - continue: Amiodarone taper, Isosorbide, Atorvastatin, Levothyroxine, Hydralazine, Spironolactone,  Toprol-XL 25mg qD, bowel regimen  - Home med Xarelto held at this time; started on Hepgtt. q6hr PTT till therapeutic, then daily PTT

## 2024-03-04 NOTE — H&P ADULT - PROBLEM SELECTOR PLAN 4
- Follow up B/L LE Duplex  - monitor WBC and temp  - follow up Blood cultures x2  - started on IV Ancef 1g qD x7d as d/w Dr. Lala  - Consider ID consult in AM

## 2024-03-04 NOTE — H&P ADULT - ASSESSMENT
84 y/o M with ICM/HFrEF (now 30%; LVIDd 6.7 cm; prev req inotrope), CAD c/b IWMI (1994) s/p angioplasty, aortic stenosis s/p bio-AVR 2004, VT arrest (2017) s/p ICD, Afib s/p multiple DCCV and ablation x2 (2020 and 2023; on AC), Aflutter s/p WARREN/DCCV (8/23), prior Ao aneurysm s/p Bentall (2021), severe MR prior MVr (2021) with MAC (precludes M-KRISTIE d/t his anatomy; turned down for TMVR trials by [employer]), atrophic kidney with CKD (b/l Cr 2.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 Patient was initiated on bumex gtt with gradual improvement with Cr improving from 3.6 to 3.1. Patient was considered for MV re-op; however, patient was reluctant of the procedure and expressed wanting to follow up in the outpatient setting to schedule. During last admission, patient was also noted to be in flutter, EP was consulted, and patient underwent repeat WARREN/DCCV , on amiodarone. Patient was discharged home 2/28 on higher dose of diuretics, as per HF team. Today, patient was evaluated at CTS office four routine follow-up, pt c/o worsening shortness of breath and L>R LE edema, with redness/swelling on left dorsal aspect of the foot, readmitted to CTS service for further management. Denies acute fever, chills, dizziness, headache, chest pain, palpitations, acute shortness of breath, abdominal pain, n/v,

## 2024-03-04 NOTE — H&P ADULT - HISTORY OF PRESENT ILLNESS
82 y/o M with ICM/HFrEF (now 30%; LVIDd 6.7 cm; prev req inotrope), CAD c/b IWMI (1994) s/p angioplasty, aortic stenosis s/p bio-AVR 2004, VT arrest (2017) s/p ICD, Afib s/p multiple DCCV and ablation x2 (2020 and 2023; on AC), Aflutter s/p WARREN/DCCV (8/23), prior Ao aneurysm s/p Bentall (2021), severe MR prior MVr (2021) with MAC (precludes M-KRISTIE d/t his anatomy; turned down for TMVR trials by [employer]), atrophic kidney with CKD (b/l Cr 2.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 Patient was initiated on bumex gtt with gradual improvement with Cr improving from 3.6 to 3.1. Patient was considered for MV re-op; however, patient was reluctant of the procedure and expressed wanting to follow up in the outpatient setting to schedule. During last admission, patient was also noted to be in flutter, EP was consulted, and patient underwent repeat WARREN/DCCV , on amiodarone. Patient was discharged home 2/28 on higher dose of diuretics, as per HF team. Today, patient was evaluated at [facility] office four routine follow-up, noted to be in acute HF exacerbation, readmitted to [facility] service for further management. HF reconsulted   84 y/o M with ICM/HFrEF (now 30%; LVIDd 6.7 cm; prev req inotrope), CAD c/b IWMI (1994) s/p angioplasty, aortic stenosis s/p bio-AVR 2004, VT arrest (2017) s/p ICD, Afib s/p multiple DCCV and ablation x2 (2020 and 2023; on AC), Aflutter s/p WARREN/DCCV (8/23), prior Ao aneurysm s/p Bentall (2021), severe MR prior MVr (2021) with MAC (precludes M-KRISTIE d/t his anatomy; turned down for TMVR trials by [employer]), atrophic kidney with CKD (b/l Cr 2.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 Patient was initiated on bumex gtt with gradual improvement with Cr improving from 3.6 to 3.1. Patient was considered for MV re-op; however, patient was reluctant of the procedure and expressed wanting to follow up in the outpatient setting to schedule. During last admission, patient was also noted to be in flutter, EP was consulted, and patient underwent repeat WARREN/DCCV , on amiodarone. Patient was discharged home 2/28 on higher dose of diuretics, as per HF team. Today, patient was evaluated at CTS office four routine follow-up, pt c/o worsening shortness of breath and L>R LE edema, with redness/swelling on left dorsal aspect of the foot, readmitted to CTS service for further management. Denies acute fever, chills, dizziness, headache, chest pain, palpitations, acute shortness of breath, abdominal pain, n/v,

## 2024-03-04 NOTE — H&P ADULT - PROBLEM SELECTOR PLAN 1
- Admit to [facility] Dr. Fenton  -Telemetry  - He was evaluated for M-KRISTIE but was not a candidate d/t his anatomy.  He was evaluated for SUMMIT (Tendyne valve) trial for a transcatheter MVR but was denied by [employer].  - HF team (Dr. Ruiz) consulted  - started on Bumex gtt  - monitor I/Os  - daily weights  - continue: Amiodarone taper, Isosorbide, Atorvastatin, Levothyroxine, Hydralazine, Spironolactone,  Toprol-XL 25mg qD, pantoprazole, and bowel regimen  - Home med Xarelto held at this time; started on Hepgtt. q6hr PTT till therapeutic, then daily PTT  - Case & plan d/w CTS Attending Dr. Fenton

## 2024-03-04 NOTE — H&P ADULT - NSHPSOCIALHISTORY_GEN_ALL_CORE
SOCIAL HISTORY:  Smoker: [ ] Yes  [X ] No    ETOH use: [ ] Yes  [X] No   Ilicit Drug use:  [ ] Yes  [X ] No Denies   Occupation: Retired   Live with: with wife   Assist device use: denies

## 2024-03-04 NOTE — H&P ADULT - PROBLEM SELECTOR PLAN 3
- Renal (Dr. Christine Lala) consulted and aware  - Monitor BMP, Uric acid level  - Preserve LUE, in anticipation for HD access, per Dr. Lala  - Avoid nephrotoxic agents if possible - Renal (Dr. Christine Lala) consulted and aware  - 1st BMP hemolyzeds specimen; rpt BMP sent and reviewed by Renal  - Monitor BMP, Uric acid level  - Preserve LUE, in anticipation for HD access, per Dr. Lala  - Avoid nephrotoxic agents if possible - Renal (Dr. Christine Lala) consulted and aware  - K 6.6 (specimen hemolyzed), rpt BMP with K 4.9   - Monitor BMP, Uric acid level per Renal  - Preserve RUE, in anticipation for HD access, per Dr. Lala  - Avoid nephrotoxic agents if possible

## 2024-03-04 NOTE — PATIENT PROFILE ADULT - FALL HARM RISK - RISK INTERVENTIONS
Assistance with ambulation/Communicate Fall Risk and Risk Factors to all staff, patient, and family/Reinforce activity limits and safety measures with patient and family/Visual Cue: Yellow wristband/Bed in lowest position, wheels locked, appropriate side rails in place/Call bell, personal items and telephone in reach/Instruct patient to call for assistance before getting out of bed or chair/Non-slip footwear when patient is out of bed/[place] to call system/Physically safe environment - no spills, clutter or unnecessary equipment/Purposeful Proactive Rounding/Room/bathroom lighting operational, light cord in reach

## 2024-03-05 DIAGNOSIS — I48.0 PAROXYSMAL ATRIAL FIBRILLATION: ICD-10-CM

## 2024-03-05 LAB
ALBUMIN SERPL ELPH-MCNC: 3.7 G/DL — SIGNIFICANT CHANGE UP (ref 3.3–5)
ALP SERPL-CCNC: 188 U/L — HIGH (ref 40–120)
ALT FLD-CCNC: 41 U/L — SIGNIFICANT CHANGE UP (ref 10–45)
ANION GAP SERPL CALC-SCNC: 17 MMOL/L — SIGNIFICANT CHANGE UP (ref 5–17)
APTT BLD: 52.7 SEC — HIGH (ref 24.5–35.6)
AST SERPL-CCNC: 43 U/L — HIGH (ref 10–40)
BASE EXCESS BLDV CALC-SCNC: -2.2 MMOL/L — LOW (ref -2–3)
BILIRUB SERPL-MCNC: 1.7 MG/DL — HIGH (ref 0.2–1.2)
BUN SERPL-MCNC: 145 MG/DL — HIGH (ref 7–23)
CA-I SERPL-SCNC: 1.16 MMOL/L — SIGNIFICANT CHANGE UP (ref 1.15–1.33)
CALCIUM SERPL-MCNC: 8.9 MG/DL — SIGNIFICANT CHANGE UP (ref 8.4–10.5)
CALCIUM SERPL-MCNC: 9.4 MG/DL — SIGNIFICANT CHANGE UP (ref 8.4–10.5)
CHLORIDE BLDV-SCNC: 92 MMOL/L — LOW (ref 96–108)
CHLORIDE SERPL-SCNC: 92 MMOL/L — LOW (ref 96–108)
CO2 BLDV-SCNC: 24 MMOL/L — SIGNIFICANT CHANGE UP (ref 22–26)
CO2 SERPL-SCNC: 21 MMOL/L — LOW (ref 22–31)
CREAT SERPL-MCNC: 4.53 MG/DL — HIGH (ref 0.5–1.3)
EGFR: 12 ML/MIN/1.73M2 — LOW
GAS PNL BLDV: 128 MMOL/L — LOW (ref 136–145)
GAS PNL BLDV: SIGNIFICANT CHANGE UP
GAS PNL BLDV: SIGNIFICANT CHANGE UP
GLUCOSE BLDV-MCNC: 97 MG/DL — SIGNIFICANT CHANGE UP (ref 70–99)
GLUCOSE SERPL-MCNC: 91 MG/DL — SIGNIFICANT CHANGE UP (ref 70–99)
HBV CORE AB SER-ACNC: SIGNIFICANT CHANGE UP
HBV SURFACE AB SER-ACNC: <3 MIU/ML — LOW
HBV SURFACE AB SER-ACNC: SIGNIFICANT CHANGE UP
HBV SURFACE AG SER-ACNC: SIGNIFICANT CHANGE UP
HCO3 BLDV-SCNC: 22 MMOL/L — SIGNIFICANT CHANGE UP (ref 22–29)
HCT VFR BLD CALC: 31.6 % — LOW (ref 39–50)
HCT VFR BLDA CALC: 32 % — LOW (ref 39–51)
HCV AB S/CO SERPL IA: 0.19 S/CO — SIGNIFICANT CHANGE UP (ref 0–0.99)
HCV AB SERPL-IMP: SIGNIFICANT CHANGE UP
HGB BLD CALC-MCNC: 10.7 G/DL — LOW (ref 12.6–17.4)
HGB BLD-MCNC: 10.4 G/DL — LOW (ref 13–17)
HOROWITZ INDEX BLDV+IHG-RTO: 21 — SIGNIFICANT CHANGE UP
LACTATE BLDV-MCNC: 1 MMOL/L — SIGNIFICANT CHANGE UP (ref 0.5–2)
MCHC RBC-ENTMCNC: 30.7 PG — SIGNIFICANT CHANGE UP (ref 27–34)
MCHC RBC-ENTMCNC: 32.9 GM/DL — SIGNIFICANT CHANGE UP (ref 32–36)
MCV RBC AUTO: 93.2 FL — SIGNIFICANT CHANGE UP (ref 80–100)
MRSA PCR RESULT.: SIGNIFICANT CHANGE UP
NRBC # BLD: 0 /100 WBCS — SIGNIFICANT CHANGE UP (ref 0–0)
PCO2 BLDV: 37 MMHG — LOW (ref 42–55)
PH BLDV: 7.39 — SIGNIFICANT CHANGE UP (ref 7.32–7.43)
PHOSPHATE SERPL-MCNC: 5.1 MG/DL — HIGH (ref 2.5–4.5)
PLATELET # BLD AUTO: 172 K/UL — SIGNIFICANT CHANGE UP (ref 150–400)
PO2 BLDV: 37 MMHG — SIGNIFICANT CHANGE UP (ref 25–45)
POTASSIUM BLDV-SCNC: 4.3 MMOL/L — SIGNIFICANT CHANGE UP (ref 3.5–5.1)
POTASSIUM SERPL-MCNC: 4.4 MMOL/L — SIGNIFICANT CHANGE UP (ref 3.5–5.3)
POTASSIUM SERPL-SCNC: 4.4 MMOL/L — SIGNIFICANT CHANGE UP (ref 3.5–5.3)
PREALB SERPL-MCNC: 19 MG/DL — LOW (ref 20–40)
PROT SERPL-MCNC: 6.7 G/DL — SIGNIFICANT CHANGE UP (ref 6–8.3)
PTH-INTACT FLD-MCNC: 242 PG/ML — HIGH (ref 15–65)
RBC # BLD: 3.39 M/UL — LOW (ref 4.2–5.8)
RBC # FLD: 14.9 % — HIGH (ref 10.3–14.5)
S AUREUS DNA NOSE QL NAA+PROBE: SIGNIFICANT CHANGE UP
SAO2 % BLDV: 63.5 % — LOW (ref 67–88)
SODIUM SERPL-SCNC: 130 MMOL/L — LOW (ref 135–145)
T4 FREE SERPL-MCNC: 2.2 NG/DL — HIGH (ref 0.9–1.8)
TSH SERPL-MCNC: 3.09 UIU/ML — SIGNIFICANT CHANGE UP (ref 0.27–4.2)
URATE SERPL-MCNC: 13.8 MG/DL — HIGH (ref 3.4–8.8)
WBC # BLD: 6.81 K/UL — SIGNIFICANT CHANGE UP (ref 3.8–10.5)
WBC # FLD AUTO: 6.81 K/UL — SIGNIFICANT CHANGE UP (ref 3.8–10.5)

## 2024-03-05 PROCEDURE — 71045 X-RAY EXAM CHEST 1 VIEW: CPT | Mod: 26,76

## 2024-03-05 PROCEDURE — 99223 1ST HOSP IP/OBS HIGH 75: CPT | Mod: FS

## 2024-03-05 PROCEDURE — 93970 EXTREMITY STUDY: CPT | Mod: 26

## 2024-03-05 PROCEDURE — 94010 BREATHING CAPACITY TEST: CPT | Mod: 26

## 2024-03-05 PROCEDURE — 99291 CRITICAL CARE FIRST HOUR: CPT | Mod: 25

## 2024-03-05 PROCEDURE — 93283 PRGRMG EVAL IMPLANTABLE DFB: CPT | Mod: 26

## 2024-03-05 PROCEDURE — 36556 INSERT NON-TUNNEL CV CATH: CPT | Mod: LT

## 2024-03-05 RX ORDER — HYDROMORPHONE HYDROCHLORIDE 2 MG/ML
0.25 INJECTION INTRAMUSCULAR; INTRAVENOUS; SUBCUTANEOUS ONCE
Refills: 0 | Status: DISCONTINUED | OUTPATIENT
Start: 2024-03-05 | End: 2024-03-05

## 2024-03-05 RX ORDER — CHLORHEXIDINE GLUCONATE 213 G/1000ML
1 SOLUTION TOPICAL
Refills: 0 | Status: DISCONTINUED | OUTPATIENT
Start: 2024-03-05 | End: 2024-03-24

## 2024-03-05 RX ORDER — ALLOPURINOL 300 MG
100 TABLET ORAL DAILY
Refills: 0 | Status: DISCONTINUED | OUTPATIENT
Start: 2024-03-05 | End: 2024-03-24

## 2024-03-05 RX ORDER — LANOLIN ALCOHOL/MO/W.PET/CERES
5 CREAM (GRAM) TOPICAL AT BEDTIME
Refills: 0 | Status: DISCONTINUED | OUTPATIENT
Start: 2024-03-05 | End: 2024-03-24

## 2024-03-05 RX ORDER — MIDODRINE HYDROCHLORIDE 2.5 MG/1
10 TABLET ORAL EVERY 8 HOURS
Refills: 0 | Status: DISCONTINUED | OUTPATIENT
Start: 2024-03-05 | End: 2024-03-05

## 2024-03-05 RX ORDER — HYDROMORPHONE HYDROCHLORIDE 2 MG/ML
0.5 INJECTION INTRAMUSCULAR; INTRAVENOUS; SUBCUTANEOUS ONCE
Refills: 0 | Status: DISCONTINUED | OUTPATIENT
Start: 2024-03-05 | End: 2024-03-05

## 2024-03-05 RX ORDER — DOBUTAMINE HCL 250MG/20ML
7.5 VIAL (ML) INTRAVENOUS
Qty: 500 | Refills: 0 | Status: DISCONTINUED | OUTPATIENT
Start: 2024-03-05 | End: 2024-03-13

## 2024-03-05 RX ORDER — LIDOCAINE HCL 20 MG/ML
15 VIAL (ML) INJECTION ONCE
Refills: 0 | Status: COMPLETED | OUTPATIENT
Start: 2024-03-05 | End: 2024-03-05

## 2024-03-05 RX ADMIN — SODIUM CHLORIDE 3 MILLILITER(S): 9 INJECTION INTRAMUSCULAR; INTRAVENOUS; SUBCUTANEOUS at 05:08

## 2024-03-05 RX ADMIN — HYDROMORPHONE HYDROCHLORIDE 0.25 MILLIGRAM(S): 2 INJECTION INTRAMUSCULAR; INTRAVENOUS; SUBCUTANEOUS at 10:30

## 2024-03-05 RX ADMIN — CHLORHEXIDINE GLUCONATE 1 APPLICATION(S): 213 SOLUTION TOPICAL at 05:00

## 2024-03-05 RX ADMIN — MIDODRINE HYDROCHLORIDE 10 MILLIGRAM(S): 2.5 TABLET ORAL at 12:10

## 2024-03-05 RX ADMIN — SODIUM CHLORIDE 3 MILLILITER(S): 9 INJECTION INTRAMUSCULAR; INTRAVENOUS; SUBCUTANEOUS at 13:42

## 2024-03-05 RX ADMIN — Medication 100 MILLIGRAM(S): at 23:00

## 2024-03-05 RX ADMIN — BUMETANIDE 5 MG/HR: 0.25 INJECTION INTRAMUSCULAR; INTRAVENOUS at 12:12

## 2024-03-05 RX ADMIN — Medication 15 MILLILITER(S): at 11:02

## 2024-03-05 RX ADMIN — SODIUM CHLORIDE 3 MILLILITER(S): 9 INJECTION INTRAMUSCULAR; INTRAVENOUS; SUBCUTANEOUS at 23:32

## 2024-03-05 RX ADMIN — Medication 75 MICROGRAM(S): at 05:23

## 2024-03-05 RX ADMIN — AMIODARONE HYDROCHLORIDE 400 MILLIGRAM(S): 400 TABLET ORAL at 17:11

## 2024-03-05 RX ADMIN — AMIODARONE HYDROCHLORIDE 400 MILLIGRAM(S): 400 TABLET ORAL at 05:24

## 2024-03-05 RX ADMIN — ATORVASTATIN CALCIUM 40 MILLIGRAM(S): 80 TABLET, FILM COATED ORAL at 23:32

## 2024-03-05 RX ADMIN — HYDROMORPHONE HYDROCHLORIDE 0.25 MILLIGRAM(S): 2 INJECTION INTRAMUSCULAR; INTRAVENOUS; SUBCUTANEOUS at 10:00

## 2024-03-05 RX ADMIN — PANTOPRAZOLE SODIUM 40 MILLIGRAM(S): 20 TABLET, DELAYED RELEASE ORAL at 05:23

## 2024-03-05 RX ADMIN — Medication 100 MILLIGRAM(S): at 17:14

## 2024-03-05 RX ADMIN — Medication 10.2 MICROGRAM(S)/KG/MIN: at 12:12

## 2024-03-05 NOTE — PHYSICAL THERAPY INITIAL EVALUATION ADULT - PERTINENT HX OF CURRENT PROBLEM, REHAB EVAL
82 y/o M with PMH ICM/HFrEF (now 30%; LVIDd 6.7 cm; prev req inotrope), CAD c/b IWMI (1994) s/p angioplasty, aortic stenosis s/p bio-AVR 2004, VT arrest (2017) s/p ICD, Afib s/p multiple DCCV and ablation x2 (2020 and 2023; on AC), Aflutter s/p WARREN/DCCV (8/23), prior Ao aneurysm s/p Bentall (2021), severe MR prior MVr (2021) with MAC (precludes M-RKISTIE d/t his anatomy; turned down for TMVR trials by [employer]), atrophic kidney with CKD (b/l Cr 2.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 Patient was initiated on bumex gtt with gradual improvement with Cr improving from 3.6 to 3.1. Patient was considered for MV re-op; however, patient was reluctant of the procedure and expressed wanting to follow up in the outpatient setting to schedule. During last admission, patient was also noted to be in flutter, EP was consulted, and patient underwent repeat WARREN/DCCV , on amiodarone. Patient was discharged home 2/28 on higher dose of diuretics, as per HF team. Patient then evaluated at CTS office for routine follow-up, pt c/o worsening shortness of breath and L>R LE edema, with redness/swelling on left dorsal aspect of the foot, readmitted to CTS service for further management. 84 y/o M with PMH ICM/HFrEF (now 30%; LVIDd 6.7 cm; prev req inotrope), CAD c/b IWMI (1994) s/p angioplasty, aortic stenosis s/p bio-AVR 2004, VT arrest (2017) s/p ICD, Afib s/p multiple DCCV and ablation x2 (2020 and 2023; on AC), Aflutter s/p WARREN/DCCV (8/23), prior Ao aneurysm s/p Bentall (2021), severe MR prior MVr (2021) with MAC (precludes M-KRISTIE d/t his anatomy; turned down for TMVR trials by [employer]), atrophic kidney with CKD (b/l Cr 2.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 Patient was initiated on bumex gtt with gradual improvement with Cr improving from 3.6 to 3.1. Patient was considered for MV re-op; however, patient was reluctant of the procedure and expressed wanting to follow up in the outpatient setting to schedule. During last admission, patient was also noted to be in flutter, EP was consulted, and patient underwent repeat WARREN/DCCV , on amiodarone. Patient was discharged home 2/28 on higher dose of diuretics, as per HF team. Patient then evaluated at CTS office for routine follow-up, pt c/o worsening shortness of breath and L>R LE edema, with redness/swelling on left dorsal aspect of the foot, readmitted to CTS service for further management.    He is ACC/AHA Stage C/D HF with NYHA Class III, appears volume overloaded and low normotensive. Has worsening SURESH with rising Cr since discharge and will need to start on CVVH for more clearance and volume removal. Once optimized from HF and nutritional standpoint, can resume plan for surgical mitral valve replacement.

## 2024-03-05 NOTE — PROGRESS NOTE ADULT - ASSESSMENT
82 y/o M with ICM/HFrEF (now 30%; LVIDd 6.7 cm; prev req inotrope), CAD c/b IWMI (1994) s/p angioplasty, aortic stenosis s/p bio-AVR 2004, VT arrest (2017) s/p ICD, Afib s/p multiple DCCV and ablation x2 (2020 and 2023; on AC), Aflutter s/p WARREN/DCCV (8/23), prior Ao aneurysm s/p Bentall (2021), severe MR prior MVr (2021) with MAC (precludes M-KRISTIE d/t his anatomy; turned down for TMVR trials by [employer]), atrophic kidney with CKD (b/l Cr 2.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 Patient was initiated on bumex gtt with gradual improvement with Cr improving from 3.6 to 3.1. Patient was considered for MV re-op; however, patient was reluctant of the procedure and expressed wanting to follow up in the outpatient setting to schedule. During last admission, patient was also noted to be in flutter, EP was consulted, and patient underwent repeat WARREN/DCCV , on amiodarone. Patient was discharged home 2/28 on higher dose of diuretics, as per HF team. Today, patient was evaluated at CTS office four routine follow-up, pt c/o worsening shortness of breath and L>R LE edema, with redness/swelling on left dorsal aspect of the foot, readmitted to CTS service for further management.  noticed with rising creatinine and bun      1- SURESH on ckd   2- decompensated CHF  3- Mitral regurgitation   4- htn   5- hypothyroid     overall his renal function has been deteriorating and he remains fluid overloaded   d.w pt in detail re: dialysis initiation hd explained and hd consent witnessed and placed in chart  hd today   in the meanwhile cont with bumex drip 1mg hr today   add allopurinol 100 mg daily   dc hydralazine to allow improvement in bp to allow hd   no blood work RUE  strict I/O  d/w CTS team

## 2024-03-05 NOTE — PROCEDURE NOTE - ADDITIONAL PROCEDURE DETAILS
Dr. Fenton aware patient on Xarelto, but felt pt needed central line for possible pressor requirements during urgent HD. Heparin held for 4hrs prior to procedure.

## 2024-03-05 NOTE — PHYSICAL THERAPY INITIAL EVALUATION ADULT - GAIT TRAINING, PT EVAL
GOAL: Pt will ambulate 250 feet w/independence, w/use of appropriate assistive device in 2 weeks
Patent

## 2024-03-05 NOTE — PROCEDURE NOTE - ADDITIONAL PROCEDURE DETAILS
Indication: Increase LRL , arrhythmia check    Estimated remaining battery longevity: ~1.8-2.1 years.     Events:  - Multiple AMS events, EGMs reviewed. Not true AT/AF, refractory p waves requiring atrial pacing.     AP 40%,  38%    LRL increased to 70 and VIP extended to 200ms (with  auto adjustment of AV delays to 250ms/225ms) with AP-VS.   Pt with baseline prolonged AV delay and occasionally junctional rhythm overtakes sinus rhythm. Cannot increase LRL higher without increasing VPacing.     Above discussed with primary team.     - Matilda Tam PA-C Indication: Increase LRL , arrhythmia check    Estimated remaining battery longevity: ~1.8-2.1 years.     Events:  - Multiple AMS events, EGMs reviewed. Not true AT/AF, refractory p waves requiring atrial pacing.     AP 40%,  38%    LRL increased to 70 and VIP extended to 200ms (with  auto adjustment of AV delays to 250ms/225ms) with AP-VS.   Pt with baseline prolonged AV delay and occasionally junctional rhythm overtakes sinus rhythm. Cannot increase LRL higher without increasing VPacing.     Above discussed with primary team. Will recheck VPacing tomorrow.     - Matilda Tam PA-C

## 2024-03-05 NOTE — CONSULT NOTE ADULT - ASSESSMENT
Cardiac studies   WARREN 2/27/24: severe MR, DCCV SR  TTE 2/22/24: LVIDd: 5.8cm, LVEF 35%, mildly enlarged RV with reduced function, severe MR and Moderate to severe TR. PASP 70mmHg  WARREN 8/23 Severe MR and moderate TR  TTE 8/23 EF 36%, grade II DD, mildly enlarged RV with reduced function, severe MR and Moderate to severe TR   84 y/o M with ICM/HFrEF (now 30%; LVIDd 6.7 cm; prev req inotrope), CAD c/b IWMI (1994) s/p angioplasty, aortic stenosis s/p bio-AVR 2004, VT arrest (2017) s/p ICD, Afib s/p multiple DCCV and ablation x3 (most recent 2/27/24 on AC), Aflutter s/p WARREN/DCCV (8/23), prior Ao aneurysm s/p Bentall (2021), severe MR prior MVr (2021) with MAC (precludes M-KRISTIE d/t his anatomy; turned down for TMVR trials by [employer]), atrophic kidney with CKD (b/l Cr 2.5-3), presents to [facility] for ADHF from CTS office for routine follow-up, c/o worsening shortness of breath and L>R LE edema, with redness/swelling on left dorsal aspect of the foot. Was recently discharged on 2/28 on higher dose of diuretics.     He is ACC/AHA Stage C/D HF with NYHA Class III, appears volume overloaded and low normotensive. Has worsening SURESH with rising Cr since discharge and will need to start on CVVH for more clearance and volume removal. Once optimized from HF and nutritional standpoint, can resume plan for surgical mitral valve replacement.      Cardiac studies   WARREN 2/27/24: severe MR, DCCV SR  TTE 2/22/24: LVIDd: 5.8cm, LVEF 35%, mildly enlarged RV with reduced function, severe MR and Moderate to severe TR. PASP 70mmHg  WARREN 8/23 Severe MR and moderate TR  TTE 8/23 EF 36%, grade II DD, mildly enlarged RV with reduced function, severe MR and Moderate to severe TR

## 2024-03-05 NOTE — PROGRESS NOTE ADULT - SUBJECTIVE AND OBJECTIVE BOX
[facility]   [phone]  RENAL FOLLOW UP NOTE  --------------------------------------------------------------------------------  Chief Complaint:    24 hour events/subjective:    seen earlier   c/o edema   overall decrease po intake     PAST HISTORY  --------------------------------------------------------------------------------  No significant changes to PMH, PSH, FHx, SHx, unless otherwise noted    ALLERGIES & MEDICATIONS  --------------------------------------------------------------------------------  Allergies    No Known Allergies    Intolerances      Standing Inpatient Medications  aMIOdarone    Tablet 400 milliGRAM(s) Oral two times a day  atorvastatin 40 milliGRAM(s) Oral at bedtime  buMETAnide Infusion 1 mG/Hr IV Continuous <Continuous>  ceFAZolin   IVPB 1000 milliGRAM(s) IV Intermittent every 24 hours  chlorhexidine 2% Cloths 1 Application(s) Topical <User Schedule>  DOBUTamine Infusion 5 MICROgram(s)/kG/Min IV Continuous <Continuous>  heparin  Infusion 900 Unit(s)/Hr IV Continuous <Continuous>  influenza  Vaccine (HIGH DOSE) 0.7 milliLiter(s) IntraMuscular once  levothyroxine 75 MICROGram(s) Oral daily  pantoprazole    Tablet 40 milliGRAM(s) Oral before breakfast  polyethylene glycol 3350 17 Gram(s) Oral daily  senna 2 Tablet(s) Oral at bedtime  sodium chloride 0.9% lock flush 3 milliLiter(s) IV Push every 8 hours    PRN Inpatient Medications      REVIEW OF SYSTEMS  --------------------------------------------------------------------------------    Gen: denies  fevers/chills,  CVS: denies chest pain/palpitations  Resp: +  SOB/Cough  GI: Denies N/V/Abd pain  : Denies dysuria    VITALS/PHYSICAL EXAM  --------------------------------------------------------------------------------  T(C): 36.6 (03-05-24 @ 18:05), Max: 36.7 (03-05-24 @ 16:05)  HR: 69 (03-05-24 @ 18:05) (60 - 89)  BP: 95/54 (03-05-24 @ 19:00) (89/57 - 116/68)  RR: 12 (03-05-24 @ 19:00) (12 - 33)  SpO2: 94% (03-05-24 @ 19:00) (78% - 97%)  Wt(kg): --  Height (cm): 175.3 (03-04-24 @ 12:00)  Weight (kg): 67.8 (03-04-24 @ 12:00)  BMI (kg/m2): 22.1 (03-04-24 @ 12:00)  BSA (m2): 1.83 (03-04-24 @ 12:00)      03-04-24 @ 07:01  -  03-05-24 @ 07:00  --------------------------------------------------------  IN: 829 mL / OUT: 950 mL / NET: -121 mL    03-05-24 @ 07:01  -  03-05-24 @ 20:19  --------------------------------------------------------  IN: 1260 mL / OUT: 1518 mL / NET: -258 mL      Physical Exam:  	    Gen: Non toxic comfortable appearing   	 +  jvd  	Pulm: decrease bs  no rales or ronchi or wheezing  	CV: RRR, S1S2; no rub  	Abd: +BS, soft, nontender/nondistended  	: No suprapubic tenderness  	UE: Warm, no cyanosis  no clubbing,  no edema  	LE: Warm, no cyanosis  no clubbing, 2-  edema  	Neuro: alert and oriented. speech coherent       LABS/STUDIES  --------------------------------------------------------------------------------              10.4   6.81  >-----------<  172      [03-05-24 @ 03:12]              31.6     130  |  92  |  145  ----------------------------<  91      [03-05-24 @ 03:12]  4.4   |  21  |  4.53        Ca     9.4     [03-05-24 @ 03:12]      Phos  5.1     [03-05-24 @ 13:03]    TPro  6.7  /  Alb  3.7  /  TBili  1.7  /  DBili  x   /  AST  43  /  ALT  41  /  AlkPhos  188  [03-05-24 @ 03:12]    PT/INR: PT 23.6 , INR 2.30       [03-04-24 @ 13:11]  PTT: 52.7       [03-05-24 @ 03:12]    Uric acid 13.8      [03-05-24 @ 03:12]    Creatinine Trend:  SCr 4.53 [03-05 @ 03:12]  SCr 4.45 [03-04 @ 15:30]  SCr 4.39 [03-04 @ 13:11]  SCr 3.65 [02-28 @ 06:46]  SCr 3.66 [02-27 @ 05:41]              Urinalysis - [03-05-24 @ 03:12]      Color  / Appearance  / SG  / pH       Gluc 91 / Ketone   / Bili  / Urobili        Blood  / Protein  / Leuk Est  / Nitrite       RBC  / WBC  / Hyaline  / Gran  / Sq Epi  / Non Sq Epi  / Bacteria     Urinalysis - [03-05-24 @ 03:12]      Color  / Appearance  / SG  / pH       Gluc 91 / Ketone   / Bili  / Urobili        Blood  / Protein  / Leuk Est  / Nitrite       RBC  / WBC  / Hyaline  / Gran  / Sq Epi  / Non Sq Epi  / Bacteria       PTH -- (Ca 8.9)      [03-05-24 @ 13:03]   242  TSH 3.09      [03-04-24 @ 13:11]

## 2024-03-05 NOTE — CONSULT NOTE ADULT - SUBJECTIVE AND OBJECTIVE BOX
HPI:  84 y/o M with ICM/HFrEF (now 30%; LVIDd 6.7 cm; prev req inotrope), CAD c/b IWMI (1994) s/p angioplasty, aortic stenosis s/p bio-AVR 2004, VT arrest (2017) s/p ICD, Afib s/p multiple DCCV and ablation x2 (2020 and 2023; on AC), Aflutter s/p WARREN/DCCV (8/23), prior Ao aneurysm s/p Bentall (2021), severe MR prior MVr (2021) with MAC (precludes M-KRISTIE d/t his anatomy; turned down for TMVR trials by [employer]), atrophic kidney with CKD (b/l Cr 2.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 Patient was initiated on bumex gtt with gradual improvement with Cr improving from 3.6 to 3.1. Patient was considered for MV re-op; however, patient was reluctant of the procedure and expressed wanting to follow up in the outpatient setting to schedule. During last admission, patient was also noted to be in flutter, EP was consulted, and patient underwent repeat WARREN/DCCV , on amiodarone. Patient was discharged home 2/28 on higher dose of diuretics, as per HF team. Today, patient was evaluated at CTS office for routine follow-up, pt c/o worsening shortness of breath and L>R LE edema, with redness/swelling on left dorsal aspect of the foot, readmitted to CTS service for further management. Denies acute fever, chills, dizziness, headache, chest pain, palpitations, acute shortness of breath, abdominal pain, n/v, (04 Mar 2024 12:18)      PAST MEDICAL & SURGICAL HISTORY:  Aortic stenosis      AICD (automatic cardioverter/defibrillator) present      Aortic valve regurgitation      Ascending aorta dilation      H/O paroxysmal supraventricular tachycardia      HLD (hyperlipidemia)      Mitral valve regurgitation      Cardiac arrest      Severe mitral regurgitation      S/P aortic valve replacement  3/13/2004      S/P hernia repair  2002      History of tonsillectomy and adenoidectomy      S/P TURP  1996      S/P colonoscopy  2001, 2002, 2006, 2011, 2016      S/P endoscopy  2006      S/P cardiac cath  1994, 1995, 1999, 2002, 2004      AICD (automatic cardioverter/defibrillator) present  12/19/17      Cardiac pacemaker  12/19/17      History of cardioversion  9/11/20      S/P ablation of atrial fibrillation  10/29/20      Status post ablation of ventricular arrhythmia  2/14/19          Review of Systems:  14 point ROS done and found to be negative or noncontributory other than noted in HPI.    MEDICATIONS  (STANDING):  aMIOdarone    Tablet 400 milliGRAM(s) Oral two times a day  atorvastatin 40 milliGRAM(s) Oral at bedtime  buMETAnide Infusion 1 mG/Hr (5 mL/Hr) IV Continuous <Continuous>  ceFAZolin   IVPB 1000 milliGRAM(s) IV Intermittent every 24 hours  chlorhexidine 2% Cloths 1 Application(s) Topical <User Schedule>  DOBUTamine Infusion 5 MICROgram(s)/kG/Min (10.2 mL/Hr) IV Continuous <Continuous>  heparin  Infusion 900 Unit(s)/Hr (9 mL/Hr) IV Continuous <Continuous>  influenza  Vaccine (HIGH DOSE) 0.7 milliLiter(s) IntraMuscular once  levothyroxine 75 MICROGram(s) Oral daily  metoprolol succinate ER 25 milliGRAM(s) Oral daily  midodrine. 10 milliGRAM(s) Oral every 8 hours  pantoprazole    Tablet 40 milliGRAM(s) Oral before breakfast  polyethylene glycol 3350 17 Gram(s) Oral daily  senna 2 Tablet(s) Oral at bedtime  sodium chloride 0.9% lock flush 3 milliLiter(s) IV Push every 8 hours    MEDICATIONS  (PRN):      HOME MEDICATIONS:    Allergies    No Known Allergies    Intolerances        SOCIAL HISTORY:    FAMILY HISTORY:      Vital Signs Last 24 Hrs  T(C): 36.3 (05 Mar 2024 11:45), Max: 36.7 (04 Mar 2024 19:47)  T(F): 97.4 (05 Mar 2024 11:45), Max: 98 (04 Mar 2024 19:47)  HR: 60 (05 Mar 2024 12:30) (60 - 78)  BP: 105/65 (05 Mar 2024 12:15) (89/61 - 106/65)  BP(mean): 79 (05 Mar 2024 12:15) (70 - 79)  RR: 26 (05 Mar 2024 12:30) (18 - 31)  SpO2: 96% (05 Mar 2024 12:30) (95% - 100%)    Parameters below as of 05 Mar 2024 07:47  Patient On (Oxygen Delivery Method): room air        Tele: Paced     General: Resting  HEENT: EOM intact.  Neck: JVP >20 cm H20  Chest: Clear to auscultation bilaterally  CV: Normal S1 and S2. No murmurs, rub, or gallops. Radial pulses normal.  Abdomen: Soft, non-distended, non-tender  Extremities: BLE edema   Neurology: Alert and oriented times three. Sensation intact  Psych: Affect normal    LABS:                        10.4   6.81  )-----------( 172      ( 05 Mar 2024 03:12 )             31.6     03-05    130<L>  |  92<L>  |  145<H>  ----------------------------<  91  4.4   |  21<L>  |  4.53<H>    Ca    9.4      05 Mar 2024 03:12    TPro  6.7  /  Alb  3.7  /  TBili  1.7<H>  /  DBili  x   /  AST  43<H>  /  ALT  41  /  AlkPhos  188<H>  03-05    PT/INR - ( 04 Mar 2024 13:11 )   PT: 23.6 sec;   INR: 2.30 ratio         PTT - ( 05 Mar 2024 03:12 )  PTT:52.7 sec  Urinalysis Basic - ( 05 Mar 2024 03:12 )    Color: x / Appearance: x / SG: x / pH: x  Gluc: 91 mg/dL / Ketone: x  / Bili: x / Urobili: x   Blood: x / Protein: x / Nitrite: x   Leuk Esterase: x / RBC: x / WBC x   Sq Epi: x / Non Sq Epi: x / Bacteria: x        RADIOLOGY & ADDITIONAL STUDIES: HPI:  84 y/o M with ICM/HFrEF (now 30%; LVIDd 6.7 cm; prev req inotrope), CAD c/b IWMI (1994) s/p angioplasty, aortic stenosis s/p bio-AVR 2004, VT arrest (2017) s/p ICD, Afib s/p multiple DCCV and ablation x2 (2020 and 2023; on AC), Aflutter s/p WARREN/DCCV (8/23), prior Ao aneurysm s/p Bentall (2021), severe MR prior MVr (2021) with MAC (precludes M-KRISTIE d/t his anatomy; turned down for TMVR trials by "[employer]), atrophic kidney with CKD (b/l Cr 2.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 Patient was initiated on bumex gtt with gradual improvement with Cr improving from 3.6 to 3.1. Patient was considered for MV re-op; however, patient was reluctant of the procedure and expressed wanting to follow up in the outpatient setting to schedule. During last admission, patient was also noted to be in flutter, EP was consulted, and patient underwent repeat WARREN/DCCV , on amiodarone. Patient was discharged home 2/28 on higher dose of diuretics, as per HF team. Today, patient was evaluated at CTS office for routine follow-up, pt c/o worsening shortness of breath and L>R LE edema, with redness/swelling on left dorsal aspect of the foot, readmitted to CTS service for further management. Denies acute fever, chills, dizziness, headache, chest pain, palpitations, acute shortness of breath, abdominal pain, n/v.       PAST MEDICAL & SURGICAL HISTORY:  Aortic stenosis  AICD (automatic cardioverter/defibrillator) present  Aortic valve regurgitation  Ascending aorta dilation  H/O paroxysmal supraventricular tachycardia  HLD (hyperlipidemia)  Mitral valve regurgitation  Cardiac arrest  Severe mitral regurgitation  S/P aortic valve replacement 3/13/2004  S/P hernia repair 2002  History of tonsillectomy and adenoidectomy  S/P TURP 1996  S/P colonoscopy (2001, 2002, 2006, 2011, 2016)  S/P endoscopy 2006  S/P cardiac cath (1994, 1995, 1999, 2002, 2004)  AICD (automatic cardioverter/defibrillator) present 12/19/17  Cardiac pacemaker 12/19/17  History of cardioversion 9/11/20  S/P ablation of atrial fibrillation 10/29/20  Status post ablation of ventricular arrhythmia 2/14/19          Review of Systems:  14 point ROS done and found to be negative or noncontributory other than noted in HPI.    MEDICATIONS  (STANDING):  aMIOdarone    Tablet 400 milliGRAM(s) Oral two times a day  atorvastatin 40 milliGRAM(s) Oral at bedtime  buMETAnide Infusion 1 mG/Hr (5 mL/Hr) IV Continuous <Continuous>  ceFAZolin   IVPB 1000 milliGRAM(s) IV Intermittent every 24 hours  chlorhexidine 2% Cloths 1 Application(s) Topical <User Schedule>  DOBUTamine Infusion 5 MICROgram(s)/kG/Min (10.2 mL/Hr) IV Continuous <Continuous>  heparin  Infusion 900 Unit(s)/Hr (9 mL/Hr) IV Continuous <Continuous>  influenza  Vaccine (HIGH DOSE) 0.7 milliLiter(s) IntraMuscular once  levothyroxine 75 MICROGram(s) Oral daily  metoprolol succinate ER 25 milliGRAM(s) Oral daily  midodrine. 10 milliGRAM(s) Oral every 8 hours  pantoprazole    Tablet 40 milliGRAM(s) Oral before breakfast  polyethylene glycol 3350 17 Gram(s) Oral daily  senna 2 Tablet(s) Oral at bedtime  sodium chloride 0.9% lock flush 3 milliLiter(s) IV Push every 8 hours    MEDICATIONS  (PRN):      HOME MEDICATIONS:    Allergies    No Known Allergies    Intolerances        SOCIAL HISTORY:  no illicit drug use or smoking hx    FAMILY HISTORY:      Vital Signs Last 24 Hrs  T(C): 36.3 (05 Mar 2024 11:45), Max: 36.7 (04 Mar 2024 19:47)  T(F): 97.4 (05 Mar 2024 11:45), Max: 98 (04 Mar 2024 19:47)  HR: 60 (05 Mar 2024 12:30) (60 - 78)  BP: 105/65 (05 Mar 2024 12:15) (89/61 - 106/65)  BP(mean): 79 (05 Mar 2024 12:15) (70 - 79)  RR: 26 (05 Mar 2024 12:30) (18 - 31)  SpO2: 96% (05 Mar 2024 12:30) (95% - 100%)    Parameters below as of 05 Mar 2024 07:47  Patient On (Oxygen Delivery Method): room air        Tele: Paced     General: Resting  HEENT: EOM intact.  Neck: JVP >20 cm H20  Chest: decreased b/l lung sounds  CV: Normal S1 and S2. No murmurs, rub, or gallops. Radial pulses normal.  Abdomen: Soft, non-distended, non-tender  Extremities: L > R BLE edema   Neurology: Alert and oriented times three. Sensation intact  Psych: Affect normal    LABS:                        10.4   6.81  )-----------( 172      ( 05 Mar 2024 03:12 )             31.6     03-05    130<L>  |  92<L>  |  145<H>  ----------------------------<  91  4.4   |  21<L>  |  4.53<H>    Ca    9.4      05 Mar 2024 03:12    TPro  6.7  /  Alb  3.7  /  TBili  1.7<H>  /  DBili  x   /  AST  43<H>  /  ALT  41  /  AlkPhos  188<H>  03-05    PT/INR - ( 04 Mar 2024 13:11 )   PT: 23.6 sec;   INR: 2.30 ratio         PTT - ( 05 Mar 2024 03:12 )  PTT:52.7 sec  Urinalysis Basic - ( 05 Mar 2024 03:12 )    Color: x / Appearance: x / SG: x / pH: x  Gluc: 91 mg/dL / Ketone: x  / Bili: x / Urobili: x   Blood: x / Protein: x / Nitrite: x   Leuk Esterase: x / RBC: x / WBC x   Sq Epi: x / Non Sq Epi: x / Bacteria: x        RADIOLOGY & ADDITIONAL STUDIES:

## 2024-03-05 NOTE — CONSULT NOTE ADULT - PROBLEM SELECTOR RECOMMENDATION 4
-Noted to have L foot redness/swelling  -f/u on VA duplex  -f/u on BCx  -on cefazolin IV for 7 days  -ID to follow

## 2024-03-05 NOTE — PHYSICAL THERAPY INITIAL EVALUATION ADULT - GENERAL OBSERVATIONS, REHAB EVAL
Pt received seated in chair, A&Ox3, +central line, +IVL, +A-line, +cardiac monitor, agreeable to physical therapy domenic dickey 45 min.

## 2024-03-05 NOTE — PROGRESS NOTE ADULT - SUBJECTIVE AND OBJECTIVE BOX
[facility] Daily Progress     Patient Name: Yanna Handy  Date: 1/17/2018  Visit #: 2/12  Referral Diagnosis: Frequent falls  Referring provider: Wilbur Hannah MD  Visit Diagnosis:     ICD-10-CM    1. Impaired functional mobility, balance, gait, and endurance Z74.09    2. Frequent falls R29.6        Assessment:     Patient is a 86 y.o. female that presents with signs and symptoms consistent with unsteady balance and gait secondary to core stability and reactive balance. Patient demonstrates impairments including narrow base of support gait with decreased марина and increased sway leading to impaired functional mobility. Patient's functional limitations include stair and curb ambulation, walking for long periods of time and walking in crowded spaces.     Today patient reports she feels that she is improving, she can get up and down from the toilet without using her hands. Today was spent on neuro re-ed, balance and gait training to increase strength, endurance and ambulation.     HEP/POC compliance is  good .  Patient demonstrates understanding/independence with home program.  Patient is appropriate to continue with skilled physical therapy intervention, as indicated by initial plan of care.    Goal Status:  Pt. will be independent with home exercise program in : 4 weeks  Pt. will show improved balance for safer : ambulation;for dressing/grooming;for household ambulation;for community ambulation;independently;in 6 weeks  Pt. will be able to walk : 10 minutes;on even surfaces;with less difficulty;for household mobility;for community mobility;in 6 weeks  Pt. will improve gait speed : for decreased risk of falls;for improved household ambulation;for improved community ambulation;in 6 weeks  Patient will ascend / descend: stairs;step;curb;with railing;independently;with less difficulty;in 6 weeks  Patient will increase : Lai score;for improved quality of function;for improved quality of life;in 6  "weeks      Plan / Patient Education:     Continue with initial plan of care.  Progress with home program as tolerated.    Plan for next visit: balance and core strengthening, ladder/hurdles, AIREX, step ups/downs, bridge     Subjective:     Patient reports things are going well, her biggest difficulty is standing on one foot but she grabs the cupboard. Patient has been able to get up from the toilet without using her hands and she is amazed by that, she has been practicing     Functional limitations are described as occurring with:   ascending and descending stairs or curbs  balance  performing routine daily activities  walking for about 15 minutes    Objective:     Patient Outcome Measures :    Lower Extremity Functional Scale (_/80): 53   Scores range from 0-80, where a score of 80 represents maximum function. The minimal clinically important difference is a positive change of 9 points.  Lai Score: 49  Balance scores range from 0-56, where a score of 41-56 ='s a low fall risk (independent); 21-40 ='s a medium fall risk (ambulatory with assistance); 0-20 ='s a high fall risk (wheelchair).  Other Test Score: 23/30 FGA    Treatment Today       Patient Education: Patient was educated on continuing plan of care, progress and review of current HEP. Patient educated on importance of consistency with exercise and therapy, as well as activity modification in order to see change and improvements. Patient demonstrated and verbalized understanding.       Exercises:  Exercise #1: Tandem and SLS - hold 10-60 sec  Comment #1: sit to stand - 10 reps x 2  Exercise #2: hip abduction/extension in standing and heel-toe raises - 10-20 reps  Comment #2: step ups to 6\" - 10 reps  each leg with varying hand assistance   Exercise #3: toe taps to 6\" step - 20 reps   Comment #3: Airex balance and dynamic balance exercises - Narrow MNASOOR, tandem, SLS       TREATMENT MINUTES COMMENTS   Evaluation     Self-care/ Home management     Manual " CRITICAL CARE ATTENDING - CTICU    MEDICATIONS  (STANDING):  aMIOdarone    Tablet 400 milliGRAM(s) Oral two times a day  atorvastatin 40 milliGRAM(s) Oral at bedtime  buMETAnide Infusion 1 mG/Hr (5 mL/Hr) IV Continuous <Continuous>  ceFAZolin   IVPB 1000 milliGRAM(s) IV Intermittent every 24 hours  chlorhexidine 2% Cloths 1 Application(s) Topical <User Schedule>  DOBUTamine Infusion 5 MICROgram(s)/kG/Min (10.2 mL/Hr) IV Continuous <Continuous>  heparin  Infusion 900 Unit(s)/Hr (9 mL/Hr) IV Continuous <Continuous>  influenza  Vaccine (HIGH DOSE) 0.7 milliLiter(s) IntraMuscular once  levothyroxine 75 MICROGram(s) Oral daily  metoprolol succinate ER 25 milliGRAM(s) Oral daily  midodrine. 10 milliGRAM(s) Oral every 8 hours  pantoprazole    Tablet 40 milliGRAM(s) Oral before breakfast  polyethylene glycol 3350 17 Gram(s) Oral daily  senna 2 Tablet(s) Oral at bedtime  sodium chloride 0.9% lock flush 3 milliLiter(s) IV Push every 8 hours                                    10.4   6.81  )-----------( 172      ( 05 Mar 2024 03:12 )             31.6       03-05    130<L>  |  92<L>  |  145<H>  ----------------------------<  91  4.4   |  21<L>  |  4.53<H>    Ca    9.4      05 Mar 2024 03:12    TPro  6.7  /  Alb  3.7  /  TBili  1.7<H>  /  DBili  x   /  AST  43<H>  /  ALT  41  /  AlkPhos  188<H>  03-05      PT/INR - ( 04 Mar 2024 13:11 )   PT: 23.6 sec;   INR: 2.30 ratio         PTT - ( 05 Mar 2024 03:12 )  PTT:52.7 sec        Daily     Daily Weight in k.7 (05 Mar 2024 07:04)      03-04 @ 07:01  -  03-05 @ 07:00  --------------------------------------------------------  IN: 829 mL / OUT: 950 mL / NET: -121 mL     @ 07:01   @ 12:54  --------------------------------------------------------  IN: 350 mL / OUT: 100 mL / NET: 250 mL        Critically Ill patient  : [ x] preoperative ,   [ ] post operative    Requires :  [ x] Arterial Line - consider insertion    [ x] Central Line  [ ] PA catheter  [ ] IABP  [ ] ECMO  [ ] LVAD  [ ] Ventilator  [x ] pacemaker -  PPM / AICD [ ] Impella.                      [x ] ABG's     [ x] Pulse Oxymetry Monitoring  Bedside evaluation , monitoring , treatment of hemodynamics , fluids , IVP/ IVCD meds.        Diagnosis:     Admitted CHF-     Pre Op Evaluation / Optimization - Re Op MVR     h/o AVR     Hypotension     CHF- acute [x  ]   chronic [x  ]    systolic [x ]   diastolic [ ]  Valvular [x ]          - Echo- EF -  30's / MR            [x ] RV dysfunction          - Cxr-cardiomegally, edema          - Clinical-  [ x]inotropes   [ x]pressors   [x ]diuresis - IVCD Bumex   [ ]IABP   [ ]ECMO   [ ]LVAD   [ ]Respiratory Failure    Mitral Regurgitation     Pulmonary Hypertension 2 to biventricular cardiac dysfunction     Hemodynamic lability,  instability. Requires  [ x] vasopressors - midodrine  [ x] inotropes  [ ] vasodilator  [ ]IVSS fluid  to maintain MAP, perfusion, C.I.     Renal Failure - Acute Kidney Injury - recent vasodilation of MAP down    May require CVVHD / IHD      Chronic Renal Failure     PPM - ? consider ?  increase HR and A-V interval to maximize forward flow    Cardiac Cachexia - NG - goal rate feeds     Hyponatremia     Metabolic Acidosis    A Fib - s/p cardioversion / amiodarone     IVCD anticoagulation with [ x] Heparin  [ ] Argatroban for  A Fib     Requires bedside physical therapy, mobilization and total shelter care.                             -                              Discussed with CT surgeon, Physician's Assistant - Nurse Practitioner- Critical care medicine team.   Discussed at  AM / PM rounds.   Chart, labs , films reviewed.    Cumulative Critical Care Time Given Today : 30 min therapy     Neuromuscular Re-education 15 Airex balance, see above   Therapeutic Activity     Therapeutic Exercises 15 See above flowsheet; Nustep 5 minutes   Gait training     Modality__________________                Total 30    Blank areas are intentional and mean the treatment did not include these items.       Domi Gutierrez, PT  1/17/2018

## 2024-03-05 NOTE — PHYSICAL THERAPY INITIAL EVALUATION ADULT - ADDITIONAL COMMENTS
Prior to admission pt reports being independent of all ADL's & functional mobility without AD. Pt [place] in [place], no steps to enter, all needs on first floor, with daughter and spouse. +walk in shower. Pt states he owns RW however does not use.

## 2024-03-05 NOTE — CONSULT NOTE ADULT - PROBLEM SELECTOR RECOMMENDATION 5
-has 3 DCCV in past (most recent 2/27/24)  -on amiodarone  -Home med Xarelto held   -on heparin gtt
Female

## 2024-03-05 NOTE — CONSULT NOTE ADULT - NS ATTEND AMEND GEN_ALL_CORE FT
Patient was seen and examined at bedside with the advanced care provider.  I agree with the above with the following exceptions:    Briefly, Mr. 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  Patient on that visit, was offered transition to HD as well has surgical repair of his severe mitral regurgitation (not a candidate for percutaneous repair).  Patient at the time had declined and wanted to go home to take care of a few personal matters.  He presented for outpatient surgical follow-up and was found to be volume overloaded and in distress.  He was referred to the ED.  He was started on Bumex 1 mg/h.  Discharge weight was 143.9 lbs and on admission was 149.4 lbs.  proBNP at discharge was 18 K and elevated to 14 K on readmission.  Cr now worse to 4.5 from prior 3. Liver enzymes were elevated and have started to downtrend with diuresis (TB 2.2 to 1.7).  Unfortunately, he has made very little urine, only approximately 100 cc, since presentation with a narrow pulse pressure of 90/60. HD catheter and RIJ central line being placed.  Patient to be transferred to CTU.    On exam appears volume overloaded with distended EJ to mandible.      Recommend the following:  - please send mixed venous off of central line prior to initiation of any inotropes. Unable to tolerate afterload reduction currently due to low BP.  -Transduce CVP.  Aim to keep at least 2 L net negative.  -Plans in place to start on HD however, I do not feel that his blood pressure will be able to tolerate this.  He will likely need to transition to CVVHD.  Nephrology following.  - Please interrogate PPM.  DCCV last admission on 2/27.  RV pacing burden 41%.

## 2024-03-05 NOTE — CONSULT NOTE ADULT - PROBLEM SELECTOR RECOMMENDATION 2
- Etiology: Likely cardiorenal   - Will need CVHD today with goal of -2L   - Preserve RUE in anticipation for HD access  - No MRAs at this time.   - Nephrology following (Dr. Lala) - Etiology: Likely cardiorenal   - Will need CVVH today with goal of -2L   - Preserve RUE in anticipation for HD access  - Cr today 4.53, Uric Acid 13.4  - Nephrology following (Dr. Lala)

## 2024-03-05 NOTE — CONSULT NOTE ADULT - PROBLEM SELECTOR RECOMMENDATION 3
- TTE 2/22 with severe MR  - He was evaluated for M-KRISTIE but was not a candidate d/t his anatomy.  He was evaluated for [facility] (Tendyne valve) trial for a transcatheter MVR but was denied by [employer]  - will continue to follow by CT surgery - TTE 2/22 with severe MR  - He was evaluated for M-KRISTIE but was not a candidate d/t his anatomy.  He was evaluated for [facility] (Tendyne valve) trial for a transcatheter MVR but was denied by [employer]  - will continue to follow by CT surgery (Dr. Fenton)  - once optimized from HF and nutritional standpoint, can resume plan for surgical mitral valve replacement.

## 2024-03-05 NOTE — CONSULT NOTE ADULT - PROBLEM SELECTOR RECOMMENDATION 9
-HFrEF LVEF 36%, grade II DD - ACC / AHA Stage C/D HF, NYHA Class III  -Etiology: ischemic and complicated by severe MR  -GDMT  -no MRA as concern of elevated Cr  -has dual chamber ICD - interrogation on 2/26 with  41% and episodes of AT/AF that began 1/2024  -Will obtain a repeat interrogation today -Etiology: ischemic and complicated by severe MR  -on bumex gtt 1mg/hr  -no MRA/SGLT2i as concern of elevated Cr  -off of HDZN/ISDN for marginal BP  -has dual chamber ICD - interrogation on 2/26 with  41% and episodes of AT/AF that began 1/2024  -Will obtain a repeat interrogation today  -Strict I/Os and daily weights  -Monitor markers for end organ function (LFT's, lactate and SvO2)  -Central line to be placed today - will need to transduce a CVP reading  -Will need a VBG from central line prior to initiation of inotrope

## 2024-03-05 NOTE — PROCEDURE NOTE - ADDITIONAL PROCEDURE DETAILS
Dr. Fenton aware patient on Xarelto, but felt pt needed urgent HD. Heparin held for 4hrs prior to procedure.

## 2024-03-06 LAB
ALBUMIN SERPL ELPH-MCNC: 3.7 G/DL — SIGNIFICANT CHANGE UP (ref 3.3–5)
ALP SERPL-CCNC: 201 U/L — HIGH (ref 40–120)
ALT FLD-CCNC: 41 U/L — SIGNIFICANT CHANGE UP (ref 10–45)
ANION GAP SERPL CALC-SCNC: 16 MMOL/L — SIGNIFICANT CHANGE UP (ref 5–17)
APTT BLD: 103.8 SEC — HIGH (ref 24.5–35.6)
APTT BLD: 39.8 SEC — HIGH (ref 24.5–35.6)
APTT BLD: 74.1 SEC — HIGH (ref 24.5–35.6)
APTT BLD: 88.2 SEC — HIGH (ref 24.5–35.6)
APTT BLD: 93.7 SEC — HIGH (ref 24.5–35.6)
AST SERPL-CCNC: 53 U/L — HIGH (ref 10–40)
BASE EXCESS BLDV CALC-SCNC: 2.6 MMOL/L — SIGNIFICANT CHANGE UP (ref -2–3)
BASOPHILS # BLD AUTO: 0 K/UL — SIGNIFICANT CHANGE UP (ref 0–0.2)
BASOPHILS NFR BLD AUTO: 0 % — SIGNIFICANT CHANGE UP (ref 0–2)
BILIRUB SERPL-MCNC: 1.7 MG/DL — HIGH (ref 0.2–1.2)
BUN SERPL-MCNC: 99 MG/DL — HIGH (ref 7–23)
CA-I SERPL-SCNC: 1.15 MMOL/L — SIGNIFICANT CHANGE UP (ref 1.15–1.33)
CALCIUM SERPL-MCNC: 9.4 MG/DL — SIGNIFICANT CHANGE UP (ref 8.4–10.5)
CHLORIDE BLDV-SCNC: 95 MMOL/L — LOW (ref 96–108)
CHLORIDE SERPL-SCNC: 95 MMOL/L — LOW (ref 96–108)
CO2 BLDV-SCNC: 27 MMOL/L — HIGH (ref 22–26)
CO2 SERPL-SCNC: 23 MMOL/L — SIGNIFICANT CHANGE UP (ref 22–31)
CREAT SERPL-MCNC: 3.49 MG/DL — HIGH (ref 0.5–1.3)
EGFR: 17 ML/MIN/1.73M2 — LOW
EOSINOPHIL # BLD AUTO: 0 K/UL — SIGNIFICANT CHANGE UP (ref 0–0.5)
EOSINOPHIL NFR BLD AUTO: 0 % — SIGNIFICANT CHANGE UP (ref 0–6)
FERRITIN SERPL-MCNC: 335 NG/ML — SIGNIFICANT CHANGE UP (ref 30–400)
GAS PNL BLDV: 133 MMOL/L — LOW (ref 136–145)
GAS PNL BLDV: SIGNIFICANT CHANGE UP
GLUCOSE BLDV-MCNC: 122 MG/DL — HIGH (ref 70–99)
GLUCOSE SERPL-MCNC: 133 MG/DL — HIGH (ref 70–99)
HCO3 BLDV-SCNC: 26 MMOL/L — SIGNIFICANT CHANGE UP (ref 22–29)
HCT VFR BLD CALC: 28.8 % — LOW (ref 39–50)
HCT VFR BLDA CALC: 30 % — LOW (ref 39–51)
HGB BLD CALC-MCNC: 10.1 G/DL — LOW (ref 12.6–17.4)
HGB BLD-MCNC: 9.9 G/DL — LOW (ref 13–17)
HOROWITZ INDEX BLDV+IHG-RTO: 21 — SIGNIFICANT CHANGE UP
INR BLD: 1.36 RATIO — HIGH (ref 0.85–1.18)
IRON SATN MFR SERPL: 290 UG/DL — HIGH (ref 45–165)
IRON SATN MFR SERPL: 67 % — HIGH (ref 16–55)
LACTATE BLDV-MCNC: 0.9 MMOL/L — SIGNIFICANT CHANGE UP (ref 0.5–2)
LYMPHOCYTES # BLD AUTO: 0.22 K/UL — LOW (ref 1–3.3)
LYMPHOCYTES # BLD AUTO: 3.5 % — LOW (ref 13–44)
MAGNESIUM SERPL-MCNC: 2.5 MG/DL — SIGNIFICANT CHANGE UP (ref 1.6–2.6)
MANUAL SMEAR VERIFICATION: SIGNIFICANT CHANGE UP
MCHC RBC-ENTMCNC: 31.8 PG — SIGNIFICANT CHANGE UP (ref 27–34)
MCHC RBC-ENTMCNC: 34.4 GM/DL — SIGNIFICANT CHANGE UP (ref 32–36)
MCV RBC AUTO: 92.6 FL — SIGNIFICANT CHANGE UP (ref 80–100)
MONOCYTES # BLD AUTO: 0.38 K/UL — SIGNIFICANT CHANGE UP (ref 0–0.9)
MONOCYTES NFR BLD AUTO: 6.2 % — SIGNIFICANT CHANGE UP (ref 2–14)
NEUTROPHILS # BLD AUTO: 5.55 K/UL — SIGNIFICANT CHANGE UP (ref 1.8–7.4)
NEUTROPHILS NFR BLD AUTO: 90.3 % — HIGH (ref 43–77)
PCO2 BLDV: 35 MMHG — LOW (ref 42–55)
PH BLDV: 7.48 — HIGH (ref 7.32–7.43)
PHOSPHATE SERPL-MCNC: 3.7 MG/DL — SIGNIFICANT CHANGE UP (ref 2.5–4.5)
PLAT MORPH BLD: NORMAL — SIGNIFICANT CHANGE UP
PLATELET # BLD AUTO: 154 K/UL — SIGNIFICANT CHANGE UP (ref 150–400)
PO2 BLDV: 37 MMHG — SIGNIFICANT CHANGE UP (ref 25–45)
POTASSIUM BLDV-SCNC: 3.7 MMOL/L — SIGNIFICANT CHANGE UP (ref 3.5–5.1)
POTASSIUM SERPL-MCNC: 3.8 MMOL/L — SIGNIFICANT CHANGE UP (ref 3.5–5.3)
POTASSIUM SERPL-SCNC: 3.8 MMOL/L — SIGNIFICANT CHANGE UP (ref 3.5–5.3)
PROT SERPL-MCNC: 6.9 G/DL — SIGNIFICANT CHANGE UP (ref 6–8.3)
PROTHROM AB SERPL-ACNC: 14.8 SEC — HIGH (ref 9.5–13)
RBC # BLD: 3.11 M/UL — LOW (ref 4.2–5.8)
RBC # FLD: 14.9 % — HIGH (ref 10.3–14.5)
RBC BLD AUTO: SIGNIFICANT CHANGE UP
SAO2 % BLDV: 74 % — SIGNIFICANT CHANGE UP (ref 67–88)
SODIUM SERPL-SCNC: 134 MMOL/L — LOW (ref 135–145)
TIBC SERPL-MCNC: 430 UG/DL — SIGNIFICANT CHANGE UP (ref 220–430)
TSH SERPL-MCNC: 2.02 UIU/ML — SIGNIFICANT CHANGE UP (ref 0.27–4.2)
UIBC SERPL-MCNC: 140 UG/DL — SIGNIFICANT CHANGE UP (ref 110–370)
WBC # BLD: 6.15 K/UL — SIGNIFICANT CHANGE UP (ref 3.8–10.5)
WBC # FLD AUTO: 6.15 K/UL — SIGNIFICANT CHANGE UP (ref 3.8–10.5)

## 2024-03-06 PROCEDURE — 99291 CRITICAL CARE FIRST HOUR: CPT | Mod: FS

## 2024-03-06 PROCEDURE — 71045 X-RAY EXAM CHEST 1 VIEW: CPT | Mod: 26

## 2024-03-06 PROCEDURE — 99291 CRITICAL CARE FIRST HOUR: CPT

## 2024-03-06 PROCEDURE — 93010 ELECTROCARDIOGRAM REPORT: CPT

## 2024-03-06 RX ORDER — PREGABALIN 225 MG/1
1000 CAPSULE ORAL DAILY
Refills: 0 | Status: DISCONTINUED | OUTPATIENT
Start: 2024-03-06 | End: 2024-03-24

## 2024-03-06 RX ORDER — IRON SUCROSE 20 MG/ML
200 INJECTION, SOLUTION INTRAVENOUS EVERY 24 HOURS
Refills: 0 | Status: COMPLETED | OUTPATIENT
Start: 2024-03-06 | End: 2024-03-08

## 2024-03-06 RX ORDER — THIAMINE MONONITRATE (VIT B1) 100 MG
100 TABLET ORAL DAILY
Refills: 0 | Status: DISCONTINUED | OUTPATIENT
Start: 2024-03-06 | End: 2024-03-24

## 2024-03-06 RX ORDER — POTASSIUM CHLORIDE 20 MEQ
40 PACKET (EA) ORAL ONCE
Refills: 0 | Status: COMPLETED | OUTPATIENT
Start: 2024-03-06 | End: 2024-03-06

## 2024-03-06 RX ORDER — ASCORBIC ACID 60 MG
500 TABLET,CHEWABLE ORAL DAILY
Refills: 0 | Status: DISCONTINUED | OUTPATIENT
Start: 2024-03-06 | End: 2024-03-24

## 2024-03-06 RX ORDER — VANCOMYCIN HCL 1 G
1000 VIAL (EA) INTRAVENOUS ONCE
Refills: 0 | Status: COMPLETED | OUTPATIENT
Start: 2024-03-06 | End: 2024-03-06

## 2024-03-06 RX ORDER — FERROUS SULFATE 325(65) MG
325 TABLET ORAL DAILY
Refills: 0 | Status: DISCONTINUED | OUTPATIENT
Start: 2024-03-06 | End: 2024-03-08

## 2024-03-06 RX ORDER — AMIODARONE HYDROCHLORIDE 400 MG/1
1 TABLET ORAL
Qty: 14 | Refills: 0
Start: 2024-03-06 | End: 2024-03-12

## 2024-03-06 RX ORDER — FOLIC ACID 0.8 MG
1 TABLET ORAL DAILY
Refills: 0 | Status: DISCONTINUED | OUTPATIENT
Start: 2024-03-06 | End: 2024-03-24

## 2024-03-06 RX ADMIN — Medication 1 MILLIGRAM(S): at 11:10

## 2024-03-06 RX ADMIN — AMIODARONE HYDROCHLORIDE 400 MILLIGRAM(S): 400 TABLET ORAL at 05:14

## 2024-03-06 RX ADMIN — HEPARIN SODIUM 11 UNIT(S)/HR: 5000 INJECTION INTRAVENOUS; SUBCUTANEOUS at 07:35

## 2024-03-06 RX ADMIN — Medication 75 MICROGRAM(S): at 05:14

## 2024-03-06 RX ADMIN — Medication 250 MILLIGRAM(S): at 09:22

## 2024-03-06 RX ADMIN — Medication 500 MILLIGRAM(S): at 11:09

## 2024-03-06 RX ADMIN — PREGABALIN 1000 MICROGRAM(S): 225 CAPSULE ORAL at 11:13

## 2024-03-06 RX ADMIN — SODIUM CHLORIDE 3 MILLILITER(S): 9 INJECTION INTRAMUSCULAR; INTRAVENOUS; SUBCUTANEOUS at 06:05

## 2024-03-06 RX ADMIN — Medication 40 MILLIEQUIVALENT(S): at 05:14

## 2024-03-06 RX ADMIN — Medication 10.2 MICROGRAM(S)/KG/MIN: at 07:35

## 2024-03-06 RX ADMIN — CHLORHEXIDINE GLUCONATE 1 APPLICATION(S): 213 SOLUTION TOPICAL at 05:14

## 2024-03-06 RX ADMIN — IRON SUCROSE 110 MILLIGRAM(S): 20 INJECTION, SOLUTION INTRAVENOUS at 10:00

## 2024-03-06 RX ADMIN — SODIUM CHLORIDE 3 MILLILITER(S): 9 INJECTION INTRAMUSCULAR; INTRAVENOUS; SUBCUTANEOUS at 14:50

## 2024-03-06 RX ADMIN — Medication 100 MILLIGRAM(S): at 11:09

## 2024-03-06 RX ADMIN — Medication 5 MILLIGRAM(S): at 21:08

## 2024-03-06 RX ADMIN — ATORVASTATIN CALCIUM 40 MILLIGRAM(S): 80 TABLET, FILM COATED ORAL at 21:08

## 2024-03-06 RX ADMIN — PANTOPRAZOLE SODIUM 40 MILLIGRAM(S): 20 TABLET, DELAYED RELEASE ORAL at 06:05

## 2024-03-06 RX ADMIN — SODIUM CHLORIDE 3 MILLILITER(S): 9 INJECTION INTRAMUSCULAR; INTRAVENOUS; SUBCUTANEOUS at 20:49

## 2024-03-06 RX ADMIN — POLYETHYLENE GLYCOL 3350 17 GRAM(S): 17 POWDER, FOR SOLUTION ORAL at 11:09

## 2024-03-06 RX ADMIN — BUMETANIDE 5 MG/HR: 0.25 INJECTION INTRAMUSCULAR; INTRAVENOUS at 07:35

## 2024-03-06 RX ADMIN — Medication 325 MILLIGRAM(S): at 11:10

## 2024-03-06 RX ADMIN — Medication 100 MILLIGRAM(S): at 18:34

## 2024-03-06 NOTE — PROGRESS NOTE ADULT - ASSESSMENT
82 y/o M with ICM/HFrEF (now 30%; LVIDd 6.7 cm; prev req inotrope), CAD c/b IWMI (1994) s/p angioplasty, aortic stenosis s/p bio-AVR 2004, VT arrest (2017) s/p ICD, Afib s/p multiple DCCV and ablation x3 (most recent 2/27/24 on AC), Aflutter s/p WARREN/DCCV (8/23), prior Ao aneurysm s/p Bentall (2021), severe MR prior MVr (2021) with MAC (precludes M-KRISTIE d/t his anatomy; turned down for TMVR trials by [employer]), atrophic kidney with CKD (b/l Cr 2.5-3), presents to [facility] for ADHF from [facility] office for routine follow-up, c/o worsening shortness of breath and L>R LE edema, with redness/swelling on left dorsal aspect of the foot. Was recently discharged on 2/28 on higher dose of diuretics.     He is ACC/AHA Stage C/D HF with NYHA Class III, appears volume overloaded and low normotensive.     Once optimized from HF and nutritional standpoint, can resume plan for surgical mitral valve replacement.      Cardiac studies   WARREN 2/27/24: severe MR, DCCV SR  TTE 2/22/24: LVIDd: 5.8cm, LVEF 35%, mildly enlarged RV with reduced function, severe MR and Moderate to severe TR. PASP 70mmHg  WARREN 8/23 Severe MR and moderate TR  TTE 8/23 EF 36%, grade II DD, mildly enlarged RV with reduced function, severe MR and Moderate to severe TR   82 y/o M with ICM/HFrEF (now 30%; LVIDd 6.7 cm; prev req inotrope), CAD c/b IWMI (1994) s/p angioplasty, aortic stenosis s/p bio-AVR 2004, VT arrest (2017) s/p ICD, Afib s/p multiple DCCV and ablation x3 (most recent 2/27/24 on AC), Aflutter s/p WARREN/DCCV (8/23), prior Ao aneurysm s/p Bentall (2021), severe MR prior MVr (2021) with MAC (precludes M-KRISTIE d/t his anatomy; turned down for TMVR trials by [employer]), atrophic kidney with CKD (b/l Cr 2.5-3), presents to [facility] for ADHF from CTS office for routine follow-up, c/o worsening shortness of breath and L>R LE edema, with redness/swelling on left dorsal aspect of the foot. Was recently discharged on 2/28 on higher dose of diuretics.     He is ACC/AHA Stage C/D HF with NYHA Class III, appears volume overload and low normotensive. Had a session of intermittent HD without fluid removal, but did make robust urine output on bumex gtt. EP interrogation noted of baseline prolonged AV delay and occasional junctional rhythm w/ sinus but no AT/AF. Vpacing at 38%. Increase LRL to 70. Once optimized from HF and nutritional standpoint, can resume plan for surgical mitral valve replacement.    Bedside Hemodynamics  3/6/24 ( 5): CVP 8, ScVO2 74%, CO/CI (FI): 6.5/3.6    Cardiac studies   WARREN 2/27/24: severe MR, DCCV SR  TTE 2/22/24: LVIDd: 5.8cm, LVEF 35%, mildly enlarged RV with reduced function, severe MR and Moderate to severe TR. PASP 70mmHg  WARREN 8/23 Severe MR and moderate TR  TTE 8/23 EF 36%, grade II DD, mildly enlarged RV with reduced function, severe MR and Moderate to severe TR

## 2024-03-06 NOTE — PROGRESS NOTE ADULT - PROBLEM SELECTOR PLAN 1
-Etiology: ischemic and complicated by severe MR  -on bumex gtt 1mg/hr with goal of net negative 1 to 2 L  -no MRA/SGLT2i with elevated Cr  -off of HDZN/ISDN for marginal BP  -has dual chamber ICD   -interrogation 3/5: AP 40%,  38%, multiple AMS events with baseline prolonged AV delay and occasionally junctional rhythm overtaking SR. LRL increase to 70.    -Consider reducing  to 2.5mcg/kg/min and monitor markers for end organ function (LFT's, lactate and ScVO2)  -Maintain goal of CVP <10  -Strict I/Os and daily weights  -Obtain iron studies

## 2024-03-06 NOTE — PROGRESS NOTE ADULT - SUBJECTIVE AND OBJECTIVE BOX
[facility]   [phone]  RENAL FOLLOW UP NOTE  --------------------------------------------------------------------------------  Chief Complaint:    24 hour events/subjective:    seen earlier   states still with decrease po intake     PAST HISTORY  --------------------------------------------------------------------------------  No significant changes to PMH, PSH, FHx, SHx, unless otherwise noted    ALLERGIES & MEDICATIONS  --------------------------------------------------------------------------------  Allergies    No Known Allergies    Intolerances      Standing Inpatient Medications  allopurinol 100 milliGRAM(s) Oral daily  ascorbic acid 500 milliGRAM(s) Oral daily  atorvastatin 40 milliGRAM(s) Oral at bedtime  buMETAnide Infusion 1 mG/Hr IV Continuous <Continuous>  ceFAZolin   IVPB 1000 milliGRAM(s) IV Intermittent every 24 hours  chlorhexidine 2% Cloths 1 Application(s) Topical <User Schedule>  cyanocobalamin 1000 MICROGram(s) Oral daily  DOBUTamine Infusion 5 MICROgram(s)/kG/Min IV Continuous <Continuous>  ferrous    sulfate 325 milliGRAM(s) Oral daily  folic acid 1 milliGRAM(s) Oral daily  heparin  Infusion 900 Unit(s)/Hr IV Continuous <Continuous>  influenza  Vaccine (HIGH DOSE) 0.7 milliLiter(s) IntraMuscular once  iron sucrose IVPB 200 milliGRAM(s) IV Intermittent every 24 hours  levothyroxine 75 MICROGram(s) Oral daily  melatonin 5 milliGRAM(s) Oral at bedtime  pantoprazole    Tablet 40 milliGRAM(s) Oral before breakfast  polyethylene glycol 3350 17 Gram(s) Oral daily  senna 2 Tablet(s) Oral at bedtime  sodium chloride 0.9% lock flush 3 milliLiter(s) IV Push every 8 hours  thiamine 100 milliGRAM(s) Oral daily    PRN Inpatient Medications      REVIEW OF SYSTEMS  --------------------------------------------------------------------------------    Gen: denies  fevers/chills,  CVS: denies chest pain/palpitations  Resp: denies worsening SOB/Cough  GI: Denies N/V/Abd pain  : Denies dysuria    VITALS/PHYSICAL EXAM  --------------------------------------------------------------------------------  T(C): 36.6 (03-06-24 @ 17:50), Max: 36.6 (03-06-24 @ 08:00)  HR: 71 (03-06-24 @ 19:00) (67 - 76)  BP: 99/55 (03-06-24 @ 19:00) (92/50 - 134/52)  RR: 29 (03-06-24 @ 19:00) (14 - 35)  SpO2: 98% (03-06-24 @ 19:00) (92% - 100%)  Wt(kg): --        03-05-24 @ 07:01  -  03-06-24 @ 07:00  --------------------------------------------------------  IN: 2028 mL / OUT: 3068 mL / NET: -1040 mL    03-06-24 @ 07:01  -  03-06-24 @ 20:15  --------------------------------------------------------  IN: 1540 mL / OUT: 1165 mL / NET: 375 mL      Physical Exam:  	      Gen: Non toxic comfortable appearing   	 +  jvd  	Pulm: decrease bs  no rales or ronchi or wheezing  	CV: RRR, S1S2; no rub  	Abd: +BS, soft, nontender/nondistended  	: No suprapubic tenderness  	UE: Warm, no cyanosis  no clubbing,  no edema  	LE: Warm, no cyanosis  no clubbing, 2-  edema  	Neuro: alert and oriented. speech coherent       LABS/STUDIES  --------------------------------------------------------------------------------              9.9    6.15  >-----------<  154      [03-06-24 @ 01:34]              28.8     134  |  95  |  99  ----------------------------<  133      [03-06-24 @ 01:34]  3.8   |  23  |  3.49        Ca     9.4     [03-06-24 @ 01:34]      Mg     2.5     [03-06-24 @ 01:34]      Phos  3.7     [03-06-24 @ 01:34]    TPro  6.9  /  Alb  3.7  /  TBili  1.7  /  DBili  x   /  AST  53  /  ALT  41  /  AlkPhos  201  [03-06-24 @ 01:34]    PT/INR: PT 14.8 , INR 1.36       [03-06-24 @ 01:34]  PTT: 74.1       [03-06-24 @ 18:55]    Uric acid 13.8      [03-05-24 @ 03:12]    Creatinine Trend:  SCr 3.49 [03-06 @ 01:34]  SCr 4.53 [03-05 @ 03:12]  SCr 4.45 [03-04 @ 15:30]  SCr 4.39 [03-04 @ 13:11]  SCr 3.65 [02-28 @ 06:46]        Iron 290, TIBC 430, %sat 67      [03-06-24 @ 13:57]  Ferritin 335      [03-06-24 @ 13:57]  PTH -- (Ca 8.9)      [03-05-24 @ 13:03]   242  TSH 2.02      [03-06-24 @ 13:57]

## 2024-03-06 NOTE — PROGRESS NOTE ADULT - PROBLEM SELECTOR PLAN 2
- Etiology: Likely cardiorenal   - Had 1 session of HD on 3/5  - Preserve RUE in anticipation for HD access  - Cr today 3.49, Uric Acid 13.4  - Nephrology following (Dr. Lala).

## 2024-03-06 NOTE — PROGRESS NOTE ADULT - PROBLEM SELECTOR PLAN 3
- TTE 2/22 with severe MR  - He was evaluated for M-KRISTIE but was not a candidate d/t his anatomy.  He was evaluated for [facility] (Tendyne valve) trial for a transcatheter MVR but was denied by [employer]  - will continue to follow by CT surgery (Dr. Fenton)  - once optimized from HF and nutritional standpoint, can resume plan for surgical mitral valve replacement.  - Has feeding tube placed

## 2024-03-06 NOTE — PROGRESS NOTE ADULT - SUBJECTIVE AND OBJECTIVE BOX
Subjective: "feel ok"    Medications:  allopurinol 100 milliGRAM(s) Oral daily  ascorbic acid 500 milliGRAM(s) Oral daily  atorvastatin 40 milliGRAM(s) Oral at bedtime  buMETAnide Infusion 1 mG/Hr IV Continuous <Continuous>  ceFAZolin   IVPB 1000 milliGRAM(s) IV Intermittent every 24 hours  chlorhexidine 2% Cloths 1 Application(s) Topical <User Schedule>  cyanocobalamin 1000 MICROGram(s) Oral daily  DOBUTamine Infusion 5 MICROgram(s)/kG/Min IV Continuous <Continuous>  ferrous    sulfate 325 milliGRAM(s) Oral daily  folic acid 1 milliGRAM(s) Oral daily  heparin  Infusion 900 Unit(s)/Hr IV Continuous <Continuous>  influenza  Vaccine (HIGH DOSE) 0.7 milliLiter(s) IntraMuscular once  iron sucrose IVPB 200 milliGRAM(s) IV Intermittent every 24 hours  levothyroxine 75 MICROGram(s) Oral daily  melatonin 5 milliGRAM(s) Oral at bedtime  pantoprazole    Tablet 40 milliGRAM(s) Oral before breakfast  polyethylene glycol 3350 17 Gram(s) Oral daily  senna 2 Tablet(s) Oral at bedtime  sodium chloride 0.9% lock flush 3 milliLiter(s) IV Push every 8 hours  thiamine 100 milliGRAM(s) Oral daily      Physical Exam:    Vitals:  Vital Signs Last 24 Hours  T(C): 36.6 (24 @ 08:00), Max: 36.7 (24 @ 16:05)  HR: 71 (24 @ 12:00) (60 - 89)  BP: 105/54 (24 @ 12:00) (89/57 - 134/52)  RR: 23 (24 @ 12:00) (12 - 35)  SpO2: 97% (24 @ 12:00) (78% - 98%)    Weight in k.1 ( @ 18:05)    I&O's Summary    05 Mar 2024 07:01  -  06 Mar 2024 07:00  --------------------------------------------------------  IN: 2028 mL / OUT: 3068 mL / NET: -1040 mL    06 Mar 2024 07:01  -  06 Mar 2024 13:19  --------------------------------------------------------  IN: 1008 mL / OUT: 600 mL / NET: 408 mL        Tele: Paced, 70    General: No distress. Comfortable.  HEENT: EOM intact.  Neck: Hard to appreciate exact amount, but + JVP  Chest: Clear to auscultation bilaterally  CV: III/IV systolic murmur  Abdomen: Soft, non-distended, non-tender  Extremities: +1 BLE edema. Redness on L dorsal aspect of foot  Neurology: Alert and oriented times three. Sensation intact  Psych: Affect normal    Labs:                        9.9    6.15  )-----------( 154      ( 06 Mar 2024 01:34 )             28.8     03-06    134<L>  |  95<L>  |  99<H>  ----------------------------<  133<H>  3.8   |  23  |  3.49<H>    Ca    9.4      06 Mar 2024 01:34  Phos  3.7     03-06  Mg     2.5     03-06    TPro  6.9  /  Alb  3.7  /  TBili  1.7<H>  /  DBili  x   /  AST  53<H>  /  ALT  41  /  AlkPhos  201<H>  03-06    PT/INR - ( 06 Mar 2024 01:34 )   PT: 14.8 sec;   INR: 1.36 ratio         PTT - ( 06 Mar 2024 08:51 )  PTT:88.2 sec

## 2024-03-06 NOTE — PROGRESS NOTE ADULT - SUBJECTIVE AND OBJECTIVE BOX
CRITICAL CARE ATTENDING - CTICU    MEDICATIONS  (STANDING):  allopurinol 100 milliGRAM(s) Oral daily  ascorbic acid 500 milliGRAM(s) Oral daily  atorvastatin 40 milliGRAM(s) Oral at bedtime  buMETAnide Infusion 1 mG/Hr (5 mL/Hr) IV Continuous <Continuous>  ceFAZolin   IVPB 1000 milliGRAM(s) IV Intermittent every 24 hours  chlorhexidine 2% Cloths 1 Application(s) Topical <User Schedule>  cyanocobalamin 1000 MICROGram(s) Oral daily  DOBUTamine Infusion 5 MICROgram(s)/kG/Min (10.2 mL/Hr) IV Continuous <Continuous>  ferrous    sulfate 325 milliGRAM(s) Oral daily  folic acid 1 milliGRAM(s) Oral daily  heparin  Infusion 900 Unit(s)/Hr (11 mL/Hr) IV Continuous <Continuous>  influenza  Vaccine (HIGH DOSE) 0.7 milliLiter(s) IntraMuscular once  levothyroxine 75 MICROGram(s) Oral daily  melatonin 5 milliGRAM(s) Oral at bedtime  pantoprazole    Tablet 40 milliGRAM(s) Oral before breakfast  polyethylene glycol 3350 17 Gram(s) Oral daily  senna 2 Tablet(s) Oral at bedtime  sodium chloride 0.9% lock flush 3 milliLiter(s) IV Push every 8 hours  thiamine 100 milliGRAM(s) Oral daily                                    9.9    6.15  )-----------( 154      ( 06 Mar 2024 01:34 )             28.8       03-06    134<L>  |  95<L>  |  99<H>  ----------------------------<  133<H>  3.8   |  23  |  3.49<H>    Ca    9.4      06 Mar 2024 01:34  Phos  3.7     03-06  Mg     2.5     03-06    TPro  6.9  /  Alb  3.7  /  TBili  1.7<H>  /  DBili  x   /  AST  53<H>  /  ALT  41  /  AlkPhos  201<H>  03-06      PT/INR - ( 06 Mar 2024 01:34 )   PT: 14.8 sec;   INR: 1.36 ratio         PTT - ( 06 Mar 2024 01:34 )  PTT:39.8 sec        Daily     Daily Weight in k.1 (05 Mar 2024 18:05)      03-04 @ 07: @ 07:00  --------------------------------------------------------  IN: 829 mL / OUT: 950 mL / NET: -121 mL     @ 07:01  -   @ 06:11  --------------------------------------------------------  IN: 1946 mL / OUT: 2468 mL / NET: -522 mL        Critically Ill patient  : [ x] preoperative ,   [ ] post operative    Requires :  [ ] Arterial Line [x] Central Line  [ ] PA catheter  [ ] IABP  [ ] ECMO  [ ] LVAD  [ ] Ventilator  [x] pacemaker -  PPM / AICD [ ] Impella.                      [x] ABG's     [x] Pulse Oxymetry Monitoring  Bedside evaluation , monitoring , treatment of hemodynamics , fluids , IVP/ IVCD meds.        Diagnosis:     3/5 - Tx to ICU for Hypotension/CHF/renal failure    Pre Op Evaluation / Optimization - Re Op MVR     h/o AVR     Hypotension     Mitral Regurgitation     Pulmonary Hypertension 2 to biventricular cardiac dysfunction     Requires chest PT, pulmonary toilet, suctioning to maintain SaO2,  patent airway and treat atelectasis.    PPM - ? consider ?  increase HR and A-V interval to maximize forward flow    A Fib - s/p cardioversion / amiodarone --> Paced Rhythm     CHF- acute [x  ]   chronic [x  ]    systolic [x ]   diastolic [ ]  Valvular [x ]          - Echo- EF -  30's / MR            [x ] RV dysfunction          - Cxr-cardiomegally, edema          - Clinical-  [ x]inotropes   [ ]pressors   [x ]diuresis - IVCD Bumex   [ ]IABP   [ ]ECMO   [ ]LVAD   [ ]Respiratory Failure    Hemodynamic lability,  instability. Requires  [ ] vasopressors [ x] inotropes  [ ] vasodilator  [ ]IVSS fluid  to maintain MAP, perfusion, C.I.     IVCD anticoagulation with [ x] Heparin gtt [ ] Argatroban for  A Fib     Synthroid for Hypothyroidism    Fluid Overload     CVVHD / IHD, 1st session done yesterday    [x] Hemodialysis [ ] Hemofiltration:    [x] negative fluid balance in a hemodynamically unstable patient.     Chronic Renal Failure     Cardiac Cachexia - NG - goal rate feeds     Tolerates DASH Diet + NG feeds at [x] goal rate  40 cc/Hr  [ ] trophic rate    [ ]       rate    Renal Failure - Acute Kidney Injury - recent vasodilation of MAP down    Hyponatremia     Metabolic Acidosis    Requires bedside physical therapy, mobilization and total USP care.                   By signing my name below, I, Leticia Helton, attest that this documentation has been prepared under the direction and in the presence of Ildefonso Bañuelos MD.   Electronically Signed: Home Mcfar[place] 24 @ 06:11    I, Ildefonso Bañuelos, personally performed the services described in this documentation. All medical record entries made by the scribe were at my direction and in my presence. I have reviewed the chart and agree that the record reflects my personal performance and is accurate and complete.   Ildefonso Bañuelos MD.       Discussed with CT surgeon, Physician Assistant - Nurse Practitioner- Critical care medicine team.   Discussed at  AM / PM rounds.   Chart, labs , films reviewed.    Cumulative Critical Care Time Given Today:  CRITICAL CARE ATTENDING - CTICU    MEDICATIONS  (STANDING):  allopurinol 100 milliGRAM(s) Oral daily  ascorbic acid 500 milliGRAM(s) Oral daily  atorvastatin 40 milliGRAM(s) Oral at bedtime  buMETAnide Infusion 1 mG/Hr (5 mL/Hr) IV Continuous <Continuous>  ceFAZolin   IVPB 1000 milliGRAM(s) IV Intermittent every 24 hours  chlorhexidine 2% Cloths 1 Application(s) Topical <User Schedule>  cyanocobalamin 1000 MICROGram(s) Oral daily  DOBUTamine Infusion 5 MICROgram(s)/kG/Min (10.2 mL/Hr) IV Continuous <Continuous>  ferrous    sulfate 325 milliGRAM(s) Oral daily  folic acid 1 milliGRAM(s) Oral daily  heparin  Infusion 900 Unit(s)/Hr (11 mL/Hr) IV Continuous <Continuous>  influenza  Vaccine (HIGH DOSE) 0.7 milliLiter(s) IntraMuscular once  levothyroxine 75 MICROGram(s) Oral daily  melatonin 5 milliGRAM(s) Oral at bedtime  pantoprazole    Tablet 40 milliGRAM(s) Oral before breakfast  polyethylene glycol 3350 17 Gram(s) Oral daily  senna 2 Tablet(s) Oral at bedtime  sodium chloride 0.9% lock flush 3 milliLiter(s) IV Push every 8 hours  thiamine 100 milliGRAM(s) Oral daily                                    9.9    6.15  )-----------( 154      ( 06 Mar 2024 01:34 )             28.8       03-06    134<L>  |  95<L>  |  99<H>  ----------------------------<  133<H>  3.8   |  23  |  3.49<H>    Ca    9.4      06 Mar 2024 01:34  Phos  3.7     03-06  Mg     2.5     03-06    TPro  6.9  /  Alb  3.7  /  TBili  1.7<H>  /  DBili  x   /  AST  53<H>  /  ALT  41  /  AlkPhos  201<H>  03-06      PT/INR - ( 06 Mar 2024 01:34 )   PT: 14.8 sec;   INR: 1.36 ratio         PTT - ( 06 Mar 2024 01:34 )  PTT:39.8 sec        Daily     Daily Weight in k.1 (05 Mar 2024 18:05)      03-04 @ 07: @ 07:00  --------------------------------------------------------  IN: 829 mL / OUT: 950 mL / NET: -121 mL     @ 07:01  -   @ 06:11  --------------------------------------------------------  IN: 1946 mL / OUT: 2468 mL / NET: -522 mL        Critically Ill patient  : [ x] preoperative ,   [ ] post operative    Requires :  [ ] Arterial Line [x] Central Line  [ ] PA catheter  [ ] IABP  [ ] ECMO  [ ] LVAD  [ ] Ventilator  [x] pacemaker -  PPM / AICD [ ] Impella.                      [x] ABG's     [x] Pulse Oxymetry Monitoring  Bedside evaluation , monitoring , treatment of hemodynamics , fluids , IVP/ IVCD meds.        Diagnosis:     3/5 - Tx to ICU for Hypotension/CHF/renal failure    Pre Op Evaluation / Optimization - Re Op MVR     h/o AVR     Hypotension     Mitral Regurgitation     Pulmonary Hypertension 2 to biventricular cardiac dysfunction     Requires chest PT, pulmonary toilet, suctioning to maintain SaO2,  patent airway and treat atelectasis.    PPM - ? consider ?  increase HR and A-V interval to maximize forward flow    Requires  [ ]DDD  [ ]VVI    [x ]AII  PPM  pacing at  70 min    to maintain HR, MAP, CI, and perfusion.     A Fib - s/p cardioversion / amiodarone --> Paced Rhythm     CHF- acute [x  ]   chronic [x  ]    systolic [x ]   diastolic [ ]  Valvular [x ]          - Echo- EF -  30's / MR            [x ] RV dysfunction          - Cxr-cardiomegally, edema          - Clinical-  [ x]inotropes   [ ]pressors   [x ]diuresis - IVCD Bumex   [ ]IABP   [ ]ECMO   [ ]LVAD   [ ]Respiratory Failure    Hemodynamic lability,  instability. Requires  [ ] vasopressors [ x] inotropes  [ ] vasodilator  [ ]IVSS fluid  to maintain MAP, perfusion, C.I.     IVCD anticoagulation with [ x] Heparin gtt [ ] Argatroban for  A Fib     Synthroid for Hypothyroidism    Fluid Overload     CVVHD / IHD, 1st session done yesterday    [x] Hemodialysis - yesterday  [ ] Hemofiltration:    [x] negative fluid balance in a hemodynamically unstable patient.     Chronic Renal Failure     Cardiac Cachexia - NG - goal rate feeds     Tolerates DASH Diet + NG feeds at [x] goal rate  40 cc/Hr  [ ] trophic rate    [ ]       rate    Renal Failure - Acute Kidney Injury - recent vasodilation of MAP down    Hyponatremia     Metabolic Acidosis    Requires bedside physical therapy, mobilization and total half-way care.                   By signing my name below, I, Leticia Helton, attest that this documentation has been prepared under the direction and in the presence of Ildefonso Bañuelos MD.   Electronically Signed: Home Mcfarland 24 @ 06:11    I, Ildefonso Bañuelos, personally performed the services described in this documentation. All medical record entries made by the scribe were at my direction and in my presence. I have reviewed the chart and agree that the record reflects my personal performance and is accurate and complete.   Ildefonso Bañuelos MD.       Discussed with CT surgeon, Physician Assistant - Nurse Practitioner- Critical care medicine team.   Discussed at  AM / PM rounds.   Chart, labs , films reviewed.    Cumulative Critical Care Time Given Today:  30 min

## 2024-03-06 NOTE — PROGRESS NOTE ADULT - SUBJECTIVE AND OBJECTIVE BOX
Reason for consult: Anemia    HPI:  82 y/o M with ICM/HFrEF (now 30%; LVIDd 6.7 cm; prev req inotrope), CAD c/b IWMI (1994) s/p angioplasty, aortic stenosis s/p bio-AVR 2004, VT arrest (2017) s/p ICD, Afib s/p multiple DCCV and ablation x2 (2020 and 2023; on AC), Aflutter s/p WARREN/DCCV (8/23), prior Ao aneurysm s/p Bentall (2021), 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 Patient was initiated on bumex gtt with gradual improvement with Cr improving from 3.6 to 3.1. Patient was considered for MV re-op; however, patient was reluctant of the procedure and expressed wanting to follow up in the outpatient setting to schedule. During last admission, patient was also noted to be in flutter, EP was consulted, and patient underwent repeat WARREN/DCCV , on amiodarone. Patient was discharged home 2/28 on higher dose of diuretics, as per HF team. Today, patient was evaluated at CTS office four routine follow-up, pt c/o worsening shortness of breath and L>R LE edema, with redness/swelling on left dorsal aspect of the foot, readmitted to CTS service for further management. Denies acute fever, chills, dizziness, headache, chest pain, palpitations, acute shortness of breath, abdominal pain, n/v, (04 Mar 2024 12:18)    Heme/onc consulted on this 82 y/o male with anemia due to iron deficiency and ckd. Pt received 2 doses intravenous iron with improvement of  hemoglobin in 11/2023, and follows with Dr. Tsang at [facility].    PAST MEDICAL & SURGICAL HISTORY:  Aortic stenosis      AICD (automatic cardioverter/defibrillator) present      Aortic valve regurgitation      Ascending aorta dilation      H/O paroxysmal supraventricular tachycardia      HLD (hyperlipidemia)      Mitral valve regurgitation      Cardiac arrest      Severe mitral regurgitation      S/P aortic valve replacement  3/13/2004      S/P hernia repair  2002      History of tonsillectomy and adenoidectomy      S/P TURP  1996      S/P colonoscopy  2001, 2002, 2006, 2011, 2016      S/P endoscopy  2006      S/P cardiac cath  1994, 1995, 1999, 2002, 2004      AICD (automatic cardioverter/defibrillator) present  12/19/17      Cardiac pacemaker  12/19/17      History of cardioversion  9/11/20      S/P ablation of atrial fibrillation  10/29/20      Status post ablation of ventricular arrhythmia  2/14/19          FAMILY HISTORY:      Alochol: Denied  Smoking: Nonsmoker  Drug Use: Denied  Marital Status:         Allergies    No Known Allergies    Intolerances        MEDICATIONS  (STANDING):  allopurinol 100 milliGRAM(s) Oral daily  ascorbic acid 500 milliGRAM(s) Oral daily  atorvastatin 40 milliGRAM(s) Oral at bedtime  buMETAnide Infusion 1 mG/Hr (5 mL/Hr) IV Continuous <Continuous>  ceFAZolin   IVPB 1000 milliGRAM(s) IV Intermittent every 24 hours  chlorhexidine 2% Cloths 1 Application(s) Topical <User Schedule>  cyanocobalamin 1000 MICROGram(s) Oral daily  DOBUTamine Infusion 5 MICROgram(s)/kG/Min (10.2 mL/Hr) IV Continuous <Continuous>  ferrous    sulfate 325 milliGRAM(s) Oral daily  folic acid 1 milliGRAM(s) Oral daily  heparin  Infusion 900 Unit(s)/Hr (9 mL/Hr) IV Continuous <Continuous>  influenza  Vaccine (HIGH DOSE) 0.7 milliLiter(s) IntraMuscular once  iron sucrose IVPB 200 milliGRAM(s) IV Intermittent every 24 hours  levothyroxine 75 MICROGram(s) Oral daily  melatonin 5 milliGRAM(s) Oral at bedtime  pantoprazole    Tablet 40 milliGRAM(s) Oral before breakfast  polyethylene glycol 3350 17 Gram(s) Oral daily  senna 2 Tablet(s) Oral at bedtime  sodium chloride 0.9% lock flush 3 milliLiter(s) IV Push every 8 hours  thiamine 100 milliGRAM(s) Oral daily    MEDICATIONS  (PRN):      ROS  No fever, sweats, chills  No epistaxis, HA, sore throat  No CP, SOB, cough, sputum  No n/v/d, abd pain, melena, hematochezia  No edema  No rash  No anxiety  No back pain, joint pain  No bleeding, bruising  No dysuria, hematuria    T(C): 36.4 (03-06-24 @ 12:00), Max: 36.7 (03-05-24 @ 16:05)  HR: 69 (03-06-24 @ 13:00) (60 - 89)  BP: 103/55 (03-06-24 @ 13:00) (89/57 - 134/52)  RR: 14 (03-06-24 @ 13:00) (12 - 35)  SpO2: 98% (03-06-24 @ 13:00) (78% - 98%)  Wt(kg): --    PE  NAD  Awake, alert  Anicteric, MMM  RRR  CTAB  Abd soft, NT, ND  No c/c/e  No rash grossly  FROM                          9.9    6.15  )-----------( 154      ( 06 Mar 2024 01:34 )             28.8       03-06    134<L>  |  95<L>  |  99<H>  ----------------------------<  133<H>  3.8   |  23  |  3.49<H>    Ca    9.4      06 Mar 2024 01:34  Phos  3.7     03-06  Mg     2.5     03-06    TPro  6.9  /  Alb  3.7  /  TBili  1.7<H>  /  DBili  x   /  AST  53<H>  /  ALT  41  /  AlkPhos  201<H>  03-06

## 2024-03-06 NOTE — PROGRESS NOTE ADULT - ASSESSMENT
84 y/o M with ICM/HFrEF (now 30%; LVIDd 6.7 cm; prev req inotrope), CAD c/b IWMI (1994) s/p angioplasty, aortic stenosis s/p bio-AVR 2004, VT arrest (2017) s/p ICD, Afib s/p multiple DCCV and ablation x2 (2020 and 2023; on AC), Aflutter s/p WARREN/DCCV (8/23), prior Ao aneurysm s/p Bentall (2021), severe MR prior MVr (2021) with MAC (precludes M-KRISTIE d/t his anatomy; turned down for TMVR trials by [employer]), atrophic kidney with CKD (b/l Cr 2.5-3), admitted with SOB.    Anemia  - Multifactorial, due to iron deficiency, CKD  - Baseline hemoglobin ~10  - Follows with Dr. Tsang at [facility]  - Received IV iron in clinic, 11/2023. Now started on iron infusions  - Nephrology following, Had 1 session of HD on 3/5  - Recommend checking iron studies    Severe mitral regurgitation, CHF  - Undergoing optimization for mitral valve repair  - HF team, critical care teams following    Cellulitis  - Currently on antibiotics. Negative duplex.   - Management per ID    Anam Crabtree PA-C  Hematology/Oncology  [facility]  [phone] (office)

## 2024-03-06 NOTE — PROCEDURE NOTE - ADDITIONAL PROCEDURE DETAILS
Indication: Assess  burden after LRL increase yesterday  Pt 2%  since LRL change.   Changes made: Lowered AMS base rate from 80 to 70.

## 2024-03-06 NOTE — PROGRESS NOTE ADULT - ASSESSMENT
82 y/o M with ICM/HFrEF (now 30%; LVIDd 6.7 cm; prev req inotrope), CAD c/b IWMI (1994) s/p angioplasty, aortic stenosis s/p bio-AVR 2004, VT arrest (2017) s/p ICD, Afib s/p multiple DCCV and ablation x2 (2020 and 2023; on AC), Aflutter s/p WARREN/DCCV (8/23), prior Ao aneurysm s/p Bentall (2021), severe MR prior MVr (2021) with MAC (precludes M-KRISTIE d/t his anatomy; turned down for TMVR trials by [employer]), atrophic kidney with CKD (b/l Cr 2.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 Patient was initiated on bumex gtt with gradual improvement with Cr improving from 3.6 to 3.1. Patient was considered for MV re-op; however, patient was reluctant of the procedure and expressed wanting to follow up in the outpatient setting to schedule. During last admission, patient was also noted to be in flutter, EP was consulted, and patient underwent repeat WARREN/DCCV , on amiodarone. Patient was discharged home 2/28 on higher dose of diuretics, as per HF team. Today, patient was evaluated at CTS office four routine follow-up, pt c/o worsening shortness of breath and L>R LE edema, with redness/swelling on left dorsal aspect of the foot, readmitted to CTS service for further management.  noticed with rising creatinine and bun      1- SURESH on ckd   2- decompensated CHF  3- Mitral regurgitation   4- htn   5- hypothyroid       HD today 2.5 hr bfr 250 dfr 500 0.5 liter 3 k bath   in the meanwhile cont with bumex drip 1mg hr today as well   allopurinol 100 mg daily   no blood work RUE  strict I/O  d/w CTS team

## 2024-03-07 LAB
ALBUMIN SERPL ELPH-MCNC: 3.5 G/DL — SIGNIFICANT CHANGE UP (ref 3.3–5)
ALP SERPL-CCNC: 242 U/L — HIGH (ref 40–120)
ALT FLD-CCNC: 32 U/L — SIGNIFICANT CHANGE UP (ref 10–45)
ANION GAP SERPL CALC-SCNC: 12 MMOL/L — SIGNIFICANT CHANGE UP (ref 5–17)
APTT BLD: 55.3 SEC — HIGH (ref 24.5–35.6)
AST SERPL-CCNC: 58 U/L — HIGH (ref 10–40)
BASE EXCESS BLDV CALC-SCNC: 4.6 MMOL/L — HIGH (ref -2–3)
BASE EXCESS BLDV CALC-SCNC: 6.8 MMOL/L — HIGH (ref -2–3)
BASE EXCESS BLDV CALC-SCNC: 7.2 MMOL/L — HIGH (ref -2–3)
BASOPHILS # BLD AUTO: 0.03 K/UL — SIGNIFICANT CHANGE UP (ref 0–0.2)
BASOPHILS NFR BLD AUTO: 0.4 % — SIGNIFICANT CHANGE UP (ref 0–2)
BILIRUB SERPL-MCNC: 1.4 MG/DL — HIGH (ref 0.2–1.2)
BUN SERPL-MCNC: 54 MG/DL — HIGH (ref 7–23)
CA-I SERPL-SCNC: 1.19 MMOL/L — SIGNIFICANT CHANGE UP (ref 1.15–1.33)
CA-I SERPL-SCNC: 1.2 MMOL/L — SIGNIFICANT CHANGE UP (ref 1.15–1.33)
CA-I SERPL-SCNC: 1.22 MMOL/L — SIGNIFICANT CHANGE UP (ref 1.15–1.33)
CALCIUM SERPL-MCNC: 9 MG/DL — SIGNIFICANT CHANGE UP (ref 8.4–10.5)
CHLORIDE BLDV-SCNC: 95 MMOL/L — LOW (ref 96–108)
CHLORIDE BLDV-SCNC: 96 MMOL/L — SIGNIFICANT CHANGE UP (ref 96–108)
CHLORIDE BLDV-SCNC: 98 MMOL/L — SIGNIFICANT CHANGE UP (ref 96–108)
CHLORIDE SERPL-SCNC: 100 MMOL/L — SIGNIFICANT CHANGE UP (ref 96–108)
CO2 BLDV-SCNC: 29 MMOL/L — HIGH (ref 22–26)
CO2 BLDV-SCNC: 32 MMOL/L — HIGH (ref 22–26)
CO2 BLDV-SCNC: 32 MMOL/L — HIGH (ref 22–26)
CO2 SERPL-SCNC: 25 MMOL/L — SIGNIFICANT CHANGE UP (ref 22–31)
CREAT SERPL-MCNC: 2.3 MG/DL — HIGH (ref 0.5–1.3)
EGFR: 27 ML/MIN/1.73M2 — LOW
EOSINOPHIL # BLD AUTO: 0.02 K/UL — SIGNIFICANT CHANGE UP (ref 0–0.5)
EOSINOPHIL NFR BLD AUTO: 0.3 % — SIGNIFICANT CHANGE UP (ref 0–6)
GAS PNL BLDV: 132 MMOL/L — LOW (ref 136–145)
GAS PNL BLDV: 133 MMOL/L — LOW (ref 136–145)
GAS PNL BLDV: 134 MMOL/L — LOW (ref 136–145)
GAS PNL BLDV: SIGNIFICANT CHANGE UP
GLUCOSE BLDV-MCNC: 106 MG/DL — HIGH (ref 70–99)
GLUCOSE BLDV-MCNC: 129 MG/DL — HIGH (ref 70–99)
GLUCOSE BLDV-MCNC: 142 MG/DL — HIGH (ref 70–99)
GLUCOSE SERPL-MCNC: 116 MG/DL — HIGH (ref 70–99)
HCO3 BLDV-SCNC: 28 MMOL/L — SIGNIFICANT CHANGE UP (ref 22–29)
HCO3 BLDV-SCNC: 30 MMOL/L — HIGH (ref 22–29)
HCO3 BLDV-SCNC: 31 MMOL/L — HIGH (ref 22–29)
HCT VFR BLD CALC: 28.8 % — LOW (ref 39–50)
HCT VFR BLDA CALC: 29 % — LOW (ref 39–51)
HCT VFR BLDA CALC: 29 % — LOW (ref 39–51)
HCT VFR BLDA CALC: 30 % — LOW (ref 39–51)
HGB BLD CALC-MCNC: 9.6 G/DL — LOW (ref 12.6–17.4)
HGB BLD CALC-MCNC: 9.7 G/DL — LOW (ref 12.6–17.4)
HGB BLD CALC-MCNC: 9.9 G/DL — LOW (ref 12.6–17.4)
HGB BLD-MCNC: 9.5 G/DL — LOW (ref 13–17)
HOROWITZ INDEX BLDV+IHG-RTO: 21 — SIGNIFICANT CHANGE UP
IMM GRANULOCYTES NFR BLD AUTO: 0.6 % — SIGNIFICANT CHANGE UP (ref 0–0.9)
INR BLD: 1.42 RATIO — HIGH (ref 0.85–1.18)
LACTATE BLDV-MCNC: 1 MMOL/L — SIGNIFICANT CHANGE UP (ref 0.5–2)
LACTATE BLDV-MCNC: 1 MMOL/L — SIGNIFICANT CHANGE UP (ref 0.5–2)
LACTATE BLDV-MCNC: 1.4 MMOL/L — SIGNIFICANT CHANGE UP (ref 0.5–2)
LYMPHOCYTES # BLD AUTO: 0.22 K/UL — LOW (ref 1–3.3)
LYMPHOCYTES # BLD AUTO: 3 % — LOW (ref 13–44)
MAGNESIUM SERPL-MCNC: 2.1 MG/DL — SIGNIFICANT CHANGE UP (ref 1.6–2.6)
MCHC RBC-ENTMCNC: 31.5 PG — SIGNIFICANT CHANGE UP (ref 27–34)
MCHC RBC-ENTMCNC: 33 GM/DL — SIGNIFICANT CHANGE UP (ref 32–36)
MCV RBC AUTO: 95.4 FL — SIGNIFICANT CHANGE UP (ref 80–100)
MONOCYTES # BLD AUTO: 0.76 K/UL — SIGNIFICANT CHANGE UP (ref 0–0.9)
MONOCYTES NFR BLD AUTO: 10.5 % — SIGNIFICANT CHANGE UP (ref 2–14)
NEUTROPHILS # BLD AUTO: 6.15 K/UL — SIGNIFICANT CHANGE UP (ref 1.8–7.4)
NEUTROPHILS NFR BLD AUTO: 85.2 % — HIGH (ref 43–77)
NRBC # BLD: 0 /100 WBCS — SIGNIFICANT CHANGE UP (ref 0–0)
PCO2 BLDV: 37 MMHG — LOW (ref 42–55)
PCO2 BLDV: 38 MMHG — LOW (ref 42–55)
PCO2 BLDV: 41 MMHG — LOW (ref 42–55)
PH BLDV: 7.49 — HIGH (ref 7.32–7.43)
PH BLDV: 7.49 — HIGH (ref 7.32–7.43)
PH BLDV: 7.51 — HIGH (ref 7.32–7.43)
PHOSPHATE SERPL-MCNC: 2.6 MG/DL — SIGNIFICANT CHANGE UP (ref 2.5–4.5)
PLATELET # BLD AUTO: 153 K/UL — SIGNIFICANT CHANGE UP (ref 150–400)
PO2 BLDV: 29 MMHG — SIGNIFICANT CHANGE UP (ref 25–45)
PO2 BLDV: 37 MMHG — SIGNIFICANT CHANGE UP (ref 25–45)
PO2 BLDV: 37 MMHG — SIGNIFICANT CHANGE UP (ref 25–45)
POTASSIUM BLDV-SCNC: 3.8 MMOL/L — SIGNIFICANT CHANGE UP (ref 3.5–5.1)
POTASSIUM BLDV-SCNC: 3.9 MMOL/L — SIGNIFICANT CHANGE UP (ref 3.5–5.1)
POTASSIUM BLDV-SCNC: 4.2 MMOL/L — SIGNIFICANT CHANGE UP (ref 3.5–5.1)
POTASSIUM SERPL-MCNC: 4.2 MMOL/L — SIGNIFICANT CHANGE UP (ref 3.5–5.3)
POTASSIUM SERPL-SCNC: 4.2 MMOL/L — SIGNIFICANT CHANGE UP (ref 3.5–5.3)
PROT SERPL-MCNC: 6.5 G/DL — SIGNIFICANT CHANGE UP (ref 6–8.3)
PROTHROM AB SERPL-ACNC: 14.8 SEC — HIGH (ref 9.5–13)
RBC # BLD: 3.02 M/UL — LOW (ref 4.2–5.8)
RBC # FLD: 15.1 % — HIGH (ref 10.3–14.5)
SAO2 % BLDV: 57.4 % — LOW (ref 67–88)
SAO2 % BLDV: 67.7 % — SIGNIFICANT CHANGE UP (ref 67–88)
SAO2 % BLDV: 70 % — SIGNIFICANT CHANGE UP (ref 67–88)
SODIUM SERPL-SCNC: 137 MMOL/L — SIGNIFICANT CHANGE UP (ref 135–145)
URATE SERPL-MCNC: 4.7 MG/DL — SIGNIFICANT CHANGE UP (ref 3.4–8.8)
WBC # BLD: 7.22 K/UL — SIGNIFICANT CHANGE UP (ref 3.8–10.5)
WBC # FLD AUTO: 7.22 K/UL — SIGNIFICANT CHANGE UP (ref 3.8–10.5)

## 2024-03-07 PROCEDURE — 99291 CRITICAL CARE FIRST HOUR: CPT

## 2024-03-07 PROCEDURE — 71045 X-RAY EXAM CHEST 1 VIEW: CPT | Mod: 26

## 2024-03-07 PROCEDURE — 99291 CRITICAL CARE FIRST HOUR: CPT | Mod: FS

## 2024-03-07 RX ORDER — MIDODRINE HYDROCHLORIDE 2.5 MG/1
10 TABLET ORAL
Refills: 0 | Status: DISCONTINUED | OUTPATIENT
Start: 2024-03-07 | End: 2024-03-10

## 2024-03-07 RX ORDER — MIDODRINE HYDROCHLORIDE 2.5 MG/1
10 TABLET ORAL ONCE
Refills: 0 | Status: COMPLETED | OUTPATIENT
Start: 2024-03-07 | End: 2024-03-07

## 2024-03-07 RX ORDER — RIVAROXABAN 15 MG-20MG
1 KIT ORAL
Qty: 0 | Refills: 0 | DISCHARGE

## 2024-03-07 RX ORDER — UBIDECARENONE 100 MG
1 CAPSULE ORAL
Refills: 0 | DISCHARGE

## 2024-03-07 RX ORDER — VASOPRESSIN 20 [USP'U]/ML
0.02 INJECTION INTRAVENOUS
Qty: 40 | Refills: 0 | Status: DISCONTINUED | OUTPATIENT
Start: 2024-03-07 | End: 2024-03-08

## 2024-03-07 RX ORDER — ALLOPURINOL 300 MG
300 TABLET ORAL EVERY 24 HOURS
Refills: 0 | Status: DISCONTINUED | OUTPATIENT
Start: 2024-03-07 | End: 2024-03-07

## 2024-03-07 RX ORDER — ENOXAPARIN SODIUM 100 MG/ML
30 INJECTION SUBCUTANEOUS EVERY 12 HOURS
Refills: 0 | Status: DISCONTINUED | OUTPATIENT
Start: 2024-03-07 | End: 2024-03-18

## 2024-03-07 RX ADMIN — ATORVASTATIN CALCIUM 40 MILLIGRAM(S): 80 TABLET, FILM COATED ORAL at 21:27

## 2024-03-07 RX ADMIN — Medication 5 MILLIGRAM(S): at 21:27

## 2024-03-07 RX ADMIN — Medication 10.2 MICROGRAM(S)/KG/MIN: at 21:28

## 2024-03-07 RX ADMIN — AMIODARONE HYDROCHLORIDE 200 MILLIGRAM(S): 400 TABLET ORAL at 17:51

## 2024-03-07 RX ADMIN — Medication 325 MILLIGRAM(S): at 12:29

## 2024-03-07 RX ADMIN — SODIUM CHLORIDE 3 MILLILITER(S): 9 INJECTION INTRAMUSCULAR; INTRAVENOUS; SUBCUTANEOUS at 21:29

## 2024-03-07 RX ADMIN — VASOPRESSIN 3 UNIT(S)/MIN: 20 INJECTION INTRAVENOUS at 21:28

## 2024-03-07 RX ADMIN — CHLORHEXIDINE GLUCONATE 1 APPLICATION(S): 213 SOLUTION TOPICAL at 05:42

## 2024-03-07 RX ADMIN — Medication 100 MILLIGRAM(S): at 12:29

## 2024-03-07 RX ADMIN — Medication 75 MICROGRAM(S): at 05:42

## 2024-03-07 RX ADMIN — Medication 500 MILLIGRAM(S): at 12:37

## 2024-03-07 RX ADMIN — SODIUM CHLORIDE 3 MILLILITER(S): 9 INJECTION INTRAMUSCULAR; INTRAVENOUS; SUBCUTANEOUS at 06:10

## 2024-03-07 RX ADMIN — ENOXAPARIN SODIUM 30 MILLIGRAM(S): 100 INJECTION SUBCUTANEOUS at 09:03

## 2024-03-07 RX ADMIN — ENOXAPARIN SODIUM 30 MILLIGRAM(S): 100 INJECTION SUBCUTANEOUS at 21:27

## 2024-03-07 RX ADMIN — SODIUM CHLORIDE 3 MILLILITER(S): 9 INJECTION INTRAMUSCULAR; INTRAVENOUS; SUBCUTANEOUS at 14:28

## 2024-03-07 RX ADMIN — PREGABALIN 1000 MICROGRAM(S): 225 CAPSULE ORAL at 12:29

## 2024-03-07 RX ADMIN — MIDODRINE HYDROCHLORIDE 10 MILLIGRAM(S): 2.5 TABLET ORAL at 17:55

## 2024-03-07 RX ADMIN — Medication 100 MILLIGRAM(S): at 17:50

## 2024-03-07 RX ADMIN — PANTOPRAZOLE SODIUM 40 MILLIGRAM(S): 20 TABLET, DELAYED RELEASE ORAL at 07:34

## 2024-03-07 RX ADMIN — AMIODARONE HYDROCHLORIDE 200 MILLIGRAM(S): 400 TABLET ORAL at 05:42

## 2024-03-07 RX ADMIN — IRON SUCROSE 110 MILLIGRAM(S): 20 INJECTION, SOLUTION INTRAVENOUS at 07:34

## 2024-03-07 RX ADMIN — Medication 1 MILLIGRAM(S): at 12:29

## 2024-03-07 RX ADMIN — BUMETANIDE 5 MG/HR: 0.25 INJECTION INTRAMUSCULAR; INTRAVENOUS at 21:28

## 2024-03-07 NOTE — DIETITIAN INITIAL EVALUATION ADULT - ENTERAL
Can continue EN feeds of Nepro at 40ml/hr x 24 hrs at this time. Provides: 960ml, 1728kcal and 78g protein. Based on dosing wt 67.8kg, provides 25.5kcal/kg and 1.15g protein/kg. To note, pt on PO diet in addition to 24 hr tube feed regimen.

## 2024-03-07 NOTE — DIETITIAN INITIAL EVALUATION ADULT - NS FNS DIET ORDER
Diet, DASH/TLC:   Sodium & Cholesterol Restricted  Tube Feeding Modality: Nasogastric  Nepro with Carb Steady (NEPRORTH)  Total Volume for 24 Hours (mL): 960  Continuous  Starting Tube Feed Rate {mL per Hour}: 30  Increase Tube Feed Rate by (mL): 10     Every 4 hours  Until Goal Tube Feed Rate (mL per Hour): 40  Tube Feed Duration (in Hours): 24  Tube Feed Start Time: 17:40  Prosource Gelatein Plus     Qty per Day:  2  Supplement Feeding Modality:  Nasogastric     Special Instructions for Nursing:  Sodium & Cholesterol Restricted (03-06-24 @ 07:46)

## 2024-03-07 NOTE — DIETITIAN INITIAL EVALUATION ADULT - SIGNS/SYMPTOMS
Pt with history of HFrEF, now on HD PO intake </=75 x >/=1 mo, moderate-severe body fat/muscle mass depletion, 1+ edema

## 2024-03-07 NOTE — PROGRESS NOTE ADULT - PROBLEM SELECTOR PLAN 1
-Etiology: ischemic and complicated by severe MR  -Consider cautiously reducing  to 4mcg/kg/min and closely monitor markers for end organ function (LFT's, lactate and ScVO2)  -Can start on low dose HDZN 5mg TID for afterload reduction today if weaning . Target MAP goal >70  -Maintain goal of CVP <10  -will increase diuretics to bumex gtt 2mg/hr with goal of net negative 1 to 2 L  -no MRA/SGLT2i at this time with Cr levels   -has dual chamber ICD   -interrogation 3/6: adjustment of LRL to 70 bpm show a 2%  pacing.  -Strict I/Os and daily weights -Etiology: ischemic and complicated by severe MR  -Would recommend slowly weaning  over the next few day. Wean to 4mcg/kg/min and closely monitor markers for end organ function (LFT's, lactate and ScVO2)   -Can start on low dose HDZN 5mg TID for afterload reduction. Hold for MAP <70  -Maintain goal of CVP <10  -will increase diuretics to bumex gtt 2mg/hr with goal of net negative 1 to 2 L  -no MRA/SGLT2i at this time with Cr levels   -has dual chamber ICD   -interrogation 3/6: adjustment of LRL to 70 bpm show a 2%  pacing.  -Strict I/Os and daily weights

## 2024-03-07 NOTE — PROGRESS NOTE ADULT - PROBLEM SELECTOR PLAN 3
- TTE 2/22 with severe MR  - He was evaluated for M-KRISTIE but was not a candidate d/t his anatomy.  He was evaluated for SUMMIT (Tendyne valve) trial for a transcatheter MVR but was denied by [employer]  - will continue to follow by CT surgery (Dr. Fenton)  - once optimized from HF and nutritional standpoint, can resume plan for surgical mitral valve replacement.

## 2024-03-07 NOTE — PROGRESS NOTE ADULT - ASSESSMENT
84 y/o M with ICM/HFrEF (now 30%; LVIDd 6.7 cm; prev req inotrope), CAD c/b IWMI (1994) s/p angioplasty, aortic stenosis s/p bio-AVR 2004, VT arrest (2017) s/p ICD, Afib s/p multiple DCCV and ablation x2 (2020 and 2023; on AC), Aflutter s/p WARREN/DCCV (8/23), prior Ao aneurysm s/p Bentall (2021), severe MR prior MVr (2021) with MAC (precludes M-KRISTIE d/t his anatomy; turned down for TMVR trials by [employer]), atrophic kidney with CKD (b/l Cr 2.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 Patient was initiated on bumex gtt with gradual improvement with Cr improving from 3.6 to 3.1. Patient was considered for MV re-op; however, patient was reluctant of the procedure and expressed wanting to follow up in the outpatient setting to schedule. During last admission, patient was also noted to be in flutter, EP was consulted, and patient underwent repeat WARREN/DCCV , on amiodarone. Patient was discharged home 2/28 on higher dose of diuretics, as per HF team. Today, patient was evaluated at CTS office four routine follow-up, pt c/o worsening shortness of breath and L>R LE edema, with redness/swelling on left dorsal aspect of the foot, readmitted to CTS service for further management.  noticed with rising creatinine and bun      1- SURESH on ckd   2- decompensated CHF  3- Mitral regurgitation   4- htn   5- hypothyroid       HD  2.5 hr bfr 250 dfr 500 0.5 liter 3 k bath  see hd flow sheet   in the meanwhile cont with bumex drip 1mg hr today as well   allopurinol 100 mg daily   no blood work RUE  strict I/O  d/w CTS team

## 2024-03-07 NOTE — DIETITIAN INITIAL EVALUATION ADULT - ORAL INTAKE PTA/DIET HISTORY
-Pt reports appetite and PO intake as been on downtrend as clinical status and cardiac history has progressed. Typically consumes 2 meals daily, small portions. Denies intolerance to chewing/swallowing, denies food allergies. Denies adherence to therapeutic diet. Denies intake of vitamins; drinks [employer] Based shakes (dark chocolate flavor).

## 2024-03-07 NOTE — DIETITIAN INITIAL EVALUATION ADULT - FACTORS AFF FOOD INTAKE
Pt prescribed PO diet (DASH/TLC) and EN feeds via NGT (Nepro with Carb Steady at 40ml/hr x 24 hrs). Feeds initiated on 3/5; infusing at goal rate at time of RD visit. Pt prescribed PO diet (DASH/TLC) and EN feeds via NGT (Nepro with Carb Steady at 40ml/hr x 24 hrs). Feeds initiated on 3/5; infusing at goal rate at time of RD visit. At goal, tube feeds to provide: 960ml, 1728kcal and 78g protein./persistent lack of appetite

## 2024-03-07 NOTE — DIETITIAN INITIAL EVALUATION ADULT - ENERGY INTAKE
Pt reports consuming ~50% of meals in-house. Confirmed with breakfast tray (50% consumed). In addition, pt receiving EN feeds (Nepro at 40ml/hr x 24 hrs). Pt aware of goal for nutrition optimization by team prior to CT surgery. Pt amenable to receive oral nutrition supplement in-house./Poor (<50%)

## 2024-03-07 NOTE — DIETITIAN INITIAL EVALUATION ADULT - PROBLEM SELECTOR PLAN 1
- Admit to [facility] Dr. Fenton  -Telemetry  - He was evaluated for M-KRISTIE but was not a candidate d/t his anatomy.  He was evaluated for SUMMIT (Tendyne valve) trial for a transcatheter MVR but was denied by [employer].  - HF team (Dr. Ruiz) consulted  - started on Bumex gtt  - monitor I/Os  - daily weights  - continue: Amiodarone taper, Isosorbide, Atorvastatin, Levothyroxine, Hydralazine, Spironolactone,  Toprol-XL 25mg qD, pantoprazole, and bowel regimen  - Home med Xarelto held at this time; started on Hepgtt. q6hr PTT till therapeutic, then daily PTT  - Case & plan d/w CTS Attending Dr. Fenton

## 2024-03-07 NOTE — PROGRESS NOTE ADULT - PROBLEM SELECTOR PLAN 2
- Etiology: Likely cardiorenal   - Had HD 3/5 and 3/6 with 0.5L fluid removal (3/6).   - Preserve RUE in anticipation for HD access  - Cr today 2.3 (3.4 <-- 4.53) , Uric Acid today is 4.7 (from 13.8)  - Nephrology following (Dr. Lala). - Etiology: Likely cardiorenal   - Had HD 3/5 and 3/6 with 0.5L fluid removal (3/6).   - Preserve RUE in anticipation for HD access  - Cr today 2.3 (3.4 <-- 4.53), Uric Acid today is 4.7 (from 13.8)  - Nephrology following (Dr. Lala).

## 2024-03-07 NOTE — DIETITIAN INITIAL EVALUATION ADULT - PERTINENT LABORATORY DATA
03-07    137  |  100  |  54<H>  ----------------------------<  116<H>  4.2   |  25  |  2.30<H>    Ca    9.0      07 Mar 2024 00:25  Phos  2.6     03-07  Mg     2.1     03-07    TPro  6.5  /  Alb  3.5  /  TBili  1.4<H>  /  DBili  x   /  AST  58<H>  /  ALT  32  /  AlkPhos  242<H>  03-07  A1C with Estimated Average Glucose Result: 5.7 % (03-04-24 @ 13:11)  A1C with Estimated Average Glucose Result: 5.6 % (02-20-24 @ 15:55)  A1C with Estimated Average Glucose Result: 5.7 % (08-16-23 @ 16:56)

## 2024-03-07 NOTE — DIETITIAN INITIAL EVALUATION ADULT - OTHER INFO
Dosing wt (3/4): 149.2 lbs  Wt trend (per Alonzo HIKIANA): (2/27): 157 lbs; (2/7): 152 lbs; (1/3): 148 lbs; (8/29/23): 142 lbs; (5/24/23): 160 lbs.   History of apparent wt fluctuations observed. Hx of heart failure noted. At risk for wt fluctuations/fluid shifts.    Dosing wt (3/4): 149.2 lbs  [facility] (2/27): 157 lbs; (2/7): 152 lbs; (1/3): 148 lbs; (8/29/23): 142 lbs; (5/24/23): 160 lbs.   History of apparent wt fluctuations observed. Hx of heart failure noted. Pt reports UBW ~148 lbs. Will continue to monitor/trend. Pt confirms hx of "fluid buildup" and wt fluctuations.

## 2024-03-07 NOTE — PROGRESS NOTE ADULT - SUBJECTIVE AND OBJECTIVE BOX
Subjective:  no acute events overnight    Medications:  allopurinol 100 milliGRAM(s) Oral daily  aMIOdarone    Tablet 200 milliGRAM(s) Oral two times a day  ascorbic acid 500 milliGRAM(s) Oral daily  atorvastatin 40 milliGRAM(s) Oral at bedtime  buMETAnide Infusion 1 mG/Hr IV Continuous <Continuous>  ceFAZolin   IVPB 1000 milliGRAM(s) IV Intermittent every 24 hours  chlorhexidine 2% Cloths 1 Application(s) Topical <User Schedule>  cyanocobalamin 1000 MICROGram(s) Oral daily  DOBUTamine Infusion 5 MICROgram(s)/kG/Min IV Continuous <Continuous>  enoxaparin Injectable 30 milliGRAM(s) SubCutaneous every 12 hours  ferrous    sulfate 325 milliGRAM(s) Oral daily  folic acid 1 milliGRAM(s) Oral daily  influenza  Vaccine (HIGH DOSE) 0.7 milliLiter(s) IntraMuscular once  iron sucrose IVPB 200 milliGRAM(s) IV Intermittent every 24 hours  levothyroxine 75 MICROGram(s) Oral daily  melatonin 5 milliGRAM(s) Oral at bedtime  pantoprazole    Tablet 40 milliGRAM(s) Oral before breakfast  polyethylene glycol 3350 17 Gram(s) Oral daily  senna 2 Tablet(s) Oral at bedtime  sodium chloride 0.9% lock flush 3 milliLiter(s) IV Push every 8 hours  thiamine 100 milliGRAM(s) Oral daily      Physical Exam:    Vitals:  Vital Signs Last 24 Hours  T(C): 36.7 (24 @ 07:43), Max: 36.8 (24 @ 20:00)  HR: 75 (24 @ 10:00) (67 - 80)  BP: 101/56 (24 @ 10:00) (94/57 - 109/57)  RR: 31 (24 @ 10:00) (15 - 34)  SpO2: 93% (24 @ 10:00) (79% - 100%)    Weight in k.9 ( @ 00:00)    I&O's Summary    06 Mar 2024 07:01  -  07 Mar 2024 07:00  --------------------------------------------------------  IN: 2284 mL / OUT: 2615 mL / NET: -331 mL    07 Mar 2024 07:01  -  07 Mar 2024 14:10  --------------------------------------------------------  IN: 447 mL / OUT: 360 mL / NET: 87 mL        Tele: Paced, 70s    General: No distress. Comfortable.  HEENT: EOM intact.  Neck: Hard to appreciate JVP d/t access lines, about 12-14 cm H20  Chest: Clear to auscultation bilaterally  CV: III/IV Systolic murmur  Abdomen: Soft, non-distended, non-tender  Skin: No rashes or skin breakdown  Neurology: Alert and oriented times three. Sensation intact  Psych: Affect normal    Labs:                        9.5    7.22  )-----------( 153      ( 07 Mar 2024 00:25 )             28.8     03-07    137  |  100  |  54<H>  ----------------------------<  116<H>  4.2   |  25  |  2.30<H>    Ca    9.0      07 Mar 2024 00:25  Phos  2.6     03-07  Mg     2.1     03-07    TPro  6.5  /  Alb  3.5  /  TBili  1.4<H>  /  DBili  x   /  AST  58<H>  /  ALT  32  /  AlkPhos  242<H>  03-07    PT/INR - ( 07 Mar 2024 00:25 )   PT: 14.8 sec;   INR: 1.42 ratio         PTT - ( 07 Mar 2024 00:25 )  PTT:55.3 sec

## 2024-03-07 NOTE — DIETITIAN INITIAL EVALUATION ADULT - PERTINENT MEDS FT
-Diarrhea improving- 1 episode overnight   -CT abd showed Diffuse pancolonic wall thickening from the cecum to the rectum   consistent with colitis which may be infectious or inflammatory. Hyperemia of the appendix is likely reactive.  -C.diff positive  -c/w PO vancomycin 500 mg TID per ID recs  -continue with IV fluids  -Isolation precaution  -ID consult Dr Altamirano  -GI: Dr. Andrade MEDICATIONS  (STANDING):  allopurinol 100 milliGRAM(s) Oral daily  aMIOdarone    Tablet 200 milliGRAM(s) Oral two times a day  ascorbic acid 500 milliGRAM(s) Oral daily  atorvastatin 40 milliGRAM(s) Oral at bedtime  buMETAnide Infusion 1 mG/Hr (5 mL/Hr) IV Continuous <Continuous>  ceFAZolin   IVPB 1000 milliGRAM(s) IV Intermittent every 24 hours  chlorhexidine 2% Cloths 1 Application(s) Topical <User Schedule>  cyanocobalamin 1000 MICROGram(s) Oral daily  DOBUTamine Infusion 5 MICROgram(s)/kG/Min (10.2 mL/Hr) IV Continuous <Continuous>  ferrous    sulfate 325 milliGRAM(s) Oral daily  folic acid 1 milliGRAM(s) Oral daily  heparin  Infusion 900 Unit(s)/Hr (7 mL/Hr) IV Continuous <Continuous>  influenza  Vaccine (HIGH DOSE) 0.7 milliLiter(s) IntraMuscular once  iron sucrose IVPB 200 milliGRAM(s) IV Intermittent every 24 hours  levothyroxine 75 MICROGram(s) Oral daily  melatonin 5 milliGRAM(s) Oral at bedtime  pantoprazole    Tablet 40 milliGRAM(s) Oral before breakfast  polyethylene glycol 3350 17 Gram(s) Oral daily  senna 2 Tablet(s) Oral at bedtime  sodium chloride 0.9% lock flush 3 milliLiter(s) IV Push every 8 hours  thiamine 100 milliGRAM(s) Oral daily    MEDICATIONS  (PRN):

## 2024-03-07 NOTE — PROGRESS NOTE ADULT - SUBJECTIVE AND OBJECTIVE BOX
CRITICAL CARE ATTENDING - CTICU    MEDICATIONS  (STANDING):  allopurinol 100 milliGRAM(s) Oral daily  allopurinol 300 milliGRAM(s) Oral every 24 hours  aMIOdarone    Tablet 200 milliGRAM(s) Oral two times a day  ascorbic acid 500 milliGRAM(s) Oral daily  atorvastatin 40 milliGRAM(s) Oral at bedtime  buMETAnide Infusion 1 mG/Hr (5 mL/Hr) IV Continuous <Continuous>  ceFAZolin   IVPB 1000 milliGRAM(s) IV Intermittent every 24 hours  chlorhexidine 2% Cloths 1 Application(s) Topical <User Schedule>  cyanocobalamin 1000 MICROGram(s) Oral daily  DOBUTamine Infusion 5 MICROgram(s)/kG/Min (10.2 mL/Hr) IV Continuous <Continuous>  ferrous    sulfate 325 milliGRAM(s) Oral daily  folic acid 1 milliGRAM(s) Oral daily  heparin  Infusion 900 Unit(s)/Hr (7 mL/Hr) IV Continuous <Continuous>  influenza  Vaccine (HIGH DOSE) 0.7 milliLiter(s) IntraMuscular once  iron sucrose IVPB 200 milliGRAM(s) IV Intermittent every 24 hours  levothyroxine 75 MICROGram(s) Oral daily  melatonin 5 milliGRAM(s) Oral at bedtime  pantoprazole    Tablet 40 milliGRAM(s) Oral before breakfast  polyethylene glycol 3350 17 Gram(s) Oral daily  senna 2 Tablet(s) Oral at bedtime  sodium chloride 0.9% lock flush 3 milliLiter(s) IV Push every 8 hours  thiamine 100 milliGRAM(s) Oral daily                                    9.5    7.22  )-----------( 153      ( 07 Mar 2024 00:25 )             28.8       03-    137  |  100  |  54<H>  ----------------------------<  116<H>  4.2   |  25  |  2.30<H>    Ca    9.0      07 Mar 2024 00:25  Phos  2.6     03-  Mg     2.1     -    TPro  6.5  /  Alb  3.5  /  TBili  1.4<H>  /  DBili  x   /  AST  58<H>  /  ALT  32  /  AlkPhos  242<H>  03-      PT/INR - ( 07 Mar 2024 00:25 )   PT: 14.8 sec;   INR: 1.42 ratio         PTT - ( 07 Mar 2024 00:25 )  PTT:55.3 sec        Daily     Daily Weight in k.9 (07 Mar 2024 00:00)       @ 07:01  -   @ 07:00  --------------------------------------------------------  IN: 8 mL / OUT: 3068 mL / NET: -1040 mL     @ 07:01  -   @ 06:23  --------------------------------------------------------  IN: 8 mL / OUT: 2015 mL / NET: 83 mL        Critically Ill patient  : [ x] preoperative ,   [ ] post operative    Requires :  [ ] Arterial Line [x] Central Line  [ ] PA catheter  [ ] IABP  [ ] ECMO  [ ] LVAD  [ ] Ventilator  [x ] pacemaker -  PPM / AICD [ ] Impella.                      [x] ABG's     [x] Pulse Oxymetry Monitoring  Bedside evaluation , monitoring , treatment of hemodynamics , fluids , IVP/ IVCD meds.        Diagnosis:     3/5 - Tx to ICU for Hypotension/CHF/renal failure    Pre Op Evaluation / Optimization - Re Op MVR     h/o AVR     Hypotension     Mitral Regurgitation     Pulmonary Hypertension 2 to biventricular cardiac dysfunction     Requires chest PT, pulmonary toilet, suctioning to maintain SaO2,  patent airway and treat atelectasis.    PPM - ? consider ?  increase HR and A-V interval to maximize forward flow    Requires  [ ]DDD  [ ]VVI    [x ]AII  PPM  pacing at  70 min    to maintain HR, MAP, CI, and perfusion.     A Fib - s/p cardioversion / amiodarone --> Paced Rhythm     CHF- acute [x  ]   chronic [x  ]    systolic [x ]   diastolic [ ]  Valvular [x ]          - Echo- EF -  30's / MR            [x ] RV dysfunction          - Cxr-cardiomegally, edema          - Clinical-  [ x]inotropes   [ ]pressors   [x ]diuresis - IVCD Bumex   [ ]IABP   [ ]ECMO   [ ]LVAD   [ ]Respiratory Failure    Hemodynamic lability,  instability. Requires  [ ] vasopressors [ x] inotropes  [ ] vasodilator  [ ]IVSS fluid  to maintain MAP, perfusion, C.I.     IVCD anticoagulation with [ x] Heparin gtt [ ] Argatroban for  A Fib     Synthroid for Hypothyroidism    Fluid Overload     CVVHD / IHD, 1st session done on 3/5/23    [x] Hemodialysis - on 3/5/23 [ ] Hemofiltration:    [x] negative fluid balance in a hemodynamically unstable patient.     Chronic Renal Failure     Cardiac Cachexia - NG - goal rate feeds     Tolerates DASH Diet + NG feeds at [x] goal rate  40 cc/Hr  [ ] trophic rate    [ ]       rate    Renal Failure - Acute Kidney Injury - recent vasodilation of MAP down    Hyponatremia     Metabolic Acidosis    Requires bedside physical therapy, mobilization and total [facility] care.               I, Ildefonso Bañuelos, personally performed the services described in this documentation. All medical record entries made by the scribe were at my direction and in my presence. I have reviewed the chart and agree that the record reflects my personal performance and is accurate and complete.   Ildefonso Bañuelos MD.       By signing my name below, I, Evelin Camarena, attest that this documentation has been prepared under the direction and in the presence of Ildefonso Bañuelos MD.   Electronically Signed: Home Akins 24 @ 06:23        Discussed with CT surgeon, Physician Assistant - Nurse Practitioner- Critical care medicine team.   Dicussed at  AM / PM rounds.   Chart, labs , films reviewed.    Cumulative Critical Care Time Given Today:  CRITICAL CARE ATTENDING - CTICU    MEDICATIONS  (STANDING):  allopurinol 100 milliGRAM(s) Oral daily  allopurinol 300 milliGRAM(s) Oral every 24 hours  aMIOdarone    Tablet 200 milliGRAM(s) Oral two times a day  ascorbic acid 500 milliGRAM(s) Oral daily  atorvastatin 40 milliGRAM(s) Oral at bedtime  buMETAnide Infusion 1 mG/Hr (5 mL/Hr) IV Continuous <Continuous>  ceFAZolin   IVPB 1000 milliGRAM(s) IV Intermittent every 24 hours  chlorhexidine 2% Cloths 1 Application(s) Topical <User Schedule>  cyanocobalamin 1000 MICROGram(s) Oral daily  DOBUTamine Infusion 5 MICROgram(s)/kG/Min (10.2 mL/Hr) IV Continuous <Continuous>  ferrous    sulfate 325 milliGRAM(s) Oral daily  folic acid 1 milliGRAM(s) Oral daily  heparin  Infusion 900 Unit(s)/Hr (7 mL/Hr) IV Continuous <Continuous>  influenza  Vaccine (HIGH DOSE) 0.7 milliLiter(s) IntraMuscular once  iron sucrose IVPB 200 milliGRAM(s) IV Intermittent every 24 hours  levothyroxine 75 MICROGram(s) Oral daily  melatonin 5 milliGRAM(s) Oral at bedtime  pantoprazole    Tablet 40 milliGRAM(s) Oral before breakfast  polyethylene glycol 3350 17 Gram(s) Oral daily  senna 2 Tablet(s) Oral at bedtime  sodium chloride 0.9% lock flush 3 milliLiter(s) IV Push every 8 hours  thiamine 100 milliGRAM(s) Oral daily                                    9.5    7.22  )-----------( 153      ( 07 Mar 2024 00:25 )             28.8       03-    137  |  100  |  54<H>  ----------------------------<  116<H>  4.2   |  25  |  2.30<H>    Ca    9.0      07 Mar 2024 00:25  Phos  2.6     03-  Mg     2.1     -    TPro  6.5  /  Alb  3.5  /  TBili  1.4<H>  /  DBili  x   /  AST  58<H>  /  ALT  32  /  AlkPhos  242<H>  03-      PT/INR - ( 07 Mar 2024 00:25 )   PT: 14.8 sec;   INR: 1.42 ratio         PTT - ( 07 Mar 2024 00:25 )  PTT:55.3 sec        Daily     Daily Weight in k.9 (07 Mar 2024 00:00)       @ 07:  -   @ 07:00  --------------------------------------------------------  IN: 8 mL / OUT: 3068 mL / NET: -1040 mL     @ 07:01  -   @ 06:23  --------------------------------------------------------  IN: 8 mL / OUT: 2015 mL / NET: 83 mL        Critically Ill patient  : [ x] preoperative ,   [ ] post operative    Requires :  [x ] Arterial Line [x] Central Line  [ ] PA catheter  [ ] IABP  [ ] ECMO  [ ] LVAD  [ ] Ventilator  [x ] pacemaker -  PPM / AICD [ ] Impella.                      [x] ABG's     [x] Pulse Oxymetry Monitoring  Bedside evaluation , monitoring , treatment of hemodynamics , fluids , IVP/ IVCD meds.        Diagnosis:     3/5 - Tx to ICU for Hypotension/CHF/renal failure    Pre Op Evaluation / Optimization - Re Op MVR     h/o AVR     Hypotension     Mitral Regurgitation     Pulmonary Hypertension 2 to biventricular cardiac dysfunction     Requires chest PT, pulmonary toilet, suctioning to maintain SaO2,  patent airway and treat atelectasis.    PPM -   increased  HR and A-V interval to maximize forward flow    Requires  [ ]DDD  [ ]VVI    [x ]AII  PPM  pacing at  70 min    to maintain HR, MAP, CI, and perfusion.     A Fib - s/p cardioversion / amiodarone --> Paced Rhythm     CHF- acute [x  ]   chronic [x  ]    systolic [x ]   diastolic [ ]  Valvular [x ]          - Echo- EF -  30's / MR            [x ] RV dysfunction          - Cxr-cardiomegally, edema          - Clinical-  [ x]inotropes   [ ]pressors   [x ]diuresis - IVCD Bumex   [ ]IABP   [ ]ECMO   [ ]LVAD   [ ]Respiratory Failure    Hemodynamic lability,  instability. Requires  [ ] vasopressors [ x] inotropes  [ ] vasodilator  [ ]IVSS fluid  to maintain MAP, perfusion, C.I.     IVCD anticoagulation with [ x] Heparin gtt [ ] Argatroban for  A Fib     Synthroid for Hypothyroidism    Fluid Overload     CVVHD / IHD, 1st session done on 3/5/23    [x] Hemodialysis - on 3/5 & 3/6  [ ] Hemofiltration:    [x] negative fluid balance in a hemodynamically unstable patient.     Chronic Renal Failure     Cardiac Cachexia - NG - goal rate feeds     Tolerates DASH Diet + NG feeds at [x] goal rate  40 cc/Hr  [ ] trophic rate    [ ]       rate    Renal Failure - Acute Kidney Injury - recent vasodilation of MAP down    Hyponatremia - resolved     Metabolic Acidosis - resolved    Requires bedside physical therapy, mobilization and total group home care.               I, Ildefonso Bañuelos, personally performed the services described in this documentation. All medical record entries made by the scribe were at my direction and in my presence. I have reviewed the chart and agree that the record reflects my personal performance and is accurate and complete.   Ildefonso Bañuelos MD.       By signing my name below, I, Evelin Camarena, attest that this documentation has been prepared under the direction and in the presence of Ildefonso Bañuelos MD.   Electronically Signed: Home Akins 24 @ 06:23        Discussed with CT surgeon, Physician Assistant - Nurse Practitioner- Critical care medicine team.   Dicussed at  AM / PM rounds.   Chart, labs , films reviewed.    Cumulative Critical Care Time Given Today:  30 min

## 2024-03-07 NOTE — DIETITIAN NUTRITION RISK NOTIFICATION - TREATMENT: THE FOLLOWING DIET HAS BEEN RECOMMENDED
Diet, DASH/TLC:   Sodium & Cholesterol Restricted  Tube Feeding Modality: Nasogastric  Nepro with Carb Steady (NEPRORTH)  Total Volume for 24 Hours (mL): 960  Continuous  Starting Tube Feed Rate {mL per Hour}: 30  Increase Tube Feed Rate by (mL): 10     Every 4 hours  Until Goal Tube Feed Rate (mL per Hour): 40  Tube Feed Duration (in Hours): 24  Tube Feed Start Time: 17:40  Prosource Gelatein Plus     Qty per Day:  2  Supplement Feeding Modality:  Nasogastric     Special Instructions for Nursing:  Sodium & Cholesterol Restricted (03-06-24 @ 07:46) [Active]

## 2024-03-07 NOTE — DIETITIAN INITIAL EVALUATION ADULT - ETIOLOGY
Decreased ability to consume adequate protein-energy intake in setting of decreased appetite with subsequent increased nutrient needs Increased physiological demand of energy/protein in setting of chronic, catabolic illness and renal dysfunction

## 2024-03-07 NOTE — DIETITIAN INITIAL EVALUATION ADULT - REASON FOR ADMISSION
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 Presented for ADHF from CTS office, c/o worsening shortness of breath and L>R LE edema. Nephrology following, s/p HD 3/5. Per MD note "Once optimized from HF and nutritional standpoint, can resume plan for surgical mitral valve replacement".

## 2024-03-07 NOTE — DIETITIAN INITIAL EVALUATION ADULT - ADD RECOMMEND
1. Consider liberalizing diet to low sodium in setting of poor PO intake, malnutrition. Defer consistency to medical team, SLP. If pt requiring HD every other day, consider addition of Renal diet to avoid build up of serum K/Phos.   2. Encourage and monitor PO intake. Encourage use of daily menus. Honor dietary preferences as expressed as able.   3. Recommend Ensure Shakes twice daily. Pt prefers chocolate flavor at this time.  Consider change to Nepro Shakes twice daily if pt requiring HD every other day as opposed to daily HD.   4. Continue micronutrient supplements as ordered unless otherwise contraindicated.   5. Monitor wt trends, labs, skin integrity, hydration status, bowel regularity.   6. Malnutrition sticker placed.

## 2024-03-07 NOTE — PROGRESS NOTE ADULT - SUBJECTIVE AND OBJECTIVE BOX
[facility]   [phone]  RENAL FOLLOW UP NOTE  --------------------------------------------------------------------------------  Chief Complaint:    24 hour events/subjective:    seen on hd  and earlier   states sob is improving     PAST HISTORY  --------------------------------------------------------------------------------  No significant changes to PMH, PSH, FHx, SHx, unless otherwise noted    ALLERGIES & MEDICATIONS  --------------------------------------------------------------------------------  Allergies    No Known Allergies    Intolerances      Standing Inpatient Medications  allopurinol 100 milliGRAM(s) Oral daily  aMIOdarone    Tablet 200 milliGRAM(s) Oral two times a day  ascorbic acid 500 milliGRAM(s) Oral daily  atorvastatin 40 milliGRAM(s) Oral at bedtime  buMETAnide Infusion 1 mG/Hr IV Continuous <Continuous>  ceFAZolin   IVPB 1000 milliGRAM(s) IV Intermittent every 24 hours  chlorhexidine 2% Cloths 1 Application(s) Topical <User Schedule>  cyanocobalamin 1000 MICROGram(s) Oral daily  DOBUTamine Infusion 2.5 MICROgram(s)/kG/Min IV Continuous <Continuous>  enoxaparin Injectable 30 milliGRAM(s) SubCutaneous every 12 hours  ferrous    sulfate 325 milliGRAM(s) Oral daily  folic acid 1 milliGRAM(s) Oral daily  influenza  Vaccine (HIGH DOSE) 0.7 milliLiter(s) IntraMuscular once  iron sucrose IVPB 200 milliGRAM(s) IV Intermittent every 24 hours  levothyroxine 75 MICROGram(s) Oral daily  melatonin 5 milliGRAM(s) Oral at bedtime  pantoprazole    Tablet 40 milliGRAM(s) Oral before breakfast  polyethylene glycol 3350 17 Gram(s) Oral daily  senna 2 Tablet(s) Oral at bedtime  sodium chloride 0.9% lock flush 3 milliLiter(s) IV Push every 8 hours  thiamine 100 milliGRAM(s) Oral daily  vasopressin Infusion 0.02 Unit(s)/Min IV Continuous <Continuous>    PRN Inpatient Medications  midodrine 10 milliGRAM(s) Oral <User Schedule> PRN      REVIEW OF SYSTEMS  --------------------------------------------------------------------------------    Gen: denies  fevers/chills,  CVS: denies chest pain/palpitations  Resp: denies SOB/Cough  GI: Denies N/V/Abd pain  : Denies dysuria    VITALS/PHYSICAL EXAM  --------------------------------------------------------------------------------  T(C): 36 (03-07-24 @ 16:39), Max: 36.8 (03-06-24 @ 20:00)  HR: 76 (03-07-24 @ 18:45) (69 - 93)  BP: 93/58 (03-07-24 @ 18:30) (79/51 - 109/57)  RR: 41 (03-07-24 @ 18:45) (15 - 41)  SpO2: 91% (03-07-24 @ 18:45) (79% - 96%)  Wt(kg): --        03-06-24 @ 07:01  -  03-07-24 @ 07:00  --------------------------------------------------------  IN: 2284 mL / OUT: 2615 mL / NET: -331 mL    03-07-24 @ 07:01  -  03-07-24 @ 19:26  --------------------------------------------------------  IN: 2073 mL / OUT: 1934 mL / NET: 139 mL      Physical Exam:  	      Gen: Non toxic comfortable appearing  + NGT  	 +  jvd  	Pulm: decrease bs  no rales or ronchi or wheezing  	CV: RRR, S1S2; no rub  	Abd: +BS, soft, nontender/nondistended  	: No suprapubic tenderness  	UE: Warm, no cyanosis  no clubbing,  no edema  	LE: Warm, no cyanosis  no clubbing, 2-  edema  	Neuro: alert and oriented. speech coherent     LABS/STUDIES  --------------------------------------------------------------------------------              9.5    7.22  >-----------<  153      [03-07-24 @ 00:25]              28.8     137  |  100  |  54  ----------------------------<  116      [03-07-24 @ 00:25]  4.2   |  25  |  2.30        Ca     9.0     [03-07-24 @ 00:25]      Mg     2.1     [03-07-24 @ 00:25]      Phos  2.6     [03-07-24 @ 00:25]    TPro  6.5  /  Alb  3.5  /  TBili  1.4  /  DBili  x   /  AST  58  /  ALT  32  /  AlkPhos  242  [03-07-24 @ 00:25]    PT/INR: PT 14.8 , INR 1.42       [03-07-24 @ 00:25]  PTT: 55.3       [03-07-24 @ 00:25]    Uric acid 4.7      [03-07-24 @ 00:25]    Creatinine Trend:  SCr 2.30 [03-07 @ 00:25]  SCr 3.49 [03-06 @ 01:34]  SCr 4.53 [03-05 @ 03:12]  SCr 4.45 [03-04 @ 15:30]  SCr 4.39 [03-04 @ 13:11]      Iron 290, TIBC 430, %sat 67      [03-06-24 @ 13:57]  Ferritin 335      [03-06-24 @ 13:57]  PTH -- (Ca 8.9)      [03-05-24 @ 13:03]   242  TSH 2.02      [03-06-24 @ 13:57]

## 2024-03-07 NOTE — DIETITIAN INITIAL EVALUATION ADULT - NSFNSGIIOFT_GEN_A_CORE
-Last BM 3/4. On bowel regimen (senna, Miralax). Prescribed Protonix.   -Prescribed multiple vitamin/supplements: thiamine, iron sucrose, folic acid, ferrous sulfate, cyanocobalamin, vitamin C.   -Prescribed IV Bumex as ordered per HF team, Nephrology.      -Last BM 3/4. On bowel regimen (senna, Miralax). Prescribed Protonix.   -Prescribed multiple vitamin/supplements: thiamine, iron sucrose, folic acid, ferrous sulfate, cyanocobalamin, vitamin C.   -Prescribed IV Bumex as ordered per HF team, Nephrology. Per discussion with Nephrologist, plan for daily HD at this time.

## 2024-03-07 NOTE — PROGRESS NOTE ADULT - ASSESSMENT
84 y/o M with ICM/HFrEF (now 30%; LVIDd 6.7 cm; prev req inotrope), CAD c/b IWMI (1994) s/p angioplasty, aortic stenosis s/p bio-AVR 2004, VT arrest (2017) s/p ICD, Afib s/p multiple DCCV and ablation x3 (most recent 2/27/24 on AC), Aflutter s/p WARREN/DCCV (8/23), prior Ao aneurysm s/p Bentall (2021), severe MR prior MVr (2021) with MAC (precludes M-KRISTIE d/t his anatomy; turned down for TMVR trials by [employer]), atrophic kidney with CKD (b/l Cr 2.5-3), presents to [facility] for ADHF from CTS office for routine follow-up, c/o worsening shortness of breath and L>R LE edema, with redness/swelling on left dorsal aspect of the foot. Was recently discharged on 2/28 on higher dose of diuretics.     He is ACC/AHA Stage C/D HF with NYHA Class III, appears volume overload and low normotensive. Had a session of intermittent HD yesterday with 0.5L fluid removal, and is make robust urine output on bumex gtt. Currently on  5, though end organ markers remain elevated, reassuringly it is improving. A repeat interrogation of ICD since adjustment of LRL to 70 bpm show a 2%  pacing. Once optimized from HF and nutritional standpoint, can resume plan for surgical mitral valve replacement.     Bedside Hemodynamics  ·	3/7/24 ( 5mcg): CVP 9, ScVO2 70%, CO/CI (FI) 7.5/4.1  3/6/24 ( 5mcg): CVP 8, ScVO2 74%, CO/CI (FI): 6.5/3.6    Cardiac studies   ·	WARREN 2/27/24: severe MR, DCCV SR  ·	TTE 2/22/24: LVIDd: 5.8cm, LVEF 35%, mildly enlarged RV with reduced function, severe MR and Moderate to severe TR. PASP 70mmHg  ·	WARREN 8/23 Severe MR and moderate TR  ·	TTE 8/23 EF 36%, grade II DD, mildly enlarged RV with reduced function, severe MR and Moderate to severe TR   82 y/o M with ICM/HFrEF (now 30%; LVIDd 6.7 cm; prev req inotrope), CAD c/b IWMI () s/p angioplasty, aortic stenosis s/p bio-AVR , VT arrest () s/p ICD, Afib s/p multiple DCCV and ablation x3 (most recent 24 on AC), Aflutter s/p WARREN/DCCV (), prior Ao aneurysm s/p Bentall (), severe MR prior MVr () with MAC (precludes M-KRISTIE d/t his anatomy; turned down for TMVR trials by [employer]), atrophic kidney with CKD (b/l Cr 2.5-3), presents to [facility] for ADHF from CTS office for routine follow-up, c/o worsening shortness of breath and L>R LE edema, with redness/swelling on left dorsal aspect of the foot. Was recently discharged on  on higher dose of diuretics.     He is ACC/AHA Stage C/D HF with NYHA Class III, appears volume overload on exam but is improving with mildly elevated CVP and low normotensive. Had a session of intermittent HD yesterday with 0.5L fluid removal, and is make robust urine output on bumex gtt. He has normal output on current of  with normal end organ function. He appears to be improving from a hemodynamic standpoint and will trial to wean inotropes slowly. A repeat interrogation of ICD since adjustment of LRL to 70 bpm show a 2%  pacing. Once optimized from HF and nutritional standpoint, can resume plan for surgical mitral valve replacement.     Bedside Hemodynamics  ·	3/7/24 ( 5mcg): CVP 9, ScVO2 70%, CO/CI (FI) 7.5/4.1  3/6/24 ( 5mcg): CVP 8, ScVO2 74%, CO/CI (FI): 6.5/3.6    Cardiac studies   ·	WARREN 24: severe MR, DCCV SR  ·	TTE 24: LVIDd: 5.8cm, LVEF 35%, mildly enlarged RV with reduced function, severe MR and Moderate to severe TR. PASP 70mmHg  ·	WARREN  Severe MR and moderate TR  ·	TTE  EF 36%, grade II DD, mildly enlarged RV with reduced function, severe MR and Moderate to severe TR

## 2024-03-07 NOTE — DIETITIAN INITIAL EVALUATION ADULT - PROBLEM SELECTOR PLAN 2
- Admit to [facility] [place] Dr. Fenton  -Telemetry  - He was evaluated for M-KRISTIE but was not a candidate d/t his anatomy.  He was evaluated for [facility] (Tendyne valve) trial for a transcatheter MVR but was denied by [employer].  - HF team (Dr. Enmanuel Ruiz) consulted and aware  - started on Bumex gtt per HF team  - monitor I/Os  - daily weights  - continue: Amiodarone taper, Isosorbide, Atorvastatin, Levothyroxine, Hydralazine, Spironolactone,  Toprol-XL 25mg qD, bowel regimen  - Home med Xarelto held at this time; started on Hepgtt. q6hr PTT till therapeutic, then daily PTT

## 2024-03-08 ENCOUNTER — RESULT REVIEW (OUTPATIENT)
Age: 84
End: 2024-03-08

## 2024-03-08 LAB
ALBUMIN SERPL ELPH-MCNC: 3.5 G/DL — SIGNIFICANT CHANGE UP (ref 3.3–5)
ALP SERPL-CCNC: 234 U/L — HIGH (ref 40–120)
ALT FLD-CCNC: 27 U/L — SIGNIFICANT CHANGE UP (ref 10–45)
ANION GAP SERPL CALC-SCNC: 10 MMOL/L — SIGNIFICANT CHANGE UP (ref 5–17)
APTT BLD: 30 SEC — SIGNIFICANT CHANGE UP (ref 24.5–35.6)
APTT BLD: 32.8 SEC — SIGNIFICANT CHANGE UP (ref 24.5–35.6)
AST SERPL-CCNC: 53 U/L — HIGH (ref 10–40)
BASE EXCESS BLDV CALC-SCNC: 1.1 MMOL/L — SIGNIFICANT CHANGE UP (ref -2–3)
BASE EXCESS BLDV CALC-SCNC: 4.1 MMOL/L — HIGH (ref -2–3)
BILIRUB SERPL-MCNC: 1.7 MG/DL — HIGH (ref 0.2–1.2)
BLD GP AB SCN SERPL QL: NEGATIVE — SIGNIFICANT CHANGE UP
BUN SERPL-MCNC: 38 MG/DL — HIGH (ref 7–23)
CA-I SERPL-SCNC: 1.15 MMOL/L — SIGNIFICANT CHANGE UP (ref 1.15–1.33)
CA-I SERPL-SCNC: 1.16 MMOL/L — SIGNIFICANT CHANGE UP (ref 1.15–1.33)
CALCIUM SERPL-MCNC: 9.2 MG/DL — SIGNIFICANT CHANGE UP (ref 8.4–10.5)
CHLORIDE BLDV-SCNC: 100 MMOL/L — SIGNIFICANT CHANGE UP (ref 96–108)
CHLORIDE BLDV-SCNC: 97 MMOL/L — SIGNIFICANT CHANGE UP (ref 96–108)
CHLORIDE SERPL-SCNC: 96 MMOL/L — SIGNIFICANT CHANGE UP (ref 96–108)
CO2 BLDV-SCNC: 26 MMOL/L — SIGNIFICANT CHANGE UP (ref 22–26)
CO2 BLDV-SCNC: 30 MMOL/L — HIGH (ref 22–26)
CO2 SERPL-SCNC: 29 MMOL/L — SIGNIFICANT CHANGE UP (ref 22–31)
CREAT SERPL-MCNC: 1.86 MG/DL — HIGH (ref 0.5–1.3)
EGFR: 35 ML/MIN/1.73M2 — LOW
GAS PNL BLDA: SIGNIFICANT CHANGE UP
GAS PNL BLDV: 131 MMOL/L — LOW (ref 136–145)
GAS PNL BLDV: 133 MMOL/L — LOW (ref 136–145)
GAS PNL BLDV: SIGNIFICANT CHANGE UP
GAS PNL BLDV: SIGNIFICANT CHANGE UP
GLUCOSE BLDV-MCNC: 171 MG/DL — HIGH (ref 70–99)
GLUCOSE BLDV-MCNC: 257 MG/DL — HIGH (ref 70–99)
GLUCOSE SERPL-MCNC: 91 MG/DL — SIGNIFICANT CHANGE UP (ref 70–99)
HCO3 BLDV-SCNC: 25 MMOL/L — SIGNIFICANT CHANGE UP (ref 22–29)
HCO3 BLDV-SCNC: 28 MMOL/L — SIGNIFICANT CHANGE UP (ref 22–29)
HCT VFR BLD CALC: 28.8 % — LOW (ref 39–50)
HCT VFR BLDA CALC: 26 % — LOW (ref 39–51)
HCT VFR BLDA CALC: 29 % — LOW (ref 39–51)
HGB BLD CALC-MCNC: 8.7 G/DL — LOW (ref 12.6–17.4)
HGB BLD CALC-MCNC: 9.5 G/DL — LOW (ref 12.6–17.4)
HGB BLD-MCNC: 9.5 G/DL — LOW (ref 13–17)
HOROWITZ INDEX BLDV+IHG-RTO: 21 — SIGNIFICANT CHANGE UP
HOROWITZ INDEX BLDV+IHG-RTO: 28 — SIGNIFICANT CHANGE UP
INR BLD: 1.28 RATIO — HIGH (ref 0.85–1.18)
LACTATE BLDV-MCNC: 1.2 MMOL/L — SIGNIFICANT CHANGE UP (ref 0.5–2)
LACTATE BLDV-MCNC: 1.5 MMOL/L — SIGNIFICANT CHANGE UP (ref 0.5–2)
MAGNESIUM SERPL-MCNC: 2 MG/DL — SIGNIFICANT CHANGE UP (ref 1.6–2.6)
MCHC RBC-ENTMCNC: 31.9 PG — SIGNIFICANT CHANGE UP (ref 27–34)
MCHC RBC-ENTMCNC: 33 GM/DL — SIGNIFICANT CHANGE UP (ref 32–36)
MCV RBC AUTO: 96.6 FL — SIGNIFICANT CHANGE UP (ref 80–100)
NRBC # BLD: 0 /100 WBCS — SIGNIFICANT CHANGE UP (ref 0–0)
PCO2 BLDV: 36 MMHG — LOW (ref 42–55)
PCO2 BLDV: 41 MMHG — LOW (ref 42–55)
PH BLDV: 7.45 — HIGH (ref 7.32–7.43)
PH BLDV: 7.45 — HIGH (ref 7.32–7.43)
PHOSPHATE SERPL-MCNC: 2.3 MG/DL — LOW (ref 2.5–4.5)
PLATELET # BLD AUTO: 148 K/UL — LOW (ref 150–400)
PO2 BLDV: 36 MMHG — SIGNIFICANT CHANGE UP (ref 25–45)
PO2 BLDV: 38 MMHG — SIGNIFICANT CHANGE UP (ref 25–45)
POTASSIUM BLDV-SCNC: 3.5 MMOL/L — SIGNIFICANT CHANGE UP (ref 3.5–5.1)
POTASSIUM BLDV-SCNC: 3.6 MMOL/L — SIGNIFICANT CHANGE UP (ref 3.5–5.1)
POTASSIUM SERPL-MCNC: 3.6 MMOL/L — SIGNIFICANT CHANGE UP (ref 3.5–5.3)
POTASSIUM SERPL-SCNC: 3.6 MMOL/L — SIGNIFICANT CHANGE UP (ref 3.5–5.3)
PROT SERPL-MCNC: 6.7 G/DL — SIGNIFICANT CHANGE UP (ref 6–8.3)
PROTHROM AB SERPL-ACNC: 14 SEC — HIGH (ref 9.5–13)
RBC # BLD: 2.98 M/UL — LOW (ref 4.2–5.8)
RBC # FLD: 15.4 % — HIGH (ref 10.3–14.5)
RH IG SCN BLD-IMP: POSITIVE — SIGNIFICANT CHANGE UP
SAO2 % BLDV: 63.2 % — LOW (ref 67–88)
SAO2 % BLDV: 66.6 % — LOW (ref 67–88)
SODIUM SERPL-SCNC: 135 MMOL/L — SIGNIFICANT CHANGE UP (ref 135–145)
WBC # BLD: 7.31 K/UL — SIGNIFICANT CHANGE UP (ref 3.8–10.5)
WBC # FLD AUTO: 7.31 K/UL — SIGNIFICANT CHANGE UP (ref 3.8–10.5)

## 2024-03-08 PROCEDURE — 71045 X-RAY EXAM CHEST 1 VIEW: CPT | Mod: 26

## 2024-03-08 PROCEDURE — 36620 INSERTION CATHETER ARTERY: CPT

## 2024-03-08 PROCEDURE — 99291 CRITICAL CARE FIRST HOUR: CPT | Mod: FS

## 2024-03-08 PROCEDURE — 99291 CRITICAL CARE FIRST HOUR: CPT | Mod: 25

## 2024-03-08 PROCEDURE — 93308 TTE F-UP OR LMTD: CPT | Mod: 26

## 2024-03-08 PROCEDURE — 93321 DOPPLER ECHO F-UP/LMTD STD: CPT | Mod: 26

## 2024-03-08 PROCEDURE — 99292 CRITICAL CARE ADDL 30 MIN: CPT | Mod: 25

## 2024-03-08 RX ORDER — POTASSIUM CHLORIDE 20 MEQ
10 PACKET (EA) ORAL ONCE
Refills: 0 | Status: COMPLETED | OUTPATIENT
Start: 2024-03-08 | End: 2024-03-08

## 2024-03-08 RX ORDER — MIDODRINE HYDROCHLORIDE 2.5 MG/1
15 TABLET ORAL EVERY 8 HOURS
Refills: 0 | Status: DISCONTINUED | OUTPATIENT
Start: 2024-03-08 | End: 2024-03-10

## 2024-03-08 RX ORDER — CALCIUM GLUCONATE 100 MG/ML
1 VIAL (ML) INTRAVENOUS ONCE
Refills: 0 | Status: COMPLETED | OUTPATIENT
Start: 2024-03-08 | End: 2024-03-08

## 2024-03-08 RX ORDER — ALBUMIN HUMAN 25 %
100 VIAL (ML) INTRAVENOUS ONCE
Refills: 0 | Status: COMPLETED | OUTPATIENT
Start: 2024-03-08 | End: 2024-03-08

## 2024-03-08 RX ORDER — POTASSIUM CHLORIDE 20 MEQ
20 PACKET (EA) ORAL ONCE
Refills: 0 | Status: COMPLETED | OUTPATIENT
Start: 2024-03-08 | End: 2024-03-08

## 2024-03-08 RX ORDER — VASOPRESSIN 20 [USP'U]/ML
0.04 INJECTION INTRAVENOUS
Qty: 40 | Refills: 0 | Status: DISCONTINUED | OUTPATIENT
Start: 2024-03-08 | End: 2024-03-24

## 2024-03-08 RX ORDER — MIDODRINE HYDROCHLORIDE 2.5 MG/1
10 TABLET ORAL EVERY 8 HOURS
Refills: 0 | Status: DISCONTINUED | OUTPATIENT
Start: 2024-03-08 | End: 2024-03-08

## 2024-03-08 RX ORDER — ALBUMIN HUMAN 25 %
200 VIAL (ML) INTRAVENOUS ONCE
Refills: 0 | Status: COMPLETED | OUTPATIENT
Start: 2024-03-08 | End: 2024-03-08

## 2024-03-08 RX ORDER — ERYTHROPOIETIN 10000 [IU]/ML
10000 INJECTION, SOLUTION INTRAVENOUS; SUBCUTANEOUS ONCE
Refills: 0 | Status: COMPLETED | OUTPATIENT
Start: 2024-03-08 | End: 2024-03-13

## 2024-03-08 RX ADMIN — Medication 100 GRAM(S): at 10:21

## 2024-03-08 RX ADMIN — Medication 100 MILLIGRAM(S): at 11:03

## 2024-03-08 RX ADMIN — AMIODARONE HYDROCHLORIDE 200 MILLIGRAM(S): 400 TABLET ORAL at 17:02

## 2024-03-08 RX ADMIN — MIDODRINE HYDROCHLORIDE 10 MILLIGRAM(S): 2.5 TABLET ORAL at 08:10

## 2024-03-08 RX ADMIN — AMIODARONE HYDROCHLORIDE 200 MILLIGRAM(S): 400 TABLET ORAL at 05:28

## 2024-03-08 RX ADMIN — Medication 5 MILLIGRAM(S): at 21:48

## 2024-03-08 RX ADMIN — PREGABALIN 1000 MICROGRAM(S): 225 CAPSULE ORAL at 11:03

## 2024-03-08 RX ADMIN — MIDODRINE HYDROCHLORIDE 15 MILLIGRAM(S): 2.5 TABLET ORAL at 14:00

## 2024-03-08 RX ADMIN — SENNA PLUS 2 TABLET(S): 8.6 TABLET ORAL at 21:48

## 2024-03-08 RX ADMIN — MIDODRINE HYDROCHLORIDE 15 MILLIGRAM(S): 2.5 TABLET ORAL at 21:48

## 2024-03-08 RX ADMIN — Medication 100 MILLIGRAM(S): at 17:02

## 2024-03-08 RX ADMIN — Medication 1 MILLIGRAM(S): at 11:03

## 2024-03-08 RX ADMIN — Medication 20 MILLIEQUIVALENT(S): at 10:21

## 2024-03-08 RX ADMIN — PANTOPRAZOLE SODIUM 40 MILLIGRAM(S): 20 TABLET, DELAYED RELEASE ORAL at 05:29

## 2024-03-08 RX ADMIN — CHLORHEXIDINE GLUCONATE 1 APPLICATION(S): 213 SOLUTION TOPICAL at 05:41

## 2024-03-08 RX ADMIN — SODIUM CHLORIDE 3 MILLILITER(S): 9 INJECTION INTRAMUSCULAR; INTRAVENOUS; SUBCUTANEOUS at 21:59

## 2024-03-08 RX ADMIN — Medication 50 MILLILITER(S): at 10:15

## 2024-03-08 RX ADMIN — IRON SUCROSE 110 MILLIGRAM(S): 20 INJECTION, SOLUTION INTRAVENOUS at 06:18

## 2024-03-08 RX ADMIN — ENOXAPARIN SODIUM 30 MILLIGRAM(S): 100 INJECTION SUBCUTANEOUS at 21:48

## 2024-03-08 RX ADMIN — ATORVASTATIN CALCIUM 40 MILLIGRAM(S): 80 TABLET, FILM COATED ORAL at 21:48

## 2024-03-08 RX ADMIN — Medication 75 MICROGRAM(S): at 05:28

## 2024-03-08 RX ADMIN — POLYETHYLENE GLYCOL 3350 17 GRAM(S): 17 POWDER, FOR SOLUTION ORAL at 11:02

## 2024-03-08 RX ADMIN — Medication 50 MILLIEQUIVALENT(S): at 03:53

## 2024-03-08 RX ADMIN — Medication 1 TABLET(S): at 14:00

## 2024-03-08 RX ADMIN — SODIUM CHLORIDE 3 MILLILITER(S): 9 INJECTION INTRAMUSCULAR; INTRAVENOUS; SUBCUTANEOUS at 06:38

## 2024-03-08 RX ADMIN — Medication 500 MILLIGRAM(S): at 11:03

## 2024-03-08 RX ADMIN — SODIUM CHLORIDE 3 MILLILITER(S): 9 INJECTION INTRAMUSCULAR; INTRAVENOUS; SUBCUTANEOUS at 13:32

## 2024-03-08 RX ADMIN — Medication 63.75 MILLIMOLE(S): at 07:32

## 2024-03-08 RX ADMIN — Medication 100 MILLILITER(S): at 12:12

## 2024-03-08 RX ADMIN — ENOXAPARIN SODIUM 30 MILLIGRAM(S): 100 INJECTION SUBCUTANEOUS at 09:38

## 2024-03-08 NOTE — PROGRESS NOTE ADULT - PROBLEM SELECTOR PLAN 3
- TTE 2/22 with severe MR  - He was evaluated for M-KRISTIE but was not a candidate d/t his anatomy.  He was evaluated for SUMMIT (Tendyne valve) trial for a transcatheter MVR but was denied by [employer]  - once optimized from HF and nutritional standpoint, plan for surgical MVR

## 2024-03-08 NOTE — PROGRESS NOTE ADULT - PROBLEM SELECTOR PLAN 1
- Etiology: ischemic and complicated by severe MR  - On  5 mcg/kg/min. Consider more gradual wean of , not below 3 mcg/kg/min  - Monitor markers of end organ function (LFTs, Cr, lactate and ScVO2)  - Continue Bumex gtt at 1 mg/hr. Target net negative 1-2L. CVP 6-8   - Please wrap BLE in ACE bandages to aid in mobilization of fluid   - Has dual chamber ICD, last interrogated 3/6

## 2024-03-08 NOTE — CHART NOTE - NSCHARTNOTEFT_GEN_A_CORE
Nutrition Services:    Consult received for assessment. Chart reviewed, events noted. See full initial assessment 3/7/24 for more detail. Per NP, pt's NGT clogged this AM, removed, however pt requesting tube to not be replaced - states he is eating. Lunch tray observed at bedside, ~75% consumed. States he ate all of breakfast. Pt reports good appetite; amenable to oral nutrition supplements to increase PO intake. Provided recommendations to optimize PO and protein intake, recommended small frequent meals by ordering nutrient-dense snacks and leaving non-perishable food away from tray for later consumption during the day or between meals, to start with protein, and sips of supplement throughout the day; reviewed foods with protein and menu order procedures in hospital.     Recommendations:  1) Discontinue DASH restriction, recommend low sodium restriction only in setting of malnutrition.   2) Add Ensure Plus High Protein 3 x daily (350kcal, 20gm protein each) to promote PO intake. If K/Phos elevated can change to Nepro 2x daily (850 kcals, 38 g protein).   3) If team prefers to continue supplemental EN - consider nocturnal feeds of Nepro at 65ml/hr x 10hr to provide 650ml volume, 1170kcal, 53g protein, 472ml free water. Regimen meets about ~50% of estimated energy & protein needs.   4) Initiate 3-day calorie count to assess adequacy of PO diet and to make adjustments to supplemental enteral nutrition (if needed).   5) Add daily Nephro-Meri.   6) Provide encouragement with PO intake, menu selections, and assistance with meals as needed.     RD remains available and will follow up per protocol.     Patricia Pizarro MS, RD, CDN, CNSC; available via [employer] Teams Nutrition Services:    Consult received for assessment. Chart reviewed, events noted. See full initial assessment 3/7/24 for more detail. Per NP, pt's NGT clogged this AM, removed, however pt requesting tube to not be replaced - states he is eating. Lunch tray observed at bedside, ~75% consumed. States he ate all of breakfast. Pt reports good appetite; amenable to oral nutrition supplements to increase PO intake. Provided recommendations to optimize PO and protein intake, recommended small frequent meals by ordering nutrient-dense snacks and leaving non-perishable food away from tray for later consumption during the day or between meals, to start with protein, and sips of supplement throughout the day; reviewed foods with protein and menu order procedures in hospital.     Recommendations:  1) Discontinue DASH restriction, recommend low sodium restriction only in setting of malnutrition.   2) Add Ensure Plus High Protein 3 x daily (350kcal, 20gm protein each) to promote PO intake. If K/Phos elevated can change to Nepro 2x daily (850 kcals, 38 g protein).   3) If team prefers to continue supplemental EN - consider nocturnal feeds of Nepro at 65ml/hr x 10hr to provide 650ml volume, 1170kcal, 53g protein, 472ml free water. Regimen meets about ~50% of estimated energy & protein needs.   4) Initiate 3-day calorie count to assess adequacy of PO diet and to make adjustments to supplemental enteral nutrition (if needed).   5) Add daily Nephro-Meri.   6) Provide encouragement with PO intake, menu selections, and assistance with meals as needed.     RD remains available; will follow up with results of calorie count upon completion.    Patricia Pizarro MS, RD, CDN, CNSC; available via [employer] Teams

## 2024-03-08 NOTE — PROGRESS NOTE ADULT - ASSESSMENT
84 y/o M with ICM/HFrEF (now 30%; LVIDd 6.7 cm; prev req inotrope), CAD c/b IWMI (1994) s/p angioplasty, aortic stenosis s/p bio-AVR 2004, VT arrest (2017) s/p ICD, Afib s/p multiple DCCV and ablation x3 (most recent 2/27/24 on AC), Aflutter s/p WARREN/DCCV (8/23), prior Ao aneurysm s/p Bentall (2021), severe MR prior MVr (2021) with MAC (precludes M-KRISTIE d/t his anatomy; turned down for TMVR trials by [employer]) and atrophic kidney with CKD (b/l Cr 2.5-3) who was recently hospitalized for ADHF with plans for surgical MVR once optimized but opted for discharge with residual congestion on 2/28. Now presenting with worsening volume retention, cellulitis of L foot and marked SURESH on CKD (BUN/Cr on admission 143/4.39).     He was empirically started on  and Initiated on iHD, first session on 3/5 with need for Intermittent pressor support.     Clinically improving from a volume perspective, making robust urine with Bumex gtt.  was increased back to 5 mcg/kg/min yesterday evening (from 2.5 mcg/kg/min) by CTU for ScVO2 of 57.4% (CO/CI 4.3/2.4). Could consider a more gradual wean of . Continue diuresis. Likely plan for iHD tomorrow for clearance. Eventual plan for surgical mitral valve replacement once optimized.     Bedside Hemodynamics  3/8/25 ( 5 mcg): CVP 10, ScVO2 67.7%, CO/CI 5.7/3.1, MAP 70, HR 70,  dsc  3/7/25 ( 2.5 mcg): ScVO2 57.4%, CO/CI 4.3/2.4  3/7/24 ( 5mcg): CVP 9, ScVO2 70%, CO/CI (FI) 7.5/4.1  3/6/24 ( 5mcg): CVP 8, ScVO2 74%, CO/CI (FI): 6.5/3.6    Cardiac studies   ·	WARREN 2/27/24: severe MR, DCCV SR  ·	TTE 2/22/24: LVIDd: 5.8cm, LVEF 35%, mildly enlarged RV with reduced function, severe MR and Moderate to severe TR. PASP 70mmHg  ·	WARREN 8/23 Severe MR and moderate TR  ·	TTE 8/23 EF 36%, grade II DD, mildly enlarged RV with reduced function, severe MR and Moderate to severe TR

## 2024-03-08 NOTE — PROGRESS NOTE ADULT - PROBLEM SELECTOR PLAN 2
- Etiology: Likely cardiorenal   - Cr on recent discharge on 2/28 was 121/3.65 and this admission 143/4.39  - HD sessions completed 3/5, 3/6 and 3/7. All clearance sessions, no fluid removed.   - Preserve RUE in anticipation for HD access  - Nephrology following

## 2024-03-08 NOTE — PROGRESS NOTE ADULT - SUBJECTIVE AND OBJECTIVE BOX
Subjective:  - feeling relatively well, ambulating with PT    Medications:  allopurinol 100 milliGRAM(s) Oral daily  aMIOdarone    Tablet 200 milliGRAM(s) Oral two times a day  ascorbic acid 500 milliGRAM(s) Oral daily  atorvastatin 40 milliGRAM(s) Oral at bedtime  buMETAnide Infusion 1 mG/Hr IV Continuous <Continuous>  ceFAZolin   IVPB 1000 milliGRAM(s) IV Intermittent every 24 hours  chlorhexidine 2% Cloths 1 Application(s) Topical <User Schedule>  cyanocobalamin 1000 MICROGram(s) Oral daily  DOBUTamine Infusion 5 MICROgram(s)/kG/Min IV Continuous <Continuous>  enoxaparin Injectable 30 milliGRAM(s) SubCutaneous every 12 hours  epoetin zara-epbx (RETACRIT) Injectable 81253 Unit(s) IV Push once  folic acid 1 milliGRAM(s) Oral daily  influenza  Vaccine (HIGH DOSE) 0.7 milliLiter(s) IntraMuscular once  levothyroxine 75 MICROGram(s) Oral daily  melatonin 5 milliGRAM(s) Oral at bedtime  midodrine 10 milliGRAM(s) Oral <User Schedule> PRN  midodrine 15 milliGRAM(s) Oral every 8 hours  pantoprazole    Tablet 40 milliGRAM(s) Oral before breakfast  polyethylene glycol 3350 17 Gram(s) Oral daily  senna 2 Tablet(s) Oral at bedtime  sodium chloride 0.9% lock flush 3 milliLiter(s) IV Push every 8 hours  thiamine 100 milliGRAM(s) Oral daily    Physical Exam:    Vitals:  Vital Signs Last 24 Hours  T(C): 36.6 (24 @ 07:45), Max: 36.7 (24 @ 19:00)  HR: 68 (24 @ 14:15) (67 - 94)  BP: 102/59 (24 @ 13:45) (79/51 - 114/59)  RR: 33 (24 @ 14:15) (13 - 46)  SpO2: 93% (24 @ 14:15) (89% - 95%)    Weight in k.4 ( @ 00:00)    I&O's Summary    07 Mar 2024 07:01  -  08 Mar 2024 07:00  --------------------------------------------------------  IN: 2576.6 mL / OUT: 3259 mL / NET: -682.4 mL    08 Mar 2024 07:01  -  08 Mar 2024 16:29  --------------------------------------------------------  IN: 617.6 mL / OUT: 440 mL / NET: 177.6 mL    Tele: Paced 70    General: No distress. Comfortable sitting up in chair  HEENT: EOM intact.  Neck: JVP 14 cm H2O with HJR  Chest: Clear to auscultation bilaterally  CV: RRR. Normal S1 and S2.   Abdomen: Soft, non-distended, non-tender  Extremities: 1-2+ BLE edema   Neurology: Alert and oriented times three. Sensation intact  Psych: Affect normal    Labs:                        9.5    7.31  )-----------( 148      ( 08 Mar 2024 00:34 )             28.8     03-08    135  |  96  |  38<H>  ----------------------------<  91  3.6   |  29  |  1.86<H>    Ca    9.2      08 Mar 2024 00:34  Phos  2.3     03-08  Mg     2.0     03-08    TPro  6.7  /  Alb  3.5  /  TBili  1.7<H>  /  DBili  x   /  AST  53<H>  /  ALT  27  /  AlkPhos  234<H>  03-08    PT/INR - ( 08 Mar 2024 00:34 )   PT: 14.0 sec;   INR: 1.28 ratio         PTT - ( 08 Mar 2024 00:34 )  PTT:30.0 sec

## 2024-03-08 NOTE — PROGRESS NOTE ADULT - CRITICAL CARE ATTENDING COMMENT
Patient was seen and examined at bedside with the advanced care provider.  I agree with the above with the following exceptions:    Continues to improve.  Yesterday dobutamine wean from 5-2.5 with drop in mixed venous to 57.4%.  Dobutamine increased back up to 5.  Today, mixed venous sat of 67.7% this AM.  Underwent third HD session yesterday and was net negative approximately 700 cc.  T. bili continues to increase up to 1.7 however other liver enzymes downtrend.  Would continue to remove fluid and keep patient ideally net -1 to 2 L.  BP remains borderline at 90s over 50s.  Started on midodrine per CDU team.  Would leave dobutamine at 5 mcg today.  Can consider slow wean from 5-4 tomorrow after additional volume removed.  Recommend wrapping lower extremities to help mobilize fluid.
Patient was seen and examined at bedside with the advanced care provider.  I agree with the above with the following exceptions:    Improving CVP today to 3-10.  Still appears with lower extremity edema.  Status post second HD session yesterday but net +~100CC.  Would aim to keep net -2 L today.  can increase Bumex 2 mg/h.  Can begin to consider weaning dobutamine.  Would not wean below 3 mcg today.  Obtain mixed venous after decreasing.
Patient was seen and examined at bedside with the advanced care provider.  I agree with the above with the following exceptions:    Much more alert today and improving volume status.  Was escalated on dobutamine to 5 mcg yesterday as well as Bumex drip at 1 mg/h overnight.  Would continue on Bumex today to achieve net -2 L goal.  Mixed venous O2 sats unfortunately were not drawn prior to the initiation of dobutamine.  However, after being on dobutamine for 1 hour initial mixed venous is 63.5% and this morning is 74%.  Renal function continues to improve.  Keep CVP's within single digits.  Would aim for net -2 L fluid goal for today.  He was briefly on HD yesterday for exchange only and no ultrafiltration.  Device interrogation yesterday revealed V pacing at approximately 38%.  PPM rate was adjusted to 70.  Would obtain iron studies as well as TSH.

## 2024-03-08 NOTE — PROGRESS NOTE ADULT - SUBJECTIVE AND OBJECTIVE BOX
CRITICAL CARE ATTENDING - CTICU    MEDICATIONS  (STANDING):  allopurinol 100 milliGRAM(s) Oral daily  aMIOdarone    Tablet 200 milliGRAM(s) Oral two times a day  ascorbic acid 500 milliGRAM(s) Oral daily  atorvastatin 40 milliGRAM(s) Oral at bedtime  buMETAnide Infusion 1 mG/Hr (5 mL/Hr) IV Continuous <Continuous>  ceFAZolin   IVPB 1000 milliGRAM(s) IV Intermittent every 24 hours  chlorhexidine 2% Cloths 1 Application(s) Topical <User Schedule>  cyanocobalamin 1000 MICROGram(s) Oral daily  DOBUTamine Infusion 5 MICROgram(s)/kG/Min (10.2 mL/Hr) IV Continuous <Continuous>  enoxaparin Injectable 30 milliGRAM(s) SubCutaneous every 12 hours  ferrous    sulfate 325 milliGRAM(s) Oral daily  folic acid 1 milliGRAM(s) Oral daily  influenza  Vaccine (HIGH DOSE) 0.7 milliLiter(s) IntraMuscular once  iron sucrose IVPB 200 milliGRAM(s) IV Intermittent every 24 hours  levothyroxine 75 MICROGram(s) Oral daily  melatonin 5 milliGRAM(s) Oral at bedtime  pantoprazole    Tablet 40 milliGRAM(s) Oral before breakfast  polyethylene glycol 3350 17 Gram(s) Oral daily  senna 2 Tablet(s) Oral at bedtime  sodium chloride 0.9% lock flush 3 milliLiter(s) IV Push every 8 hours  thiamine 100 milliGRAM(s) Oral daily  vasopressin Infusion 0.02 Unit(s)/Min (3 mL/Hr) IV Continuous <Continuous>                                    9.5    7.31  )-----------( 148      ( 08 Mar 2024 00:34 )             28.8       03-08    135  |  96  |  38<H>  ----------------------------<  91  3.6   |  29  |  1.86<H>    Ca    9.2      08 Mar 2024 00:34  Phos  2.3     03-08  Mg     2.0     03-08    TPro  6.7  /  Alb  3.5  /  TBili  1.7<H>  /  DBili  x   /  AST  53<H>  /  ALT  27  /  AlkPhos  234<H>  03-08      PT/INR - ( 08 Mar 2024 00:34 )   PT: 14.0 sec;   INR: 1.28 ratio         PTT - ( 08 Mar 2024 00:34 )  PTT:30.0 sec        Daily     Daily Weight in k.4 (08 Mar 2024 00:00)       @ 07:  -   @ 07:00  --------------------------------------------------------  IN: 2284 mL / OUT: 2615 mL / NET: -331 mL     @ 07:01  -   @ 06:02  --------------------------------------------------------  IN: 2446.2 mL / OUT: 3084 mL / NET: -637.8 mL        Critically Ill patient  : [ x] preoperative ,   [ ] post operative    Requires :  [x ] Arterial Line [x] Central Line  [ ] PA catheter  [ ] IABP  [ ] ECMO  [ ] LVAD  [ ] Ventilator  [x ] pacemaker -  PPM / AICD [ ] Impella.                      [x] ABG's     [x] Pulse Oxymetry Monitoring  Bedside evaluation , monitoring , treatment of hemodynamics , fluids , IVP/ IVCD meds.        Diagnosis:     3/5 - Tx to ICU for Hypotension/CHF/renal failure    Pre Op Evaluation / Optimization - Re Op MVR     h/o AVR     Hypotension     Mitral Regurgitation     Pulmonary Hypertension 2 to biventricular cardiac dysfunction     Requires chest PT, pulmonary toilet, suctioning to maintain SaO2,  patent airway and treat atelectasis.    PPM -   increased  HR and A-V interval to maximize forward flow    Requires  [ ]DDD  [ ]VVI    [x ]AII  PPM  pacing at  70 min    to maintain HR, MAP, CI, and perfusion.     A Fib - s/p cardioversion / amiodarone --> Paced Rhythm     CHF- acute [x  ]   chronic [x  ]    systolic [x ]   diastolic [ ]  Valvular [x ]          - Echo- EF -  30's / MR            [x ] RV dysfunction          - Cxr-cardiomegally, edema          - Clinical-  [ x]inotropes   [ ]pressors   [x ]diuresis - IVCD Bumex   [ ]IABP   [ ]ECMO   [ ]LVAD   [ ]Respiratory Failure    Hemodynamic lability,  instability. Requires  [ ] vasopressors [ x] inotropes  [ ] vasodilator  [ ]IVSS fluid  to maintain MAP, perfusion, C.I.     Synthroid for Hypothyroidism    Fluid Overload     CVVHD / IHD, 1st session done on 3/5/23    [x] Hemodialysis - on 3/5 & 3/6  [ ] Hemofiltration:    [x] negative fluid balance in a hemodynamically unstable patient.     Chronic Renal Failure     Cardiac Cachexia - NG - goal rate feeds     Tolerates DASH Diet + NG feeds at [x] goal rate  40 cc/Hr  [ ] trophic rate    [ ]       rate    Renal Failure - Acute Kidney Injury - recent vasodilation of MAP down    Hyponatremia - resolved     Metabolic Acidosis - resolved    Requires bedside physical therapy, mobilization and total group home care.             I, Ildefonso Bañuelos, personally performed the services described in this documentation. All medical record entries made by the scribe were at my direction and in my presence. I have reviewed the chart and agree that the record reflects my personal performance and is accurate and complete.   Ildefonso Bañuelos MD.       By signing my name below, I, Kev Hanna, attest that this documentation has been prepared under the direction and in the presence of Ildefonso Bañuelos MD.   Electronically Signed: Home Ravi 24 @ 06:02        Discussed with CT surgeon, Physician Assistant - Nurse Practitioner- Critical care medicine team.   Dicussed at  AM / PM rounds.   Chart, labs , films reviewed.    Cumulative Critical Care Time Given Today:  CRITICAL CARE ATTENDING - CTICU    MEDICATIONS  (STANDING):  allopurinol 100 milliGRAM(s) Oral daily  aMIOdarone    Tablet 200 milliGRAM(s) Oral two times a day  ascorbic acid 500 milliGRAM(s) Oral daily  atorvastatin 40 milliGRAM(s) Oral at bedtime  buMETAnide Infusion 1 mG/Hr (5 mL/Hr) IV Continuous <Continuous>  ceFAZolin   IVPB 1000 milliGRAM(s) IV Intermittent every 24 hours  chlorhexidine 2% Cloths 1 Application(s) Topical <User Schedule>  cyanocobalamin 1000 MICROGram(s) Oral daily  DOBUTamine Infusion 5 MICROgram(s)/kG/Min (10.2 mL/Hr) IV Continuous <Continuous>  enoxaparin Injectable 30 milliGRAM(s) SubCutaneous every 12 hours  ferrous    sulfate 325 milliGRAM(s) Oral daily  folic acid 1 milliGRAM(s) Oral daily  influenza  Vaccine (HIGH DOSE) 0.7 milliLiter(s) IntraMuscular once  iron sucrose IVPB 200 milliGRAM(s) IV Intermittent every 24 hours  levothyroxine 75 MICROGram(s) Oral daily  melatonin 5 milliGRAM(s) Oral at bedtime  pantoprazole    Tablet 40 milliGRAM(s) Oral before breakfast  polyethylene glycol 3350 17 Gram(s) Oral daily  senna 2 Tablet(s) Oral at bedtime  sodium chloride 0.9% lock flush 3 milliLiter(s) IV Push every 8 hours  thiamine 100 milliGRAM(s) Oral daily  vasopressin Infusion 0.02 Unit(s)/Min (3 mL/Hr) IV Continuous <Continuous>                                    9.5    7.31  )-----------( 148      ( 08 Mar 2024 00:34 )             28.8       03-08    135  |  96  |  38<H>  ----------------------------<  91  3.6   |  29  |  1.86<H>    Ca    9.2      08 Mar 2024 00:34  Phos  2.3     03-08  Mg     2.0     03-08    TPro  6.7  /  Alb  3.5  /  TBili  1.7<H>  /  DBili  x   /  AST  53<H>  /  ALT  27  /  AlkPhos  234<H>  03-08      PT/INR - ( 08 Mar 2024 00:34 )   PT: 14.0 sec;   INR: 1.28 ratio         PTT - ( 08 Mar 2024 00:34 )  PTT:30.0 sec        Daily     Daily Weight in k.4 (08 Mar 2024 00:00)       @ 07:  -   @ 07:00  --------------------------------------------------------  IN: 2284 mL / OUT: 2615 mL / NET: -331 mL     @ 07:01  -   @ 06:02  --------------------------------------------------------  IN: 2446.2 mL / OUT: 3084 mL / NET: -637.8 mL        Critically Ill patient  : [ x] preoperative ,   [ ] post operative    Requires :  [x ] Arterial Line [x] Central Line  [ ] PA catheter  [ ] IABP  [ ] ECMO  [ ] LVAD  [ ] Ventilator  [x ] pacemaker -  PPM / AICD [ ] Impella.  [x] IHD                      [x] ABG's     [x] Pulse Oxymetry Monitoring  Bedside evaluation , monitoring , treatment of hemodynamics , fluids , IVP/ IVCD meds.        Diagnosis:     3/5 - Tx to ICU for Hypotension/CHF/renal failure    Pre Op Evaluation / Optimization - Re Op MVR     h/o AVR     Hypotension     Mitral Regurgitation     Pulmonary Hypertension 2 to biventricular cardiac dysfunction     Requires chest PT, pulmonary toilet, suctioning to maintain SaO2,  patent airway and treat atelectasis.    PPM -   increased  HR and A-V interval to maximize forward flow    Requires  [ ]DDD  [ ]VVI    [x ]AII  PPM  pacing at  70 min    to maintain HR, MAP, CI, and perfusion.     A Fib - s/p cardioversion / amiodarone --> Paced Rhythm     CHF- acute [x  ]   chronic [x  ]    systolic [x ]   diastolic [ ]  Valvular [x ]          - Echo- EF -  30's / MR            [x ] RV dysfunction          - Cxr-cardiomegally, edema          - Clinical-  [ x]inotropes   [ x]pressors   [x ]diuresis - IVCD Bumex   [ ]IABP   [ ]ECMO   [ ]LVAD   [ ]Respiratory Failure    Hemodynamic lability,  instability. Requires  [x ] vasopressors [ x] inotropes  [ ] vasodilator  [ ]IVSS fluid  to maintain MAP, perfusion, C.I.     Synthroid for Hypothyroidism    Fluid Overload      IHD, - Daily x 3 days    [x] Hemodialysis - on 3/5 & 3/6  [ ] Hemofiltration:    [x] negative fluid balance in a hemodynamically unstable patient.     Chronic Renal Failure     Cardiac Cachexia - NG - goal rate feeds     Tolerates DASH Diet + NG feeds at [x] goal rate  40 cc/Hr  [ ] trophic rate    [ ]       rate    Renal Failure - Acute Kidney Injury - recent vasodilation of MAP down    Thrombocytopenia     Hypokalemia    Hyponatremia - resolved                                                  -  Dialysis  Metabolic Acidosis - resolved    Requires bedside physical therapy, mobilization and total nursing home care.             I, Ildefonso Bañuelos, personally performed the services described in this documentation. All medical record entries made by the scribe were at my direction and in my presence. I have reviewed the chart and agree that the record reflects my personal performance and is accurate and complete.   Ildefonso Bañuelos MD.       By signing my name below, I, Kev Hanna, attest that this documentation has been prepared under the direction and in the presence of Ildefonso Bañuelos MD.   Electronically Signed: Home Ravi 24 @ 06:02        Discussed with CT surgeon, Physician Assistant - Nurse Practitioner- Critical care medicine team.   Dicussed at  AM / PM rounds.   Chart, labs , films reviewed.    Cumulative Critical Care Time Given Today:  105 min

## 2024-03-08 NOTE — PROGRESS NOTE ADULT - ASSESSMENT
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 Patient was initiated on bumex gtt with gradual improvement with Cr improving from 3.6 to 3.1. Patient was considered for MV re-op; however, patient was reluctant of the procedure and expressed wanting to follow up in the outpatient setting to schedule. During last admission, patient was also noted to be in flutter, EP was consulted, and patient underwent repeat WARREN/DCCV , on amiodarone. Patient was discharged home 2/28 on higher dose of diuretics, as per HF team. Today, patient was evaluated at CTS office four routine follow-up, pt c/o worsening shortness of breath and L>R LE edema, with redness/swelling on left dorsal aspect of the foot, readmitted to CTS service for further management.  noticed with rising creatinine and bun      1- SURESH on ckd   2- decompensated CHF  3- Mitral regurgitation   4- hypotension   5- hypothyroid   6- hyperuricemia   7- anemia       initially planned for hd today however lytes in range, hypotension  and fluid status improving will hold hd today   cont dobutamine drip   decrease bumex to 1/2 mg hr  midodrine 15 mg tid   hold venofer iron sat high   retacrit [id number] U tiw as of am   d.w ctu team when seen earlier      allopurinol 100 mg daily   no blood work RUE  strict I/O  d/w CTS team

## 2024-03-08 NOTE — PROGRESS NOTE ADULT - SUBJECTIVE AND OBJECTIVE BOX
[facility]   [phone]  RENAL FOLLOW UP NOTE  --------------------------------------------------------------------------------  Chief Complaint:    24 hour events/subjective:    seen earlier   on bumex drip on dobutamine     PAST HISTORY  --------------------------------------------------------------------------------  No significant changes to PMH, PSH, FHx, SHx, unless otherwise noted    ALLERGIES & MEDICATIONS  --------------------------------------------------------------------------------  Allergies    No Known Allergies    Intolerances      Standing Inpatient Medications  allopurinol 100 milliGRAM(s) Oral daily  aMIOdarone    Tablet 200 milliGRAM(s) Oral two times a day  ascorbic acid 500 milliGRAM(s) Oral daily  atorvastatin 40 milliGRAM(s) Oral at bedtime  buMETAnide Infusion 1 mG/Hr IV Continuous <Continuous>  ceFAZolin   IVPB 1000 milliGRAM(s) IV Intermittent every 24 hours  chlorhexidine 2% Cloths 1 Application(s) Topical <User Schedule>  cyanocobalamin 1000 MICROGram(s) Oral daily  DOBUTamine Infusion 5 MICROgram(s)/kG/Min IV Continuous <Continuous>  enoxaparin Injectable 30 milliGRAM(s) SubCutaneous every 12 hours  epoetin zara-epbx (RETACRIT) Injectable 78862 Unit(s) IV Push once  folic acid 1 milliGRAM(s) Oral daily  influenza  Vaccine (HIGH DOSE) 0.7 milliLiter(s) IntraMuscular once  levothyroxine 75 MICROGram(s) Oral daily  melatonin 5 milliGRAM(s) Oral at bedtime  midodrine 15 milliGRAM(s) Oral every 8 hours  pantoprazole    Tablet 40 milliGRAM(s) Oral before breakfast  polyethylene glycol 3350 17 Gram(s) Oral daily  senna 2 Tablet(s) Oral at bedtime  sodium chloride 0.9% lock flush 3 milliLiter(s) IV Push every 8 hours  thiamine 100 milliGRAM(s) Oral daily  vasopressin Infusion 0.04 Unit(s)/Min IV Continuous <Continuous>    PRN Inpatient Medications  midodrine 10 milliGRAM(s) Oral <User Schedule> PRN      REVIEW OF SYSTEMS  --------------------------------------------------------------------------------    Gen: denies  fevers/chills,  CVS: denies chest pain/palpitations  Resp: denies worsening SOB/Cough  GI: Denies N/V/Abd pain  : Denies dysuria      VITALS/PHYSICAL EXAM  --------------------------------------------------------------------------------  T(C): 36.6 (03-08-24 @ 07:45), Max: 36.7 (03-08-24 @ 03:00)  HR: 69 (03-08-24 @ 18:45) (67 - 94)  BP: 102/59 (03-08-24 @ 13:45) (81/45 - 110/57)  RR: 18 (03-08-24 @ 18:45) (13 - 46)  SpO2: 98% (03-08-24 @ 18:45) (89% - 99%)  Wt(kg): --        03-07-24 @ 07:01  -  03-08-24 @ 07:00  --------------------------------------------------------  IN: 2576.6 mL / OUT: 3259 mL / NET: -682.4 mL    03-08-24 @ 07:01  -  03-08-24 @ 19:34  --------------------------------------------------------  IN: 686.4 mL / OUT: 580 mL / NET: 106.4 mL      Physical Exam:  	    Gen: Non toxic comfortable appearing   	 +  jvd  	Pulm: decrease bs  no rales or ronchi or wheezing  	CV: RRR, S1S2; no rub  	Abd: +BS, soft, nontender/nondistended  	: No suprapubic tenderness  	UE: Warm, no cyanosis  no clubbing,  no edema  	LE: Warm, no cyanosis  no clubbing, 1+   edema  	Neuro: alert and oriented. speech coherent       LABS/STUDIES  --------------------------------------------------------------------------------              9.5    7.31  >-----------<  148      [03-08-24 @ 00:34]              28.8     135  |  96  |  38  ----------------------------<  91      [03-08-24 @ 00:34]  3.6   |  29  |  1.86        Ca     9.2     [03-08-24 @ 00:34]      Mg     2.0     [03-08-24 @ 00:34]      Phos  2.3     [03-08-24 @ 00:34]    TPro  6.7  /  Alb  3.5  /  TBili  1.7  /  DBili  x   /  AST  53  /  ALT  27  /  AlkPhos  234  [03-08-24 @ 00:34]    PT/INR: PT 14.0 , INR 1.28       [03-08-24 @ 00:34]  PTT: 32.8       [03-08-24 @ 17:09]    Uric acid 4.7      [03-07-24 @ 00:25]    Creatinine Trend:  SCr 1.86 [03-08 @ 00:34]  SCr 2.30 [03-07 @ 00:25]  SCr 3.49 [03-06 @ 01:34]  SCr 4.53 [03-05 @ 03:12]  SCr 4.45 [03-04 @ 15:30]         Iron 290, TIBC 430, %sat 67      [03-06-24 @ 13:57]  Ferritin 335      [03-06-24 @ 13:57]  PTH -- (Ca 8.9)      [03-05-24 @ 13:03]   242  TSH 2.02      [03-06-24 @ 13:57]

## 2024-03-09 ENCOUNTER — RESULT REVIEW (OUTPATIENT)
Age: 84
End: 2024-03-09

## 2024-03-09 LAB
ALBUMIN SERPL ELPH-MCNC: 4.1 G/DL — SIGNIFICANT CHANGE UP (ref 3.3–5)
ALP SERPL-CCNC: 191 U/L — HIGH (ref 40–120)
ALT FLD-CCNC: 23 U/L — SIGNIFICANT CHANGE UP (ref 10–45)
ANION GAP SERPL CALC-SCNC: 13 MMOL/L — SIGNIFICANT CHANGE UP (ref 5–17)
APTT BLD: 33 SEC — SIGNIFICANT CHANGE UP (ref 24.5–35.6)
AST SERPL-CCNC: 50 U/L — HIGH (ref 10–40)
BASE EXCESS BLDV CALC-SCNC: 2.4 MMOL/L — SIGNIFICANT CHANGE UP (ref -2–3)
BASE EXCESS BLDV CALC-SCNC: 4 MMOL/L — HIGH (ref -2–3)
BILIRUB SERPL-MCNC: 2.1 MG/DL — HIGH (ref 0.2–1.2)
BUN SERPL-MCNC: 53 MG/DL — HIGH (ref 7–23)
CA-I SERPL-SCNC: 1.2 MMOL/L — SIGNIFICANT CHANGE UP (ref 1.15–1.33)
CALCIUM SERPL-MCNC: 9.4 MG/DL — SIGNIFICANT CHANGE UP (ref 8.4–10.5)
CHLORIDE BLDV-SCNC: 97 MMOL/L — SIGNIFICANT CHANGE UP (ref 96–108)
CHLORIDE SERPL-SCNC: 94 MMOL/L — LOW (ref 96–108)
CO2 BLDV-SCNC: 28 MMOL/L — HIGH (ref 22–26)
CO2 BLDV-SCNC: 30 MMOL/L — HIGH (ref 22–26)
CO2 SERPL-SCNC: 25 MMOL/L — SIGNIFICANT CHANGE UP (ref 22–31)
CREAT SERPL-MCNC: 2.53 MG/DL — HIGH (ref 0.5–1.3)
EGFR: 25 ML/MIN/1.73M2 — LOW
FIBRINOGEN PPP-MCNC: 293 MG/DL — SIGNIFICANT CHANGE UP (ref 200–445)
GAS PNL BLDA: SIGNIFICANT CHANGE UP
GAS PNL BLDA: SIGNIFICANT CHANGE UP
GAS PNL BLDV: 130 MMOL/L — LOW (ref 136–145)
GAS PNL BLDV: SIGNIFICANT CHANGE UP
GLUCOSE BLDV-MCNC: 129 MG/DL — HIGH (ref 70–99)
GLUCOSE SERPL-MCNC: 137 MG/DL — HIGH (ref 70–99)
HCO3 BLDV-SCNC: 27 MMOL/L — SIGNIFICANT CHANGE UP (ref 22–29)
HCO3 BLDV-SCNC: 28 MMOL/L — SIGNIFICANT CHANGE UP (ref 22–29)
HCT VFR BLD CALC: 27.3 % — LOW (ref 39–50)
HCT VFR BLDA CALC: 27 % — LOW (ref 39–51)
HGB BLD CALC-MCNC: 8.9 G/DL — LOW (ref 12.6–17.4)
HGB BLD-MCNC: 8.8 G/DL — LOW (ref 13–17)
HOROWITZ INDEX BLDV+IHG-RTO: 28 — SIGNIFICANT CHANGE UP
HOROWITZ INDEX BLDV+IHG-RTO: 28 — SIGNIFICANT CHANGE UP
INR BLD: 1.36 RATIO — HIGH (ref 0.85–1.18)
LACTATE BLDV-MCNC: 0.7 MMOL/L — SIGNIFICANT CHANGE UP (ref 0.5–2)
MAGNESIUM SERPL-MCNC: 2 MG/DL — SIGNIFICANT CHANGE UP (ref 1.6–2.6)
MCHC RBC-ENTMCNC: 31.4 PG — SIGNIFICANT CHANGE UP (ref 27–34)
MCHC RBC-ENTMCNC: 32.2 GM/DL — SIGNIFICANT CHANGE UP (ref 32–36)
MCV RBC AUTO: 97.5 FL — SIGNIFICANT CHANGE UP (ref 80–100)
NRBC # BLD: 0 /100 WBCS — SIGNIFICANT CHANGE UP (ref 0–0)
PCO2 BLDV: 42 MMHG — SIGNIFICANT CHANGE UP (ref 42–55)
PCO2 BLDV: 42 MMHG — SIGNIFICANT CHANGE UP (ref 42–55)
PH BLDV: 7.42 — SIGNIFICANT CHANGE UP (ref 7.32–7.43)
PH BLDV: 7.44 — HIGH (ref 7.32–7.43)
PHOSPHATE SERPL-MCNC: 3.7 MG/DL — SIGNIFICANT CHANGE UP (ref 2.5–4.5)
PLATELET # BLD AUTO: 131 K/UL — LOW (ref 150–400)
PO2 BLDV: 27 MMHG — SIGNIFICANT CHANGE UP (ref 25–45)
PO2 BLDV: 42 MMHG — SIGNIFICANT CHANGE UP (ref 25–45)
POTASSIUM BLDV-SCNC: 4 MMOL/L — SIGNIFICANT CHANGE UP (ref 3.5–5.1)
POTASSIUM SERPL-MCNC: 4 MMOL/L — SIGNIFICANT CHANGE UP (ref 3.5–5.3)
POTASSIUM SERPL-SCNC: 4 MMOL/L — SIGNIFICANT CHANGE UP (ref 3.5–5.3)
PROT SERPL-MCNC: 6.8 G/DL — SIGNIFICANT CHANGE UP (ref 6–8.3)
PROTHROM AB SERPL-ACNC: 14.2 SEC — HIGH (ref 9.5–13)
RBC # BLD: 2.8 M/UL — LOW (ref 4.2–5.8)
RBC # FLD: 15.4 % — HIGH (ref 10.3–14.5)
SAO2 % BLDV: 49.5 % — LOW (ref 67–88)
SAO2 % BLDV: 71.4 % — SIGNIFICANT CHANGE UP (ref 67–88)
SODIUM SERPL-SCNC: 132 MMOL/L — LOW (ref 135–145)
WBC # BLD: 8.42 K/UL — SIGNIFICANT CHANGE UP (ref 3.8–10.5)
WBC # FLD AUTO: 8.42 K/UL — SIGNIFICANT CHANGE UP (ref 3.8–10.5)

## 2024-03-09 PROCEDURE — 99291 CRITICAL CARE FIRST HOUR: CPT | Mod: FS

## 2024-03-09 PROCEDURE — 93308 TTE F-UP OR LMTD: CPT | Mod: 26

## 2024-03-09 PROCEDURE — 71045 X-RAY EXAM CHEST 1 VIEW: CPT | Mod: 26

## 2024-03-09 PROCEDURE — 93321 DOPPLER ECHO F-UP/LMTD STD: CPT | Mod: 26

## 2024-03-09 RX ORDER — ACETAMINOPHEN 500 MG
650 TABLET ORAL ONCE
Refills: 0 | Status: COMPLETED | OUTPATIENT
Start: 2024-03-09 | End: 2024-03-09

## 2024-03-09 RX ADMIN — MIDODRINE HYDROCHLORIDE 15 MILLIGRAM(S): 2.5 TABLET ORAL at 21:36

## 2024-03-09 RX ADMIN — MIDODRINE HYDROCHLORIDE 15 MILLIGRAM(S): 2.5 TABLET ORAL at 05:21

## 2024-03-09 RX ADMIN — Medication 10.2 MICROGRAM(S)/KG/MIN: at 21:37

## 2024-03-09 RX ADMIN — CHLORHEXIDINE GLUCONATE 1 APPLICATION(S): 213 SOLUTION TOPICAL at 05:22

## 2024-03-09 RX ADMIN — ATORVASTATIN CALCIUM 40 MILLIGRAM(S): 80 TABLET, FILM COATED ORAL at 21:37

## 2024-03-09 RX ADMIN — Medication 100 MILLIGRAM(S): at 16:50

## 2024-03-09 RX ADMIN — MIDODRINE HYDROCHLORIDE 15 MILLIGRAM(S): 2.5 TABLET ORAL at 13:35

## 2024-03-09 RX ADMIN — VASOPRESSIN 6 UNIT(S)/MIN: 20 INJECTION INTRAVENOUS at 21:37

## 2024-03-09 RX ADMIN — ENOXAPARIN SODIUM 30 MILLIGRAM(S): 100 INJECTION SUBCUTANEOUS at 08:38

## 2024-03-09 RX ADMIN — Medication 500 MILLIGRAM(S): at 11:30

## 2024-03-09 RX ADMIN — POLYETHYLENE GLYCOL 3350 17 GRAM(S): 17 POWDER, FOR SOLUTION ORAL at 11:29

## 2024-03-09 RX ADMIN — SODIUM CHLORIDE 3 MILLILITER(S): 9 INJECTION INTRAMUSCULAR; INTRAVENOUS; SUBCUTANEOUS at 13:28

## 2024-03-09 RX ADMIN — Medication 100 MILLIGRAM(S): at 11:30

## 2024-03-09 RX ADMIN — PREGABALIN 1000 MICROGRAM(S): 225 CAPSULE ORAL at 11:29

## 2024-03-09 RX ADMIN — PANTOPRAZOLE SODIUM 40 MILLIGRAM(S): 20 TABLET, DELAYED RELEASE ORAL at 05:22

## 2024-03-09 RX ADMIN — Medication 1 MILLIGRAM(S): at 11:30

## 2024-03-09 RX ADMIN — SODIUM CHLORIDE 3 MILLILITER(S): 9 INJECTION INTRAMUSCULAR; INTRAVENOUS; SUBCUTANEOUS at 21:53

## 2024-03-09 RX ADMIN — AMIODARONE HYDROCHLORIDE 200 MILLIGRAM(S): 400 TABLET ORAL at 05:21

## 2024-03-09 RX ADMIN — AMIODARONE HYDROCHLORIDE 200 MILLIGRAM(S): 400 TABLET ORAL at 17:00

## 2024-03-09 RX ADMIN — Medication 650 MILLIGRAM(S): at 23:47

## 2024-03-09 RX ADMIN — Medication 75 MICROGRAM(S): at 05:22

## 2024-03-09 RX ADMIN — Medication 650 MILLIGRAM(S): at 23:17

## 2024-03-09 RX ADMIN — ENOXAPARIN SODIUM 30 MILLIGRAM(S): 100 INJECTION SUBCUTANEOUS at 21:37

## 2024-03-09 RX ADMIN — Medication 5 MILLIGRAM(S): at 21:36

## 2024-03-09 NOTE — PROGRESS NOTE ADULT - SUBJECTIVE AND OBJECTIVE BOX
[facility]   [phone]  RENAL FOLLOW UP NOTE  --------------------------------------------------------------------------------  Chief Complaint:    24 hour events/subjective:    seen earlier   on midodrine and pressors     PAST HISTORY  --------------------------------------------------------------------------------  No significant changes to PMH, PSH, FHx, SHx, unless otherwise noted    ALLERGIES & MEDICATIONS  --------------------------------------------------------------------------------  Allergies    No Known Allergies    Intolerances      Standing Inpatient Medications  allopurinol 100 milliGRAM(s) Oral daily  aMIOdarone    Tablet 200 milliGRAM(s) Oral two times a day  ascorbic acid 500 milliGRAM(s) Oral daily  atorvastatin 40 milliGRAM(s) Oral at bedtime  ceFAZolin   IVPB 1000 milliGRAM(s) IV Intermittent every 24 hours  chlorhexidine 2% Cloths 1 Application(s) Topical <User Schedule>  CRRT Treatment    <Continuous>  cyanocobalamin 1000 MICROGram(s) Oral daily  DOBUTamine Infusion 5 MICROgram(s)/kG/Min IV Continuous <Continuous>  enoxaparin Injectable 30 milliGRAM(s) SubCutaneous every 12 hours  epoetin zara-epbx (RETACRIT) Injectable 39527 Unit(s) IV Push once  folic acid 1 milliGRAM(s) Oral daily  levothyroxine 75 MICROGram(s) Oral daily  melatonin 5 milliGRAM(s) Oral at bedtime  midodrine 15 milliGRAM(s) Oral every 8 hours  pantoprazole    Tablet 40 milliGRAM(s) Oral before breakfast  polyethylene glycol 3350 17 Gram(s) Oral daily  PrismaSATE Dialysate BGK 4 / 2.5 5000 milliLiter(s) CRRT <Continuous>  PrismaSOL Filtration BGK 0 / 2.5 5000 milliLiter(s) CRRT <Continuous>  PrismaSOL Filtration BGK 4 / 2.5 5000 milliLiter(s) CRRT <Continuous>  senna 2 Tablet(s) Oral at bedtime  sodium chloride 0.9% lock flush 3 milliLiter(s) IV Push every 8 hours  thiamine 100 milliGRAM(s) Oral daily  vasopressin Infusion 0.04 Unit(s)/Min IV Continuous <Continuous>    PRN Inpatient Medications  midodrine 10 milliGRAM(s) Oral <User Schedule> PRN      REVIEW OF SYSTEMS  --------------------------------------------------------------------------------    Gen: denies  fevers/chills,  CVS: denies chest pain/palpitations  Resp: denies worsening  SOB/Cough  GI: Denies N/V/Abd pain  : Denies dysuria    VITALS/PHYSICAL EXAM  --------------------------------------------------------------------------------  T(C): 36.6 (03-09-24 @ 12:00), Max: 36.8 (03-08-24 @ 20:00)  HR: 72 (03-09-24 @ 12:45) (67 - 91)  BP: 102/59 (03-08-24 @ 13:45) (96/56 - 102/59)  RR: 30 (03-09-24 @ 12:45) (15 - 46)  SpO2: 93% (03-09-24 @ 12:45) (85% - 100%)  Wt(kg): --        03-08-24 @ 07:01  -  03-09-24 @ 07:00  --------------------------------------------------------  IN: 1023.3 mL / OUT: 1305 mL / NET: -281.7 mL    03-09-24 @ 06:01  -  03-09-24 @ 13:18  --------------------------------------------------------  IN: 515.2 mL / OUT: 120 mL / NET: 395.2 mL      Physical Exam:  	      Gen: Non toxic comfortable appearing   	 +  jvd  	Pulm: decrease bs  no rales or ronchi or wheezing  	CV: RRR, S1S2; no rub  	Abd: +BS, soft, nontender/nondistended  	: No suprapubic tenderness  	UE: Warm, no cyanosis  no clubbing,  no edema  	LE: Warm, no cyanosis  no clubbing, 1+   edema  	Neuro: alert and oriented. speech coherent     LABS/STUDIES  --------------------------------------------------------------------------------              8.8    8.42  >-----------<  131      [03-09-24 @ 00:33]              27.3     132  |  94  |  53  ----------------------------<  137      [03-09-24 @ 00:33]  4.0   |  25  |  2.53        Ca     9.4     [03-09-24 @ 00:33]      Mg     2.0     [03-09-24 @ 00:33]      Phos  3.7     [03-09-24 @ 00:33]    TPro  6.8  /  Alb  4.1  /  TBili  2.1  /  DBili  x   /  AST  50  /  ALT  23  /  AlkPhos  191  [03-09-24 @ 00:33]    PT/INR: PT 14.2 , INR 1.36       [03-09-24 @ 05:22]  PTT: 33.0       [03-09-24 @ 05:22]      Creatinine Trend:  SCr 2.53 [03-09 @ 00:33]  SCr 1.86 [03-08 @ 00:34]  SCr 2.30 [03-07 @ 00:25]  SCr 3.49 [03-06 @ 01:34]  SCr 4.53 [03-05 @ 03:12]        Iron 290, TIBC 430, %sat 67      [03-06-24 @ 13:57]  Ferritin 335      [03-06-24 @ 13:57]  PTH -- (Ca 8.9)      [03-05-24 @ 13:03]   242  TSH 2.02      [03-06-24 @ 13:57]

## 2024-03-09 NOTE — PROGRESS NOTE ADULT - ASSESSMENT
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 Patient was initiated on bumex gtt with gradual improvement with Cr improving from 3.6 to 3.1. Patient was considered for MV re-op; however, patient was reluctant of the procedure and expressed wanting to follow up in the outpatient setting to schedule. During last admission, patient was also noted to be in flutter, EP was consulted, and patient underwent repeat WARREN/DCCV , on amiodarone. Patient was discharged home 2/28 on higher dose of diuretics, as per HF team. Today, patient was evaluated at CTS office four routine follow-up, pt c/o worsening shortness of breath and L>R LE edema, with redness/swelling on left dorsal aspect of the foot, readmitted to CTS service for further management.  noticed with rising creatinine and bun      1- SURESH on ckd   2- decompensated CHF  3- Mitral regurgitation   4- hypotension   5- hypothyroid   6- hyperuricemia   7- anemia       initially planned for hd today however lytes in range, hypotension  and fluid status improving will hold hd today   cont dobutamine drip   midodrine 15 mg tid   cont vasopressin drip   given hypotension will change to CRRT  dfr 1200 50 cc hr fluid removal 12 hour tonight   see new orders   retacrit [id number] U tiw as of am     allopurinol 100 mg daily   no blood work RUE  strict I/O  d/w CTS team when seen earlier

## 2024-03-09 NOTE — PROGRESS NOTE ADULT - SUBJECTIVE AND OBJECTIVE BOX
Patient seen and examined at the bedside.    Remained critically ill on continuous ICU monitoring.    OBJECTIVE:  Vital Signs Last 24 Hrs  T(C): 36.2 (09 Mar 2024 08:00), Max: 36.8 (08 Mar 2024 20:00)  T(F): 97.1 (09 Mar 2024 08:00), Max: 98.3 (08 Mar 2024 20:00)  HR: 90 (09 Mar 2024 09:00) (67 - 93)  BP: 102/59 (08 Mar 2024 13:45) (81/45 - 107/64)  BP(mean): 75 (08 Mar 2024 13:45) (57 - 80)  RR: 23 (09 Mar 2024 09:00) (16 - 46)  SpO2: 96% (09 Mar 2024 09:00) (89% - 100%)    Parameters below as of 09 Mar 2024 08:00  Patient On (Oxygen Delivery Method): nasal cannula  O2 Flow (L/min): 2        Physical Exam:   General: NAD   Neurology: nonfocal   Eyes: bilateral pupils equal and reactive   ENT/Neck: Neck supple, trachea midline, No JVD   Respiratory: Clear bilaterally   CV: S1S2, no murmurs        [x] Sternal dressing        [x] Sinus rhythm, [x] AICD  Abdominal: Soft, NT, ND +BS   Extremities: 1-2+ pedal edema noted, + peripheral pulses   Skin: No Rashes, Hematoma, Ecchymosis                           Assessment:  84 y/o M with ICM/HFrEF (now 30%; LVIDd 6.7 cm; prev req inotrope), CAD c/b IWMI (1994) s/p angioplasty, aortic stenosis s/p bio-AVR 2004, VT arrest (2017) s/p ICD, Afib s/p multiple DCCV and ablation x2 (2020 and 2023; on AC), Aflutter s/p WARREN/DCCV (8/23), prior Ao aneurysm s/p Bentall (2021), severe MR prior MVr (2021) with MAC (precludes M-KRISTIE d/t his anatomy; turned down for TMVR trials by Alvarez), atrophic kidney with CKD (b/l Cr 2.5-3). Was admitted on 2/20 with acute on chronic heart failure and acute on chronic kidney dysfunction, with Heart Failure and Renal teams following. Patient was considered for MV re-op; however, patient was reluctant of the procedure and expressed wanting to follow up in the outpatient setting to schedule.    Aortic root aneurysm, HO of AVR, and MVR s/p Repeat Sternotomy, Bentall Procedure, and Mitral Valve Repair with WARREN on 6/2/21  Aortic stenosis  Hemodynamic instability  Hypovolemia  Post op respiratory insufficiency  Acute blood loss anemia  Thrombocytopenia    Plan:   ***Neuro***  [x] Nonfocal   Continue with Melatonin for sleep regimen   Post operative neuro assessment     ***Cardiovascular***  Invasive hemodynamic monitoring, assess perfusion indices   SR / CVP 20 / MAP 60 / Hct 27.3% / Lactate 0.7  [x] Vasopressin - 0.04 Unit(s)/Min [x] Dobutamine - 5 mcg/kG/Min  Continuos reassessment of hemodynamics  Amiodarone for rate control  Continue Midodrine  [x] VTE ppx with Lovenox  [x] Statin   Serial EKG and cardiac enzymes     ***Pulmonary***  [x] Nasal Cannula 2L  Follow SpO2, CXR, blood gasses   Encourage incentive spirometry, continue pulse ox monitoring, follow ABGs     ***GI***  [x]  Diet:  Dash with Nepro TF  [x] Protonix  Bowel regimen with Miralax and Senna.     ***Renal***  Continue to monitor I/Os, BUN/Creatinine.   Replete lytes PRN  HD yesterday  Allopurinol for gout    ***ID***  Cefazolin for cellulitis    ***Endocrine***  HbA1c 5.7%                - [x] Monitor glucose levels             - Need tight glycemic control to prevent wound infection.  [x] Hypothyroidism             - [x] Synthroid          Patient requires continuous monitoring with bedside rhythm monitoring, pulse oximetry monitoring, and continuous central venous and arterial pressure monitoring; and intermittent blood gas analysis. Care plan discussed with the ICU care team.   Patient remained critical, at risk for life threatening decompensation.    I have spent 30 minutes providing critical care management to this patient.    By signing my name below, I, Leticia Helton, attest that this documentation has been prepared under the direction and in the presence of SHARON Smith  Electronically signed: Home Mcfarland, 03-09-24 @ 09:27    I, SHARON Smith, personally performed the services described in this documentation. all medical record entries made by the scribe were at my direction and in my presence. I have reviewed the chart and agree that the record reflects my personal performance and is accurate and complete  Electronically signed: SHARON Smith Patient seen and examined at the bedside.    Remained critically ill on continuous ICU monitoring.    OBJECTIVE:  Vital Signs Last 24 Hrs  T(C): 36.2 (09 Mar 2024 08:00), Max: 36.8 (08 Mar 2024 20:00)  T(F): 97.1 (09 Mar 2024 08:00), Max: 98.3 (08 Mar 2024 20:00)  HR: 90 (09 Mar 2024 09:00) (67 - 93)  BP: 102/59 (08 Mar 2024 13:45) (81/45 - 107/64)  BP(mean): 75 (08 Mar 2024 13:45) (57 - 80)  RR: 23 (09 Mar 2024 09:00) (16 - 46)  SpO2: 96% (09 Mar 2024 09:00) (89% - 100%)    Parameters below as of 09 Mar 2024 08:00  Patient On (Oxygen Delivery Method): nasal cannula  O2 Flow (L/min): 2        Physical Exam:   General: NAD   Neurology: nonfocal   Eyes: bilateral pupils equal and reactive   ENT/Neck: Neck supple, trachea midline, No JVD   Respiratory: Clear bilaterally   CV: S1S2, no murmurs        [x] Sternal dressing        [x] Sinus rhythm, [x] AICD  Abdominal: Soft, NT, ND +BS   Extremities: 1-2+ pedal edema noted, + peripheral pulses   Skin: No Rashes, Hematoma, Ecchymosis                           Assessment:  84 y/o M with ICM/HFrEF (now 30%; LVIDd 6.7 cm; prev req inotrope), CAD c/b IWMI (1994) s/p angioplasty, aortic stenosis s/p bio-AVR 2004, VT arrest (2017) s/p ICD, Afib s/p multiple DCCV and ablation x2 (2020 and 2023; on AC), Aflutter s/p WARREN/DCCV (8/23), prior Ao aneurysm s/p Bentall (2021), severe MR prior MVr (2021) with MAC (precludes M-KRISTIE d/t his anatomy; turned down for TMVR trials by Alvarez), atrophic kidney with CKD (b/l Cr 2.5-3). Was admitted on 2/20 with acute on chronic heart failure and acute on chronic kidney dysfunction, with Heart Failure and Renal teams following. Patient was considered for MV re-op; however, patient was reluctant of the procedure and expressed wanting to follow up in the outpatient setting to schedule.    Aortic root aneurysm, H/O of AVR, and MVR s/p Repeat Sternotomy, Bentall Procedure, and Mitral Valve Repair with WARREN on 6/2/21  Aortic stenosis  Hemodynamic instability  Hypovolemia  Post op respiratory insufficiency  Acute blood loss anemia  Thrombocytopenia    Plan:   ***Neuro***  [x] Nonfocal   Continue with Melatonin for sleep regimen   Post operative neuro assessment     ***Cardiovascular***  Invasive hemodynamic monitoring, assess perfusion indices   SR / CVP 20 / MAP 60 / Hct 27.3% / Lactate 0.7  [x] Vasopressin - 0.04 Unit(s)/Min [x] Dobutamine - 5 mcg/kG/Min  Continuos reassessment of hemodynamics  Amiodarone for rate control  Continue Midodrine  [x] VTE ppx with Lovenox  [x] Statin   Serial EKG and cardiac enzymes     ***Pulmonary***  [x] Nasal Cannula 2L  Follow SpO2, CXR, blood gasses   Encourage incentive spirometry, continue pulse ox monitoring, follow ABGs     ***GI***  [x]  Diet:  Dash with Nepro TF  [x] Protonix  Bowel regimen with Miralax and Senna.     ***Renal***  Continue to monitor I/Os, BUN/Creatinine.   Replete lytes PRN  HD yesterday  Allopurinol for gout    ***ID***  Cefazolin for cellulitis    ***Endocrine***  HbA1c 5.7%                - [x] Monitor glucose levels             - Need tight glycemic control to prevent wound infection.  [x] Hypothyroidism             - [x] Synthroid          Patient requires continuous monitoring with bedside rhythm monitoring, pulse oximetry monitoring, and continuous central venous and arterial pressure monitoring; and intermittent blood gas analysis. Care plan discussed with the ICU care team.   Patient remained critical, at risk for life threatening decompensation.    I have spent 30 minutes providing critical care management to this patient.    By signing my name below, ILeticia, attest that this documentation has been prepared under the direction and in the presence of SHARON Smith  Electronically signed: Home Mcfarland, 03-09-24 @ 09:27    ICesar PA, personally performed the services described in this documentation. all medical record entries made by the scribe were at my direction and in my presence. I have reviewed the chart and agree that the record reflects my personal performance and is accurate and complete  Electronically signed: SHARON Smith Patient seen and examined at the bedside.    Remained critically ill on continuous ICU monitoring.    OBJECTIVE:  Vital Signs Last 24 Hrs  T(C): 36.2 (09 Mar 2024 08:00), Max: 36.8 (08 Mar 2024 20:00)  T(F): 97.1 (09 Mar 2024 08:00), Max: 98.3 (08 Mar 2024 20:00)  HR: 90 (09 Mar 2024 09:00) (67 - 93)  BP: 102/59 (08 Mar 2024 13:45) (81/45 - 107/64)  BP(mean): 75 (08 Mar 2024 13:45) (57 - 80)  RR: 23 (09 Mar 2024 09:00) (16 - 46)  SpO2: 96% (09 Mar 2024 09:00) (89% - 100%)    Parameters below as of 09 Mar 2024 08:00  Patient On (Oxygen Delivery Method): nasal cannula  O2 Flow (L/min): 2        Physical Exam:   General: NAD   Neurology: nonfocal   Eyes: bilateral pupils equal and reactive   ENT/Neck: Neck supple, trachea midline, No JVD   Respiratory: Clear bilaterally   CV: S1S2, no murmurs        [x] Sternal dressing        [x] Sinus rhythm, [x] AICD  Abdominal: Soft, NT, ND +BS   Extremities: 1-2+ pedal edema noted, + peripheral pulses   Skin: No Rashes, Hematoma, Ecchymosis                           Assessment:  82 y/o M with ICM/HFrEF (now 30%; LVIDd 6.7 cm; prev req inotrope), CAD c/b IWMI (1994) s/p angioplasty, aortic stenosis s/p bio-AVR 2004, VT arrest (2017) s/p ICD, Afib s/p multiple DCCV and ablation x2 (2020 and 2023; on AC), Aflutter s/p WARREN/DCCV (8/23), prior Ao aneurysm s/p Bentall (2021), severe MR prior MVr (2021) with MAC (precludes M-KRISTIE d/t his anatomy; turned down for TMVR trials by Alvarez), atrophic kidney with CKD (b/l Cr 2.5-3). Was admitted on 2/20 with acute on chronic heart failure and acute on chronic kidney dysfunction, with Heart Failure and Renal teams following. Patient was considered for MV re-op; however, patient was reluctant of the procedure and expressed wanting to follow up in the outpatient setting to schedule.    Aortic root aneurysm, H/O of AVR, and MVR s/p Repeat Sternotomy, Bentall Procedure, and Mitral Valve Repair with WARREN on 6/2/21  Aortic stenosis  Hemodynamic instability  Hypovolemia  Post op respiratory insufficiency  Acute blood loss anemia  Thrombocytopenia    Plan:   ***Neuro***  [x] Nonfocal   Continue with Melatonin for sleep regimen     ***Cardiovascular***  Invasive hemodynamic monitoring, assess perfusion indices   SR / CVP 20 / MAP 60 / Hct 27.3% / Lactate 0.7  [x] Vasopressin - 0.04 Unit(s)/Min [x] Dobutamine - 5 mcg/kG/Min  Continuos reassessment of hemodynamics  Amiodarone for rate control  Continue Midodrine  [x] VTE ppx with Lovenox  [x] Statin   Serial EKG and cardiac enzymes     ***Pulmonary***  [x] Nasal Cannula 2L  Follow SpO2, CXR, blood gasses   Encourage incentive spirometry, continue pulse ox monitoring, follow ABGs     ***GI***  [x]  Diet:  Dash with Nepro TF nocturnal  [x] Protonix  Bowel regimen with Miralax and Senna.     ***Renal***  Continue to monitor I/Os, BUN/Creatinine.   Replete lytes PRN  HD yesterday  Allopurinol for gout    ***ID***  Cefazolin for cellulitis    ***Endocrine***  HbA1c 5.7%                - [x] Monitor glucose levels             - Need tight glycemic control to prevent wound infection.  [x] Hypothyroidism             - [x] Synthroid          Patient requires continuous monitoring with bedside rhythm monitoring, pulse oximetry monitoring, and continuous central venous and arterial pressure monitoring; and intermittent blood gas analysis. Care plan discussed with the ICU care team.   Patient remained critical, at risk for life threatening decompensation.    I have spent 30 minutes providing critical care management to this patient.    By signing my name below, ILeticia, attest that this documentation has been prepared under the direction and in the presence of SHARON Smith  Electronically signed: Home Mcfarland, 03-09-24 @ 09:27    I, SHARON Smith, personally performed the services described in this documentation. all medical record entries made by the scribe were at my direction and in my presence. I have reviewed the chart and agree that the record reflects my personal performance and is accurate and complete  Electronically signed: SHARON mSith

## 2024-03-10 LAB
ALBUMIN SERPL ELPH-MCNC: 4.2 G/DL — SIGNIFICANT CHANGE UP (ref 3.3–5)
ALP SERPL-CCNC: 192 U/L — HIGH (ref 40–120)
ALT FLD-CCNC: 17 U/L — SIGNIFICANT CHANGE UP (ref 10–45)
ANION GAP SERPL CALC-SCNC: 10 MMOL/L — SIGNIFICANT CHANGE UP (ref 5–17)
AST SERPL-CCNC: 38 U/L — SIGNIFICANT CHANGE UP (ref 10–40)
BASE EXCESS BLDV CALC-SCNC: 0.9 MMOL/L — SIGNIFICANT CHANGE UP (ref -2–3)
BASE EXCESS BLDV CALC-SCNC: 3 MMOL/L — SIGNIFICANT CHANGE UP (ref -2–3)
BILIRUB SERPL-MCNC: 1.8 MG/DL — HIGH (ref 0.2–1.2)
BUN SERPL-MCNC: 47 MG/DL — HIGH (ref 7–23)
CA-I SERPL-SCNC: 1.18 MMOL/L — SIGNIFICANT CHANGE UP (ref 1.15–1.33)
CALCIUM SERPL-MCNC: 9.3 MG/DL — SIGNIFICANT CHANGE UP (ref 8.4–10.5)
CHLORIDE BLDV-SCNC: 97 MMOL/L — SIGNIFICANT CHANGE UP (ref 96–108)
CHLORIDE SERPL-SCNC: 97 MMOL/L — SIGNIFICANT CHANGE UP (ref 96–108)
CO2 BLDV-SCNC: 27 MMOL/L — HIGH (ref 22–26)
CO2 BLDV-SCNC: 30 MMOL/L — HIGH (ref 22–26)
CO2 SERPL-SCNC: 26 MMOL/L — SIGNIFICANT CHANGE UP (ref 22–31)
CREAT SERPL-MCNC: 1.89 MG/DL — HIGH (ref 0.5–1.3)
CULTURE RESULTS: SIGNIFICANT CHANGE UP
EGFR: 35 ML/MIN/1.73M2 — LOW
GAS PNL BLDA: SIGNIFICANT CHANGE UP
GAS PNL BLDA: SIGNIFICANT CHANGE UP
GAS PNL BLDV: 132 MMOL/L — LOW (ref 136–145)
GAS PNL BLDV: SIGNIFICANT CHANGE UP
GAS PNL BLDV: SIGNIFICANT CHANGE UP
GLUCOSE BLDV-MCNC: 134 MG/DL — HIGH (ref 70–99)
GLUCOSE SERPL-MCNC: 141 MG/DL — HIGH (ref 70–99)
HCO3 BLDV-SCNC: 26 MMOL/L — SIGNIFICANT CHANGE UP (ref 22–29)
HCO3 BLDV-SCNC: 28 MMOL/L — SIGNIFICANT CHANGE UP (ref 22–29)
HCT VFR BLD CALC: 26.1 % — LOW (ref 39–50)
HCT VFR BLDA CALC: 27 % — LOW (ref 39–51)
HGB BLD CALC-MCNC: 8.9 G/DL — LOW (ref 12.6–17.4)
HGB BLD-MCNC: 8.6 G/DL — LOW (ref 13–17)
HOROWITZ INDEX BLDV+IHG-RTO: 40 — SIGNIFICANT CHANGE UP
HOROWITZ INDEX BLDV+IHG-RTO: 50 — SIGNIFICANT CHANGE UP
LACTATE BLDV-MCNC: 1.4 MMOL/L — SIGNIFICANT CHANGE UP (ref 0.5–2)
MAGNESIUM SERPL-MCNC: 2.3 MG/DL — SIGNIFICANT CHANGE UP (ref 1.6–2.6)
MCHC RBC-ENTMCNC: 32.1 PG — SIGNIFICANT CHANGE UP (ref 27–34)
MCHC RBC-ENTMCNC: 33 GM/DL — SIGNIFICANT CHANGE UP (ref 32–36)
MCV RBC AUTO: 97.4 FL — SIGNIFICANT CHANGE UP (ref 80–100)
NRBC # BLD: 0 /100 WBCS — SIGNIFICANT CHANGE UP (ref 0–0)
PCO2 BLDV: 43 MMHG — SIGNIFICANT CHANGE UP (ref 42–55)
PCO2 BLDV: 46 MMHG — SIGNIFICANT CHANGE UP (ref 42–55)
PH BLDV: 7.39 — SIGNIFICANT CHANGE UP (ref 7.32–7.43)
PH BLDV: 7.4 — SIGNIFICANT CHANGE UP (ref 7.32–7.43)
PHOSPHATE SERPL-MCNC: 2.3 MG/DL — LOW (ref 2.5–4.5)
PLATELET # BLD AUTO: 124 K/UL — LOW (ref 150–400)
PO2 BLDV: 35 MMHG — SIGNIFICANT CHANGE UP (ref 25–45)
PO2 BLDV: 36 MMHG — SIGNIFICANT CHANGE UP (ref 25–45)
POTASSIUM BLDV-SCNC: 3.9 MMOL/L — SIGNIFICANT CHANGE UP (ref 3.5–5.1)
POTASSIUM SERPL-MCNC: 3.9 MMOL/L — SIGNIFICANT CHANGE UP (ref 3.5–5.3)
POTASSIUM SERPL-SCNC: 3.9 MMOL/L — SIGNIFICANT CHANGE UP (ref 3.5–5.3)
PROT SERPL-MCNC: 6.7 G/DL — SIGNIFICANT CHANGE UP (ref 6–8.3)
RBC # BLD: 2.68 M/UL — LOW (ref 4.2–5.8)
RBC # FLD: 15.5 % — HIGH (ref 10.3–14.5)
SAO2 % BLDV: 61.6 % — LOW (ref 67–88)
SAO2 % BLDV: 63.3 % — LOW (ref 67–88)
SODIUM SERPL-SCNC: 133 MMOL/L — LOW (ref 135–145)
SPECIMEN SOURCE: SIGNIFICANT CHANGE UP
WBC # BLD: 8.81 K/UL — SIGNIFICANT CHANGE UP (ref 3.8–10.5)
WBC # FLD AUTO: 8.81 K/UL — SIGNIFICANT CHANGE UP (ref 3.8–10.5)

## 2024-03-10 PROCEDURE — 71045 X-RAY EXAM CHEST 1 VIEW: CPT | Mod: 26

## 2024-03-10 PROCEDURE — 99291 CRITICAL CARE FIRST HOUR: CPT

## 2024-03-10 PROCEDURE — 93971 EXTREMITY STUDY: CPT | Mod: 26,LT

## 2024-03-10 RX ORDER — ACETAMINOPHEN 500 MG
1000 TABLET ORAL ONCE
Refills: 0 | Status: COMPLETED | OUTPATIENT
Start: 2024-03-10 | End: 2024-03-10

## 2024-03-10 RX ORDER — FENTANYL CITRATE 50 UG/ML
50 INJECTION INTRAVENOUS ONCE
Refills: 0 | Status: DISCONTINUED | OUTPATIENT
Start: 2024-03-10 | End: 2024-03-10

## 2024-03-10 RX ORDER — LIDOCAINE 4 G/100G
1 CREAM TOPICAL DAILY
Refills: 0 | Status: DISCONTINUED | OUTPATIENT
Start: 2024-03-10 | End: 2024-03-24

## 2024-03-10 RX ORDER — MIDODRINE HYDROCHLORIDE 2.5 MG/1
20 TABLET ORAL EVERY 8 HOURS
Refills: 0 | Status: DISCONTINUED | OUTPATIENT
Start: 2024-03-10 | End: 2024-03-24

## 2024-03-10 RX ADMIN — Medication 10.2 MICROGRAM(S)/KG/MIN: at 07:57

## 2024-03-10 RX ADMIN — POLYETHYLENE GLYCOL 3350 17 GRAM(S): 17 POWDER, FOR SOLUTION ORAL at 12:52

## 2024-03-10 RX ADMIN — Medication 100 MILLIGRAM(S): at 17:21

## 2024-03-10 RX ADMIN — FENTANYL CITRATE 50 MICROGRAM(S): 50 INJECTION INTRAVENOUS at 21:33

## 2024-03-10 RX ADMIN — FENTANYL CITRATE 50 MICROGRAM(S): 50 INJECTION INTRAVENOUS at 21:48

## 2024-03-10 RX ADMIN — MIDODRINE HYDROCHLORIDE 20 MILLIGRAM(S): 2.5 TABLET ORAL at 21:34

## 2024-03-10 RX ADMIN — LIDOCAINE 1 PATCH: 4 CREAM TOPICAL at 16:04

## 2024-03-10 RX ADMIN — SENNA PLUS 2 TABLET(S): 8.6 TABLET ORAL at 21:33

## 2024-03-10 RX ADMIN — PANTOPRAZOLE SODIUM 40 MILLIGRAM(S): 20 TABLET, DELAYED RELEASE ORAL at 06:45

## 2024-03-10 RX ADMIN — Medication 500 MILLIGRAM(S): at 12:53

## 2024-03-10 RX ADMIN — AMIODARONE HYDROCHLORIDE 200 MILLIGRAM(S): 400 TABLET ORAL at 06:49

## 2024-03-10 RX ADMIN — ENOXAPARIN SODIUM 30 MILLIGRAM(S): 100 INJECTION SUBCUTANEOUS at 09:25

## 2024-03-10 RX ADMIN — CHLORHEXIDINE GLUCONATE 1 APPLICATION(S): 213 SOLUTION TOPICAL at 05:37

## 2024-03-10 RX ADMIN — Medication 63.75 MILLIMOLE(S): at 05:58

## 2024-03-10 RX ADMIN — Medication 400 MILLIGRAM(S): at 23:15

## 2024-03-10 RX ADMIN — MIDODRINE HYDROCHLORIDE 20 MILLIGRAM(S): 2.5 TABLET ORAL at 13:02

## 2024-03-10 RX ADMIN — Medication 100 MILLIGRAM(S): at 12:53

## 2024-03-10 RX ADMIN — ENOXAPARIN SODIUM 30 MILLIGRAM(S): 100 INJECTION SUBCUTANEOUS at 20:49

## 2024-03-10 RX ADMIN — Medication 1000 MILLIGRAM(S): at 23:30

## 2024-03-10 RX ADMIN — SODIUM CHLORIDE 3 MILLILITER(S): 9 INJECTION INTRAMUSCULAR; INTRAVENOUS; SUBCUTANEOUS at 22:01

## 2024-03-10 RX ADMIN — MIDODRINE HYDROCHLORIDE 20 MILLIGRAM(S): 2.5 TABLET ORAL at 06:45

## 2024-03-10 RX ADMIN — PREGABALIN 1000 MICROGRAM(S): 225 CAPSULE ORAL at 12:53

## 2024-03-10 RX ADMIN — SODIUM CHLORIDE 3 MILLILITER(S): 9 INJECTION INTRAMUSCULAR; INTRAVENOUS; SUBCUTANEOUS at 05:36

## 2024-03-10 RX ADMIN — AMIODARONE HYDROCHLORIDE 200 MILLIGRAM(S): 400 TABLET ORAL at 17:21

## 2024-03-10 RX ADMIN — Medication 10.2 MICROGRAM(S)/KG/MIN: at 19:21

## 2024-03-10 RX ADMIN — SODIUM CHLORIDE 3 MILLILITER(S): 9 INJECTION INTRAMUSCULAR; INTRAVENOUS; SUBCUTANEOUS at 12:50

## 2024-03-10 RX ADMIN — ATORVASTATIN CALCIUM 40 MILLIGRAM(S): 80 TABLET, FILM COATED ORAL at 21:34

## 2024-03-10 RX ADMIN — VASOPRESSIN 6 UNIT(S)/MIN: 20 INJECTION INTRAVENOUS at 07:57

## 2024-03-10 RX ADMIN — Medication 5 MILLIGRAM(S): at 21:34

## 2024-03-10 RX ADMIN — LIDOCAINE 1 PATCH: 4 CREAM TOPICAL at 20:01

## 2024-03-10 RX ADMIN — VASOPRESSIN 6 UNIT(S)/MIN: 20 INJECTION INTRAVENOUS at 19:21

## 2024-03-10 RX ADMIN — Medication 1 MILLIGRAM(S): at 12:53

## 2024-03-10 RX ADMIN — Medication 75 MICROGRAM(S): at 06:45

## 2024-03-10 NOTE — PROGRESS NOTE ADULT - ASSESSMENT
82 y/o M with ICM/HFrEF (now 30%; LVIDd 6.7 cm; prev req inotrope), CAD c/b IWMI (1994) s/p angioplasty, aortic stenosis s/p bio-AVR 2004, VT arrest (2017) s/p ICD, Afib s/p multiple DCCV and ablation x2 (2020 and 2023; on AC), Aflutter s/p WARREN/DCCV (8/23), prior Ao aneurysm s/p Bentall (2021), severe MR prior MVr (2021) with MAC (precludes M-KRISTIE d/t his anatomy; turned down for TMVR trials by [employer]), atrophic kidney with CKD (b/l Cr 2.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 Patient was initiated on bumex gtt with gradual improvement with Cr improving from 3.6 to 3.1. Patient was considered for MV re-op; however, patient was reluctant of the procedure and expressed wanting to follow up in the outpatient setting to schedule. During last admission, patient was also noted to be in flutter, EP was consulted, and patient underwent repeat WARREN/DCCV , on amiodarone. Patient was discharged home 2/28 on higher dose of diuretics, as per HF team. Today, patient was evaluated at CTS office four routine follow-up, pt c/o worsening shortness of breath and L>R LE edema, with redness/swelling on left dorsal aspect of the foot, readmitted to CTS service for further management.  noticed with rising creatinine and bun      1- SURESH on ckd   2- decompensated CHF  3- Mitral regurgitation   4- hypotension   5- hypothyroid   6- hyperuricemia   7- anemia       initially planned for hd today however lytes in range, hypotension  and fluid status improving will hold hd today   cont dobutamine drip   midodrine 15 mg tid   cont vasopressin drip    CRRT  dfr 1200 50 cc hr fluid removal   see new orders   retacrit [id number] U tiw as of am   allopurinol 100 mg daily   no blood work RUE  strict I/O  d/w CTS team when seen earlier

## 2024-03-10 NOTE — PROGRESS NOTE ADULT - SUBJECTIVE AND OBJECTIVE BOX
Patient seen and examined at the bedside.    Remained critically ill on continuous ICU monitoring.    OBJECTIVE:  Vital Signs Last 24 Hrs  T(C): 36.4 (10 Mar 2024 08:00), Max: 36.6 (09 Mar 2024 12:00)  T(F): 97.5 (10 Mar 2024 08:00), Max: 97.8 (09 Mar 2024 12:00)  HR: 69 (10 Mar 2024 08:00) (69 - 92)  BP: --  BP(mean): --  RR: 19 (10 Mar 2024 08:00) (13 - 38)  SpO2: 100% (10 Mar 2024 08:00) (81% - 100%)    Parameters below as of 10 Mar 2024 08:00  Patient On (Oxygen Delivery Method): hi flow  O2 Flow (L/min): 50  O2 Concentration (%): 50      Physical Exam:   General: NAD   Neurology: nonfocal   Eyes: bilateral pupils equal and reactive   ENT/Neck: Neck supple, trachea midline, No JVD   Respiratory: Clear bilaterally   CV: S1S2, no murmurs        [x] Sternal dressing        [x] Sinus rhythm, [x] AICD  Abdominal: Soft, NT, ND +BS   Extremities: 1-2+ pedal edema noted, + peripheral pulses   Skin: No Rashes, Hematoma, Ecchymosis                           Assessment:  82 y/o M with ICM/HFrEF (now 30%; LVIDd 6.7 cm; prev req inotrope), CAD c/b IWMI (1994) s/p angioplasty, aortic stenosis s/p bio-AVR 2004, VT arrest (2017) s/p ICD, Afib s/p multiple DCCV and ablation x2 (2020 and 2023; on AC), Aflutter s/p WARREN/DCCV (8/23), prior Ao aneurysm s/p Bentall (2021), severe MR prior MVr (2021) with MAC (precludes M-KRISTIE d/t his anatomy; turned down for TMVR trials by Alvarez), atrophic kidney with CKD (b/l Cr 2.5-3). Was admitted on 2/20 with acute on chronic heart failure and acute on chronic kidney dysfunction, with Heart Failure and Renal teams following. Patient was considered for MV re-op; however, patient was reluctant of the procedure and expressed wanting to follow up in the outpatient setting to schedule.    Aortic root aneurysm, H/O of AVR, and MVR s/p Repeat Sternotomy, Bentall Procedure, and Mitral Valve Repair with WARREN on 6/2/21  Aortic stenosis  Hemodynamic instability  Hypovolemia  Post op respiratory insufficiency  Acute blood loss anemia  Thrombocytopenia    Plan:   ***Neuro***  [x] Nonfocal   Continue with Melatonin for sleep regimen     ***Cardiovascular***  Invasive hemodynamic monitoring, assess perfusion indices   SR / CVP 22 / MAP 69 / Hct 26.1% / Lactate 1.0  [x] Vasopressin - 0.04 Unit(s)/Min [x] Dobutamine - 5 mcg/kG/Min  Continuous reassessment of hemodynamics  PO Amiodarone for rate control  Continue Midodrine  [x] VTE ppx with Lovenox  [x] Statin nightly  Serial EKG and cardiac enzymes     ***Pulmonary***  [x] HFO2 50L/50%  Follow SpO2, CXR, blood gasses   Encourage incentive spirometry, continue pulse ox monitoring, follow ABGs     ***GI***  [x] Dash diet with Nepro TF nocturnal @ goal 40 mL/hr  [x] Protonix for stress ulcer ppx  Bowel regimen with Miralax and Senna.     ***Renal***  Continue to monitor I/Os, BUN/Creatinine.   Replete lytes PRN  HD yesterday  Allopurinol for gout  Renal support with RETACRIT     ***ID***  Cefazolin for cellulitis    ***Endocrine***  HbA1c 5.7%                - [x] Monitor glucose levels             - Need tight glycemic control to prevent wound infection.  [x] Hypothyroidism             - [x] Synthroid                    Patient requires continuous monitoring with bedside rhythm monitoring, pulse oximetry monitoring, and continuous central venous and arterial pressure monitoring; and intermittent blood gas analysis. Care plan discussed with the ICU care team.   Patient remained critical, at risk for life threatening decompensation.    I have spent 30 minutes providing critical care management to this patient.    By signing my name below, I, Oh Thomas, attest that this documentation has been prepared under the direction and in the presence of SHARON Keys   Electronically signed: Home Tang, 03-10-24 @ 08:46    I, Tree Pool, personally performed the services described in this documentation. all medical record entries made by the scribe were at my direction and in my presence. I have reviewed the chart and agree that the record reflects my personal performance and is accurate and complete  Electronically signed: SHARON Keys  Patient seen and examined at the bedside.    Remained critically ill on continuous ICU monitoring.    Today:  - Continue Dobutamine drip for acute on chronic systolic heart failure and cardiogenic shock  - Continue Vasopressin drip for vasogenic shock and cardiogenic shock  - Patient is depressed about hospital stay and future course, wife is also concerned and request Psychiatry consult and intervention. Psychiatry was contacted and is aware, they stated that the patient would be seen tomorrow  - Midodrine to wean off vasopressors  - HFNC for hypoxemic respiratory failure likely secondary to acute pulmonary edema, wean as tolerated  - CVVH for acute renal failure and volume removal for hypervolemia  - Ambulated    OBJECTIVE:  Vital Signs Last 24 Hrs  T(C): 36.4 (10 Mar 2024 08:00), Max: 36.6 (09 Mar 2024 12:00)  T(F): 97.5 (10 Mar 2024 08:00), Max: 97.8 (09 Mar 2024 12:00)  HR: 69 (10 Mar 2024 08:00) (69 - 92)  BP: --  BP(mean): --  RR: 19 (10 Mar 2024 08:00) (13 - 38)  SpO2: 100% (10 Mar 2024 08:00) (81% - 100%)    Parameters below as of 10 Mar 2024 08:00  Patient On (Oxygen Delivery Method): hi flow  O2 Flow (L/min): 50  O2 Concentration (%): 50      Physical Exam:   General: NAD, OOBTC  Neurology: nonfocal, AAO x3  Eyes: bilateral pupils equal and reactive   ENT/Neck: Neck supple, trachea midline, RIJ HDC and LIJ CVC in place without bleeding  Respiratory: Crackles bilaterally   CV: S1S2, no murmurs        [x] Sternal dressing        [x] Sinus rhythm, [x] AICD  Abdominal: Soft, NT, ND +BS   Extremities: 1-2+ pedal edema noted, + peripheral pulses   Skin: No Rashes, Hematoma, Ecchymosis                           Assessment:  84 y/o M with ICM/HFrEF (now 30%; LVIDd 6.7 cm; prev req inotrope), CAD c/b IWMI (1994) s/p angioplasty, aortic stenosis s/p bio-AVR 2004, VT arrest (2017) s/p ICD, Afib s/p multiple DCCV and ablation x2 (2020 and 2023; on AC), Aflutter s/p WARREN/DCCV (8/23), prior Ao aneurysm s/p Bentall (2021), severe MR prior MVr (2021) with MAC (precludes M-KRISTIE d/t his anatomy; turned down for TMVR trials by Alvarez), atrophic kidney with CKD (b/l Cr 2.5-3). Was admitted on 2/20 with acute on chronic heart failure and acute on chronic kidney dysfunction, with Heart Failure and Renal teams following. Patient was considered for MV re-op; however, patient was reluctant of the procedure and expressed wanting to follow up in the outpatient setting to schedule.    Aortic root aneurysm, H/O of AVR, and MVR s/p Repeat Sternotomy, Bentall Procedure, and Mitral Valve Repair with WARREN on 6/2/21  Hemodynamic instability  Hypervolemia  Vasogenic shock  Cardiogenic shock  Acute hypoxemic respiratory failure  Acute pulmonary edema  Acute renal failure  Acute on chronic systolic heart failure    Plan:   ***Neuro***  [x] Nonfocal   Continue with Melatonin for sleep regimen     ***Cardiovascular***  Invasive hemodynamic monitoring, assess perfusion indices   SR / CVP 22 / MAP 69 / Hct 26.1% / Lactate 1.0  [x] Vasopressin - 0.04 Unit(s)/Min [x] Dobutamine - 5 mcg/kG/Min  Continuous reassessment of hemodynamics  PO Amiodarone for rate control  Continue Midodrine  [x] VTE ppx with Lovenox  [x] Statin nightly  Serial EKG and cardiac enzymes     ***Pulmonary***  [x] HFO2 50L/50%  Follow SpO2, CXR, blood gasses   Encourage incentive spirometry, continue pulse ox monitoring, follow ABGs     ***GI***  [x] Dash diet  [x] Protonix for stress ulcer ppx  Bowel regimen with Miralax and Senna.     ***Renal***  Continue to monitor I/Os, BUN/Creatinine.   Replete lytes PRN  CVVH for volume removal and clearance  Allopurinol for gout  Renal support with RETACRIT     ***ID***  Cefazolin for cellulitis    ***Endocrine***  HbA1c 5.7%                - [x] Monitor glucose levels             - Need tight glycemic control to prevent wound infection.  [x] Hypothyroidism             - [x] Synthroid          Patient requires continuous monitoring with bedside rhythm monitoring, pulse oximetry monitoring, and continuous central venous and arterial pressure monitoring; and intermittent blood gas analysis. Care plan discussed with the ICU care team.   Patient remained critical, at risk for life threatening decompensation.    I have spent 60 minutes providing critical care management to this patient.    By signing my name below, I, Oh Thomas, attest that this documentation has been prepared under the direction and in the presence of SHARON Keys   Electronically signed: Home Tang, 03-10-24 @ 08:46    I, Tree Pool, personally performed the services described in this documentation. all medical record entries made by the buddyibe were at my direction and in my presence. I have reviewed the chart and agree that the record reflects my personal performance and is accurate and complete  Electronically signed: SHARON Keys

## 2024-03-10 NOTE — PROGRESS NOTE ADULT - SUBJECTIVE AND OBJECTIVE BOX
[facility]   [phone]  RENAL FOLLOW UP NOTE  --------------------------------------------------------------------------------  Chief Complaint:    24 hour events/subjective:    seen earlier   on high flow O2     PAST HISTORY  --------------------------------------------------------------------------------  No significant changes to PMH, PSH, FHx, SHx, unless otherwise noted    ALLERGIES & MEDICATIONS  --------------------------------------------------------------------------------  Allergies    No Known Allergies    Intolerances      Standing Inpatient Medications  allopurinol 100 milliGRAM(s) Oral daily  aMIOdarone    Tablet 200 milliGRAM(s) Oral two times a day  ascorbic acid 500 milliGRAM(s) Oral daily  atorvastatin 40 milliGRAM(s) Oral at bedtime  chlorhexidine 2% Cloths 1 Application(s) Topical <User Schedule>  CRRT Treatment    <Continuous>  cyanocobalamin 1000 MICROGram(s) Oral daily  DOBUTamine Infusion 5 MICROgram(s)/kG/Min IV Continuous <Continuous>  enoxaparin Injectable 30 milliGRAM(s) SubCutaneous every 12 hours  epoetin zara-epbx (RETACRIT) Injectable 65451 Unit(s) IV Push once  folic acid 1 milliGRAM(s) Oral daily  levothyroxine 75 MICROGram(s) Oral daily  lidocaine   4% Patch 1 Patch Transdermal daily  melatonin 5 milliGRAM(s) Oral at bedtime  midodrine 20 milliGRAM(s) Oral every 8 hours  pantoprazole    Tablet 40 milliGRAM(s) Oral before breakfast  polyethylene glycol 3350 17 Gram(s) Oral daily  PrismaSATE Dialysate BGK 4 / 2.5 5000 milliLiter(s) CRRT <Continuous>  PrismaSOL Filtration BGK 0 / 2.5 5000 milliLiter(s) CRRT <Continuous>  PrismaSOL Filtration BGK 4 / 2.5 5000 milliLiter(s) CRRT <Continuous>  senna 2 Tablet(s) Oral at bedtime  sodium chloride 0.9% lock flush 3 milliLiter(s) IV Push every 8 hours  thiamine 100 milliGRAM(s) Oral daily  vasopressin Infusion 0.04 Unit(s)/Min IV Continuous <Continuous>    PRN Inpatient Medications      REVIEW OF SYSTEMS  --------------------------------------------------------------------------------    Gen: denies  fevers/chills,  CVS: denies chest pain/palpitations  Resp:  +  SOB/Cough  GI: Denies N/V/Abd pain  : Denies dysuria    VITALS/PHYSICAL EXAM  --------------------------------------------------------------------------------  T(C): 37.1 (03-10-24 @ 20:00), Max: 37.1 (03-10-24 @ 20:00)  HR: 75 (03-10-24 @ 21:00) (69 - 93)  BP: --  RR: 17 (03-10-24 @ 21:00) (13 - 40)  SpO2: 100% (03-10-24 @ 21:00) (80% - 100%)  Wt(kg): --        03-09-24 @ 06:01  -  03-10-24 @ 07:00  --------------------------------------------------------  IN: 1241.3 mL / OUT: 1346 mL / NET: -104.7 mL    03-10-24 @ 07:01  -  03-10-24 @ 21:47  --------------------------------------------------------  IN: 2185.3 mL / OUT: 2969 mL / NET: -783.7 mL      Physical Exam:  	        Gen:  on high flow O2  	 +  jvd  	Pulm: decrease bs  no rales or ronchi or wheezing  	CV: RRR, S1S2; no rub  	Abd: +BS, soft, nontender/nondistended  	: No suprapubic tenderness  	UE: Warm, no cyanosis  no clubbing,  no edema  	LE: Warm, no cyanosis  no clubbing, 1+   edema  	Neuro: alert and oriented. speech coherent       LABS/STUDIES  --------------------------------------------------------------------------------              8.6    8.81  >-----------<  124      [03-10-24 @ 04:03]              26.1     133  |  97  |  47  ----------------------------<  141      [03-10-24 @ 04:03]  3.9   |  26  |  1.89        Ca     9.3     [03-10-24 @ 04:03]      Mg     2.3     [03-10-24 @ 04:03]      Phos  2.3     [03-10-24 @ 04:03]    TPro  6.7  /  Alb  4.2  /  TBili  1.8  /  DBili  x   /  AST  38  /  ALT  17  /  AlkPhos  192  [03-10-24 @ 04:03]    PT/INR: PT 14.2 , INR 1.36       [03-09-24 @ 05:22]  PTT: 33.0       [03-09-24 @ 05:22]      Creatinine Trend:  SCr 1.89 [03-10 @ 04:03]  SCr 2.53 [03-09 @ 00:33]  SCr 1.86 [03-08 @ 00:34]  SCr 2.30 [03-07 @ 00:25]  SCr 3.49 [03-06 @ 01:34]              Iron 290, TIBC 430, %sat 67      [03-06-24 @ 13:57]  Ferritin 335      [03-06-24 @ 13:57]  PTH -- (Ca 8.9)      [03-05-24 @ 13:03]   242  TSH 2.02      [03-06-24 @ 13:57]

## 2024-03-11 ENCOUNTER — RESULT REVIEW (OUTPATIENT)
Age: 84
End: 2024-03-11

## 2024-03-11 LAB
ALBUMIN SERPL ELPH-MCNC: 4.2 G/DL — SIGNIFICANT CHANGE UP (ref 3.3–5)
ALBUMIN SERPL ELPH-MCNC: 4.3 G/DL — SIGNIFICANT CHANGE UP (ref 3.3–5)
ALP SERPL-CCNC: 197 U/L — HIGH (ref 40–120)
ALP SERPL-CCNC: 227 U/L — HIGH (ref 40–120)
ALT FLD-CCNC: 18 U/L — SIGNIFICANT CHANGE UP (ref 10–45)
ALT FLD-CCNC: 19 U/L — SIGNIFICANT CHANGE UP (ref 10–45)
ANION GAP SERPL CALC-SCNC: 11 MMOL/L — SIGNIFICANT CHANGE UP (ref 5–17)
ANION GAP SERPL CALC-SCNC: 14 MMOL/L — SIGNIFICANT CHANGE UP (ref 5–17)
APTT BLD: 35.5 SEC — SIGNIFICANT CHANGE UP (ref 24.5–35.6)
AST SERPL-CCNC: 40 U/L — SIGNIFICANT CHANGE UP (ref 10–40)
AST SERPL-CCNC: 44 U/L — HIGH (ref 10–40)
BASE EXCESS BLDV CALC-SCNC: -0.2 MMOL/L — SIGNIFICANT CHANGE UP (ref -2–3)
BASE EXCESS BLDV CALC-SCNC: 0.7 MMOL/L — SIGNIFICANT CHANGE UP (ref -2–3)
BASE EXCESS BLDV CALC-SCNC: 1.1 MMOL/L — SIGNIFICANT CHANGE UP (ref -2–3)
BASE EXCESS BLDV CALC-SCNC: 1.5 MMOL/L — SIGNIFICANT CHANGE UP (ref -2–3)
BASE EXCESS BLDV CALC-SCNC: 2 MMOL/L — SIGNIFICANT CHANGE UP (ref -2–3)
BASE EXCESS BLDV CALC-SCNC: 3.3 MMOL/L — HIGH (ref -2–3)
BILIRUB SERPL-MCNC: 2.2 MG/DL — HIGH (ref 0.2–1.2)
BILIRUB SERPL-MCNC: 2.2 MG/DL — HIGH (ref 0.2–1.2)
BUN SERPL-MCNC: 29 MG/DL — HIGH (ref 7–23)
BUN SERPL-MCNC: 32 MG/DL — HIGH (ref 7–23)
CA-I SERPL-SCNC: 1.19 MMOL/L — SIGNIFICANT CHANGE UP (ref 1.15–1.33)
CA-I SERPL-SCNC: 1.21 MMOL/L — SIGNIFICANT CHANGE UP (ref 1.15–1.33)
CA-I SERPL-SCNC: 1.22 MMOL/L — SIGNIFICANT CHANGE UP (ref 1.15–1.33)
CA-I SERPL-SCNC: 1.24 MMOL/L — SIGNIFICANT CHANGE UP (ref 1.15–1.33)
CALCIUM SERPL-MCNC: 8.9 MG/DL — SIGNIFICANT CHANGE UP (ref 8.4–10.5)
CALCIUM SERPL-MCNC: 9 MG/DL — SIGNIFICANT CHANGE UP (ref 8.4–10.5)
CHLORIDE BLDV-SCNC: 100 MMOL/L — SIGNIFICANT CHANGE UP (ref 96–108)
CHLORIDE BLDV-SCNC: 100 MMOL/L — SIGNIFICANT CHANGE UP (ref 96–108)
CHLORIDE BLDV-SCNC: 101 MMOL/L — SIGNIFICANT CHANGE UP (ref 96–108)
CHLORIDE BLDV-SCNC: 102 MMOL/L — SIGNIFICANT CHANGE UP (ref 96–108)
CHLORIDE BLDV-SCNC: 99 MMOL/L — SIGNIFICANT CHANGE UP (ref 96–108)
CHLORIDE BLDV-SCNC: 99 MMOL/L — SIGNIFICANT CHANGE UP (ref 96–108)
CHLORIDE SERPL-SCNC: 99 MMOL/L — SIGNIFICANT CHANGE UP (ref 96–108)
CHLORIDE SERPL-SCNC: 99 MMOL/L — SIGNIFICANT CHANGE UP (ref 96–108)
CO2 BLDV-SCNC: 27 MMOL/L — HIGH (ref 22–26)
CO2 BLDV-SCNC: 27 MMOL/L — HIGH (ref 22–26)
CO2 BLDV-SCNC: 28 MMOL/L — HIGH (ref 22–26)
CO2 BLDV-SCNC: 28 MMOL/L — HIGH (ref 22–26)
CO2 BLDV-SCNC: 29 MMOL/L — HIGH (ref 22–26)
CO2 BLDV-SCNC: 30 MMOL/L — HIGH (ref 22–26)
CO2 SERPL-SCNC: 22 MMOL/L — SIGNIFICANT CHANGE UP (ref 22–31)
CO2 SERPL-SCNC: 25 MMOL/L — SIGNIFICANT CHANGE UP (ref 22–31)
CREAT SERPL-MCNC: 1.33 MG/DL — HIGH (ref 0.5–1.3)
CREAT SERPL-MCNC: 1.52 MG/DL — HIGH (ref 0.5–1.3)
EGFR: 45 ML/MIN/1.73M2 — LOW
EGFR: 53 ML/MIN/1.73M2 — LOW
FLUAV AG NPH QL: SIGNIFICANT CHANGE UP
FLUBV AG NPH QL: SIGNIFICANT CHANGE UP
GAS PNL BLDA: SIGNIFICANT CHANGE UP
GAS PNL BLDV: 130 MMOL/L — LOW (ref 136–145)
GAS PNL BLDV: 131 MMOL/L — LOW (ref 136–145)
GAS PNL BLDV: 132 MMOL/L — LOW (ref 136–145)
GAS PNL BLDV: 133 MMOL/L — LOW (ref 136–145)
GAS PNL BLDV: 133 MMOL/L — LOW (ref 136–145)
GAS PNL BLDV: 134 MMOL/L — LOW (ref 136–145)
GAS PNL BLDV: SIGNIFICANT CHANGE UP
GLUCOSE BLDV-MCNC: 111 MG/DL — HIGH (ref 70–99)
GLUCOSE BLDV-MCNC: 113 MG/DL — HIGH (ref 70–99)
GLUCOSE BLDV-MCNC: 119 MG/DL — HIGH (ref 70–99)
GLUCOSE BLDV-MCNC: 133 MG/DL — HIGH (ref 70–99)
GLUCOSE BLDV-MCNC: 138 MG/DL — HIGH (ref 70–99)
GLUCOSE BLDV-MCNC: 148 MG/DL — HIGH (ref 70–99)
GLUCOSE SERPL-MCNC: 128 MG/DL — HIGH (ref 70–99)
GLUCOSE SERPL-MCNC: 164 MG/DL — HIGH (ref 70–99)
HCO3 BLDV-SCNC: 25 MMOL/L — SIGNIFICANT CHANGE UP (ref 22–29)
HCO3 BLDV-SCNC: 26 MMOL/L — SIGNIFICANT CHANGE UP (ref 22–29)
HCO3 BLDV-SCNC: 27 MMOL/L — SIGNIFICANT CHANGE UP (ref 22–29)
HCO3 BLDV-SCNC: 27 MMOL/L — SIGNIFICANT CHANGE UP (ref 22–29)
HCO3 BLDV-SCNC: 28 MMOL/L — SIGNIFICANT CHANGE UP (ref 22–29)
HCO3 BLDV-SCNC: 28 MMOL/L — SIGNIFICANT CHANGE UP (ref 22–29)
HCT VFR BLD CALC: 26.4 % — LOW (ref 39–50)
HCT VFR BLD CALC: 26.6 % — LOW (ref 39–50)
HCT VFR BLDA CALC: 25 % — LOW (ref 39–51)
HCT VFR BLDA CALC: 26 % — LOW (ref 39–51)
HGB BLD CALC-MCNC: 8.4 G/DL — LOW (ref 12.6–17.4)
HGB BLD CALC-MCNC: 8.5 G/DL — LOW (ref 12.6–17.4)
HGB BLD CALC-MCNC: 8.6 G/DL — LOW (ref 12.6–17.4)
HGB BLD CALC-MCNC: 8.6 G/DL — LOW (ref 12.6–17.4)
HGB BLD CALC-MCNC: 8.7 G/DL — LOW (ref 12.6–17.4)
HGB BLD CALC-MCNC: 8.7 G/DL — LOW (ref 12.6–17.4)
HGB BLD-MCNC: 8.4 G/DL — LOW (ref 13–17)
HGB BLD-MCNC: 8.5 G/DL — LOW (ref 13–17)
HOROWITZ INDEX BLDV+IHG-RTO: 40 — SIGNIFICANT CHANGE UP
HOROWITZ INDEX BLDV+IHG-RTO: 40 — SIGNIFICANT CHANGE UP
HOROWITZ INDEX BLDV+IHG-RTO: 50 — SIGNIFICANT CHANGE UP
HOROWITZ INDEX BLDV+IHG-RTO: 50 — SIGNIFICANT CHANGE UP
HOROWITZ INDEX BLDV+IHG-RTO: 80 — SIGNIFICANT CHANGE UP
HOROWITZ INDEX BLDV+IHG-RTO: 80 — SIGNIFICANT CHANGE UP
INR BLD: 1.35 RATIO — HIGH (ref 0.85–1.18)
LACTATE BLDV-MCNC: 0.7 MMOL/L — SIGNIFICANT CHANGE UP (ref 0.5–2)
LACTATE BLDV-MCNC: 0.9 MMOL/L — SIGNIFICANT CHANGE UP (ref 0.5–2)
LACTATE BLDV-MCNC: 0.9 MMOL/L — SIGNIFICANT CHANGE UP (ref 0.5–2)
LACTATE BLDV-MCNC: 1.1 MMOL/L — SIGNIFICANT CHANGE UP (ref 0.5–2)
LACTATE BLDV-MCNC: 1.1 MMOL/L — SIGNIFICANT CHANGE UP (ref 0.5–2)
LACTATE BLDV-MCNC: 1.6 MMOL/L — SIGNIFICANT CHANGE UP (ref 0.5–2)
MAGNESIUM SERPL-MCNC: 2.5 MG/DL — SIGNIFICANT CHANGE UP (ref 1.6–2.6)
MCHC RBC-ENTMCNC: 31.6 PG — SIGNIFICANT CHANGE UP (ref 27–34)
MCHC RBC-ENTMCNC: 31.8 GM/DL — LOW (ref 32–36)
MCHC RBC-ENTMCNC: 31.8 PG — SIGNIFICANT CHANGE UP (ref 27–34)
MCHC RBC-ENTMCNC: 32 GM/DL — SIGNIFICANT CHANGE UP (ref 32–36)
MCV RBC AUTO: 99.2 FL — SIGNIFICANT CHANGE UP (ref 80–100)
MCV RBC AUTO: 99.6 FL — SIGNIFICANT CHANGE UP (ref 80–100)
NRBC # BLD: 0 /100 WBCS — SIGNIFICANT CHANGE UP (ref 0–0)
NRBC # BLD: 0 /100 WBCS — SIGNIFICANT CHANGE UP (ref 0–0)
OTHER CELLS CSF MANUAL: 8 ML/DL — LOW (ref 18–22)
PCO2 BLDV: 43 MMHG — SIGNIFICANT CHANGE UP (ref 42–55)
PCO2 BLDV: 44 MMHG — SIGNIFICANT CHANGE UP (ref 42–55)
PCO2 BLDV: 45 MMHG — SIGNIFICANT CHANGE UP (ref 42–55)
PCO2 BLDV: 46 MMHG — SIGNIFICANT CHANGE UP (ref 42–55)
PCO2 BLDV: 46 MMHG — SIGNIFICANT CHANGE UP (ref 42–55)
PCO2 BLDV: 48 MMHG — SIGNIFICANT CHANGE UP (ref 42–55)
PH BLDV: 7.36 — SIGNIFICANT CHANGE UP (ref 7.32–7.43)
PH BLDV: 7.37 — SIGNIFICANT CHANGE UP (ref 7.32–7.43)
PH BLDV: 7.37 — SIGNIFICANT CHANGE UP (ref 7.32–7.43)
PH BLDV: 7.38 — SIGNIFICANT CHANGE UP (ref 7.32–7.43)
PH BLDV: 7.4 — SIGNIFICANT CHANGE UP (ref 7.32–7.43)
PH BLDV: 7.4 — SIGNIFICANT CHANGE UP (ref 7.32–7.43)
PHOSPHATE SERPL-MCNC: 2.4 MG/DL — LOW (ref 2.5–4.5)
PLATELET # BLD AUTO: 124 K/UL — LOW (ref 150–400)
PLATELET # BLD AUTO: 126 K/UL — LOW (ref 150–400)
PO2 BLDV: 26 MMHG — SIGNIFICANT CHANGE UP (ref 25–45)
PO2 BLDV: 30 MMHG — SIGNIFICANT CHANGE UP (ref 25–45)
PO2 BLDV: 31 MMHG — SIGNIFICANT CHANGE UP (ref 25–45)
PO2 BLDV: 35 MMHG — SIGNIFICANT CHANGE UP (ref 25–45)
PO2 BLDV: 36 MMHG — SIGNIFICANT CHANGE UP (ref 25–45)
PO2 BLDV: 37 MMHG — SIGNIFICANT CHANGE UP (ref 25–45)
POTASSIUM BLDV-SCNC: 4.1 MMOL/L — SIGNIFICANT CHANGE UP (ref 3.5–5.1)
POTASSIUM BLDV-SCNC: 4.2 MMOL/L — SIGNIFICANT CHANGE UP (ref 3.5–5.1)
POTASSIUM BLDV-SCNC: 4.3 MMOL/L — SIGNIFICANT CHANGE UP (ref 3.5–5.1)
POTASSIUM BLDV-SCNC: 4.3 MMOL/L — SIGNIFICANT CHANGE UP (ref 3.5–5.1)
POTASSIUM SERPL-MCNC: 4.1 MMOL/L — SIGNIFICANT CHANGE UP (ref 3.5–5.3)
POTASSIUM SERPL-MCNC: 4.2 MMOL/L — SIGNIFICANT CHANGE UP (ref 3.5–5.3)
POTASSIUM SERPL-SCNC: 4.1 MMOL/L — SIGNIFICANT CHANGE UP (ref 3.5–5.3)
POTASSIUM SERPL-SCNC: 4.2 MMOL/L — SIGNIFICANT CHANGE UP (ref 3.5–5.3)
PROT SERPL-MCNC: 7.1 G/DL — SIGNIFICANT CHANGE UP (ref 6–8.3)
PROT SERPL-MCNC: 7.1 G/DL — SIGNIFICANT CHANGE UP (ref 6–8.3)
PROTHROM AB SERPL-ACNC: 14.1 SEC — HIGH (ref 9.5–13)
RAPID RVP RESULT: SIGNIFICANT CHANGE UP
RBC # BLD: 2.66 M/UL — LOW (ref 4.2–5.8)
RBC # BLD: 2.67 M/UL — LOW (ref 4.2–5.8)
RBC # FLD: 15.6 % — HIGH (ref 10.3–14.5)
RBC # FLD: 15.9 % — HIGH (ref 10.3–14.5)
RSV RNA NPH QL NAA+NON-PROBE: SIGNIFICANT CHANGE UP
SAO2 % BLDV: 46.6 % — LOW (ref 67–88)
SAO2 % BLDV: 54.1 % — LOW (ref 67–88)
SAO2 % BLDV: 54.8 % — LOW (ref 67–88)
SAO2 % BLDV: 59 % — LOW (ref 67–88)
SAO2 % BLDV: 63.8 % — LOW (ref 67–88)
SAO2 % BLDV: 64.9 % — LOW (ref 67–88)
SARS-COV-2 RNA SPEC QL NAA+PROBE: SIGNIFICANT CHANGE UP
SARS-COV-2 RNA SPEC QL NAA+PROBE: SIGNIFICANT CHANGE UP
SODIUM SERPL-SCNC: 135 MMOL/L — SIGNIFICANT CHANGE UP (ref 135–145)
SODIUM SERPL-SCNC: 135 MMOL/L — SIGNIFICANT CHANGE UP (ref 135–145)
WBC # BLD: 10.57 K/UL — HIGH (ref 3.8–10.5)
WBC # BLD: 11.94 K/UL — HIGH (ref 3.8–10.5)
WBC # FLD AUTO: 10.57 K/UL — HIGH (ref 3.8–10.5)
WBC # FLD AUTO: 11.94 K/UL — HIGH (ref 3.8–10.5)

## 2024-03-11 PROCEDURE — 99292 CRITICAL CARE ADDL 30 MIN: CPT | Mod: FS

## 2024-03-11 PROCEDURE — 93321 DOPPLER ECHO F-UP/LMTD STD: CPT | Mod: 26

## 2024-03-11 PROCEDURE — 71045 X-RAY EXAM CHEST 1 VIEW: CPT | Mod: 26

## 2024-03-11 PROCEDURE — 93308 TTE F-UP OR LMTD: CPT | Mod: 26

## 2024-03-11 PROCEDURE — 99291 CRITICAL CARE FIRST HOUR: CPT

## 2024-03-11 PROCEDURE — 93010 ELECTROCARDIOGRAM REPORT: CPT | Mod: 76

## 2024-03-11 PROCEDURE — 99232 SBSQ HOSP IP/OBS MODERATE 35: CPT

## 2024-03-11 RX ORDER — SERTRALINE 25 MG/1
25 TABLET, FILM COATED ORAL DAILY
Refills: 0 | Status: DISCONTINUED | OUTPATIENT
Start: 2024-03-11 | End: 2024-03-11

## 2024-03-11 RX ORDER — SERTRALINE 25 MG/1
50 TABLET, FILM COATED ORAL DAILY
Refills: 0 | Status: DISCONTINUED | OUTPATIENT
Start: 2024-03-11 | End: 2024-03-24

## 2024-03-11 RX ORDER — ERYTHROPOIETIN 10000 [IU]/ML
20000 INJECTION, SOLUTION INTRAVENOUS; SUBCUTANEOUS
Refills: 0 | Status: DISCONTINUED | OUTPATIENT
Start: 2024-03-11 | End: 2024-03-24

## 2024-03-11 RX ORDER — ACETAMINOPHEN 500 MG
1000 TABLET ORAL ONCE
Refills: 0 | Status: COMPLETED | OUTPATIENT
Start: 2024-03-11 | End: 2024-03-11

## 2024-03-11 RX ORDER — MILRINONE LACTATE 1 MG/ML
0.12 INJECTION, SOLUTION INTRAVENOUS
Qty: 20 | Refills: 0 | Status: DISCONTINUED | OUTPATIENT
Start: 2024-03-11 | End: 2024-03-11

## 2024-03-11 RX ADMIN — ATORVASTATIN CALCIUM 40 MILLIGRAM(S): 80 TABLET, FILM COATED ORAL at 21:03

## 2024-03-11 RX ADMIN — POLYETHYLENE GLYCOL 3350 17 GRAM(S): 17 POWDER, FOR SOLUTION ORAL at 11:19

## 2024-03-11 RX ADMIN — MIDODRINE HYDROCHLORIDE 20 MILLIGRAM(S): 2.5 TABLET ORAL at 21:03

## 2024-03-11 RX ADMIN — Medication 400 MILLIGRAM(S): at 11:46

## 2024-03-11 RX ADMIN — Medication 100 MILLIGRAM(S): at 11:21

## 2024-03-11 RX ADMIN — MIDODRINE HYDROCHLORIDE 20 MILLIGRAM(S): 2.5 TABLET ORAL at 05:03

## 2024-03-11 RX ADMIN — PREGABALIN 1000 MICROGRAM(S): 225 CAPSULE ORAL at 11:20

## 2024-03-11 RX ADMIN — Medication 5 MILLIGRAM(S): at 21:03

## 2024-03-11 RX ADMIN — SERTRALINE 25 MILLIGRAM(S): 25 TABLET, FILM COATED ORAL at 05:03

## 2024-03-11 RX ADMIN — ERYTHROPOIETIN 20000 UNIT(S): 10000 INJECTION, SOLUTION INTRAVENOUS; SUBCUTANEOUS at 21:05

## 2024-03-11 RX ADMIN — Medication 500 MILLIGRAM(S): at 11:21

## 2024-03-11 RX ADMIN — SODIUM CHLORIDE 3 MILLILITER(S): 9 INJECTION INTRAMUSCULAR; INTRAVENOUS; SUBCUTANEOUS at 14:00

## 2024-03-11 RX ADMIN — AMIODARONE HYDROCHLORIDE 200 MILLIGRAM(S): 400 TABLET ORAL at 17:05

## 2024-03-11 RX ADMIN — LIDOCAINE 1 PATCH: 4 CREAM TOPICAL at 03:14

## 2024-03-11 RX ADMIN — Medication 1000 MILLIGRAM(S): at 12:15

## 2024-03-11 RX ADMIN — ENOXAPARIN SODIUM 30 MILLIGRAM(S): 100 INJECTION SUBCUTANEOUS at 21:03

## 2024-03-11 RX ADMIN — AMIODARONE HYDROCHLORIDE 200 MILLIGRAM(S): 400 TABLET ORAL at 05:03

## 2024-03-11 RX ADMIN — CHLORHEXIDINE GLUCONATE 1 APPLICATION(S): 213 SOLUTION TOPICAL at 05:03

## 2024-03-11 RX ADMIN — Medication 14.2 MICROGRAM(S)/KG/MIN: at 21:07

## 2024-03-11 RX ADMIN — VASOPRESSIN 6 UNIT(S)/MIN: 20 INJECTION INTRAVENOUS at 21:07

## 2024-03-11 RX ADMIN — Medication 75 MICROGRAM(S): at 05:03

## 2024-03-11 RX ADMIN — ENOXAPARIN SODIUM 30 MILLIGRAM(S): 100 INJECTION SUBCUTANEOUS at 09:31

## 2024-03-11 RX ADMIN — SODIUM CHLORIDE 3 MILLILITER(S): 9 INJECTION INTRAMUSCULAR; INTRAVENOUS; SUBCUTANEOUS at 06:54

## 2024-03-11 RX ADMIN — SENNA PLUS 2 TABLET(S): 8.6 TABLET ORAL at 21:03

## 2024-03-11 RX ADMIN — PANTOPRAZOLE SODIUM 40 MILLIGRAM(S): 20 TABLET, DELAYED RELEASE ORAL at 05:03

## 2024-03-11 RX ADMIN — Medication 63.75 MILLIMOLE(S): at 04:05

## 2024-03-11 RX ADMIN — Medication 1 MILLIGRAM(S): at 11:20

## 2024-03-11 RX ADMIN — Medication 100 MILLIGRAM(S): at 11:20

## 2024-03-11 RX ADMIN — MIDODRINE HYDROCHLORIDE 20 MILLIGRAM(S): 2.5 TABLET ORAL at 13:57

## 2024-03-11 RX ADMIN — SODIUM CHLORIDE 3 MILLILITER(S): 9 INJECTION INTRAMUSCULAR; INTRAVENOUS; SUBCUTANEOUS at 21:19

## 2024-03-11 NOTE — PROGRESS NOTE ADULT - PROBLEM SELECTOR PLAN 1
- Etiology: ischemic and complicated by severe MR  - On . Consider more gradual wean of , not below 3 mcg/kg/min  - Monitor markers of end organ function (LFTs, Cr, lactate and ScVO2)  - Target net negative 1-2L. CVP 6-8   - Please wrap BLE in ACE bandages to aid in mobilization of fluid   - Has dual chamber ICD, last interrogated 3/6

## 2024-03-11 NOTE — PROGRESS NOTE ADULT - SUBJECTIVE AND OBJECTIVE BOX
Patient seen and examined at the bedside.    Remained critically ill on continuous ICU monitoring.    OBJECTIVE:  Vital Signs Last 24 Hrs  T(C): 36.4 (11 Mar 2024 16:00), Max: 37.1 (10 Mar 2024 20:00)  T(F): 97.5 (11 Mar 2024 16:00), Max: 98.8 (10 Mar 2024 20:00)  HR: 77 (11 Mar 2024 18:45) (69 - 94)  BP: --  BP(mean): --  RR: 23 (11 Mar 2024 18:45) (14 - 32)  SpO2: 100% (11 Mar 2024 18:45) (90% - 100%)    Parameters below as of 11 Mar 2024 15:28  Patient On (Oxygen Delivery Method): nasal cannula, high flow  O2 Flow (L/min): 50  O2 Concentration (%): 50      Physical Exam:   General: NAD, OOBTC  Neurology: nonfocal, AAO x3  Eyes: bilateral pupils equal and reactive   ENT/Neck: Neck supple, trachea midline, RIJ HDC and LIJ CVC in place without bleeding  Respiratory: Crackles bilaterally   CV: S1S2, no murmurs        [x] Sternal dressing        [x] Sinus rhythm, [x] AICD  Abdominal: Soft, NT, ND +BS   Extremities: 1-2+ pedal edema noted, + peripheral pulses   Skin: No Rashes, Hematoma, Ecchymosis                           Assessment:  84 y/o M with ICM/HFrEF (now 30%; LVIDd 6.7 cm; prev req inotrope), CAD c/b IWMI (1994) s/p angioplasty, aortic stenosis s/p bio-AVR 2004, VT arrest (2017) s/p ICD, Afib s/p multiple DCCV and ablation x2 (2020 and 2023; on AC), Aflutter s/p WARREN/DCCV (8/23), prior Ao aneurysm s/p Bentall (2021), severe MR prior MVr (2021) with MAC (precludes M-KRISTIE d/t his anatomy; turned down for TMVR trials by [employer]), atrophic kidney with CKD (b/l Cr 2.5-3). Was admitted on 2/20 with acute on chronic heart failure and acute on chronic kidney dysfunction, with Heart Failure and Renal teams following. Patient was considered for MV re-op; however, patient was reluctant of the procedure and expressed wanting to follow up in the outpatient setting to schedule.    Aortic root aneurysm, H/O of AVR, and MVR s/p Repeat Sternotomy, Bentall Procedure, and Mitral Valve Repair with WARREN on 6/2/21  Hemodynamic instability  Hypervolemia  Vasogenic shock  Cardiogenic shock  Acute hypoxemic respiratory failure  Acute pulmonary edema  Acute renal failure  Acute on chronic systolic heart failure    Plan:   ***Neuro***  [x] Nonfocal   Continue with Melatonin for sleep regimen   Cont with Zoloft     ***Cardiovascular***  Invasive hemodynamic monitoring, assess perfusion indices   SR / CVP 21 / MAP 74 / Hct 26.6% / Lactate 1.8  [x] Vasopressin - 0.04 Unit(s)/Min   [x] Dobutamine - 7 mcg/kG/Min  Continuous reassessment of hemodynamics  PO Amiodarone for rate control  Continue Midodrine  [x] VTE ppx with Lovenox  [x] Statin nightly  Serial EKG and cardiac enzymes     ***Pulmonary***  [x] HFO2 50L/50%  Follow SpO2, CXR, blood gasses   Encourage incentive spirometry, continue pulse ox monitoring, follow ABGs     ***GI***  [x] Dash diet with TF's at 30cc/hr   [x] Protonix for stress ulcer ppx  Bowel regimen with Miralax and Senna.     ***Renal***  Continue to monitor I/Os, BUN/Creatinine.   Replete lytes PRN  CVVH for volume removal and clearance  Allopurinol for gout  Renal support with RETACRIT     ***ID***  Cefazolin for cellulitis    ***Endocrine***  HbA1c 5.7%                - [x] Monitor glucose levels             - Need tight glycemic control to prevent wound infection.  [x] Hypothyroidism             - [x] Synthroid        Patient requires continuous monitoring with bedside rhythm monitoring, pulse oximetry monitoring, and continuous central venous and arterial pressure monitoring; and intermittent blood gas analysis. Care plan discussed with the ICU care team.   Patient remained critical, at risk for life threatening decompensation.    I have spent 55 minutes providing critical care management to this patient.    By signing my name below, I, Tressa Tamayo, attest that this documentation has been prepared under the direction and in the presence of Bud Rivera NP.  Electronically signed: Frieda Mendoza, 03-11-24 @ 19:38    I, Bud Rivera , personally performed the services described in this documentation. all medical record entries made by the frieda were at my direction and in my presence. I have reviewed the chart and agree that the record reflects my personal performance and is accurate and complete  Electronically signed: Bud Rivera NP.

## 2024-03-11 NOTE — PROGRESS NOTE ADULT - PROBLEM SELECTOR PLAN 3
- TTE 2/22 with severe MR  - He was evaluated for M-KRISTIE but was not a candidate d/t his anatomy.  He was evaluated for [facility] (Tendyne valve) trial for a transcatheter MVR but was denied by [employer]  - once optimized from HF and nutritional standpoint, plan for surgical MVR - TTE 2/22 with severe MR  - He was evaluated for M-KRISTIE but was not a candidate d/t his anatomy.  He was evaluated for [facility] (Tendyne valve) trial for a transcatheter MVR but was denied by [employer]  - once optimized from HF and nutritional standpoint, plan for surgical MVR  - Adequate nutrition, PT evaluation to help optimize for potential surgery

## 2024-03-11 NOTE — PROGRESS NOTE ADULT - SUBJECTIVE AND OBJECTIVE BOX
Patient seen and examined at the bedside.    Remained critically ill on continuous ICU monitoring.      Brief Summary:  82 y/o M with ICM/HFrEF (now 30%; LVIDd 6.7 cm; prev req inotrope), CAD c/b IWMI (1994) s/p angioplasty, aortic stenosis s/p bio-AVR 2004, VT arrest (2017) s/p ICD, Afib s/p multiple DCCV and ablation x2 (2020 and 2023; on AC), Aflutter s/p WARREN/DCCV (8/23), prior Ao aneurysm s/p Bentall (2021), severe MR prior MVr (2021) with MAC (precludes M-KRISTIE d/t his anatomy; turned down for TMVR trials by [employer]), atrophic kidney with CKD (b/l Cr 2.5-3). Transferred to ICU for Hypotension/CHF/renal failure 3/5/24  Aortic root aneurysm, H/O of AVR, and MVR s/p Repeat Sternotomy, Bentall Procedure, and Mitral Valve Repair with WARREN on 6/2/21      24 Hour events:         OBJECTIVE:  Vital Signs Last 24 Hrs  T(C): 36.2 (11 Mar 2024 04:00), Max: 37.1 (10 Mar 2024 20:00)  T(F): 97.2 (11 Mar 2024 04:00), Max: 98.8 (10 Mar 2024 20:00)  HR: 77 (11 Mar 2024 05:15) (69 - 93)  BP: --  BP(mean): --  RR: 23 (11 Mar 2024 05:15) (14 - 40)  SpO2: 99% (11 Mar 2024 05:15) (80% - 100%)    Parameters below as of 11 Mar 2024 04:30  Patient On (Oxygen Delivery Method): nasal cannula, high flow  O2 Flow (L/min): 50  O2 Concentration (%): 100                Physical Exam:   General: awake/ OOBTC     Neurology: intact  ENT: trachea midline  Respiratory: on HFNC  CV: Afib   Abdominal: Soft, NT  : Royal   Extremities: warm, well perfused, moving all extremities   Skin: No Rashes, Hematoma, Ecchymosis         -------------------------------------------------------------------------------------------------------------------------------    Labs:                        8.4    10.57 )-----------( 126      ( 11 Mar 2024 00:32 )             26.4     03-11    135  |  99  |  32<H>  ----------------------------<  128<H>  4.1   |  25  |  1.52<H>    Ca    9.0      11 Mar 2024 00:32  Phos  2.4     03-11  Mg     2.5     03-11    TPro  7.1  /  Alb  4.3  /  TBili  2.2<H>  /  DBili  x   /  AST  40  /  ALT  18  /  AlkPhos  227<H>  03-11    LIVER FUNCTIONS - ( 11 Mar 2024 00:32 )  Alb: 4.3 g/dL / Pro: 7.1 g/dL / ALK PHOS: 227 U/L / ALT: 18 U/L / AST: 40 U/L / GGT: x           PT/INR - ( 11 Mar 2024 00:32 )   PT: 14.1 sec;   INR: 1.35 ratio         PTT - ( 11 Mar 2024 00:32 )  PTT:35.5 sec  ABG - ( 11 Mar 2024 05:24 )  pH, Arterial: 7.45  pH, Blood: x     /  pCO2: 35    /  pO2: 243   / HCO3: 24    / Base Excess: 0.4   /  SaO2: 99.5              ------------------------------------------------------------------------------------------------------------------------------  Assessment:  82 y/o M with ICM/HFrEF (now 30%; LVIDd 6.7 cm; prev req inotrope), CAD c/b IWMI (1994) s/p angioplasty, aortic stenosis s/p bio-AVR 2004, VT arrest (2017) s/p ICD, Afib s/p multiple DCCV and ablation x2 (2020 and 2023; on AC), Aflutter s/p WARREN/DCCV (8/23), prior Ao aneurysm s/p Bentall (2021), severe MR prior MVr (2021) with MAC (precludes M-KRISTIE d/t his anatomy; turned down for TMVR trials by [employer]), atrophic kidney with CKD (b/l Cr 2.5-3). Transferred to ICU for Hypotension/CHF/renal failure 3/5/24    Aortic root aneurysm, H/O of AVR, and MVR s/p Repeat Sternotomy, Bentall Procedure, and Mitral Valve Repair with WARREN on 6/2/21  Aortic stenosis  Hemodynamic instability  Hypovolemia  Post op acute respiratory insufficiency  Acute blood loss anemia  Thrombocytopenia          Plan:   ***Neuro***  - Zoloft daily.  - Melatonin for sleep at night       ***Cardiovascular***  - Invasive hemodynamic monitoring, assess perfusion indices   - At risk for hemodynamic instability and cardiac arrhythmias.  - IVF for perioperative hypovolemia.  - Wean pressors for MAP > 65 mm Hg    - IV Dobutamine infusion for inotropic support   - Amiodarone/ Midodrine for afib  - Statin daily        ***Pulmonary***  - Postoperative acute respiratory insufficiency   - High flow nasal cannula.  Wean oxygen as able.  - Deep breathing and coughing exercises.    ***GI***  - Continue diet + tube feeds   - Protonix for stress ulcer prophylaxis   - Bowel regimen.    ***Renal***  - CKD on CRRT    - Royal catheter for strict I/O measurements.   - Replete electrolytes as indicated.    ***ID***      ***Endocrine***  - Synthroid for Hypothyroidism     ***Hematology***  - Acute blood loss anemia and thrombocytopenia   - No current transfusion indication   - At risk for Bleeding   - Lovenox for DVT prophylaxis          Patient requires continuous monitoring with bedside rhythm monitoring, pulse oximetry monitoring, and continuous central venous and arterial pressure monitoring; and intermittent blood gas analysis. Care plan discussed with the ICU care team.   Patient remained critical, at risk for life threatening decompensation.    I have spent 30 minutes providing critical care management to this patient.    By signing my name below, I, Kev Hanna, attest that this documentation has been prepared under the direction and in the presence of Mena Au DO  Electronically signed: Kev Jacques, 03-11-24 @ 06:07    I, Mena Au DO, personally performed the services described in this documentation. all medical record entries made by the scribe were at my direction and in my presence. I have reviewed the chart and agree that the record reflects my personal performance and is accurate and complete  Electronically signed: Mena Au DO, Patient seen and examined at the bedside.    Remained critically ill on continuous ICU monitoring.      Brief Summary:  84 y/o M with ICM/HFrEF (now 30%; LVIDd 6.7 cm; prev req inotrope), CAD c/b IWMI (1994) s/p angioplasty, aortic stenosis s/p bio-AVR 2004, VT arrest (2017) s/p ICD, Afib s/p multiple DCCV and ablation x2 (2020 and 2023; on AC), Aflutter s/p WARREN/DCCV (8/23), prior Ao aneurysm s/p Bentall (2021), severe MR prior MVr (2021) with MAC (precludes M-KRISTIE d/t his anatomy; turned down for TMVR trials by [employer]), atrophic kidney with CKD (b/l Cr 2.5-3). Transferred to ICU for Hypotension/CHF/renal failure 3/5/24  Aortic root aneurysm, H/O of AVR, and MVR s/p Repeat Sternotomy, Bentall Procedure, and Mitral Valve Repair with WARREN on 6/2/21      24 Hour events:         OBJECTIVE:  Vital Signs Last 24 Hrs  T(C): 36.2 (11 Mar 2024 04:00), Max: 37.1 (10 Mar 2024 20:00)  T(F): 97.2 (11 Mar 2024 04:00), Max: 98.8 (10 Mar 2024 20:00)  HR: 77 (11 Mar 2024 05:15) (69 - 93)  BP: --  BP(mean): --  RR: 23 (11 Mar 2024 05:15) (14 - 40)  SpO2: 99% (11 Mar 2024 05:15) (80% - 100%)    Parameters below as of 11 Mar 2024 04:30  Patient On (Oxygen Delivery Method): nasal cannula, high flow  O2 Flow (L/min): 50  O2 Concentration (%): 100                Physical Exam:   General: awake/ OOBTC     Neurology: intact  ENT: trachea midline  Respiratory: on HFNC  CV: Afib   Abdominal: Soft, NT  : Royal   Extremities: warm, well perfused, moving all extremities   Skin: No Rashes, Hematoma, Ecchymosis         -------------------------------------------------------------------------------------------------------------------------------    Labs:                        8.4    10.57 )-----------( 126      ( 11 Mar 2024 00:32 )             26.4     03-11    135  |  99  |  32<H>  ----------------------------<  128<H>  4.1   |  25  |  1.52<H>    Ca    9.0      11 Mar 2024 00:32  Phos  2.4     03-11  Mg     2.5     03-11    TPro  7.1  /  Alb  4.3  /  TBili  2.2<H>  /  DBili  x   /  AST  40  /  ALT  18  /  AlkPhos  227<H>  03-11    LIVER FUNCTIONS - ( 11 Mar 2024 00:32 )  Alb: 4.3 g/dL / Pro: 7.1 g/dL / ALK PHOS: 227 U/L / ALT: 18 U/L / AST: 40 U/L / GGT: x           PT/INR - ( 11 Mar 2024 00:32 )   PT: 14.1 sec;   INR: 1.35 ratio         PTT - ( 11 Mar 2024 00:32 )  PTT:35.5 sec  ABG - ( 11 Mar 2024 05:24 )  pH, Arterial: 7.45  pH, Blood: x     /  pCO2: 35    /  pO2: 243   / HCO3: 24    / Base Excess: 0.4   /  SaO2: 99.5              ------------------------------------------------------------------------------------------------------------------------------  Assessment:  84 y/o M with ICM/HFrEF (now 30%; LVIDd 6.7 cm; prev req inotrope), CAD c/b IWMI (1994) s/p angioplasty, aortic stenosis s/p bio-AVR 2004, VT arrest (2017) s/p ICD, Afib s/p multiple DCCV and ablation x2 (2020 and 2023; on AC), Aflutter s/p WARREN/DCCV (8/23), prior Ao aneurysm s/p Bentall (2021), severe MR prior MVr (2021) with MAC (precludes M-KRISTIE d/t his anatomy; turned down for TMVR trials by [employer]), atrophic kidney with CKD (b/l Cr 2.5-3). Transferred to ICU for Hypotension/CHF/renal failure 3/5/24    Aortic root aneurysm, H/O of AVR, and MVR s/p Repeat Sternotomy, Bentall Procedure, and Mitral Valve Repair with WARREN on 6/2/21  Aortic stenosis  Hemodynamic instability  Hypovolemia  Post op acute respiratory insufficiency  Acute blood loss anemia  Thrombocytopenia          Plan:   ***Neuro***  - Zoloft daily.  - Melatonin for sleep at night       ***Cardiovascular***  - Invasive hemodynamic monitoring, assess perfusion indices   - At risk for hemodynamic instability and cardiac arrhythmias.  - IVF for perioperative hypovolemia.  - Wean pressors for MAP > 65 mm Hg    - IV Dobutamine infusion for inotropic support   - Amiodarone/ Midodrine for afib  - Statin daily        ***Pulmonary***  - Postoperative acute respiratory insufficiency   - High flow nasal cannula.  Wean oxygen as able.  - Nitric oxide 10 ppm.  - Deep breathing and coughing exercises.    ***GI***  - Continue diet + tube feeds   - Protonix for stress ulcer prophylaxis   - Bowel regimen.    ***Renal***  - CKD on CRRT    - Royal catheter for strict I/O measurements.   - Replete electrolytes as indicated.    ***ID***      ***Endocrine***  - Synthroid for Hypothyroidism     ***Hematology***  - Acute blood loss anemia and thrombocytopenia   - No current transfusion indication   - At risk for Bleeding   - Lovenox for DVT prophylaxis          Patient requires continuous monitoring with bedside rhythm monitoring, pulse oximetry monitoring, and continuous central venous and arterial pressure monitoring; and intermittent blood gas analysis. Care plan discussed with the ICU care team.   Patient remained critical, at risk for life threatening decompensation.    I have spent 30 minutes providing critical care management to this patient.    By signing my name below, I, Kev Hanna, attest that this documentation has been prepared under the direction and in the presence of Mena Au DO  Electronically signed: Kev Hanna, 03-11-24 @ 06:07    I, Mena Au DO, personally performed the services described in this documentation. all medical record entries made by the scribe were at my direction and in my presence. I have reviewed the chart and agree that the record reflects my personal performance and is accurate and complete  Electronically signed: Mena Au DO, Patient seen and examined at the bedside.    Remained critically ill on continuous ICU monitoring.      Brief Summary:  84 y/o M with ICM/HFrEF (now 30%; LVIDd 6.7 cm; prev req inotrope), CAD c/b IWMI (1994) s/p angioplasty, aortic stenosis s/p bio-AVR 2004, VT arrest (2017) s/p ICD, Afib s/p multiple DCCV and ablation x2 (2020 and 2023; on AC), Aflutter s/p WARREN/DCCV (8/23), prior Ao aneurysm s/p Bentall (2021), severe MR prior MVr (2021) with MAC (precludes M-KRISTIE d/t his anatomy; turned down for TMVR trials by [employer]), atrophic kidney with CKD (b/l Cr 2.5-3). Transferred to ICU for Hypotension/CHF/renal failure 3/5/24  Aortic root aneurysm, H/O of AVR, and MVR s/p Repeat Sternotomy, Bentall Procedure, and Mitral Valve Repair with WARREN on 6/2/21      24 Hour events:  - CXR wet, started CRRT overnight   - Nitric added, started HFO2 for low PO2  - F/u with echo from this AM  - completed antibiotics course       OBJECTIVE:  Vital Signs Last 24 Hrs  T(C): 36.2 (11 Mar 2024 04:00), Max: 37.1 (10 Mar 2024 20:00)  T(F): 97.2 (11 Mar 2024 04:00), Max: 98.8 (10 Mar 2024 20:00)  HR: 77 (11 Mar 2024 05:15) (69 - 93)  BP: --  BP(mean): --  RR: 23 (11 Mar 2024 05:15) (14 - 40)  SpO2: 99% (11 Mar 2024 05:15) (80% - 100%)    Parameters below as of 11 Mar 2024 04:30  Patient On (Oxygen Delivery Method): nasal cannula, high flow  O2 Flow (L/min): 50  O2 Concentration (%): 100                Physical Exam:   General: awake/ OOBTC     Neurology: intact  ENT: trachea midline  Respiratory: on HFNC  CV: Afib   Abdominal: Soft, NT  : Royal   Extremities: warm, well perfused, moving all extremities   Skin: No Rashes, Hematoma, Ecchymosis         -------------------------------------------------------------------------------------------------------------------------------    Labs:                        8.4    10.57 )-----------( 126      ( 11 Mar 2024 00:32 )             26.4     03-11    135  |  99  |  32<H>  ----------------------------<  128<H>  4.1   |  25  |  1.52<H>    Ca    9.0      11 Mar 2024 00:32  Phos  2.4     03-11  Mg     2.5     03-11    TPro  7.1  /  Alb  4.3  /  TBili  2.2<H>  /  DBili  x   /  AST  40  /  ALT  18  /  AlkPhos  227<H>  03-11    LIVER FUNCTIONS - ( 11 Mar 2024 00:32 )  Alb: 4.3 g/dL / Pro: 7.1 g/dL / ALK PHOS: 227 U/L / ALT: 18 U/L / AST: 40 U/L / GGT: x           PT/INR - ( 11 Mar 2024 00:32 )   PT: 14.1 sec;   INR: 1.35 ratio         PTT - ( 11 Mar 2024 00:32 )  PTT:35.5 sec  ABG - ( 11 Mar 2024 05:24 )  pH, Arterial: 7.45  pH, Blood: x     /  pCO2: 35    /  pO2: 243   / HCO3: 24    / Base Excess: 0.4   /  SaO2: 99.5              ------------------------------------------------------------------------------------------------------------------------------  Assessment:  84 y/o M with ICM/HFrEF (now 30%; LVIDd 6.7 cm; prev req inotrope), CAD c/b IWMI (1994) s/p angioplasty, aortic stenosis s/p bio-AVR 2004, VT arrest (2017) s/p ICD, Afib s/p multiple DCCV and ablation x2 (2020 and 2023; on AC), Aflutter s/p WARREN/DCCV (8/23), prior Ao aneurysm s/p Bentall (2021), severe MR prior MVr (2021) with MAC (precludes M-KRISTIE d/t his anatomy; turned down for TMVR trials by [employer]), atrophic kidney with CKD (b/l Cr 2.5-3). Transferred to ICU for Hypotension/CHF/renal failure 3/5/24    Aortic root aneurysm, H/O of AVR, and MVR s/p Repeat Sternotomy, Bentall Procedure, and Mitral Valve Repair with WARREN on 6/2/21  Aortic stenosis  Hemodynamic instability  Hypovolemia  Post op acute respiratory insufficiency  Acute blood loss anemia  Thrombocytopenia          Plan:   ***Neuro***  - Zoloft daily.  - Melatonin for sleep at night       ***Cardiovascular***  - Invasive hemodynamic monitoring, assess perfusion indices   - At risk for hemodynamic instability and cardiac arrhythmias.  - IVF for perioperative hypovolemia.  - Wean pressors for MAP > 65 mm Hg    - IV Dobutamine infusion for inotropic support   - Amiodarone/ Midodrine for afib  - Statin daily   - F/u with echo from this AM       ***Pulmonary***  - Postoperative acute respiratory insufficiency   - High flow nasal cannula.  Wean oxygen as able.  - Nitric oxide 10 ppm.  - Deep breathing and coughing exercises.    ***GI***  - Continue diet + tube feeds   - Protonix for stress ulcer prophylaxis   - Bowel regimen.    ***Renal***  - CKD  - CXR wet, started CRRT overnight   - Royal catheter for strict I/O measurements.   - Replete electrolytes as indicated.    ***ID***      ***Endocrine***  - Synthroid for Hypothyroidism     ***Hematology***  - Acute blood loss anemia and thrombocytopenia   - No current transfusion indication   - At risk for Bleeding   - Lovenox for DVT prophylaxis          Patient requires continuous monitoring with bedside rhythm monitoring, pulse oximetry monitoring, and continuous central venous and arterial pressure monitoring; and intermittent blood gas analysis. Care plan discussed with the ICU care team.   Patient remained critical, at risk for life threatening decompensation.    I have spent 30 minutes providing critical care management to this patient.    By signing my name below, I, Kev Hanna, attest that this documentation has been prepared under the direction and in the presence of Mena Au DO  Electronically signed: Kev Hanna, 03-11-24 @ 06:07    I, Mena Au DO, personally performed the services described in this documentation. all medical record entries made by the scribe were at my direction and in my presence. I have reviewed the chart and agree that the record reflects my personal performance and is accurate and complete  Electronically signed: Mena Au DO, Patient seen and examined at the bedside.    Remained critically ill on continuous ICU monitoring.      Brief Summary:  84 yo M with PMHx CKD (b/l Cr 2.5-3), ICM/HFrEF, CAD, aortic stenosis s/p AVR-t (2004), VT arrest (2017) s/p ICD, Afib s/p multiple DCCV and ablation x2 (2020 and 2023; on AC), Aflutter s/p WARREN/DCCV (8/23), prior aortic aneurysm s/p Bentall (2021), and severe MR prior MVr (2021), transferred to ICU for hypotension/CHF/renal failure on 3/5/24.      24 Hour events:  - Started on CRRT overnight   - Nitric added for h/o pHTN.  - Dobutamine increased from 5 to 7 mcg/kg/min.      OBJECTIVE:  Vital Signs Last 24 Hrs  T(C): 36.2 (11 Mar 2024 04:00), Max: 37.1 (10 Mar 2024 20:00)  T(F): 97.2 (11 Mar 2024 04:00), Max: 98.8 (10 Mar 2024 20:00)  HR: 77 (11 Mar 2024 05:15) (69 - 93)  BP: --  BP(mean): --  RR: 23 (11 Mar 2024 05:15) (14 - 40)  SpO2: 99% (11 Mar 2024 05:15) (80% - 100%)    Parameters below as of 11 Mar 2024 04:30  Patient On (Oxygen Delivery Method): nasal cannula, high flow  O2 Flow (L/min): 50  O2 Concentration (%): 100                Physical Exam:   General: awake, interactive  Neurology: intact  Respiratory: on HFNC, non-labored respirations  CV: Sinus rhythm  Abdominal: Soft, NT  Extremities: warm, well perfused, moving all extremities   -------------------------------------------------------------------------------------------------------------------------------    Labs:                        8.4    10.57 )-----------( 126      ( 11 Mar 2024 00:32 )             26.4     03-11    135  |  99  |  32<H>  ----------------------------<  128<H>  4.1   |  25  |  1.52<H>    Ca    9.0      11 Mar 2024 00:32  Phos  2.4     03-11  Mg     2.5     03-11    TPro  7.1  /  Alb  4.3  /  TBili  2.2<H>  /  DBili  x   /  AST  40  /  ALT  18  /  AlkPhos  227<H>  03-11    LIVER FUNCTIONS - ( 11 Mar 2024 00:32 )  Alb: 4.3 g/dL / Pro: 7.1 g/dL / ALK PHOS: 227 U/L / ALT: 18 U/L / AST: 40 U/L / GGT: x           PT/INR - ( 11 Mar 2024 00:32 )   PT: 14.1 sec;   INR: 1.35 ratio         PTT - ( 11 Mar 2024 00:32 )  PTT:35.5 sec  ABG - ( 11 Mar 2024 05:24 )  pH, Arterial: 7.45  pH, Blood: x     /  pCO2: 35    /  pO2: 243   / HCO3: 24    / Base Excess: 0.4   /  SaO2: 99.5        ------------------------------------------------------------------------------------------------------------------------------  Assessment:  84 yo M with PMHx CKD (b/l Cr 2.5-3), ICM/HFrEF, CAD, aortic stenosis s/p AVR-t (2004), VT arrest (2017) s/p ICD, Afib s/p multiple DCCV and ablation x2 (2020 and 2023; on AC), Aflutter s/p WARREN/DCCV (8/23), prior aortic aneurysm s/p Bentall (2021), and severe MR prior MVr (2021), transferred to ICU for hypotension/CHF/renal failure on 3/5/24.    s/p Repeat Sternotomy, Bentall Procedure, and Mitral Valve Repair on 6/2/21  At risk for hemodynamic instability  Receiving inotropic medication  Acute respiratory insufficiency  Anemia  Thrombocytopenia      Plan:   ***Neuro***  - Zoloft daily.  - Melatonin for sleep at night   - Optimize day/night cycles.    ***Cardiovascular***  - At risk for hemodynamic instability and cardiac arrhythmias.  - Wean pressors for MAP > 65 mm Hg    - IV Dobutamine infusion for inotropic support   - Amiodarone for afib  - Statin daily   - F/u with echo from this AM    ***Pulmonary***  -Acute respiratory insufficiency   - High flow nasal cannula.  Wean oxygen as able.  - Nitric oxide 10 ppm.  - Deep breathing and coughing exercises.    ***GI***  - Continue diet  - Protonix for stress ulcer prophylaxis   - Bowel regimen.    ***Renal***  - CKD  - Started CRRT overnight   - Goal net negative.  - Replete electrolytes as indicated.    ***ID***  - No acute issues.    ***Endocrine***  - Synthroid for Hypothyroidism     ***Hematology***  - Anemia and thrombocytopenia   - No current transfusion indication   - Lovenox for DVT prophylaxis      Patient requires continuous monitoring with bedside rhythm monitoring, pulse oximetry monitoring, and continuous central venous and arterial pressure monitoring; and intermittent blood gas analysis. Care plan discussed with the ICU care team.   Patient remained critical, at risk for life threatening decompensation.    I have spent 50 minutes providing critical care management to this patient.    By signing my name below, I, Kev Hanna, attest that this documentation has been prepared under the direction and in the presence of Mena Au DO  Electronically signed: Kev Hanna, 03-11-24 @ 06:07    I, Mena Au DO, personally performed the services described in this documentation. all medical record entries made by the scribe were at my direction and in my presence. I have reviewed the chart and agree that the record reflects my personal performance and is accurate and complete  Electronically signed: Mena Au DO,

## 2024-03-11 NOTE — CHART NOTE - NSCHARTNOTEFT_GEN_A_CORE
Nutrition Follow Up Note  Patient seen for: calorie count results/nutrition follow up. Chart reviewed, events noted.    Source: [x] Patient       [x] Medical Record        [x] RN        [] Family at bedside       [] Other:    -If unable to interview patient: [] Trach/Vent/BiPAP  [] Disoriented/confused/inappropriate to interview    Diet Order:   Diet, DASH/TLC:   Sodium & Cholesterol Restricted  Tube Feeding Modality: Nasogastric  Nepro with Carb Steady (NEPRORTH)  Total Volume for 24 Hours (mL): 960  Continuous  Starting Tube Feed Rate {mL per Hour}: 30  Increase Tube Feed Rate by (mL): 10     Every 4 hours  Until Goal Tube Feed Rate (mL per Hour): 40  Tube Feed Duration (in Hours): 24  Tube Feed Start Time: 17:40  Prosource Gelatein Plus     Qty per Day:  2  Supplement Feeding Modality:  Nasogastric  Ensure Plus High Protein Cans or Servings Per Day:  3       Frequency:  Daily     Special Instructions for Nursing:  Sodium & Cholesterol Restricted (24)    EN Order Provides: 960ml volume, 1728kcal, 78g protein, 698ml free water.  Current Pump Rate: off  EN provision (per flow sheets):    - 5 Day EN Average = N/A     Is current diet order appropriate/adequate? See recommendations below.    PO intake :   [] >75%  Adequate    [] 50-75%  Fair       [] <50%  Poor      [] N/A  - CALORIE COUNT RESULTS: two days completed   - 3/9/24: 2638kcal, 120g protein    - 3/10/24: 2254kcal, 90g protein   ****Pt meeting estimated needs through PO diet alone.    Nutrition-related concerns:  - Pt ordered for enteral feeds however no NGT in place at this time, feeds do not appear to have been infusing over weekend per RN  - Pt initiated on CRRT 3/9/24. Phosphorus low on AM labs.   - Requires Dobutamine and Vasopressin   - Ordered for oral Levothyroxine     GI:  Last BM 3/9 x 1.   Bowel Regimen? [x] Yes   [] No    Weights:   Daily Weight in k.3 (), Weight in k.7 (), Weight in k.4 (), Weight in k.9 (), Weight in k.6 (), Weight in k.1 (-), Weight in k.1 ()  - weight fluctuations noted, trending up in setting of fluid retention    Drug Dosing Weight  Height (cm): 175.3 (04 Mar 2024 12:00)  Weight (kg): 67.8 (04 Mar 2024 12:00)  BMI (kg/m2): 22.1 (04 Mar 2024 12:00)  BSA (m2): 1.83 (04 Mar 2024 12:00)    MEDICATIONS  (STANDING):  allopurinol 100 milliGRAM(s) Oral daily  aMIOdarone    Tablet 200 milliGRAM(s) Oral two times a day  ascorbic acid 500 milliGRAM(s) Oral daily  atorvastatin 40 milliGRAM(s) Oral at bedtime  chlorhexidine 2% Cloths 1 Application(s) Topical <User Schedule>  CRRT Treatment    <Continuous>  cyanocobalamin 1000 MICROGram(s) Oral daily  DOBUTamine Infusion 7 MICROgram(s)/kG/Min (14.2 mL/Hr) IV Continuous <Continuous>  enoxaparin Injectable 30 milliGRAM(s) SubCutaneous every 12 hours  epoetin zara-epbx (RETACRIT) Injectable 52097 Unit(s) IV Push once  folic acid 1 milliGRAM(s) Oral daily  levothyroxine 75 MICROGram(s) Oral daily  lidocaine   4% Patch 1 Patch Transdermal daily  melatonin 5 milliGRAM(s) Oral at bedtime  midodrine 20 milliGRAM(s) Oral every 8 hours  milrinone Infusion 0.125 MICROgram(s)/kG/Min (2.54 mL/Hr) IV Continuous <Continuous>  pantoprazole    Tablet 40 milliGRAM(s) Oral before breakfast  polyethylene glycol 3350 17 Gram(s) Oral daily  PrismaSATE Dialysate BGK 4 / 2.5 5000 milliLiter(s) (1200 mL/Hr) CRRT <Continuous>  PrismaSOL Filtration BGK 0 / 2.5 5000 milliLiter(s) (600 mL/Hr) CRRT <Continuous>  PrismaSOL Filtration BGK 4 / 2.5 5000 milliLiter(s) (400 mL/Hr) CRRT <Continuous>  senna 2 Tablet(s) Oral at bedtime  sertraline 25 milliGRAM(s) Oral daily  sodium chloride 0.9% lock flush 3 milliLiter(s) IV Push every 8 hours  thiamine 100 milliGRAM(s) Oral daily  vasopressin Infusion 0.04 Unit(s)/Min (6 mL/Hr) IV Continuous <Continuous>    Pertinent Labs:  @ 00:32: Na 135, BUN 32<H>, Cr 1.52<H>, <H>, K+ 4.1, Phos 2.4<L>, Mg 2.5, Alk Phos 227<H>, ALT/SGPT 18, AST/SGOT 40, HbA1c --    A1C with Estimated Average Glucose Result: 5.7 % (24 @ 13:11)  A1C with Estimated Average Glucose Result: 5.6 % (24 @ 15:55)    Finger Sticks: N/A    Skin per nursing documentation: No noted pressure injuries as per documentation.   Edema: 1+ generalized, 2+ sumi foot     Based on: dosing wt 67.8kg   Estimated Energy Needs: 2034 - 2373kcal (30 - 35kcal/kg)  Estimated Protein Needs: 81 - 102g protein (1.2 - 1.5g/kg)  Estimated Fluid Needs: per team.  Alberto State Equation: N/A    Previous Nutrition Diagnosis: Severe Malnutrition, Increased Nutrient Needs   Nutrition Diagnosis is: [x] ongoing  [] resolved [] not applicable     New Nutrition Diagnosis: [x] Not applicable    Nutrition Care Plan:  [x] In Progress  [] Achieved  [] Not applicable    Nutrition Interventions:     Education Provided:       [] Yes:  [] No:     Recommendations:        ****IN PROGRESS/INCOMPLETE****       Monitoring and Evaluation:   Continue to monitor nutritional intake, tolerance to diet prescription, weights, labs, skin integrity    RD remains available upon request and will follow up per protocol    Patricia Pizarro MS, RD, CDN, CNSC; available via [employer] Teams Nutrition Follow Up Note  Patient seen for: calorie count results/nutrition follow up. Chart reviewed, events noted.    Source: [x] Patient       [x] Medical Record        [x] RN        [] Family at bedside       [] Other:    -If unable to interview patient: [] Trach/Vent/BiPAP  [] Disoriented/confused/inappropriate to interview    Diet Order:   Diet, DASH/TLC:   Sodium & Cholesterol Restricted  Tube Feeding Modality: Nasogastric  Nepro with Carb Steady (NEPRORTH)  Total Volume for 24 Hours (mL): 960  Continuous  Starting Tube Feed Rate {mL per Hour}: 30  Increase Tube Feed Rate by (mL): 10     Every 4 hours  Until Goal Tube Feed Rate (mL per Hour): 40  Tube Feed Duration (in Hours): 24  Tube Feed Start Time: 17:40  Prosource Gelatein Plus     Qty per Day:  2  Supplement Feeding Modality:  Nasogastric  Ensure Plus High Protein Cans or Servings Per Day:  3       Frequency:  Daily     Special Instructions for Nursing:  Sodium & Cholesterol Restricted (24)    EN Order Provides: 960ml volume, 1728kcal, 78g protein, 698ml free water.  Current Pump Rate: off  EN provision (per flow sheets):    - 5 Day EN Average = N/A     Is current diet order appropriate/adequate? See recommendations below.    PO intake :   [] >75%  Adequate    [] 50-75%  Fair       [] <50%  Poor      [] N/A  - CALORIE COUNT RESULTS: two days completed   - 3/9/24: 2638kcal, 120g protein    - 3/10/24: 2254kcal, 90g protein   ****Pt meeting estimated needs through PO diet alone.    Nutrition-related concerns:  - Pt ordered for enteral feeds however no NGT in place at this time, feeds have not been infusing over weekend per team/RN  - Pt initiated on CRRT 3/9/24. Phosphorus low on AM labs.   - Requires Dobutamine and Vasopressin   - Ordered for oral Levothyroxine     GI:  Last BM 3/9 x 1.   Bowel Regimen? [x] Yes   [] No    Weights:   Daily Weight in k.3 (), Weight in k.7 (), Weight in k.4 (), Weight in k.9 (), Weight in k.6 (), Weight in k.1 (), Weight in k.1 ()  - weight fluctuations noted, trending up in setting of fluid retention    Drug Dosing Weight  Height (cm): 175.3 (04 Mar 2024 12:00)  Weight (kg): 67.8 (04 Mar 2024 12:00)  BMI (kg/m2): 22.1 (04 Mar 2024 12:00)  BSA (m2): 1.83 (04 Mar 2024 12:00)    MEDICATIONS  (STANDING):  allopurinol 100 milliGRAM(s) Oral daily  aMIOdarone    Tablet 200 milliGRAM(s) Oral two times a day  ascorbic acid 500 milliGRAM(s) Oral daily  atorvastatin 40 milliGRAM(s) Oral at bedtime  chlorhexidine 2% Cloths 1 Application(s) Topical <User Schedule>  CRRT Treatment    <Continuous>  cyanocobalamin 1000 MICROGram(s) Oral daily  DOBUTamine Infusion 7 MICROgram(s)/kG/Min (14.2 mL/Hr) IV Continuous <Continuous>  enoxaparin Injectable 30 milliGRAM(s) SubCutaneous every 12 hours  epoetin zara-epbx (RETACRIT) Injectable 53395 Unit(s) IV Push once  folic acid 1 milliGRAM(s) Oral daily  levothyroxine 75 MICROGram(s) Oral daily  lidocaine   4% Patch 1 Patch Transdermal daily  melatonin 5 milliGRAM(s) Oral at bedtime  midodrine 20 milliGRAM(s) Oral every 8 hours  milrinone Infusion 0.125 MICROgram(s)/kG/Min (2.54 mL/Hr) IV Continuous <Continuous>  pantoprazole    Tablet 40 milliGRAM(s) Oral before breakfast  polyethylene glycol 3350 17 Gram(s) Oral daily  PrismaSATE Dialysate BGK 4 / 2.5 5000 milliLiter(s) (1200 mL/Hr) CRRT <Continuous>  PrismaSOL Filtration BGK 0 / 2.5 5000 milliLiter(s) (600 mL/Hr) CRRT <Continuous>  PrismaSOL Filtration BGK 4 / 2.5 5000 milliLiter(s) (400 mL/Hr) CRRT <Continuous>  senna 2 Tablet(s) Oral at bedtime  sertraline 25 milliGRAM(s) Oral daily  sodium chloride 0.9% lock flush 3 milliLiter(s) IV Push every 8 hours  thiamine 100 milliGRAM(s) Oral daily  vasopressin Infusion 0.04 Unit(s)/Min (6 mL/Hr) IV Continuous <Continuous>    Pertinent Labs:  @ 00:32: Na 135, BUN 32<H>, Cr 1.52<H>, <H>, K+ 4.1, Phos 2.4<L>, Mg 2.5, Alk Phos 227<H>, ALT/SGPT 18, AST/SGOT 40, HbA1c --    A1C with Estimated Average Glucose Result: 5.7 % (24 @ 13:11)  A1C with Estimated Average Glucose Result: 5.6 % (24 @ 15:55)    Finger Sticks: N/A    Skin per nursing documentation: No noted pressure injuries as per documentation.   Edema: 1+ generalized, 2+ sumi foot     Based on: dosing wt 67.8kg   Estimated Energy Needs: 2034 - 2373kcal (30 - 35kcal/kg)  Estimated Protein Needs: 81 - 102g protein (1.2 - 1.5g/kg)  Estimated Fluid Needs: per team.  Alberto State Equation: N/A    Previous Nutrition Diagnosis: Severe Malnutrition, Increased Nutrient Needs   Nutrition Diagnosis is: [x] ongoing  [] resolved [] not applicable     New Nutrition Diagnosis: [x] Not applicable    Nutrition Care Plan:  [x] In Progress  [] Achieved  [] Not applicable    Nutrition Interventions:     Education Provided:       [x] Yes: Provided recommendations to optimize PO and protein intake, recommended small frequent meals by ordering nutrient-dense snacks and leaving non-perishable food away from tray for later consumption during the day or between meals, to start with protein, and sips of supplement throughout the day; reviewed foods with protein and menu order procedures in hospital.      Recommendations:      1) Recommend discontinuing enteral feeds at this time. Liberalize PO diet to low sodium only (d/c DASH)  2) Ensure Plus High Protein 2 x daily (pt unable to take all 3 at this time)  3) Gelatein Plus 2 x daily.  4) Continue micronutrient supplementation as ordered. Monitor/replete electrolytes PRN.  5) Provide encouragement with PO intake, menu selections, and assistance with meals as needed.     Monitoring and Evaluation:   Continue to monitor nutritional intake, tolerance to diet prescription, weights, labs, skin integrity    RD remains available upon request and will follow up per protocol    Patricia Pizarro MS, RD, CDN, CNSC; available via MS Teams

## 2024-03-11 NOTE — PROGRESS NOTE ADULT - SUBJECTIVE AND OBJECTIVE BOX
Overnight Events: Weaned off of Milrinone     Review Of Systems: No chest pain, shortness of breath, or palpitations            Current Meds:  allopurinol 100 milliGRAM(s) Oral daily  aMIOdarone    Tablet 200 milliGRAM(s) Oral two times a day  ascorbic acid 500 milliGRAM(s) Oral daily  atorvastatin 40 milliGRAM(s) Oral at bedtime  chlorhexidine 2% Cloths 1 Application(s) Topical <User Schedule>  CRRT Treatment    <Continuous>  cyanocobalamin 1000 MICROGram(s) Oral daily  DOBUTamine Infusion 7 MICROgram(s)/kG/Min IV Continuous <Continuous>  enoxaparin Injectable 30 milliGRAM(s) SubCutaneous every 12 hours  epoetin zara-epbx (RETACRIT) Injectable 97771 Unit(s) IV Push once  folic acid 1 milliGRAM(s) Oral daily  levothyroxine 75 MICROGram(s) Oral daily  lidocaine   4% Patch 1 Patch Transdermal daily  melatonin 5 milliGRAM(s) Oral at bedtime  midodrine 20 milliGRAM(s) Oral every 8 hours  pantoprazole    Tablet 40 milliGRAM(s) Oral before breakfast  polyethylene glycol 3350 17 Gram(s) Oral daily  PrismaSATE Dialysate BGK 4 / 2.5 5000 milliLiter(s) CRRT <Continuous>  PrismaSOL Filtration BGK 0 / 2.5 5000 milliLiter(s) CRRT <Continuous>  PrismaSOL Filtration BGK 4 / 2.5 5000 milliLiter(s) CRRT <Continuous>  senna 2 Tablet(s) Oral at bedtime  sertraline 50 milliGRAM(s) Oral daily  sodium chloride 0.9% lock flush 3 milliLiter(s) IV Push every 8 hours  thiamine 100 milliGRAM(s) Oral daily  vasopressin Infusion 0.04 Unit(s)/Min IV Continuous <Continuous>      Vitals:  T(F): 97.8 (03-11), Max: 98.8 (03-10)  HR: 69 (03-11) (69 - 94)  BP: --  RR: 18 (03-11)  SpO2: 100% (03-11)  I&O's Summary    10 Mar 2024 07:01  -  11 Mar 2024 07:00  --------------------------------------------------------  IN: 2735.8 mL / OUT: 4536 mL / NET: -1800.2 mL    11 Mar 2024 07:01  -  11 Mar 2024 13:48  --------------------------------------------------------  IN: 943.7 mL / OUT: 1388 mL / NET: -444.3 mL        Physical Exam:      Appearance: No acute distress  Eyes: pink conjunctiva  HEENT: AT/NC   Cardiovascular: Regular rate, mid-sternal scar   Respiratory: No accessory resp muscle use   Gastrointestinal:  non-distended   Musculoskeletal: No clubbing; no joint deformity   Neurologic: Normal speech, no facial asymmetry  Psychiatry: AAOx3, mood & affect appropriate  Skin: No rashes, ecchymoses, or cyanosis of exposed skin                           8.4    10.57 )-----------( 126      ( 11 Mar 2024 00:32 )             26.4     03-11    135  |  99  |  32<H>  ----------------------------<  128<H>  4.1   |  25  |  1.52<H>    Ca    9.0      11 Mar 2024 00:32  Phos  2.4     03-11  Mg     2.5     03-11    TPro  7.1  /  Alb  4.3  /  TBili  2.2<H>  /  DBili  x   /  AST  40  /  ALT  18  /  AlkPhos  227<H>  03-11    PT/INR - ( 11 Mar 2024 00:32 )   PT: 14.1 sec;   INR: 1.35 ratio         PTT - ( 11 Mar 2024 00:32 )  PTT:35.5 sec

## 2024-03-11 NOTE — PROGRESS NOTE ADULT - SUBJECTIVE AND OBJECTIVE BOX
[facility]   [phone]  RENAL FOLLOW UP NOTE  --------------------------------------------------------------------------------  Chief Complaint:    24 hour events/subjective:    seen earlier  on CRRT  still on high flow O2     PAST HISTORY  --------------------------------------------------------------------------------  No significant changes to PMH, PSH, FHx, SHx, unless otherwise noted    ALLERGIES & MEDICATIONS  --------------------------------------------------------------------------------  Allergies    No Known Allergies    Intolerances      Standing Inpatient Medications  allopurinol 100 milliGRAM(s) Oral daily  aMIOdarone    Tablet 200 milliGRAM(s) Oral two times a day  ascorbic acid 500 milliGRAM(s) Oral daily  atorvastatin 40 milliGRAM(s) Oral at bedtime  chlorhexidine 2% Cloths 1 Application(s) Topical <User Schedule>  CRRT Treatment    <Continuous>  cyanocobalamin 1000 MICROGram(s) Oral daily  DOBUTamine Infusion 7 MICROgram(s)/kG/Min IV Continuous <Continuous>  enoxaparin Injectable 30 milliGRAM(s) SubCutaneous every 12 hours  epoetin zara-epbx (RETACRIT) Injectable 48042 Unit(s) IV Push <User Schedule>  epoetin zara-epbx (RETACRIT) Injectable 54614 Unit(s) IV Push once  folic acid 1 milliGRAM(s) Oral daily  levothyroxine 75 MICROGram(s) Oral daily  lidocaine   4% Patch 1 Patch Transdermal daily  melatonin 5 milliGRAM(s) Oral at bedtime  midodrine 20 milliGRAM(s) Oral every 8 hours  pantoprazole    Tablet 40 milliGRAM(s) Oral before breakfast  polyethylene glycol 3350 17 Gram(s) Oral daily  PrismaSATE Dialysate BGK 4 / 2.5 5000 milliLiter(s) CRRT <Continuous>  PrismaSOL Filtration BGK 0 / 2.5 5000 milliLiter(s) CRRT <Continuous>  PrismaSOL Filtration BGK 4 / 2.5 5000 milliLiter(s) CRRT <Continuous>  senna 2 Tablet(s) Oral at bedtime  sertraline 50 milliGRAM(s) Oral daily  sodium chloride 0.9% lock flush 3 milliLiter(s) IV Push every 8 hours  thiamine 100 milliGRAM(s) Oral daily  vasopressin Infusion 0.04 Unit(s)/Min IV Continuous <Continuous>    PRN Inpatient Medications      REVIEW OF SYSTEMS  --------------------------------------------------------------------------------    Gen: denies  fevers/chills,  CVS: denies chest pain/palpitations  Resp:  +  SOB/Cough  GI: Denies N/V/Abd pain  : Denies dysuria    VITALS/PHYSICAL EXAM  --------------------------------------------------------------------------------  T(C): 36.2 (03-11-24 @ 19:00), Max: 36.6 (03-11-24 @ 12:00)  HR: 82 (03-11-24 @ 19:30) (69 - 94)  BP: --  RR: 29 (03-11-24 @ 19:30) (14 - 32)  SpO2: 95% (03-11-24 @ 19:30) (90% - 100%)  Wt(kg): --        03-10-24 @ 07:01  -  03-11-24 @ 07:00  --------------------------------------------------------  IN: 2735.8 mL / OUT: 4536 mL / NET: -1800.2 mL    03-11-24 @ 07:01  -  03-11-24 @ 20:59  --------------------------------------------------------  IN: 1914.1 mL / OUT: 2856 mL / NET: -941.9 mL      Physical Exam:  	    Gen:  on high flow O2 + IJ cath   	 +  jvd  	Pulm: decrease bs  no rales or ronchi or wheezing  	CV: RRR, S1S2; no rub  	Abd: +BS, soft, nontender/nondistended  	: No suprapubic tenderness  	UE: Warm, no cyanosis  no clubbing,  no edema  	LE: Warm, no cyanosis  no clubbing, 1+   edema  	Neuro: alert and oriented. speech coherent     LABS/STUDIES  --------------------------------------------------------------------------------              8.5    11.94 >-----------<  124      [03-11-24 @ 13:15]              26.6     135  |  99  |  29  ----------------------------<  164      [03-11-24 @ 13:15]  4.2   |  22  |  1.33        Ca     8.9     [03-11-24 @ 13:15]      Mg     2.5     [03-11-24 @ 00:32]      Phos  2.4     [03-11-24 @ 00:32]    TPro  7.1  /  Alb  4.2  /  TBili  2.2  /  DBili  x   /  AST  44  /  ALT  19  /  AlkPhos  197  [03-11-24 @ 13:15]    PT/INR: PT 14.1 , INR 1.35       [03-11-24 @ 00:32]  PTT: 35.5       [03-11-24 @ 00:32]      Creatinine Trend:  SCr 1.33 [03-11 @ 13:15]  SCr 1.52 [03-11 @ 00:32]  SCr 1.89 [03-10 @ 04:03]  SCr 2.53 [03-09 @ 00:33]  SCr 1.86 [03-08 @ 00:34]        Iron 290, TIBC 430, %sat 67      [03-06-24 @ 13:57]  Ferritin 335      [03-06-24 @ 13:57]  PTH -- (Ca 8.9)      [03-05-24 @ 13:03]   242  TSH 2.02      [03-06-24 @ 13:57]

## 2024-03-11 NOTE — PROGRESS NOTE ADULT - ASSESSMENT
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 Patient was initiated on bumex gtt with gradual improvement with Cr improving from 3.6 to 3.1. Patient was considered for MV re-op; however, patient was reluctant of the procedure and expressed wanting to follow up in the outpatient setting to schedule. During last admission, patient was also noted to be in flutter, EP was consulted, and patient underwent repeat WARREN/DCCV , on amiodarone. Patient was discharged home 2/28 on higher dose of diuretics, as per HF team. Today, patient was evaluated at CTS office four routine follow-up, pt c/o worsening shortness of breath and L>R LE edema, with redness/swelling on left dorsal aspect of the foot, readmitted to CTS service for further management.  noticed with rising creatinine and bun      1- SURESH on ckd   2- decompensated CHF  3- Mitral regurgitation   4- hypotension   5- hypothyroid   6- hyperuricemia   7- anemia       initially planned for hd today however lytes in range, hypotension  and fluid status improving will hold hd today   cont dobutamine drip   midodrine 15 mg tid   cont vasopressin drip    CRRT  dfr 1200  increased to 150 cc hr fluid removal   see new orders   reatcrit 45050 U twice weekly   allopurinol 100 mg daily   no blood work RUE  strict I/O  d/w CTS team when seen earlier

## 2024-03-12 LAB
ALBUMIN SERPL ELPH-MCNC: 4.3 G/DL — SIGNIFICANT CHANGE UP (ref 3.3–5)
ALP SERPL-CCNC: 184 U/L — HIGH (ref 40–120)
ALT FLD-CCNC: 17 U/L — SIGNIFICANT CHANGE UP (ref 10–45)
ANION GAP SERPL CALC-SCNC: 16 MMOL/L — SIGNIFICANT CHANGE UP (ref 5–17)
AST SERPL-CCNC: 42 U/L — HIGH (ref 10–40)
BASE EXCESS BLDV CALC-SCNC: -0.8 MMOL/L — SIGNIFICANT CHANGE UP (ref -2–3)
BASE EXCESS BLDV CALC-SCNC: -2.3 MMOL/L — LOW (ref -2–3)
BASE EXCESS BLDV CALC-SCNC: 0.5 MMOL/L — SIGNIFICANT CHANGE UP (ref -2–3)
BASE EXCESS BLDV CALC-SCNC: 0.7 MMOL/L — SIGNIFICANT CHANGE UP (ref -2–3)
BASE EXCESS BLDV CALC-SCNC: 1.4 MMOL/L — SIGNIFICANT CHANGE UP (ref -2–3)
BILIRUB SERPL-MCNC: 3 MG/DL — HIGH (ref 0.2–1.2)
BLD GP AB SCN SERPL QL: NEGATIVE — SIGNIFICANT CHANGE UP
BUN SERPL-MCNC: 30 MG/DL — HIGH (ref 7–23)
CA-I SERPL-SCNC: 1.16 MMOL/L — SIGNIFICANT CHANGE UP (ref 1.15–1.33)
CA-I SERPL-SCNC: 1.21 MMOL/L — SIGNIFICANT CHANGE UP (ref 1.15–1.33)
CA-I SERPL-SCNC: 1.22 MMOL/L — SIGNIFICANT CHANGE UP (ref 1.15–1.33)
CA-I SERPL-SCNC: 1.28 MMOL/L — SIGNIFICANT CHANGE UP (ref 1.15–1.33)
CA-I SERPL-SCNC: 1.36 MMOL/L — HIGH (ref 1.15–1.33)
CALCIUM SERPL-MCNC: 9.2 MG/DL — SIGNIFICANT CHANGE UP (ref 8.4–10.5)
CHLORIDE BLDV-SCNC: 100 MMOL/L — SIGNIFICANT CHANGE UP (ref 96–108)
CHLORIDE BLDV-SCNC: 103 MMOL/L — SIGNIFICANT CHANGE UP (ref 96–108)
CHLORIDE BLDV-SCNC: 104 MMOL/L — SIGNIFICANT CHANGE UP (ref 96–108)
CHLORIDE BLDV-SCNC: 99 MMOL/L — SIGNIFICANT CHANGE UP (ref 96–108)
CHLORIDE BLDV-SCNC: 99 MMOL/L — SIGNIFICANT CHANGE UP (ref 96–108)
CHLORIDE SERPL-SCNC: 97 MMOL/L — SIGNIFICANT CHANGE UP (ref 96–108)
CO2 BLDV-SCNC: 24 MMOL/L — SIGNIFICANT CHANGE UP (ref 22–26)
CO2 BLDV-SCNC: 26 MMOL/L — SIGNIFICANT CHANGE UP (ref 22–26)
CO2 BLDV-SCNC: 26 MMOL/L — SIGNIFICANT CHANGE UP (ref 22–26)
CO2 BLDV-SCNC: 27 MMOL/L — HIGH (ref 22–26)
CO2 BLDV-SCNC: 28 MMOL/L — HIGH (ref 22–26)
CO2 SERPL-SCNC: 21 MMOL/L — LOW (ref 22–31)
CREAT SERPL-MCNC: 1.28 MG/DL — SIGNIFICANT CHANGE UP (ref 0.5–1.3)
EGFR: 56 ML/MIN/1.73M2 — LOW
GAS PNL BLDA: SIGNIFICANT CHANGE UP
GAS PNL BLDV: 131 MMOL/L — LOW (ref 136–145)
GAS PNL BLDV: 132 MMOL/L — LOW (ref 136–145)
GAS PNL BLDV: 132 MMOL/L — LOW (ref 136–145)
GAS PNL BLDV: 135 MMOL/L — LOW (ref 136–145)
GAS PNL BLDV: 135 MMOL/L — LOW (ref 136–145)
GAS PNL BLDV: SIGNIFICANT CHANGE UP
GLUCOSE BLDV-MCNC: 121 MG/DL — HIGH (ref 70–99)
GLUCOSE BLDV-MCNC: 123 MG/DL — HIGH (ref 70–99)
GLUCOSE BLDV-MCNC: 130 MG/DL — HIGH (ref 70–99)
GLUCOSE BLDV-MCNC: 161 MG/DL — HIGH (ref 70–99)
GLUCOSE BLDV-MCNC: 178 MG/DL — HIGH (ref 70–99)
GLUCOSE SERPL-MCNC: 149 MG/DL — HIGH (ref 70–99)
HCO3 BLDV-SCNC: 23 MMOL/L — SIGNIFICANT CHANGE UP (ref 22–29)
HCO3 BLDV-SCNC: 24 MMOL/L — SIGNIFICANT CHANGE UP (ref 22–29)
HCO3 BLDV-SCNC: 24 MMOL/L — SIGNIFICANT CHANGE UP (ref 22–29)
HCO3 BLDV-SCNC: 26 MMOL/L — SIGNIFICANT CHANGE UP (ref 22–29)
HCO3 BLDV-SCNC: 27 MMOL/L — SIGNIFICANT CHANGE UP (ref 22–29)
HCT VFR BLD CALC: 28.7 % — LOW (ref 39–50)
HCT VFR BLDA CALC: 24 % — LOW (ref 39–51)
HCT VFR BLDA CALC: 26 % — LOW (ref 39–51)
HCT VFR BLDA CALC: 27 % — LOW (ref 39–51)
HGB BLD CALC-MCNC: 8.1 G/DL — LOW (ref 12.6–17.4)
HGB BLD CALC-MCNC: 8.6 G/DL — LOW (ref 12.6–17.4)
HGB BLD CALC-MCNC: 8.9 G/DL — LOW (ref 12.6–17.4)
HGB BLD CALC-MCNC: 8.9 G/DL — LOW (ref 12.6–17.4)
HGB BLD CALC-MCNC: 9.1 G/DL — LOW (ref 12.6–17.4)
HGB BLD-MCNC: 9.6 G/DL — LOW (ref 13–17)
HOROWITZ INDEX BLDV+IHG-RTO: 50 — SIGNIFICANT CHANGE UP
LACTATE BLDV-MCNC: 1.3 MMOL/L — SIGNIFICANT CHANGE UP (ref 0.5–2)
LACTATE BLDV-MCNC: 1.4 MMOL/L — SIGNIFICANT CHANGE UP (ref 0.5–2)
LACTATE BLDV-MCNC: 1.7 MMOL/L — SIGNIFICANT CHANGE UP (ref 0.5–2)
LACTATE BLDV-MCNC: 1.9 MMOL/L — SIGNIFICANT CHANGE UP (ref 0.5–2)
LACTATE BLDV-MCNC: 1.9 MMOL/L — SIGNIFICANT CHANGE UP (ref 0.5–2)
MAGNESIUM SERPL-MCNC: 2.6 MG/DL — SIGNIFICANT CHANGE UP (ref 1.6–2.6)
MCHC RBC-ENTMCNC: 32 PG — SIGNIFICANT CHANGE UP (ref 27–34)
MCHC RBC-ENTMCNC: 33.4 GM/DL — SIGNIFICANT CHANGE UP (ref 32–36)
MCV RBC AUTO: 95.7 FL — SIGNIFICANT CHANGE UP (ref 80–100)
NRBC # BLD: 0 /100 WBCS — SIGNIFICANT CHANGE UP (ref 0–0)
PCO2 BLDV: 36 MMHG — LOW (ref 42–55)
PCO2 BLDV: 41 MMHG — LOW (ref 42–55)
PCO2 BLDV: 42 MMHG — SIGNIFICANT CHANGE UP (ref 42–55)
PCO2 BLDV: 44 MMHG — SIGNIFICANT CHANGE UP (ref 42–55)
PCO2 BLDV: 44 MMHG — SIGNIFICANT CHANGE UP (ref 42–55)
PH BLDV: 7.35 — SIGNIFICANT CHANGE UP (ref 7.32–7.43)
PH BLDV: 7.38 — SIGNIFICANT CHANGE UP (ref 7.32–7.43)
PH BLDV: 7.38 — SIGNIFICANT CHANGE UP (ref 7.32–7.43)
PH BLDV: 7.39 — SIGNIFICANT CHANGE UP (ref 7.32–7.43)
PH BLDV: 7.44 — HIGH (ref 7.32–7.43)
PHOSPHATE SERPL-MCNC: 2.6 MG/DL — SIGNIFICANT CHANGE UP (ref 2.5–4.5)
PLATELET # BLD AUTO: 124 K/UL — LOW (ref 150–400)
PO2 BLDV: 27 MMHG — SIGNIFICANT CHANGE UP (ref 25–45)
PO2 BLDV: 30 MMHG — SIGNIFICANT CHANGE UP (ref 25–45)
PO2 BLDV: 30 MMHG — SIGNIFICANT CHANGE UP (ref 25–45)
PO2 BLDV: 34 MMHG — SIGNIFICANT CHANGE UP (ref 25–45)
PO2 BLDV: 94 MMHG — HIGH (ref 25–45)
POTASSIUM BLDV-SCNC: 3.8 MMOL/L — SIGNIFICANT CHANGE UP (ref 3.5–5.1)
POTASSIUM BLDV-SCNC: 3.9 MMOL/L — SIGNIFICANT CHANGE UP (ref 3.5–5.1)
POTASSIUM BLDV-SCNC: 4.3 MMOL/L — SIGNIFICANT CHANGE UP (ref 3.5–5.1)
POTASSIUM BLDV-SCNC: 4.3 MMOL/L — SIGNIFICANT CHANGE UP (ref 3.5–5.1)
POTASSIUM BLDV-SCNC: 4.4 MMOL/L — SIGNIFICANT CHANGE UP (ref 3.5–5.1)
POTASSIUM SERPL-MCNC: 4.3 MMOL/L — SIGNIFICANT CHANGE UP (ref 3.5–5.3)
POTASSIUM SERPL-SCNC: 4.3 MMOL/L — SIGNIFICANT CHANGE UP (ref 3.5–5.3)
PROT SERPL-MCNC: 7 G/DL — SIGNIFICANT CHANGE UP (ref 6–8.3)
RBC # BLD: 3 M/UL — LOW (ref 4.2–5.8)
RBC # FLD: 17 % — HIGH (ref 10.3–14.5)
RH IG SCN BLD-IMP: POSITIVE — SIGNIFICANT CHANGE UP
SAO2 % BLDV: 48.9 % — LOW (ref 67–88)
SAO2 % BLDV: 50.3 % — LOW (ref 67–88)
SAO2 % BLDV: 53.1 % — LOW (ref 67–88)
SAO2 % BLDV: 57.6 % — LOW (ref 67–88)
SAO2 % BLDV: 98.8 % — HIGH (ref 67–88)
SODIUM SERPL-SCNC: 134 MMOL/L — LOW (ref 135–145)
WBC # BLD: 12.59 K/UL — HIGH (ref 3.8–10.5)
WBC # FLD AUTO: 12.59 K/UL — HIGH (ref 3.8–10.5)

## 2024-03-12 PROCEDURE — 71045 X-RAY EXAM CHEST 1 VIEW: CPT | Mod: 26

## 2024-03-12 PROCEDURE — 93010 ELECTROCARDIOGRAM REPORT: CPT

## 2024-03-12 PROCEDURE — 99291 CRITICAL CARE FIRST HOUR: CPT

## 2024-03-12 RX ORDER — MEROPENEM 1 G/30ML
INJECTION INTRAVENOUS
Refills: 0 | Status: COMPLETED | OUTPATIENT
Start: 2024-03-12 | End: 2024-03-19

## 2024-03-12 RX ORDER — CALCIUM GLUCONATE 100 MG/ML
2 VIAL (ML) INTRAVENOUS ONCE
Refills: 0 | Status: COMPLETED | OUTPATIENT
Start: 2024-03-12 | End: 2024-03-12

## 2024-03-12 RX ORDER — MEROPENEM 1 G/30ML
1000 INJECTION INTRAVENOUS ONCE
Refills: 0 | Status: COMPLETED | OUTPATIENT
Start: 2024-03-12 | End: 2024-03-12

## 2024-03-12 RX ORDER — MEROPENEM 1 G/30ML
1000 INJECTION INTRAVENOUS EVERY 8 HOURS
Refills: 0 | Status: COMPLETED | OUTPATIENT
Start: 2024-03-12 | End: 2024-03-19

## 2024-03-12 RX ORDER — EPINEPHRINE 0.3 MG/.3ML
0.02 INJECTION INTRAMUSCULAR; SUBCUTANEOUS
Qty: 4 | Refills: 0 | Status: DISCONTINUED | OUTPATIENT
Start: 2024-03-12 | End: 2024-03-13

## 2024-03-12 RX ORDER — ALBUMIN HUMAN 25 %
50 VIAL (ML) INTRAVENOUS ONCE
Refills: 0 | Status: COMPLETED | OUTPATIENT
Start: 2024-03-12 | End: 2024-03-12

## 2024-03-12 RX ORDER — NOREPINEPHRINE BITARTRATE/D5W 8 MG/250ML
0.01 PLASTIC BAG, INJECTION (ML) INTRAVENOUS
Qty: 8 | Refills: 0 | Status: DISCONTINUED | OUTPATIENT
Start: 2024-03-12 | End: 2024-03-13

## 2024-03-12 RX ORDER — VANCOMYCIN HCL 1 G
1000 VIAL (EA) INTRAVENOUS ONCE
Refills: 0 | Status: COMPLETED | OUTPATIENT
Start: 2024-03-12 | End: 2024-03-12

## 2024-03-12 RX ADMIN — AMIODARONE HYDROCHLORIDE 200 MILLIGRAM(S): 400 TABLET ORAL at 05:12

## 2024-03-12 RX ADMIN — POLYETHYLENE GLYCOL 3350 17 GRAM(S): 17 POWDER, FOR SOLUTION ORAL at 11:23

## 2024-03-12 RX ADMIN — Medication 15.3 MICROGRAM(S)/KG/MIN: at 08:18

## 2024-03-12 RX ADMIN — MEROPENEM 100 MILLIGRAM(S): 1 INJECTION INTRAVENOUS at 19:53

## 2024-03-12 RX ADMIN — Medication 15.3 MICROGRAM(S)/KG/MIN: at 19:53

## 2024-03-12 RX ADMIN — MIDODRINE HYDROCHLORIDE 20 MILLIGRAM(S): 2.5 TABLET ORAL at 21:08

## 2024-03-12 RX ADMIN — Medication 250 MILLIGRAM(S): at 20:30

## 2024-03-12 RX ADMIN — EPINEPHRINE 5.09 MICROGRAM(S)/KG/MIN: 0.3 INJECTION INTRAMUSCULAR; SUBCUTANEOUS at 08:18

## 2024-03-12 RX ADMIN — SODIUM CHLORIDE 3 MILLILITER(S): 9 INJECTION INTRAMUSCULAR; INTRAVENOUS; SUBCUTANEOUS at 12:16

## 2024-03-12 RX ADMIN — Medication 75 MICROGRAM(S): at 05:14

## 2024-03-12 RX ADMIN — SERTRALINE 50 MILLIGRAM(S): 25 TABLET, FILM COATED ORAL at 05:12

## 2024-03-12 RX ADMIN — Medication 100 MILLIGRAM(S): at 11:22

## 2024-03-12 RX ADMIN — MIDODRINE HYDROCHLORIDE 20 MILLIGRAM(S): 2.5 TABLET ORAL at 15:19

## 2024-03-12 RX ADMIN — Medication 5 MILLIGRAM(S): at 21:08

## 2024-03-12 RX ADMIN — SODIUM CHLORIDE 3 MILLILITER(S): 9 INJECTION INTRAMUSCULAR; INTRAVENOUS; SUBCUTANEOUS at 05:40

## 2024-03-12 RX ADMIN — PANTOPRAZOLE SODIUM 40 MILLIGRAM(S): 20 TABLET, DELAYED RELEASE ORAL at 06:49

## 2024-03-12 RX ADMIN — VASOPRESSIN 6 UNIT(S)/MIN: 20 INJECTION INTRAVENOUS at 19:53

## 2024-03-12 RX ADMIN — SODIUM CHLORIDE 3 MILLILITER(S): 9 INJECTION INTRAMUSCULAR; INTRAVENOUS; SUBCUTANEOUS at 21:00

## 2024-03-12 RX ADMIN — Medication 50 MILLILITER(S): at 04:15

## 2024-03-12 RX ADMIN — EPINEPHRINE 5.09 MICROGRAM(S)/KG/MIN: 0.3 INJECTION INTRAMUSCULAR; SUBCUTANEOUS at 19:54

## 2024-03-12 RX ADMIN — ATORVASTATIN CALCIUM 40 MILLIGRAM(S): 80 TABLET, FILM COATED ORAL at 21:08

## 2024-03-12 RX ADMIN — Medication 200 GRAM(S): at 09:10

## 2024-03-12 RX ADMIN — PREGABALIN 1000 MICROGRAM(S): 225 CAPSULE ORAL at 11:23

## 2024-03-12 RX ADMIN — Medication 1.27 MICROGRAM(S)/KG/MIN: at 19:54

## 2024-03-12 RX ADMIN — Medication 100 MILLIGRAM(S): at 11:21

## 2024-03-12 RX ADMIN — ENOXAPARIN SODIUM 30 MILLIGRAM(S): 100 INJECTION SUBCUTANEOUS at 11:21

## 2024-03-12 RX ADMIN — ENOXAPARIN SODIUM 30 MILLIGRAM(S): 100 INJECTION SUBCUTANEOUS at 21:08

## 2024-03-12 RX ADMIN — CHLORHEXIDINE GLUCONATE 1 APPLICATION(S): 213 SOLUTION TOPICAL at 05:12

## 2024-03-12 RX ADMIN — MIDODRINE HYDROCHLORIDE 20 MILLIGRAM(S): 2.5 TABLET ORAL at 05:12

## 2024-03-12 RX ADMIN — Medication 50 MILLILITER(S): at 03:50

## 2024-03-12 RX ADMIN — SENNA PLUS 2 TABLET(S): 8.6 TABLET ORAL at 21:08

## 2024-03-12 RX ADMIN — AMIODARONE HYDROCHLORIDE 200 MILLIGRAM(S): 400 TABLET ORAL at 18:00

## 2024-03-12 RX ADMIN — Medication 1 MILLIGRAM(S): at 11:22

## 2024-03-12 RX ADMIN — VASOPRESSIN 6 UNIT(S)/MIN: 20 INJECTION INTRAVENOUS at 08:18

## 2024-03-12 RX ADMIN — Medication 500 MILLIGRAM(S): at 11:23

## 2024-03-12 NOTE — PROGRESS NOTE ADULT - SUBJECTIVE AND OBJECTIVE BOX
Patient seen and examined at the bedside.    Remained critically ill on continuous ICU monitoring.      Brief Summary:  82 yo M with PMHx CKD (b/l Cr 2.5-3), ICM/HFrEF, CAD, aortic stenosis s/p AVR-t (2004), VT arrest (2017) s/p ICD, Afib s/p multiple DCCV and ablation x2 (2020 and 2023; on AC), Aflutter s/p WARREN/DCCV (8/23), prior aortic aneurysm s/p Bentall (2021), and severe MR prior MVr (2021), transferred to ICU for hypotension/CHF/renal failure on 3/5/24.        24 Hour events:       OBJECTIVE:  Vital Signs Last 24 Hrs  T(C): 36.1 (12 Mar 2024 04:00), Max: 36.6 (11 Mar 2024 12:00)  T(F): 97 (12 Mar 2024 04:00), Max: 97.8 (11 Mar 2024 12:00)  HR: 79 (12 Mar 2024 05:30) (69 - 94)  BP: --  BP(mean): --  RR: 24 (12 Mar 2024 05:30) (15 - 32)  SpO2: 99% (12 Mar 2024 05:30) (90% - 100%)    Parameters below as of 12 Mar 2024 04:00  Patient On (Oxygen Delivery Method): nasal cannula, high flow  O2 Flow (L/min): 50  O2 Concentration (%): 50                Physical Exam:   General: awake, interactive  Neurology: intact  Respiratory: on HFNC, non-labored respirations  CV: Sinus rhythm  Abdominal: Soft, NT  Extremities: warm, well perfused, moving all extremities     -------------------------------------------------------------------------------------------------------------------------------    Labs:                        9.6    12.59 )-----------( 124      ( 12 Mar 2024 00:42 )             28.7     03-12    134<L>  |  97  |  30<H>  ----------------------------<  149<H>  4.3   |  21<L>  |  1.28    Ca    9.2      12 Mar 2024 00:42  Phos  2.6     03-12  Mg     2.6     03-12    TPro  7.0  /  Alb  4.3  /  TBili  3.0<H>  /  DBili  x   /  AST  42<H>  /  ALT  17  /  AlkPhos  184<H>  03-12    LIVER FUNCTIONS - ( 12 Mar 2024 00:42 )  Alb: 4.3 g/dL / Pro: 7.0 g/dL / ALK PHOS: 184 U/L / ALT: 17 U/L / AST: 42 U/L / GGT: x           PT/INR - ( 11 Mar 2024 00:32 )   PT: 14.1 sec;   INR: 1.35 ratio         PTT - ( 11 Mar 2024 00:32 )  PTT:35.5 sec  ABG - ( 12 Mar 2024 00:13 )  pH, Arterial: 7.43  pH, Blood: x     /  pCO2: 37    /  pO2: 105   / HCO3: 25    / Base Excess: 0.4   /  SaO2: 98.5              ------------------------------------------------------------------------------------------------------------------------------  Assessment:  82 yo M with PMHx CKD (b/l Cr 2.5-3), ICM/HFrEF, CAD, aortic stenosis s/p AVR-t (2004), VT arrest (2017) s/p ICD, Afib s/p multiple DCCV and ablation x2 (2020 and 2023; on AC), Aflutter s/p WARREN/DCCV (8/23), prior aortic aneurysm s/p Bentall (2021), and severe MR prior MVr (2021), transferred to ICU for hypotension/CHF/renal failure on 3/5/24.    s/p Repeat Sternotomy, Bentall Procedure, and Mitral Valve Repair on 6/2/21  At risk for hemodynamic instability  Receiving inotropic medication  Acute respiratory insufficiency  Anemia  Thrombocytopenia      Plan:   ***Neuro***  - Zoloft daily.  - Melatonin for sleep at night   - Optimize day/night cycles.    ***Cardiovascular***  - At risk for hemodynamic instability and cardiac arrhythmias.  - Wean pressors for MAP > 65 mm Hg    - IV Dobutamine infusion for inotropic support   - Amiodarone for afib  - Midodrine q8 for BP Management   - Statin daily   - F/u with echo from this AM    ***Pulmonary***  -Acute respiratory insufficiency   - High flow nasal cannula. Wean oxygen as able.  - Nitric oxide 20 ppm.  - Deep breathing and coughing exercises.    ***GI***  - Continue diet + TFs as needed  - Protonix for stress ulcer prophylaxis   - Bowel regimen.    ***Renal***  - CKD  - Started CRRT 3/10  - Goal net negative.  - Replete electrolytes as indicated.    ***ID***  - No acute issues.    ***Endocrine***  - Synthroid for Hypothyroidism     ***Hematology***  - Anemia and thrombocytopenia   - No current transfusion indication   - Lovenox for DVT prophylaxis          Patient requires continuous monitoring with bedside rhythm monitoring, pulse oximetry monitoring, and continuous central venous and arterial pressure monitoring; and intermittent blood gas analysis. Care plan discussed with the ICU care team.   Patient remained critical, at risk for life threatening decompensation.    I have spent 30 minutes providing critical care management to this patient.    By signing my name below, I, Kevrigo Hanna, attest that this documentation has been prepared under the direction and in the presence of Mena Au DO  Electronically signed: Kev Hanna, 03-12-24 @ 05:44    I, Mena Au DO, personally performed the services described in this documentation. all medical record entries made by the scribe were at my direction and in my presence. I have reviewed the chart and agree that the record reflects my personal performance and is accurate and complete  Electronically signed: Mena Au DO, Patient seen and examined at the bedside.    Remained critically ill on continuous ICU monitoring.      Brief Summary:  84 yo M with PMHx CKD (b/l Cr 2.5-3), ICM/HFrEF, CAD, aortic stenosis s/p AVR-t (2004), VT arrest (2017) s/p ICD, Afib s/p multiple DCCV and ablation x2 (2020 and 2023; on AC), Aflutter s/p WARREN/DCCV (8/23), prior aortic aneurysm s/p Bentall (2021), and severe MR prior MVr (2021), transferred to ICU for hypotension/CHF/renal failure on 3/5/24.        24 Hour events:  - Failing nitric wean   - Started on Levo/Epi for failed nitric wean  - Goal to wean Levo to off  - Started on Insulin Sliding Scale        OBJECTIVE:  Vital Signs Last 24 Hrs  T(C): 36.1 (12 Mar 2024 04:00), Max: 36.6 (11 Mar 2024 12:00)  T(F): 97 (12 Mar 2024 04:00), Max: 97.8 (11 Mar 2024 12:00)  HR: 79 (12 Mar 2024 05:30) (69 - 94)  BP: --  BP(mean): --  RR: 24 (12 Mar 2024 05:30) (15 - 32)  SpO2: 99% (12 Mar 2024 05:30) (90% - 100%)    Parameters below as of 12 Mar 2024 04:00  Patient On (Oxygen Delivery Method): nasal cannula, high flow  O2 Flow (L/min): 50  O2 Concentration (%): 50                Physical Exam:   General: awake, interactive  Neurology: intact  Respiratory: on HFNC, non-labored respirations  CV: Afib  Abdominal: Soft, NT  Extremities: warm, well perfused, moving all extremities     -------------------------------------------------------------------------------------------------------------------------------    Labs:                        9.6    12.59 )-----------( 124      ( 12 Mar 2024 00:42 )             28.7     03-12    134<L>  |  97  |  30<H>  ----------------------------<  149<H>  4.3   |  21<L>  |  1.28    Ca    9.2      12 Mar 2024 00:42  Phos  2.6     03-12  Mg     2.6     03-12    TPro  7.0  /  Alb  4.3  /  TBili  3.0<H>  /  DBili  x   /  AST  42<H>  /  ALT  17  /  AlkPhos  184<H>  03-12    LIVER FUNCTIONS - ( 12 Mar 2024 00:42 )  Alb: 4.3 g/dL / Pro: 7.0 g/dL / ALK PHOS: 184 U/L / ALT: 17 U/L / AST: 42 U/L / GGT: x           PT/INR - ( 11 Mar 2024 00:32 )   PT: 14.1 sec;   INR: 1.35 ratio         PTT - ( 11 Mar 2024 00:32 )  PTT:35.5 sec  ABG - ( 12 Mar 2024 00:13 )  pH, Arterial: 7.43  pH, Blood: x     /  pCO2: 37    /  pO2: 105   / HCO3: 25    / Base Excess: 0.4   /  SaO2: 98.5              ------------------------------------------------------------------------------------------------------------------------------  Assessment:  84 yo M with PMHx CKD (b/l Cr 2.5-3), ICM/HFrEF, CAD, aortic stenosis s/p AVR-t (2004), VT arrest (2017) s/p ICD, Afib s/p multiple DCCV and ablation x2 (2020 and 2023; on AC), Aflutter s/p WARREN/DCCV (8/23), prior aortic aneurysm s/p Bentall (2021), and severe MR prior MVr (2021), transferred to ICU for hypotension/CHF/renal failure on 3/5/24.    s/p Repeat Sternotomy, Bentall Procedure, and Mitral Valve Repair on 6/2/21  At risk for hemodynamic instability  Receiving inotropic medication  Acute respiratory insufficiency  Anemia  Thrombocytopenia      Plan:   ***Neuro***  - Zoloft daily.  - Melatonin for sleep at night   - Optimize day/night cycles.    ***Cardiovascular***  - At risk for hemodynamic instability and cardiac arrhythmias.  - Wean pressors for MAP > 65 mm Hg    - IV Dobutamine and Epinephrine infusions for inotropic support   - Amiodarone for afib  - Midodrine q8 for BP Management   - Statin daily   - F/u with echo from this AM    ***Pulmonary***  -Acute respiratory insufficiency   - High flow nasal cannula. Wean oxygen as able.  - Nitric oxide 20 ppm.  - Deep breathing and coughing exercises.    ***GI***  - Continue diet + TFs as needed  - Protonix for stress ulcer prophylaxis   - Bowel regimen.    ***Renal***  - CKD  - Started CRRT 3/10  - Goal net negative.  - Replete electrolytes as indicated.    ***ID***  - No acute issues.    ***Endocrine***  - Stress Hyperglycemia- Insulin Sliding Scale   - Synthroid for Hypothyroidism     ***Hematology***  - Anemia and thrombocytopenia   - No current transfusion indication   - Lovenox for DVT prophylaxis          Patient requires continuous monitoring with bedside rhythm monitoring, pulse oximetry monitoring, and continuous central venous and arterial pressure monitoring; and intermittent blood gas analysis. Care plan discussed with the ICU care team.   Patient remained critical, at risk for life threatening decompensation.    I have spent 30 minutes providing critical care management to this patient.    By signing my name below, I, Kev Hanna, attest that this documentation has been prepared under the direction and in the presence of Mena Au DO  Electronically signed: Kev Hanna, 03-12-24 @ 05:44    I, Mena Au DO, personally performed the services described in this documentation. all medical record entries made by the scribe were at my direction and in my presence. I have reviewed the chart and agree that the record reflects my personal performance and is accurate and complete  Electronically signed: Mena Au DO, Patient seen and examined at the bedside.    Remained critically ill on continuous ICU monitoring.      Brief Summary:  84 yo M with PMHx CKD (b/l Cr 2.5-3), ICM/HFrEF, CAD, aortic stenosis s/p AVR-t (2004), VT arrest (2017) s/p ICD, Afib s/p multiple DCCV and ablation x2 (2020 and 2023; on AC), Aflutter s/p WARREN/DCCV (8/23), prior aortic aneurysm s/p Bentall (2021), and severe MR prior MVr (2021), transferred to ICU for hypotension/CHF/renal failure on 3/5/24.        24 Hour events:  - Failing nitric wean this AM, requiring increasing Luke, adding epi gtt for support.  - Respiratory viral panel negative.     OBJECTIVE:  Vital Signs Last 24 Hrs  T(C): 36.1 (12 Mar 2024 04:00), Max: 36.6 (11 Mar 2024 12:00)  T(F): 97 (12 Mar 2024 04:00), Max: 97.8 (11 Mar 2024 12:00)  HR: 79 (12 Mar 2024 05:30) (69 - 94)  BP: --  BP(mean): --  RR: 24 (12 Mar 2024 05:30) (15 - 32)  SpO2: 99% (12 Mar 2024 05:30) (90% - 100%)    Parameters below as of 12 Mar 2024 04:00  Patient On (Oxygen Delivery Method): nasal cannula, high flow  O2 Flow (L/min): 50  O2 Concentration (%): 50                Physical Exam:   General: awake, OOBTC  Neurology: intact, interactive  Respiratory: on HFNC with Luke, non-labored respirations  CV: Afib  Abdominal: Soft, nondistended  Extremities: warm to palpation, BRAVO     -------------------------------------------------------------------------------------------------------------------------------    Labs:                        9.6    12.59 )-----------( 124      ( 12 Mar 2024 00:42 )             28.7     03-12    134<L>  |  97  |  30<H>  ----------------------------<  149<H>  4.3   |  21<L>  |  1.28    Ca    9.2      12 Mar 2024 00:42  Phos  2.6     03-12  Mg     2.6     03-12    TPro  7.0  /  Alb  4.3  /  TBili  3.0<H>  /  DBili  x   /  AST  42<H>  /  ALT  17  /  AlkPhos  184<H>  03-12    LIVER FUNCTIONS - ( 12 Mar 2024 00:42 )  Alb: 4.3 g/dL / Pro: 7.0 g/dL / ALK PHOS: 184 U/L / ALT: 17 U/L / AST: 42 U/L / GGT: x           PT/INR - ( 11 Mar 2024 00:32 )   PT: 14.1 sec;   INR: 1.35 ratio         PTT - ( 11 Mar 2024 00:32 )  PTT:35.5 sec  ABG - ( 12 Mar 2024 00:13 )  pH, Arterial: 7.43  pH, Blood: x     /  pCO2: 37    /  pO2: 105   / HCO3: 25    / Base Excess: 0.4   /  SaO2: 98.5          ------------------------------------------------------------------------------------------------------------------------------  Assessment:  84 yo M with PMHx CKD (b/l Cr 2.5-3), ICM/HFrEF, CAD, aortic stenosis s/p AVR-t (2004), VT arrest (2017) s/p ICD, Afib s/p multiple DCCV and ablation x2 (2020 and 2023; on AC), Aflutter s/p WARREN/DCCV (8/23), prior aortic aneurysm s/p Bentall (2021), and severe MR prior MVr (2021), transferred to ICU for hypotension/CHF/renal failure on 3/5/24.    s/p Repeat Sternotomy, Bentall Procedure, and Mitral Valve Repair on 6/2/21  At high risk for hemodynamic instability and cardiac arrhythmias  Receiving inotropic medication  Acute respiratory insufficiency  Anemia  Thrombocytopenia      Plan:   ***Neuro***  - Zoloft daily.  - Melatonin for sleep at night   - Optimize day/night cycles.    ***Cardiovascular***  - At high risk for hemodynamic instability and cardiac arrhythmias.  - Wean norepinephrine for MAP > 65 mm Hg    - IV Dobutamine and Epinephrine infusions for inotropic support   - Amiodarone for afib  - Midodrine q8 for BP Management   - Statin daily     ***Pulmonary***  - Acute respiratory insufficiency   - High flow nasal cannula. Wean oxygen as able.  - Continue Nitric oxide 20 ppm.  - Deep breathing and coughing exercises.    ***GI***  - Continue diet + TFs as needed  - Protonix for stress ulcer prophylaxis   - Bowel regimen.    ***Renal***  - CKD on CRRT  - Goal net negative.  - Replete electrolytes as indicated.    ***ID***  - No acute issues.    ***Endocrine***  - Stress Hyperglycemia- Insulin Sliding Scale   - Synthroid for Hypothyroidism     ***Hematology***  - Anemia and thrombocytopenia   - No current transfusion indication   - Lovenox for DVT prophylaxis          Patient requires continuous monitoring with bedside rhythm monitoring, pulse oximetry monitoring, and continuous central venous and arterial pressure monitoring; and intermittent blood gas analysis. Care plan discussed with the ICU care team.   Patient remained critical, at risk for life threatening decompensation.    I have spent 50 minutes providing critical care management to this patient.    By signing my name below, I, Kev Hanna, attest that this documentation has been prepared under the direction and in the presence of Mena Au DO  Electronically signed: Kev Hanna, 03-12-24 @ 05:44    I, Mena Au DO, personally performed the services described in this documentation. all medical record entries made by the scribe were at my direction and in my presence. I have reviewed the chart and agree that the record reflects my personal performance and is accurate and complete  Electronically signed: Mena Au, ,

## 2024-03-12 NOTE — PROGRESS NOTE ADULT - ASSESSMENT
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 Patient was initiated on bumex gtt with gradual improvement with Cr improving from 3.6 to 3.1. Patient was considered for MV re-op; however, patient was reluctant of the procedure and expressed wanting to follow up in the outpatient setting to schedule. During last admission, patient was also noted to be in flutter, EP was consulted, and patient underwent repeat WARREN/DCCV , on amiodarone. Patient was discharged home 2/28 on higher dose of diuretics, as per HF team. Today, patient was evaluated at CTS office four routine follow-up, pt c/o worsening shortness of breath and L>R LE edema, with redness/swelling on left dorsal aspect of the foot, readmitted to CTS service for further management.  noticed with rising creatinine and bun      1- SURESH on ckd   2- decompensated CHF  3- Mitral regurgitation   4- hypotension   5- hypothyroid   6- hyperuricemia   7- anemia      cont dobutamine drip   midodrine 15 mg tid   cont vasopressin drip    CRRT with [id number] dialyzer;  dfr 1200  increased to 150 cc hr fluid removal   see new orders   reatcrit 08875 U twice weekly   allopurinol 100 mg daily   no blood work RUE  strict I/O  d/w CTS team when seen earlier

## 2024-03-12 NOTE — PROGRESS NOTE ADULT - SUBJECTIVE AND OBJECTIVE BOX
[facility]   [phone]  RENAL FOLLOW UP NOTE  --------------------------------------------------------------------------------  Chief Complaint:    24 hour events/subjective:    seen earlier   still c/o sob   is on CRRT     PAST HISTORY  --------------------------------------------------------------------------------  No significant changes to PMH, PSH, FHx, SHx, unless otherwise noted    ALLERGIES & MEDICATIONS  --------------------------------------------------------------------------------  Allergies    No Known Allergies    Intolerances      Standing Inpatient Medications  allopurinol 100 milliGRAM(s) Oral daily  aMIOdarone    Tablet 200 milliGRAM(s) Oral two times a day  ascorbic acid 500 milliGRAM(s) Oral daily  atorvastatin 40 milliGRAM(s) Oral at bedtime  chlorhexidine 2% Cloths 1 Application(s) Topical <User Schedule>  CRRT Treatment    <Continuous>  cyanocobalamin 1000 MICROGram(s) Oral daily  DOBUTamine Infusion 7.5 MICROgram(s)/kG/Min IV Continuous <Continuous>  enoxaparin Injectable 30 milliGRAM(s) SubCutaneous every 12 hours  EPINEPHrine    Infusion 0.02 MICROgram(s)/kG/Min IV Continuous <Continuous>  epoetin zara-epbx (RETACRIT) Injectable 66390 Unit(s) IV Push once  epoetin zara-epbx (RETACRIT) Injectable 39580 Unit(s) IV Push <User Schedule>  folic acid 1 milliGRAM(s) Oral daily  levothyroxine 75 MICROGram(s) Oral daily  lidocaine   4% Patch 1 Patch Transdermal daily  melatonin 5 milliGRAM(s) Oral at bedtime  meropenem  IVPB 1000 milliGRAM(s) IV Intermittent every 8 hours  meropenem  IVPB      midodrine 20 milliGRAM(s) Oral every 8 hours  norepinephrine Infusion 0.01 MICROgram(s)/kG/Min IV Continuous <Continuous>  pantoprazole    Tablet 40 milliGRAM(s) Oral before breakfast  polyethylene glycol 3350 17 Gram(s) Oral daily  PrismaSATE Dialysate BGK 4 / 2.5 5000 milliLiter(s) CRRT <Continuous>  PrismaSOL Filtration BGK 0 / 2.5 5000 milliLiter(s) CRRT <Continuous>  PrismaSOL Filtration BGK 4 / 2.5 5000 milliLiter(s) CRRT <Continuous>  senna 2 Tablet(s) Oral at bedtime  sertraline 50 milliGRAM(s) Oral daily  sodium chloride 0.9% lock flush 3 milliLiter(s) IV Push every 8 hours  thiamine 100 milliGRAM(s) Oral daily  vasopressin Infusion 0.04 Unit(s)/Min IV Continuous <Continuous>    PRN Inpatient Medications      REVIEW OF SYSTEMS  --------------------------------------------------------------------------------    Gen: denies  fevers/chills,  CVS: denies chest pain/palpitations  Resp:  +  SOB/Cough  GI: Denies N/V/Abd pain  : Denies dysuria    VITALS/PHYSICAL EXAM  --------------------------------------------------------------------------------  T(C): 36.3 (03-12-24 @ 20:00), Max: 36.5 (03-12-24 @ 16:00)  HR: 73 (03-12-24 @ 21:00) (71 - 114)  BP: 85/53 (03-12-24 @ 19:00) (85/53 - 98/54)  RR: 21 (03-12-24 @ 21:00) (18 - 38)  SpO2: 100% (03-12-24 @ 21:00) (87% - 100%)  Wt(kg): --        03-11-24 @ 07:01  -  03-12-24 @ 07:00  --------------------------------------------------------  IN: 2549.1 mL / OUT: 4809 mL / NET: -2259.9 mL    03-12-24 @ 07:01  -  03-12-24 @ 21:19  --------------------------------------------------------  IN: 1504.9 mL / OUT: 1876 mL / NET: -371.1 mL      Physical Exam:  	      Gen:  on high flow O2 + IJ cath   	 +  jvd  	Pulm: decrease bs + coarse bs   	CV: RRR, S1S2; no rub  	Abd: +BS, soft, nontender/nondistended  	: No suprapubic tenderness  	UE: Warm, no cyanosis  no clubbing,  no edema  	LE: Warm, no cyanosis  no clubbing, 1+   edema  	Neuro: alert and oriented. speech coherent     LABS/STUDIES  --------------------------------------------------------------------------------              9.6    12.59 >-----------<  124      [03-12-24 @ 00:42]              28.7     134  |  97  |  30  ----------------------------<  149      [03-12-24 @ 00:42]  4.3   |  21  |  1.28        Ca     9.2     [03-12-24 @ 00:42]      Mg     2.6     [03-12-24 @ 00:42]      Phos  2.6     [03-12-24 @ 00:42]    TPro  7.0  /  Alb  4.3  /  TBili  3.0  /  DBili  x   /  AST  42  /  ALT  17  /  AlkPhos  184  [03-12-24 @ 00:42]    PT/INR: PT 14.1 , INR 1.35       [03-11-24 @ 00:32]  PTT: 35.5       [03-11-24 @ 00:32]      Creatinine Trend:  SCr 1.28 [03-12 @ 00:42]  SCr 1.33 [03-11 @ 13:15]  SCr 1.52 [03-11 @ 00:32]  SCr 1.89 [03-10 @ 04:03]  SCr 2.53 [03-09 @ 00:33]        Iron 290, TIBC 430, %sat 67      [03-06-24 @ 13:57]  Ferritin 335      [03-06-24 @ 13:57]  PTH -- (Ca 8.9)      [03-05-24 @ 13:03]   242  TSH 2.02      [03-06-24 @ 13:57]

## 2024-03-13 ENCOUNTER — APPOINTMENT (OUTPATIENT)
Dept: HEART FAILURE | Facility: CLINIC | Age: 84
End: 2024-03-13

## 2024-03-13 ENCOUNTER — APPOINTMENT (OUTPATIENT)
Dept: CARDIOTHORACIC SURGERY | Facility: CLINIC | Age: 84
End: 2024-03-13

## 2024-03-13 DIAGNOSIS — A41.9 SEPSIS, UNSPECIFIED ORGANISM: ICD-10-CM

## 2024-03-13 LAB
ALBUMIN SERPL ELPH-MCNC: 4.4 G/DL — SIGNIFICANT CHANGE UP (ref 3.3–5)
ALP SERPL-CCNC: 161 U/L — HIGH (ref 40–120)
ALT FLD-CCNC: 15 U/L — SIGNIFICANT CHANGE UP (ref 10–45)
ANION GAP SERPL CALC-SCNC: 15 MMOL/L — SIGNIFICANT CHANGE UP (ref 5–17)
APTT BLD: 32 SEC — SIGNIFICANT CHANGE UP (ref 24.5–35.6)
AST SERPL-CCNC: 39 U/L — SIGNIFICANT CHANGE UP (ref 10–40)
BASE EXCESS BLDMV CALC-SCNC: -0.2 MMOL/L — SIGNIFICANT CHANGE UP (ref -3–3)
BASE EXCESS BLDMV CALC-SCNC: -0.2 MMOL/L — SIGNIFICANT CHANGE UP (ref -3–3)
BASE EXCESS BLDMV CALC-SCNC: -1.1 MMOL/L — SIGNIFICANT CHANGE UP (ref -3–3)
BASE EXCESS BLDMV CALC-SCNC: 0 MMOL/L — SIGNIFICANT CHANGE UP (ref -3–3)
BASE EXCESS BLDV CALC-SCNC: -1 MMOL/L — SIGNIFICANT CHANGE UP (ref -2–3)
BILIRUB SERPL-MCNC: 3.1 MG/DL — HIGH (ref 0.2–1.2)
BUN SERPL-MCNC: 34 MG/DL — HIGH (ref 7–23)
CA-I SERPL-SCNC: 1.26 MMOL/L — SIGNIFICANT CHANGE UP (ref 1.15–1.33)
CALCIUM SERPL-MCNC: 9.6 MG/DL — SIGNIFICANT CHANGE UP (ref 8.4–10.5)
CHLORIDE BLDV-SCNC: 99 MMOL/L — SIGNIFICANT CHANGE UP (ref 96–108)
CHLORIDE SERPL-SCNC: 96 MMOL/L — SIGNIFICANT CHANGE UP (ref 96–108)
CO2 BLDMV-SCNC: 25 MMOL/L — SIGNIFICANT CHANGE UP (ref 21–29)
CO2 BLDMV-SCNC: 26 MMOL/L — SIGNIFICANT CHANGE UP (ref 21–29)
CO2 BLDV-SCNC: 26 MMOL/L — SIGNIFICANT CHANGE UP (ref 22–26)
CO2 SERPL-SCNC: 20 MMOL/L — LOW (ref 22–31)
CREAT SERPL-MCNC: 1.41 MG/DL — HIGH (ref 0.5–1.3)
EGFR: 49 ML/MIN/1.73M2 — LOW
FIBRINOGEN PPP-MCNC: 461 MG/DL — HIGH (ref 200–445)
GAS PNL BLDA: SIGNIFICANT CHANGE UP
GAS PNL BLDMV: SIGNIFICANT CHANGE UP
GAS PNL BLDV: 131 MMOL/L — LOW (ref 136–145)
GAS PNL BLDV: SIGNIFICANT CHANGE UP
GAS PNL BLDV: SIGNIFICANT CHANGE UP
GLUCOSE BLDV-MCNC: 116 MG/DL — HIGH (ref 70–99)
GLUCOSE SERPL-MCNC: 138 MG/DL — HIGH (ref 70–99)
HCO3 BLDMV-SCNC: 24 MMOL/L — SIGNIFICANT CHANGE UP (ref 20–28)
HCO3 BLDMV-SCNC: 24 MMOL/L — SIGNIFICANT CHANGE UP (ref 20–28)
HCO3 BLDMV-SCNC: 25 MMOL/L — SIGNIFICANT CHANGE UP (ref 20–28)
HCO3 BLDMV-SCNC: 25 MMOL/L — SIGNIFICANT CHANGE UP (ref 20–28)
HCO3 BLDV-SCNC: 24 MMOL/L — SIGNIFICANT CHANGE UP (ref 22–29)
HCT VFR BLD CALC: 25.3 % — LOW (ref 39–50)
HCT VFR BLDA CALC: 24 % — LOW (ref 39–51)
HGB BLD CALC-MCNC: 7.9 G/DL — LOW (ref 12.6–17.4)
HGB BLD-MCNC: 8.2 G/DL — LOW (ref 13–17)
HOROWITZ INDEX BLDMV+IHG-RTO: 50 — SIGNIFICANT CHANGE UP
HOROWITZ INDEX BLDMV+IHG-RTO: 50 — SIGNIFICANT CHANGE UP
HOROWITZ INDEX BLDMV+IHG-RTO: 70 — SIGNIFICANT CHANGE UP
HOROWITZ INDEX BLDV+IHG-RTO: 70 — SIGNIFICANT CHANGE UP
INR BLD: 1.35 RATIO — HIGH (ref 0.85–1.18)
LACTATE BLDV-MCNC: 1.3 MMOL/L — SIGNIFICANT CHANGE UP (ref 0.5–2)
MAGNESIUM SERPL-MCNC: 2.6 MG/DL — SIGNIFICANT CHANGE UP (ref 1.6–2.6)
MCHC RBC-ENTMCNC: 31.4 PG — SIGNIFICANT CHANGE UP (ref 27–34)
MCHC RBC-ENTMCNC: 32.4 GM/DL — SIGNIFICANT CHANGE UP (ref 32–36)
MCV RBC AUTO: 96.9 FL — SIGNIFICANT CHANGE UP (ref 80–100)
NRBC # BLD: 0 /100 WBCS — SIGNIFICANT CHANGE UP (ref 0–0)
O2 CT VFR BLD CALC: 21 MMHG — LOW (ref 30–65)
O2 CT VFR BLD CALC: 23 MMHG — LOW (ref 30–65)
O2 CT VFR BLD CALC: 26 MMHG — LOW (ref 30–65)
O2 CT VFR BLD CALC: 27 MMHG — LOW (ref 30–65)
PCO2 BLDMV: 37 MMHG — SIGNIFICANT CHANGE UP (ref 30–65)
PCO2 BLDMV: 40 MMHG — SIGNIFICANT CHANGE UP (ref 30–65)
PCO2 BLDMV: 41 MMHG — SIGNIFICANT CHANGE UP (ref 30–65)
PCO2 BLDMV: 42 MMHG — SIGNIFICANT CHANGE UP (ref 30–65)
PCO2 BLDV: 42 MMHG — SIGNIFICANT CHANGE UP (ref 42–55)
PH BLDMV: 7.37 — SIGNIFICANT CHANGE UP (ref 7.32–7.45)
PH BLDMV: 7.39 — SIGNIFICANT CHANGE UP (ref 7.32–7.45)
PH BLDMV: 7.4 — SIGNIFICANT CHANGE UP (ref 7.32–7.45)
PH BLDMV: 7.42 — SIGNIFICANT CHANGE UP (ref 7.32–7.45)
PH BLDV: 7.37 — SIGNIFICANT CHANGE UP (ref 7.32–7.43)
PHOSPHATE SERPL-MCNC: 1.6 MG/DL — LOW (ref 2.5–4.5)
PLATELET # BLD AUTO: 141 K/UL — LOW (ref 150–400)
PO2 BLDV: 29 MMHG — SIGNIFICANT CHANGE UP (ref 25–45)
POTASSIUM BLDV-SCNC: 4.2 MMOL/L — SIGNIFICANT CHANGE UP (ref 3.5–5.1)
POTASSIUM SERPL-MCNC: 4.3 MMOL/L — SIGNIFICANT CHANGE UP (ref 3.5–5.3)
POTASSIUM SERPL-SCNC: 4.3 MMOL/L — SIGNIFICANT CHANGE UP (ref 3.5–5.3)
PROT SERPL-MCNC: 7 G/DL — SIGNIFICANT CHANGE UP (ref 6–8.3)
PROTHROM AB SERPL-ACNC: 14.1 SEC — HIGH (ref 9.5–13)
RBC # BLD: 2.61 M/UL — LOW (ref 4.2–5.8)
RBC # FLD: 16.9 % — HIGH (ref 10.3–14.5)
SAO2 % BLDMV: 33.6 — LOW (ref 60–90)
SAO2 % BLDMV: 34.8 — LOW (ref 60–90)
SAO2 % BLDMV: 45.7 — LOW (ref 60–90)
SAO2 % BLDMV: 45.9 — LOW (ref 60–90)
SAO2 % BLDV: 53.7 % — LOW (ref 67–88)
SODIUM SERPL-SCNC: 131 MMOL/L — LOW (ref 135–145)
VANCOMYCIN TROUGH SERPL-MCNC: 6.2 UG/ML — LOW (ref 10–20)
VANCOMYCIN TROUGH SERPL-MCNC: 6.3 UG/ML — LOW (ref 10–20)
WBC # BLD: 15.14 K/UL — HIGH (ref 3.8–10.5)
WBC # FLD AUTO: 15.14 K/UL — HIGH (ref 3.8–10.5)

## 2024-03-13 PROCEDURE — 99222 1ST HOSP IP/OBS MODERATE 55: CPT

## 2024-03-13 PROCEDURE — 99292 CRITICAL CARE ADDL 30 MIN: CPT | Mod: 25

## 2024-03-13 PROCEDURE — 71045 X-RAY EXAM CHEST 1 VIEW: CPT | Mod: 26

## 2024-03-13 PROCEDURE — 99291 CRITICAL CARE FIRST HOUR: CPT | Mod: 25

## 2024-03-13 PROCEDURE — 99232 SBSQ HOSP IP/OBS MODERATE 35: CPT

## 2024-03-13 PROCEDURE — 33967 INSERT I-AORT PERCUT DEVICE: CPT | Mod: 53

## 2024-03-13 PROCEDURE — 36556 INSERT NON-TUNNEL CV CATH: CPT

## 2024-03-13 PROCEDURE — 99292 CRITICAL CARE ADDL 30 MIN: CPT | Mod: FS,25

## 2024-03-13 PROCEDURE — 36800 INSERTION OF CANNULA: CPT

## 2024-03-13 RX ORDER — EPINEPHRINE 0.3 MG/.3ML
0.04 INJECTION INTRAMUSCULAR; SUBCUTANEOUS
Qty: 4 | Refills: 0 | Status: DISCONTINUED | OUTPATIENT
Start: 2024-03-13 | End: 2024-03-14

## 2024-03-13 RX ORDER — NOREPINEPHRINE BITARTRATE/D5W 8 MG/250ML
0.06 PLASTIC BAG, INJECTION (ML) INTRAVENOUS
Qty: 16 | Refills: 0 | Status: DISCONTINUED | OUTPATIENT
Start: 2024-03-13 | End: 2024-03-24

## 2024-03-13 RX ORDER — SODIUM BICARBONATE 1 MEQ/ML
50 SYRINGE (ML) INTRAVENOUS ONCE
Refills: 0 | Status: COMPLETED | OUTPATIENT
Start: 2024-03-13 | End: 2024-03-13

## 2024-03-13 RX ORDER — FLUCONAZOLE 150 MG/1
200 TABLET ORAL EVERY 24 HOURS
Refills: 0 | Status: DISCONTINUED | OUTPATIENT
Start: 2024-03-14 | End: 2024-03-18

## 2024-03-13 RX ORDER — DOBUTAMINE HCL 250MG/20ML
10 VIAL (ML) INTRAVENOUS
Qty: 1000 | Refills: 0 | Status: DISCONTINUED | OUTPATIENT
Start: 2024-03-13 | End: 2024-03-24

## 2024-03-13 RX ORDER — FLUCONAZOLE 150 MG/1
200 TABLET ORAL ONCE
Refills: 0 | Status: COMPLETED | OUTPATIENT
Start: 2024-03-13 | End: 2024-03-13

## 2024-03-13 RX ORDER — FLUCONAZOLE 150 MG/1
TABLET ORAL
Refills: 0 | Status: DISCONTINUED | OUTPATIENT
Start: 2024-03-13 | End: 2024-03-18

## 2024-03-13 RX ORDER — VANCOMYCIN HCL 1 G
500 VIAL (EA) INTRAVENOUS EVERY 12 HOURS
Refills: 0 | Status: DISCONTINUED | OUTPATIENT
Start: 2024-03-13 | End: 2024-03-14

## 2024-03-13 RX ORDER — DEXMEDETOMIDINE HYDROCHLORIDE IN 0.9% SODIUM CHLORIDE 4 UG/ML
0.2 INJECTION INTRAVENOUS
Qty: 200 | Refills: 0 | Status: DISCONTINUED | OUTPATIENT
Start: 2024-03-13 | End: 2024-03-14

## 2024-03-13 RX ORDER — BUMETANIDE 0.25 MG/ML
2 INJECTION INTRAMUSCULAR; INTRAVENOUS
Qty: 20 | Refills: 0 | Status: DISCONTINUED | OUTPATIENT
Start: 2024-03-13 | End: 2024-03-15

## 2024-03-13 RX ORDER — BUMETANIDE 0.25 MG/ML
2 INJECTION INTRAMUSCULAR; INTRAVENOUS ONCE
Refills: 0 | Status: COMPLETED | OUTPATIENT
Start: 2024-03-13 | End: 2024-03-13

## 2024-03-13 RX ORDER — AMIODARONE HYDROCHLORIDE 400 MG/1
1 TABLET ORAL
Qty: 30 | Refills: 0
Start: 2024-03-13 | End: 2024-04-11

## 2024-03-13 RX ADMIN — Medication 500 MILLIGRAM(S): at 13:46

## 2024-03-13 RX ADMIN — Medication 50 MILLIEQUIVALENT(S): at 14:00

## 2024-03-13 RX ADMIN — MEROPENEM 100 MILLIGRAM(S): 1 INJECTION INTRAVENOUS at 04:00

## 2024-03-13 RX ADMIN — Medication 100 MILLIGRAM(S): at 13:47

## 2024-03-13 RX ADMIN — AMIODARONE HYDROCHLORIDE 200 MILLIGRAM(S): 400 TABLET ORAL at 19:53

## 2024-03-13 RX ADMIN — SODIUM CHLORIDE 3 MILLILITER(S): 9 INJECTION INTRAMUSCULAR; INTRAVENOUS; SUBCUTANEOUS at 13:55

## 2024-03-13 RX ADMIN — Medication 7.63 MICROGRAM(S)/KG/MIN: at 19:32

## 2024-03-13 RX ADMIN — Medication 63.75 MILLIMOLE(S): at 06:00

## 2024-03-13 RX ADMIN — FLUCONAZOLE 100 MILLIGRAM(S): 150 TABLET ORAL at 13:27

## 2024-03-13 RX ADMIN — Medication 1 MILLIGRAM(S): at 13:55

## 2024-03-13 RX ADMIN — PANTOPRAZOLE SODIUM 40 MILLIGRAM(S): 20 TABLET, DELAYED RELEASE ORAL at 07:05

## 2024-03-13 RX ADMIN — MEROPENEM 100 MILLIGRAM(S): 1 INJECTION INTRAVENOUS at 21:06

## 2024-03-13 RX ADMIN — Medication 100 MILLIGRAM(S): at 19:31

## 2024-03-13 RX ADMIN — BUMETANIDE 2 MILLIGRAM(S): 0.25 INJECTION INTRAMUSCULAR; INTRAVENOUS at 09:00

## 2024-03-13 RX ADMIN — Medication 100 MILLIGRAM(S): at 13:45

## 2024-03-13 RX ADMIN — SODIUM CHLORIDE 3 MILLILITER(S): 9 INJECTION INTRAMUSCULAR; INTRAVENOUS; SUBCUTANEOUS at 23:11

## 2024-03-13 RX ADMIN — MIDODRINE HYDROCHLORIDE 20 MILLIGRAM(S): 2.5 TABLET ORAL at 14:01

## 2024-03-13 RX ADMIN — ERYTHROPOIETIN 10000 UNIT(S): 10000 INJECTION, SOLUTION INTRAVENOUS; SUBCUTANEOUS at 21:10

## 2024-03-13 RX ADMIN — EPINEPHRINE 5.09 MICROGRAM(S)/KG/MIN: 0.3 INJECTION INTRAMUSCULAR; SUBCUTANEOUS at 19:32

## 2024-03-13 RX ADMIN — SODIUM CHLORIDE 3 MILLILITER(S): 9 INJECTION INTRAMUSCULAR; INTRAVENOUS; SUBCUTANEOUS at 05:44

## 2024-03-13 RX ADMIN — CHLORHEXIDINE GLUCONATE 1 APPLICATION(S): 213 SOLUTION TOPICAL at 05:02

## 2024-03-13 RX ADMIN — Medication 75 MICROGRAM(S): at 05:02

## 2024-03-13 RX ADMIN — MIDODRINE HYDROCHLORIDE 20 MILLIGRAM(S): 2.5 TABLET ORAL at 05:01

## 2024-03-13 RX ADMIN — SERTRALINE 50 MILLIGRAM(S): 25 TABLET, FILM COATED ORAL at 05:01

## 2024-03-13 RX ADMIN — PREGABALIN 1000 MICROGRAM(S): 225 CAPSULE ORAL at 13:47

## 2024-03-13 RX ADMIN — ENOXAPARIN SODIUM 30 MILLIGRAM(S): 100 INJECTION SUBCUTANEOUS at 13:47

## 2024-03-13 RX ADMIN — ENOXAPARIN SODIUM 30 MILLIGRAM(S): 100 INJECTION SUBCUTANEOUS at 21:06

## 2024-03-13 RX ADMIN — MEROPENEM 100 MILLIGRAM(S): 1 INJECTION INTRAVENOUS at 13:48

## 2024-03-13 RX ADMIN — VASOPRESSIN 6 UNIT(S)/MIN: 20 INJECTION INTRAVENOUS at 19:33

## 2024-03-13 RX ADMIN — AMIODARONE HYDROCHLORIDE 200 MILLIGRAM(S): 400 TABLET ORAL at 05:02

## 2024-03-13 NOTE — PROGRESS NOTE ADULT - SUBJECTIVE AND OBJECTIVE BOX
[facility]   [phone]  RENAL FOLLOW UP NOTE  --------------------------------------------------------------------------------  Chief Complaint:    24 hour events/subjective:    seen earlier  on high flow O2  on pressors  still sob     PAST HISTORY  --------------------------------------------------------------------------------  No significant changes to PMH, PSH, FHx, SHx, unless otherwise noted    ALLERGIES & MEDICATIONS  --------------------------------------------------------------------------------  Allergies    No Known Allergies    Intolerances      Standing Inpatient Medications  allopurinol 100 milliGRAM(s) Oral daily  ascorbic acid 500 milliGRAM(s) Oral daily  atorvastatin 40 milliGRAM(s) Oral at bedtime  buMETAnide Infusion 2 mG/Hr IV Continuous <Continuous>  chlorhexidine 2% Cloths 1 Application(s) Topical <User Schedule>  CRRT Treatment    <Continuous>  cyanocobalamin 1000 MICROGram(s) Oral daily  dexMEDEtomidine Infusion 0.2 MICROgram(s)/kG/Hr IV Continuous <Continuous>  DOBUTamine Infusion 7.5 MICROgram(s)/kG/Min IV Continuous <Continuous>  enoxaparin Injectable 30 milliGRAM(s) SubCutaneous every 12 hours  EPINEPHrine    Infusion 0.04 MICROgram(s)/kG/Min IV Continuous <Continuous>  epoetin zara-epbx (RETACRIT) Injectable 29514 Unit(s) IV Push <User Schedule>  fluconAZOLE IVPB      folic acid 1 milliGRAM(s) Oral daily  levothyroxine 75 MICROGram(s) Oral daily  lidocaine   4% Patch 1 Patch Transdermal daily  melatonin 5 milliGRAM(s) Oral at bedtime  meropenem  IVPB      meropenem  IVPB 1000 milliGRAM(s) IV Intermittent every 8 hours  midodrine 20 milliGRAM(s) Oral every 8 hours  norepinephrine Infusion 0.06 MICROgram(s)/kG/Min IV Continuous <Continuous>  pantoprazole    Tablet 40 milliGRAM(s) Oral before breakfast  polyethylene glycol 3350 17 Gram(s) Oral daily  PrismaSATE Dialysate BGK 4 / 2.5 5000 milliLiter(s) CRRT <Continuous>  PrismaSOL Filtration BGK 0 / 2.5 5000 milliLiter(s) CRRT <Continuous>  PrismaSOL Filtration BGK 4 / 2.5 5000 milliLiter(s) CRRT <Continuous>  senna 2 Tablet(s) Oral at bedtime  sertraline 50 milliGRAM(s) Oral daily  sodium chloride 0.9% lock flush 3 milliLiter(s) IV Push every 8 hours  thiamine 100 milliGRAM(s) Oral daily  vancomycin  IVPB 500 milliGRAM(s) IV Intermittent every 12 hours  vasopressin Infusion 0.04 Unit(s)/Min IV Continuous <Continuous>    PRN Inpatient Medications      REVIEW OF SYSTEMS  --------------------------------------------------------------------------------    Gen: denies  fevers/chills,  CVS: denies chest pain/palpitations  Resp:  +  SOB/Cough+   GI: Denies N/V/Abd pain  : Denies dysuria    VITALS/PHYSICAL EXAM  --------------------------------------------------------------------------------  T(C): 37 (03-13-24 @ 16:00), Max: 37.1 (03-13-24 @ 04:00)  HR: 74 (03-13-24 @ 21:15) (69 - 97)  BP: --  RR: 24 (03-13-24 @ 21:15) (21 - 41)  SpO2: 100% (03-13-24 @ 21:15) (86% - 100%)  Wt(kg): --        03-12-24 @ 07:01  -  03-13-24 @ 07:00  --------------------------------------------------------  IN: 2404.5 mL / OUT: 3143 mL / NET: -738.5 mL    03-13-24 @ 07:01  -  03-13-24 @ 21:39  --------------------------------------------------------  IN: 1283.5 mL / OUT: 471 mL / NET: 812.5 mL      Physical Exam:  	      Gen:  on high flow O2 + IJ cath   	 +  jvd  	Pulm: decrease bs + coarse bs   	CV: RRR, S1S2; no rub  	Abd: +BS, soft, nontender/nondistended  	: No suprapubic tenderness  	UE: Warm, no cyanosis  no clubbing,  no edema  	LE: Warm, no cyanosis  no clubbing,  no   edema  	Neuro: alert and oriented. speech coherent       LABS/STUDIES  --------------------------------------------------------------------------------              8.2    15.14 >-----------<  141      [03-13-24 @ 00:30]              25.3     131  |  96  |  34  ----------------------------<  138      [03-13-24 @ 00:30]  4.3   |  20  |  1.41        Ca     9.6     [03-13-24 @ 00:30]      Mg     2.6     [03-13-24 @ 00:30]      Phos  1.6     [03-13-24 @ 00:30]    TPro  7.0  /  Alb  4.4  /  TBili  3.1  /  DBili  x   /  AST  39  /  ALT  15  /  AlkPhos  161  [03-13-24 @ 00:30]    PT/INR: PT 14.1 , INR 1.35       [03-13-24 @ 06:17]  PTT: 32.0       [03-13-24 @ 06:17]      Creatinine Trend:  SCr 1.41 [03-13 @ 00:30]  SCr 1.28 [03-12 @ 00:42]  SCr 1.33 [03-11 @ 13:15]  SCr 1.52 [03-11 @ 00:32]  SCr 1.89 [03-10 @ 04:03]                Iron 290, TIBC 430, %sat 67      [03-06-24 @ 13:57]  Ferritin 335      [03-06-24 @ 13:57]  PTH -- (Ca 8.9)      [03-05-24 @ 13:03]   242  TSH 2.02      [03-06-24 @ 13:57]

## 2024-03-13 NOTE — PROGRESS NOTE ADULT - PROBLEM SELECTOR PLAN 3
- TTE 2/22 with severe MR  - He was evaluated for M-KRISTIE but was not a candidate d/t his anatomy.  He was evaluated for [facility] (Tendyne valve) trial for a transcatheter MVR but was denied by [employer]  - once optimized from HF and nutritional standpoint, plan for surgical MVR although patient appears to be a high risk candidate   - Adequate nutrition, PT evaluation to help optimize for potential surgery however this may be difficult if mechanical support is required

## 2024-03-13 NOTE — PROGRESS NOTE ADULT - PROBLEM SELECTOR PLAN 1
- Etiology: ischemic and complicated by severe MR  - On . Consider more gradual wean of , not below 3 mcg/kg/min  - Monitor markers of end organ function (LFTs, Cr, lactate and ScVO2)  - Target net negative 1-2L. CVP 6-8   - Please wrap BLE in ACE bandages to aid in mobilization of fluid   - Has dual chamber ICD, last interrogated 3/6  - Pt with increasing pressor support, respiratory distress, worsening CXR on 3/13, hemodynamics concerning for sepsis component, patient may need escalation of care with mechanical support, IABP vs Impella

## 2024-03-13 NOTE — PROCEDURE NOTE - ADDITIONAL PROCEDURE DETAILS
Pre- Procedure   Patient and/or family/surrogate educated about central line-associated blood stream infection prevention practices    Catheter Type: (  ) Introducer     Number of Lumens: Triple    TIME OUT performed prior to this procedure with verbal confirmation of correct patient identity, correct site, side, agreement of the procedure, correct patient position, availability of necessary equipment. Safety precautions based on patient history or medication use has been addressed   RN at bedside.  The patient was placed in the Supine position. The patient's placement site was prepped with Chlorhexidine solution then draped using maximum sterile barrier protection. The area was injected with 1% Lidocaine.  Using the Seldinger technique and Ultrasound, the catheter was introduced. Strict adherence to outlined aseptic technique, including hand washing prior to donning sterile barriers (gloves and gown), utilizing a mask and cap, plus draping the patient with a sterile drape was observed. Upon completion of the line placement, the insertion site was covered with sterile dressing.    All materials used for catheter insertion, including the intact guide wire, were accounted for at the end of the procedure.    Attempted site and actual site: Right    Post Procedure (Catheter Placement Verification)  Correct placement confirmed by ultrasonography  The Tip of the catheter is confirmed to be in appropriate position by radiography which revealed no pneumothorax and line may be accessed.

## 2024-03-13 NOTE — PROCEDURE NOTE - ADDITIONAL PROCEDURE DETAILS
Pre- Procedure   Patient and/or family/surrogate educated about central line-associated blood stream infection prevention practices    Catheter Type: Dialysis    Number of Lumens: Double    TIME OUT performed prior to this procedure with verbal confirmation of correct patient identity, correct site, side and karlee (if applicable), agreement of the procedure, correct patient position, availability of necessary equipment. Safety precautions based on patient history or medication use has been addressed. RN at bedside.    Procedure  The patient was placed in the Supine position. The patient's placement site was prepped with Chlorhexidine solution then draped using maximum sterile barrier protection.  The area was injected with 1% Lidocaine.  Using the Seldinger technique and Ultrasound, the catheter was introduced.  Strict adherence to outlined aseptic technique, including hand washing prior to donning sterile barriers (gloves and gown), utilizing a mask and cap, plus draping the patient with a sterile drape was observed. Upon completion of the line placement, catheter sutured in place and the insertion site was covered with sterile dressing.    All materials used for catheter insertion, including the intact guide wire, were accounted for at the end of the procedure    Emergency placement: Yes     Attempted site and actual site: Left internal Jugular       Post Procedure (Catheter Placement Verification)  Correct placement confirmed by ultrasonography  The Tip of the catheter is confirmed to be in appropriate position by radiography which revealed no pneumothorax and line may be accessed

## 2024-03-13 NOTE — PROGRESS NOTE ADULT - ASSESSMENT
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 Now presenting with worsening volume retention, cellulitis of L foot and marked SURESH on CKD (BUN/Cr on admission 143/4.39).     He was empirically started on  and Initiated on iHD, first session on 3/5 with need for Intermittent pressor support.  Eventual plan for surgical mitral valve replacement once optimized per Dr. Fenton, however pt currently not optimized and  high risk for surgery given deteriorating respiratory status, pressor and inotrope requirements.     Bedside Hemodynamics  3/13/24 ( 7.5 mcg, Bumex 2 mG/hr, Epi 0.02 mcg/kg/min, NE 0.06 mcg/kg/min ): CVP 23, SvO2 53.7%, CO/CI 4.6/2.5,   3/11/24 ( 7 mcg): CVP 18, SvO2 54.8%, CO/CI 2.7/2.2, SVR 1629  3/8/24 ( 5 mcg): CVP 10, ScVO2 67.7%, CO/CI 5.7/3.1, MAP 70, HR 70,  dsc  3/7/24 ( 2.5 mcg): ScVO2 57.4%, CO/CI 4.3/2.4  3/7/24 ( 5mcg): CVP 9, ScVO2 70%, CO/CI (FI) 7.5/4.1  3/6/24 ( 5mcg): CVP 8, ScVO2 74%, CO/CI (FI): 6.5/3.6    Cardiac studies   ·	TTE 3/11/24: LVEF 37 %,  severe mitral regurgitation. Multiple segmental abnormalities exist. Moderately enlarged right ventricular cavity size and reduced systolic function.  ·	WARREN 2/27/24: severe MR, DCCV SR  ·	TTE 2/22/24: LVIDd: 5.8cm, LVEF 35%, mildly enlarged RV with reduced function, severe MR and Moderate to severe TR. PASP 70mmHg  ·	WARREN 8/23 Severe MR and moderate TR  ·	TTE 8/23 EF 36%, grade II DD, mildly enlarged RV with reduced function, severe MR and Moderate to severe TR

## 2024-03-13 NOTE — PROGRESS NOTE ADULT - SUBJECTIVE AND OBJECTIVE BOX
CRITICAL CARE ATTENDING - CTICU    MEDICATIONS  (STANDING):  allopurinol 100 milliGRAM(s) Oral daily  aMIOdarone    Tablet 200 milliGRAM(s) Oral two times a day  ascorbic acid 500 milliGRAM(s) Oral daily  atorvastatin 40 milliGRAM(s) Oral at bedtime  chlorhexidine 2% Cloths 1 Application(s) Topical <User Schedule>  CRRT Treatment    <Continuous>  cyanocobalamin 1000 MICROGram(s) Oral daily  DOBUTamine Infusion 7.5 MICROgram(s)/kG/Min (15.3 mL/Hr) IV Continuous <Continuous>  enoxaparin Injectable 30 milliGRAM(s) SubCutaneous every 12 hours  EPINEPHrine    Infusion 0.02 MICROgram(s)/kG/Min (5.09 mL/Hr) IV Continuous <Continuous>  epoetin zara-epbx (RETACRIT) Injectable 86584 Unit(s) IV Push <User Schedule>  epoetin zara-epbx (RETACRIT) Injectable 47324 Unit(s) IV Push once  folic acid 1 milliGRAM(s) Oral daily  levothyroxine 75 MICROGram(s) Oral daily  lidocaine   4% Patch 1 Patch Transdermal daily  melatonin 5 milliGRAM(s) Oral at bedtime  meropenem  IVPB      meropenem  IVPB 1000 milliGRAM(s) IV Intermittent every 8 hours  midodrine 20 milliGRAM(s) Oral every 8 hours  norepinephrine Infusion 0.01 MICROgram(s)/kG/Min (1.27 mL/Hr) IV Continuous <Continuous>  pantoprazole    Tablet 40 milliGRAM(s) Oral before breakfast  polyethylene glycol 3350 17 Gram(s) Oral daily  PrismaSATE Dialysate BGK 4 / 2.5 5000 milliLiter(s) (1200 mL/Hr) CRRT <Continuous>  PrismaSOL Filtration BGK 0 / 2.5 5000 milliLiter(s) (600 mL/Hr) CRRT <Continuous>  PrismaSOL Filtration BGK 4 / 2.5 5000 milliLiter(s) (400 mL/Hr) CRRT <Continuous>  senna 2 Tablet(s) Oral at bedtime  sertraline 50 milliGRAM(s) Oral daily  sodium chloride 0.9% lock flush 3 milliLiter(s) IV Push every 8 hours  thiamine 100 milliGRAM(s) Oral daily  vasopressin Infusion 0.04 Unit(s)/Min (6 mL/Hr) IV Continuous <Continuous>                                    8.2    15.14 )-----------( 141      ( 13 Mar 2024 00:30 )             25.3           131<L>  |  96  |  34<H>  ----------------------------<  138<H>  4.3   |  20<L>  |  1.41<H>    Ca    9.6      13 Mar 2024 00:30  Phos  1.6       Mg     2.6         TPro  7.0  /  Alb  4.4  /  TBili  3.1<H>  /  DBili  x   /  AST  39  /  ALT  15  /  AlkPhos  161<H>        PT/INR - ( 13 Mar 2024 06:17 )   PT: 14.1 sec;   INR: 1.35 ratio         PTT - ( 13 Mar 2024 06:17 )  PTT:32.0 sec        Daily     Daily Weight in k.8 (13 Mar 2024 00:00)      0312 @ 07:01  -   @ 07:00  --------------------------------------------------------  IN: 2404.5 mL / OUT: 3143 mL / NET: -738.5 mL        Critically Ill patient  : [ ] preoperative ,   [ ] post operative,   [x] non operative    Requires :  [x] Arterial Line   [x] Central Line  [ ] PA catheter  [ ] IABP  [ ] ECMO  [ ] LVAD  [ ] Ventilator  [ ] pacemaker [ ] Impella   [x] HFNC  [x] CVVHD  [x] Luke                      [x ] ABG's     [ x] Pulse Oxymetry Monitoring  Bedside evaluation , monitoring , treatment of hemodynamics , fluids , IVP/ IVCD meds.        Diagnosis:     3/5 - Tx to ICU for Hypotension/CHF/renal failure    Pre Op Evaluation / Optimization - Re Op MVR     h/o AVR     Hypotension     Mitral Regurgitation     Pulmonary Hypertension 2 to biventricular cardiac dysfunction     Requires chest PT, pulmonary toilet, suctioning to maintain SaO2,  patent airway and treat atelectasis.     Hypoxemia - Requires [ ] BIPAP  [x] HFNC 50%/50L  [x] Luke 20 PPM    Paced Rhythm     CHF- acute [x]   chronic [x]    systolic [x]   diatolic [x]    Valvular  [x]           - Echo- EF -   37%          [ ] RV dysfunction          - Cxr-cardiomegally, edema          - Clinical-  [x]inotropes   [x]pressors   [ ]diuresis   [ ]IABP   [ ]ECMO   [ ]LVAD   [ ]Respiratory Failure. [x] CVVHD  [x] Luke    Hemodynamic lability,  instability. Requires IVCD [x] vasopressors [x] inotropes  [ ] vasodilator  [x]IVSS fluid / Blood Products to maintain MAP, perfusion, C.I.     Thrombocytopenia     Renal Failure - Acute Kidney Injury    Hypokalemia    Hyponatremia                                                 -  Dialysis  Metabolic Acidosis - resolved    CVVHD    Chronic Renal failure    Cardiac Cachexia - NG - goal rate feeds    Tolerates DASH Diet + NG feeds at [x] goal rate 40 cc/Hr   [ ] trophic rate    [ ]       rate     Requires bedside physical therapy, mobilization and total retirement care.               By signing my name below, I, Leticia Helton, attest that this documentation has been prepared under the direction and in the presence of Ildefonso Bañuelos MD.   Electronically Signed: [facility] 24 @ 07:24    I, Ildefonso Bañuelos, personally performed the services described in this documentation. All medical record entries made by the scribe were at my direction and in my presence. I have reviewed the chart and agree that the record reflects my personal performance and is accurate and complete.   Ildefonso Bañuelos MD.       Discussed with CT surgeon, Physician Assistant - Nurse Practitioner- Critical care medicine team.   Discussed at  AM / PM rounds.   Chart, labs , films reviewed.    Cumulative Critical Care Time Given Today:  CRITICAL CARE ATTENDING - CTICU    MEDICATIONS  (STANDING):  allopurinol 100 milliGRAM(s) Oral daily  aMIOdarone    Tablet 200 milliGRAM(s) Oral two times a day  ascorbic acid 500 milliGRAM(s) Oral daily  atorvastatin 40 milliGRAM(s) Oral at bedtime  chlorhexidine 2% Cloths 1 Application(s) Topical <User Schedule>  CRRT Treatment    <Continuous>  cyanocobalamin 1000 MICROGram(s) Oral daily  DOBUTamine Infusion 7.5 MICROgram(s)/kG/Min (15.3 mL/Hr) IV Continuous <Continuous>  enoxaparin Injectable 30 milliGRAM(s) SubCutaneous every 12 hours  EPINEPHrine    Infusion 0.02 MICROgram(s)/kG/Min (5.09 mL/Hr) IV Continuous <Continuous>  epoetin zara-epbx (RETACRIT) Injectable 52544 Unit(s) IV Push <User Schedule>  epoetin zara-epbx (RETACRIT) Injectable 67920 Unit(s) IV Push once  folic acid 1 milliGRAM(s) Oral daily  levothyroxine 75 MICROGram(s) Oral daily  lidocaine   4% Patch 1 Patch Transdermal daily  melatonin 5 milliGRAM(s) Oral at bedtime  meropenem  IVPB      meropenem  IVPB 1000 milliGRAM(s) IV Intermittent every 8 hours  midodrine 20 milliGRAM(s) Oral every 8 hours  norepinephrine Infusion 0.01 MICROgram(s)/kG/Min (1.27 mL/Hr) IV Continuous <Continuous>  pantoprazole    Tablet 40 milliGRAM(s) Oral before breakfast  polyethylene glycol 3350 17 Gram(s) Oral daily  PrismaSATE Dialysate BGK 4 / 2.5 5000 milliLiter(s) (1200 mL/Hr) CRRT <Continuous>  PrismaSOL Filtration BGK 0 / 2.5 5000 milliLiter(s) (600 mL/Hr) CRRT <Continuous>  PrismaSOL Filtration BGK 4 / 2.5 5000 milliLiter(s) (400 mL/Hr) CRRT <Continuous>  senna 2 Tablet(s) Oral at bedtime  sertraline 50 milliGRAM(s) Oral daily  sodium chloride 0.9% lock flush 3 milliLiter(s) IV Push every 8 hours  thiamine 100 milliGRAM(s) Oral daily  vasopressin Infusion 0.04 Unit(s)/Min (6 mL/Hr) IV Continuous <Continuous>                                    8.2    15.14 )-----------( 141      ( 13 Mar 2024 00:30 )             25.3           131<L>  |  96  |  34<H>  ----------------------------<  138<H>  4.3   |  20<L>  |  1.41<H>    Ca    9.6      13 Mar 2024 00:30  Phos  1.6       Mg     2.6         TPro  7.0  /  Alb  4.4  /  TBili  3.1<H>  /  DBili  x   /  AST  39  /  ALT  15  /  AlkPhos  161<H>        PT/INR - ( 13 Mar 2024 06:17 )   PT: 14.1 sec;   INR: 1.35 ratio         PTT - ( 13 Mar 2024 06:17 )  PTT:32.0 sec        Daily     Daily Weight in k.8 (13 Mar 2024 00:00)      03-12 @ 07:01  -   @ 07:00  --------------------------------------------------------  IN: 2404.5 mL / OUT: 3143 mL / NET: -738.5 mL        Critically Ill patient  : [ x] preoperative - MVR   [ ] post operative,   [x] non operative    Requires :  [x] Arterial Line   [x] Central Line  [ ] PA catheter  [ ] IABP  [ ] ECMO  [ ] LVAD  [ ] Ventilator  [ ] pacemaker [ ] Impella   [x] HFNC  [x] CVVHD  [x] Luke                      [x ] ABG's     [ x] Pulse Oxymetry Monitoring  Bedside evaluation , monitoring , treatment of hemodynamics , fluids , IVP/ IVCD meds.        Diagnosis:     3/5 - Tx to ICU for Hypotension/CHF/renal failure    Pre Op Evaluation / Optimization - Re Op MVR     h/o AVR / MVR     Hypotension     Fluid  overload     Mitral Regurgitation     Pulmonary Hypertension 2 to biventricular cardiac dysfunction     Requires chest PT, pulmonary toilet, suctioning to maintain SaO2,  patent airway and treat atelectasis.     Hypoxemia - Requires [ ] BIPAP  [x] HFNC 50%/50L  [x] Luke 20 PPM    Paced Rhythm     CHF- acute [x]   chronic [x]    systolic [x]   diatolic [ ]    Valvular  [x]           - Echo- EF -   37%          [ ] RV dysfunction          - Cxr-cardiomegally, edema          - Clinical-  [x]inotropes   [x]pressors   [ x]diuresis   [ ]IABP   [ ]ECMO   [ ]LVAD   [x ] Respiratory insuffiencey  [x] CVVHD  [x] Luke    Hemodynamic lability,  instability. Requires IVCD [x] vasopressors [x] inotropes  [ ] vasodilator  []IVSS fluid / Blood Products to maintain MAP, perfusion, C.I.     Thrombocytopenia     Renal Failure - Acute Kidney Injury    Hyponatremia                                                   Metabolic Acidosis     CVVHD - negative balance    Chronic Renal failure    Cardiac Cachexia - NG - goal rate feeds    Tolerates DASH Diet + NG feeds at [x] goal rate 40 cc/Hr   [ ] trophic rate    [ ]       rate     Requires bedside physical therapy, mobilization and total long term care.     Strongly consider sepsis - worsening CXR / advancing pressor requirements    Present, assisting, supervising:    [ x] central line  [ x] Wayland Salvatore catheter   [x ] Shiley   [x ] CVVH-D                 By signing my name below, I, Leticia Helton, attest that this documentation has been prepared under the direction and in the presence of Ildefonso Bañuelos MD.   Electronically Signed: [facility] [place]far[place] 24 @ 07:24    I, Ildefonso Bañuelos, personally performed the services described in this documentation. All medical record entries made by the scribe were at my direction and in my presence. I have reviewed the chart and agree that the record reflects my personal performance and is accurate and complete.   Ildefonso Bañuelos MD.       Discussed with CT surgeon, Physician Assistant - Nurse Practitioner- Critical care medicine team.   Discussed at  AM / PM rounds.   Chart, labs , films reviewed.    Cumulative Critical Care Time Given Today:  105 min

## 2024-03-13 NOTE — CONSULT NOTE ADULT - SUBJECTIVE AND OBJECTIVE BOX
Patient is a 83y old  Male who presents with a chief complaint of shortness of breath (13 Mar 2024 07:23)    HPI:  84 y/o M with ICM/HFrEF (now 30%; LVIDd 6.7 cm; prev req inotrope), CAD c/b IWMI (1994) s/p angioplasty, aortic stenosis s/p bio-AVR 2004, VT arrest (2017) s/p ICD, Afib s/p multiple DCCV and ablation x2 (2020 and 2023; on AC), Aflutter s/p WARREN/DCCV (8/23), prior Ao aneurysm s/p Bentall (2021), 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 Patient was initiated on bumex gtt with gradual improvement with Cr improving from 3.6 to 3.1. Patient was considered for MV re-op; however, patient was reluctant of the procedure and expressed wanting to follow up in the outpatient setting to schedule. During last admission, patient was also noted to be in flutter, EP was consulted, and patient underwent repeat WARREN/DCCV , on amiodarone. Patient was discharged home 2/28 on higher dose of diuretics, as per HF team. Today, patient was evaluated at CTS office four routine follow-up, pt c/o worsening shortness of breath and L>R LE edema, with redness/swelling on left dorsal aspect of the foot, readmitted to CTS service for further management. Denies acute fever, chills, dizziness, headache, chest pain, palpitations, acute shortness of breath, abdominal pain, n/v, (04 Mar 2024 12:18)      PAST MEDICAL & SURGICAL HISTORY:  Aortic stenosis      AICD (automatic cardioverter/defibrillator) present      Aortic valve regurgitation      Ascending aorta dilation      H/O paroxysmal supraventricular tachycardia      HLD (hyperlipidemia)      Mitral valve regurgitation      Cardiac arrest      Severe mitral regurgitation      S/P aortic valve replacement  3/13/2004      S/P hernia repair  2002      History of tonsillectomy and adenoidectomy      S/P TURP  1996      S/P colonoscopy  2001, 2002, 2006, 2011, 2016      S/P endoscopy  2006      S/P cardiac cath  1994, 1995, 1999, 2002, 2004      AICD (automatic cardioverter/defibrillator) present  12/19/17      Cardiac pacemaker  12/19/17      History of cardioversion  9/11/20      S/P ablation of atrial fibrillation  10/29/20      Status post ablation of ventricular arrhythmia  2/14/19          Social history:    FAMILY HISTORY:    REVIEW OF SYSTEMS  General:	Denies any malaise fatigue or chills. Fevers absent    Skin:No rash  	        e    Allergic/Immunologic:	No hives or rash   Allergies    No Known Allergies    Intolerances        Antimicrobials:    fluconAZOLE IVPB 200 milliGRAM(s) IV Intermittent once  fluconAZOLE IVPB      meropenem  IVPB 1000 milliGRAM(s) IV Intermittent every 8 hours  meropenem  IVPB            Vital Signs Last 24 Hrs  T(C): 36.4 (13 Mar 2024 08:00), Max: 37.1 (13 Mar 2024 04:00)  T(F): 97.5 (13 Mar 2024 08:00), Max: 98.7 (13 Mar 2024 04:00)  HR: 93 (13 Mar 2024 10:30) (69 - 95)  BP: 85/53 (12 Mar 2024 19:00) (85/53 - 98/54)  BP(mean): 65 (12 Mar 2024 19:00) (65 - 72)  RR: 26 (13 Mar 2024 10:30) (18 - 38)  SpO2: 93% (13 Mar 2024 10:30) (87% - 100%)    Parameters below as of 13 Mar 2024 09:07  Patient On (Oxygen Delivery Method): nasal cannula, high flow, 31c degrees  O2 Flow (L/min): 50  O2 Concentration (%): 50    PHYSICAL EXAM:Pleasant patient in no acute distress.         ENMT:No sinus tenderness.No thrush.No pharyngeal exudate or erythema.Fair dental hygiene    Neck:Supple,No LN,no JVD      Respiratory:Good air entry bilaterally,CTA    Cardiovascular:S1 S2 wnl, No murmurs,rub or gallops    Gastrointestinal:Soft BS(+) no tenderness no masses ,No rebound or guarding    Genitourinary:No CVA tendereness     Rectal:    Extremities:No cyanosis,clubbing or edema.    Vascular:peripheral pulses felt                                     8.2    15.14 )-----------( 141      ( 13 Mar 2024 00:30 )             25.3         03-13    131<L>  |  96  |  34<H>  ----------------------------<  138<H>  4.3   |  20<L>  |  1.41<H>    Ca    9.6      13 Mar 2024 00:30  Phos  1.6     03-13  Mg     2.6     03-13    TPro  7.0  /  Alb  4.4  /  TBili  3.1<H>  /  DBili  x   /  AST  39  /  ALT  15  /  AlkPhos  161<H>  03-13      RECENT CULTURES:      MICROBIOLOGY:          Radiology:      Assessment:        Recommendations and Plan:    Pager [phone]  After 5 pm/weekends or if no response :[phone]

## 2024-03-13 NOTE — PROGRESS NOTE ADULT - ASSESSMENT
84 y/o M with ICM/HFrEF (now 30%; LVIDd 6.7 cm; prev req inotrope), CAD c/b IWMI (1994) s/p angioplasty, aortic stenosis s/p bio-AVR 2004, VT arrest (2017) s/p ICD, Afib s/p multiple DCCV and ablation x2 (2020 and 2023; on AC), Aflutter s/p WARREN/DCCV (8/23), prior Ao aneurysm s/p Bentall (2021), severe MR prior MVr (2021) with MAC (precludes M-KRISTIE d/t his anatomy; turned down for TMVR trials by [email or web address]), atrophic kidney with CKD (b/l Cr 2.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 Patient was initiated on bumex gtt with gradual improvement with Cr improving from 3.6 to 3.1. Patient was considered for MV re-op; however, patient was reluctant of the procedure and expressed wanting to follow up in the outpatient setting to schedule. During last admission, patient was also noted to be in flutter, EP was consulted, and patient underwent repeat WARREN/DCCV , on amiodarone. Patient was discharged home 2/28 on higher dose of diuretics, as per HF team. Today, patient was evaluated at CTS office four routine follow-up, pt c/o worsening shortness of breath and L>R LE edema, with redness/swelling on left dorsal aspect of the foot, readmitted to CTS service for further management.  noticed with rising creatinine and bun      1- SURESH on ckd   2- decompensated CHF  3- Mitral regurgitation   4- hypotension   5- hypothyroid   6- hyperuricemia   7- anemia      cont dobutamine drip   midodrine 20  mg tid   cont vasopressin drip     CRRT with HI1513 dialyzer;  dfr 1200  increased to 200 cc hr fluid removal  as bp tolerates   see new orders   + luek meropenem 1 g tid   reatcrit 33766 U twice weekly   allopurinol 100 mg daily   no blood work RUE  strict I/O  d/w CTS team when seen earlier

## 2024-03-13 NOTE — PROGRESS NOTE ADULT - SUBJECTIVE AND OBJECTIVE BOX
Overnight Events:  Pt with worsening CXR, concern for sepsis, increasing pressor requirements     Review Of Systems: Unable to obtain, patient having Central line placement          Current Meds:  allopurinol 100 milliGRAM(s) Oral daily  aMIOdarone    Tablet 200 milliGRAM(s) Oral two times a day  ascorbic acid 500 milliGRAM(s) Oral daily  atorvastatin 40 milliGRAM(s) Oral at bedtime  buMETAnide Infusion 2 mG/Hr IV Continuous <Continuous>  chlorhexidine 2% Cloths 1 Application(s) Topical <User Schedule>  CRRT Treatment    <Continuous>  cyanocobalamin 1000 MICROGram(s) Oral daily  dexMEDEtomidine Infusion 0.2 MICROgram(s)/kG/Hr IV Continuous <Continuous>  DOBUTamine Infusion 7.5 MICROgram(s)/kG/Min IV Continuous <Continuous>  enoxaparin Injectable 30 milliGRAM(s) SubCutaneous every 12 hours  EPINEPHrine    Infusion 0.02 MICROgram(s)/kG/Min IV Continuous <Continuous>  epoetin zara-epbx (RETACRIT) Injectable 02670 Unit(s) IV Push <User Schedule>  epoetin zara-epbx (RETACRIT) Injectable 27621 Unit(s) IV Push once  fluconAZOLE IVPB 200 milliGRAM(s) IV Intermittent once  fluconAZOLE IVPB      folic acid 1 milliGRAM(s) Oral daily  levothyroxine 75 MICROGram(s) Oral daily  lidocaine   4% Patch 1 Patch Transdermal daily  melatonin 5 milliGRAM(s) Oral at bedtime  meropenem  IVPB      meropenem  IVPB 1000 milliGRAM(s) IV Intermittent every 8 hours  midodrine 20 milliGRAM(s) Oral every 8 hours  norepinephrine Infusion 0.06 MICROgram(s)/kG/Min IV Continuous <Continuous>  pantoprazole    Tablet 40 milliGRAM(s) Oral before breakfast  polyethylene glycol 3350 17 Gram(s) Oral daily  PrismaSATE Dialysate BGK 4 / 2.5 5000 milliLiter(s) CRRT <Continuous>  PrismaSOL Filtration BGK 0 / 2.5 5000 milliLiter(s) CRRT <Continuous>  PrismaSOL Filtration BGK 4 / 2.5 5000 milliLiter(s) CRRT <Continuous>  senna 2 Tablet(s) Oral at bedtime  sertraline 50 milliGRAM(s) Oral daily  sodium chloride 0.9% lock flush 3 milliLiter(s) IV Push every 8 hours  thiamine 100 milliGRAM(s) Oral daily  vasopressin Infusion 0.04 Unit(s)/Min IV Continuous <Continuous>      Vitals:  T(F): 97.5 (03-13), Max: 98.7 (03-13)  HR: 78 (03-13) (69 - 94)  BP: 85/53 (03-12) (85/53 - 98/54)  RR: 26 (03-13)  SpO2: 93% (03-13)  I&O's Summary    12 Mar 2024 07:01  -  13 Mar 2024 07:00  --------------------------------------------------------  IN: 2404.5 mL / OUT: 3143 mL / NET: -738.5 mL    13 Mar 2024 07:01  -  13 Mar 2024 12:08  --------------------------------------------------------  IN: 463.8 mL / OUT: 0 mL / NET: 463.8 mL        Physical Exam:    Appearance: Mild distress   Eyes: pink conjunctiva  HEENT: AT/NC   Cardiovascular: Regular rate, mid-sternal scar   Respiratory: + accessory resp muscle use, HFNC  Gastrointestinal:  non-distended   Musculoskeletal: No clubbing; no joint deformity   Neurologic: Normal speech, no facial asymmetry  Psychiatry: AAOx3, mood & affect appropriate  Skin: No rashes, ecchymoses, or cyanosis of exposed skin                           8.2    15.14 )-----------( 141      ( 13 Mar 2024 00:30 )             25.3     03-13    131<L>  |  96  |  34<H>  ----------------------------<  138<H>  4.3   |  20<L>  |  1.41<H>    Ca    9.6      13 Mar 2024 00:30  Phos  1.6     03-13  Mg     2.6     03-13    TPro  7.0  /  Alb  4.4  /  TBili  3.1<H>  /  DBili  x   /  AST  39  /  ALT  15  /  AlkPhos  161<H>  03-13    PT/INR - ( 13 Mar 2024 06:17 )   PT: 14.1 sec;   INR: 1.35 ratio         PTT - ( 13 Mar 2024 06:17 )  PTT:32.0 sec

## 2024-03-13 NOTE — PROGRESS NOTE ADULT - SUBJECTIVE AND OBJECTIVE BOX
Patient seen and examined at the bedside.    Remained critically ill on continuous ICU monitoring.    OBJECTIVE:  Vital Signs Last 24 Hrs  T(C): 37 (13 Mar 2024 16:00), Max: 37.1 (13 Mar 2024 04:00)  T(F): 98.6 (13 Mar 2024 16:00), Max: 98.7 (13 Mar 2024 04:00)  HR: 75 (13 Mar 2024 20:00) (69 - 97)  BP: --  BP(mean): --  RR: 29 (13 Mar 2024 20:00) (21 - 41)  SpO2: 95% (13 Mar 2024 20:00) (86% - 100%)    Parameters below as of 13 Mar 2024 20:00  Patient On (Oxygen Delivery Method): nasal cannula, high flow  O2 Flow (L/min): 50  O2 Concentration (%): 70      Physical Exam:   General: NAD, OOBTC  Neurology: Intact, AAO x3  Eyes: bilateral pupils equal and reactive   ENT/Neck: Neck supple, trachea midline, RIJ HDC and LIJ CVC in place without bleeding  Respiratory: Crackles bilaterally   CV: S1S2, no murmurs        [x] Sternal dressing        [x] Sinus rhythm, [x] AICD  Abdominal: Soft, NT, ND +BS   Extremities: 1-2+ pedal edema noted, + peripheral pulses   Skin: No Rashes, Hematoma, Ecchymosis                           Assessment:  84 y/o M with ICM/HFrEF (now 30%; LVIDd 6.7 cm; prev req inotrope), CAD c/b IWMI (1994) s/p angioplasty, aortic stenosis s/p bio-AVR 2004, VT arrest (2017) s/p ICD, Afib s/p multiple DCCV and ablation x2 (2020 and 2023; on AC), Aflutter s/p WARREN/DCCV (8/23), prior Ao aneurysm s/p Bentall (2021), severe MR prior MVr (2021) with MAC (precludes M-KRISTIE d/t his anatomy; turned down for TMVR trials by [employer]), atrophic kidney with CKD (b/l Cr 2.5-3). Was admitted on 2/20 with acute on chronic heart failure and acute on chronic kidney dysfunction, with Heart Failure and Renal teams following. Patient was considered for MV re-op; however, patient was reluctant of the procedure and expressed wanting to follow up in the outpatient setting to schedule.    Aortic root aneurysm, H/O of AVR, and MVR s/p Repeat Sternotomy, Bentall Procedure, and Mitral Valve Repair with WARREN on 6/2/21  Hemodynamic instability  Hypervolemia  Vasogenic shock  Cardiogenic shock  Acute hypoxemic respiratory failure  Acute pulmonary edema  Acute renal failure  Acute on chronic systolic heart failure    Plan:   ***Neuro***  [x] Sedated with [x] Precedex  Continue with Melatonin for sleep regimen   Cont with Zoloft     ***Cardiovascular***  Invasive hemodynamic monitoring, assess perfusion indices   ND / CVP 14 / MAP 68 / PAP 42 / ci 2.9 / Hct 26.6% / Lactate 1.8  [x] Vasopressin - 0.04 Unit(s)/Min   [x] Dobutamine - 7.5 mcg/kG/Min  [x] Epinephrine - 0.04 mcgs/kG/Min  [x] Levophed - 0.06 mcgs/kG/Min  [x] Vasopressin - 0.04 Unit(s)/Min  [x] IABP placed today 3/13  Continuous reassessment of hemodynamics  PO Amiodarone for rate control  Continue Midodrine  [x] VTE ppx with Lovenox  [x] Statin nightly  Serial EKG and cardiac enzymes   Dickson placed today 3/13   BRIAN landrum & PHANI switched     ***Pulmonary***  [x] HFO2 50L/70% [x] Luke 20 ppm  Follow SpO2, CXR, blood gasses   Encourage incentive spirometry, continue pulse ox monitoring, follow ABGs     ***GI***  [x] Dash diet with TF's at 40cc/hr   [x] Protonix for stress ulcer ppx  Bowel regimen with Miralax and Senna.     ***Renal***  Continue to monitor I/Os, BUN/Creatinine.   Replete lytes PRN  CVVH/CRRT for volume removal and clearance - goal net negative  Allopurinol for gout  Renal support with RETACRIT   Diuresis with Bumex gtt     ***ID***  Cefazolin for cellulitis  Empiric dosing with Diflucan/ Vanco/ Merop  F/u all blood cultures     ***Endocrine***  HbA1c 5.7%                - [x] Monitor glucose levels             - Need tight glycemic control to prevent wound infection.  [x] Hypothyroidism             - [x] Synthroid        Patient requires continuous monitoring with bedside rhythm monitoring, pulse oximetry monitoring, and continuous central venous and arterial pressure monitoring; and intermittent blood gas analysis. Care plan discussed with the ICU care team.   Patient remained critical, at risk for life threatening decompensation.    I have spent 30 minutes providing critical care management to this patient.    By signing my name below, I, Oh Thomas, attest that this documentation has been prepared under the direction and in the presence of Bud Rivera NP   Electronically signed: Home Tang, 03-13-24 @ 20:16    I, Bud Rivera, personally performed the services described in this documentation. all medical record entries made by the scribe were at my direction and in my presence. I have reviewed the chart and agree that the record reflects my personal performance and is accurate and complete  Electronically signed: Bud Rivera NP

## 2024-03-13 NOTE — PROGRESS NOTE ADULT - PROBLEM SELECTOR PLAN 2
- Etiology: Likely cardiorenal   - Cr on recent discharge on 2/28 was 121/3.65 and this admission 143/4.39  - HD sessions started 3/5  - Preserve RUE in anticipation for HD access  - Nephrology following

## 2024-03-13 NOTE — PROGRESS NOTE ADULT - ATTENDING COMMENTS
Patient seen and examined at bedside. The patient had rising pressor requirements overnight with epi and vasopressin added on. He appears tachypneic on exam and he is now requiring hi flow nasal cannula. His CXR is concerning for both volume overload and a possible PNA (has a rising WBC). However his picture at this time appears to be more cardiogenic. A swan luis was placed which showed elevated biventricular filling pressures with a PA sat of 44. Given these numbers would place an IABP.   Will need to aggressively optimize his volume and would aim for a net negative of 2-3L  Wean pressors as tolerated  C/w  7.5 Patient seen and examined at bedside. The patient had rising pressor requirements overnight with epi and vasopressin added on. He appears tachypneic on exam and he is now requiring hi flow nasal cannula. His CXR is concerning for both volume overload and a possible PNA (has a rising WBC). However his picture at this time appears to be more cardiogenic. A swan luis was placed which showed elevated biventricular filling pressures with a PA sat of 44. Given these numbers would place an IABP.   Will need to aggressively optimize his volume and would aim for a net negative of 2-3L  Wean pressors as tolerated  C/w  7.5.

## 2024-03-13 NOTE — CONSULT NOTE ADULT - ASSESSMENT
pt is very sob and cachetic  Denies any systemic manifestations of infection prior to admission  hs of AVR PPM  CKI  CHF  admitted with sob, edema, but is alert  chest x-ray compactible l with chf  cant r/o infection    discussed with [facility]    agree with empiric meropenem  await cultures   dose vanco carefully by level in view of renal insufficieny

## 2024-03-14 LAB
ALBUMIN SERPL ELPH-MCNC: 4.2 G/DL — SIGNIFICANT CHANGE UP (ref 3.3–5)
ALP SERPL-CCNC: 153 U/L — HIGH (ref 40–120)
ALT FLD-CCNC: 22 U/L — SIGNIFICANT CHANGE UP (ref 10–45)
ANION GAP SERPL CALC-SCNC: 13 MMOL/L — SIGNIFICANT CHANGE UP (ref 5–17)
AST SERPL-CCNC: 47 U/L — HIGH (ref 10–40)
BASE EXCESS BLDMV CALC-SCNC: -1.1 MMOL/L — SIGNIFICANT CHANGE UP (ref -3–3)
BASE EXCESS BLDMV CALC-SCNC: 0.3 MMOL/L — SIGNIFICANT CHANGE UP (ref -3–3)
BILIRUB SERPL-MCNC: 4.6 MG/DL — HIGH (ref 0.2–1.2)
BUN SERPL-MCNC: 41 MG/DL — HIGH (ref 7–23)
CALCIUM SERPL-MCNC: 9.7 MG/DL — SIGNIFICANT CHANGE UP (ref 8.4–10.5)
CHLORIDE SERPL-SCNC: 97 MMOL/L — SIGNIFICANT CHANGE UP (ref 96–108)
CO2 BLDMV-SCNC: 25 MMOL/L — SIGNIFICANT CHANGE UP (ref 21–29)
CO2 BLDMV-SCNC: 27 MMOL/L — SIGNIFICANT CHANGE UP (ref 21–29)
CO2 SERPL-SCNC: 21 MMOL/L — LOW (ref 22–31)
CREAT SERPL-MCNC: 1.54 MG/DL — HIGH (ref 0.5–1.3)
EGFR: 44 ML/MIN/1.73M2 — LOW
GAS PNL BLDA: SIGNIFICANT CHANGE UP
GAS PNL BLDA: SIGNIFICANT CHANGE UP
GAS PNL BLDMV: SIGNIFICANT CHANGE UP
GAS PNL BLDMV: SIGNIFICANT CHANGE UP
GLUCOSE SERPL-MCNC: 143 MG/DL — HIGH (ref 70–99)
HCO3 BLDMV-SCNC: 24 MMOL/L — SIGNIFICANT CHANGE UP (ref 20–28)
HCO3 BLDMV-SCNC: 25 MMOL/L — SIGNIFICANT CHANGE UP (ref 20–28)
HCT VFR BLD CALC: 26.8 % — LOW (ref 39–50)
HCT VFR BLD CALC: 27.6 % — LOW (ref 39–50)
HGB BLD-MCNC: 8.8 G/DL — LOW (ref 13–17)
HGB BLD-MCNC: 9.2 G/DL — LOW (ref 13–17)
HOROWITZ INDEX BLDMV+IHG-RTO: 40 — SIGNIFICANT CHANGE UP
HOROWITZ INDEX BLDMV+IHG-RTO: 40 — SIGNIFICANT CHANGE UP
MAGNESIUM SERPL-MCNC: 2.6 MG/DL — SIGNIFICANT CHANGE UP (ref 1.6–2.6)
MCHC RBC-ENTMCNC: 31.5 PG — SIGNIFICANT CHANGE UP (ref 27–34)
MCHC RBC-ENTMCNC: 31.7 PG — SIGNIFICANT CHANGE UP (ref 27–34)
MCHC RBC-ENTMCNC: 32.8 GM/DL — SIGNIFICANT CHANGE UP (ref 32–36)
MCHC RBC-ENTMCNC: 33.3 GM/DL — SIGNIFICANT CHANGE UP (ref 32–36)
MCV RBC AUTO: 94.5 FL — SIGNIFICANT CHANGE UP (ref 80–100)
MCV RBC AUTO: 96.4 FL — SIGNIFICANT CHANGE UP (ref 80–100)
NRBC # BLD: 0 /100 WBCS — SIGNIFICANT CHANGE UP (ref 0–0)
NRBC # BLD: 0 /100 WBCS — SIGNIFICANT CHANGE UP (ref 0–0)
O2 CT VFR BLD CALC: 29 MMHG — LOW (ref 30–65)
O2 CT VFR BLD CALC: 29 MMHG — LOW (ref 30–65)
PCO2 BLDMV: 38 MMHG — SIGNIFICANT CHANGE UP (ref 30–65)
PCO2 BLDMV: 42 MMHG — SIGNIFICANT CHANGE UP (ref 30–65)
PH BLDMV: 7.39 — SIGNIFICANT CHANGE UP (ref 7.32–7.45)
PH BLDMV: 7.4 — SIGNIFICANT CHANGE UP (ref 7.32–7.45)
PHOSPHATE SERPL-MCNC: 1.7 MG/DL — LOW (ref 2.5–4.5)
PHOSPHATE SERPL-MCNC: 2.3 MG/DL — LOW (ref 2.5–4.5)
PLATELET # BLD AUTO: 143 K/UL — LOW (ref 150–400)
PLATELET # BLD AUTO: 159 K/UL — SIGNIFICANT CHANGE UP (ref 150–400)
POTASSIUM SERPL-MCNC: 4.2 MMOL/L — SIGNIFICANT CHANGE UP (ref 3.5–5.3)
POTASSIUM SERPL-SCNC: 4.2 MMOL/L — SIGNIFICANT CHANGE UP (ref 3.5–5.3)
PROT SERPL-MCNC: 6.8 G/DL — SIGNIFICANT CHANGE UP (ref 6–8.3)
RBC # BLD: 2.78 M/UL — LOW (ref 4.2–5.8)
RBC # BLD: 2.92 M/UL — LOW (ref 4.2–5.8)
RBC # FLD: 17 % — HIGH (ref 10.3–14.5)
RBC # FLD: 17.1 % — HIGH (ref 10.3–14.5)
SAO2 % BLDMV: 48.4 — LOW (ref 60–90)
SAO2 % BLDMV: 50.4 — LOW (ref 60–90)
SODIUM SERPL-SCNC: 131 MMOL/L — LOW (ref 135–145)
VANCOMYCIN TROUGH SERPL-MCNC: 6.5 UG/ML — LOW (ref 10–20)
VANCOMYCIN TROUGH SERPL-MCNC: 7.9 UG/ML — LOW (ref 10–20)
WBC # BLD: 15.18 K/UL — HIGH (ref 3.8–10.5)
WBC # BLD: 16.85 K/UL — HIGH (ref 3.8–10.5)
WBC # FLD AUTO: 15.18 K/UL — HIGH (ref 3.8–10.5)
WBC # FLD AUTO: 16.85 K/UL — HIGH (ref 3.8–10.5)

## 2024-03-14 PROCEDURE — 99292 CRITICAL CARE ADDL 30 MIN: CPT

## 2024-03-14 PROCEDURE — 93010 ELECTROCARDIOGRAM REPORT: CPT

## 2024-03-14 PROCEDURE — 99232 SBSQ HOSP IP/OBS MODERATE 35: CPT

## 2024-03-14 PROCEDURE — 93280 PM DEVICE PROGR EVAL DUAL: CPT | Mod: 26

## 2024-03-14 PROCEDURE — 71045 X-RAY EXAM CHEST 1 VIEW: CPT | Mod: 26,76

## 2024-03-14 PROCEDURE — 99291 CRITICAL CARE FIRST HOUR: CPT

## 2024-03-14 PROCEDURE — 76705 ECHO EXAM OF ABDOMEN: CPT | Mod: 26

## 2024-03-14 RX ORDER — DEXMEDETOMIDINE HYDROCHLORIDE IN 0.9% SODIUM CHLORIDE 4 UG/ML
0.5 INJECTION INTRAVENOUS
Qty: 200 | Refills: 0 | Status: DISCONTINUED | OUTPATIENT
Start: 2024-03-14 | End: 2024-03-15

## 2024-03-14 RX ORDER — EPINEPHRINE 0.3 MG/.3ML
0.1 INJECTION INTRAMUSCULAR; SUBCUTANEOUS
Qty: 8 | Refills: 0 | Status: DISCONTINUED | OUTPATIENT
Start: 2024-03-14 | End: 2024-03-24

## 2024-03-14 RX ORDER — METOCLOPRAMIDE HCL 10 MG
5 TABLET ORAL EVERY 8 HOURS
Refills: 0 | Status: DISCONTINUED | OUTPATIENT
Start: 2024-03-14 | End: 2024-03-24

## 2024-03-14 RX ORDER — VANCOMYCIN HCL 1 G
750 VIAL (EA) INTRAVENOUS EVERY 12 HOURS
Refills: 0 | Status: DISCONTINUED | OUTPATIENT
Start: 2024-03-14 | End: 2024-03-19

## 2024-03-14 RX ADMIN — Medication 1 MILLIGRAM(S): at 14:38

## 2024-03-14 RX ADMIN — SODIUM CHLORIDE 3 MILLILITER(S): 9 INJECTION INTRAMUSCULAR; INTRAVENOUS; SUBCUTANEOUS at 05:47

## 2024-03-14 RX ADMIN — Medication 100 MILLIGRAM(S): at 14:38

## 2024-03-14 RX ADMIN — ATORVASTATIN CALCIUM 40 MILLIGRAM(S): 80 TABLET, FILM COATED ORAL at 21:27

## 2024-03-14 RX ADMIN — POLYETHYLENE GLYCOL 3350 17 GRAM(S): 17 POWDER, FOR SOLUTION ORAL at 14:37

## 2024-03-14 RX ADMIN — SERTRALINE 50 MILLIGRAM(S): 25 TABLET, FILM COATED ORAL at 05:43

## 2024-03-14 RX ADMIN — VASOPRESSIN 6 UNIT(S)/MIN: 20 INJECTION INTRAVENOUS at 05:42

## 2024-03-14 RX ADMIN — Medication 500 MILLIGRAM(S): at 14:38

## 2024-03-14 RX ADMIN — Medication 7.63 MICROGRAM(S)/KG/MIN: at 05:42

## 2024-03-14 RX ADMIN — Medication 63.75 MILLIMOLE(S): at 03:27

## 2024-03-14 RX ADMIN — VASOPRESSIN 6 UNIT(S)/MIN: 20 INJECTION INTRAVENOUS at 08:07

## 2024-03-14 RX ADMIN — Medication 75 MICROGRAM(S): at 05:48

## 2024-03-14 RX ADMIN — Medication 5 MILLIGRAM(S): at 21:28

## 2024-03-14 RX ADMIN — Medication 5 MILLIGRAM(S): at 16:32

## 2024-03-14 RX ADMIN — Medication 85 MILLIMOLE(S): at 13:26

## 2024-03-14 RX ADMIN — PANTOPRAZOLE SODIUM 40 MILLIGRAM(S): 20 TABLET, DELAYED RELEASE ORAL at 05:48

## 2024-03-14 RX ADMIN — ENOXAPARIN SODIUM 30 MILLIGRAM(S): 100 INJECTION SUBCUTANEOUS at 08:09

## 2024-03-14 RX ADMIN — SENNA PLUS 2 TABLET(S): 8.6 TABLET ORAL at 21:27

## 2024-03-14 RX ADMIN — MIDODRINE HYDROCHLORIDE 20 MILLIGRAM(S): 2.5 TABLET ORAL at 21:27

## 2024-03-14 RX ADMIN — Medication 5 MILLIGRAM(S): at 21:27

## 2024-03-14 RX ADMIN — Medication 7.63 MICROGRAM(S)/KG/MIN: at 08:08

## 2024-03-14 RX ADMIN — ENOXAPARIN SODIUM 30 MILLIGRAM(S): 100 INJECTION SUBCUTANEOUS at 21:28

## 2024-03-14 RX ADMIN — PREGABALIN 1000 MICROGRAM(S): 225 CAPSULE ORAL at 14:38

## 2024-03-14 RX ADMIN — AMIODARONE HYDROCHLORIDE 200 MILLIGRAM(S): 400 TABLET ORAL at 05:43

## 2024-03-14 RX ADMIN — Medication 100 MILLIGRAM(S): at 05:43

## 2024-03-14 RX ADMIN — Medication 3.81 MICROGRAM(S)/KG/MIN: at 05:42

## 2024-03-14 RX ADMIN — SODIUM CHLORIDE 3 MILLILITER(S): 9 INJECTION INTRAMUSCULAR; INTRAVENOUS; SUBCUTANEOUS at 22:00

## 2024-03-14 RX ADMIN — MEROPENEM 100 MILLIGRAM(S): 1 INJECTION INTRAVENOUS at 21:27

## 2024-03-14 RX ADMIN — SODIUM CHLORIDE 3 MILLILITER(S): 9 INJECTION INTRAMUSCULAR; INTRAVENOUS; SUBCUTANEOUS at 14:27

## 2024-03-14 RX ADMIN — MIDODRINE HYDROCHLORIDE 20 MILLIGRAM(S): 2.5 TABLET ORAL at 14:37

## 2024-03-14 RX ADMIN — MEROPENEM 100 MILLIGRAM(S): 1 INJECTION INTRAVENOUS at 03:52

## 2024-03-14 RX ADMIN — Medication 250 MILLIGRAM(S): at 17:46

## 2024-03-14 RX ADMIN — CHLORHEXIDINE GLUCONATE 1 APPLICATION(S): 213 SOLUTION TOPICAL at 05:48

## 2024-03-14 RX ADMIN — EPINEPHRINE 10.2 MICROGRAM(S)/KG/MIN: 0.3 INJECTION INTRAMUSCULAR; SUBCUTANEOUS at 08:08

## 2024-03-14 RX ADMIN — Medication 3.81 MICROGRAM(S)/KG/MIN: at 08:08

## 2024-03-14 RX ADMIN — MIDODRINE HYDROCHLORIDE 20 MILLIGRAM(S): 2.5 TABLET ORAL at 05:42

## 2024-03-14 RX ADMIN — FLUCONAZOLE 100 MILLIGRAM(S): 150 TABLET ORAL at 08:27

## 2024-03-14 RX ADMIN — EPINEPHRINE 10.2 MICROGRAM(S)/KG/MIN: 0.3 INJECTION INTRAMUSCULAR; SUBCUTANEOUS at 05:42

## 2024-03-14 NOTE — PROGRESS NOTE ADULT - PROBLEM SELECTOR PLAN 1
- Etiology: ischemic and complicated by severe MR  - On . Consider more gradual wean of , not below 3 mcg/kg/min  - Monitor markers of end organ function (LFTs, Cr, lactate and ScVO2)  - Target net negative 1-2L. CVP 6-8   - Wean off Luke as it may worsen patient's condition and cause pulmonary edema   - Please wrap BLE in ACE bandages to aid in mobilization of fluid   - Has dual chamber ICD, last interrogated 3/14,  Increased lower rate limit from 70 to 80bpm.   - Pt with increasing pressor support, respiratory distress, worsening CXR on 3/13, hemodynamics concerning for sepsis component; Trial of escalation of support with IABP placement unsuccessful, likely due to tortuosity in the groin.  - Would recommend palliative care discussion

## 2024-03-14 NOTE — PROGRESS NOTE ADULT - ASSESSMENT
pt is very sob and cachetic  Denies any systemic manifestations of infection prior to admission  hs of AVR PPM  CKI  CHF  admitted with sob, edema, but is alert  chest x-ray compactible l with chf  cant r/o infection         agree with empiric meropenem ( diflucan per  [facility])   await cultures   dose vanco carefully by level in view of renal insufficieny but now on cavhd  discussed with [facility] swan Salvatore suggestive of pulmonary edema  no documented infection     would complete 7 days of antibiotics and dc

## 2024-03-14 NOTE — PROGRESS NOTE ADULT - ASSESSMENT
82 y/o M with ICM/HFrEF (now 30%; LVIDd 6.7 cm; prev req inotrope), CAD c/b IWMI (1994) s/p angioplasty, aortic stenosis s/p bio-AVR 2004, VT arrest (2017) s/p ICD, Afib s/p multiple DCCV and ablation x2 (2020 and 2023; on AC), Aflutter s/p WARREN/DCCV (8/23), prior Ao aneurysm s/p Bentall (2021), severe MR prior MVr (2021) with MAC (precludes M-KRISTIE d/t his anatomy; turned down for TMVR trials by [employer]), atrophic kidney with CKD (b/l Cr 2.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 Patient was initiated on bumex gtt with gradual improvement with Cr improving from 3.6 to 3.1. Patient was considered for MV re-op; however, patient was reluctant of the procedure and expressed wanting to follow up in the outpatient setting to schedule. During last admission, patient was also noted to be in flutter, EP was consulted, and patient underwent repeat WARREN/DCCV , on amiodarone. Patient was discharged home 2/28 on higher dose of diuretics, as per HF team. Today, patient was evaluated at CTS office four routine follow-up, pt c/o worsening shortness of breath and L>R LE edema, with redness/swelling on left dorsal aspect of the foot, readmitted to CTS service for further management.  noticed with rising creatinine and bun      1- SURESH on ckd   2- decompensated CHF  3- Mitral regurgitation   4- hypotension   5- hypothyroid   6- hyperuricemia   7- anemia      cont dobutamine drip   midodrine 20  mg tid   cont vasopressin drip  /levophed/epinephrine    CRRT with [id number] dialyzer;  dfr 1200   200 cc hr fluid removal  as bp tolerates   see new orders   + williamek meropenem 1 g tid   reatcrit 27622 U twice weekly   allopurinol 100 mg daily   no blood work RUE  strict I/O  d/w CTS team when seen earlier

## 2024-03-14 NOTE — PROCEDURE NOTE - ADDITIONAL PROCEDURE DETAILS
Indication: asked by CTSx primary team to increase LRL as patient on 4 pressors in CTU  Presenting rhythm: AP-VS at 70bpm  Changes made: Increased lower rate limit from 70 to 80bpm.   Pt intermittent AP-/AP-VS at increased rate.   Please call back prior to discharge to evaluate for re-programming.

## 2024-03-14 NOTE — PROGRESS NOTE ADULT - ASSESSMENT
84 y/o M with ICM/HFrEF (now 30%; LVIDd 6.7 cm; prev req inotrope), CAD c/b IWMI (1994) s/p angioplasty, aortic stenosis s/p bio-AVR 2004, VT arrest (2017) s/p ICD, Afib s/p multiple DCCV and ablation x3 (most recent 2/27/24 on AC), Aflutter s/p WARREN/DCCV (8/23), prior Ao aneurysm s/p Bentall (2021), severe MR prior MVr (2021) with MAC (precludes M-KRISTIE d/t his anatomy; turned down for TMVR trials by [employer]) and atrophic kidney with CKD (b/l Cr 2.5-3) who was recently hospitalized for ADHF with plans for surgical MVR once optimized but opted for discharge with residual congestion on 2/28. Now presenting with worsening volume retention, cellulitis of L foot and marked SURESH on CKD (BUN/Cr on admission 143/4.39).     He was empirically started on  and Initiated on iHD, first session on 3/5 with need for Intermittent pressor support.  Eventual plan for surgical mitral valve replacement once optimized per Dr. Fenton, however pt currently not optimized and  high risk for surgery given deteriorating respiratory status, pressor and inotrope requirements.     Bedside Hemodynamics  3/14/24 ( 7.5 mcg, Epi 0.02 mcg/kg/min, NE 0.06 mcg/kg/min, vasopressin 0.04U/min ): CVP 12, SvO2 50.4%, CO/CI 2.7/1.9, SVR 1866  3/13/24 ( 7.5 mcg, Bumex 2 mG/hr, Epi 0.02 mcg/kg/min, NE 0.06 mcg/kg/min ): CVP 23, SvO2 53.7%, CO/CI 4.6/2.5,   3/11/24 ( 7 mcg): CVP 18, SvO2 54.8%, CO/CI 2.7/2.2, SVR 1629  3/8/24 ( 5 mcg): CVP 10, ScVO2 67.7%, CO/CI 5.7/3.1, MAP 70, HR 70,  dsc  3/7/24 ( 2.5 mcg): ScVO2 57.4%, CO/CI 4.3/2.4  3/7/24 ( 5mcg): CVP 9, ScVO2 70%, CO/CI (FI) 7.5/4.1  3/6/24 ( 5mcg): CVP 8, ScVO2 74%, CO/CI (FI): 6.5/3.6    Cardiac studies   ·	TTE 3/11/24: LVEF 37 %,  severe mitral regurgitation. Multiple segmental abnormalities exist. Moderately enlarged right ventricular cavity size and reduced systolic function.  ·	WARREN 2/27/24: severe MR, DCCV SR  ·	TTE 2/22/24: LVIDd: 5.8cm, LVEF 35%, mildly enlarged RV with reduced function, severe MR and Moderate to severe TR. PASP 70mmHg  ·	WARREN 8/23 Severe MR and moderate TR  ·	TTE 8/23 EF 36%, grade II DD, mildly enlarged RV with reduced function, severe MR and Moderate to severe TR

## 2024-03-14 NOTE — PROGRESS NOTE ADULT - SUBJECTIVE AND OBJECTIVE BOX
infectious diseases progress note:    Patient is a 83y old  Male who presents with a chief complaint of shortness of breath (14 Mar 2024 06:22)        Heart failure          ROS:  CONSTITUTIONAL:  Negative fever or chills, feels well, good appetite  EYES:  Negative  blurry vision or double vision  CARDIOVASCULAR:  Negative for chest pain or palpitations  RESPIRATORY:  Negative for cough, wheezing, or SOB   GASTROINTESTINAL:  Negative for nausea, vomiting, diarrhea, constipation, or abdominal pain  GENITOURINARY:  Negative frequency, urgency or dysuria  NEUROLOGIC:  No headache, confusion, dizziness, lightheadedness    Allergies    No Known Allergies    Intolerances        ANTIBIOTICS/RELEVANT:  antimicrobials  fluconAZOLE IVPB 200 milliGRAM(s) IV Intermittent every 24 hours  fluconAZOLE IVPB      meropenem  IVPB      meropenem  IVPB 1000 milliGRAM(s) IV Intermittent every 8 hours  vancomycin  IVPB 500 milliGRAM(s) IV Intermittent every 12 hours    immunologic:  epoetin zara-epbx (RETACRIT) Injectable 50600 Unit(s) IV Push <User Schedule>    OTHER:  allopurinol 100 milliGRAM(s) Oral daily  aMIOdarone    Tablet 200 milliGRAM(s) Oral daily  ascorbic acid 500 milliGRAM(s) Oral daily  atorvastatin 40 milliGRAM(s) Oral at bedtime  buMETAnide Infusion 2 mG/Hr IV Continuous <Continuous>  chlorhexidine 2% Cloths 1 Application(s) Topical <User Schedule>  CRRT Treatment    <Continuous>  cyanocobalamin 1000 MICROGram(s) Oral daily  dexMEDEtomidine Infusion 0.5 MICROgram(s)/kG/Hr IV Continuous <Continuous>  DOBUTamine Infusion 7.5 MICROgram(s)/kG/Min IV Continuous <Continuous>  enoxaparin Injectable 30 milliGRAM(s) SubCutaneous every 12 hours  EPINEPHrine    Infusion 0.06 MICROgram(s)/kG/Min IV Continuous <Continuous>  folic acid 1 milliGRAM(s) Oral daily  levothyroxine 75 MICROGram(s) Oral daily  lidocaine   4% Patch 1 Patch Transdermal daily  melatonin 5 milliGRAM(s) Oral at bedtime  midodrine 20 milliGRAM(s) Oral every 8 hours  norepinephrine Infusion 0.06 MICROgram(s)/kG/Min IV Continuous <Continuous>  pantoprazole    Tablet 40 milliGRAM(s) Oral before breakfast  polyethylene glycol 3350 17 Gram(s) Oral daily  PrismaSATE Dialysate BGK 4 / 2.5 5000 milliLiter(s) CRRT <Continuous>  PrismaSOL Filtration BGK 0 / 2.5 5000 milliLiter(s) CRRT <Continuous>  PrismaSOL Filtration BGK 4 / 2.5 5000 milliLiter(s) CRRT <Continuous>  senna 2 Tablet(s) Oral at bedtime  sertraline 50 milliGRAM(s) Oral daily  sodium chloride 0.9% lock flush 3 milliLiter(s) IV Push every 8 hours  thiamine 100 milliGRAM(s) Oral daily  vasopressin Infusion 0.04 Unit(s)/Min IV Continuous <Continuous>      Objective:  Vital Signs Last 24 Hrs  T(C): 36.4 (14 Mar 2024 08:00), Max: 37.3 (14 Mar 2024 00:00)  T(F): 97.5 (14 Mar 2024 08:00), Max: 99.1 (14 Mar 2024 00:00)  HR: 69 (14 Mar 2024 09:45) (68 - 97)  BP: --  BP(mean): --  RR: 22 (14 Mar 2024 09:45) (15 - 41)  SpO2: 96% (14 Mar 2024 09:45) (86% - 100%)    Parameters below as of 14 Mar 2024 08:00  Patient On (Oxygen Delivery Method): nasal cannula, high flow  O2 Flow (L/min): 50  O2 Concentration (%): 40    PHYSICAL EXAM:  Constitutional:Well-developed, well nourished--no acute distress  Eyes:JESUS, EOMI  Ear/Nose/Throat: no oral lesion, no sinus tenderness on percussion	  Neck:no JVD, no lymphadenopathy, supple  Respiratory: CTA sumi  Cardiovascular: S1S2 RRR, no murmurs  Gastrointestinal:soft, (+) BS, no HSM  Extremities:no e/e/c        LABS:                        8.8    16.85 )-----------( 159      ( 14 Mar 2024 00:29 )             26.8     03-14    131<L>  |  97  |  41<H>  ----------------------------<  143<H>  4.2   |  21<L>  |  1.54<H>    Ca    9.7      14 Mar 2024 00:29  Phos  1.7     03-14  Mg     2.6     03-14    TPro  6.8  /  Alb  4.2  /  TBili  4.6<H>  /  DBili  x   /  AST  47<H>  /  ALT  22  /  AlkPhos  153<H>  03-14    PT/INR - ( 13 Mar 2024 06:17 )   PT: 14.1 sec;   INR: 1.35 ratio         PTT - ( 13 Mar 2024 06:17 )  PTT:32.0 sec  Urinalysis Basic - ( 14 Mar 2024 00:29 )    Color: x / Appearance: x / SG: x / pH: x  Gluc: 143 mg/dL / Ketone: x  / Bili: x / Urobili: x   Blood: x / Protein: x / Nitrite: x   Leuk Esterase: x / RBC: x / WBC x   Sq Epi: x / Non Sq Epi: x / Bacteria: x          MICROBIOLOGY:    RECENT CULTURES:  03-12 @ 17:30 .Blood Blood-Peripheral                No growth at 24 hours          RESPIRATORY CULTURES:              RADIOLOGY & ADDITIONAL STUDIES:        Pager [phone]  After 5 pm/weekends or if no response :[phone]

## 2024-03-14 NOTE — PROGRESS NOTE ADULT - PROBLEM SELECTOR PLAN 3
- TTE 2/22 with severe MR  - He was evaluated for M-KRISTIE but was not a candidate d/t his anatomy.  He was evaluated for [facility] (Tendyne valve) trial for a transcatheter MVR but was denied by [employer]  - once optimized from HF and nutritional standpoint, plan for surgical MVR although patient appears to be a high risk candidate, currently on inotrope, ,multiple pressors, unable to place IABP   - Adequate nutrition, PT

## 2024-03-14 NOTE — PROGRESS NOTE ADULT - SUBJECTIVE AND OBJECTIVE BOX
Overnight Events: Failed IABP placement due to tortuous vessels, started on Luke     Review Of Systems: Sleeping comfortably     Current Meds:  allopurinol 100 milliGRAM(s) Oral daily  aMIOdarone    Tablet 200 milliGRAM(s) Oral daily  ascorbic acid 500 milliGRAM(s) Oral daily  atorvastatin 40 milliGRAM(s) Oral at bedtime  buMETAnide Infusion 2 mG/Hr IV Continuous <Continuous>  chlorhexidine 2% Cloths 1 Application(s) Topical <User Schedule>  cyanocobalamin 1000 MICROGram(s) Oral daily  dexMEDEtomidine Infusion 0.5 MICROgram(s)/kG/Hr IV Continuous <Continuous>  DOBUTamine Infusion 7.5 MICROgram(s)/kG/Min IV Continuous <Continuous>  enoxaparin Injectable 30 milliGRAM(s) SubCutaneous every 12 hours  EPINEPHrine    Infusion 0.06 MICROgram(s)/kG/Min IV Continuous <Continuous>  epoetin zara-epbx (RETACRIT) Injectable 75779 Unit(s) IV Push <User Schedule>  fluconAZOLE IVPB 200 milliGRAM(s) IV Intermittent every 24 hours  fluconAZOLE IVPB      folic acid 1 milliGRAM(s) Oral daily  levothyroxine 75 MICROGram(s) Oral daily  lidocaine   4% Patch 1 Patch Transdermal daily  melatonin 5 milliGRAM(s) Oral at bedtime  meropenem  IVPB      meropenem  IVPB 1000 milliGRAM(s) IV Intermittent every 8 hours  midodrine 20 milliGRAM(s) Oral every 8 hours  norepinephrine Infusion 0.06 MICROgram(s)/kG/Min IV Continuous <Continuous>  pantoprazole    Tablet 40 milliGRAM(s) Oral before breakfast  polyethylene glycol 3350 17 Gram(s) Oral daily  senna 2 Tablet(s) Oral at bedtime  sertraline 50 milliGRAM(s) Oral daily  sodium chloride 0.9% lock flush 3 milliLiter(s) IV Push every 8 hours  thiamine 100 milliGRAM(s) Oral daily  vancomycin  IVPB 500 milliGRAM(s) IV Intermittent every 12 hours  vasopressin Infusion 0.04 Unit(s)/Min IV Continuous <Continuous>      Vitals:  T(F): 97.5 (03-14), Max: 99.1 (03-14)  HR: 80 (03-14) (68 - 97)  BP: --  RR: 23 (03-14)  SpO2: 93% (03-14)  I&O's Summary    13 Mar 2024 07:01  -  14 Mar 2024 07:00  --------------------------------------------------------  IN: 2416.6 mL / OUT: 3441 mL / NET: -1024.4 mL    14 Mar 2024 07:01  -  14 Mar 2024 13:53  --------------------------------------------------------  IN: 630.6 mL / OUT: 1866 mL / NET: -1235.4 mL        Physical Exam:    Appearance: Sleeping comfortably   Eyes: pink conjunctiva  HEENT: AT/NC   Cardiovascular: Regular rate, mid-sternal scar   Respiratory: No accessory resp muscle use  Gastrointestinal:  non-distended   Musculoskeletal: No clubbing; no joint deformity   Neurologic: Normal speech, no facial asymmetry  Psychiatry: AAOx3, mood & affect appropriate  Skin: No rashes, ecchymoses, or cyanosis of exposed skin                           8.8    16.85 )-----------( 159      ( 14 Mar 2024 00:29 )             26.8     03-14    131<L>  |  97  |  41<H>  ----------------------------<  143<H>  4.2   |  21<L>  |  1.54<H>    Ca    9.7      14 Mar 2024 00:29  Phos  2.3     03-14  Mg     2.6     03-14    TPro  6.8  /  Alb  4.2  /  TBili  4.6<H>  /  DBili  x   /  AST  47<H>  /  ALT  22  /  AlkPhos  153<H>  03-14    PT/INR - ( 13 Mar 2024 06:17 )   PT: 14.1 sec;   INR: 1.35 ratio         PTT - ( 13 Mar 2024 06:17 )  PTT:32.0 sec

## 2024-03-14 NOTE — PROGRESS NOTE ADULT - SUBJECTIVE AND OBJECTIVE BOX
CRITICAL CARE ATTENDING - CTICU    MEDICATIONS  (STANDING):  allopurinol 100 milliGRAM(s) Oral daily  aMIOdarone    Tablet 200 milliGRAM(s) Oral daily  ascorbic acid 500 milliGRAM(s) Oral daily  atorvastatin 40 milliGRAM(s) Oral at bedtime  buMETAnide Infusion 2 mG/Hr (10 mL/Hr) IV Continuous <Continuous>  chlorhexidine 2% Cloths 1 Application(s) Topical <User Schedule>  CRRT Treatment    <Continuous>  cyanocobalamin 1000 MICROGram(s) Oral daily  dexMEDEtomidine Infusion 0.5 MICROgram(s)/kG/Hr (8.48 mL/Hr) IV Continuous <Continuous>  DOBUTamine Infusion 7.5 MICROgram(s)/kG/Min (7.63 mL/Hr) IV Continuous <Continuous>  enoxaparin Injectable 30 milliGRAM(s) SubCutaneous every 12 hours  EPINEPHrine    Infusion 0.04 MICROgram(s)/kG/Min (10.2 mL/Hr) IV Continuous <Continuous>  epoetin zara-epbx (RETACRIT) Injectable 32329 Unit(s) IV Push <User Schedule>  fluconAZOLE IVPB 200 milliGRAM(s) IV Intermittent every 24 hours  fluconAZOLE IVPB      folic acid 1 milliGRAM(s) Oral daily  levothyroxine 75 MICROGram(s) Oral daily  lidocaine   4% Patch 1 Patch Transdermal daily  melatonin 5 milliGRAM(s) Oral at bedtime  meropenem  IVPB 1000 milliGRAM(s) IV Intermittent every 8 hours  meropenem  IVPB      midodrine 20 milliGRAM(s) Oral every 8 hours  norepinephrine Infusion 0.06 MICROgram(s)/kG/Min (3.81 mL/Hr) IV Continuous <Continuous>  pantoprazole    Tablet 40 milliGRAM(s) Oral before breakfast  polyethylene glycol 3350 17 Gram(s) Oral daily  PrismaSATE Dialysate BGK 4 / 2.5 5000 milliLiter(s) (1200 mL/Hr) CRRT <Continuous>  PrismaSOL Filtration BGK 0 / 2.5 5000 milliLiter(s) (600 mL/Hr) CRRT <Continuous>  PrismaSOL Filtration BGK 4 / 2.5 5000 milliLiter(s) (400 mL/Hr) CRRT <Continuous>  senna 2 Tablet(s) Oral at bedtime  sertraline 50 milliGRAM(s) Oral daily  sodium chloride 0.9% lock flush 3 milliLiter(s) IV Push every 8 hours  thiamine 100 milliGRAM(s) Oral daily  vancomycin  IVPB 500 milliGRAM(s) IV Intermittent every 12 hours  vasopressin Infusion 0.04 Unit(s)/Min (6 mL/Hr) IV Continuous <Continuous>                              8.8    16.85 )-----------( 159      ( 14 Mar 2024 00:29 )             26.8       03-14    131<L>  |  97  |  41<H>  ----------------------------<  143<H>  4.2   |  21<L>  |  1.54<H>    Ca    9.7      14 Mar 2024 00:29  Phos  1.7       Mg     2.6         TPro  6.8  /  Alb  4.2  /  TBili  4.6<H>  /  DBili  x   /  AST  47<H>  /  ALT  22  /  AlkPhos  153<H>        PT/INR - ( 13 Mar 2024 06:17 )   PT: 14.1 sec;   INR: 1.35 ratio         PTT - ( 13 Mar 2024 06:17 )  PTT:32.0 sec        Daily     Daily Weight in k.2 (14 Mar 2024 00:00)       @ : @ 07:00  --------------------------------------------------------  IN: 2404.5 mL / OUT: 3143 mL / NET: -738.5 mL     @ 07:  -  14 @ 06:22  --------------------------------------------------------  IN: 2269.5 mL / OUT: 3064 mL / NET: -794.5 mL    Critically Ill patient  : [ x] preoperative - MVR   [ ] post operative,   [x] non operative    Requires :  [x] Arterial Line   [x] Central Line  [ ] PA catheter  [ ] IABP  [ ] ECMO  [ ] LVAD  [ ] Ventilator  [ ] pacemaker [ ] Impella   [x] HFNC  [x] CVVHD  [x] Luke                      [x ] ABG's     [ x] Pulse Oxymetry Monitoring  Bedside evaluation , monitoring , treatment of hemodynamics , fluids , IVP/ IVCD meds.        Diagnosis:     3/5 - Tx to ICU for Hypotension/CHF/renal failure    Pre Op Evaluation / Optimization - Re Op MVR     h/o AVR / MVR     Hypotension     Fluid  overload     Mitral Regurgitation     Pulmonary Hypertension 2 to biventricular cardiac dysfunction     Requires chest PT, pulmonary toilet, suctioning to maintain SaO2,  patent airway and treat atelectasis.     Hypoxemia - Requires [ ] BIPAP  [x] HFNC 50%/50L  [x] Luke 20 PPM    Paced Rhythm     CHF- acute [x]   chronic [x]    systolic [x]   diatolic [ ]    Valvular  [x]           - Echo- EF -   37%          [ ] RV dysfunction          - Cxr-cardiomegally, edema          - Clinical-  [x]inotropes   [x]pressors   [ x]diuresis   [ ]IABP   [ ]ECMO   [ ]LVAD   [x ] Respiratory insuffiencey  [x] CVVHD  [x] Luke    Hemodynamic lability,  instability. Requires IVCD [x] vasopressors [x] inotropes  [ ] vasodilator  []IVSS fluid / Blood Products to maintain MAP, perfusion, C.I.     Thrombocytopenia     Renal Failure - Acute Kidney Injury    Hyponatremia                                                   Metabolic Acidosis     CVVHD - negative balance    Chronic Renal failure    Cardiac Cachexia - NG - goal rate feeds    Tolerates DASH Diet + NG feeds at [x] goal rate 40 cc/Hr   [ ] trophic rate    [ ]       rate     Requires bedside physical therapy, mobilization and total [facility] care.     Strongly consider sepsis - worsening CXR / advancing pressor requirements    Present, assisting, supervising:    [ x] central line  [ x] [place] Salvatore catheter   [x ] Shiley   [x ] CVVH-D               I, Ildefonso Bañuelos, personally performed the services described in this documentation. All medical record entries made by the scribe were at my direction and in my presence. I have reviewed the chart and agree that the record reflects my personal performance and is accurate and complete.   Ildefonso Bañuelos MD.     By signing my name below, I, Tressa Tamayo, attest that this documentation has been prepared under the direction and in the presence of Ildefonso Bañuelos MD.   Electronically Signed: Home [facility] 24 @ 06:22    Patient requires continuous monitoring with bedside rhythm monitoring, pulse oximetry monitoring, and continuous central venous and arterial pressure monitoring; and intermittent blood gas analysis. Care plan discussed with the ICU care team.   Patient remained critical, at risk for life threatening decompensation.    I have spent 30 minutes providing critical care management to this patient.      Discussed with CT surgeon, Physician Assistant - Nurse Practitioner- Critical care medicine team.   Discussed at  AM / PM rounds.   Chart, labs , films reviewed. CRITICAL CARE ATTENDING - CTICU    MEDICATIONS  (STANDING):  allopurinol 100 milliGRAM(s) Oral daily  aMIOdarone    Tablet 200 milliGRAM(s) Oral daily  ascorbic acid 500 milliGRAM(s) Oral daily  atorvastatin 40 milliGRAM(s) Oral at bedtime  buMETAnide Infusion 2 mG/Hr (10 mL/Hr) IV Continuous <Continuous>  chlorhexidine 2% Cloths 1 Application(s) Topical <User Schedule>  CRRT Treatment    <Continuous>  cyanocobalamin 1000 MICROGram(s) Oral daily  dexMEDEtomidine Infusion 0.5 MICROgram(s)/kG/Hr (8.48 mL/Hr) IV Continuous <Continuous>  DOBUTamine Infusion 7.5 MICROgram(s)/kG/Min (7.63 mL/Hr) IV Continuous <Continuous>  enoxaparin Injectable 30 milliGRAM(s) SubCutaneous every 12 hours  EPINEPHrine    Infusion 0.04 MICROgram(s)/kG/Min (10.2 mL/Hr) IV Continuous <Continuous>  epoetin zara-epbx (RETACRIT) Injectable 76554 Unit(s) IV Push <User Schedule>  fluconAZOLE IVPB 200 milliGRAM(s) IV Intermittent every 24 hours  fluconAZOLE IVPB      folic acid 1 milliGRAM(s) Oral daily  levothyroxine 75 MICROGram(s) Oral daily  lidocaine   4% Patch 1 Patch Transdermal daily  melatonin 5 milliGRAM(s) Oral at bedtime  meropenem  IVPB 1000 milliGRAM(s) IV Intermittent every 8 hours  meropenem  IVPB      midodrine 20 milliGRAM(s) Oral every 8 hours  norepinephrine Infusion 0.06 MICROgram(s)/kG/Min (3.81 mL/Hr) IV Continuous <Continuous>  pantoprazole    Tablet 40 milliGRAM(s) Oral before breakfast  polyethylene glycol 3350 17 Gram(s) Oral daily  PrismaSATE Dialysate BGK 4 / 2.5 5000 milliLiter(s) (1200 mL/Hr) CRRT <Continuous>  PrismaSOL Filtration BGK 0 / 2.5 5000 milliLiter(s) (600 mL/Hr) CRRT <Continuous>  PrismaSOL Filtration BGK 4 / 2.5 5000 milliLiter(s) (400 mL/Hr) CRRT <Continuous>  senna 2 Tablet(s) Oral at bedtime  sertraline 50 milliGRAM(s) Oral daily  sodium chloride 0.9% lock flush 3 milliLiter(s) IV Push every 8 hours  thiamine 100 milliGRAM(s) Oral daily  vancomycin  IVPB 500 milliGRAM(s) IV Intermittent every 12 hours  vasopressin Infusion 0.04 Unit(s)/Min (6 mL/Hr) IV Continuous <Continuous>                              8.8    16.85 )-----------( 159      ( 14 Mar 2024 00:29 )             26.8       03-14    131<L>  |  97  |  41<H>  ----------------------------<  143<H>  4.2   |  21<L>  |  1.54<H>    Ca    9.7      14 Mar 2024 00:29  Phos  1.7       Mg     2.6         TPro  6.8  /  Alb  4.2  /  TBili  4.6<H>  /  DBili  x   /  AST  47<H>  /  ALT  22  /  AlkPhos  153<H>        PT/INR - ( 13 Mar 2024 06:17 )   PT: 14.1 sec;   INR: 1.35 ratio         PTT - ( 13 Mar 2024 06:17 )  PTT:32.0 sec        Daily     Daily Weight in k.2 (14 Mar 2024 00:00)       @ : @ 07:00  --------------------------------------------------------  IN: 2404.5 mL / OUT: 3143 mL / NET: -738.5 mL     @ 07:  -  14 @ 06:22  --------------------------------------------------------  IN: 2269.5 mL / OUT: 3064 mL / NET: -794.5 mL    Critically Ill patient  : [ x] preoperative - MVR   [ ] post operative,   [x] non operative    Requires :  [x] Arterial Line   [x] Central Line  [x ] PA catheter  [ ] IABP  [ ] ECMO  [ ] LVAD  [ ] Ventilator  [ ] pacemaker [ ] Impella   [x] HFNC  [x] CVVHD  [x] Luke                      [x ] ABG's     [ x] Pulse Oxymetry Monitoring  Bedside evaluation , monitoring , treatment of hemodynamics , fluids , IVP/ IVCD meds.        Diagnosis:     3/5 - Tx to ICU for Hypotension/CHF/renal failure    Pre Op Evaluation / Optimization - Re Op MVR     h/o AVR / MVR     Cardiogenic Shock     Hypotension     Fluid  overload     Mitral Regurgitation     Pulmonary Hypertension 2 to biventricular cardiac dysfunction     Requires chest PT, pulmonary toilet, suctioning to maintain SaO2,  patent airway and treat atelectasis.     Hypoxemia - Requires [ x] BIPAP  [x] HFNC 50%/50L  [x] Luke 20 PPM    Paced Rhythm     CHF- acute [x]   chronic [x]    systolic [x]   diatolic [ ]    Valvular  [x]           - Echo- EF -   37%          [x ] RV dysfunction          - Cxr-cardiomegally, edema          - Clinical-  [x]inotropes   [x]pressors   [ x]diuresis   [ ]IABP   [ ]ECMO   [ ]LVAD   [x ] Respiratory insuffiencey  [x] CVVHD  [x] Luke    Hemodynamic lability,  instability. Requires IVCD [x] vasopressors [x] inotropes  [ ] vasodilator  []IVSS fluid / Blood Products to maintain MAP, perfusion, C.I.     Thrombocytopenia     Renal Failure - Acute Kidney Injury    Hyponatremia                                                   Metabolic Acidosis     CVVHD - negative balance    Chronic Renal failure    Cardiac Cachexia - NG - goal rate feeds    Tolerates DASH Diet + NG feeds at [x] goal rate 40 cc/Hr   [ ] trophic rate    [ ]       rate     Requires bedside physical therapy, mobilization and total senior living care.     Strongly consider sepsis - worsening CXR / advancing pressor requirements                  I, Ildefonso Bañuelos, personally performed the services described in this documentation. All medical record entries made by the scribe were at my direction and in my presence. I have reviewed the chart and agree that the record reflects my personal performance and is accurate and complete.   Ildefonso Bañuelos MD.     By signing my name below, I, Tressa Tamayo, attest that this documentation has been prepared under the direction and in the presence of Ildefonso Bañuelos MD.   Electronically Signed: Home [facility] 24 @ 06:22    Patient requires continuous monitoring with bedside rhythm monitoring, pulse oximetry monitoring, and continuous central venous and arterial pressure monitoring; and intermittent blood gas analysis. Care plan discussed with the ICU care team.   Patient remained critical, at risk for life threatening decompensation.    I have spent 30 minutes providing critical care management to this patient.      Discussed with CT surgeon, Physician Assistant - Nurse Practitioner- Critical care medicine team.   Discussed at  AM / PM rounds.   Chart, labs , films reviewed.

## 2024-03-14 NOTE — PROCEDURE NOTE - PRACTITIONER PERFORMING THE TIME OUT
CHARLOTTE HERRERA
myself
Morelia HERRERA
myself
Morelia HERRERA
Trung Ordoñez NP
CHARLOTTE HERRERA
Morelia HERRERA

## 2024-03-15 LAB
ALBUMIN SERPL ELPH-MCNC: 4.2 G/DL — SIGNIFICANT CHANGE UP (ref 3.3–5)
ALP SERPL-CCNC: 157 U/L — HIGH (ref 40–120)
ALT FLD-CCNC: 22 U/L — SIGNIFICANT CHANGE UP (ref 10–45)
ANION GAP SERPL CALC-SCNC: 17 MMOL/L — SIGNIFICANT CHANGE UP (ref 5–17)
AST SERPL-CCNC: 48 U/L — HIGH (ref 10–40)
BASE EXCESS BLDMV CALC-SCNC: 1 MMOL/L — SIGNIFICANT CHANGE UP (ref -3–3)
BASE EXCESS BLDMV CALC-SCNC: 1.5 MMOL/L — SIGNIFICANT CHANGE UP (ref -3–3)
BASOPHILS # BLD AUTO: 0.03 K/UL — SIGNIFICANT CHANGE UP (ref 0–0.2)
BASOPHILS NFR BLD AUTO: 0.2 % — SIGNIFICANT CHANGE UP (ref 0–2)
BILIRUB DIRECT SERPL-MCNC: 3.4 MG/DL — HIGH (ref 0–0.3)
BILIRUB INDIRECT FLD-MCNC: 3.4 MG/DL — HIGH (ref 0.2–1)
BILIRUB SERPL-MCNC: 6.6 MG/DL — HIGH (ref 0.2–1.2)
BILIRUB SERPL-MCNC: 6.8 MG/DL — HIGH (ref 0.2–1.2)
BUN SERPL-MCNC: 31 MG/DL — HIGH (ref 7–23)
CALCIUM SERPL-MCNC: 9.9 MG/DL — SIGNIFICANT CHANGE UP (ref 8.4–10.5)
CHLORIDE SERPL-SCNC: 98 MMOL/L — SIGNIFICANT CHANGE UP (ref 96–108)
CO2 BLDMV-SCNC: 27 MMOL/L — SIGNIFICANT CHANGE UP (ref 21–29)
CO2 BLDMV-SCNC: 28 MMOL/L — SIGNIFICANT CHANGE UP (ref 21–29)
CO2 SERPL-SCNC: 22 MMOL/L — SIGNIFICANT CHANGE UP (ref 22–31)
CREAT SERPL-MCNC: 1.23 MG/DL — SIGNIFICANT CHANGE UP (ref 0.5–1.3)
EGFR: 58 ML/MIN/1.73M2 — LOW
EOSINOPHIL # BLD AUTO: 0.07 K/UL — SIGNIFICANT CHANGE UP (ref 0–0.5)
EOSINOPHIL NFR BLD AUTO: 0.5 % — SIGNIFICANT CHANGE UP (ref 0–6)
GAS PNL BLDA: SIGNIFICANT CHANGE UP
GAS PNL BLDA: SIGNIFICANT CHANGE UP
GAS PNL BLDMV: SIGNIFICANT CHANGE UP
GAS PNL BLDMV: SIGNIFICANT CHANGE UP
GLUCOSE SERPL-MCNC: 140 MG/DL — HIGH (ref 70–99)
HCO3 BLDMV-SCNC: 26 MMOL/L — SIGNIFICANT CHANGE UP (ref 20–28)
HCO3 BLDMV-SCNC: 27 MMOL/L — SIGNIFICANT CHANGE UP (ref 20–28)
HCT VFR BLD CALC: 29.8 % — LOW (ref 39–50)
HGB BLD-MCNC: 10.1 G/DL — LOW (ref 13–17)
HOROWITZ INDEX BLDMV+IHG-RTO: 40 — SIGNIFICANT CHANGE UP
HOROWITZ INDEX BLDMV+IHG-RTO: 50 — SIGNIFICANT CHANGE UP
IMM GRANULOCYTES NFR BLD AUTO: 0.8 % — SIGNIFICANT CHANGE UP (ref 0–0.9)
LYMPHOCYTES # BLD AUTO: 0.46 K/UL — LOW (ref 1–3.3)
LYMPHOCYTES # BLD AUTO: 3.2 % — LOW (ref 13–44)
MAGNESIUM SERPL-MCNC: 2.8 MG/DL — HIGH (ref 1.6–2.6)
MCHC RBC-ENTMCNC: 32.1 PG — SIGNIFICANT CHANGE UP (ref 27–34)
MCHC RBC-ENTMCNC: 33.9 GM/DL — SIGNIFICANT CHANGE UP (ref 32–36)
MCV RBC AUTO: 94.6 FL — SIGNIFICANT CHANGE UP (ref 80–100)
MONOCYTES # BLD AUTO: 1.28 K/UL — HIGH (ref 0–0.9)
MONOCYTES NFR BLD AUTO: 8.9 % — SIGNIFICANT CHANGE UP (ref 2–14)
NEUTROPHILS # BLD AUTO: 12.49 K/UL — HIGH (ref 1.8–7.4)
NEUTROPHILS NFR BLD AUTO: 86.4 % — HIGH (ref 43–77)
NRBC # BLD: 0 /100 WBCS — SIGNIFICANT CHANGE UP (ref 0–0)
O2 CT VFR BLD CALC: 27 MMHG — LOW (ref 30–65)
O2 CT VFR BLD CALC: 32 MMHG — SIGNIFICANT CHANGE UP (ref 30–65)
PCO2 BLDMV: 42 MMHG — SIGNIFICANT CHANGE UP (ref 30–65)
PCO2 BLDMV: 43 MMHG — SIGNIFICANT CHANGE UP (ref 30–65)
PH BLDMV: 7.4 — SIGNIFICANT CHANGE UP (ref 7.32–7.45)
PH BLDMV: 7.4 — SIGNIFICANT CHANGE UP (ref 7.32–7.45)
PHOSPHATE SERPL-MCNC: 3.1 MG/DL — SIGNIFICANT CHANGE UP (ref 2.5–4.5)
PLATELET # BLD AUTO: 135 K/UL — LOW (ref 150–400)
POTASSIUM BLDA-SCNC: 4 MMOL/L — SIGNIFICANT CHANGE UP (ref 3.5–5.1)
POTASSIUM SERPL-MCNC: 4.1 MMOL/L — SIGNIFICANT CHANGE UP (ref 3.5–5.3)
POTASSIUM SERPL-SCNC: 4.1 MMOL/L — SIGNIFICANT CHANGE UP (ref 3.5–5.3)
PROT SERPL-MCNC: 7 G/DL — SIGNIFICANT CHANGE UP (ref 6–8.3)
RBC # BLD: 3.15 M/UL — LOW (ref 4.2–5.8)
RBC # FLD: 17.2 % — HIGH (ref 10.3–14.5)
SAO2 % BLDMV: 49.8 — LOW (ref 60–90)
SAO2 % BLDMV: 58.7 — LOW (ref 60–90)
SODIUM SERPL-SCNC: 137 MMOL/L — SIGNIFICANT CHANGE UP (ref 135–145)
VANCOMYCIN TROUGH SERPL-MCNC: 10.5 UG/ML — SIGNIFICANT CHANGE UP (ref 10–20)
VANCOMYCIN TROUGH SERPL-MCNC: 9.7 UG/ML — LOW (ref 10–20)
WBC # BLD: 14.45 K/UL — HIGH (ref 3.8–10.5)
WBC # FLD AUTO: 14.45 K/UL — HIGH (ref 3.8–10.5)

## 2024-03-15 PROCEDURE — 99292 CRITICAL CARE ADDL 30 MIN: CPT

## 2024-03-15 PROCEDURE — 99232 SBSQ HOSP IP/OBS MODERATE 35: CPT

## 2024-03-15 PROCEDURE — 99291 CRITICAL CARE FIRST HOUR: CPT

## 2024-03-15 PROCEDURE — 71045 X-RAY EXAM CHEST 1 VIEW: CPT | Mod: 26

## 2024-03-15 RX ORDER — DEXTROSE 50 % IN WATER 50 %
25 SYRINGE (ML) INTRAVENOUS ONCE
Refills: 0 | Status: DISCONTINUED | OUTPATIENT
Start: 2024-03-15 | End: 2024-03-24

## 2024-03-15 RX ORDER — INSULIN LISPRO 100/ML
VIAL (ML) SUBCUTANEOUS EVERY 6 HOURS
Refills: 0 | Status: DISCONTINUED | OUTPATIENT
Start: 2024-03-15 | End: 2024-03-24

## 2024-03-15 RX ORDER — INSULIN LISPRO 100/ML
VIAL (ML) SUBCUTANEOUS AT BEDTIME
Refills: 0 | Status: DISCONTINUED | OUTPATIENT
Start: 2024-03-15 | End: 2024-03-15

## 2024-03-15 RX ORDER — DEXTROSE 50 % IN WATER 50 %
15 SYRINGE (ML) INTRAVENOUS ONCE
Refills: 0 | Status: DISCONTINUED | OUTPATIENT
Start: 2024-03-15 | End: 2024-03-16

## 2024-03-15 RX ORDER — GLUCAGON INJECTION, SOLUTION 0.5 MG/.1ML
1 INJECTION, SOLUTION SUBCUTANEOUS ONCE
Refills: 0 | Status: DISCONTINUED | OUTPATIENT
Start: 2024-03-15 | End: 2024-03-16

## 2024-03-15 RX ORDER — SODIUM CHLORIDE 9 MG/ML
1000 INJECTION, SOLUTION INTRAVENOUS
Refills: 0 | Status: DISCONTINUED | OUTPATIENT
Start: 2024-03-15 | End: 2024-03-16

## 2024-03-15 RX ORDER — DEXTROSE 50 % IN WATER 50 %
12.5 SYRINGE (ML) INTRAVENOUS ONCE
Refills: 0 | Status: DISCONTINUED | OUTPATIENT
Start: 2024-03-15 | End: 2024-03-15

## 2024-03-15 RX ORDER — DEXTROSE 50 % IN WATER 50 %
25 SYRINGE (ML) INTRAVENOUS ONCE
Refills: 0 | Status: DISCONTINUED | OUTPATIENT
Start: 2024-03-15 | End: 2024-03-15

## 2024-03-15 RX ORDER — POTASSIUM CHLORIDE 20 MEQ
10 PACKET (EA) ORAL ONCE
Refills: 0 | Status: COMPLETED | OUTPATIENT
Start: 2024-03-15 | End: 2024-03-15

## 2024-03-15 RX ADMIN — MEROPENEM 100 MILLIGRAM(S): 1 INJECTION INTRAVENOUS at 20:13

## 2024-03-15 RX ADMIN — CHLORHEXIDINE GLUCONATE 1 APPLICATION(S): 213 SOLUTION TOPICAL at 05:33

## 2024-03-15 RX ADMIN — SODIUM CHLORIDE 3 MILLILITER(S): 9 INJECTION INTRAMUSCULAR; INTRAVENOUS; SUBCUTANEOUS at 21:46

## 2024-03-15 RX ADMIN — PANTOPRAZOLE SODIUM 40 MILLIGRAM(S): 20 TABLET, DELAYED RELEASE ORAL at 05:33

## 2024-03-15 RX ADMIN — MEROPENEM 100 MILLIGRAM(S): 1 INJECTION INTRAVENOUS at 04:31

## 2024-03-15 RX ADMIN — Medication 50 MILLIEQUIVALENT(S): at 16:57

## 2024-03-15 RX ADMIN — Medication 100 MILLIGRAM(S): at 11:38

## 2024-03-15 RX ADMIN — Medication 500 MILLIGRAM(S): at 11:38

## 2024-03-15 RX ADMIN — PREGABALIN 1000 MICROGRAM(S): 225 CAPSULE ORAL at 11:39

## 2024-03-15 RX ADMIN — VASOPRESSIN 6 UNIT(S)/MIN: 20 INJECTION INTRAVENOUS at 07:54

## 2024-03-15 RX ADMIN — AMIODARONE HYDROCHLORIDE 200 MILLIGRAM(S): 400 TABLET ORAL at 05:33

## 2024-03-15 RX ADMIN — Medication 5 MILLIGRAM(S): at 21:45

## 2024-03-15 RX ADMIN — Medication 5 MILLIGRAM(S): at 05:33

## 2024-03-15 RX ADMIN — POLYETHYLENE GLYCOL 3350 17 GRAM(S): 17 POWDER, FOR SOLUTION ORAL at 11:39

## 2024-03-15 RX ADMIN — Medication 7.63 MICROGRAM(S)/KG/MIN: at 07:54

## 2024-03-15 RX ADMIN — Medication 3.81 MICROGRAM(S)/KG/MIN: at 05:30

## 2024-03-15 RX ADMIN — VASOPRESSIN 6 UNIT(S)/MIN: 20 INJECTION INTRAVENOUS at 05:32

## 2024-03-15 RX ADMIN — ENOXAPARIN SODIUM 30 MILLIGRAM(S): 100 INJECTION SUBCUTANEOUS at 21:45

## 2024-03-15 RX ADMIN — SERTRALINE 50 MILLIGRAM(S): 25 TABLET, FILM COATED ORAL at 05:33

## 2024-03-15 RX ADMIN — ERYTHROPOIETIN 20000 UNIT(S): 10000 INJECTION, SOLUTION INTRAVENOUS; SUBCUTANEOUS at 18:18

## 2024-03-15 RX ADMIN — ATORVASTATIN CALCIUM 40 MILLIGRAM(S): 80 TABLET, FILM COATED ORAL at 21:45

## 2024-03-15 RX ADMIN — Medication 100 MILLIGRAM(S): at 11:39

## 2024-03-15 RX ADMIN — SODIUM CHLORIDE 3 MILLILITER(S): 9 INJECTION INTRAMUSCULAR; INTRAVENOUS; SUBCUTANEOUS at 05:35

## 2024-03-15 RX ADMIN — MIDODRINE HYDROCHLORIDE 20 MILLIGRAM(S): 2.5 TABLET ORAL at 13:03

## 2024-03-15 RX ADMIN — Medication 3.81 MICROGRAM(S)/KG/MIN: at 07:54

## 2024-03-15 RX ADMIN — ENOXAPARIN SODIUM 30 MILLIGRAM(S): 100 INJECTION SUBCUTANEOUS at 09:05

## 2024-03-15 RX ADMIN — Medication 5 MILLIGRAM(S): at 13:03

## 2024-03-15 RX ADMIN — SENNA PLUS 2 TABLET(S): 8.6 TABLET ORAL at 21:45

## 2024-03-15 RX ADMIN — MIDODRINE HYDROCHLORIDE 20 MILLIGRAM(S): 2.5 TABLET ORAL at 21:45

## 2024-03-15 RX ADMIN — FLUCONAZOLE 100 MILLIGRAM(S): 150 TABLET ORAL at 07:54

## 2024-03-15 RX ADMIN — Medication 7.63 MICROGRAM(S)/KG/MIN: at 05:29

## 2024-03-15 RX ADMIN — Medication 1 MILLIGRAM(S): at 11:39

## 2024-03-15 RX ADMIN — EPINEPHRINE 7.63 MICROGRAM(S)/KG/MIN: 0.3 INJECTION INTRAMUSCULAR; SUBCUTANEOUS at 05:31

## 2024-03-15 RX ADMIN — Medication 250 MILLIGRAM(S): at 05:32

## 2024-03-15 RX ADMIN — Medication 75 MICROGRAM(S): at 05:33

## 2024-03-15 RX ADMIN — EPINEPHRINE 7.63 MICROGRAM(S)/KG/MIN: 0.3 INJECTION INTRAMUSCULAR; SUBCUTANEOUS at 07:54

## 2024-03-15 RX ADMIN — MEROPENEM 100 MILLIGRAM(S): 1 INJECTION INTRAVENOUS at 11:40

## 2024-03-15 RX ADMIN — MIDODRINE HYDROCHLORIDE 20 MILLIGRAM(S): 2.5 TABLET ORAL at 05:33

## 2024-03-15 RX ADMIN — Medication 250 MILLIGRAM(S): at 18:00

## 2024-03-15 RX ADMIN — SODIUM CHLORIDE 3 MILLILITER(S): 9 INJECTION INTRAMUSCULAR; INTRAVENOUS; SUBCUTANEOUS at 13:16

## 2024-03-15 NOTE — PROGRESS NOTE ADULT - TIME BILLING
- Generation of cardiovascular treatment plan.  - Coordination of care with primary team.

## 2024-03-15 NOTE — PROGRESS NOTE ADULT - ASSESSMENT
pt is very sob and cachetic  Denies any systemic manifestations of infection prior to admission  hs of AVR PPM  CKI  CHF  admitted with sob, edema, but is alert  chest x-ray compactible l with chf  cant r/o infection         agree with empiric meropenem ( diflucan per  [facility])   await cultures   dose vanco carefully by level in view of renal insufficieny but now on CVVHD  discussed with [facility] suggestive of pulmonary edema  no documented infection     would complete 7 days of antibiotics     Pamela Reaves M.D. ,   please reach via teams   If no answer, or after 5PM/ weekends,  then please call  [phone]    ID service will be covering over the weekend. Please call for acute issues or questions. [phone]

## 2024-03-15 NOTE — PROGRESS NOTE ADULT - PROBLEM SELECTOR PROBLEM 4
Cellulitis
Sepsis, unspecified organism
Cellulitis
Sepsis, unspecified organism
Sepsis, unspecified organism
Cellulitis
Cellulitis

## 2024-03-15 NOTE — PROGRESS NOTE ADULT - ASSESSMENT
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 Now presenting with worsening volume retention, cellulitis of L foot and marked SURESH on CKD (BUN/Cr on admission 143/4.39).     He was empirically started on  and Initiated on iHD, first session on 3/5 with need for Intermittent pressor support.  Eventual plan for surgical mitral valve replacement once optimized per Dr. Fenton, however pt currently not optimized and  high risk for surgery given deteriorating respiratory status, pressor and inotrope requirements.     Bedside Hemodynamics  3/15/24 ( 7.5 mcg, Epi 0.06 mcg/kg/min, NE 0.06 mcg/kg/min, vasopressin 0.04U/min ): CVP 19, SvO2 58.7%, CO/CI 3.4/12.0, SVR 1411  3/14/24 ( 7.5 mcg, Epi 0.02 mcg/kg/min, NE 0.06 mcg/kg/min, vasopressin 0.04U/min ): CVP 12, SvO2 50.4%, CO/CI 2.7/1.9, SVR 1866  3/13/24 ( 7.5 mcg, Bumex 2 mG/hr, Epi 0.02 mcg/kg/min, NE 0.06 mcg/kg/min ): CVP 23, SvO2 53.7%, CO/CI 4.6/2.5,   3/11/24 ( 7 mcg): CVP 18, SvO2 54.8%, CO/CI 2.7/2.2, SVR 1629  3/8/24 ( 5 mcg): CVP 10, ScVO2 67.7%, CO/CI 5.7/3.1, MAP 70, HR 70,  dsc  3/7/24 ( 2.5 mcg): ScVO2 57.4%, CO/CI 4.3/2.4  3/7/24 ( 5mcg): CVP 9, ScVO2 70%, CO/CI (FI) 7.5/4.1  3/6/24 ( 5mcg): CVP 8, ScVO2 74%, CO/CI (FI): 6.5/3.6    Cardiac studies   ·	TTE 3/11/24: LVEF 37 %,  severe mitral regurgitation. Multiple segmental abnormalities exist. Moderately enlarged right ventricular cavity size and reduced systolic function.  ·	WARREN 2/27/24: severe MR, DCCV SR  ·	TTE 2/22/24: LVIDd: 5.8cm, LVEF 35%, mildly enlarged RV with reduced function, severe MR and Moderate to severe TR. PASP 70mmHg  ·	WARREN 8/23 Severe MR and moderate TR  ·	TTE 8/23 EF 36%, grade II DD, mildly enlarged RV with reduced function, severe MR and Moderate to severe TR

## 2024-03-15 NOTE — CHART NOTE - NSCHARTNOTEFT_GEN_A_CORE
Nutrition Follow Up Note  Patient seen for: malnutrition follow up. Chart reviewed, events noted.    Source: [x] Patient       [x] Medical Record        [x] RN        [] Family at bedside       [] Other:    -If unable to interview patient: [] Trach/Vent/BiPAP  [] Disoriented/confused/inappropriate to interview    Diet Order: Diet, NPO with Tube Feed:   Tube Feeding Modality: Nasogastric  Nepro with Carb Steady (NEPRORTH)  Total Volume for 24 Hours (mL): 240  Continuous  Starting Tube Feed Rate {mL per Hour}: 10  Until Goal Tube Feed Rate (mL per Hour): 10  Tube Feed Duration (in Hours): 24  Tube Feed Start Time: 16:40  Supplement Feeding Modality:  Nasogastric  Ensure Enlive Cans or Servings Per Day:  2       Frequency:  Daily (24 @ 16:39)    EN Order Provides: 960ml volume, 1728kcal, 78g protein, 698ml free water.  Current Pump Rate: 30ml/hr  EN provision (per flow sheets): enteral feeds restarted overnight    Is current diet order appropriate/adequate? See recommendations below.    PO intake :   [] >75%  Adequate    [] 50-75%  Fair       [] <50%  Poor      [x] N/A  - Pt previously eating well, now NPO.    Nutrition-related concerns:  - Increased pressor/inotrope support required (Epinephrine, Levophed, Vasopressin, Dobutamine). Failed IABP placement 3/13. On HFNC, required BiPAP overnight.   - Pt continues on CRRT, electrolytes currently largely WDL.  - Ordered for oral Levothyroxine     GI:  Last BM 3/12 x 2.   Bowel Regimen? [x] Yes   [] No    Weights:   Daily Weight in k.7 (-15), Weight in k.2 (-), Weight in k.8 (-), Weight in k.2 (-), Weight in k.3 (), Weight in k.7 ()  - weight fluctuations noted    Drug Dosing Weight  Height (cm): 175.3 (04 Mar 2024 12:00)  Weight (kg): 67.8 (04 Mar 2024 12:00)  BMI (kg/m2): 22.1 (04 Mar 2024 12:00)  BSA (m2): 1.83 (04 Mar 2024 12:00)    MEDICATIONS  (STANDING):  allopurinol 100 milliGRAM(s) Oral daily  aMIOdarone    Tablet 200 milliGRAM(s) Oral daily  ascorbic acid 500 milliGRAM(s) Oral daily  atorvastatin 40 milliGRAM(s) Oral at bedtime  chlorhexidine 2% Cloths 1 Application(s) Topical <User Schedule>  cyanocobalamin 1000 MICROGram(s) Oral daily  dexMEDEtomidine Infusion 0.5 MICROgram(s)/kG/Hr (8.48 mL/Hr) IV Continuous <Continuous>  dextrose 5%. 1000 milliLiter(s) (100 mL/Hr) IV Continuous <Continuous>  dextrose 5%. 1000 milliLiter(s) (50 mL/Hr) IV Continuous <Continuous>  dextrose 50% Injectable 25 Gram(s) IV Push once  dextrose 50% Injectable 12.5 Gram(s) IV Push once  dextrose 50% Injectable 25 Gram(s) IV Push once  DOBUTamine Infusion 7.5 MICROgram(s)/kG/Min (7.63 mL/Hr) IV Continuous <Continuous>  enoxaparin Injectable 30 milliGRAM(s) SubCutaneous every 12 hours  EPINEPHrine    Infusion 0.06 MICROgram(s)/kG/Min (7.63 mL/Hr) IV Continuous <Continuous>  epoetin zara-epbx (RETACRIT) Injectable 41729 Unit(s) IV Push <User Schedule>  fluconAZOLE IVPB 200 milliGRAM(s) IV Intermittent every 24 hours  fluconAZOLE IVPB      folic acid 1 milliGRAM(s) Oral daily  glucagon  Injectable 1 milliGRAM(s) IntraMuscular once  insulin lispro (ADMELOG) corrective regimen sliding scale   SubCutaneous every 6 hours  insulin lispro (ADMELOG) corrective regimen sliding scale   SubCutaneous at bedtime  levothyroxine 75 MICROGram(s) Oral daily  lidocaine   4% Patch 1 Patch Transdermal daily  melatonin 5 milliGRAM(s) Oral at bedtime  meropenem  IVPB      meropenem  IVPB 1000 milliGRAM(s) IV Intermittent every 8 hours  metoclopramide Injectable 5 milliGRAM(s) IV Push every 8 hours  midodrine 20 milliGRAM(s) Oral every 8 hours  norepinephrine Infusion 0.06 MICROgram(s)/kG/Min (3.81 mL/Hr) IV Continuous <Continuous>  pantoprazole    Tablet 40 milliGRAM(s) Oral before breakfast  polyethylene glycol 3350 17 Gram(s) Oral daily  senna 2 Tablet(s) Oral at bedtime  sertraline 50 milliGRAM(s) Oral daily  sodium chloride 0.9% lock flush 3 milliLiter(s) IV Push every 8 hours  thiamine 100 milliGRAM(s) Oral daily  vancomycin  IVPB 750 milliGRAM(s) IV Intermittent every 12 hours  vasopressin Infusion 0.04 Unit(s)/Min (6 mL/Hr) IV Continuous <Continuous>    MEDICATIONS  (PRN):  dextrose Oral Gel 15 Gram(s) Oral once PRN Blood Glucose LESS THAN 70 milliGRAM(s)/deciliter    Pertinent Labs: 03-15 @ 00:22: Na 137, BUN 31<H>, Cr 1.23, <H>, K+ 4.1, Phos 3.1, Mg 2.8<H>, Alk Phos 157<H>, ALT/SGPT 22, AST/SGOT 48<H>, HbA1c --   @ 11:30: Na --, BUN --, Cr --, BG --, K+ --, Phos 2.3<L>, Mg --, Alk Phos --, ALT/SGPT --, AST/SGOT --, HbA1c --    A1C with Estimated Average Glucose Result: 5.7 % (24 @ 13:11)  A1C with Estimated Average Glucose Result: 5.6 % (24 @ 15:55)    Finger Sticks: N/A    Skin per nursing documentation: No noted pressure injuries as per documentation.   Edema: No noted edema as per flow sheets.     Based on: dosing wt 67.8kg   Estimated Energy Needs:  - 2373kcal (30 - 35kcal/kg)  Estimated Protein Needs: 81 - 102g protein (1.2 - 1.5g/kg)  Estimated Fluid Needs: per team.  [facility] Equation: N/A    Previous Nutrition Diagnosis: Severe Malnutrition, Increased Nutrient Needs   Nutrition Diagnosis is: [x] ongoing  [] resolved [] not applicable     New Nutrition Diagnosis: [x] Not applicable    Nutrition Care Plan:  [x] In Progress  [] Achieved  [] Not applicable    Nutrition Interventions:     Education Provided: N/A    Recommendations:        ****IN PROGRESS/INCOMPLETE****     Monitoring and Evaluation:   Continue to monitor nutritional intake, tolerance to diet prescription, weights, labs, skin integrity    RD remains available upon request and will follow up per protocol    Patricia Pizarro MS, RD, CDN, CNSC; available via MS Teams Nutrition Follow Up Note  Patient seen for: malnutrition follow up. Chart reviewed, events noted.    Source: [x] Patient       [x] Medical Record        [x] RN        [] Family at bedside       [] Other:    -If unable to interview patient: [] Trach/Vent/BiPAP  [] Disoriented/confused/inappropriate to interview    Diet Order: Diet, NPO with Tube Feed:   Tube Feeding Modality: Nasogastric  Nepro with Carb Steady (NEPRORTH)  Total Volume for 24 Hours (mL): 240  Continuous  Starting Tube Feed Rate {mL per Hour}: 10  Until Goal Tube Feed Rate (mL per Hour): 10  Tube Feed Duration (in Hours): 24  Tube Feed Start Time: 16:40  Supplement Feeding Modality:  Nasogastric  Ensure Enlive Cans or Servings Per Day:  2       Frequency:  Daily (24 @ 16:39)    EN Order Provides: 960ml volume, 1728kcal, 78g protein, 698ml free water.  Current Pump Rate: 30ml/hr  EN provision (per flow sheets): enteral feeds restarted overnight    Is current diet order appropriate/adequate? See recommendations below.    PO intake :   [] >75%  Adequate    [] 50-75%  Fair       [] <50%  Poor      [x] N/A  - Pt previously eating well, now NPO.    Nutrition-related concerns:  - Increased pressor/inotrope support required (Epinephrine, Levophed, Vasopressin, Dobutamine). Failed IABP placement 3/13. On HFNC, required BiPAP overnight.   - Pt continues on CRRT, electrolytes currently largely WDL.  - Ordered for oral Levothyroxine     GI:  Last BM 3/12 x 2.   Bowel Regimen? [x] Yes   [] No    Weights:   Daily Weight in k.7 (-15), Weight in k.2 (-), Weight in k.8 (-), Weight in k.2 (-), Weight in k.3 (), Weight in k.7 ()  - weight fluctuations noted    Drug Dosing Weight  Height (cm): 175.3 (04 Mar 2024 12:00)  Weight (kg): 67.8 (04 Mar 2024 12:00)  BMI (kg/m2): 22.1 (04 Mar 2024 12:00)  BSA (m2): 1.83 (04 Mar 2024 12:00)    MEDICATIONS  (STANDING):  allopurinol 100 milliGRAM(s) Oral daily  aMIOdarone    Tablet 200 milliGRAM(s) Oral daily  ascorbic acid 500 milliGRAM(s) Oral daily  atorvastatin 40 milliGRAM(s) Oral at bedtime  chlorhexidine 2% Cloths 1 Application(s) Topical <User Schedule>  cyanocobalamin 1000 MICROGram(s) Oral daily  dexMEDEtomidine Infusion 0.5 MICROgram(s)/kG/Hr (8.48 mL/Hr) IV Continuous <Continuous>  dextrose 5%. 1000 milliLiter(s) (100 mL/Hr) IV Continuous <Continuous>  dextrose 5%. 1000 milliLiter(s) (50 mL/Hr) IV Continuous <Continuous>  dextrose 50% Injectable 25 Gram(s) IV Push once  dextrose 50% Injectable 12.5 Gram(s) IV Push once  dextrose 50% Injectable 25 Gram(s) IV Push once  DOBUTamine Infusion 7.5 MICROgram(s)/kG/Min (7.63 mL/Hr) IV Continuous <Continuous>  enoxaparin Injectable 30 milliGRAM(s) SubCutaneous every 12 hours  EPINEPHrine    Infusion 0.06 MICROgram(s)/kG/Min (7.63 mL/Hr) IV Continuous <Continuous>  epoetin zara-epbx (RETACRIT) Injectable 91071 Unit(s) IV Push <User Schedule>  fluconAZOLE IVPB 200 milliGRAM(s) IV Intermittent every 24 hours  fluconAZOLE IVPB      folic acid 1 milliGRAM(s) Oral daily  glucagon  Injectable 1 milliGRAM(s) IntraMuscular once  insulin lispro (ADMELOG) corrective regimen sliding scale   SubCutaneous every 6 hours  insulin lispro (ADMELOG) corrective regimen sliding scale   SubCutaneous at bedtime  levothyroxine 75 MICROGram(s) Oral daily  lidocaine   4% Patch 1 Patch Transdermal daily  melatonin 5 milliGRAM(s) Oral at bedtime  meropenem  IVPB      meropenem  IVPB 1000 milliGRAM(s) IV Intermittent every 8 hours  metoclopramide Injectable 5 milliGRAM(s) IV Push every 8 hours  midodrine 20 milliGRAM(s) Oral every 8 hours  norepinephrine Infusion 0.06 MICROgram(s)/kG/Min (3.81 mL/Hr) IV Continuous <Continuous>  pantoprazole    Tablet 40 milliGRAM(s) Oral before breakfast  polyethylene glycol 3350 17 Gram(s) Oral daily  senna 2 Tablet(s) Oral at bedtime  sertraline 50 milliGRAM(s) Oral daily  sodium chloride 0.9% lock flush 3 milliLiter(s) IV Push every 8 hours  thiamine 100 milliGRAM(s) Oral daily  vancomycin  IVPB 750 milliGRAM(s) IV Intermittent every 12 hours  vasopressin Infusion 0.04 Unit(s)/Min (6 mL/Hr) IV Continuous <Continuous>    MEDICATIONS  (PRN):  dextrose Oral Gel 15 Gram(s) Oral once PRN Blood Glucose LESS THAN 70 milliGRAM(s)/deciliter    Pertinent Labs: 03-15 @ 00:22: Na 137, BUN 31<H>, Cr 1.23, <H>, K+ 4.1, Phos 3.1, Mg 2.8<H>, Alk Phos 157<H>, ALT/SGPT 22, AST/SGOT 48<H>, HbA1c --   @ 11:30: Na --, BUN --, Cr --, BG --, K+ --, Phos 2.3<L>, Mg --, Alk Phos --, ALT/SGPT --, AST/SGOT --, HbA1c --    A1C with Estimated Average Glucose Result: 5.7 % (24 @ 13:11)  A1C with Estimated Average Glucose Result: 5.6 % (24 @ 15:55)    Finger Sticks: N/A    Skin per nursing documentation: No noted pressure injuries as per documentation.   Edema: No noted edema as per flow sheets.     Based on: dosing wt 67.8kg   Estimated Energy Needs:  - 2373kcal (30 - 35kcal/kg)  Estimated Protein Needs: 81 - 102g protein (1.2 - 1.5g/kg)  Estimated Fluid Needs: per team.  [facility] Equation: N/A    Previous Nutrition Diagnosis: Severe Malnutrition, Increased Nutrient Needs   Nutrition Diagnosis is: [x] ongoing  [] resolved [] not applicable     New Nutrition Diagnosis: [x] Not applicable    Nutrition Care Plan:  [x] In Progress  [] Achieved  [] Not applicable    Nutrition Interventions:     Education Provided: N/A    Recommendations:      1) Given current pressor requirements consider change in feeds to Vital 1.5 with goal rate 60ml/hr x 24hr to provide 1440ml volume, 2160kcal, 97g protein, 1100ml free water. Meets ~32kcal/kg, 1.4g/kg protein based on dosing wt 67.8kg.    - If preferred to continue feeds of Nepro recommend new goal rate 50ml/hr x 24hr to provide 1200ml volume, 2160kcal, 97g protein, 872ml free water. Meets ~32kcal/kg, 1.4g/kg protein based on dosing wt 67.8kg.   2) Multivitamin if not otherwise contraindicated.  3) Monitor GI tolerance to feeds, RD remains available to adjust TF regimen/formulary as needed/upon request.   4) Defer provision of PO diet to team.     Monitoring and Evaluation:   Continue to monitor nutritional intake, tolerance to diet prescription, weights, labs, skin integrity    RD remains available upon request and will follow up per protocol    Patricia Pizarro MS, RD, CDN, CNSC; available via MS Teams Nutrition Follow Up Note  Patient seen for: malnutrition follow up. Chart reviewed, events noted.    Source: [x] Patient       [x] Medical Record        [x] RN        [] Family at bedside       [] Other:    -If unable to interview patient: [] Trach/Vent/BiPAP  [] Disoriented/confused/inappropriate to interview    Diet Order: Diet, NPO with Tube Feed:   Tube Feeding Modality: Nasogastric  Nepro with Carb Steady (NEPRORTH)  Total Volume for 24 Hours (mL): 240  Continuous  Starting Tube Feed Rate {mL per Hour}: 10  Until Goal Tube Feed Rate (mL per Hour): 10  Tube Feed Duration (in Hours): 24  Tube Feed Start Time: 16:40  Supplement Feeding Modality:  Nasogastric  Ensure Enlive Cans or Servings Per Day:  2       Frequency:  Daily (24 @ 16:39)    EN Order Provides: 240ml volume, 432kcal, 19g protein, 174ml free water  Current Pump Rate: 30ml/hr  EN provision (per flow sheets): enteral feeds restarted overnight    Is current diet order appropriate/adequate? See recommendations below.    PO intake :   [] >75%  Adequate    [] 50-75%  Fair       [] <50%  Poor      [x] N/A  - Pt previously eating well, now NPO.    Nutrition-related concerns:  - Increased pressor/inotrope support required (Epinephrine, Levophed, Vasopressin, Dobutamine). Failed IABP placement 3/13. On HFNC, required BiPAP overnight.   - Pt continues on CRRT, electrolytes currently largely WDL.  - Ordered for oral Levothyroxine     GI:  Last BM 3/12 x 2.   Bowel Regimen? [x] Yes   [] No    Weights:   Daily Weight in k.7 (-15), Weight in k.2 (-), Weight in k.8 (-), Weight in k.2 (-), Weight in k.3 (-), Weight in k.7 ()  - weight fluctuations noted    Drug Dosing Weight  Height (cm): 175.3 (04 Mar 2024 12:00)  Weight (kg): 67.8 (04 Mar 2024 12:00)  BMI (kg/m2): 22.1 (04 Mar 2024 12:00)  BSA (m2): 1.83 (04 Mar 2024 12:00)    MEDICATIONS  (STANDING):  allopurinol 100 milliGRAM(s) Oral daily  aMIOdarone    Tablet 200 milliGRAM(s) Oral daily  ascorbic acid 500 milliGRAM(s) Oral daily  atorvastatin 40 milliGRAM(s) Oral at bedtime  chlorhexidine 2% Cloths 1 Application(s) Topical <User Schedule>  cyanocobalamin 1000 MICROGram(s) Oral daily  dexMEDEtomidine Infusion 0.5 MICROgram(s)/kG/Hr (8.48 mL/Hr) IV Continuous <Continuous>  dextrose 5%. 1000 milliLiter(s) (100 mL/Hr) IV Continuous <Continuous>  dextrose 5%. 1000 milliLiter(s) (50 mL/Hr) IV Continuous <Continuous>  dextrose 50% Injectable 25 Gram(s) IV Push once  dextrose 50% Injectable 12.5 Gram(s) IV Push once  dextrose 50% Injectable 25 Gram(s) IV Push once  DOBUTamine Infusion 7.5 MICROgram(s)/kG/Min (7.63 mL/Hr) IV Continuous <Continuous>  enoxaparin Injectable 30 milliGRAM(s) SubCutaneous every 12 hours  EPINEPHrine    Infusion 0.06 MICROgram(s)/kG/Min (7.63 mL/Hr) IV Continuous <Continuous>  epoetin zara-epbx (RETACRIT) Injectable 53860 Unit(s) IV Push <User Schedule>  fluconAZOLE IVPB 200 milliGRAM(s) IV Intermittent every 24 hours  fluconAZOLE IVPB      folic acid 1 milliGRAM(s) Oral daily  glucagon  Injectable 1 milliGRAM(s) IntraMuscular once  insulin lispro (ADMELOG) corrective regimen sliding scale   SubCutaneous every 6 hours  insulin lispro (ADMELOG) corrective regimen sliding scale   SubCutaneous at bedtime  levothyroxine 75 MICROGram(s) Oral daily  lidocaine   4% Patch 1 Patch Transdermal daily  melatonin 5 milliGRAM(s) Oral at bedtime  meropenem  IVPB      meropenem  IVPB 1000 milliGRAM(s) IV Intermittent every 8 hours  metoclopramide Injectable 5 milliGRAM(s) IV Push every 8 hours  midodrine 20 milliGRAM(s) Oral every 8 hours  norepinephrine Infusion 0.06 MICROgram(s)/kG/Min (3.81 mL/Hr) IV Continuous <Continuous>  pantoprazole    Tablet 40 milliGRAM(s) Oral before breakfast  polyethylene glycol 3350 17 Gram(s) Oral daily  senna 2 Tablet(s) Oral at bedtime  sertraline 50 milliGRAM(s) Oral daily  sodium chloride 0.9% lock flush 3 milliLiter(s) IV Push every 8 hours  thiamine 100 milliGRAM(s) Oral daily  vancomycin  IVPB 750 milliGRAM(s) IV Intermittent every 12 hours  vasopressin Infusion 0.04 Unit(s)/Min (6 mL/Hr) IV Continuous <Continuous>    MEDICATIONS  (PRN):  dextrose Oral Gel 15 Gram(s) Oral once PRN Blood Glucose LESS THAN 70 milliGRAM(s)/deciliter    Pertinent Labs: 03-15 @ 00:22: Na 137, BUN 31<H>, Cr 1.23, <H>, K+ 4.1, Phos 3.1, Mg 2.8<H>, Alk Phos 157<H>, ALT/SGPT 22, AST/SGOT 48<H>, HbA1c --   @ 11:30: Na --, BUN --, Cr --, BG --, K+ --, Phos 2.3<L>, Mg --, Alk Phos --, ALT/SGPT --, AST/SGOT --, HbA1c --    A1C with Estimated Average Glucose Result: 5.7 % (24 @ 13:11)  A1C with Estimated Average Glucose Result: 5.6 % (24 @ 15:55)    Finger Sticks: N/A    Skin per nursing documentation: No noted pressure injuries as per documentation.   Edema: No noted edema as per flow sheets.     Recalculated based on: current wt 61.7kg   Estimated Energy Needs:  - 3kcal (32 - 37kcal/kg)  Estimated Protein Needs: 86 - 99g protein (1.4 - 1.6g/kg)  Estimated Fluid Needs: per team.  [facility] Equation: N/A    Previous Nutrition Diagnosis: Severe Malnutrition, Increased Nutrient Needs   Nutrition Diagnosis is: [x] ongoing  [] resolved [] not applicable     New Nutrition Diagnosis: [x] Not applicable    Nutrition Care Plan:  [x] In Progress  [] Achieved  [] Not applicable    Nutrition Interventions:     Education Provided: N/A    Recommendations:      1) Given current pressor requirements consider change in feeds to Vital 1.5 with goal rate 55ml/hr x 24hr to provide 1320ml volume, 1980kcal, 89g protein, 1008ml free water. Meets 32kcal/kg, 1.4g/kg protein based on dosing wt 61.7kg.    - If preferred to continue feeds of Nepro recommend new goal rate 45ml/hr x 24hr to provide 1080ml volume, 1944kcal, 87g protein, 785ml free water. Meets ~32kcal/kg, ~1.4g/kg protein based on dosing wt 67.8kg.   2) Multivitamin if not otherwise contraindicated.  3) Monitor GI tolerance to feeds, RD remains available to adjust TF regimen/formulary as needed/upon request.   4) Defer provision of PO diet to team.     Monitoring and Evaluation:   Continue to monitor nutritional intake, tolerance to diet prescription, weights, labs, skin integrity    RD remains available upon request and will follow up per protocol    Patricia Pizarro MS, RD, CDN, CNSC; available via MS Teams

## 2024-03-15 NOTE — PROGRESS NOTE ADULT - SUBJECTIVE AND OBJECTIVE BOX
Patient is a 83y old  Male who presents with a chief complaint of shortness of breath (15 Mar 2024 09:13)    Being followed by ID for        Interval history:  No other acute events      ROS:  No cough,SOB,CP  No N/V/D  No abd pain  No urinary complaints  No HA  No joint or limb pain  No other complaints    PAST MEDICAL & SURGICAL HISTORY:  Aortic stenosis      AICD (automatic cardioverter/defibrillator) present      Aortic valve regurgitation      Ascending aorta dilation      H/O paroxysmal supraventricular tachycardia      HLD (hyperlipidemia)      Mitral valve regurgitation      Cardiac arrest      Severe mitral regurgitation      S/P aortic valve replacement  3/13/2004      S/P hernia repair  2002      History of tonsillectomy and adenoidectomy      S/P TURP  1996      S/P colonoscopy  2001, 2002, 2006, 2011, 2016      S/P endoscopy  2006      S/P cardiac cath  1994, 1995, 1999, 2002, 2004      AICD (automatic cardioverter/defibrillator) present  12/19/17      Cardiac pacemaker  12/19/17      History of cardioversion  9/11/20      S/P ablation of atrial fibrillation  10/29/20      Status post ablation of ventricular arrhythmia  2/14/19        Allergies    No Known Allergies    Intolerances      Antimicrobials:    fluconAZOLE IVPB 200 milliGRAM(s) IV Intermittent every 24 hours  fluconAZOLE IVPB      meropenem  IVPB      meropenem  IVPB 1000 milliGRAM(s) IV Intermittent every 8 hours  vancomycin  IVPB 750 milliGRAM(s) IV Intermittent every 12 hours    MEDICATIONS  (STANDING):  allopurinol 100 milliGRAM(s) Oral daily  aMIOdarone    Tablet 200 milliGRAM(s) Oral daily  ascorbic acid 500 milliGRAM(s) Oral daily  atorvastatin 40 milliGRAM(s) Oral at bedtime  chlorhexidine 2% Cloths 1 Application(s) Topical <User Schedule>  CRRT Treatment    <Continuous>  cyanocobalamin 1000 MICROGram(s) Oral daily  dexMEDEtomidine Infusion 0.5 MICROgram(s)/kG/Hr (8.48 mL/Hr) IV Continuous <Continuous>  dextrose 5%. 1000 milliLiter(s) (50 mL/Hr) IV Continuous <Continuous>  dextrose 5%. 1000 milliLiter(s) (100 mL/Hr) IV Continuous <Continuous>  dextrose 50% Injectable 25 Gram(s) IV Push once  dextrose 50% Injectable 25 Gram(s) IV Push once  dextrose 50% Injectable 12.5 Gram(s) IV Push once  DOBUTamine Infusion 7.5 MICROgram(s)/kG/Min (7.63 mL/Hr) IV Continuous <Continuous>  enoxaparin Injectable 30 milliGRAM(s) SubCutaneous every 12 hours  EPINEPHrine    Infusion 0.06 MICROgram(s)/kG/Min (7.63 mL/Hr) IV Continuous <Continuous>  epoetin zara-epbx (RETACRIT) Injectable 94890 Unit(s) IV Push <User Schedule>  fluconAZOLE IVPB 200 milliGRAM(s) IV Intermittent every 24 hours  fluconAZOLE IVPB      folic acid 1 milliGRAM(s) Oral daily  glucagon  Injectable 1 milliGRAM(s) IntraMuscular once  insulin lispro (ADMELOG) corrective regimen sliding scale   SubCutaneous every 6 hours  insulin lispro (ADMELOG) corrective regimen sliding scale   SubCutaneous at bedtime  levothyroxine 75 MICROGram(s) Oral daily  lidocaine   4% Patch 1 Patch Transdermal daily  melatonin 5 milliGRAM(s) Oral at bedtime  meropenem  IVPB      meropenem  IVPB 1000 milliGRAM(s) IV Intermittent every 8 hours  metoclopramide Injectable 5 milliGRAM(s) IV Push every 8 hours  midodrine 20 milliGRAM(s) Oral every 8 hours  norepinephrine Infusion 0.06 MICROgram(s)/kG/Min (3.81 mL/Hr) IV Continuous <Continuous>  pantoprazole    Tablet 40 milliGRAM(s) Oral before breakfast  polyethylene glycol 3350 17 Gram(s) Oral daily  potassium chloride  10 mEq/50 mL IVPB 10 milliEquivalent(s) IV Intermittent once  PrismaSATE Dialysate BGK 4 / 2.5 5000 milliLiter(s) (1200 mL/Hr) CRRT <Continuous>  PrismaSOL Filtration BGK 0 / 2.5 5000 milliLiter(s) (600 mL/Hr) CRRT <Continuous>  PrismaSOL Filtration BGK 4 / 2.5 5000 milliLiter(s) (400 mL/Hr) CRRT <Continuous>  senna 2 Tablet(s) Oral at bedtime  sertraline 50 milliGRAM(s) Oral daily  sodium chloride 0.9% lock flush 3 milliLiter(s) IV Push every 8 hours  thiamine 100 milliGRAM(s) Oral daily  vancomycin  IVPB 750 milliGRAM(s) IV Intermittent every 12 hours  vasopressin Infusion 0.04 Unit(s)/Min (6 mL/Hr) IV Continuous <Continuous>      Vital Signs Last 24 Hrs  T(C): 36.7 (03-15-24 @ 12:00), Max: 37.2 (03-14-24 @ 20:00)  T(F): 98.1 (03-15-24 @ 12:00), Max: 99 (03-14-24 @ 20:00)  HR: 83 (03-15-24 @ 16:00) (78 - 98)  BP: --  BP(mean): --  RR: 29 (03-15-24 @ 16:00) (15 - 35)  SpO2: 98% (03-15-24 @ 16:00) (89% - 100%)    Physical Exam:    Constitutional well preserved,comfortable,pleasant    HEENT PERRLA EOMI,No pallor or icterus    No oral exudate or erythema    Neck supple no JVD or LN    Chest Good AE,CTA    CVS RRR S1 S2 WNl No murmur or rub or gallop    Abd soft BS normal No tenderness no masses    Ext No cyanosis clubbing or edema    IV site no erythema tenderness or discharge    Joints no swelling or LOM    CNS AAO X 3 no focal    Lab Data:                          10.1   14.45 )-----------( 135      ( 15 Mar 2024 00:20 )             29.8       03-15    137  |  98  |  31<H>  ----------------------------<  140<H>  4.1   |  22  |  1.23    Ca    9.9      15 Mar 2024 00:22  Phos  3.1     03-15  Mg     2.8     03-15    TPro  x   /  Alb  x   /  TBili  6.8<H>  /  DBili  3.4<H>  /  AST  x   /  ALT  x   /  AlkPhos  x   03-15      Urinalysis Basic - ( 15 Mar 2024 00:22 )    Color: x / Appearance: x / SG: x / pH: x  Gluc: 140 mg/dL / Ketone: x  / Bili: x / Urobili: x   Blood: x / Protein: x / Nitrite: x   Leuk Esterase: x / RBC: x / WBC x   Sq Epi: x / Non Sq Epi: x / Bacteria: x        .Blood Blood  03-13-24   No growth at 24 hours  --  --      .Blood Blood  03-13-24   No growth at 48 Hours  --  --      .Blood Blood-Peripheral  03-12-24   No growth at 48 Hours  --  --                Vancomycin Level, Trough: 9.7 ug/mL (03-15-24 @ 04:37)  Vancomycin Level, Trough: 7.9 ug/mL (03-14-24 @ 16:29)  Vancomycin Level, Trough: 6.5 ug/mL (03-14-24 @ 04:28)      WBC Count: 14.45 (03-15-24 @ 00:20)  WBC Count: 15.18 (03-14-24 @ 13:49)  WBC Count: 16.85 (03-14-24 @ 00:29)  WBC Count: 15.14 (03-13-24 @ 00:30)  WBC Count: 12.59 (03-12-24 @ 00:42)  WBC Count: 11.94 (03-11-24 @ 13:15)  WBC Count: 10.57 (03-11-24 @ 00:32)  WBC Count: 8.81 (03-10-24 @ 04:03)  WBC Count: 8.42 (03-09-24 @ 00:33)       Bilirubin Total: 6.8 mg/dL (03-15-24 @ 04:37)  Bilirubin Total: 6.6 mg/dL (03-15-24 @ 00:22)  Aspartate Aminotransferase (AST/SGOT): 48 U/L (03-15-24 @ 00:22)  Alanine Aminotransferase (ALT/SGPT): 22 U/L (03-15-24 @ 00:22)  Alkaline Phosphatase: 157 U/L (03-15-24 @ 00:22)  Bilirubin Total: 4.6 mg/dL (03-14-24 @ 00:29)  Aspartate Aminotransferase (AST/SGOT): 47 U/L (03-14-24 @ 00:29)  Alanine Aminotransferase (ALT/SGPT): 22 U/L (03-14-24 @ 00:29)  Alkaline Phosphatase: 153 U/L (03-14-24 @ 00:29)  Bilirubin Total: 3.1 mg/dL (03-13-24 @ 00:30)  Aspartate Aminotransferase (AST/SGOT): 39 U/L (03-13-24 @ 00:30)  Alanine Aminotransferase (ALT/SGPT): 15 U/L (03-13-24 @ 00:30)  Alkaline Phosphatase: 161 U/L (03-13-24 @ 00:30)  Bilirubin Total: 3.0 mg/dL (03-12-24 @ 00:42)  Aspartate Aminotransferase (AST/SGOT): 42 U/L (03-12-24 @ 00:42)  Alanine Aminotransferase (ALT/SGPT): 17 U/L (03-12-24 @ 00:42)  Alkaline Phosphatase: 184 U/L (03-12-24 @ 00:42)  Bilirubin Total: 2.2 mg/dL (03-11-24 @ 13:15)  Aspartate Aminotransferase (AST/SGOT): 44 U/L (03-11-24 @ 13:15)  Alanine Aminotransferase (ALT/SGPT): 19 U/L (03-11-24 @ 13:15)  Alkaline Phosphatase: 197 U/L (03-11-24 @ 13:15)  Bilirubin Total: 2.2 mg/dL (03-11-24 @ 00:32)  Aspartate Aminotransferase (AST/SGOT): 40 U/L (03-11-24 @ 00:32)  Alanine Aminotransferase (ALT/SGPT): 18 U/L (03-11-24 @ 00:32)  Alkaline Phosphatase: 227 U/L (03-11-24 @ 00:32)         Patient is a 83y old  Male who presents with a chief complaint of shortness of breath (15 Mar 2024 09:13)    Being followed by ID for        Interval history:  pt remains on high flow  awake  claims can't get the mucus/sputum up   pt on CVVHD  No other acute events        PAST MEDICAL & SURGICAL HISTORY:  Aortic stenosis      AICD (automatic cardioverter/defibrillator) present      Aortic valve regurgitation      Ascending aorta dilation      H/O paroxysmal supraventricular tachycardia      HLD (hyperlipidemia)      Mitral valve regurgitation      Cardiac arrest      Severe mitral regurgitation      S/P aortic valve replacement  3/13/2004      S/P hernia repair  2002      History of tonsillectomy and adenoidectomy      S/P TURP  1996      S/P colonoscopy  2001, 2002, 2006, 2011, 2016      S/P endoscopy  2006      S/P cardiac cath  1994, 1995, 1999, 2002, 2004      AICD (automatic cardioverter/defibrillator) present  12/19/17      Cardiac pacemaker  12/19/17      History of cardioversion  9/11/20      S/P ablation of atrial fibrillation  10/29/20      Status post ablation of ventricular arrhythmia  2/14/19        Allergies    No Known Allergies    Intolerances      Antimicrobials:    fluconAZOLE IVPB 200 milliGRAM(s) IV Intermittent every 24 hours  fluconAZOLE IVPB      meropenem  IVPB      meropenem  IVPB 1000 milliGRAM(s) IV Intermittent every 8 hours  vancomycin  IVPB 750 milliGRAM(s) IV Intermittent every 12 hours    MEDICATIONS  (STANDING):  allopurinol 100 milliGRAM(s) Oral daily  aMIOdarone    Tablet 200 milliGRAM(s) Oral daily  ascorbic acid 500 milliGRAM(s) Oral daily  atorvastatin 40 milliGRAM(s) Oral at bedtime  chlorhexidine 2% Cloths 1 Application(s) Topical <User Schedule>  CRRT Treatment    <Continuous>  cyanocobalamin 1000 MICROGram(s) Oral daily  dexMEDEtomidine Infusion 0.5 MICROgram(s)/kG/Hr (8.48 mL/Hr) IV Continuous <Continuous>  dextrose 5%. 1000 milliLiter(s) (50 mL/Hr) IV Continuous <Continuous>  dextrose 5%. 1000 milliLiter(s) (100 mL/Hr) IV Continuous <Continuous>  dextrose 50% Injectable 25 Gram(s) IV Push once  dextrose 50% Injectable 25 Gram(s) IV Push once  dextrose 50% Injectable 12.5 Gram(s) IV Push once  DOBUTamine Infusion 7.5 MICROgram(s)/kG/Min (7.63 mL/Hr) IV Continuous <Continuous>  enoxaparin Injectable 30 milliGRAM(s) SubCutaneous every 12 hours  EPINEPHrine    Infusion 0.06 MICROgram(s)/kG/Min (7.63 mL/Hr) IV Continuous <Continuous>  epoetin zara-epbx (RETACRIT) Injectable 62342 Unit(s) IV Push <User Schedule>  fluconAZOLE IVPB 200 milliGRAM(s) IV Intermittent every 24 hours  fluconAZOLE IVPB      folic acid 1 milliGRAM(s) Oral daily  glucagon  Injectable 1 milliGRAM(s) IntraMuscular once  insulin lispro (ADMELOG) corrective regimen sliding scale   SubCutaneous every 6 hours  insulin lispro (ADMELOG) corrective regimen sliding scale   SubCutaneous at bedtime  levothyroxine 75 MICROGram(s) Oral daily  lidocaine   4% Patch 1 Patch Transdermal daily  melatonin 5 milliGRAM(s) Oral at bedtime  meropenem  IVPB      meropenem  IVPB 1000 milliGRAM(s) IV Intermittent every 8 hours  metoclopramide Injectable 5 milliGRAM(s) IV Push every 8 hours  midodrine 20 milliGRAM(s) Oral every 8 hours  norepinephrine Infusion 0.06 MICROgram(s)/kG/Min (3.81 mL/Hr) IV Continuous <Continuous>  pantoprazole    Tablet 40 milliGRAM(s) Oral before breakfast  polyethylene glycol 3350 17 Gram(s) Oral daily  potassium chloride  10 mEq/50 mL IVPB 10 milliEquivalent(s) IV Intermittent once  PrismaSATE Dialysate BGK 4 / 2.5 5000 milliLiter(s) (1200 mL/Hr) CRRT <Continuous>  PrismaSOL Filtration BGK 0 / 2.5 5000 milliLiter(s) (600 mL/Hr) CRRT <Continuous>  PrismaSOL Filtration BGK 4 / 2.5 5000 milliLiter(s) (400 mL/Hr) CRRT <Continuous>  senna 2 Tablet(s) Oral at bedtime  sertraline 50 milliGRAM(s) Oral daily  sodium chloride 0.9% lock flush 3 milliLiter(s) IV Push every 8 hours  thiamine 100 milliGRAM(s) Oral daily  vancomycin  IVPB 750 milliGRAM(s) IV Intermittent every 12 hours  vasopressin Infusion 0.04 Unit(s)/Min (6 mL/Hr) IV Continuous <Continuous>      Vital Signs Last 24 Hrs  T(C): 36.7 (03-15-24 @ 12:00), Max: 37.2 (03-14-24 @ 20:00)  T(F): 98.1 (03-15-24 @ 12:00), Max: 99 (03-14-24 @ 20:00)  HR: 83 (03-15-24 @ 16:00) (78 - 98)  BP: --  BP(mean): --  RR: 29 (03-15-24 @ 16:00) (15 - 35)  SpO2: 98% (03-15-24 @ 16:00) (89% - 100%)    Physical Exam:    Constitutional  pt resting quietly    HEENT PERRLA EOMI,No pallor or icterus    No oral exudate or erythema    Neck supple no JVD or LN    Chest Good AE,CTA    CVS  S1 S2     Abd soft BS normal No tenderness     Ext No cyanosis clubbing or edema    IV site no erythema tenderness or discharge    Joints no swelling or LOM    CNS AAO X 3 no focal    Lab Data:                          10.1   14.45 )-----------( 135      ( 15 Mar 2024 00:20 )             29.8       03-15    137  |  98  |  31<H>  ----------------------------<  140<H>  4.1   |  22  |  1.23    Ca    9.9      15 Mar 2024 00:22  Phos  3.1     03-15  Mg     2.8     03-15    TPro  x   /  Alb  x   /  TBili  6.8<H>  /  DBili  3.4<H>  /  AST  x   /  ALT  x   /  AlkPhos  x   03-15    Urinalysis (03.04.24 @ 13:11)    pH Urine: 5.5   Glucose Qualitative, Urine: Negative mg/dL   Blood, Urine: Negative   Color: Yellow   Urine Appearance: Clear   Bilirubin: Negative   Ketone - Urine: Negative mg/dL   Specific Gravity: 1.010   Protein, Urine: Negative mg/dL   Urobilinogen: 1.0 mg/dL   Nitrite: Negative   Leukocyte Esterase Concentration: Negative        .Blood Blood  03-13-24   No growth at 24 hours  --  --      .Blood Blood  03-13-24   No growth at 48 Hours  --  --      .Blood Blood-Peripheral  03-12-24   No growth at 48 Hours  --  --        Vancomycin Level, Trough: 9.7 ug/mL (03-15-24 @ 04:37)  Vancomycin Level, Trough: 7.9 ug/mL (03-14-24 @ 16:29)  Vancomycin Level, Trough: 6.5 ug/mL (03-14-24 @ 04:28)      WBC Count: 14.45 (03-15-24 @ 00:20)  WBC Count: 15.18 (03-14-24 @ 13:49)  WBC Count: 16.85 (03-14-24 @ 00:29)  WBC Count: 15.14 (03-13-24 @ 00:30)  WBC Count: 12.59 (03-12-24 @ 00:42)  WBC Count: 11.94 (03-11-24 @ 13:15)  WBC Count: 10.57 (03-11-24 @ 00:32)  WBC Count: 8.81 (03-10-24 @ 04:03)  WBC Count: 8.42 (03-09-24 @ 00:33)       Bilirubin Total: 6.8 mg/dL (03-15-24 @ 04:37)  Bilirubin Total: 6.6 mg/dL (03-15-24 @ 00:22)  Aspartate Aminotransferase (AST/SGOT): 48 U/L (03-15-24 @ 00:22)  Alanine Aminotransferase (ALT/SGPT): 22 U/L (03-15-24 @ 00:22)  Alkaline Phosphatase: 157 U/L (03-15-24 @ 00:22)        < from: Xray Chest 1 View- PORTABLE-Urgent (Xray Chest 1 View- PORTABLE-Urgent .) (03.14.24 @ 14:56) >  ACC: [id number] EXAM:  XR CHEST PORTABLE URGENT 1V   ORDERED BY: RICK CALI     ACC: [id number] EXAM:  XR CHEST PORTABLE URGENT 1V   ORDERED BY:  MEDHAT CADET     PROCEDURE DATE:  03/14/2024          INTERPRETATION:  CLINICAL INDICATION: cardiothoracic surgery, NG tube   placement    EXAM: Frontal radiograph of the chest.    COMPARISON: Chest radiograph from 3/14/2024    FINDINGS:    Status post median sternotomy.  Left chest wall AICD with intact leads.  Right introducer catheter with Superior-Ganzcatheter in the proximal right   pulmonary artery.  Left IJ hemodialysis catheter with tip in SVC.  Aortic valve replacement.  Enteric tube with tip in the stomach.  The heart is not accurately assessed in this AP projection.  No significant change in diffuse bilateral airspace opacities. No pleural   effusion.  There is no pneumothorax.  No acute bony abnormality.    3/14/2024 2:15 PM:    Superior-Salvatore catheter is noted looped in the main pulmonary artery.  Enteric tube is been repositioned more proximally within the stomach   however it appears coiled in the pharynx.  There is increasing pulmonary edema    IMPRESSION:    Increasing pulmonary edema.  Enteric tube with tip in the stomach, however it is coiled in the pharynx.    --- End of Report ---    < end of copied text >

## 2024-03-15 NOTE — PROGRESS NOTE ADULT - SUBJECTIVE AND OBJECTIVE BOX
[facility]   [phone]  RENAL FOLLOW UP NOTE  --------------------------------------------------------------------------------  Chief Complaint:    24 hour events/subjective:    seen earlier   on high flow O2  on epi vaso levo  and midodrine   states sob slightly better     PAST HISTORY  --------------------------------------------------------------------------------  No significant changes to PMH, PSH, FHx, SHx, unless otherwise noted    ALLERGIES & MEDICATIONS  --------------------------------------------------------------------------------  Allergies    No Known Allergies    Intolerances      Standing Inpatient Medications  allopurinol 100 milliGRAM(s) Oral daily  aMIOdarone    Tablet 200 milliGRAM(s) Oral daily  ascorbic acid 500 milliGRAM(s) Oral daily  atorvastatin 40 milliGRAM(s) Oral at bedtime  chlorhexidine 2% Cloths 1 Application(s) Topical <User Schedule>  CRRT Treatment    <Continuous>  cyanocobalamin 1000 MICROGram(s) Oral daily  dextrose 5%. 1000 milliLiter(s) IV Continuous <Continuous>  dextrose 5%. 1000 milliLiter(s) IV Continuous <Continuous>  dextrose 50% Injectable 25 Gram(s) IV Push once  DOBUTamine Infusion 7.5 MICROgram(s)/kG/Min IV Continuous <Continuous>  enoxaparin Injectable 30 milliGRAM(s) SubCutaneous every 12 hours  EPINEPHrine    Infusion 0.08 MICROgram(s)/kG/Min IV Continuous <Continuous>  epoetin zara-epbx (RETACRIT) Injectable 08724 Unit(s) IV Push <User Schedule>  fluconAZOLE IVPB 200 milliGRAM(s) IV Intermittent every 24 hours  fluconAZOLE IVPB      folic acid 1 milliGRAM(s) Oral daily  glucagon  Injectable 1 milliGRAM(s) IntraMuscular once  insulin lispro (ADMELOG) corrective regimen sliding scale   SubCutaneous every 6 hours  levothyroxine 75 MICROGram(s) Oral daily  lidocaine   4% Patch 1 Patch Transdermal daily  melatonin 5 milliGRAM(s) Oral at bedtime  meropenem  IVPB      meropenem  IVPB 1000 milliGRAM(s) IV Intermittent every 8 hours  metoclopramide Injectable 5 milliGRAM(s) IV Push every 8 hours  midodrine 20 milliGRAM(s) Oral every 8 hours  norepinephrine Infusion 0.06 MICROgram(s)/kG/Min IV Continuous <Continuous>  pantoprazole    Tablet 40 milliGRAM(s) Oral before breakfast  polyethylene glycol 3350 17 Gram(s) Oral daily  PrismaSATE Dialysate BGK 4 / 2.5 5000 milliLiter(s) CRRT <Continuous>  PrismaSOL Filtration BGK 0 / 2.5 5000 milliLiter(s) CRRT <Continuous>  PrismaSOL Filtration BGK 4 / 2.5 5000 milliLiter(s) CRRT <Continuous>  senna 2 Tablet(s) Oral at bedtime  sertraline 50 milliGRAM(s) Oral daily  sodium chloride 0.9% lock flush 3 milliLiter(s) IV Push every 8 hours  thiamine 100 milliGRAM(s) Oral daily  vancomycin  IVPB 750 milliGRAM(s) IV Intermittent every 12 hours  vasopressin Infusion 0.04 Unit(s)/Min IV Continuous <Continuous>    PRN Inpatient Medications  dextrose Oral Gel 15 Gram(s) Oral once PRN      REVIEW OF SYSTEMS  --------------------------------------------------------------------------------    Gen: denies  fevers/chills,  CVS: denies chest pain/palpitations  Resp:  +  SOB/Cough  GI: Denies N/V/Abd pain  : Denies dysuria    VITALS/PHYSICAL EXAM  --------------------------------------------------------------------------------  T(C): 36.7 (03-15-24 @ 20:00), Max: 37.1 (03-15-24 @ 00:00)  HR: 81 (03-15-24 @ 22:00) (78 - 98)  BP: --  RR: 25 (03-15-24 @ 22:00) (16 - 35)  SpO2: 99% (03-15-24 @ 22:00) (89% - 100%)  Wt(kg): --        24 @ 07:01  -  03-15-24 @ 07:00  --------------------------------------------------------  IN: 2991.1 mL / OUT: 5374 mL / NET: -2382.9 mL    03-15-24 @ 07:01  -  03-15-24 @ 22:00  --------------------------------------------------------  IN: 1917 mL / OUT: 2413 mL / NET: -496 mL      Physical Exam:  	    Gen:  on high flow O2 + IJ cath   	+ Romeo   	Pulm: decrease bs + coarse bs   	CV: RRR, S1S2; no rub  	Abd: +BS, soft, nontender/nondistended  	: No suprapubic tenderness  	UE: Warm, no cyanosis  no clubbing,  no edema  	LE: Warm, no cyanosis  no clubbing,  no   edema  	Neuro: alert and oriented. speech coherent       LABS/STUDIES  --------------------------------------------------------------------------------              10.1   14.45 >-----------<  135      [03-15-24 @ 00:20]              29.8     137  |  98  |  31  ----------------------------<  140      [03-15-24 @ 00:22]  4.1   |  22  |  1.23        Ca     9.9     [03-15-24 @ 00:22]      Mg     2.8     [03-15-24 @ 00:22]      Phos  3.1     [03-15-24 @ 00:22]    TPro  x   /  Alb  x   /  TBili  6.8  /  DBili  3.4  /  AST  x   /  ALT  x   /  AlkPhos  x   [03-15-24 @ 04:37]          Creatinine Trend:  SCr 1.23 [03-15 @ 00:22]  SCr 1.54 [ 00:29]  SCr 1.41 [ 00:30]  SCr 1.28 [ 00:42]  SCr 1.33 [ 13:15]        Iron 290, TIBC 430, %sat 67      [24 @ 13:57]  Ferritin 335      [24 13:57]  PTH -- (Ca 8.9)      [24 13:03]   242  TSH 2.02      [24 13:57]

## 2024-03-15 NOTE — PROGRESS NOTE ADULT - SUBJECTIVE AND OBJECTIVE BOX
Overnight Events: Continues to require Dobutamine and multiple pressors     Review Of Systems: No chest pain, shortness of breath, or palpitations            Current Meds:  allopurinol 100 milliGRAM(s) Oral daily  aMIOdarone    Tablet 200 milliGRAM(s) Oral daily  ascorbic acid 500 milliGRAM(s) Oral daily  atorvastatin 40 milliGRAM(s) Oral at bedtime  chlorhexidine 2% Cloths 1 Application(s) Topical <User Schedule>  cyanocobalamin 1000 MICROGram(s) Oral daily  dexMEDEtomidine Infusion 0.5 MICROgram(s)/kG/Hr IV Continuous <Continuous>  dextrose 5%. 1000 milliLiter(s) IV Continuous <Continuous>  dextrose 5%. 1000 milliLiter(s) IV Continuous <Continuous>  dextrose 50% Injectable 25 Gram(s) IV Push once  dextrose 50% Injectable 25 Gram(s) IV Push once  dextrose 50% Injectable 12.5 Gram(s) IV Push once  dextrose Oral Gel 15 Gram(s) Oral once PRN  DOBUTamine Infusion 7.5 MICROgram(s)/kG/Min IV Continuous <Continuous>  enoxaparin Injectable 30 milliGRAM(s) SubCutaneous every 12 hours  EPINEPHrine    Infusion 0.06 MICROgram(s)/kG/Min IV Continuous <Continuous>  epoetin zara-epbx (RETACRIT) Injectable 91731 Unit(s) IV Push <User Schedule>  fluconAZOLE IVPB 200 milliGRAM(s) IV Intermittent every 24 hours  fluconAZOLE IVPB      folic acid 1 milliGRAM(s) Oral daily  glucagon  Injectable 1 milliGRAM(s) IntraMuscular once  insulin lispro (ADMELOG) corrective regimen sliding scale   SubCutaneous every 6 hours  insulin lispro (ADMELOG) corrective regimen sliding scale   SubCutaneous at bedtime  levothyroxine 75 MICROGram(s) Oral daily  lidocaine   4% Patch 1 Patch Transdermal daily  melatonin 5 milliGRAM(s) Oral at bedtime  meropenem  IVPB      meropenem  IVPB 1000 milliGRAM(s) IV Intermittent every 8 hours  metoclopramide Injectable 5 milliGRAM(s) IV Push every 8 hours  midodrine 20 milliGRAM(s) Oral every 8 hours  norepinephrine Infusion 0.06 MICROgram(s)/kG/Min IV Continuous <Continuous>  pantoprazole    Tablet 40 milliGRAM(s) Oral before breakfast  polyethylene glycol 3350 17 Gram(s) Oral daily  senna 2 Tablet(s) Oral at bedtime  sertraline 50 milliGRAM(s) Oral daily  sodium chloride 0.9% lock flush 3 milliLiter(s) IV Push every 8 hours  thiamine 100 milliGRAM(s) Oral daily  vancomycin  IVPB 750 milliGRAM(s) IV Intermittent every 12 hours  vasopressin Infusion 0.04 Unit(s)/Min IV Continuous <Continuous>      Vitals:  T(F): 98.6 (03-15), Max: 99 (03-14)  HR: 84 (03-15) (68 - 97)  BP: --  RR: 20 (03-15)  SpO2: 98% (03-15)  I&O's Summary    14 Mar 2024 07:01  -  15 Mar 2024 07:00  --------------------------------------------------------  IN: 2991.1 mL / OUT: 5374 mL / NET: -2382.9 mL    15 Mar 2024 07:01  -  15 Mar 2024 09:14  --------------------------------------------------------  IN: 259.2 mL / OUT: 459 mL / NET: -199.8 mL        Physical Exam:    Appearance: Sleeping  Eyes: pink conjunctiva  HEENT: AT/NC   Cardiovascular: Regular rate, mid-sternal scar   Respiratory: No accessory resp muscle use, HFNC   Gastrointestinal:  non-distended   Musculoskeletal: No clubbing; no joint deformity   Neurologic: Normal speech, no facial asymmetry  Psychiatry:  mood & affect appropriate  Skin: No rashes, ecchymoses, or cyanosis of exposed skin                           10.1   14.45 )-----------( 135      ( 15 Mar 2024 00:20 )             29.8     03-15    137  |  98  |  31<H>  ----------------------------<  140<H>  4.1   |  22  |  1.23    Ca    9.9      15 Mar 2024 00:22  Phos  3.1     03-15  Mg     2.8     03-15    TPro  x   /  Alb  x   /  TBili  6.8<H>  /  DBili  3.4<H>  /  AST  x   /  ALT  x   /  AlkPhos  x   03-15

## 2024-03-15 NOTE — PROGRESS NOTE ADULT - PROBLEM SELECTOR PROBLEM 3
Severe mitral regurgitation

## 2024-03-15 NOTE — PROGRESS NOTE ADULT - PROBLEM SELECTOR PLAN 1
- Etiology: ischemic and complicated by severe MR  - On . Would increase to a rate of 10 mcg/kg/min. (Liver function tests rising)  - Monitor markers of end organ function (LFTs, Bilirubin, Cr, lactate and ScVO2)  - Target net negative 1-2L. CVP 6-8   - Avoid Luke as it may worsen patient's condition and cause pulmonary edema   - Please wrap BLE in ACE bandages to aid in mobilization of fluid   - Has dual chamber ICD, last interrogated 3/14,  Increased lower rate limit from 70 to 80bpm.   - Pt with increasing pressor support, respiratory distress, worsening CXR on 3/13, hemodynamics concerning for sepsis component; Trial of escalation of support with IABP placement unsuccessful, likely due to tortuosity in the groin.  - Would recommend palliative care discussion

## 2024-03-15 NOTE — PROGRESS NOTE ADULT - PROBLEM SELECTOR PLAN 5
-has 3 DCCV in past (most recent 2/27/24)  -on amiodarone  -TSH: 2   -Home med on Xarelto - but on hold    -on heparin gtt
- History of multiple DCCV, most recent on 2/27/24  - On PO Amio load  - On AC with Heparin infusion
-has 3 DCCV in past (most recent 2/27/24)  -on amiodarone - will need repeat TSH  -Home med Xarelto - but on hold    -on heparin gtt
- History of multiple DCCV, most recent on 2/27/24  - On PO Amio load  - On AC with Heparin infusion

## 2024-03-15 NOTE — PROGRESS NOTE ADULT - ASSESSMENT
84 y/o M with ICM/HFrEF (now 30%; LVIDd 6.7 cm; prev req inotrope), CAD c/b IWMI (1994) s/p angioplasty, aortic stenosis s/p bio-AVR 2004, VT arrest (2017) s/p ICD, Afib s/p multiple DCCV and ablation x2 (2020 and 2023; on AC), Aflutter s/p WARREN/DCCV (8/23), prior Ao aneurysm s/p Bentall (2021), severe MR prior MVr (2021) with MAC (precludes M-KRISTIE d/t his anatomy; turned down for TMVR trials by [employer]), atrophic kidney with CKD (b/l Cr 2.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 Patient was initiated on bumex gtt with gradual improvement with Cr improving from 3.6 to 3.1. Patient was considered for MV re-op; however, patient was reluctant of the procedure and expressed wanting to follow up in the outpatient setting to schedule. During last admission, patient was also noted to be in flutter, EP was consulted, and patient underwent repeat WARREN/DCCV , on amiodarone. Patient was discharged home 2/28 on higher dose of diuretics, as per HF team. Today, patient was evaluated at CTS office four routine follow-up, pt c/o worsening shortness of breath and L>R LE edema, with redness/swelling on left dorsal aspect of the foot, readmitted to CTS service for further management.  noticed with rising creatinine and bun      1- SURESH on ckd   2- decompensated CHF  3- Mitral regurgitation   4- hypotension   5- hypothyroid   6- hyperuricemia   7- anemia      cont dobutamine drip   midodrine 20  mg tid   cont vasopressin drip  /levophed/epinephrine    CRRT with [id number] dialyzer;  dfr 1200   100  cc hr fluid removal  as bp tolerates   see new orders   + campos meropenem 1 g tid   reatcrit 62995 U twice weekly   allopurinol 100 mg daily   no blood work RUE  strict I/O  d/w CTS team when seen earlier

## 2024-03-15 NOTE — PROGRESS NOTE ADULT - SUBJECTIVE AND OBJECTIVE BOX
Patient seen and examined at the bedside.    Remained critically ill on continuous ICU monitoring.    OBJECTIVE:  Vital Signs Last 24 Hrs  T(C): 36.7 (15 Mar 2024 20:00), Max: 37.2 (14 Mar 2024 20:00)  T(F): 98.1 (15 Mar 2024 20:00), Max: 99 (14 Mar 2024 20:00)  HR: 82 (15 Mar 2024 19:15) (78 - 98)  BP: --  BP(mean): --  RR: 24 (15 Mar 2024 19:15) (16 - 35)  SpO2: 97% (15 Mar 2024 19:15) (89% - 100%)    Parameters below as of 15 Mar 2024 20:00  Patient On (Oxygen Delivery Method): nasal cannula, high flow  O2 Flow (L/min): 50  O2 Concentration (%): 50    REVIEW OF SYSTEMS:  N/A    Physical Exam:  General: In bed with lines & gtts  Neurology: Sedated  Eyes: bilateral pupils equal and reactive   ENT/Neck: Neck supple, trachea midline, No JVD   Respiratory: Rales noted bilaterally   CV: RRR, S1S2, no murmurs       [x] Paced rhythm [x] PPM - DDD 80  Abdominal: Soft, NT, ND +BS,   Extremities: no edema, + peripheral pulses   Skin: No Rashes, Hematoma, Ecchymosis                           Assessment:    Plan:   ***Neuro***  [x] Sedated with Precedex  Continue routine neuro assessment   Melatonin for sleep  Continue Zoloft     ***Cardiovascular***  Invasive hemodynamic monitoring, assess perfusion indices   HI 84 - 98 / CVP 12- 15 / MAP 66 - 74 / PAP 69/25(43) / Hct 29.8% / Lactate 1.0  [x] Levophed - 0.24 mcg/kg/min  [x] Epinephrine - 0.06 mcg/kg/min  [x] Vasopressin - 0.04 units/min  [x] Dobutamine - 7.5 mcg/kg/Min  Volume: [x] 1 PRBC [x] 5 cryo  Continuous reassessment of hemodynamics   PO Amiodarone for rate control  Continue Midodrine  [x] VTE ppx with Lovenox  [x] Statin   [x] Mg >2   Serial EKG and cardiac enzymes     ***Pulmonary***  Nitric oxide d/c'ed  Oxygen: [x] HFNC - 50L/50%  Encourage incentive spirometry, continue pulse ox monitoring, follow ABGs     ***GI***  [x] Nepro TF @40cc/hr  [x] Protonix for stress ulcer prophylaxis  Bowel regimen with Miralax and Senna  Reglan for gut motility     ***Renal***  GFR 58  Continue to monitor I/Os, BUN/Creatinine.   Replete lytes PRN  Royal present  Allopurinol for gout  Renal support with RETACRIT    CVVH/CRRT    ***ID***  Empiric dosing with Diflucan/ Vanco/ Merop    ***Endocrine***  [x] Hyperglycemia : HbA1c 5.7%             - [x] ISS  [x] Hypothyroidsm             - [x] Synthroid          Patient requires continuous monitoring with bedside rhythm monitoring, pulse oximetry monitoring, and continuous central venous and arterial pressure monitoring; and intermittent blood gas analysis. Care plan discussed with the ICU care team.   Patient remained critical, at risk for life threatening decompensation.    I have spent 30 minutes providing critical care management to this patient.    By signing my name below, I, Nick Geiger, attest that this documentation has been prepared under the direction and in the presence of Russell Deal MD   Electronically signed: Frieda Mcginnis, 03-15-24 @ 19:50    I, Russell Deal, personally performed the services described in this documentation. all medical record entries made by the frieda were at my direction and in my presence. I have reviewed the chart and agree that the record reflects my personal performance and is accurate and complete  Electronically signed: Russell Deal MD  Patient seen and examined at the bedside.    Remained critically ill on continuous ICU monitoring.    OBJECTIVE:  Vital Signs Last 24 Hrs  T(C): 36.7 (15 Mar 2024 20:00), Max: 37.2 (14 Mar 2024 20:00)  T(F): 98.1 (15 Mar 2024 20:00), Max: 99 (14 Mar 2024 20:00)  HR: 82 (15 Mar 2024 19:15) (78 - 98)  BP: --  BP(mean): --  RR: 24 (15 Mar 2024 19:15) (16 - 35)  SpO2: 97% (15 Mar 2024 19:15) (89% - 100%)    Parameters below as of 15 Mar 2024 20:00  Patient On (Oxygen Delivery Method): nasal cannula, high flow  O2 Flow (L/min): 50  O2 Concentration (%): 50    REVIEW OF SYSTEMS:  N/A    Physical Exam:  General: In bed with lines & multiple gtt   Neurology: Able f/u commands moves all 4   Eyes: bilateral pupils equal and reactive   ENT/Neck: Neck supple, trachea midline, +++ JVD   Respiratory: Rales noted bilaterally   CV: [x] + holosystolic murmur        [x] Paced rhythm [x] PPM - DDD 80  Abdominal: Soft, NT, ND +BS,   Extremities: no edema, + peripheral pulses   Skin: No Rashes, Hematoma, Ecchymosis                           Assessment:  83 yr male 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, 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, Severe MR valvular heart failure , CRI, AICD   Decompensated heart failure, cardiogenic shock with renal failure  Marginal hemodynamics with high dose dual pressor  Anemia   Suspected sepsis        Plan:   ***Neuro***  [x] Precedex off neurologically intact        Continue  neuro assessment        Melatonin & Zoloft     ***Cardiovascular***  Cardiogenic shock complicated with MODS unable to place IABP   no improvement in perfusion indices on pharmacological support     Paced  80-98 / CVP 14-16 / MAP 50-70 / PAP 69/25(43) / CI 1.9 / Hct 29.8% / Lactate 1.0  [X] Nitric oxide Dced  [x] Levophed - 0.24 mcg/kg/min Target MAP >65   [x] Epinephrine - 0.06 mcg/kg/min ^ 0.08 mcg/kg/min   [x] Vasopressin - 0.04 units/min  [x] Dobutamine - 7.5 mcg/kg/Min   [x] Proamatine  [x] CRT - cc/hr    [x] maintain Hct 24% RETACRIT + PRN Tx     [x]  Amiodarone   [x]  Lovenox 30 BID   [x] Lipitor   [x] Mg >2   [x] Check baseline cortisol     High risk Sx candidate for MVR     ***Pulmonary***  Cardiogenic Pulmonary edema    Nitric oxide d/c'ed  Oxygen: [x] HFNC - 50L/50%  Encourage incentive spirometry, continue pulse ox monitoring, follow ABGs     ***GI***  Avoid high dose tube feeds given high pressor requirement   [x] Nepro TF @40cc/hr  [x] Protonix for stress ulcer prophylaxis  Bowel regimen with Miralax and Senna  Reglan for gut motility     ***Renal***  Anuric renal failure  Acute on chronic renal failure   CVVHDF    ***ID***    Empiric dosing with Diflucan/ Vanco/ Merop  Blood culture X2 3/13 negative     ***Endocrine***  [x] Hyperglycemia : HbA1c 5.7%             - [x] ISS    [x] Hypothyroidism             - [x] Synthroid          Patient requires continuous monitoring with bedside rhythm monitoring, pulse oximetry monitoring, and continuous central venous and arterial pressure monitoring; and intermittent blood gas analysis. Care plan discussed with the ICU care team.   Patient remained critical, at risk for life threatening decompensation.    I have spent 30 minutes providing critical care management to this patient.    By signing my name below, I, Nick Geiger, attest that this documentation has been prepared under the direction and in the presence of Russell Deal MD   Electronically signed: Frieda Mcginnis, 03-15-24 @ 19:50    I, Russell Deal, personally performed the services described in this documentation. all medical record entries made by the frieda were at my direction and in my presence. I have reviewed the chart and agree that the record reflects my personal performance and is accurate and complete  Electronically signed: Russell Deal MD

## 2024-03-15 NOTE — PROGRESS NOTE ADULT - ATTENDING COMMENTS
Patient remains clinically tenuous  He remains on triple pressors with  at 7.5  His SVO2 reamains low in the 40s/50s. His vasculature would not allow of MCS through the groin  Can increase  to 10   However overall his prognosis is poor, and his GOC should be addressed

## 2024-03-15 NOTE — PROGRESS NOTE ADULT - PROBLEM SELECTOR PLAN 4
- L dorsal foot   - VA duplex negative for DVT   - 3/4 BCx NGTD  - On Cefazolin IV
-L foot redness/swelling  -VA duplex (-) for DVT   -3/4 BCx NGTD  -on cefazolin IV for 7 days. Received Vanc x 1  -ID to follow.
- Rising WBC count, worsening respiratory distress, increasing pressor requirements and hemodynamics concerning for sepsis   - Patient on broad spectrum antibiotics: Vancomycin, Meropenem and Fluconazole   - ID following, BCx 3/12 NGTD
- L dorsal foot   - VA duplex negative for DVT   - 3/4 BCx NGTD  - s/p Cefazolin
- Rising WBC count, worsening respiratory distress, increasing pressor requirements and hemodynamics concerning for sepsis   - Patient on broad spectrum antibiotics: Vancomycin, Meropenem and Fluconazole   - ID following, cultures to be sent
-L foot redness/swelling  -VA duplex (-) for DVT   -3/4 BCx NGTD  -on cefazolin IV for 7 days. Vanco added today  -ID to follow.
- Rising WBC count, worsening respiratory distress, increasing pressor requirements and hemodynamics concerning for sepsis   - Patient on broad spectrum antibiotics: Vancomycin, Meropenem and Fluconazole   - ID following, BCx 3/12 and 3/13 NGTD

## 2024-03-15 NOTE — PROGRESS NOTE ADULT - SUBJECTIVE AND OBJECTIVE BOX
CRITICAL CARE ATTENDING - CTICU    MEDICATIONS  (STANDING):  allopurinol 100 milliGRAM(s) Oral daily  aMIOdarone    Tablet 200 milliGRAM(s) Oral daily  ascorbic acid 500 milliGRAM(s) Oral daily  atorvastatin 40 milliGRAM(s) Oral at bedtime  chlorhexidine 2% Cloths 1 Application(s) Topical <User Schedule>  cyanocobalamin 1000 MICROGram(s) Oral daily  dexMEDEtomidine Infusion 0.5 MICROgram(s)/kG/Hr (8.48 mL/Hr) IV Continuous <Continuous>  dextrose 5%. 1000 milliLiter(s) (50 mL/Hr) IV Continuous <Continuous>  dextrose 5%. 1000 milliLiter(s) (100 mL/Hr) IV Continuous <Continuous>  dextrose 50% Injectable 25 Gram(s) IV Push once  dextrose 50% Injectable 25 Gram(s) IV Push once  dextrose 50% Injectable 12.5 Gram(s) IV Push once  DOBUTamine Infusion 7.5 MICROgram(s)/kG/Min (7.63 mL/Hr) IV Continuous <Continuous>  enoxaparin Injectable 30 milliGRAM(s) SubCutaneous every 12 hours  EPINEPHrine    Infusion 0.06 MICROgram(s)/kG/Min (7.63 mL/Hr) IV Continuous <Continuous>  epoetin zara-epbx (RETACRIT) Injectable 07120 Unit(s) IV Push <User Schedule>  fluconAZOLE IVPB      fluconAZOLE IVPB 200 milliGRAM(s) IV Intermittent every 24 hours  folic acid 1 milliGRAM(s) Oral daily  glucagon  Injectable 1 milliGRAM(s) IntraMuscular once  insulin lispro (ADMELOG) corrective regimen sliding scale   SubCutaneous every 6 hours  insulin lispro (ADMELOG) corrective regimen sliding scale   SubCutaneous at bedtime  levothyroxine 75 MICROGram(s) Oral daily  lidocaine   4% Patch 1 Patch Transdermal daily  melatonin 5 milliGRAM(s) Oral at bedtime  meropenem  IVPB 1000 milliGRAM(s) IV Intermittent every 8 hours  meropenem  IVPB      metoclopramide Injectable 5 milliGRAM(s) IV Push every 8 hours  midodrine 20 milliGRAM(s) Oral every 8 hours  norepinephrine Infusion 0.06 MICROgram(s)/kG/Min (3.81 mL/Hr) IV Continuous <Continuous>  pantoprazole    Tablet 40 milliGRAM(s) Oral before breakfast  polyethylene glycol 3350 17 Gram(s) Oral daily  senna 2 Tablet(s) Oral at bedtime  sertraline 50 milliGRAM(s) Oral daily  sodium chloride 0.9% lock flush 3 milliLiter(s) IV Push every 8 hours  thiamine 100 milliGRAM(s) Oral daily  vancomycin  IVPB 750 milliGRAM(s) IV Intermittent every 12 hours  vasopressin Infusion 0.04 Unit(s)/Min (6 mL/Hr) IV Continuous <Continuous>                                    10.1   14.45 )-----------( 135      ( 15 Mar 2024 00:20 )             29.8       03-15    137  |  98  |  31<H>  ----------------------------<  140<H>  4.1   |  22  |  1.23    Ca    9.9      15 Mar 2024 00:22  Phos  3.1     03-15  Mg     2.8     03-15    TPro  x   /  Alb  x   /  TBili  6.8<H>  /  DBili  3.4<H>  /  AST  x   /  ALT  x   /  AlkPhos  x   03-15              Daily     Daily Weight in k.7 (15 Mar 2024 00:00)       @ 07: @ 07:00  --------------------------------------------------------  IN: 2416.6 mL / OUT: 3441 mL / NET: -1024.4 mL     @ 07:01  -  03-15 @ 06:21  --------------------------------------------------------  IN: 2949.7 mL / OUT: 5246 mL / NET: -2296.3 mL        Critically Ill patient  : [ x] preoperative - MVR   [ ] post operative,   [ ] non operative    Requires :  [x] Arterial Line   [x] Central Line  [x ] PA catheter  [ ] IABP  [ ] ECMO  [ ] LVAD  [ ] Ventilator  [ ] pacemaker [ ] Impella   [x] HFNC  [x] CVVHD  [x] Luke                      [x ] ABG's     [ x] Pulse Oxymetry Monitoring  Bedside evaluation , monitoring , treatment of hemodynamics , fluids , IVP/ IVCD meds.        Diagnosis:     3/5 - Tx to ICU for Hypotension/CHF/renal failure    Pre Op Evaluation / Optimization - Re Op MVR     3/13 - R. fem IABP placement failed (tortuous anatomy)    h/o AVR / MVR     Cardiogenic Shock     Hypotension     Mitral Regurgitation     Pulmonary Hypertension 2 to biventricular cardiac dysfunction     Requires chest PT, pulmonary toilet, suctioning to maintain SaO2,  patent airway and treat atelectasis.     Hypoxemia - Requires [ x] BIPAP 40%/50L  [x] HFNC 50%/50L  [x] Luke 4 PPM    Paced Rhythm     AICD/PPM    Swanz Salvatore catheter interpretation and therapeutic management of unstable hemodynamics     CHF- acute [x]   chronic [x]    systolic [x]   diatolic [ ]    Valvular  [x]           - Echo- EF -   37%          [x ] RV dysfunction          - Cxr-cardiomegally, edema          - Clinical-  [x]inotropes   [x]pressors   [ ]diuresis   [ ]IABP   [ ]ECMO   [ ]LVAD   [x ] Respiratory insuffiencey  [x] CVVHD  [x] Luke    Hemodynamic lability,  instability. Requires IVCD [x] vasopressors [x] inotropes  [ ] vasodilator  [ ]IVSS fluid / Blood Products to maintain MAP, perfusion, C.I.     Hyperglycemia - Insulin Sliding Scale     Synthroid for Hypothyroidism    IVCD Precedex for comfort    Thrombocytopenia     Prerenal Azotemia                                                   Metabolic Acidosis     CVVHD - negative balance    Chronic Renal failure    Cardiac Cachexia - NG - goal rate feeds    Tolerates NG feeds at [x] goal rate 10 cc/Hr   [ ] trophic rate    [ ]       rate     Requires bedside physical therapy, mobilization and total group home care.     Strongly consider sepsis - worsening CXR / advancing pressor requirements                  By signing my name below, I, Leticia Helton, attest that this documentation has been prepared under the direction and in the presence of Ildefonso Bañuelos MD.   Electronically Signed: Home Mcfarland 03-15-24 @ 06:21    I, Ildefonso Bañuelos, personally performed the services described in this documentation. All medical record entries made by the scribe were at my direction and in my presence. I have reviewed the chart and agree that the record reflects my personal performance and is accurate and complete.   Ildefonso Bañuelos MD.       Discussed with CT surgeon, Physician Assistant - Nurse Practitioner- Critical care medicine team.   Discussed at  AM / PM rounds.   Chart, labs , films reviewed.    Cumulative Critical Care Time Given Today:  CRITICAL CARE ATTENDING - [facility]    MEDICATIONS  (STANDING):  allopurinol 100 milliGRAM(s) Oral daily  aMIOdarone    Tablet 200 milliGRAM(s) Oral daily  ascorbic acid 500 milliGRAM(s) Oral daily  atorvastatin 40 milliGRAM(s) Oral at bedtime  chlorhexidine 2% Cloths 1 Application(s) Topical <User Schedule>  cyanocobalamin 1000 MICROGram(s) Oral daily  dexMEDEtomidine Infusion 0.5 MICROgram(s)/kG/Hr (8.48 mL/Hr) IV Continuous <Continuous>  dextrose 5%. 1000 milliLiter(s) (50 mL/Hr) IV Continuous <Continuous>  dextrose 5%. 1000 milliLiter(s) (100 mL/Hr) IV Continuous <Continuous>  dextrose 50% Injectable 25 Gram(s) IV Push once  dextrose 50% Injectable 25 Gram(s) IV Push once  dextrose 50% Injectable 12.5 Gram(s) IV Push once  DOBUTamine Infusion 7.5 MICROgram(s)/kG/Min (7.63 mL/Hr) IV Continuous <Continuous>  enoxaparin Injectable 30 milliGRAM(s) SubCutaneous every 12 hours  EPINEPHrine    Infusion 0.06 MICROgram(s)/kG/Min (7.63 mL/Hr) IV Continuous <Continuous>  epoetin zara-epbx (RETACRIT) Injectable 46905 Unit(s) IV Push <User Schedule>  fluconAZOLE IVPB      fluconAZOLE IVPB 200 milliGRAM(s) IV Intermittent every 24 hours  folic acid 1 milliGRAM(s) Oral daily  glucagon  Injectable 1 milliGRAM(s) IntraMuscular once  insulin lispro (ADMELOG) corrective regimen sliding scale   SubCutaneous every 6 hours  insulin lispro (ADMELOG) corrective regimen sliding scale   SubCutaneous at bedtime  levothyroxine 75 MICROGram(s) Oral daily  lidocaine   4% Patch 1 Patch Transdermal daily  melatonin 5 milliGRAM(s) Oral at bedtime  meropenem  IVPB 1000 milliGRAM(s) IV Intermittent every 8 hours  meropenem  IVPB      metoclopramide Injectable 5 milliGRAM(s) IV Push every 8 hours  midodrine 20 milliGRAM(s) Oral every 8 hours  norepinephrine Infusion 0.06 MICROgram(s)/kG/Min (3.81 mL/Hr) IV Continuous <Continuous>  pantoprazole    Tablet 40 milliGRAM(s) Oral before breakfast  polyethylene glycol 3350 17 Gram(s) Oral daily  senna 2 Tablet(s) Oral at bedtime  sertraline 50 milliGRAM(s) Oral daily  sodium chloride 0.9% lock flush 3 milliLiter(s) IV Push every 8 hours  thiamine 100 milliGRAM(s) Oral daily  vancomycin  IVPB 750 milliGRAM(s) IV Intermittent every 12 hours  vasopressin Infusion 0.04 Unit(s)/Min (6 mL/Hr) IV Continuous <Continuous>                                    10.1   14.45 )-----------( 135      ( 15 Mar 2024 00:20 )             29.8       03-15    137  |  98  |  31<H>  ----------------------------<  140<H>  4.1   |  22  |  1.23    Ca    9.9      15 Mar 2024 00:22  Phos  3.1     03-15  Mg     2.8     03-15    TPro  x   /  Alb  x   /  TBili  6.8<H>  /  DBili  3.4<H>  /  AST  x   /  ALT  x   /  AlkPhos  x   03-15              Daily     Daily Weight in k.7 (15 Mar 2024 00:00)       @ 07: @ 07:00  --------------------------------------------------------  IN: 2416.6 mL / OUT: 3441 mL / NET: -1024.4 mL     @ 07:01  -  03-15 @ 06:21  --------------------------------------------------------  IN: 2949.7 mL / OUT: 5246 mL / NET: -2296.3 mL        Critically Ill patient  : [ x] preoperative - MVR   [ ] post operative,   [ x] non operative ?    Requires :  [x] Arterial Line   [x] Central Line  [x ] PA catheter  [ ] IABP  [ ] ECMO  [ ] LVAD  [ ] Ventilator  [ ] pacemaker [ ] Impella   [x] HFNC  [x] CVVHD                        [x ] ABG's     [ x] Pulse Oxymetry Monitoring  Bedside evaluation , monitoring , treatment of hemodynamics , fluids , IVP/ IVCD meds.        Diagnosis:     3/5 - Tx to ICU for Hypotension/CHF/renal failure    Pre Op Evaluation / Optimization - Re Op MVR     3/13 - R. fem IABP placement failed (tortuous anatomy)    h/o AVR / MVR     Cardiogenic Shock     Hypotension     Mitral Regurgitation     Pulmonary Hypertension 2 to biventricular cardiac dysfunction     Requires chest PT, pulmonary toilet, suctioning to maintain SaO2,  patent airway and treat atelectasis.     Hypoxemia - Requires [ x] BIPAP 40%/50L  [x] HFNC 50%/50L      Paced Rhythm     Requires  [ x]DDD  [ ]VVI    [ ]AII  temporary pacing at  80 min    to maintain HR, MAP, CI, and perfusion.     AICD/PPM    Swanz Salvatore catheter interpretation and therapeutic management of unstable hemodynamics     CHF- acute [x]   chronic [x]    systolic [x]   diatolic [ ]    Valvular  [x]           - Echo- EF -   37% /  MR       [x ] RV dysfunction          - Cxr-cardiomegally, edema          - Clinical-  [x]inotropes   [x]pressors   [x ]diuresis   [ ]IABP   [ ]ECMO   [ ]LVAD   [x ] Respiratory insuffiencey  [x] CVVHD      Hemodynamic lability,  instability. Requires IVCD [x] vasopressors [x] inotropes  [ x]  CVVHD   [ ]IVSS fluid / Blood Products to maintain MAP, perfusion, C.I.     Hyperglycemia - Insulin Sliding Scale     Synthroid for Hypothyroidism    Thrombocytopenia     Renal Failure - Acute Kidney Injury                                                    Metabolic Acidosis     CVVHD - negative balance    Chronic Renal failure    Cardiac Cachexia - NG - goal rate feeds    Tolerates NG feeds at    [ ] trophic rate    [x ]  30cc/hr     rate     Requires bedside physical therapy, mobilization and total nursing home care.     Strongly consider sepsis - worsening CXR / advancing pressor requirements                  By signing my name below, I, Leticia Helton, attest that this documentation has been prepared under the direction and in the presence of Ildefonso Bañuelos MD.   Electronically Signed: Home Mcfarland 03-15-24 @ 06:21    I, Ildefonso Bañuelos, personally performed the services described in this documentation. All medical record entries made by the scribe were at my direction and in my presence. I have reviewed the chart and agree that the record reflects my personal performance and is accurate and complete.   Ildefonso Bañuelos MD.       Discussed with CT surgeon, Physician Assistant - Nurse Practitioner- Critical care medicine team.   Discussed at  AM / PM rounds.   Chart, labs , films reviewed.    Cumulative Critical Care Time Given Today:  105 min

## 2024-03-16 LAB
ALBUMIN SERPL ELPH-MCNC: 4.1 G/DL — SIGNIFICANT CHANGE UP (ref 3.3–5)
ALP SERPL-CCNC: 181 U/L — HIGH (ref 40–120)
ALT FLD-CCNC: 28 U/L — SIGNIFICANT CHANGE UP (ref 10–45)
ANION GAP SERPL CALC-SCNC: 13 MMOL/L — SIGNIFICANT CHANGE UP (ref 5–17)
AST SERPL-CCNC: 55 U/L — HIGH (ref 10–40)
BASE EXCESS BLDMV CALC-SCNC: 0.5 MMOL/L — SIGNIFICANT CHANGE UP (ref -3–3)
BASE EXCESS BLDMV CALC-SCNC: 1 MMOL/L — SIGNIFICANT CHANGE UP (ref -3–3)
BASE EXCESS BLDMV CALC-SCNC: 1 MMOL/L — SIGNIFICANT CHANGE UP (ref -3–3)
BILIRUB SERPL-MCNC: 6.9 MG/DL — HIGH (ref 0.2–1.2)
BLD GP AB SCN SERPL QL: NEGATIVE — SIGNIFICANT CHANGE UP
BUN SERPL-MCNC: 30 MG/DL — HIGH (ref 7–23)
CALCIUM SERPL-MCNC: 9.7 MG/DL — SIGNIFICANT CHANGE UP (ref 8.4–10.5)
CHLORIDE SERPL-SCNC: 99 MMOL/L — SIGNIFICANT CHANGE UP (ref 96–108)
CO2 BLDMV-SCNC: 27 MMOL/L — SIGNIFICANT CHANGE UP (ref 21–29)
CO2 BLDMV-SCNC: 28 MMOL/L — SIGNIFICANT CHANGE UP (ref 21–29)
CO2 BLDMV-SCNC: 29 MMOL/L — SIGNIFICANT CHANGE UP (ref 21–29)
CO2 SERPL-SCNC: 22 MMOL/L — SIGNIFICANT CHANGE UP (ref 22–31)
CORTIS F PM SERPL-MCNC: 31.5 UG/DL — HIGH (ref 2.7–10.5)
CREAT SERPL-MCNC: 1.16 MG/DL — SIGNIFICANT CHANGE UP (ref 0.5–1.3)
EGFR: 62 ML/MIN/1.73M2 — SIGNIFICANT CHANGE UP
GAS PNL BLDA: SIGNIFICANT CHANGE UP
GAS PNL BLDMV: SIGNIFICANT CHANGE UP
GLUCOSE SERPL-MCNC: 183 MG/DL — HIGH (ref 70–99)
HCO3 BLDMV-SCNC: 25 MMOL/L — SIGNIFICANT CHANGE UP (ref 20–28)
HCO3 BLDMV-SCNC: 27 MMOL/L — SIGNIFICANT CHANGE UP (ref 20–28)
HCO3 BLDMV-SCNC: 27 MMOL/L — SIGNIFICANT CHANGE UP (ref 20–28)
HCT VFR BLD CALC: 31.4 % — LOW (ref 39–50)
HGB BLD-MCNC: 10.3 G/DL — LOW (ref 13–17)
HOROWITZ INDEX BLDMV+IHG-RTO: 40 — SIGNIFICANT CHANGE UP
HOROWITZ INDEX BLDMV+IHG-RTO: 50 — SIGNIFICANT CHANGE UP
HOROWITZ INDEX BLDMV+IHG-RTO: 50 — SIGNIFICANT CHANGE UP
MAGNESIUM SERPL-MCNC: 2.7 MG/DL — HIGH (ref 1.6–2.6)
MCHC RBC-ENTMCNC: 31.8 PG — SIGNIFICANT CHANGE UP (ref 27–34)
MCHC RBC-ENTMCNC: 32.8 GM/DL — SIGNIFICANT CHANGE UP (ref 32–36)
MCV RBC AUTO: 96.9 FL — SIGNIFICANT CHANGE UP (ref 80–100)
NRBC # BLD: 2 /100 WBCS — HIGH (ref 0–0)
O2 CT VFR BLD CALC: 22 MMHG — LOW (ref 30–65)
O2 CT VFR BLD CALC: 28 MMHG — LOW (ref 30–65)
O2 CT VFR BLD CALC: 33 MMHG — SIGNIFICANT CHANGE UP (ref 30–65)
PCO2 BLDMV: 41 MMHG — SIGNIFICANT CHANGE UP (ref 30–65)
PCO2 BLDMV: 45 MMHG — SIGNIFICANT CHANGE UP (ref 30–65)
PCO2 BLDMV: 48 MMHG — SIGNIFICANT CHANGE UP (ref 30–65)
PH BLDMV: 7.36 — SIGNIFICANT CHANGE UP (ref 7.32–7.45)
PH BLDMV: 7.38 — SIGNIFICANT CHANGE UP (ref 7.32–7.45)
PH BLDMV: 7.4 — SIGNIFICANT CHANGE UP (ref 7.32–7.45)
PHOSPHATE SERPL-MCNC: 1.9 MG/DL — LOW (ref 2.5–4.5)
PLATELET # BLD AUTO: 138 K/UL — LOW (ref 150–400)
POTASSIUM SERPL-MCNC: 4.1 MMOL/L — SIGNIFICANT CHANGE UP (ref 3.5–5.3)
POTASSIUM SERPL-SCNC: 4.1 MMOL/L — SIGNIFICANT CHANGE UP (ref 3.5–5.3)
PROT SERPL-MCNC: 7.2 G/DL — SIGNIFICANT CHANGE UP (ref 6–8.3)
RBC # BLD: 3.24 M/UL — LOW (ref 4.2–5.8)
RBC # FLD: 17.7 % — HIGH (ref 10.3–14.5)
RH IG SCN BLD-IMP: POSITIVE — SIGNIFICANT CHANGE UP
SAO2 % BLDMV: 40.8 — LOW (ref 60–90)
SAO2 % BLDMV: 52 — LOW (ref 60–90)
SAO2 % BLDMV: 54.7 — LOW (ref 60–90)
SODIUM SERPL-SCNC: 134 MMOL/L — LOW (ref 135–145)
VANCOMYCIN TROUGH SERPL-MCNC: 12 UG/ML — SIGNIFICANT CHANGE UP (ref 10–20)
VANCOMYCIN TROUGH SERPL-MCNC: 13.5 UG/ML — SIGNIFICANT CHANGE UP (ref 10–20)
WBC # BLD: 16.63 K/UL — HIGH (ref 3.8–10.5)
WBC # FLD AUTO: 16.63 K/UL — HIGH (ref 3.8–10.5)

## 2024-03-16 PROCEDURE — 71045 X-RAY EXAM CHEST 1 VIEW: CPT | Mod: 26,76

## 2024-03-16 PROCEDURE — 99292 CRITICAL CARE ADDL 30 MIN: CPT

## 2024-03-16 PROCEDURE — 99291 CRITICAL CARE FIRST HOUR: CPT

## 2024-03-16 RX ORDER — SALIVA SUBSTITUTE COMB NO.11 351 MG
5 POWDER IN PACKET (EA) MUCOUS MEMBRANE DAILY
Refills: 0 | Status: DISCONTINUED | OUTPATIENT
Start: 2024-03-16 | End: 2024-03-24

## 2024-03-16 RX ORDER — SODIUM CHLORIDE 9 MG/ML
1000 INJECTION, SOLUTION INTRAVENOUS
Refills: 0 | Status: DISCONTINUED | OUTPATIENT
Start: 2024-03-16 | End: 2024-03-17

## 2024-03-16 RX ORDER — PETROLATUM,WHITE
1 JELLY (GRAM) TOPICAL
Refills: 0 | Status: DISCONTINUED | OUTPATIENT
Start: 2024-03-16 | End: 2024-03-24

## 2024-03-16 RX ADMIN — ENOXAPARIN SODIUM 30 MILLIGRAM(S): 100 INJECTION SUBCUTANEOUS at 08:28

## 2024-03-16 RX ADMIN — Medication 3.81 MICROGRAM(S)/KG/MIN: at 08:27

## 2024-03-16 RX ADMIN — Medication 5 MILLIGRAM(S): at 21:11

## 2024-03-16 RX ADMIN — Medication 100 MILLIGRAM(S): at 11:22

## 2024-03-16 RX ADMIN — Medication 250 MILLIGRAM(S): at 18:09

## 2024-03-16 RX ADMIN — FLUCONAZOLE 100 MILLIGRAM(S): 150 TABLET ORAL at 08:24

## 2024-03-16 RX ADMIN — ATORVASTATIN CALCIUM 40 MILLIGRAM(S): 80 TABLET, FILM COATED ORAL at 21:12

## 2024-03-16 RX ADMIN — MEROPENEM 100 MILLIGRAM(S): 1 INJECTION INTRAVENOUS at 11:21

## 2024-03-16 RX ADMIN — Medication 2: at 17:10

## 2024-03-16 RX ADMIN — PANTOPRAZOLE SODIUM 40 MILLIGRAM(S): 20 TABLET, DELAYED RELEASE ORAL at 05:23

## 2024-03-16 RX ADMIN — MEROPENEM 100 MILLIGRAM(S): 1 INJECTION INTRAVENOUS at 21:11

## 2024-03-16 RX ADMIN — Medication 63.75 MILLIMOLE(S): at 02:22

## 2024-03-16 RX ADMIN — CHLORHEXIDINE GLUCONATE 1 APPLICATION(S): 213 SOLUTION TOPICAL at 05:23

## 2024-03-16 RX ADMIN — VASOPRESSIN 6 UNIT(S)/MIN: 20 INJECTION INTRAVENOUS at 05:21

## 2024-03-16 RX ADMIN — SERTRALINE 50 MILLIGRAM(S): 25 TABLET, FILM COATED ORAL at 05:22

## 2024-03-16 RX ADMIN — ENOXAPARIN SODIUM 30 MILLIGRAM(S): 100 INJECTION SUBCUTANEOUS at 21:12

## 2024-03-16 RX ADMIN — MEROPENEM 100 MILLIGRAM(S): 1 INJECTION INTRAVENOUS at 03:03

## 2024-03-16 RX ADMIN — VASOPRESSIN 6 UNIT(S)/MIN: 20 INJECTION INTRAVENOUS at 08:27

## 2024-03-16 RX ADMIN — SENNA PLUS 2 TABLET(S): 8.6 TABLET ORAL at 21:12

## 2024-03-16 RX ADMIN — Medication 500 MILLIGRAM(S): at 11:22

## 2024-03-16 RX ADMIN — Medication 5 MILLILITER(S): at 16:08

## 2024-03-16 RX ADMIN — SODIUM CHLORIDE 3 MILLILITER(S): 9 INJECTION INTRAMUSCULAR; INTRAVENOUS; SUBCUTANEOUS at 13:17

## 2024-03-16 RX ADMIN — POLYETHYLENE GLYCOL 3350 17 GRAM(S): 17 POWDER, FOR SOLUTION ORAL at 11:21

## 2024-03-16 RX ADMIN — PREGABALIN 1000 MICROGRAM(S): 225 CAPSULE ORAL at 11:22

## 2024-03-16 RX ADMIN — Medication 1 APPLICATION(S): at 16:09

## 2024-03-16 RX ADMIN — Medication 2: at 00:01

## 2024-03-16 RX ADMIN — EPINEPHRINE 10.2 MICROGRAM(S)/KG/MIN: 0.3 INJECTION INTRAMUSCULAR; SUBCUTANEOUS at 05:24

## 2024-03-16 RX ADMIN — Medication 10.2 MICROGRAM(S)/KG/MIN: at 08:24

## 2024-03-16 RX ADMIN — SODIUM CHLORIDE 3 MILLILITER(S): 9 INJECTION INTRAMUSCULAR; INTRAVENOUS; SUBCUTANEOUS at 05:24

## 2024-03-16 RX ADMIN — Medication 5 MILLIGRAM(S): at 13:43

## 2024-03-16 RX ADMIN — AMIODARONE HYDROCHLORIDE 200 MILLIGRAM(S): 400 TABLET ORAL at 05:23

## 2024-03-16 RX ADMIN — Medication 250 MILLIGRAM(S): at 06:31

## 2024-03-16 RX ADMIN — Medication 3.81 MICROGRAM(S)/KG/MIN: at 05:24

## 2024-03-16 RX ADMIN — Medication 5 MILLIGRAM(S): at 21:12

## 2024-03-16 RX ADMIN — MIDODRINE HYDROCHLORIDE 20 MILLIGRAM(S): 2.5 TABLET ORAL at 14:13

## 2024-03-16 RX ADMIN — MIDODRINE HYDROCHLORIDE 20 MILLIGRAM(S): 2.5 TABLET ORAL at 21:12

## 2024-03-16 RX ADMIN — Medication 75 MICROGRAM(S): at 05:23

## 2024-03-16 RX ADMIN — MIDODRINE HYDROCHLORIDE 20 MILLIGRAM(S): 2.5 TABLET ORAL at 05:23

## 2024-03-16 RX ADMIN — SODIUM CHLORIDE 3 MILLILITER(S): 9 INJECTION INTRAMUSCULAR; INTRAVENOUS; SUBCUTANEOUS at 22:00

## 2024-03-16 RX ADMIN — Medication 5 MILLIGRAM(S): at 05:22

## 2024-03-16 RX ADMIN — Medication 2: at 11:22

## 2024-03-16 RX ADMIN — EPINEPHRINE 10.2 MICROGRAM(S)/KG/MIN: 0.3 INJECTION INTRAMUSCULAR; SUBCUTANEOUS at 08:27

## 2024-03-16 RX ADMIN — Medication 1 MILLIGRAM(S): at 11:21

## 2024-03-16 RX ADMIN — Medication 10.2 MICROGRAM(S)/KG/MIN: at 05:23

## 2024-03-16 NOTE — PROGRESS NOTE ADULT - SUBJECTIVE AND OBJECTIVE BOX
Patient seen and examined at the bedside.    Remained critically ill on continuous ICU monitoring.    OBJECTIVE:  Vital Signs Last 24 Hrs  T(C): 36.9 (16 Mar 2024 04:00), Max: 37 (15 Mar 2024 08:00)  T(F): 98.4 (16 Mar 2024 04:00), Max: 98.6 (15 Mar 2024 08:00)  HR: 96 (16 Mar 2024 07:00) (80 - 98)  BP: --  BP(mean): --  RR: 24 (16 Mar 2024 07:00) (17 - 41)  SpO2: 99% (16 Mar 2024 07:00) (85% - 100%)    Parameters below as of 16 Mar 2024 04:00  Patient On (Oxygen Delivery Method): BiPAP/CPAP    O2 Concentration (%): 40      Physical Exam:   General: NAD   Neurology: nonfocal   ENT/Neck: Neck supple, trachea midline, No JVD   Respiratory: Clear bilaterally   CV: S1S2, no murmurs        [x] Sternal dressing        [x] Paced rhythm, [x] PPM - DDD 80  Abdominal: Soft, NT, ND +BS   Extremities: 1-2+ pedal edema noted   ============================I/O===========================   I&O's Detail    15 Mar 2024 07:01  -  16 Mar 2024 07:00  --------------------------------------------------------  IN:    DOBUTamine: 168.6 mL    DOBUTamine: 30.6 mL    Enteral Tube Flush: 210 mL    EPINEPHrine: 100.1 mL    EPINEPHrine: 112.2 mL    IV PiggyBack: 250 mL    IV PiggyBack: 800 mL    Nepro with Carb Steady: 660 mL    Norepinephrine: 335.8 mL    Sodium Chloride 0.9% Bolus: 120 mL    Vasopressin: 360 mL  Total IN: 3147.3 mL    OUT:    Dexmedetomidine: 0 mL    Indwelling Catheter - Urethral (mL): 0 mL    Other (mL): 4303 mL  Total OUT: 4303 mL    Total NET: -1155.7 mL        ============================ LABS =========================                        10.3   16.63 )-----------( 138      ( 16 Mar 2024 00:47 )             31.4     03-16    134<L>  |  99  |  30<H>  ----------------------------<  183<H>  4.1   |  22  |  1.16    Ca    9.7      16 Mar 2024 00:47  Phos  1.9     03-16  Mg     2.7     03-16    TPro  7.2  /  Alb  4.1  /  TBili  6.9<H>  /  DBili  x   /  AST  55<H>  /  ALT  28  /  AlkPhos  181<H>  03-16    LIVER FUNCTIONS - ( 16 Mar 2024 00:47 )  Alb: 4.1 g/dL / Pro: 7.2 g/dL / ALK PHOS: 181 U/L / ALT: 28 U/L / AST: 55 U/L / GGT: x             ABG - ( 16 Mar 2024 03:00 )  pH, Arterial: 7.45  pH, Blood: x     /  pCO2: 36    /  pO2: 98    / HCO3: 25    / Base Excess: 1.1   /  SaO2: 99.1              ======================Micro/Rad/Cardio=================  Culture: Reviewed   CXR: Reviewed  Echo: Reviewed  ======================================================  PAST MEDICAL & SURGICAL HISTORY:  Aortic stenosis      AICD (automatic cardioverter/defibrillator) present      Aortic valve regurgitation      Ascending aorta dilation      H/O paroxysmal supraventricular tachycardia      HLD (hyperlipidemia)      Mitral valve regurgitation      Cardiac arrest      Severe mitral regurgitation      S/P aortic valve replacement  3/13/2004      S/P hernia repair  2002      History of tonsillectomy and adenoidectomy      S/P TURP  1996      S/P colonoscopy  2001, 2002, 2006, 2011, 2016      S/P endoscopy  2006      S/P cardiac cath  1994, 1995, 1999, 2002, 2004      AICD (automatic cardioverter/defibrillator) present  12/19/17      Cardiac pacemaker  12/19/17      History of cardioversion  9/11/20      S/P ablation of atrial fibrillation  10/29/20      Status post ablation of ventricular arrhythmia  2/14/19          ======================================================    Assessment:  82 y/o M with ICM/HFrEF (now 30%; LVIDd 6.7 cm; prev req inotrope), CAD c/b IWMI (1994) s/p angioplasty, aortic stenosis s/p bio-AVR 2004, VT arrest (2017) s/p ICD, Afib s/p multiple DCCV and ablation x2 (2020 and 2023; on AC), Aflutter s/p WARREN/DCCV (8/23), prior Ao aneurysm s/p Bentall (2021), severe MR prior MVr (2021) with MAC (precludes M-KRISTIE d/t his anatomy; turned down for TMVR trials by [employer]), atrophic kidney with CKD (b/l Cr 2.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    Tx to ICU for Hypotension/CHF/renal failure  Hemodynamic instability  Hypovolemia  Post op respiratory insufficiency  Acute blood loss anemia  Thrombocytopenia  ======================================================  Plan:   ***Neuro***  [x] Nonfocal  Continue with Melatonin for sleep regimen   Continue Zoloft    ***Cardiovascular***  Invasive hemodynamic monitoring, assess perfusion indices   AL / CVP 18 / MAP 72 / PAP 51 / CI 1.9 / Hct 31.4% / Lactate 1.2  [x] Levophed - 0.06 mcg/kG/Min [x] Vasopressin - 0.04 Unit(s)/Min [x] Epinephrine - 0.08 mcg/kG/Min [x] Dobutamine - 10 mcg/kG/Min  Volume: Give prbcs if hct < 28%  Continuos reassessment of hemodynamics   Amiodarone for AFib prophylaxis   Continue Midodrine  [x] VTE ppx with Lovenox  [x] Statin     ***Pulmonary***  [x] BiPAP 40%  [x] HFNC 50%/50L  Follow SpO2, CXR, blood gasses   Encourage incentive spirometry, continue pulse ox monitoring, follow ABGs             ***GI***  [x] NPO with Nepro TF @ 10 mL/Hr  [x] Protonix  Bowel regimen with Miralax and Senna  Reglan for gut motility    ***Renal***  [x] CKD  Continue to monitor I/Os, BUN/Creatinine.   Replete lytes PRN  Royal present  CVVHD    ***ID***  Diflucan, Meropenem, Vancomycin for Empiric dosing    ***Endocrine***  [x] Stress Hyperglycemia : HbA1c 5.7%                - [x] ISS             - Need tight glycemic control to prevent wound infection.  [x] Gout             - [x] Allopurinol  [x] Hypothyroidism             - [x] Synthroid            Patient requires continuous monitoring with bedside rhythm monitoring, arterial line, pulse oximetry, ventilator monitoring; intermittent blood gas analysis. Care plan discussed with ICU care team. Patient remains critical; required more than usual ICU care.    Care Plan discussed with the ICU care team. Patient remained critical, at risk for life threatening decompensation.     By signing my name below, I, Leticia Helton, attest that this documentation has been prepared under the direction and in the presence of Edith Alfaro MD.  Electronically signed: Frieda Mcfarland, 03-16-24 @ 07:04    I, Dominic Sharma, personally performed the services described in this documentation. all medical record entries made by the frieda were at my direction and in my presence. I have reviewed the chart and agree that the record reflects my personal performance and is accurate and complete  Electronically signed: Edith Alfaro MD. Patient seen and examined at the bedside.    Remained critically ill on continuous ICU monitoring.    OBJECTIVE:  Vital Signs Last 24 Hrs  T(C): 36.9 (16 Mar 2024 04:00), Max: 37 (15 Mar 2024 08:00)  T(F): 98.4 (16 Mar 2024 04:00), Max: 98.6 (15 Mar 2024 08:00)  HR: 96 (16 Mar 2024 07:00) (80 - 98)  BP: --  BP(mean): --  RR: 24 (16 Mar 2024 07:00) (17 - 41)  SpO2: 99% (16 Mar 2024 07:00) (85% - 100%)    Parameters below as of 16 Mar 2024 04:00  Patient On (Oxygen Delivery Method): BiPAP/CPAP    O2 Concentration (%): 40      Physical Exam:   General: NAD   Neurology: nonfocal   ENT/Neck: Neck supple, trachea midline, No JVD   Respiratory: Clear bilaterally   CV: S1S2, no murmurs        [x] Sternal dressing        [x] Paced rhythm, [x] PPM - DDD 80  Abdominal: Soft, NT, ND +BS   Extremities: 1-2+ pedal edema noted   ============================I/O===========================   I&O's Detail    15 Mar 2024 07:01  -  16 Mar 2024 07:00  --------------------------------------------------------  IN:    DOBUTamine: 168.6 mL    DOBUTamine: 30.6 mL    Enteral Tube Flush: 210 mL    EPINEPHrine: 100.1 mL    EPINEPHrine: 112.2 mL    IV PiggyBack: 250 mL    IV PiggyBack: 800 mL    Nepro with Carb Steady: 660 mL    Norepinephrine: 335.8 mL    Sodium Chloride 0.9% Bolus: 120 mL    Vasopressin: 360 mL  Total IN: 3147.3 mL    OUT:    Dexmedetomidine: 0 mL    Indwelling Catheter - Urethral (mL): 0 mL    Other (mL): 4303 mL  Total OUT: 4303 mL    Total NET: -1155.7 mL        ============================ LABS =========================                        10.3   16.63 )-----------( 138      ( 16 Mar 2024 00:47 )             31.4     03-16    134<L>  |  99  |  30<H>  ----------------------------<  183<H>  4.1   |  22  |  1.16    Ca    9.7      16 Mar 2024 00:47  Phos  1.9     03-16  Mg     2.7     03-16    TPro  7.2  /  Alb  4.1  /  TBili  6.9<H>  /  DBili  x   /  AST  55<H>  /  ALT  28  /  AlkPhos  181<H>  03-16    LIVER FUNCTIONS - ( 16 Mar 2024 00:47 )  Alb: 4.1 g/dL / Pro: 7.2 g/dL / ALK PHOS: 181 U/L / ALT: 28 U/L / AST: 55 U/L / GGT: x             ABG - ( 16 Mar 2024 03:00 )  pH, Arterial: 7.45  pH, Blood: x     /  pCO2: 36    /  pO2: 98    / HCO3: 25    / Base Excess: 1.1   /  SaO2: 99.1              ======================Micro/Rad/Cardio=================  Culture: Reviewed   CXR: Reviewed  Echo: Reviewed  ======================================================  PAST MEDICAL & SURGICAL HISTORY:  Aortic stenosis      AICD (automatic cardioverter/defibrillator) present      Aortic valve regurgitation      Ascending aorta dilation      H/O paroxysmal supraventricular tachycardia      HLD (hyperlipidemia)      Mitral valve regurgitation      Cardiac arrest      Severe mitral regurgitation      S/P aortic valve replacement  3/13/2004      S/P hernia repair  2002      History of tonsillectomy and adenoidectomy      S/P TURP  1996      S/P colonoscopy  2001, 2002, 2006, 2011, 2016      S/P endoscopy  2006      S/P cardiac cath  1994, 1995, 1999, 2002, 2004      AICD (automatic cardioverter/defibrillator) present  12/19/17      Cardiac pacemaker  12/19/17      History of cardioversion  9/11/20      S/P ablation of atrial fibrillation  10/29/20      Status post ablation of ventricular arrhythmia  2/14/19          ======================================================    Assessment:  84 y/o M with ICM/HFrEF (now 30%; LVIDd 6.7 cm; prev req inotrope), CAD c/b IWMI (1994) s/p angioplasty, aortic stenosis s/p bio-AVR 2004, VT arrest (2017) s/p ICD, Afib s/p multiple DCCV and ablation x2 (2020 and 2023; on AC), Aflutter s/p WARREN/DCCV (8/23), prior Ao aneurysm s/p Bentall (2021), severe MR prior MVr (2021) with MAC (precludes M-KRISTIE d/t his anatomy; turned down for TMVR trials by [employer]), atrophic kidney with CKD (b/l Cr 2.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    Tx to ICU for Hypotension/CHF/renal failure  Hemodynamic instability  Hypovolemia  Post op respiratory insufficiency  Acute blood loss anemia  Thrombocytopenia  ======================================================  Plan:   ***Neuro***  [x] Nonfocal  Continue with Melatonin for sleep regimen   Continue Zoloft  OOBTC    ***Cardiovascular***  Invasive hemodynamic monitoring, assess perfusion indices   AL / CVP 18 / MAP 72 / PAP 51 / CI 1.9 / Hct 31.4% / Lactate 1.2  [x] Levophed - 0.06 mcg/kG/Min [x] Vasopressin - 0.04 Unit(s)/Min [x] Epinephrine - 0.08 mcg/kG/Min [x] Dobutamine - 10 mcg/kG/Min  Volume: Give prbcs if hct < 28%  Continuos reassessment of hemodynamics   Amiodarone for AFib prophylaxis   Continue Midodrine  [x] VTE ppx with Lovenox  [x] Statin     ***Pulmonary***  [x] BiPAP 40%  [x] HFNC 50%/50L  Follow SpO2, CXR, blood gasses   Encourage incentive spirometry, continue pulse ox monitoring, follow ABGs             ***GI***  [x] NPO with Nepro TF @ 10 mL/Hr -  Plan to advance diet  [x] Protonix  Bowel regimen with Miralax and Senna  Reglan for gut motility  GI following, appreciate recs.    ***Renal***  [x] CKD  Continue to monitor I/Os, BUN/Creatinine.   Replete lytes PRN  Royal present  CVVHD    ***ID***  Diflucan, Meropenem, Vancomycin for Empiric dosing    ***Endocrine***  [x] Stress Hyperglycemia : HbA1c 5.7%                - [x] ISS             - Need tight glycemic control to prevent wound infection.  [x] Gout             - [x] Allopurinol  [x] Hypothyroidism             - [x] Synthroid            Patient requires continuous monitoring with bedside rhythm monitoring, arterial line, pulse oximetry, ventilator monitoring; intermittent blood gas analysis. Care plan discussed with ICU care team. Patient remains critical; required more than usual ICU care.    Care Plan discussed with the ICU care team. Patient remained critical, at risk for life threatening decompensation.     By signing my name below, ILeticia, attest that this documentation has been prepared under the direction and in the presence of Edith Alfaro MD.  Electronically signed: Frieda Mcfarland, 03-16-24 @ 07:04    IDominic, personally performed the services described in this documentation. all medical record entries made by the frieda were at my direction and in my presence. I have reviewed the chart and agree that the record reflects my personal performance and is accurate and complete  Electronically signed: Edith Alfaro MD.

## 2024-03-16 NOTE — PROGRESS NOTE ADULT - SUBJECTIVE AND OBJECTIVE BOX
Patient seen and examined at the bedside.    Remained critically ill on continuous ICU monitoring.    OBJECTIVE:  Vital Signs Last 24 Hrs  T(C): 36.6 (16 Mar 2024 16:00), Max: 36.9 (16 Mar 2024 04:00)  T(F): 97.9 (16 Mar 2024 16:00), Max: 98.4 (16 Mar 2024 04:00)  HR: 85 (16 Mar 2024 18:45) (79 - 98)  BP: --  BP(mean): --  RR: 24 (16 Mar 2024 18:45) (18 - 41)  SpO2: 100% (16 Mar 2024 18:45) (85% - 100%)    Parameters below as of 16 Mar 2024 16:00  Patient On (Oxygen Delivery Method): nasal cannula, high flow  O2 Flow (L/min): 50  O2 Concentration (%): 50    Physical Exam:  General: In bed with lines & multiple gtt   Neurology: Able f/u commands moves all 4   Eyes: bilateral pupils equal and reactive   ENT/Neck: Neck supple, trachea midline, +++ JVD   Respiratory: Rales noted bilaterally   CV: [x] + holosystolic murmur        [x] Paced rhythm [x] PPM - DDD 80  Abdominal: Soft, NT, ND +BS,   Extremities: no edema, + peripheral pulses   Skin: No Rashes, Hematoma, Ecchymosis                           Assessment:  83 yr male 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, 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, Severe MR valvular heart failure , CRI, AICD   Decompensated heart failure, cardiogenic shock with renal failure  Marginal hemodynamics with high dose dual pressor  Anemia   Suspected sepsis        Plan:   ***Neuro***  [x] Precedex off neurologically intact        Continue  neuro assessment        Melatonin & Zoloft     ***Cardiovascular***  Cardiogenic shock complicated with MODS unable to place IABP   no improvement in perfusion indices on pharmacological support   Paced  80-98 / CVP 10-15 / MAP 60-70 / PAP 67/27 (43) / CI 3.5 / Hct 31.4% / Lactate 1.2/ SVO2 42   [x] Levophed - 0.06 mcg/kg/min Target MAP >65   [x] Epinephrine - 0.06 mcg/kg/min ^ 0.08 mcg/kg/min   [x] Vasopressin - 0.04 units/min  [x] Dobutamine - 10 mcg/kg/Min   [x] Proamatine  [x] CRT - cc/hr    [x] maintain Hct 24% RETACRIT + PRN Tx    [x] PO Amiodarone for afib prophylaxis   [x]  Lovenox 30 BID   [x] Lipitor   [x] Mg >2   [x] Check baseline cortisol     High risk Sx candidate for MVR     ***Pulmonary***  Cardiogenic Pulmonary edema    Nitric oxide d/c'ed  Oxygen: [x] HFNC - 50L/50%  Encourage incentive spirometry, continue pulse ox monitoring, follow ABGs     ***GI***  Avoid high dose tube feeds given high pressor requirement   [x] Nepro TF @40cc/hr  [x] Protonix for stress ulcer prophylaxis  Bowel regimen with Miralax and Senna  Reglan for gut motility     ***Renal***  GFR 62  Anuric renal failure  Acute on chronic renal failure   Allopurinol for gout   Kyleigh-crit for renal support   CVVHDF    ***ID***  Empiric dosing with Diflucan/ Vanco/ Merop  Blood culture X2 3/13 negative     ***Endocrine***  [x] Hyperglycemia : HbA1c 5.7%             - [x] ISS  [x] Hypothyroidism             - [x] Synthroid      Patient requires continuous monitoring with bedside rhythm monitoring, pulse oximetry monitoring, and continuous central venous and arterial pressure monitoring; and intermittent blood gas analysis. Care plan discussed with the ICU care team.   Patient remained critical, at risk for life threatening decompensation.    I have spent 70 minutes providing critical care management to this patient.    By signing my name below, I, Tressa Tamayo, attest that this documentation has been prepared under the direction and in the presence of Russell Deal MD   Electronically signed: Home Mendoza, 03-16-24 @ 18:53    I, Russell Deal, personally performed the services described in this documentation. all medical record entries made by the buddyibmarino were at my direction and in my presence. I have reviewed the chart and agree that the record reflects my personal performance and is accurate and complete  Electronically signed: Russell Deal MD  Patient seen and examined at the bedside.    Remained critically ill on continuous ICU monitoring.    OBJECTIVE:  Vital Signs Last 24 Hrs  T(C): 36.6 (16 Mar 2024 16:00), Max: 36.9 (16 Mar 2024 04:00)  T(F): 97.9 (16 Mar 2024 16:00), Max: 98.4 (16 Mar 2024 04:00)  HR: 85 (16 Mar 2024 18:45) (79 - 98)  BP: --  BP(mean): --  RR: 24 (16 Mar 2024 18:45) (18 - 41)  SpO2: 100% (16 Mar 2024 18:45) (85% - 100%)    Parameters below as of 16 Mar 2024 16:00  Patient On (Oxygen Delivery Method): nasal cannula, high flow  O2 Flow (L/min): 50  O2 Concentration (%): 50    Physical Exam:  General: In bed with lines & multiple gtt   Neurology: Able f/u commands moves all 4   Eyes: bilateral pupils equal and reactive   ENT/Neck: Neck supple, trachea midline, ++ JVD   Respiratory: Rales noted bilaterally   CV: [x] + holosystolic murmur        [x] Paced rhythm [x] PPM - DDD 80  Abdominal: Soft, NT, ND +BS,   Extremities: no edema, + peripheral pulses   Skin: No Rashes, Hematoma, Ecchymosis                           Assessment:  83 yr male 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, 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, Severe MR valvular heart failure , CRI, AICD   Decompensated heart failure, cardiogenic shock with renal failure  Marginal hemodynamics with high dose dual pressor  Anemia    sepsis   Hypophosphatemia     24 hr events remained critically ill on ^ dose of Dobutamine & Epi on CRRT   Volume removal v given high dose pressor requirement     Plan:   ***Neuro***  [x]  neurologically intact        Continue  neuro assessment        Melatonin & Zoloft     ***Cardiovascular***  Cardiogenic shock complicated with MODS unable to place IABP sec vascular issues      Paced  80-98 / CVP 10-15 / MAP 60-70 / PAP 67/27 (43) / CI 3.5 / Hct 31.4% / Lactate 1.2/ SVO2 40 > 52 > 54    [x] NO Dced 33/15   [x] Levophed - 0.2  mcg/kg/min Target MAP >65   [x] Epinephrine -  0.08 mcg/kg/min   [x] Vasopressin - 0.1  units/min  [x] Dobutamine - 10 mcg/kg/Min   [x] Proamatine 20 mg TID   [x] CRT - cc/hr    [x] maintain Hct 24% RETACRIT + PRN Tx    [x] Amiodarone for Afib prophylaxis   [x]  Lovenox 30 BID   [x] Lipitor   [x] Mg >2 K >4 Replete PO4 f/u level   [x] cortisol >18 no indication to start stress dose steroids     High risk Sx candidate for MVR     ***Pulmonary***  Cardiogenic Pulmonary edema    Nitric oxide d/c'ed  Oxygen: [x] HFNC - 50L/50%  Encourage incentive spirometry, continue pulse ox monitoring, follow ABGs     ***GI***  Avoid high dose tube feeds given high pressor requirement   [x] Nepro TF @40cc/hr  [x] Protonix for stress ulcer prophylaxis  Bowel regimen with Miralax and Senna  Reglan for gut motility     ***Renal***    Anuric renal failure  Acute on chronic renal failure   Allopurinol for gout   Kyleigh-crit for renal support   CVVHDF    ***ID***  Empiric dosing with Diflucan/ Vanco/ Merop  Blood culture X2 3/13 negative     ***Endocrine***  [x] Hyperglycemia : HbA1c 5.7%             - [x] ISS  [x] Hypothyroidism             - [x] Synthroid      Patient requires continuous monitoring with bedside rhythm monitoring, pulse oximetry monitoring, and continuous central venous and arterial pressure monitoring; and intermittent blood gas analysis. Care plan discussed with the ICU care team.   Patient remained critical, at risk for life threatening decompensation.    I have spent 70 minutes providing critical care management to this patient.    By signing my name below, I, Tressa Tamayo, attest that this documentation has been prepared under the direction and in the presence of Russell Deal MD   Electronically signed: Home Mendoza, 03-16-24 @ 18:53    I, Russell Deal, personally performed the services described in this documentation. all medical record entries made by the buddyibe were at my direction and in my presence. I have reviewed the chart and agree that the record reflects my personal performance and is accurate and complete  Electronically signed: Russell Deal MD  Patient seen and examined at the bedside.    Remained critically ill on continuous ICU monitoring.    OBJECTIVE:  Vital Signs Last 24 Hrs  T(C): 36.6 (16 Mar 2024 16:00), Max: 36.9 (16 Mar 2024 04:00)  T(F): 97.9 (16 Mar 2024 16:00), Max: 98.4 (16 Mar 2024 04:00)  HR: 85 (16 Mar 2024 18:45) (79 - 98)  BP: --  BP(mean): --  RR: 24 (16 Mar 2024 18:45) (18 - 41)  SpO2: 100% (16 Mar 2024 18:45) (85% - 100%)    Parameters below as of 16 Mar 2024 16:00  Patient On (Oxygen Delivery Method): nasal cannula, high flow  O2 Flow (L/min): 50  O2 Concentration (%): 50    Physical Exam:  General: In bed with lines & multiple gtt   Neurology: Able f/u commands moves all 4   Eyes: bilateral pupils equal and reactive   ENT/Neck: Neck supple, trachea midline, ++ JVD   Respiratory: Rales noted bilaterally   CV: [x] + holosystolic murmur        [x] Paced rhythm [x] PPM - DDD 80  Abdominal: Soft, NT, ND +BS,   Extremities: no edema, + peripheral pulses   Skin: No Rashes, Hematoma, Ecchymosis                           Assessment:  83 yr male 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, 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, Severe MR valvular heart failure , CRI, AICD   Decompensated heart failure, cardiogenic shock with renal failure  Marginal hemodynamics with high dose dual pressor  Anemia    sepsis   Hypophosphatemia     24 hr events remained critically ill on ^ dose of Dobutamine & Epi on CRRT   Volume removal v given high dose pressor requirement     Plan:   ***Neuro***  [x]  neurologically intact        Continue  neuro assessment        Melatonin & Zoloft     ***Cardiovascular***  Cardiogenic shock complicated with MODS unable to place IABP sec vascular issues      Paced  80-98 / CVP 10-15 / MAP 60-70 / PAP 67/27 (43) / CI 3.5 / Hct 31.4% / Lactate 1.2/ SVO2 40 > 52 > 54    [x] NO Dced 33/15   [x] Levophed - 0.2  mcg/kg/min Target MAP >65   [x] Epinephrine -  0.08 mcg/kg/min   [x] Vasopressin - 0.1  units/min  [x] Dobutamine - 10 mcg/kg/Min   [x] Proamatine 20 mg TID   [x] CRT - cc/hr    [x] maintain Hct 24% RETACRIT + PRN Tx    [x] Amiodarone for Afib prophylaxis   [x]  Lovenox 30 BID check anti Xa level   [x] Lipitor   [x] Mg >2 K >4 Replete PO4 f/u level   [x] cortisol >18 no indication to start stress dose steroids     High risk Sx candidate for MVR     ***Pulmonary***  Cardiogenic Pulmonary edema    Nitric oxide d/c'ed  Oxygen: [x] HFNC - 50L/50%  Encourage incentive spirometry, continue pulse ox monitoring, follow ABGs     ***GI***  Avoid high dose tube feeds given high pressor requirement   [x] Nepro TF @40cc/hr  [x] Protonix for stress ulcer prophylaxis  Bowel regimen with Miralax and Senna  Reglan for gut motility     ***Renal***    Anuric renal failure  Acute on chronic renal failure   Allopurinol for gout   Kyleigh-crit for renal support   CVVHDF    ***ID***  Empiric dosing with Diflucan/ Vanco/ Merop  Blood culture X2 3/13 negative     ***Endocrine***  [x] Hyperglycemia : HbA1c 5.7%             - [x] ISS  [x] Hypothyroidism             - [x] Synthroid      Patient requires continuous monitoring with bedside rhythm monitoring, pulse oximetry monitoring, and continuous central venous and arterial pressure monitoring; and intermittent blood gas analysis. Care plan discussed with the ICU care team.   Patient remained critical, at risk for life threatening decompensation.    I have spent 70 minutes providing critical care management to this patient.    By signing my name below, I, Tressa Tamayo, attest that this documentation has been prepared under the direction and in the presence of Russell Deal MD   Electronically signed: Home Mendoza, 03-16-24 @ 18:53    I, Russell Deal, personally performed the services described in this documentation. all medical record entries made by the buddyibmarino were at my direction and in my presence. I have reviewed the chart and agree that the record reflects my personal performance and is accurate and complete  Electronically signed: Russell Deal MD

## 2024-03-17 LAB
ALBUMIN SERPL ELPH-MCNC: 4.2 G/DL — SIGNIFICANT CHANGE UP (ref 3.3–5)
ALP SERPL-CCNC: 208 U/L — HIGH (ref 40–120)
ALT FLD-CCNC: 32 U/L — SIGNIFICANT CHANGE UP (ref 10–45)
ANION GAP SERPL CALC-SCNC: 13 MMOL/L — SIGNIFICANT CHANGE UP (ref 5–17)
APTT BLD: 32.5 SEC — SIGNIFICANT CHANGE UP (ref 24.5–35.6)
AST SERPL-CCNC: 62 U/L — HIGH (ref 10–40)
BASE EXCESS BLDMV CALC-SCNC: -0.4 MMOL/L — SIGNIFICANT CHANGE UP (ref -3–3)
BASE EXCESS BLDMV CALC-SCNC: 0.3 MMOL/L — SIGNIFICANT CHANGE UP (ref -3–3)
BASE EXCESS BLDMV CALC-SCNC: 0.8 MMOL/L — SIGNIFICANT CHANGE UP (ref -3–3)
BASE EXCESS BLDV CALC-SCNC: 0.3 MMOL/L — SIGNIFICANT CHANGE UP (ref -2–3)
BASE EXCESS BLDV CALC-SCNC: 0.5 MMOL/L — SIGNIFICANT CHANGE UP (ref -2–3)
BILIRUB SERPL-MCNC: 6.7 MG/DL — HIGH (ref 0.2–1.2)
BUN SERPL-MCNC: 27 MG/DL — HIGH (ref 7–23)
CALCIUM SERPL-MCNC: 9.5 MG/DL — SIGNIFICANT CHANGE UP (ref 8.4–10.5)
CHLORIDE SERPL-SCNC: 99 MMOL/L — SIGNIFICANT CHANGE UP (ref 96–108)
CO2 BLDMV-SCNC: 27 MMOL/L — SIGNIFICANT CHANGE UP (ref 21–29)
CO2 BLDMV-SCNC: 28 MMOL/L — SIGNIFICANT CHANGE UP (ref 21–29)
CO2 BLDMV-SCNC: 28 MMOL/L — SIGNIFICANT CHANGE UP (ref 21–29)
CO2 BLDV-SCNC: 27 MMOL/L — HIGH (ref 22–26)
CO2 BLDV-SCNC: 28 MMOL/L — HIGH (ref 22–26)
CO2 SERPL-SCNC: 22 MMOL/L — SIGNIFICANT CHANGE UP (ref 22–31)
CREAT SERPL-MCNC: 1.17 MG/DL — SIGNIFICANT CHANGE UP (ref 0.5–1.3)
CULTURE RESULTS: SIGNIFICANT CHANGE UP
EGFR: 62 ML/MIN/1.73M2 — SIGNIFICANT CHANGE UP
GAS PNL BLDA: SIGNIFICANT CHANGE UP
GAS PNL BLDMV: SIGNIFICANT CHANGE UP
GAS PNL BLDV: SIGNIFICANT CHANGE UP
GLUCOSE SERPL-MCNC: 171 MG/DL — HIGH (ref 70–99)
HCO3 BLDMV-SCNC: 26 MMOL/L — SIGNIFICANT CHANGE UP (ref 20–28)
HCO3 BLDMV-SCNC: 26 MMOL/L — SIGNIFICANT CHANGE UP (ref 20–28)
HCO3 BLDMV-SCNC: 27 MMOL/L — SIGNIFICANT CHANGE UP (ref 20–28)
HCO3 BLDV-SCNC: 26 MMOL/L — SIGNIFICANT CHANGE UP (ref 22–29)
HCO3 BLDV-SCNC: 26 MMOL/L — SIGNIFICANT CHANGE UP (ref 22–29)
HCT VFR BLD CALC: 29.8 % — LOW (ref 39–50)
HCT VFR BLD CALC: 31.7 % — LOW (ref 39–50)
HGB BLD-MCNC: 10.5 G/DL — LOW (ref 13–17)
HGB BLD-MCNC: 9.9 G/DL — LOW (ref 13–17)
HOROWITZ INDEX BLDMV+IHG-RTO: 40 — SIGNIFICANT CHANGE UP
HOROWITZ INDEX BLDMV+IHG-RTO: 50 — SIGNIFICANT CHANGE UP
HOROWITZ INDEX BLDMV+IHG-RTO: 50 — SIGNIFICANT CHANGE UP
HOROWITZ INDEX BLDV+IHG-RTO: 40 — SIGNIFICANT CHANGE UP
HOROWITZ INDEX BLDV+IHG-RTO: 40 — SIGNIFICANT CHANGE UP
INR BLD: 1.28 RATIO — HIGH (ref 0.85–1.18)
MAGNESIUM SERPL-MCNC: 2.9 MG/DL — HIGH (ref 1.6–2.6)
MCHC RBC-ENTMCNC: 32.2 PG — SIGNIFICANT CHANGE UP (ref 27–34)
MCHC RBC-ENTMCNC: 32.5 PG — SIGNIFICANT CHANGE UP (ref 27–34)
MCHC RBC-ENTMCNC: 33.1 GM/DL — SIGNIFICANT CHANGE UP (ref 32–36)
MCHC RBC-ENTMCNC: 33.2 GM/DL — SIGNIFICANT CHANGE UP (ref 32–36)
MCV RBC AUTO: 97.2 FL — SIGNIFICANT CHANGE UP (ref 80–100)
MCV RBC AUTO: 97.7 FL — SIGNIFICANT CHANGE UP (ref 80–100)
NRBC # BLD: 0 /100 WBCS — SIGNIFICANT CHANGE UP (ref 0–0)
NRBC # BLD: 1 /100 WBCS — HIGH (ref 0–0)
O2 CT VFR BLD CALC: 27 MMHG — LOW (ref 30–65)
O2 CT VFR BLD CALC: 32 MMHG — SIGNIFICANT CHANGE UP (ref 30–65)
O2 CT VFR BLD CALC: 34 MMHG — SIGNIFICANT CHANGE UP (ref 30–65)
PCO2 BLDMV: 46 MMHG — SIGNIFICANT CHANGE UP (ref 30–65)
PCO2 BLDMV: 48 MMHG — SIGNIFICANT CHANGE UP (ref 30–65)
PCO2 BLDMV: 48 MMHG — SIGNIFICANT CHANGE UP (ref 30–65)
PCO2 BLDV: 44 MMHG — SIGNIFICANT CHANGE UP (ref 42–55)
PCO2 BLDV: 48 MMHG — SIGNIFICANT CHANGE UP (ref 42–55)
PH BLDMV: 7.34 — SIGNIFICANT CHANGE UP (ref 7.32–7.45)
PH BLDMV: 7.35 — SIGNIFICANT CHANGE UP (ref 7.32–7.45)
PH BLDMV: 7.37 — SIGNIFICANT CHANGE UP (ref 7.32–7.45)
PH BLDV: 7.35 — SIGNIFICANT CHANGE UP (ref 7.32–7.43)
PH BLDV: 7.38 — SIGNIFICANT CHANGE UP (ref 7.32–7.43)
PHOSPHATE SERPL-MCNC: 2.3 MG/DL — LOW (ref 2.5–4.5)
PLATELET # BLD AUTO: 108 K/UL — LOW (ref 150–400)
PLATELET # BLD AUTO: 113 K/UL — LOW (ref 150–400)
PO2 BLDV: 31 MMHG — SIGNIFICANT CHANGE UP (ref 25–45)
PO2 BLDV: 38 MMHG — SIGNIFICANT CHANGE UP (ref 25–45)
POTASSIUM SERPL-MCNC: 4.2 MMOL/L — SIGNIFICANT CHANGE UP (ref 3.5–5.3)
POTASSIUM SERPL-SCNC: 4.2 MMOL/L — SIGNIFICANT CHANGE UP (ref 3.5–5.3)
PROT SERPL-MCNC: 7.3 G/DL — SIGNIFICANT CHANGE UP (ref 6–8.3)
PROTHROM AB SERPL-ACNC: 13.3 SEC — HIGH (ref 9.5–13)
RBC # BLD: 3.05 M/UL — LOW (ref 4.2–5.8)
RBC # BLD: 3.26 M/UL — LOW (ref 4.2–5.8)
RBC # FLD: 18.2 % — HIGH (ref 10.3–14.5)
RBC # FLD: 18.2 % — HIGH (ref 10.3–14.5)
SAO2 % BLDMV: 45.6 — LOW (ref 60–90)
SAO2 % BLDMV: 52.4 — LOW (ref 60–90)
SAO2 % BLDMV: 57.9 — LOW (ref 60–90)
SAO2 % BLDV: 53.1 % — LOW (ref 67–88)
SAO2 % BLDV: 64 % — LOW (ref 67–88)
SODIUM SERPL-SCNC: 134 MMOL/L — LOW (ref 135–145)
SPECIMEN SOURCE: SIGNIFICANT CHANGE UP
UFH PPP CHRO-ACNC: 0.28 IU/ML — LOW (ref 0.3–0.7)
VANCOMYCIN TROUGH SERPL-MCNC: 13.1 UG/ML — SIGNIFICANT CHANGE UP (ref 10–20)
VANCOMYCIN TROUGH SERPL-MCNC: 14.5 UG/ML — SIGNIFICANT CHANGE UP (ref 10–20)
WBC # BLD: 15.36 K/UL — HIGH (ref 3.8–10.5)
WBC # BLD: 16.03 K/UL — HIGH (ref 3.8–10.5)
WBC # FLD AUTO: 15.36 K/UL — HIGH (ref 3.8–10.5)
WBC # FLD AUTO: 16.03 K/UL — HIGH (ref 3.8–10.5)

## 2024-03-17 PROCEDURE — 99291 CRITICAL CARE FIRST HOUR: CPT

## 2024-03-17 PROCEDURE — 51703 INSERT BLADDER CATH COMPLEX: CPT

## 2024-03-17 PROCEDURE — 99292 CRITICAL CARE ADDL 30 MIN: CPT

## 2024-03-17 PROCEDURE — 99221 1ST HOSP IP/OBS SF/LOW 40: CPT | Mod: 25

## 2024-03-17 PROCEDURE — 71045 X-RAY EXAM CHEST 1 VIEW: CPT | Mod: 26

## 2024-03-17 RX ORDER — HYDROCORTISONE 20 MG
100 TABLET ORAL EVERY 8 HOURS
Refills: 0 | Status: COMPLETED | OUTPATIENT
Start: 2024-03-17 | End: 2024-03-18

## 2024-03-17 RX ORDER — ALBUMIN HUMAN 25 %
100 VIAL (ML) INTRAVENOUS ONCE
Refills: 0 | Status: COMPLETED | OUTPATIENT
Start: 2024-03-17 | End: 2024-03-17

## 2024-03-17 RX ORDER — ALBUMIN HUMAN 25 %
100 VIAL (ML) INTRAVENOUS
Refills: 0 | Status: COMPLETED | OUTPATIENT
Start: 2024-03-17 | End: 2024-03-17

## 2024-03-17 RX ORDER — CALCIUM GLUCONATE 100 MG/ML
2 VIAL (ML) INTRAVENOUS ONCE
Refills: 0 | Status: COMPLETED | OUTPATIENT
Start: 2024-03-17 | End: 2024-03-17

## 2024-03-17 RX ORDER — SODIUM CHLORIDE 9 MG/ML
4 INJECTION INTRAMUSCULAR; INTRAVENOUS; SUBCUTANEOUS EVERY 12 HOURS
Refills: 0 | Status: DISCONTINUED | OUTPATIENT
Start: 2024-03-17 | End: 2024-03-21

## 2024-03-17 RX ADMIN — SODIUM CHLORIDE 3 MILLILITER(S): 9 INJECTION INTRAMUSCULAR; INTRAVENOUS; SUBCUTANEOUS at 13:20

## 2024-03-17 RX ADMIN — MIDODRINE HYDROCHLORIDE 20 MILLIGRAM(S): 2.5 TABLET ORAL at 05:12

## 2024-03-17 RX ADMIN — Medication 2: at 17:13

## 2024-03-17 RX ADMIN — VASOPRESSIN 6 UNIT(S)/MIN: 20 INJECTION INTRAVENOUS at 07:23

## 2024-03-17 RX ADMIN — FLUCONAZOLE 100 MILLIGRAM(S): 150 TABLET ORAL at 08:00

## 2024-03-17 RX ADMIN — Medication 100 MILLIGRAM(S): at 11:13

## 2024-03-17 RX ADMIN — POLYETHYLENE GLYCOL 3350 17 GRAM(S): 17 POWDER, FOR SOLUTION ORAL at 11:13

## 2024-03-17 RX ADMIN — SODIUM CHLORIDE 4 MILLILITER(S): 9 INJECTION INTRAMUSCULAR; INTRAVENOUS; SUBCUTANEOUS at 17:12

## 2024-03-17 RX ADMIN — Medication 3.81 MICROGRAM(S)/KG/MIN: at 07:23

## 2024-03-17 RX ADMIN — SENNA PLUS 2 TABLET(S): 8.6 TABLET ORAL at 21:08

## 2024-03-17 RX ADMIN — PREGABALIN 1000 MICROGRAM(S): 225 CAPSULE ORAL at 11:13

## 2024-03-17 RX ADMIN — Medication 250 MILLIGRAM(S): at 06:24

## 2024-03-17 RX ADMIN — Medication 1 MILLIGRAM(S): at 11:13

## 2024-03-17 RX ADMIN — Medication 10.2 MICROGRAM(S)/KG/MIN: at 05:13

## 2024-03-17 RX ADMIN — Medication 50 MILLILITER(S): at 18:11

## 2024-03-17 RX ADMIN — Medication 50 MILLILITER(S): at 21:46

## 2024-03-17 RX ADMIN — Medication 5 MILLIGRAM(S): at 21:09

## 2024-03-17 RX ADMIN — Medication 10.2 MICROGRAM(S)/KG/MIN: at 19:12

## 2024-03-17 RX ADMIN — MEROPENEM 100 MILLIGRAM(S): 1 INJECTION INTRAVENOUS at 21:08

## 2024-03-17 RX ADMIN — Medication 1 APPLICATION(S): at 05:14

## 2024-03-17 RX ADMIN — Medication 5 MILLIGRAM(S): at 21:07

## 2024-03-17 RX ADMIN — MEROPENEM 100 MILLIGRAM(S): 1 INJECTION INTRAVENOUS at 11:15

## 2024-03-17 RX ADMIN — SODIUM CHLORIDE 4 MILLILITER(S): 9 INJECTION INTRAMUSCULAR; INTRAVENOUS; SUBCUTANEOUS at 03:09

## 2024-03-17 RX ADMIN — Medication 200 GRAM(S): at 22:55

## 2024-03-17 RX ADMIN — EPINEPHRINE 12.7 MICROGRAM(S)/KG/MIN: 0.3 INJECTION INTRAMUSCULAR; SUBCUTANEOUS at 19:14

## 2024-03-17 RX ADMIN — VASOPRESSIN 6 UNIT(S)/MIN: 20 INJECTION INTRAVENOUS at 19:12

## 2024-03-17 RX ADMIN — Medication 75 MICROGRAM(S): at 05:12

## 2024-03-17 RX ADMIN — Medication 63.75 MILLIMOLE(S): at 02:05

## 2024-03-17 RX ADMIN — ATORVASTATIN CALCIUM 40 MILLIGRAM(S): 80 TABLET, FILM COATED ORAL at 21:08

## 2024-03-17 RX ADMIN — Medication 500 MILLIGRAM(S): at 11:12

## 2024-03-17 RX ADMIN — Medication 5 MILLIGRAM(S): at 05:11

## 2024-03-17 RX ADMIN — Medication 50 MILLILITER(S): at 21:42

## 2024-03-17 RX ADMIN — EPINEPHRINE 12.7 MICROGRAM(S)/KG/MIN: 0.3 INJECTION INTRAMUSCULAR; SUBCUTANEOUS at 07:24

## 2024-03-17 RX ADMIN — Medication 2: at 00:09

## 2024-03-17 RX ADMIN — Medication 50 MILLILITER(S): at 22:54

## 2024-03-17 RX ADMIN — MIDODRINE HYDROCHLORIDE 20 MILLIGRAM(S): 2.5 TABLET ORAL at 21:07

## 2024-03-17 RX ADMIN — Medication 5 MILLILITER(S): at 11:22

## 2024-03-17 RX ADMIN — MIDODRINE HYDROCHLORIDE 20 MILLIGRAM(S): 2.5 TABLET ORAL at 13:06

## 2024-03-17 RX ADMIN — Medication 2: at 11:29

## 2024-03-17 RX ADMIN — ENOXAPARIN SODIUM 30 MILLIGRAM(S): 100 INJECTION SUBCUTANEOUS at 21:07

## 2024-03-17 RX ADMIN — SODIUM CHLORIDE 3 MILLILITER(S): 9 INJECTION INTRAMUSCULAR; INTRAVENOUS; SUBCUTANEOUS at 21:59

## 2024-03-17 RX ADMIN — CHLORHEXIDINE GLUCONATE 1 APPLICATION(S): 213 SOLUTION TOPICAL at 05:12

## 2024-03-17 RX ADMIN — VASOPRESSIN 6 UNIT(S)/MIN: 20 INJECTION INTRAVENOUS at 05:13

## 2024-03-17 RX ADMIN — Medication 3.81 MICROGRAM(S)/KG/MIN: at 19:13

## 2024-03-17 RX ADMIN — EPINEPHRINE 12.7 MICROGRAM(S)/KG/MIN: 0.3 INJECTION INTRAMUSCULAR; SUBCUTANEOUS at 05:13

## 2024-03-17 RX ADMIN — Medication 1 APPLICATION(S): at 17:16

## 2024-03-17 RX ADMIN — PANTOPRAZOLE SODIUM 40 MILLIGRAM(S): 20 TABLET, DELAYED RELEASE ORAL at 05:14

## 2024-03-17 RX ADMIN — ENOXAPARIN SODIUM 30 MILLIGRAM(S): 100 INJECTION SUBCUTANEOUS at 09:00

## 2024-03-17 RX ADMIN — MEROPENEM 100 MILLIGRAM(S): 1 INJECTION INTRAVENOUS at 03:03

## 2024-03-17 RX ADMIN — SERTRALINE 50 MILLIGRAM(S): 25 TABLET, FILM COATED ORAL at 05:12

## 2024-03-17 RX ADMIN — Medication 100 MILLIGRAM(S): at 11:12

## 2024-03-17 RX ADMIN — Medication 250 MILLIGRAM(S): at 18:12

## 2024-03-17 RX ADMIN — Medication 5 MILLIGRAM(S): at 13:05

## 2024-03-17 RX ADMIN — Medication 3.81 MICROGRAM(S)/KG/MIN: at 05:13

## 2024-03-17 RX ADMIN — SODIUM CHLORIDE 3 MILLILITER(S): 9 INJECTION INTRAMUSCULAR; INTRAVENOUS; SUBCUTANEOUS at 05:14

## 2024-03-17 RX ADMIN — Medication 10.2 MICROGRAM(S)/KG/MIN: at 07:23

## 2024-03-17 RX ADMIN — AMIODARONE HYDROCHLORIDE 200 MILLIGRAM(S): 400 TABLET ORAL at 05:12

## 2024-03-17 RX ADMIN — Medication 50 MILLILITER(S): at 02:34

## 2024-03-17 RX ADMIN — Medication 100 MILLIGRAM(S): at 23:13

## 2024-03-17 NOTE — CONSULT NOTE ADULT - SUBJECTIVE AND OBJECTIVE BOX
HPI:  Patient is a 83y Male PMH ICM/HFrEF (now 30%; LVIDd 6.7 cm; prev req inotrope), CAD c/b IWMI (1994) s/p angioplasty, aortic stenosis s/p bio-AVR 2004, VT arrest (2017) s/p ICD, Afib s/p multiple DCCV and ablation x2 (2020 and 2023; on AC), Aflutter s/p WARREN/DCCV (8/23), prior Ao aneurysm s/p Bentall (2021), severe MR prior MVr (2021) with MAC (precludes M-KRISTIE d/t his anatomy; turned down for TMVR trials by [employer]), atrophic kidney with CKD (b/l Cr 2.5-3), was admitted on 2/20 with acute on chronic heart failure and acute on chronic kidney dysfunction now with MODS on multiple pressors. Urology consulted for hemorrhage from the penis s/p herbert removal.    PAST MEDICAL & SURGICAL HISTORY:  Aortic stenosis      AICD (automatic cardioverter/defibrillator) present      Aortic valve regurgitation      Ascending aorta dilation      H/O paroxysmal supraventricular tachycardia      HLD (hyperlipidemia)      Mitral valve regurgitation      Cardiac arrest      Severe mitral regurgitation      S/P aortic valve replacement  3/13/2004      S/P hernia repair  2002      History of tonsillectomy and adenoidectomy      S/P TURP  1996      S/P colonoscopy  2001, 2002, 2006, 2011, 2016      S/P endoscopy  2006      S/P cardiac cath  1994, 1995, 1999, 2002, 2004      AICD (automatic cardioverter/defibrillator) present  12/19/17      Cardiac pacemaker  12/19/17      History of cardioversion  9/11/20      S/P ablation of atrial fibrillation  10/29/20      Status post ablation of ventricular arrhythmia  2/14/19        FAMILY HISTORY:    SOCIAL HISTORY:   Tobacco hx:  MEDICATIONS  (STANDING):  allopurinol 100 milliGRAM(s) Oral daily  aMIOdarone    Tablet 200 milliGRAM(s) Oral daily  ascorbic acid 500 milliGRAM(s) Oral daily  atorvastatin 40 milliGRAM(s) Oral at bedtime  Biotene Dry Mouth Oral Rinse 5 milliLiter(s) Swish and Spit daily  chlorhexidine 2% Cloths 1 Application(s) Topical <User Schedule>  CRRT Treatment    <Continuous>  cyanocobalamin 1000 MICROGram(s) Oral daily  dextrose 5%. 1000 milliLiter(s) (10 mL/Hr) IV Continuous <Continuous>  dextrose 50% Injectable 25 Gram(s) IV Push once  DOBUTamine Infusion 10 MICROgram(s)/kG/Min (10.2 mL/Hr) IV Continuous <Continuous>  enoxaparin Injectable 30 milliGRAM(s) SubCutaneous every 12 hours  EPINEPHrine    Infusion 0.1 MICROgram(s)/kG/Min (12.7 mL/Hr) IV Continuous <Continuous>  epoetin zara-epbx (RETACRIT) Injectable 22512 Unit(s) IV Push <User Schedule>  fluconAZOLE IVPB 200 milliGRAM(s) IV Intermittent every 24 hours  fluconAZOLE IVPB      folic acid 1 milliGRAM(s) Oral daily  insulin lispro (ADMELOG) corrective regimen sliding scale   SubCutaneous every 6 hours  levothyroxine 75 MICROGram(s) Oral daily  lidocaine   4% Patch 1 Patch Transdermal daily  melatonin 5 milliGRAM(s) Oral at bedtime  meropenem  IVPB      meropenem  IVPB 1000 milliGRAM(s) IV Intermittent every 8 hours  metoclopramide Injectable 5 milliGRAM(s) IV Push every 8 hours  midodrine 20 milliGRAM(s) Oral every 8 hours  norepinephrine Infusion 0.06 MICROgram(s)/kG/Min (3.81 mL/Hr) IV Continuous <Continuous>  pantoprazole    Tablet 40 milliGRAM(s) Oral before breakfast  petrolatum white Ointment 1 Application(s) Topical two times a day  polyethylene glycol 3350 17 Gram(s) Oral daily  PrismaSATE Dialysate BGK 4 / 2.5 5000 milliLiter(s) (1200 mL/Hr) CRRT <Continuous>  PrismaSOL Filtration BGK 4 / 2.5 5000 milliLiter(s) (400 mL/Hr) CRRT <Continuous>  PrismaSOL Filtration BGK 4 / 2.5 5000 milliLiter(s) (600 mL/Hr) CRRT <Continuous>  senna 2 Tablet(s) Oral at bedtime  sertraline 50 milliGRAM(s) Oral daily  sodium chloride 0.9% lock flush 3 milliLiter(s) IV Push every 8 hours  sodium chloride 3%  Inhalation 4 milliLiter(s) Inhalation every 12 hours  thiamine 100 milliGRAM(s) Oral daily  vancomycin  IVPB 750 milliGRAM(s) IV Intermittent every 12 hours  vasopressin Infusion 0.04 Unit(s)/Min (6 mL/Hr) IV Continuous <Continuous>    MEDICATIONS  (PRN):    Allergies    No Known Allergies    Intolerances        REVIEW OF SYSTEMS: Pertinent positives and negatives as stated in HPI, otherwise negative    Vital signs  T(C): 36.1 (03-17-24 @ 04:00), Max: 36.6 (03-16-24 @ 16:00)  HR: 80 (03-17-24 @ 05:15)  BP: --  SpO2: 100% (03-17-24 @ 05:15)  Wt(kg): --    Output      Physical Exam  Gen: chronically ill appearing  Pulm: No respiratory distress, no subcostal retractions  Abd: Soft, NT, ND  : bleeding from meatus and large clot formation, using aseptic technique, attempted to place 22f 3 way, then 20f coude and successfully 18f coude for tamponade. urine peach  MSK: No edema present    LABS:        03-17 @ 00:41    WBC --    / Hct --    / SCr 1.17     03-17 @ 00:38    WBC 16.03 / Hct 31.7  / SCr --       03-17    134<L>  |  99  |  27<H>  ----------------------------<  171<H>  4.2   |  22  |  1.17    Ca    9.5      17 Mar 2024 00:41  Phos  2.3     03-17  Mg     2.9     03-17    TPro  7.3  /  Alb  4.2  /  TBili  6.7<H>  /  DBili  x   /  AST  62<H>  /  ALT  32  /  AlkPhos  208<H>  03-17    PT/INR - ( 17 Mar 2024 05:52 )   PT: 13.3 sec;   INR: 1.28 ratio         PTT - ( 17 Mar 2024 05:52 )  PTT:32.5 sec  Urinalysis Basic - ( 17 Mar 2024 00:41 )    Color: x / Appearance: x / SG: x / pH: x  Gluc: 171 mg/dL / Ketone: x  / Bili: x / Urobili: x   Blood: x / Protein: x / Nitrite: x   Leuk Esterase: x / RBC: x / WBC x   Sq Epi: x / Non Sq Epi: x / Bacteria: x

## 2024-03-17 NOTE — CONSULT NOTE ADULT - ASSESSMENT
83y Male PMH ICM/HFrEF (now 30%; LVIDd 6.7 cm; prev req inotrope), CAD c/b IWMI (1994) s/p angioplasty, aortic stenosis s/p bio-AVR 2004, VT arrest (2017) s/p ICD, Afib s/p multiple DCCV and ablation x2 (2020 and 2023; on AC), Aflutter s/p WARREN/DCCV (8/23), prior Ao aneurysm s/p Bentall (2021), severe MR prior MVr (2021) with MAC (precludes M-KRISTIE d/t his anatomy; turned down for TMVR trials by [employer]), atrophic kidney with CKD (b/l Cr 2.5-3), was admitted on 2/20 with acute on chronic heart failure and acute on chronic kidney dysfunction now with MODS on multiple pressors. Urology consulted for hemorrhage from the penis s/p herbert removal.    Recs:  - given orange color urine, no indication for CBI at this time  - continue with herbert catheter; herbert presumed to have been blown up in the prostate, causing urethral/prostatic injury, which caused gush of blood at time of removal. Large size herbert also used for tamponade of urethral injury.  - continue to monitor urine color  - RN staff may hand irrigate catheter PRN   - trend H/H, transfused PRN per primary team protocol  - gross hematuria may take longer than expected to improve in the setting of anticoagulation  - recommend keeping herbert for at least 5 days to allow urethra to heal    Please call urology if color worsens, passing clots in urine or any further questions.      [facility] Pager #[phone]    [facility]  [address] [place] [address], [address]  [place] [address] [phone]

## 2024-03-17 NOTE — PROGRESS NOTE ADULT - ASSESSMENT
83y Male PMH ICM/HFrEF (now 30%; LVIDd 6.7 cm; prev req inotrope), CAD c/b IWMI (1994) s/p angioplasty, aortic stenosis s/p bio-AVR 2004, VT arrest (2017) s/p ICD, Afib s/p multiple DCCV and ablation x2 (2020 and 2023; on AC), Aflutter s/p WARREN/DCCV (8/23), prior Ao aneurysm s/p Bentall (2021), severe MR prior MVr (2021) with MAC (precludes M-KRISTIE d/t his anatomy; turned down for TMVR trials by [employer]), atrophic kidney with CKD (b/l Cr 2.5-3), was admitted on 2/20 with acute on chronic heart failure and acute on chronic kidney dysfunction now with MODS on multiple pressors. Urology consulted for hemorrhage from the penis s/p herbert removal.    Recs:  - Given paco color urine, no indication for CBI at this time  - Continue with herbert catheter; herbert presumed to have been blown up in the prostate, causing urethral/prostatic injury, which caused gush of blood at time of removal. Large size herbert also used for tamponade of urethral injury.  - Continue to monitor urine color, once clear yellow can TOV   - RN staff may hand irrigate catheter PRN   - Trend H/H, transfused PRN per primary team protocol  - Gross hematuria may take longer than expected to improve in the setting of anticoagulation    Discussed with Dr. Sharpe  [facility]  [address] [place] [address], [place] [id number]  [place] [id number] [phone]

## 2024-03-17 NOTE — PROGRESS NOTE ADULT - SUBJECTIVE AND OBJECTIVE BOX
[facility]   [phone]  RENAL FOLLOW UP NOTE  --------------------------------------------------------------------------------  Chief Complaint:    24 hour events/subjective:    seen earlier   on high flow O2   on multiple pressors   on dobutamine     PAST HISTORY  --------------------------------------------------------------------------------  No significant changes to PMH, PSH, FHx, SHx, unless otherwise noted    ALLERGIES & MEDICATIONS  --------------------------------------------------------------------------------  Allergies    No Known Allergies    Intolerances      Standing Inpatient Medications  albumin human 25% IVPB 100 milliLiter(s) IV Intermittent every 10 minutes  allopurinol 100 milliGRAM(s) Oral daily  aMIOdarone    Tablet 200 milliGRAM(s) Oral daily  ascorbic acid 500 milliGRAM(s) Oral daily  atorvastatin 40 milliGRAM(s) Oral at bedtime  Biotene Dry Mouth Oral Rinse 5 milliLiter(s) Swish and Spit daily  chlorhexidine 2% Cloths 1 Application(s) Topical <User Schedule>  CRRT Treatment    <Continuous>  cyanocobalamin 1000 MICROGram(s) Oral daily  dextrose 50% Injectable 25 Gram(s) IV Push once  DOBUTamine Infusion 10 MICROgram(s)/kG/Min IV Continuous <Continuous>  enoxaparin Injectable 30 milliGRAM(s) SubCutaneous every 12 hours  EPINEPHrine    Infusion 0.1 MICROgram(s)/kG/Min IV Continuous <Continuous>  epoetin zara-epbx (RETACRIT) Injectable 47795 Unit(s) IV Push <User Schedule>  fluconAZOLE IVPB 200 milliGRAM(s) IV Intermittent every 24 hours  fluconAZOLE IVPB      folic acid 1 milliGRAM(s) Oral daily  insulin lispro (ADMELOG) corrective regimen sliding scale   SubCutaneous every 6 hours  levothyroxine 75 MICROGram(s) Oral daily  lidocaine   4% Patch 1 Patch Transdermal daily  melatonin 5 milliGRAM(s) Oral at bedtime  meropenem  IVPB 1000 milliGRAM(s) IV Intermittent every 8 hours  meropenem  IVPB      metoclopramide Injectable 5 milliGRAM(s) IV Push every 8 hours  midodrine 20 milliGRAM(s) Oral every 8 hours  norepinephrine Infusion 0.06 MICROgram(s)/kG/Min IV Continuous <Continuous>  pantoprazole    Tablet 40 milliGRAM(s) Oral before breakfast  petrolatum white Ointment 1 Application(s) Topical two times a day  polyethylene glycol 3350 17 Gram(s) Oral daily  PrismaSATE Dialysate BGK 4 / 2.5 5000 milliLiter(s) CRRT <Continuous>  PrismaSOL Filtration BGK 4 / 2.5 5000 milliLiter(s) CRRT <Continuous>  PrismaSOL Filtration BGK 4 / 2.5 5000 milliLiter(s) CRRT <Continuous>  senna 2 Tablet(s) Oral at bedtime  sertraline 50 milliGRAM(s) Oral daily  sodium chloride 0.9% lock flush 3 milliLiter(s) IV Push every 8 hours  sodium chloride 3%  Inhalation 4 milliLiter(s) Inhalation every 12 hours  thiamine 100 milliGRAM(s) Oral daily  vancomycin  IVPB 750 milliGRAM(s) IV Intermittent every 12 hours  vasopressin Infusion 0.04 Unit(s)/Min IV Continuous <Continuous>    PRN Inpatient Medications      REVIEW OF SYSTEMS  --------------------------------------------------------------------------------    Gen: denies  fevers/chills,  CVS: denies chest pain/palpitations  Resp:  +  SOB/Cough  GI: Denies N/V/Abd pain  : Denies dysuria    VITALS/PHYSICAL EXAM  --------------------------------------------------------------------------------  T(C): 36.4 (03-17-24 @ 20:00), Max: 36.6 (03-17-24 @ 16:00)  HR: 96 (03-17-24 @ 21:24) (80 - 100)  BP: --  RR: 17 (03-17-24 @ 21:00) (14 - 41)  SpO2: 97% (03-17-24 @ 21:24) (89% - 100%)  Wt(kg): --        03-16-24 @ 07:01  -  03-17-24 @ 07:00  --------------------------------------------------------  IN: 3584.4 mL / OUT: 4184 mL / NET: -599.6 mL    03-17-24 @ 07:01  -  03-17-24 @ 21:42  --------------------------------------------------------  IN: 2085.1 mL / OUT: 1029 mL / NET: 1056.1 mL      Physical Exam:  	    Gen:  on high flow O2 + IJ cath   	Pulm: decrease bs + coarse bs   	CV: RRR, S1S2; no rub  	Abd: +BS, soft, nontender/nondistended  	: No suprapubic tenderness  	UE: Warm, no cyanosis  no clubbing,  no edema  	LE: Warm, no cyanosis  no clubbing,  no   edema  	Neuro: alert and oriented. speech coherent       LABS/STUDIES  --------------------------------------------------------------------------------              9.9    15.36 >-----------<  108      [03-17-24 @ 06:24]              29.8     134  |  99  |  27  ----------------------------<  171      [03-17-24 @ 00:41]  4.2   |  22  |  1.17        Ca     9.5     [03-17-24 @ 00:41]      Mg     2.9     [03-17-24 @ 00:41]      Phos  2.3     [03-17-24 @ 00:41]    TPro  7.3  /  Alb  4.2  /  TBili  6.7  /  DBili  x   /  AST  62  /  ALT  32  /  AlkPhos  208  [03-17-24 @ 00:41]    PT/INR: PT 13.3 , INR 1.28       [03-17-24 @ 05:52]  PTT: 32.5       [03-17-24 @ 05:52]      Creatinine Trend:  SCr 1.17 [03-17 @ 00:41]  SCr 1.16 [03-16 @ 00:47]  SCr 1.23 [03-15 @ 00:22]  SCr 1.54 [03-14 @ 00:29]  SCr 1.41 [03-13 @ 00:30]      Iron 290, TIBC 430, %sat 67      [03-06-24 @ 13:57]  Ferritin 335      [03-06-24 @ 13:57]  PTH -- (Ca 8.9)      [03-05-24 @ 13:03]   242  TSH 2.02      [03-06-24 @ 13:57]

## 2024-03-17 NOTE — PROGRESS NOTE ADULT - SUBJECTIVE AND OBJECTIVE BOX
Patient seen and examined at the bedside.    Remained critically ill on continuous ICU monitoring.    OBJECTIVE:  Vital Signs Last 24 Hrs  T(C): 36.1 (17 Mar 2024 04:00), Max: 36.6 (16 Mar 2024 16:00)  T(F): 97 (17 Mar 2024 04:00), Max: 97.9 (16 Mar 2024 16:00)  HR: 80 (17 Mar 2024 05:15) (79 - 98)  BP: --  BP(mean): --  RR: 20 (17 Mar 2024 05:15) (16 - 41)  SpO2: 100% (17 Mar 2024 05:15) (88% - 100%)    Parameters below as of 17 Mar 2024 04:00  Patient On (Oxygen Delivery Method): nasal cannula, high flow  O2 Flow (L/min): 50  O2 Concentration (%): 50      Physical Exam:   General: NAD   Neurology: nonfocal   ENT/Neck: Neck supple, trachea midline, No JVD   Respiratory: Clear bilaterally   CV: S1S2, no murmurs        [x] Sternal dressing        [x] Paced rhythm, [x] PPM - DDD 80  Abdominal: Soft, NT, ND +BS   Extremities: 1-2+ pedal edema noted   ============================I/O===========================   I&O's Detail    15 Mar 2024 07:01  -  16 Mar 2024 07:00  --------------------------------------------------------  IN:    DOBUTamine: 168.6 mL    DOBUTamine: 30.6 mL    Enteral Tube Flush: 210 mL    EPINEPHrine: 100.1 mL    EPINEPHrine: 112.2 mL    IV PiggyBack: 250 mL    IV PiggyBack: 800 mL    Nepro with Carb Steady: 660 mL    Norepinephrine: 335.8 mL    Sodium Chloride 0.9% Bolus: 120 mL    Vasopressin: 360 mL  Total IN: 3147.3 mL    OUT:    Dexmedetomidine: 0 mL    Indwelling Catheter - Urethral (mL): 0 mL    Other (mL): 4303 mL  Total OUT: 4303 mL    Total NET: -1155.7 mL    16 Mar 2024 07:01  -  17 Mar 2024 06:11  --------------------------------------------------------  IN:    Albumin 5% - 50 mL: 100 mL    dextrose 5%: 50 mL    DOBUTamine: 224.4 mL    Enteral Tube Flush: 125 mL    EPINEPHrine: 183.6 mL    EPINEPHrine: 50.8 mL    IV PiggyBack: 500 mL    IV PiggyBack: 500 mL    Nepro with Carb Steady: 640 mL    Norepinephrine: 279.4 mL    Sodium Chloride 0.9% Bolus: 110 mL    Vasopressin: 330 mL  Total IN: 3093.2 mL    OUT:    Indwelling Catheter - Urethral (mL): 0 mL    Other (mL): 3759 mL  Total OUT: 3759 mL    Total NET: -665.8 mL  ============================ LABS =========================                        10.5   16.03 )-----------( 113      ( 17 Mar 2024 00:38 )             31.7     03-17    134<L>  |  99  |  27<H>  ----------------------------<  171<H>  4.2   |  22  |  1.17    Ca    9.5      17 Mar 2024 00:41  Phos  2.3     03-17  Mg     2.9     03-17    TPro  7.3  /  Alb  4.2  /  TBili  6.7<H>  /  DBili  x   /  AST  62<H>  /  ALT  32  /  AlkPhos  208<H>  03-17    LIVER FUNCTIONS - ( 17 Mar 2024 00:41 )  Alb: 4.2 g/dL / Pro: 7.3 g/dL / ALK PHOS: 208 U/L / ALT: 32 U/L / AST: 62 U/L / GGT: x             ABG - ( 17 Mar 2024 04:05 )  pH, Arterial: 7.38  pH, Blood: x     /  pCO2: 40    /  pO2: 154   / HCO3: 24    / Base Excess: -1.3  /  SaO2: 99.6      ======================Micro/Rad/Cardio=================  Culture: Reviewed   CXR: Reviewed  Echo: Reviewed  ======================================================  PAST MEDICAL & SURGICAL HISTORY:  Aortic stenosis      AICD (automatic cardioverter/defibrillator) present      Aortic valve regurgitation      Ascending aorta dilation      H/O paroxysmal supraventricular tachycardia      HLD (hyperlipidemia)      Mitral valve regurgitation      Cardiac arrest      Severe mitral regurgitation      S/P aortic valve replacement  3/13/2004      S/P hernia repair  2002      History of tonsillectomy and adenoidectomy      S/P TURP  1996      S/P colonoscopy  2001, 2002, 2006, 2011, 2016      S/P endoscopy  2006      S/P cardiac cath  1994, 1995, 1999, 2002, 2004      AICD (automatic cardioverter/defibrillator) present  12/19/17      Cardiac pacemaker  12/19/17      History of cardioversion  9/11/20      S/P ablation of atrial fibrillation  10/29/20      Status post ablation of ventricular arrhythmia  2/14/19  ======================================================    Assessment:  82 y/o M with ICM/HFrEF (now 30%; LVIDd 6.7 cm; prev req inotrope), CAD c/b IWMI (1994) s/p angioplasty, aortic stenosis s/p bio-AVR 2004, VT arrest (2017) s/p ICD, Afib s/p multiple DCCV and ablation x2 (2020 and 2023; on AC), Aflutter s/p WARREN/DCCV (8/23), prior Ao aneurysm s/p Bentall (2021), 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    Tx to ICU for Hypotension/CHF/renal failure  Hemodynamic instability  Hypovolemia  Post op respiratory insufficiency  Acute blood loss anemia  Thrombocytopenia  Leukocytosis  ======================================================  Plan:   ***Neuro***  [x] Nonfocal  Continue with Melatonin for sleep regimen   Continue Zoloft    ***Cardiovascular***  Invasive hemodynamic monitoring, assess perfusion indices   DE / CVP 13 / MAP 69 / PAP 40 / CI 2 / Hct 31.7% / Lactate 1.2  [x] Levophed - 0.06 mcg/kG/Min [x] Vasopressin - 0.04 Unit(s)/Min [x] Epinephrine - 0.1 mcg/kG/Min [x] Dobutamine - 10 mcg/kG/Min  Continuos reassessment of hemodynamics   Amiodarone for AFib prophylaxis   Continue Midodrine  [x] VTE ppx with SQ Lovenox  [x] Statin nightly     ***Pulmonary***  [x] BiPAP 40%  [x] HFNC 50%/50L  Follow SpO2, CXR, blood gasses   Encourage incentive spirometry, continue pulse ox monitoring, follow ABGs             ***GI***  [x] NPO with Nepro TF @ 40 mL/Hr   [x] Protonix for stress ulcer ppx  Bowel regimen with Miralax and Senna  Reglan for gut motility  GI following, appreciate recs.    ***Renal***  [x] CKD  Continue to monitor I/Os, BUN/Creatinine.   Replete lytes PRN  Royal present  CVVHD  Goal net negative   RETACRIT for renal support     ***ID***  Diflucan, Meropenem, Vancomycin for Empiric dosing    ***Endocrine***  [x] Stress Hyperglycemia : HbA1c 5.7%                - [x] ISS             - Need tight glycemic control to prevent wound infection.  [x] Gout             - [x] Allopurinol  [x] Hypothyroidism             - [x] Synthroid            Patient requires continuous monitoring with:  bedside rhythm monitoring, arterial line, pulse oximetry, ventilator management and monitoring; intermittent blood gas analysis.  Care plan discussed with ICU care team.  patient remain critical; required more than usual post op care; I have spent 35 minutes providing non routine post op care, revaluated multiple times during the day .     Care Plan discussed with the ICU care team. Patient remained critical, at risk for life threatening decompensation.     By signing my name below, I, Oh Thomas , attest that this documentation has been prepared under the direction and in the presence of Edith Alfaro MD.  Electronically signed: Home Tang, 03-17-24 @ 06:11    I, Dominic Sharma, personally performed the services described in this documentation. all medical record entries made by the scribe were at my direction and in my presence. I have reviewed the chart and agree that the record reflects my personal performance and is accurate and complete  Electronically signed: Edith Alfaro MD.

## 2024-03-17 NOTE — PROGRESS NOTE ADULT - ASSESSMENT
82 y/o M with ICM/HFrEF (now 30%; LVIDd 6.7 cm; prev req inotrope), CAD c/b IWMI (1994) s/p angioplasty, aortic stenosis s/p bio-AVR 2004, VT arrest (2017) s/p ICD, Afib s/p multiple DCCV and ablation x2 (2020 and 2023; on AC), Aflutter s/p WARREN/DCCV (8/23), prior Ao aneurysm s/p Bentall (2021), severe MR prior MVr (2021) with MAC (precludes M-KRISTIE d/t his anatomy; turned down for TMVR trials by [employer]), atrophic kidney with CKD (b/l Cr 2.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 Patient was initiated on bumex gtt with gradual improvement with Cr improving from 3.6 to 3.1. Patient was considered for MV re-op; however, patient was reluctant of the procedure and expressed wanting to follow up in the outpatient setting to schedule. During last admission, patient was also noted to be in flutter, EP was consulted, and patient underwent repeat WARREN/DCCV , on amiodarone. Patient was discharged home 2/28 on higher dose of diuretics, as per HF team. Today, patient was evaluated at CTS office four routine follow-up, pt c/o worsening shortness of breath and L>R LE edema, with redness/swelling on left dorsal aspect of the foot, readmitted to CTS service for further management.  noticed with rising creatinine and bun      1- SURESH on ckd   2- decompensated CHF  3- Mitral regurgitation   4- hypotension   5- hypothyroid   6- hyperuricemia   7- anemia      cont dobutamine drip   midodrine 20  mg tid   cont vasopressin drip  /levophed/epinephrine    CRRT with [id number] dialyzer;  dfr 1200   50   cc hr fluid removal  as bp tolerates   see new orders   + campos meropenem 1 g tid   reatcrit 42685 U twice weekly   allopurinol 100 mg daily   no blood work RUE  strict I/O  d/w CTS team when seen earlier

## 2024-03-17 NOTE — PROGRESS NOTE ADULT - SUBJECTIVE AND OBJECTIVE BOX
Subjective  Patient seen and examined at bedside.     Objective    Vital signs  T(F): , Max: 97.9 (03-16-24 @ 16:00)  HR: 80 (03-17-24 @ 10:30)  BP: --  SpO2: 98% (03-17-24 @ 10:30)  Wt(kg): --    Output     OUT:    Indwelling Catheter - Urethral (mL): 60 mL  Total OUT: 60 mL    Total NET: -60 mL      OUT:    Indwelling Catheter - Urethral (mL): 0 mL  Total OUT: 0 mL    Total NET: 0 mL          : herbert secured in place, draining clear paco urine     Labs      03-17 @ 06:24    WBC 15.36 / Hct 29.8  / SCr --       03-17 @ 00:41    WBC --    / Hct --    / SCr 1.17         Culture - Blood (collected 03-13-24 @ 13:00)  Source: .Blood Blood  Preliminary Report (03-16-24 @ 20:01):    No growth at 72 Hours    Culture - Blood (collected 03-13-24 @ 11:02)  Source: .Blood Blood  Preliminary Report (03-16-24 @ 15:10):    No growth at 72 Hours    Culture - Blood (collected 03-12-24 @ 17:30)  Source: .Blood Blood-Peripheral  Preliminary Report (03-16-24 @ 23:01):    No growth at 4 days

## 2024-03-17 NOTE — PROGRESS NOTE ADULT - SUBJECTIVE AND OBJECTIVE BOX
Patient seen and examined at the bedside.    Remained critically ill on continuous ICU monitoring.    OBJECTIVE:  Vital Signs Last 24 Hrs  T(C): 36.6 (17 Mar 2024 16:00), Max: 36.6 (16 Mar 2024 20:00)  T(F): 97.8 (17 Mar 2024 16:00), Max: 97.9 (16 Mar 2024 20:00)  HR: 100 (17 Mar 2024 17:17) (80 - 100)  BP: --  BP(mean): --  RR: 21 (17 Mar 2024 17:15) (14 - 41)  SpO2: 100% (17 Mar 2024 17:17) (89% - 100%)    Parameters below as of 17 Mar 2024 17:17  Patient On (Oxygen Delivery Method): nasal cannula, high flow      Physical Exam:  General: In bed with lines & multiple gtt   Neurology: Able f/u commands moves all 4   Eyes: bilateral pupils equal and reactive   ENT/Neck: Neck supple, trachea midline, ++ JVD   Respiratory: Rales noted bilaterally   CV: [x] + holosystolic murmur        [x] Paced rhythm [x] PPM - DDD 80  Abdominal: Soft, NT, ND +BS,   Extremities: no edema, + peripheral pulses   Skin: No Rashes, Hematoma, Ecchymosis                           Assessment:  83 yr male 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, 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, Severe MR valvular heart failure , CRI, AICD   Decompensated heart failure, cardiogenic shock with renal failure  Marginal hemodynamics with high dose dual pressor  Anemia    sepsis   Hypophosphatemia     24 hr events remained critically ill on ^ dose of Dobutamine & Epi on CRRT   Volume removal v given high dose pressor requirement     Plan:   ***Neuro***  [x]  neurologically intact        Continue  neuro assessment        Melatonin & Zoloft     ***Cardiovascular***  Cardiogenic shock complicated with MODS unable to place IABP sec vascular issues    Paced  80-90 / CVP -13 / MAP 66-75/ PAP -4/-15 (-11)) / CI 1.9 / Hct 29.8% / Lactate 1.2/ SVO2 40 > 52 > 54  >45  [x] NO Dced 33/15   [x] Levophed - 0.06  mcg/kg/min Target MAP >65   [x] Epinephrine -  0.1 mcg/kg/min   [x] Vasopressin - 0.04  units/min  [x] Dobutamine - 10 mcg/kg/Min   [x] Proamatine 20 mg TID   [x] CRT - cc/hr    [x] maintain Hct 24% RETACRIT + PRN Tx    [x] Midodrine for b/p support   [x] Amiodarone for Afib prophylaxis   [x]  Lovenox 30 BID check anti Xa level   [x] Lipitor   [x] Mg >2 K >4 Replete PO4 f/u level   [x] cortisol >18 no indication to start stress dose steroids     High risk Sx candidate for MVR     ***Pulmonary***  Cardiogenic Pulmonary edema    Nitric oxide d/c'ed  Oxygen: [x] HFNC - 50L/40%  Encourage incentive spirometry, continue pulse ox monitoring, follow ABGs     ***GI***  Avoid high dose tube feeds given high pressor requirement   [x] Nepro TF @40cc/hr  [x] Protonix for stress ulcer prophylaxis  Bowel regimen with Miralax and Senna  Reglan for gut motility     ***Renal***  Anuric renal failure  Acute on chronic renal failure   Allopurinol for gout   Kyleigh-crit for renal support   CVVHDF    ***ID***  Empiric dosing with Diflucan/ Vanco/ Merop  Blood culture X2 3/13 negative     ***Endocrine***  [x] Hyperglycemia : HbA1c 5.7%             - [x] ISS  [x] Hypothyroidism             - [x] Synthroid        Patient requires continuous monitoring with bedside rhythm monitoring, pulse oximetry monitoring, and continuous central venous and arterial pressure monitoring; and intermittent blood gas analysis. Care plan discussed with the ICU care team.   Patient remained critical, at risk for life threatening decompensation.    I have spent 70 minutes providing critical care management to this patient.    By signing my name below, I, Tressa Tamayo, attest that this documentation has been prepared under the direction and in the presence of Russell Deal MD   Electronically signed: Home Mendoza, 03-17-24 @ 17:55    I, Russell Deal, personally performed the services described in this documentation. all medical record entries made by the buddyibmarino were at my direction and in my presence. I have reviewed the chart and agree that the record reflects my personal performance and is accurate and complete  Electronically signed: Russell Deal MD  Patient seen and examined at the bedside.    Remained critically ill on continuous ICU monitoring.    OBJECTIVE:  Vital Signs Last 24 Hrs  T(C): 36.6 (17 Mar 2024 16:00), Max: 36.6 (16 Mar 2024 20:00)  T(F): 97.8 (17 Mar 2024 16:00), Max: 97.9 (16 Mar 2024 20:00)  HR: 100 (17 Mar 2024 17:17) (80 - 100)  BP: --  BP(mean): --  RR: 21 (17 Mar 2024 17:15) (14 - 41)  SpO2: 100% (17 Mar 2024 17:17) (89% - 100%)    Parameters below as of 17 Mar 2024 17:17  Patient On (Oxygen Delivery Method): nasal cannula, high flow      Physical Exam:  General: In bed with lines & multiple gtt   Neurology: Able f/u commands moves all 4   Eyes: bilateral pupils equal and reactive   ENT/Neck: Neck supple, trachea midline, ++ JVD   Respiratory: Rales noted bilaterally   CV: [x] + holosystolic murmur        [x] Paced rhythm [x] PPM - DDD 80  Abdominal: Soft, NT, ND +BS,   Extremities: no edema, + peripheral pulses   Skin: No Rashes, Hematoma, Ecchymosis                           Assessment:  83 yr male 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, 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, Severe MR valvular heart failure , CRI, AICD   Decompensated heart failure, cardiogenic shock with renal failure  Marginal hemodynamics with high dose dual pressor  Anemia    sepsis   Hypophosphatemia     24 hr events remained critically ill on ^ dose of Dobutamine & Epi on CRRT , DNI  Volume removal v given high dose pressor requirement     Plan:   ***Neuro***  [x]  neurologically intact        Continue  neuro assessment        Melatonin & Zoloft     ***Cardiovascular***  Cardiogenic shock complicated with MODS unable to place IABP sec vascular issues    Paced  80-90 / CVP -13 / MAP 66-75/ PAP -4/-15 (-11)) / CI 1.9 / Hct 29.8% / Lactate 1.2/ SVO2 40 > 52 > 54  >45  [x] NO Dced 33/15   [x] Levophed - 0.06  mcg/kg/min Target MAP >65   [x] Gave 100cc x2 of Albumin   [x] Epinephrine -  0.1 mcg/kg/min   [x] Vasopressin - 0.04  units/min  [x] Dobutamine - 10 mcg/kg/Min   [x] Proamatine 20 mg TID   [x] CRT - cc/hr    [x] maintain Hct 24% RETACRIT + PRN Tx    [x] Midodrine for b/p support   [x] Amiodarone for Afib prophylaxis   [x]  Lovenox 30 BID check anti Xa level   [x] Lipitor   [x] Mg >2 K >4 Replete PO4 f/u level   [x] cortisol >18 no indication to start stress dose steroids     High risk Sx candidate for MVR     ***Pulmonary***  Cardiogenic Pulmonary edema    Nitric oxide d/c'ed  Oxygen: [x] HFNC - 50L/40%  Encourage incentive spirometry, continue pulse ox monitoring, follow ABGs     ***GI***  Avoid high dose tube feeds given high pressor requirement   [x] Nepro TF @40cc/hr  [x] Protonix for stress ulcer prophylaxis  Bowel regimen with Miralax and Senna  Reglan for gut motility     ***Renal***  Anuric renal failure  Acute on chronic renal failure   Allopurinol for gout   Kyleigh-crit for renal support   CVVHDF    ***ID***  Empiric dosing with Diflucan/ Vanco/ Merop  Blood culture X2 3/13 negative     ***Endocrine***  [x] Hyperglycemia : HbA1c 5.7%             - [x] ISS  [x] Hypothyroidism             - [x] Synthroid        Patient requires continuous monitoring with bedside rhythm monitoring, pulse oximetry monitoring, and continuous central venous and arterial pressure monitoring; and intermittent blood gas analysis. Care plan discussed with the ICU care team.   Patient remained critical, at risk for life threatening decompensation.    I have spent 70 minutes providing critical care management to this patient.    By signing my name below, I, Tressa Tamayo, attest that this documentation has been prepared under the direction and in the presence of Russell Deal MD   Electronically signed: Home Mendoza, 03-17-24 @ 17:55    I, Russell Deal, personally performed the services described in this documentation. all medical record entries made by the buddyibe were at my direction and in my presence. I have reviewed the chart and agree that the record reflects my personal performance and is accurate and complete  Electronically signed: Russell Deal MD  Patient seen and examined at the bedside.    Remained critically ill on continuous ICU monitoring.    OBJECTIVE:  Vital Signs Last 24 Hrs  T(C): 36.6 (17 Mar 2024 16:00), Max: 36.6 (16 Mar 2024 20:00)  T(F): 97.8 (17 Mar 2024 16:00), Max: 97.9 (16 Mar 2024 20:00)  HR: 100 (17 Mar 2024 17:17) (80 - 100)  BP: --  BP(mean): --  RR: 21 (17 Mar 2024 17:15) (14 - 41)  SpO2: 100% (17 Mar 2024 17:17) (89% - 100%)    Parameters below as of 17 Mar 2024 17:17  Patient On (Oxygen Delivery Method): nasal cannula, high flow      Physical Exam:  General: In bed with lines & multiple gtt   Neurology: Able f/u commands moves all 4   Eyes: bilateral pupils equal and reactive   ENT/Neck: Neck supple, trachea midline, ++ JVD   Respiratory: Rales noted bilaterally   CV: [x] + holosystolic murmur        [x] Paced rhythm [x] PPM - DDD 80  Abdominal: Soft, NT, ND +BS,   Extremities: no edema, + peripheral pulses   Skin: No Rashes, Hematoma, Ecchymosis                           Assessment:  83 yr male 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, 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, Severe MR valvular heart failure , CRI, AICD   Decompensated heart failure, cardiogenic shock with renal failure  Marginal hemodynamics with ^^ dose of multiple pressor & iontrope  Anemia    sepsis   Hypophosphatemia  Hematuria     24 hr events remained critically ill with worsening hypotension ^ Pressor requirement on no MCS   [place] Salvatore Dced today   Penile hemorrhage post Royal removal requiring urology to replace emergent Royal          Plan:   ***Neuro***  [x]  neurologically intact        Continue  neuro assessment        Melatonin & Zoloft     ***Cardiovascular***  Cardiogenic shock complicated with MODS unable to place IABP sec vascular issues    Paced  80-90 / CVP 16  / MAP 55-70  / CI 1.9 / Hct 29.8% / Lactate 1.2/ SVO2 40 > 52 > 54  >45  [x] NO Dced 33/15   [x] Levophed 0.3   mcg/kg/min Target MAP >65   [x] Albumin 25% 200 cc   [x] Epinephrine -  0.1 mcg/kg/min   [x] Vasopressin 0.1   units/min  [x] Dobutamine - 10 mcg/kg/Min   [x] Proamatine 20 mg TID   [x] CRT 0  cc/hr    [x] maintain Hct 24% RETACRIT + PRN Tx    [x] Amiodarone   [x]  Lovenox 30 BID check anti Xa level   [x] Lipitor   [x] Mg >2 K >4 Replete PO4 f/u level   [x] cortisol >18 no indication to start stress dose steroids     High risk Sx candidate for MVR     ***Pulmonary***  Cardiogenic Pulmonary edema      Nitric oxide d/c'ed  Oxygen: [x] HFNC - 50L/40%  Encourage incentive spirometry, continue pulse ox monitoring, follow ABGs     ***GI***  Avoid high dose tube feeds given high pressor requirement   [x] Nepro TF @40cc/hr  [x] Protonix for stress ulcer prophylaxis  Bowel regimen with Miralax and Senna  Reglan for gut motility     ***Renal***  Anuric renal failure  Acute on chronic renal failure   Allopurinol for gout   CVVHDF    ***ID***  Empiric dosing with Diflucan/ Vanco/ Merop  Blood culture X2 3/13 negative     ***Endocrine***  [x] Hyperglycemia : HbA1c 5.7%             - [x] ISS  [x] Hypothyroidism             - [x] Synthroid        Patient requires continuous monitoring with bedside rhythm monitoring, pulse oximetry monitoring, and continuous central venous and arterial pressure monitoring; and intermittent blood gas analysis. Care plan discussed with the ICU care team.   Patient remained critical, at risk for life threatening decompensation.    I have spent 70 minutes providing critical care management to this patient.    By signing my name below, I, Tressa Tamayo, attest that this documentation has been prepared under the direction and in the presence of Russell Deal MD   Electronically signed: Home Mendoza, 03-17-24 @ 17:55    I, Russell Deal, personally performed the services described in this documentation. all medical record entries made by the scribe were at my direction and in my presence. I have reviewed the chart and agree that the record reflects my personal performance and is accurate and complete  Electronically signed: Russell Deal MD

## 2024-03-18 DIAGNOSIS — R04.0 EPISTAXIS: ICD-10-CM

## 2024-03-18 DIAGNOSIS — Z91.89 OTHER SPECIFIED PERSONAL RISK FACTORS, NOT ELSEWHERE CLASSIFIED: ICD-10-CM

## 2024-03-18 DIAGNOSIS — J31.0 CHRONIC RHINITIS: ICD-10-CM

## 2024-03-18 LAB
ALBUMIN SERPL ELPH-MCNC: 4.6 G/DL — SIGNIFICANT CHANGE UP (ref 3.3–5)
ALBUMIN SERPL ELPH-MCNC: 5 G/DL — SIGNIFICANT CHANGE UP (ref 3.3–5)
ALP SERPL-CCNC: 131 U/L — HIGH (ref 40–120)
ALP SERPL-CCNC: 153 U/L — HIGH (ref 40–120)
ALT FLD-CCNC: 29 U/L — SIGNIFICANT CHANGE UP (ref 10–45)
ALT FLD-CCNC: 33 U/L — SIGNIFICANT CHANGE UP (ref 10–45)
ANION GAP SERPL CALC-SCNC: 13 MMOL/L — SIGNIFICANT CHANGE UP (ref 5–17)
ANION GAP SERPL CALC-SCNC: 17 MMOL/L — SIGNIFICANT CHANGE UP (ref 5–17)
AST SERPL-CCNC: 69 U/L — HIGH (ref 10–40)
AST SERPL-CCNC: 98 U/L — HIGH (ref 10–40)
BASE EXCESS BLDV CALC-SCNC: -0.6 MMOL/L — SIGNIFICANT CHANGE UP (ref -2–3)
BASE EXCESS BLDV CALC-SCNC: -1.1 MMOL/L — SIGNIFICANT CHANGE UP (ref -2–3)
BASE EXCESS BLDV CALC-SCNC: -1.7 MMOL/L — SIGNIFICANT CHANGE UP (ref -2–3)
BASE EXCESS BLDV CALC-SCNC: -2 MMOL/L — SIGNIFICANT CHANGE UP (ref -2–3)
BASE EXCESS BLDV CALC-SCNC: -2.1 MMOL/L — LOW (ref -2–3)
BASE EXCESS BLDV CALC-SCNC: -2.5 MMOL/L — LOW (ref -2–3)
BASE EXCESS BLDV CALC-SCNC: -2.7 MMOL/L — LOW (ref -2–3)
BASE EXCESS BLDV CALC-SCNC: -3.2 MMOL/L — LOW (ref -2–3)
BASE EXCESS BLDV CALC-SCNC: -3.4 MMOL/L — LOW (ref -2–3)
BASE EXCESS BLDV CALC-SCNC: -4.2 MMOL/L — LOW (ref -2–3)
BILIRUB SERPL-MCNC: 7.2 MG/DL — HIGH (ref 0.2–1.2)
BILIRUB SERPL-MCNC: 7.9 MG/DL — HIGH (ref 0.2–1.2)
BUN SERPL-MCNC: 25 MG/DL — HIGH (ref 7–23)
BUN SERPL-MCNC: 25 MG/DL — HIGH (ref 7–23)
CA-I SERPL-SCNC: 1.31 MMOL/L — SIGNIFICANT CHANGE UP (ref 1.15–1.33)
CA-I SERPL-SCNC: 1.33 MMOL/L — SIGNIFICANT CHANGE UP (ref 1.15–1.33)
CA-I SERPL-SCNC: 1.34 MMOL/L — HIGH (ref 1.15–1.33)
CA-I SERPL-SCNC: 1.34 MMOL/L — HIGH (ref 1.15–1.33)
CA-I SERPL-SCNC: 1.37 MMOL/L — HIGH (ref 1.15–1.33)
CALCIUM SERPL-MCNC: 10 MG/DL — SIGNIFICANT CHANGE UP (ref 8.4–10.5)
CALCIUM SERPL-MCNC: 10.9 MG/DL — HIGH (ref 8.4–10.5)
CHLORIDE BLDV-SCNC: 101 MMOL/L — SIGNIFICANT CHANGE UP (ref 96–108)
CHLORIDE BLDV-SCNC: 101 MMOL/L — SIGNIFICANT CHANGE UP (ref 96–108)
CHLORIDE BLDV-SCNC: 102 MMOL/L — SIGNIFICANT CHANGE UP (ref 96–108)
CHLORIDE SERPL-SCNC: 101 MMOL/L — SIGNIFICANT CHANGE UP (ref 96–108)
CHLORIDE SERPL-SCNC: 99 MMOL/L — SIGNIFICANT CHANGE UP (ref 96–108)
CO2 BLDV-SCNC: 24 MMOL/L — SIGNIFICANT CHANGE UP (ref 22–26)
CO2 BLDV-SCNC: 25 MMOL/L — SIGNIFICANT CHANGE UP (ref 22–26)
CO2 BLDV-SCNC: 26 MMOL/L — SIGNIFICANT CHANGE UP (ref 22–26)
CO2 BLDV-SCNC: 27 MMOL/L — HIGH (ref 22–26)
CO2 SERPL-SCNC: 19 MMOL/L — LOW (ref 22–31)
CO2 SERPL-SCNC: 21 MMOL/L — LOW (ref 22–31)
CREAT SERPL-MCNC: 0.87 MG/DL — SIGNIFICANT CHANGE UP (ref 0.5–1.3)
CREAT SERPL-MCNC: 1.03 MG/DL — SIGNIFICANT CHANGE UP (ref 0.5–1.3)
CULTURE RESULTS: SIGNIFICANT CHANGE UP
CULTURE RESULTS: SIGNIFICANT CHANGE UP
EGFR: 72 ML/MIN/1.73M2 — SIGNIFICANT CHANGE UP
EGFR: 86 ML/MIN/1.73M2 — SIGNIFICANT CHANGE UP
GAS PNL BLDA: SIGNIFICANT CHANGE UP
GAS PNL BLDV: 132 MMOL/L — LOW (ref 136–145)
GAS PNL BLDV: 133 MMOL/L — LOW (ref 136–145)
GAS PNL BLDV: 134 MMOL/L — LOW (ref 136–145)
GAS PNL BLDV: SIGNIFICANT CHANGE UP
GLUCOSE BLDV-MCNC: 122 MG/DL — HIGH (ref 70–99)
GLUCOSE BLDV-MCNC: 142 MG/DL — HIGH (ref 70–99)
GLUCOSE BLDV-MCNC: 162 MG/DL — HIGH (ref 70–99)
GLUCOSE BLDV-MCNC: 163 MG/DL — HIGH (ref 70–99)
GLUCOSE BLDV-MCNC: 189 MG/DL — HIGH (ref 70–99)
GLUCOSE SERPL-MCNC: 130 MG/DL — HIGH (ref 70–99)
GLUCOSE SERPL-MCNC: 189 MG/DL — HIGH (ref 70–99)
HCO3 BLDV-SCNC: 22 MMOL/L — SIGNIFICANT CHANGE UP (ref 22–29)
HCO3 BLDV-SCNC: 23 MMOL/L — SIGNIFICANT CHANGE UP (ref 22–29)
HCO3 BLDV-SCNC: 24 MMOL/L — SIGNIFICANT CHANGE UP (ref 22–29)
HCO3 BLDV-SCNC: 25 MMOL/L — SIGNIFICANT CHANGE UP (ref 22–29)
HCO3 BLDV-SCNC: 26 MMOL/L — SIGNIFICANT CHANGE UP (ref 22–29)
HCT VFR BLD CALC: 27.1 % — LOW (ref 39–50)
HCT VFR BLDA CALC: 26 % — LOW (ref 39–51)
HCT VFR BLDA CALC: 28 % — LOW (ref 39–51)
HGB BLD CALC-MCNC: 8.8 G/DL — LOW (ref 12.6–17.4)
HGB BLD CALC-MCNC: 9.2 G/DL — LOW (ref 12.6–17.4)
HGB BLD CALC-MCNC: 9.3 G/DL — LOW (ref 12.6–17.4)
HGB BLD-MCNC: 8.8 G/DL — LOW (ref 13–17)
HOROWITZ INDEX BLDV+IHG-RTO: 100 — SIGNIFICANT CHANGE UP
HOROWITZ INDEX BLDV+IHG-RTO: 40 — SIGNIFICANT CHANGE UP
HOROWITZ INDEX BLDV+IHG-RTO: 40 — SIGNIFICANT CHANGE UP
HOROWITZ INDEX BLDV+IHG-RTO: 50 — SIGNIFICANT CHANGE UP
LACTATE BLDV-MCNC: 1.8 MMOL/L — SIGNIFICANT CHANGE UP (ref 0.5–2)
LACTATE BLDV-MCNC: 1.9 MMOL/L — SIGNIFICANT CHANGE UP (ref 0.5–2)
LACTATE BLDV-MCNC: 2 MMOL/L — SIGNIFICANT CHANGE UP (ref 0.5–2)
LACTATE BLDV-MCNC: 2.2 MMOL/L — HIGH (ref 0.5–2)
LACTATE BLDV-MCNC: 2.2 MMOL/L — HIGH (ref 0.5–2)
MAGNESIUM SERPL-MCNC: 2.9 MG/DL — HIGH (ref 1.6–2.6)
MCHC RBC-ENTMCNC: 31.8 PG — SIGNIFICANT CHANGE UP (ref 27–34)
MCHC RBC-ENTMCNC: 32.5 GM/DL — SIGNIFICANT CHANGE UP (ref 32–36)
MCV RBC AUTO: 97.8 FL — SIGNIFICANT CHANGE UP (ref 80–100)
NRBC # BLD: 1 /100 WBCS — HIGH (ref 0–0)
PCO2 BLDV: 43 MMHG — SIGNIFICANT CHANGE UP (ref 42–55)
PCO2 BLDV: 43 MMHG — SIGNIFICANT CHANGE UP (ref 42–55)
PCO2 BLDV: 44 MMHG — SIGNIFICANT CHANGE UP (ref 42–55)
PCO2 BLDV: 45 MMHG — SIGNIFICANT CHANGE UP (ref 42–55)
PCO2 BLDV: 46 MMHG — SIGNIFICANT CHANGE UP (ref 42–55)
PCO2 BLDV: 47 MMHG — SIGNIFICANT CHANGE UP (ref 42–55)
PCO2 BLDV: 48 MMHG — SIGNIFICANT CHANGE UP (ref 42–55)
PCO2 BLDV: 48 MMHG — SIGNIFICANT CHANGE UP (ref 42–55)
PCO2 BLDV: 49 MMHG — SIGNIFICANT CHANGE UP (ref 42–55)
PCO2 BLDV: 56 MMHG — HIGH (ref 42–55)
PH BLDV: 7.27 — LOW (ref 7.32–7.43)
PH BLDV: 7.29 — LOW (ref 7.32–7.43)
PH BLDV: 7.3 — LOW (ref 7.32–7.43)
PH BLDV: 7.3 — LOW (ref 7.32–7.43)
PH BLDV: 7.31 — LOW (ref 7.32–7.43)
PH BLDV: 7.32 — SIGNIFICANT CHANGE UP (ref 7.32–7.43)
PH BLDV: 7.33 — SIGNIFICANT CHANGE UP (ref 7.32–7.43)
PH BLDV: 7.33 — SIGNIFICANT CHANGE UP (ref 7.32–7.43)
PH BLDV: 7.36 — SIGNIFICANT CHANGE UP (ref 7.32–7.43)
PH BLDV: 7.37 — SIGNIFICANT CHANGE UP (ref 7.32–7.43)
PHOSPHATE SERPL-MCNC: 2.2 MG/DL — LOW (ref 2.5–4.5)
PHOSPHATE SERPL-MCNC: 2.4 MG/DL — LOW (ref 2.5–4.5)
PLATELET # BLD AUTO: 84 K/UL — LOW (ref 150–400)
PO2 BLDV: 20 MMHG — LOW (ref 25–45)
PO2 BLDV: 29 MMHG — SIGNIFICANT CHANGE UP (ref 25–45)
PO2 BLDV: 31 MMHG — SIGNIFICANT CHANGE UP (ref 25–45)
PO2 BLDV: 32 MMHG — SIGNIFICANT CHANGE UP (ref 25–45)
PO2 BLDV: 32 MMHG — SIGNIFICANT CHANGE UP (ref 25–45)
PO2 BLDV: 33 MMHG — SIGNIFICANT CHANGE UP (ref 25–45)
PO2 BLDV: 35 MMHG — SIGNIFICANT CHANGE UP (ref 25–45)
PO2 BLDV: 38 MMHG — SIGNIFICANT CHANGE UP (ref 25–45)
PO2 BLDV: 40 MMHG — SIGNIFICANT CHANGE UP (ref 25–45)
PO2 BLDV: 52 MMHG — HIGH (ref 25–45)
POTASSIUM BLDV-SCNC: 4.1 MMOL/L — SIGNIFICANT CHANGE UP (ref 3.5–5.1)
POTASSIUM BLDV-SCNC: 4.4 MMOL/L — SIGNIFICANT CHANGE UP (ref 3.5–5.1)
POTASSIUM BLDV-SCNC: 4.5 MMOL/L — SIGNIFICANT CHANGE UP (ref 3.5–5.1)
POTASSIUM BLDV-SCNC: 4.5 MMOL/L — SIGNIFICANT CHANGE UP (ref 3.5–5.1)
POTASSIUM BLDV-SCNC: 4.7 MMOL/L — SIGNIFICANT CHANGE UP (ref 3.5–5.1)
POTASSIUM SERPL-MCNC: 4.3 MMOL/L — SIGNIFICANT CHANGE UP (ref 3.5–5.3)
POTASSIUM SERPL-MCNC: 4.5 MMOL/L — SIGNIFICANT CHANGE UP (ref 3.5–5.3)
POTASSIUM SERPL-SCNC: 4.3 MMOL/L — SIGNIFICANT CHANGE UP (ref 3.5–5.3)
POTASSIUM SERPL-SCNC: 4.5 MMOL/L — SIGNIFICANT CHANGE UP (ref 3.5–5.3)
PROT SERPL-MCNC: 6.8 G/DL — SIGNIFICANT CHANGE UP (ref 6–8.3)
PROT SERPL-MCNC: 7.6 G/DL — SIGNIFICANT CHANGE UP (ref 6–8.3)
RBC # BLD: 2.77 M/UL — LOW (ref 4.2–5.8)
RBC # FLD: 18.3 % — HIGH (ref 10.3–14.5)
SAO2 % BLDV: 29 % — LOW (ref 67–88)
SAO2 % BLDV: 48.4 % — LOW (ref 67–88)
SAO2 % BLDV: 49.9 % — LOW (ref 67–88)
SAO2 % BLDV: 51.1 % — LOW (ref 67–88)
SAO2 % BLDV: 51.5 % — LOW (ref 67–88)
SAO2 % BLDV: 53.1 % — LOW (ref 67–88)
SAO2 % BLDV: 56.4 % — LOW (ref 67–88)
SAO2 % BLDV: 64.3 % — LOW (ref 67–88)
SAO2 % BLDV: 66.3 % — LOW (ref 67–88)
SAO2 % BLDV: 84.5 % — SIGNIFICANT CHANGE UP (ref 67–88)
SODIUM SERPL-SCNC: 135 MMOL/L — SIGNIFICANT CHANGE UP (ref 135–145)
SODIUM SERPL-SCNC: 135 MMOL/L — SIGNIFICANT CHANGE UP (ref 135–145)
SPECIMEN SOURCE: SIGNIFICANT CHANGE UP
SPECIMEN SOURCE: SIGNIFICANT CHANGE UP
VANCOMYCIN TROUGH SERPL-MCNC: 13.3 UG/ML — SIGNIFICANT CHANGE UP (ref 10–20)
VANCOMYCIN TROUGH SERPL-MCNC: 14 UG/ML — SIGNIFICANT CHANGE UP (ref 10–20)
VANCOMYCIN TROUGH SERPL-MCNC: 15.7 UG/ML — SIGNIFICANT CHANGE UP (ref 10–20)
WBC # BLD: 15.13 K/UL — HIGH (ref 3.8–10.5)
WBC # FLD AUTO: 15.13 K/UL — HIGH (ref 3.8–10.5)

## 2024-03-18 PROCEDURE — 99232 SBSQ HOSP IP/OBS MODERATE 35: CPT

## 2024-03-18 PROCEDURE — 31645 BRNCHSC W/THER ASPIR 1ST: CPT

## 2024-03-18 PROCEDURE — 99232 SBSQ HOSP IP/OBS MODERATE 35: CPT | Mod: 25

## 2024-03-18 PROCEDURE — 31231 NASAL ENDOSCOPY DX: CPT

## 2024-03-18 PROCEDURE — 31500 INSERT EMERGENCY AIRWAY: CPT

## 2024-03-18 PROCEDURE — 99292 CRITICAL CARE ADDL 30 MIN: CPT | Mod: 25

## 2024-03-18 PROCEDURE — 99291 CRITICAL CARE FIRST HOUR: CPT | Mod: 25

## 2024-03-18 PROCEDURE — 71045 X-RAY EXAM CHEST 1 VIEW: CPT | Mod: 26

## 2024-03-18 RX ORDER — CHLORHEXIDINE GLUCONATE 213 G/1000ML
15 SOLUTION TOPICAL EVERY 12 HOURS
Refills: 0 | Status: DISCONTINUED | OUTPATIENT
Start: 2024-03-18 | End: 2024-03-23

## 2024-03-18 RX ORDER — DEXMEDETOMIDINE HYDROCHLORIDE IN 0.9% SODIUM CHLORIDE 4 UG/ML
0.02 INJECTION INTRAVENOUS
Qty: 200 | Refills: 0 | Status: DISCONTINUED | OUTPATIENT
Start: 2024-03-18 | End: 2024-03-24

## 2024-03-18 RX ORDER — LEVOTHYROXINE SODIUM 125 MCG
37.5 TABLET ORAL AT BEDTIME
Refills: 0 | Status: DISCONTINUED | OUTPATIENT
Start: 2024-03-18 | End: 2024-03-24

## 2024-03-18 RX ORDER — ROCURONIUM BROMIDE 10 MG/ML
50 VIAL (ML) INTRAVENOUS ONCE
Refills: 0 | Status: DISCONTINUED | OUTPATIENT
Start: 2024-03-18 | End: 2024-03-23

## 2024-03-18 RX ORDER — PANTOPRAZOLE SODIUM 20 MG/1
40 TABLET, DELAYED RELEASE ORAL DAILY
Refills: 0 | Status: DISCONTINUED | OUTPATIENT
Start: 2024-03-18 | End: 2024-03-19

## 2024-03-18 RX ORDER — CALCIUM CHLORIDE
1000 POWDER (GRAM) MISCELLANEOUS ONCE
Refills: 0 | Status: COMPLETED | OUTPATIENT
Start: 2024-03-18 | End: 2024-03-18

## 2024-03-18 RX ORDER — TRANEXAMIC ACID 100 MG/ML
500 INJECTION, SOLUTION INTRAVENOUS EVERY 8 HOURS
Refills: 0 | Status: COMPLETED | OUTPATIENT
Start: 2024-03-18 | End: 2024-03-21

## 2024-03-18 RX ORDER — ETOMIDATE 2 MG/ML
40 INJECTION INTRAVENOUS ONCE
Refills: 0 | Status: DISCONTINUED | OUTPATIENT
Start: 2024-03-18 | End: 2024-03-23

## 2024-03-18 RX ORDER — PROPOFOL 10 MG/ML
10 INJECTION, EMULSION INTRAVENOUS
Qty: 500 | Refills: 0 | Status: DISCONTINUED | OUTPATIENT
Start: 2024-03-18 | End: 2024-03-21

## 2024-03-18 RX ADMIN — Medication 100 MILLIGRAM(S): at 06:26

## 2024-03-18 RX ADMIN — CHLORHEXIDINE GLUCONATE 15 MILLILITER(S): 213 SOLUTION TOPICAL at 18:21

## 2024-03-18 RX ADMIN — FLUCONAZOLE 100 MILLIGRAM(S): 150 TABLET ORAL at 08:31

## 2024-03-18 RX ADMIN — Medication 100 MILLIGRAM(S): at 13:13

## 2024-03-18 RX ADMIN — Medication 3.81 MICROGRAM(S)/KG/MIN: at 19:31

## 2024-03-18 RX ADMIN — MIDODRINE HYDROCHLORIDE 20 MILLIGRAM(S): 2.5 TABLET ORAL at 21:21

## 2024-03-18 RX ADMIN — SODIUM CHLORIDE 4 MILLILITER(S): 9 INJECTION INTRAMUSCULAR; INTRAVENOUS; SUBCUTANEOUS at 05:38

## 2024-03-18 RX ADMIN — PANTOPRAZOLE SODIUM 40 MILLIGRAM(S): 20 TABLET, DELAYED RELEASE ORAL at 08:31

## 2024-03-18 RX ADMIN — SERTRALINE 50 MILLIGRAM(S): 25 TABLET, FILM COATED ORAL at 06:24

## 2024-03-18 RX ADMIN — Medication 1000 MILLIGRAM(S): at 03:54

## 2024-03-18 RX ADMIN — Medication 10.2 MICROGRAM(S)/KG/MIN: at 19:31

## 2024-03-18 RX ADMIN — Medication 2: at 23:38

## 2024-03-18 RX ADMIN — DEXMEDETOMIDINE HYDROCHLORIDE IN 0.9% SODIUM CHLORIDE 0.34 MICROGRAM(S)/KG/HR: 4 INJECTION INTRAVENOUS at 13:52

## 2024-03-18 RX ADMIN — Medication 250 MILLIGRAM(S): at 18:38

## 2024-03-18 RX ADMIN — Medication 100 MILLIGRAM(S): at 13:14

## 2024-03-18 RX ADMIN — POLYETHYLENE GLYCOL 3350 17 GRAM(S): 17 POWDER, FOR SOLUTION ORAL at 13:51

## 2024-03-18 RX ADMIN — Medication 1 APPLICATION(S): at 07:22

## 2024-03-18 RX ADMIN — Medication 75 MICROGRAM(S): at 06:45

## 2024-03-18 RX ADMIN — Medication 1 MILLIGRAM(S): at 13:13

## 2024-03-18 RX ADMIN — ERYTHROPOIETIN 20000 UNIT(S): 10000 INJECTION, SOLUTION INTRAVENOUS; SUBCUTANEOUS at 20:02

## 2024-03-18 RX ADMIN — Medication 5 MILLIGRAM(S): at 21:22

## 2024-03-18 RX ADMIN — PROPOFOL 4.07 MICROGRAM(S)/KG/MIN: 10 INJECTION, EMULSION INTRAVENOUS at 19:31

## 2024-03-18 RX ADMIN — SODIUM CHLORIDE 3 MILLILITER(S): 9 INJECTION INTRAMUSCULAR; INTRAVENOUS; SUBCUTANEOUS at 14:00

## 2024-03-18 RX ADMIN — VASOPRESSIN 6 UNIT(S)/MIN: 20 INJECTION INTRAVENOUS at 19:30

## 2024-03-18 RX ADMIN — Medication 5 MILLILITER(S): at 13:51

## 2024-03-18 RX ADMIN — MEROPENEM 100 MILLIGRAM(S): 1 INJECTION INTRAVENOUS at 13:13

## 2024-03-18 RX ADMIN — TRANEXAMIC ACID 500 MILLIGRAM(S): 100 INJECTION, SOLUTION INTRAVENOUS at 22:48

## 2024-03-18 RX ADMIN — Medication 500 MILLIGRAM(S): at 13:12

## 2024-03-18 RX ADMIN — Medication 2: at 13:22

## 2024-03-18 RX ADMIN — Medication 250 MILLIGRAM(S): at 12:12

## 2024-03-18 RX ADMIN — MEROPENEM 100 MILLIGRAM(S): 1 INJECTION INTRAVENOUS at 19:55

## 2024-03-18 RX ADMIN — ATORVASTATIN CALCIUM 40 MILLIGRAM(S): 80 TABLET, FILM COATED ORAL at 21:21

## 2024-03-18 RX ADMIN — MIDODRINE HYDROCHLORIDE 20 MILLIGRAM(S): 2.5 TABLET ORAL at 13:12

## 2024-03-18 RX ADMIN — TRANEXAMIC ACID 500 MILLIGRAM(S): 100 INJECTION, SOLUTION INTRAVENOUS at 17:05

## 2024-03-18 RX ADMIN — Medication 2: at 06:44

## 2024-03-18 RX ADMIN — Medication 1 APPLICATION(S): at 18:38

## 2024-03-18 RX ADMIN — CHLORHEXIDINE GLUCONATE 15 MILLILITER(S): 213 SOLUTION TOPICAL at 06:24

## 2024-03-18 RX ADMIN — Medication 5 MILLIGRAM(S): at 06:24

## 2024-03-18 RX ADMIN — PREGABALIN 1000 MICROGRAM(S): 225 CAPSULE ORAL at 13:12

## 2024-03-18 RX ADMIN — DEXMEDETOMIDINE HYDROCHLORIDE IN 0.9% SODIUM CHLORIDE 0.34 MICROGRAM(S)/KG/HR: 4 INJECTION INTRAVENOUS at 19:32

## 2024-03-18 RX ADMIN — MEROPENEM 100 MILLIGRAM(S): 1 INJECTION INTRAVENOUS at 05:01

## 2024-03-18 RX ADMIN — SODIUM CHLORIDE 4 MILLILITER(S): 9 INJECTION INTRAMUSCULAR; INTRAVENOUS; SUBCUTANEOUS at 17:07

## 2024-03-18 RX ADMIN — EPINEPHRINE 12.7 MICROGRAM(S)/KG/MIN: 0.3 INJECTION INTRAMUSCULAR; SUBCUTANEOUS at 19:32

## 2024-03-18 RX ADMIN — Medication 5 MILLIGRAM(S): at 13:14

## 2024-03-18 RX ADMIN — Medication 63.75 MILLIMOLE(S): at 01:15

## 2024-03-18 RX ADMIN — SODIUM CHLORIDE 3 MILLILITER(S): 9 INJECTION INTRAMUSCULAR; INTRAVENOUS; SUBCUTANEOUS at 05:24

## 2024-03-18 RX ADMIN — Medication 2: at 18:25

## 2024-03-18 RX ADMIN — MIDODRINE HYDROCHLORIDE 20 MILLIGRAM(S): 2.5 TABLET ORAL at 06:24

## 2024-03-18 RX ADMIN — SENNA PLUS 2 TABLET(S): 8.6 TABLET ORAL at 21:21

## 2024-03-18 RX ADMIN — CHLORHEXIDINE GLUCONATE 1 APPLICATION(S): 213 SOLUTION TOPICAL at 07:14

## 2024-03-18 RX ADMIN — Medication 37.5 MICROGRAM(S): at 21:22

## 2024-03-18 RX ADMIN — AMIODARONE HYDROCHLORIDE 200 MILLIGRAM(S): 400 TABLET ORAL at 06:25

## 2024-03-18 NOTE — PROGRESS NOTE ADULT - SUBJECTIVE AND OBJECTIVE BOX
CRITICAL CARE ATTENDING - CTICU    MEDICATIONS  (STANDING):  allopurinol 100 milliGRAM(s) Oral daily  aMIOdarone    Tablet 200 milliGRAM(s) Oral daily  ascorbic acid 500 milliGRAM(s) Oral daily  atorvastatin 40 milliGRAM(s) Oral at bedtime  Biotene Dry Mouth Oral Rinse 5 milliLiter(s) Swish and Spit daily  chlorhexidine 0.12% Liquid 15 milliLiter(s) Oral Mucosa every 12 hours  chlorhexidine 2% Cloths 1 Application(s) Topical <User Schedule>  CRRT Treatment    <Continuous>  cyanocobalamin 1000 MICROGram(s) Oral daily  dextrose 50% Injectable 25 Gram(s) IV Push once  DOBUTamine Infusion 10 MICROgram(s)/kG/Min (10.2 mL/Hr) IV Continuous <Continuous>  enoxaparin Injectable 30 milliGRAM(s) SubCutaneous every 12 hours  EPINEPHrine    Infusion 0.1 MICROgram(s)/kG/Min (12.7 mL/Hr) IV Continuous <Continuous>  epoetin zara-epbx (RETACRIT) Injectable 56352 Unit(s) IV Push <User Schedule>  fluconAZOLE IVPB      fluconAZOLE IVPB 200 milliGRAM(s) IV Intermittent every 24 hours  folic acid 1 milliGRAM(s) Oral daily  hydrocortisone sodium succinate Injectable 100 milliGRAM(s) IV Push every 8 hours  insulin lispro (ADMELOG) corrective regimen sliding scale   SubCutaneous every 6 hours  levothyroxine 75 MICROGram(s) Oral daily  lidocaine   4% Patch 1 Patch Transdermal daily  melatonin 5 milliGRAM(s) Oral at bedtime  meropenem  IVPB 1000 milliGRAM(s) IV Intermittent every 8 hours  meropenem  IVPB      metoclopramide Injectable 5 milliGRAM(s) IV Push every 8 hours  midodrine 20 milliGRAM(s) Oral every 8 hours  norepinephrine Infusion 0.06 MICROgram(s)/kG/Min (3.81 mL/Hr) IV Continuous <Continuous>  pantoprazole    Tablet 40 milliGRAM(s) Oral before breakfast  petrolatum white Ointment 1 Application(s) Topical two times a day  polyethylene glycol 3350 17 Gram(s) Oral daily  PrismaSATE Dialysate BGK 4 / 2.5 5000 milliLiter(s) (1200 mL/Hr) CRRT <Continuous>  PrismaSOL Filtration BGK 4 / 2.5 5000 milliLiter(s) (400 mL/Hr) CRRT <Continuous>  PrismaSOL Filtration BGK 4 / 2.5 5000 milliLiter(s) (600 mL/Hr) CRRT <Continuous>  senna 2 Tablet(s) Oral at bedtime  sertraline 50 milliGRAM(s) Oral daily  sodium chloride 0.9% lock flush 3 milliLiter(s) IV Push every 8 hours  sodium chloride 3%  Inhalation 4 milliLiter(s) Inhalation every 12 hours  thiamine 100 milliGRAM(s) Oral daily  vancomycin  IVPB 750 milliGRAM(s) IV Intermittent every 12 hours  vasopressin Infusion 0.04 Unit(s)/Min (6 mL/Hr) IV Continuous <Continuous>                                    8.8    15.13 )-----------( 84       ( 18 Mar 2024 00:26 )             27.1       03-18    135  |  99  |  25<H>  ----------------------------<  130<H>  4.5   |  19<L>  |  1.03    Ca    10.9<H>      18 Mar 2024 00:26  Phos  2.2     18  Mg     2.9     18    TPro  7.6  /  Alb  5.0  /  TBili  7.2<H>  /  DBili  x   /  AST  69<H>  /  ALT  29  /  AlkPhos  153<H>  18      PT/INR - ( 17 Mar 2024 05:52 )   PT: 13.3 sec;   INR: 1.28 ratio         PTT - ( 17 Mar 2024 05:52 )  PTT:32.5 sec    Mode: AC/ CMV (Assist Control/ Continuous Mandatory Ventilation)  RR (machine): 14  TV (machine): 500  FiO2: 50  PEEP: 5  ITime: 1  MAP: 12  PIP: 26      Daily     Daily Weight in k.9 (18 Mar 2024 00:00)      -16 @ 07:  -   @ 07:00  --------------------------------------------------------  IN: 3584.4 mL / OUT: 4184 mL / NET: -599.6 mL     @ 07: @ 06:08  --------------------------------------------------------  IN: 3306.9 mL / OUT: 1081 mL / NET: 2225.9 mL          Critically Ill patient  : [ x] preoperative - MVR   [ ] post operative,   [ x] non operative ?    Requires :  [x] Arterial Line   [x] Central Line  [x ] PA catheter  [ ] IABP  [ ] ECMO  [ ] LVAD  [x ] Ventilator  [x ] pacemaker- PPM [ ] Impella  [x] CVVHD                      [x ] ABG's     [ x] Pulse Oxymetry Monitoring  Bedside evaluation , monitoring , treatment of hemodynamics , fluids , IVP/ IVCD meds.        Diagnosis:     3/5 - Tx to ICU for Hypotension/CHF/renal failure    Pre Op Evaluation / Optimization - Re Op MVR     3/13 - R. fem IABP placement failed (tortuous anatomy)    h/o AVR / MVR     Cardiogenic Shock     Hypotension     Mitral Regurgitation     Respiratory Failure    Pulmonary Hypertension 2 to biventricular cardiac dysfunction     Requires chest PT, pulmonary toilet, suctioning to maintain SaO2,  patent airway and treat atelectasis.     Hypoxemia - Requires [ ] BIPAP  [ ] HFNC  [x] Nitric oxide 40ppm     Difficult weaning process - multiple organ system involvement in critically ill patient     Ventilator Management:  [ x]AC-rest    [ ]CPAP-PS Wean    [ ]Trach Collar     [ ]Extubate    [ ] T-Piece  [ ]peep>5     Paced Rhythm     AICD/PPM    Swanz Salvatore catheter interpretation and therapeutic management of unstable hemodynamics     CHF- acute [x]   chronic [x]    systolic [x]   diatolic [ ]    Valvular  [x]           - Echo- EF -   37% /  MR       [x ] RV dysfunction          - Cxr-cardiomegally, edema          - Clinical-  [x]inotropes   [x]pressors   [x ]diuresis   [ ]IABP   [ ]ECMO   [ ]LVAD   [x ] Respiratory Failure [x] CVVHD      Hemodynamic lability,  instability. Requires IVCD [x] vasopressors [x] inotropes  [ x]  CVVHD   [ ]IVSS fluid / Blood Products to maintain MAP, perfusion, C.I.     Hyperglycemia - Insulin Sliding Scale     Synthroid for Hypothyroidism    Thrombocytopenia     Renal Failure - Acute Kidney Injury                                                    Metabolic Acidosis     CVVHD - negative balance    Chronic Renal failure    Cardiac Cachexia - NG - goal rate feeds    Tolerates NG feeds at    [ ] trophic rate    [x ]  40cc/hr     rate     Requires bedside physical therapy, mobilization and total detention care.     Strongly consider sepsis - worsening CXR / advancing pressor requirements            I, Ildefonso Bañuelos, personally performed the services described in this documentation. All medical record entries made by the scribe were at my direction and in my presence. I have reviewed the chart and agree that the record reflects my personal performance and is accurate and complete.   Ildefonso Bañuelos MD.       By signing my name below, I, Kev Hanna, attest that this documentation has been prepared under the direction and in the presence of Ildefonso Bañuelos MD.   Electronically Signed: Home Ravi 24 @ 06:08        Discussed with CT surgeon, Physician Assistant - Nurse Practitioner- Critical care medicine team.   Dicussed at  AM / PM rounds.   Chart, labs , films reviewed.    Cumulative Critical Care Time Given Today:  CRITICAL CARE ATTENDING - CTICU    MEDICATIONS  (STANDING):  allopurinol 100 milliGRAM(s) Oral daily  aMIOdarone    Tablet 200 milliGRAM(s) Oral daily  ascorbic acid 500 milliGRAM(s) Oral daily  atorvastatin 40 milliGRAM(s) Oral at bedtime  Biotene Dry Mouth Oral Rinse 5 milliLiter(s) Swish and Spit daily  chlorhexidine 0.12% Liquid 15 milliLiter(s) Oral Mucosa every 12 hours  chlorhexidine 2% Cloths 1 Application(s) Topical <User Schedule>  CRRT Treatment    <Continuous>  cyanocobalamin 1000 MICROGram(s) Oral daily  dextrose 50% Injectable 25 Gram(s) IV Push once  DOBUTamine Infusion 10 MICROgram(s)/kG/Min (10.2 mL/Hr) IV Continuous <Continuous>  enoxaparin Injectable 30 milliGRAM(s) SubCutaneous every 12 hours  EPINEPHrine    Infusion 0.1 MICROgram(s)/kG/Min (12.7 mL/Hr) IV Continuous <Continuous>  epoetin zara-epbx (RETACRIT) Injectable 71257 Unit(s) IV Push <User Schedule>  fluconAZOLE IVPB      fluconAZOLE IVPB 200 milliGRAM(s) IV Intermittent every 24 hours  folic acid 1 milliGRAM(s) Oral daily  hydrocortisone sodium succinate Injectable 100 milliGRAM(s) IV Push every 8 hours  insulin lispro (ADMELOG) corrective regimen sliding scale   SubCutaneous every 6 hours  levothyroxine 75 MICROGram(s) Oral daily  lidocaine   4% Patch 1 Patch Transdermal daily  melatonin 5 milliGRAM(s) Oral at bedtime  meropenem  IVPB 1000 milliGRAM(s) IV Intermittent every 8 hours  meropenem  IVPB      metoclopramide Injectable 5 milliGRAM(s) IV Push every 8 hours  midodrine 20 milliGRAM(s) Oral every 8 hours  norepinephrine Infusion 0.06 MICROgram(s)/kG/Min (3.81 mL/Hr) IV Continuous <Continuous>  pantoprazole    Tablet 40 milliGRAM(s) Oral before breakfast  petrolatum white Ointment 1 Application(s) Topical two times a day  polyethylene glycol 3350 17 Gram(s) Oral daily  PrismaSATE Dialysate BGK 4 / 2.5 5000 milliLiter(s) (1200 mL/Hr) CRRT <Continuous>  PrismaSOL Filtration BGK 4 / 2.5 5000 milliLiter(s) (400 mL/Hr) CRRT <Continuous>  PrismaSOL Filtration BGK 4 / 2.5 5000 milliLiter(s) (600 mL/Hr) CRRT <Continuous>  senna 2 Tablet(s) Oral at bedtime  sertraline 50 milliGRAM(s) Oral daily  sodium chloride 0.9% lock flush 3 milliLiter(s) IV Push every 8 hours  sodium chloride 3%  Inhalation 4 milliLiter(s) Inhalation every 12 hours  thiamine 100 milliGRAM(s) Oral daily  vancomycin  IVPB 750 milliGRAM(s) IV Intermittent every 12 hours  vasopressin Infusion 0.04 Unit(s)/Min (6 mL/Hr) IV Continuous <Continuous>                                    8.8    15.13 )-----------( 84       ( 18 Mar 2024 00:26 )             27.1       03-18    135  |  99  |  25<H>  ----------------------------<  130<H>  4.5   |  19<L>  |  1.03    Ca    10.9<H>      18 Mar 2024 00:26  Phos  2.2     18  Mg     2.9     18    TPro  7.6  /  Alb  5.0  /  TBili  7.2<H>  /  DBili  x   /  AST  69<H>  /  ALT  29  /  AlkPhos  153<H>  18      PT/INR - ( 17 Mar 2024 05:52 )   PT: 13.3 sec;   INR: 1.28 ratio         PTT - ( 17 Mar 2024 05:52 )  PTT:32.5 sec    Mode: AC/ CMV (Assist Control/ Continuous Mandatory Ventilation)  RR (machine): 14  TV (machine): 500  FiO2: 50  PEEP: 5  ITime: 1  MAP: 12  PIP: 26      Daily     Daily Weight in k.9 (18 Mar 2024 00:00)      -16 @ 07:  -   @ 07:00  --------------------------------------------------------  IN: 3584.4 mL / OUT: 4184 mL / NET: -599.6 mL     @ 07: @ 06:08  --------------------------------------------------------  IN: 3306.9 mL / OUT: 1081 mL / NET: 2225.9 mL          Critically Ill patient  : [ x] preoperative - MVR   [ ] post operative,   [ x] non operative ?    Requires :  [x] Arterial Line   [x] Central Line  [x ] PA catheter  [ ] IABP  [ ] ECMO  [ ] LVAD  [x ] Ventilator  [x ] pacemaker- PPM [ ] Impella  [x] CVVHD                      [x ] ABG's     [ x] Pulse Oxymetry Monitoring  Bedside evaluation , monitoring , treatment of hemodynamics , fluids , IVP/ IVCD meds.        Diagnosis:     3/5 - Tx to ICU for Hypotension/CHF/renal failure    Pre Op Evaluation / Optimization - Re Op MVR     3/13 - R. fem IABP placement failed (tortuous anatomy)    h/o AVR / MVR     Cardiogenic Shock     Hypotension     Mitral Regurgitation     Respiratory Failure    Pulmonary Hypertension 2 to biventricular cardiac dysfunction     Requires chest PT, pulmonary toilet, ambu bagging, suctioning to maintain SaO2,  patent airway and treat atelectasis.     Hypoxemia - Requires [ ] BIPAP  [ ] HFNC  [x] Nitric oxide 40ppm     Difficult weaning process - multiple organ system involvement in critically ill patient     Ventilator Management:  [ x]AC-rest    [ ]CPAP-PS Wean    [ ]Trach Collar     [ ]Extubate    [ ] T-Piece  [ ]peep>5     Paced Rhythm     AICD/PPM    Swanz Salvatore catheter interpretation and therapeutic management of unstable hemodynamics     CHF- acute [x]   chronic [x]    systolic [x]   diatolic [ ]    Valvular  [x]           - Echo- EF -   37% /  MR       [x ] RV dysfunction          - Cxr-cardiomegally, edema          - Clinical-  [x]inotropes   [x]pressors   [x ]diuresis   [ ]IABP   [ ]ECMO   [ ]LVAD   [x ] Respiratory Failure [x] CVVHD      Hemodynamic lability,  instability. Requires IVCD [x] vasopressors [x] inotropes  [ x]  CVVHD   [ ]IVSS fluid / Blood Products to maintain MAP, perfusion, C.I.     Hyperglycemia - Insulin Sliding Scale     Synthroid for Hypothyroidism    Thrombocytopenia ? DIC ?     Renal Failure - Acute Kidney Injury                                                    Metabolic Acidosis     CVVHD - negative balance    Chronic Renal failure    Renal Failure - Acute Kidney Injury     Cardiac Cachexia - NG - goal rate feeds    Tolerates NG feeds at    [ ] trophic rate    [x ]  40cc/hr     rate     Requires bedside physical therapy, mobilization and total long-term care.     Strongly consider sepsis - worsening CXR / advancing pressor requirements            I, Ildefonso Bañuelos, personally performed the services described in this documentation. All medical record entries made by the scribe were at my direction and in my presence. I have reviewed the chart and agree that the record reflects my personal performance and is accurate and complete.   Ildefonso Bañuelos MD.       By signing my name below, I, Kev Hanna, attest that this documentation has been prepared under the direction and in the presence of Ildefonso Bañuelos MD.   Electronically Signed: Home Ravi 24 @ 06:08        Discussed with CT surgeon, Physician Assistant - Nurse Practitioner- Critical care medicine team.   Dicussed at  AM / PM rounds.   Chart, labs , films reviewed.    Cumulative Critical Care Time Given Today:  105 min

## 2024-03-18 NOTE — PROGRESS NOTE ADULT - SUBJECTIVE AND OBJECTIVE BOX
Urology Progress Note     Subjective/24hour Events:   Patient seen and examined.   No acute events overnight.       Vital Signs:  Vital Signs Last 24 Hrs  T(C): 34.6 (18 Mar 2024 08:00), Max: 36.7 (18 Mar 2024 04:00)  T(F): 94.3 (18 Mar 2024 08:00), Max: 98 (18 Mar 2024 04:00)  HR: 80 (18 Mar 2024 15:16) (80 - 100)  BP: --  BP(mean): --  RR: 18 (18 Mar 2024 15:00) (16 - 34)  SpO2: 100% (18 Mar 2024 15:16) (90% - 100%)    Parameters below as of 18 Mar 2024 11:15  Patient On (Oxygen Delivery Method): ventilator    O2 Concentration (%): 50    CAPILLARY BLOOD GLUCOSE  126 (18 Mar 2024 00:00)      POCT Blood Glucose.: 165 mg/dL (18 Mar 2024 06:30)  POCT Blood Glucose.: 126 mg/dL (18 Mar 2024 00:02)  POCT Blood Glucose.: 158 mg/dL (17 Mar 2024 17:11)      I&O's Detail    17 Mar 2024 07:01  -  18 Mar 2024 07:00  --------------------------------------------------------  IN:    Albumin 25%  -  50 mL: 300 mL    DOBUTamine: 244.8 mL    Enteral Tube Flush: 190 mL    EPINEPHrine: 304.8 mL    IV PiggyBack: 250 mL    IV PiggyBack: 550 mL    Nepro with Carb Steady: 630 mL    Norepinephrine: 355.9 mL    PRBCs (Packed Red Blood Cells): 300 mL    Propofol: 4.1 mL    Sodium Chloride 0.9% Bolus: 365 mL    Vasopressin: 352.5 mL  Total IN: 3847.1 mL    OUT:    dextrose 5%: 0 mL    Indwelling Catheter - Urethral (mL): 5 mL    Other (mL): 1195 mL  Total OUT: 1200 mL    Total NET: 2647.1 mL      18 Mar 2024 07:01  -  18 Mar 2024 16:19  --------------------------------------------------------  IN:    Dexmedetomidine: 10.2 mL    DOBUTamine: 81.6 mL    EPINEPHrine: 101.6 mL    IV PiggyBack: 400 mL    Nepro with Carb Steady: 130 mL    Norepinephrine: 25.6 mL    Propofol: 32.8 mL    Sodium Chloride 0.9% Bolus: 40 mL    Vasopressin: 120 mL  Total IN: 941.8 mL    OUT:    Indwelling Catheter - Urethral (mL): 0 mL    Other (mL): 507 mL  Total OUT: 507 mL    Total NET: 434.8 mL          MEDICATIONS  (STANDING):  allopurinol 100 milliGRAM(s) Oral daily  aMIOdarone    Tablet 200 milliGRAM(s) Oral daily  ascorbic acid 500 milliGRAM(s) Oral daily  atorvastatin 40 milliGRAM(s) Oral at bedtime  Biotene Dry Mouth Oral Rinse 5 milliLiter(s) Swish and Spit daily  chlorhexidine 0.12% Liquid 15 milliLiter(s) Oral Mucosa every 12 hours  chlorhexidine 2% Cloths 1 Application(s) Topical <User Schedule>  CRRT Treatment    <Continuous>  cyanocobalamin 1000 MICROGram(s) Oral daily  dexMEDEtomidine Infusion 0.02 MICROgram(s)/kG/Hr (0.34 mL/Hr) IV Continuous <Continuous>  dextrose 50% Injectable 25 Gram(s) IV Push once  DOBUTamine Infusion 10 MICROgram(s)/kG/Min (10.2 mL/Hr) IV Continuous <Continuous>  EPINEPHrine    Infusion 0.1 MICROgram(s)/kG/Min (12.7 mL/Hr) IV Continuous <Continuous>  epoetin zara-epbx (RETACRIT) Injectable 42011 Unit(s) IV Push <User Schedule>  etomidate Injectable 40 milliGRAM(s) IV Push once  fluconAZOLE IVPB 200 milliGRAM(s) IV Intermittent every 24 hours  fluconAZOLE IVPB      folic acid 1 milliGRAM(s) Oral daily  insulin lispro (ADMELOG) corrective regimen sliding scale   SubCutaneous every 6 hours  levothyroxine Injectable 37.5 MICROGram(s) IV Push at bedtime  lidocaine   4% Patch 1 Patch Transdermal daily  melatonin 5 milliGRAM(s) Oral at bedtime  meropenem  IVPB      meropenem  IVPB 1000 milliGRAM(s) IV Intermittent every 8 hours  metoclopramide Injectable 5 milliGRAM(s) IV Push every 8 hours  midodrine 20 milliGRAM(s) Oral every 8 hours  norepinephrine Infusion 0.06 MICROgram(s)/kG/Min (3.81 mL/Hr) IV Continuous <Continuous>  pantoprazole  Injectable 40 milliGRAM(s) IV Push daily  petrolatum white Ointment 1 Application(s) Topical two times a day  polyethylene glycol 3350 17 Gram(s) Oral daily  PrismaSATE Dialysate BGK 4 / 2.5 5000 milliLiter(s) (1200 mL/Hr) CRRT <Continuous>  PrismaSOL Filtration BGK 4 / 2.5 5000 milliLiter(s) (600 mL/Hr) CRRT <Continuous>  PrismaSOL Filtration BGK 4 / 2.5 5000 milliLiter(s) (400 mL/Hr) CRRT <Continuous>  propofol Infusion 10 MICROgram(s)/kG/Min (4.07 mL/Hr) IV Continuous <Continuous>  rocuronium Injectable 50 milliGRAM(s) IV Push once  senna 2 Tablet(s) Oral at bedtime  sertraline 50 milliGRAM(s) Oral daily  sodium chloride 0.9% lock flush 3 milliLiter(s) IV Push every 8 hours  sodium chloride 3%  Inhalation 4 milliLiter(s) Inhalation every 12 hours  thiamine 100 milliGRAM(s) Oral daily  tranexamic acid Injectable for Nebulization 500 milliGRAM(s) Inhalation every 8 hours  vancomycin  IVPB 750 milliGRAM(s) IV Intermittent every 12 hours  vasopressin Infusion 0.04 Unit(s)/Min (6 mL/Hr) IV Continuous <Continuous>    MEDICATIONS  (PRN):      Physical Exam:  Gen: intubated, sedated  Lungs: intubated  Ab: Soft, nontender, nondistended.   : Jacobs in place, draining dark paco urine, no gross hematuria.    Labs:    03-18    135  |  101  |  25<H>  ----------------------------<  189<H>  4.3   |  21<L>  |  0.87    Ca    10.0      18 Mar 2024 13:26  Phos  2.4     03-18  Mg     2.9     03-18    TPro  6.8  /  Alb  4.6  /  TBili  7.9<H>  /  DBili  x   /  AST  98<H>  /  ALT  33  /  AlkPhos  131<H>  03-18    LIVER FUNCTIONS - ( 18 Mar 2024 13:26 )  Alb: 4.6 g/dL / Pro: 6.8 g/dL / ALK PHOS: 131 U/L / ALT: 33 U/L / AST: 98 U/L / GGT: x                                 8.8    15.13 )-----------( 84       ( 18 Mar 2024 00:26 )             27.1     PT/INR - ( 17 Mar 2024 05:52 )   PT: 13.3 sec;   INR: 1.28 ratio         PTT - ( 17 Mar 2024 05:52 )  PTT:32.5 sec

## 2024-03-18 NOTE — PROGRESS NOTE ADULT - NS ATTEND AMEND GEN_ALL_CORE FT
Diffuse mucosal irritation in the nose, multiple clots irrigated and suction.  Scope showed no active bleeding.  Would benefit from nebulized TXA in the nose and continuation of oral care.  No discrete bleeding seen today, would not place nasal packing at this time

## 2024-03-18 NOTE — PROGRESS NOTE ADULT - SUBJECTIVE AND OBJECTIVE BOX
Patient seen and examined at the bedside.    Remained critically ill on continuous ICU monitoring.    OBJECTIVE:  Vital Signs Last 24 Hrs  T(C): 34.6 (18 Mar 2024 08:00), Max: 36.7 (18 Mar 2024 04:00)  T(F): 94.3 (18 Mar 2024 08:00), Max: 98 (18 Mar 2024 04:00)  HR: 86 (18 Mar 2024 20:15) (80 - 100)  BP: --  BP(mean): --  RR: 19 (18 Mar 2024 20:15) (15 - 34)  SpO2: 100% (18 Mar 2024 20:15) (90% - 100%)    Parameters below as of 18 Mar 2024 17:12  Patient On (Oxygen Delivery Method): ventilator          Physical Exam:   General: NAD   Neurology: nonfocal   ENT/Neck: Neck supple, trachea midline, No JVD   Respiratory: Clear bilaterally   CV: S1S2, no murmurs        [x] Sternal dressing        [x] Paced rhythm, [x] PPM -   Abdominal: Soft, NT, ND +BS   Extremities: 1-2+ pedal edema noted         Assessment:  82 y/o M with ICM/HFrEF (now 30%; LVIDd 6.7 cm; prev req inotrope), CAD c/b IWMI (1994) s/p angioplasty, aortic stenosis s/p bio-AVR 2004, VT arrest (2017) s/p ICD, Afib s/p multiple DCCV and ablation x2 (2020 and 2023; on AC), Aflutter s/p WARREN/DCCV (8/23), prior Ao aneurysm s/p Bentall (2021), 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    Tx to ICU for Hypotension/CHF/renal failure  Hemodynamic instability  Hypovolemia  Post op respiratory insufficiency  Acute blood loss anemia  Thrombocytopenia  Leukocytosis      Plan:   ***Neuro***  [x] Precedex / Propofol   Continue with Melatonin for sleep regimen   Continue Zoloft    ***Cardiovascular***  Invasive hemodynamic monitoring, assess perfusion indices   SD / CVP 21 / MAP 77/ Hct 27.1% / Lactate 1.7  [x] Levophed - 0.06 mcg/kG/Min   [x] Vasopressin - 0.04 Unit(s)/Min   [x] Epinephrine - 0.1 mcg/kG/Min   [x] Dobutamine - 10 mcg/kG/Min  [x] S/P 1 unit PRBC today   Continuos reassessment of hemodynamics   Amiodarone for AFib prophylaxis   Continue Midodrine  [x] Statin       ***Pulmonary***  [x] Intubated last night for hypoxia   Post op vent management   Titration of FiO2 and PEEP, follow SpO2, CXR, blood gasses     Mode: AC/ CMV (Assist Control/ Continuous Mandatory Ventilation)  RR (machine): 14  TV (machine): 500  FiO2: 50  PEEP: 5  ITime: 1  MAP: 12  PIP: 22              ***GI***  [x] NPO with Nepro TF @ 50 mL/Hr   [x] Protonix for stress ulcer ppx  Bowel regimen with Miralax and Senna  Reglan for gut motility  GI following, appreciate recs.    ***Renal***  [x] CKD  Continue to monitor I/Os, BUN/Creatinine.   Replete lytes PRN  Royal present  CVVHD  Goal net negative   RETACRIT for renal support     ***ID***  Diflucan, Meropenem, Vancomycin for Empiric dosing    ***Endocrine***  [x] Stress Hyperglycemia : HbA1c 5.7%                - [x] ISS             - Need tight glycemic control to prevent wound infection.  [x] Gout             - [x] Allopurinol  [x] Hypothyroidism             - [x] Synthroid        Patient requires continuous monitoring with bedside rhythm monitoring, pulse oximetry monitoring, and continuous central venous and arterial pressure monitoring; and intermittent blood gas analysis. Care plan discussed with the ICU care team.   Patient remained critical, at risk for life threatening decompensation.    I have spent 30 minutes providing critical care management to this patient.    By signing my name below, I, Tressa Tamayo, attest that this documentation has been prepared under the direction and in the presence of SHARON Dao.  Electronically signed: Frieda Mendoza, 03-18-24 @ 20:34    KARLOS, Alex Flores , personally performed the services described in this documentation. all medical record entries made by the frieda were at my direction and in my presence. I have reviewed the chart and agree that the record reflects my personal performance and is accurate and complete  Electronically signed: SHARON Dao.

## 2024-03-18 NOTE — PROGRESS NOTE ADULT - SUBJECTIVE AND OBJECTIVE BOX
Patient is a 83y old  Male who presents with a chief complaint of shortness of breath (18 Mar 2024 06:08)    Being followed by ID for        Interval history:  No other acute events      ROS:  No cough,SOB,CP  No N/V/D  No abd pain  No urinary complaints  No HA  No joint or limb pain  No other complaints    PAST MEDICAL & SURGICAL HISTORY:  Aortic stenosis      AICD (automatic cardioverter/defibrillator) present      Aortic valve regurgitation      Ascending aorta dilation      H/O paroxysmal supraventricular tachycardia      HLD (hyperlipidemia)      Mitral valve regurgitation      Cardiac arrest      Severe mitral regurgitation      S/P aortic valve replacement  3/13/2004      S/P hernia repair  2002      History of tonsillectomy and adenoidectomy      S/P TURP  1996      S/P colonoscopy  2001, 2002, 2006, 2011, 2016      S/P endoscopy  2006      S/P cardiac cath  1994, 1995, 1999, 2002, 2004      AICD (automatic cardioverter/defibrillator) present  12/19/17      Cardiac pacemaker  12/19/17      History of cardioversion  9/11/20      S/P ablation of atrial fibrillation  10/29/20      Status post ablation of ventricular arrhythmia  2/14/19        Allergies    No Known Allergies    Intolerances      Antimicrobials:    fluconAZOLE IVPB 200 milliGRAM(s) IV Intermittent every 24 hours  fluconAZOLE IVPB      meropenem  IVPB      meropenem  IVPB 1000 milliGRAM(s) IV Intermittent every 8 hours  vancomycin  IVPB 750 milliGRAM(s) IV Intermittent every 12 hours    MEDICATIONS  (STANDING):  allopurinol 100 milliGRAM(s) Oral daily  aMIOdarone    Tablet 200 milliGRAM(s) Oral daily  ascorbic acid 500 milliGRAM(s) Oral daily  atorvastatin 40 milliGRAM(s) Oral at bedtime  Biotene Dry Mouth Oral Rinse 5 milliLiter(s) Swish and Spit daily  chlorhexidine 0.12% Liquid 15 milliLiter(s) Oral Mucosa every 12 hours  chlorhexidine 2% Cloths 1 Application(s) Topical <User Schedule>  CRRT Treatment    <Continuous>  cyanocobalamin 1000 MICROGram(s) Oral daily  dexMEDEtomidine Infusion 0.02 MICROgram(s)/kG/Hr (0.34 mL/Hr) IV Continuous <Continuous>  dextrose 50% Injectable 25 Gram(s) IV Push once  DOBUTamine Infusion 10 MICROgram(s)/kG/Min (10.2 mL/Hr) IV Continuous <Continuous>  EPINEPHrine    Infusion 0.1 MICROgram(s)/kG/Min (12.7 mL/Hr) IV Continuous <Continuous>  epoetin zara-epbx (RETACRIT) Injectable 68139 Unit(s) IV Push <User Schedule>  etomidate Injectable 40 milliGRAM(s) IV Push once  fluconAZOLE IVPB 200 milliGRAM(s) IV Intermittent every 24 hours  fluconAZOLE IVPB      folic acid 1 milliGRAM(s) Oral daily  insulin lispro (ADMELOG) corrective regimen sliding scale   SubCutaneous every 6 hours  levothyroxine 75 MICROGram(s) Oral daily  lidocaine   4% Patch 1 Patch Transdermal daily  melatonin 5 milliGRAM(s) Oral at bedtime  meropenem  IVPB      meropenem  IVPB 1000 milliGRAM(s) IV Intermittent every 8 hours  metoclopramide Injectable 5 milliGRAM(s) IV Push every 8 hours  midodrine 20 milliGRAM(s) Oral every 8 hours  norepinephrine Infusion 0.06 MICROgram(s)/kG/Min (3.81 mL/Hr) IV Continuous <Continuous>  pantoprazole  Injectable 40 milliGRAM(s) IV Push daily  petrolatum white Ointment 1 Application(s) Topical two times a day  polyethylene glycol 3350 17 Gram(s) Oral daily  PrismaSATE Dialysate BGK 4 / 2.5 5000 milliLiter(s) (1200 mL/Hr) CRRT <Continuous>  PrismaSOL Filtration BGK 4 / 2.5 5000 milliLiter(s) (600 mL/Hr) CRRT <Continuous>  PrismaSOL Filtration BGK 4 / 2.5 5000 milliLiter(s) (400 mL/Hr) CRRT <Continuous>  propofol Infusion 10 MICROgram(s)/kG/Min (4.07 mL/Hr) IV Continuous <Continuous>  rocuronium Injectable 50 milliGRAM(s) IV Push once  senna 2 Tablet(s) Oral at bedtime  sertraline 50 milliGRAM(s) Oral daily  sodium chloride 0.9% lock flush 3 milliLiter(s) IV Push every 8 hours  sodium chloride 3%  Inhalation 4 milliLiter(s) Inhalation every 12 hours  thiamine 100 milliGRAM(s) Oral daily  vancomycin  IVPB 750 milliGRAM(s) IV Intermittent every 12 hours  vasopressin Infusion 0.04 Unit(s)/Min (6 mL/Hr) IV Continuous <Continuous>      Vital Signs Last 24 Hrs  T(C): 34.6 (03-18-24 @ 08:00), Max: 36.7 (03-18-24 @ 04:00)  T(F): 94.3 (03-18-24 @ 08:00), Max: 98 (03-18-24 @ 04:00)  HR: 80 (03-18-24 @ 15:00) (80 - 100)  BP: --  BP(mean): --  RR: 18 (03-18-24 @ 15:00) (16 - 34)  SpO2: 100% (03-18-24 @ 15:00) (90% - 100%)    Physical Exam:    Constitutional well preserved,comfortable,pleasant    HEENT PERRLA EOMI,No pallor or icterus    No oral exudate or erythema    Neck supple no JVD or LN    Chest Good AE,CTA    CVS RRR S1 S2 WNl No murmur or rub or gallop    Abd soft BS normal No tenderness no masses    Ext No cyanosis clubbing or edema    IV site no erythema tenderness or discharge    Joints no swelling or LOM    CNS AAO X 3 no focal    Lab Data:                          8.8    15.13 )-----------( 84       ( 18 Mar 2024 00:26 )             27.1       03-18    135  |  101  |  25<H>  ----------------------------<  189<H>  4.3   |  21<L>  |  0.87    Ca    10.0      18 Mar 2024 13:26  Phos  2.4     03-18  Mg     2.9     03-18    TPro  6.8  /  Alb  4.6  /  TBili  7.9<H>  /  DBili  x   /  AST  98<H>  /  ALT  33  /  AlkPhos  131<H>  03-18    Urinalysis (03.04.24 @ 13:11)    Glucose Qualitative, Urine: Negative mg/dL   Blood, Urine: Negative   pH Urine: 5.5   Color: Yellow   Urine Appearance: Clear   Bilirubin: Negative   Ketone - Urine: Negative mg/dL   Specific Gravity: 1.010   Protein, Urine: Negative mg/dL   Urobilinogen: 1.0 mg/dL   Nitrite: Negative   Leukocyte Esterase Concentration: Negative          .Blood Blood  03-13-24   No growth at 4 days  --  --      .Blood Blood  03-13-24   No growth at 5 days  --  --      .Blood Blood-Peripheral  03-12-24   No growth at 5 days  --  --                Vancomycin Level, Trough: 13.3 ug/mL (03-18-24 @ 09:45)  Vancomycin Level, Trough: 14.0 ug/mL (03-18-24 @ 07:31)  Vancomycin Level, Trough: 14.5 ug/mL (03-17-24 @ 17:19)  Vancomycin Level, Trough: 13.1 ug/mL (03-17-24 @ 05:12)  Vancomycin Level, Trough: 13.5 ug/mL (03-16-24 @ 17:08)      WBC Count: 15.13 (03-18-24 @ 00:26)  WBC Count: 15.36 (03-17-24 @ 06:24)  WBC Count: 16.03 (03-17-24 @ 00:38)  WBC Count: 16.63 (03-16-24 @ 00:47)  WBC Count: 14.45 (03-15-24 @ 00:20)  WBC Count: 15.18 (03-14-24 @ 13:49)  WBC Count: 16.85 (03-14-24 @ 00:29)  WBC Count: 15.14 (03-13-24 @ 00:30)  WBC Count: 12.59 (03-12-24 @ 00:42)       Bilirubin Total: 7.9 mg/dL (03-18-24 @ 13:26)  Aspartate Aminotransferase (AST/SGOT): 98 U/L (03-18-24 @ 13:26)  Alanine Aminotransferase (ALT/SGPT): 33 U/L (03-18-24 @ 13:26)  Alkaline Phosphatase: 131 U/L (03-18-24 @ 13:26)  Bilirubin Total: 7.2 mg/dL (03-18-24 @ 00:26)  Aspartate Aminotransferase (AST/SGOT): 69 U/L (03-18-24 @ 00:26)  Alanine Aminotransferase (ALT/SGPT): 29 U/L (03-18-24 @ 00:26)  Alkaline Phosphatase: 153 U/L (03-18-24 @ 00:26)  Bilirubin Total: 6.7 mg/dL (03-17-24 @ 00:41)  Aspartate Aminotransferase (AST/SGOT): 62 U/L (03-17-24 @ 00:41)  Alanine Aminotransferase (ALT/SGPT): 32 U/L (03-17-24 @ 00:41)  Alkaline Phosphatase: 208 U/L (03-17-24 @ 00:41)  Bilirubin Total: 6.9 mg/dL (03-16-24 @ 00:47)  Aspartate Aminotransferase (AST/SGOT): 55 U/L (03-16-24 @ 00:47)  Alanine Aminotransferase (ALT/SGPT): 28 U/L (03-16-24 @ 00:47)  Alkaline Phosphatase: 181 U/L (03-16-24 @ 00:47)  Bilirubin Total: 6.8 mg/dL (03-15-24 @ 04:37)  Bilirubin Total: 6.6 mg/dL (03-15-24 @ 00:22)  Aspartate Aminotransferase (AST/SGOT): 48 U/L (03-15-24 @ 00:22)  Alanine Aminotransferase (ALT/SGPT): 22 U/L (03-15-24 @ 00:22)  Alkaline Phosphatase: 157 U/L (03-15-24 @ 00:22)  Bilirubin Total: 4.6 mg/dL (03-14-24 @ 00:29)  Aspartate Aminotransferase (AST/SGOT): 47 U/L (03-14-24 @ 00:29)  Alanine Aminotransferase (ALT/SGPT): 22 U/L (03-14-24 @ 00:29)  Alkaline Phosphatase: 153 U/L (03-14-24 @ 00:29)         Patient is a 83y old  Male who presents with a chief complaint of shortness of breath (18 Mar 2024 06:08)    Being followed by ID for resp failure        Interval history:  events noted   pt is intubated in the CTU  pt sedated  some bloody secretions  receving CVVHD   No other acute events      PAST MEDICAL & SURGICAL HISTORY:  Aortic stenosis      AICD (automatic cardioverter/defibrillator) present      Aortic valve regurgitation      Ascending aorta dilation      H/O paroxysmal supraventricular tachycardia      HLD (hyperlipidemia)      Mitral valve regurgitation      Cardiac arrest      Severe mitral regurgitation      S/P aortic valve replacement  3/13/2004      S/P hernia repair  2002      History of tonsillectomy and adenoidectomy      S/P TURP  1996      S/P colonoscopy  2001, 2002, 2006, 2011, 2016      S/P endoscopy  2006      S/P cardiac cath  1994, 1995, 1999, 2002, 2004      AICD (automatic cardioverter/defibrillator) present  12/19/17      Cardiac pacemaker  12/19/17      History of cardioversion  9/11/20      S/P ablation of atrial fibrillation  10/29/20      Status post ablation of ventricular arrhythmia  2/14/19        Allergies    No Known Allergies    Intolerances      Antimicrobials:    fluconAZOLE IVPB 200 milliGRAM(s) IV Intermittent every 24 hours  fluconAZOLE IVPB      meropenem  IVPB      meropenem  IVPB 1000 milliGRAM(s) IV Intermittent every 8 hours  vancomycin  IVPB 750 milliGRAM(s) IV Intermittent every 12 hours    MEDICATIONS  (STANDING):  allopurinol 100 milliGRAM(s) Oral daily  aMIOdarone    Tablet 200 milliGRAM(s) Oral daily  ascorbic acid 500 milliGRAM(s) Oral daily  atorvastatin 40 milliGRAM(s) Oral at bedtime  Biotene Dry Mouth Oral Rinse 5 milliLiter(s) Swish and Spit daily  chlorhexidine 0.12% Liquid 15 milliLiter(s) Oral Mucosa every 12 hours  chlorhexidine 2% Cloths 1 Application(s) Topical <User Schedule>  CRRT Treatment    <Continuous>  cyanocobalamin 1000 MICROGram(s) Oral daily  dexMEDEtomidine Infusion 0.02 MICROgram(s)/kG/Hr (0.34 mL/Hr) IV Continuous <Continuous>  dextrose 50% Injectable 25 Gram(s) IV Push once  DOBUTamine Infusion 10 MICROgram(s)/kG/Min (10.2 mL/Hr) IV Continuous <Continuous>  EPINEPHrine    Infusion 0.1 MICROgram(s)/kG/Min (12.7 mL/Hr) IV Continuous <Continuous>  epoetin zara-epbx (RETACRIT) Injectable 71785 Unit(s) IV Push <User Schedule>  etomidate Injectable 40 milliGRAM(s) IV Push once  fluconAZOLE IVPB 200 milliGRAM(s) IV Intermittent every 24 hours  fluconAZOLE IVPB      folic acid 1 milliGRAM(s) Oral daily  insulin lispro (ADMELOG) corrective regimen sliding scale   SubCutaneous every 6 hours  levothyroxine 75 MICROGram(s) Oral daily  lidocaine   4% Patch 1 Patch Transdermal daily  melatonin 5 milliGRAM(s) Oral at bedtime  meropenem  IVPB      meropenem  IVPB 1000 milliGRAM(s) IV Intermittent every 8 hours  metoclopramide Injectable 5 milliGRAM(s) IV Push every 8 hours  midodrine 20 milliGRAM(s) Oral every 8 hours  norepinephrine Infusion 0.06 MICROgram(s)/kG/Min (3.81 mL/Hr) IV Continuous <Continuous>  pantoprazole  Injectable 40 milliGRAM(s) IV Push daily  petrolatum white Ointment 1 Application(s) Topical two times a day  polyethylene glycol 3350 17 Gram(s) Oral daily  PrismaSATE Dialysate BGK 4 / 2.5 5000 milliLiter(s) (1200 mL/Hr) CRRT <Continuous>  PrismaSOL Filtration BGK 4 / 2.5 5000 milliLiter(s) (600 mL/Hr) CRRT <Continuous>  PrismaSOL Filtration BGK 4 / 2.5 5000 milliLiter(s) (400 mL/Hr) CRRT <Continuous>  propofol Infusion 10 MICROgram(s)/kG/Min (4.07 mL/Hr) IV Continuous <Continuous>  rocuronium Injectable 50 milliGRAM(s) IV Push once  senna 2 Tablet(s) Oral at bedtime  sertraline 50 milliGRAM(s) Oral daily  sodium chloride 0.9% lock flush 3 milliLiter(s) IV Push every 8 hours  sodium chloride 3%  Inhalation 4 milliLiter(s) Inhalation every 12 hours  thiamine 100 milliGRAM(s) Oral daily  vancomycin  IVPB 750 milliGRAM(s) IV Intermittent every 12 hours  vasopressin Infusion 0.04 Unit(s)/Min (6 mL/Hr) IV Continuous <Continuous>      Vital Signs Last 24 Hrs  T(C): 34.6 (03-18-24 @ 08:00), Max: 36.7 (03-18-24 @ 04:00)  T(F): 94.3 (03-18-24 @ 08:00), Max: 98 (03-18-24 @ 04:00)  HR: 80 (03-18-24 @ 15:00) (80 - 100)  BP: --  BP(mean): --  RR: 18 (03-18-24 @ 15:00) (16 - 34)  SpO2: 100% (03-18-24 @ 15:00) (90% - 100%)    Physical Exam:    Constitutional well preserved,comfortable,pleasant    HEENT PERRLA EOMI,No pallor or icterus    No oral exudate or erythema    Neck supple no JVD or LN    Chest Good AE,CTA    CVS  S1 S2     Abd soft BS normal No tenderness     Ext No cyanosis clubbing or edema    IV site no erythema tenderness or discharge    Joints no swelling or LOM    CNS AAO X 3 no focal    Lab Data:                          8.8    15.13 )-----------( 84       ( 18 Mar 2024 00:26 )             27.1       03-18    135  |  101  |  25<H>  ----------------------------<  189<H>  4.3   |  21<L>  |  0.87    Ca    10.0      18 Mar 2024 13:26  Phos  2.4     03-18  Mg     2.9     03-18    TPro  6.8  /  Alb  4.6  /  TBili  7.9<H>  /  DBili  x   /  AST  98<H>  /  ALT  33  /  AlkPhos  131<H>  03-18    Urinalysis (03.04.24 @ 13:11)    Glucose Qualitative, Urine: Negative mg/dL   Blood, Urine: Negative   pH Urine: 5.5   Color: Yellow   Urine Appearance: Clear   Bilirubin: Negative   Ketone - Urine: Negative mg/dL   Specific Gravity: 1.010   Protein, Urine: Negative mg/dL   Urobilinogen: 1.0 mg/dL   Nitrite: Negative   Leukocyte Esterase Concentration: Negative          .Blood Blood  03-13-24   No growth at 4 days  --  --      .Blood Blood  03-13-24   No growth at 5 days  --  --      .Blood Blood-Peripheral  03-12-24   No growth at 5 days  --  --                Vancomycin Level, Trough: 13.3 ug/mL (03-18-24 @ 09:45)  Vancomycin Level, Trough: 14.0 ug/mL (03-18-24 @ 07:31)  Vancomycin Level, Trough: 14.5 ug/mL (03-17-24 @ 17:19)  Vancomycin Level, Trough: 13.1 ug/mL (03-17-24 @ 05:12)  Vancomycin Level, Trough: 13.5 ug/mL (03-16-24 @ 17:08)      WBC Count: 15.13 (03-18-24 @ 00:26)  WBC Count: 15.36 (03-17-24 @ 06:24)  WBC Count: 16.03 (03-17-24 @ 00:38)  WBC Count: 16.63 (03-16-24 @ 00:47)  WBC Count: 14.45 (03-15-24 @ 00:20)  WBC Count: 15.18 (03-14-24 @ 13:49)  WBC Count: 16.85 (03-14-24 @ 00:29)  WBC Count: 15.14 (03-13-24 @ 00:30)  WBC Count: 12.59 (03-12-24 @ 00:42)       Bilirubin Total: 7.9 mg/dL (03-18-24 @ 13:26)  Aspartate Aminotransferase (AST/SGOT): 98 U/L (03-18-24 @ 13:26)  Alanine Aminotransferase (ALT/SGPT): 33 U/L (03-18-24 @ 13:26)  Alkaline Phosphatase: 131 U/L (03-18-24 @ 13:26)  Bilirubin Total: 7.2 mg/dL (03-18-24 @ 00:26)  Aspartate Aminotransferase (AST/SGOT): 69 U/L (03-18-24 @ 00:26)  Alanine Aminotransferase (ALT/SGPT): 29 U/L (03-18-24 @ 00:26)  Alkaline Phosphatase: 153 U/L (03-18-24 @ 00:26)    < from: Xray Chest 1 View- PORTABLE-Routine (Xray Chest 1 View- PORTABLE-Routine in AM.) (03.17.24 @ 03:10) >  IMPRESSION:    Stable pulmonary vascular congestive changes.    --- End of Report ---    < end of copied text >    < from: US Abdomen Upper Quadrant Right (03.14.24 @ 14:34) >  IMPRESSION:  1. The gallbladder is unremarkable without stones. No biliary dilatation.  2. No focal intrahepatic abnormality. The hepatic veins and IVC are   distended, suggesting passive congestion in the setting ofright cardiac   dysfunction. Suggest clinical correlation.  3. Redemonstrated atrophic right kidney with mild right-sided   hydronephrosis. Multiple right renal cysts.  4. Trace perihepatic ascites.      < end of copied text >    < from: TTE W or WO Ultrasound Enhancing Agent (03.11.24 @ 07:29) >  CONCLUSIONS:      1. Left ventricular systolic function is severely decreased with an ejection fraction of 37 % by Grier's method of disks. Regional wall motion abnormalities present.   2. Analysis of left ventricular diastolic function and filling pressure is made challenging by the presence of severe mitral regurgitation.   3. Multiple segmental abnormalities exist. See findings.   4. Moderately enlarged right ventricular cavity size and reduced systolic function.   5. There is moderate tricuspid regurgitation. Estimated pulmonary artery systolic pressure is 54 mmHg.   6. An annuloplasty ring is noted in the mitral position. Severe intravalvular mitral regurgitation. There is mitral valve thickening of the tip segment. There is normal leaflet mobility of the mitral valve.There is increased flow through the mitral valve creating an elevated gradient to the severe mitral regurgitation. There is severe mitral regurgitation at a blood pressure of 109/49 mmHg. The MR EROA is 0.57 cm2 and the regurgitant volume is 86 mL/beat.   7. Compared to the transthoracic echocardiogram performed on 3/9/2024, PASP has decreased.    < end of copied text >        MRSA/MSSA PCR (02.20.24 @ 15:55)    MRSA PCR Result.: NotDete: The results of this test should be interpreted with consideration of  clinical context.  Not Detected result indicates the absence of organisms or that the number  of organisms is below the assay limit of detection.  Detected result indicates the presence of organism nucleic acid.  Indeterminate result may indicate the presence of amplification  inhibitors in specimen; presence or absence of organisms cannot be  determined. Consider collecting new specimen if further testing is still  needed.  This qualitative PCR assay is FDA-approved, and its performance was  established by [facility] [employer] [facility] [employer], [place], [place].   Staph aureus PCR Result: [facility]    Culture - Blood (03.13.24 @ 13:00)    Specimen Source: .Blood Blood   Culture Results:   No growth at 5 days    Culture - Blood (03.13.24 @ 11:02)    Specimen Source: .Blood Blood   Culture Results:   No growth at 5 days    Pro-Brain Natriuretic Peptide (03.04.24 @ 13:11)    Pro-Brain Natriuretic Peptide: [id number] pg/mL

## 2024-03-18 NOTE — PROGRESS NOTE ADULT - ASSESSMENT
pt is very sob and cachetic  Denies any systemic manifestations of infection prior to admission  hs of AVR PPM  CKI  CHF  admitted with sob, edema, but was  alert  chest x-ray compactible l with chf  cant r/o infection         agree with empiric meropenem ( diflucan per  [facility])   await cultures - so far negatiev   dose vanco carefully by level in view of renal insufficieny but now on CVVHD  discussed with [facility] alison Chase suggestive of pulmonary edema  no documented infection     would complete 7 days of antibiotics - started March 12th- day #7  check sputum cultures - seem bloody  cehk Qtc on diflucan- will d/c with worsening LFTS    Pamela Reaves M.D. ,   please reach via teams   If no answer, or after 5PM/ weekends,  then please call  [phone]                    84 y/o M with ICM/HFrEF (now 30%; LVIDd 6.7 cm; prev req inotrope), CAD c/b IWMI (1994) s/p angioplasty, aortic stenosis s/p bio-AVR 2004, VT arrest (2017) s/p ICD, Afib s/p multiple DCCV and ablation x2 (2020 and 2023; on AC), Aflutter s/p WARREN/DCCV (8/23), prior Ao aneurysm s/p Bentall (2021), severe MR prior MVr (2021) with MAC (precludes M-KRISTIE d/t his anatomy; turned down for TMVR trials by [employer]), atrophic kidney with CKD (b/l Cr 2.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 Patient was initiated on bumex gtt with gradual improvement with Cr improving from 3.6 to 3.1. Patient was considered for MV re-op; however, patient was reluctant of the procedure and expressed wanting to follow up in the outpatient setting to schedule. During last admission, patient was also noted to be in flutter, EP was consulted, and patient underwent repeat WARREN/DCCV , on amiodarone. Patient was discharged home 2/28 on higher dose of diuretics, as per HF team.pt returned with worsening SOB ,edema, but was  alert  chest x-ray compactible l with chf  cant r/o infection         Pt ws treated with empiric meropenem ( diflucan per  [facility])   cultures - so far negatiev   dose vanco carefully by level in view of renal insufficieny but now on CVVHD  discussed with [facility] alison Chase suggestive of pulmonary edema  no documented infection   Pt now intubated   would complete 7 days of antibiotics - started March 12th- day #7  check sputum cultures - seem bloody  d/c diflucan with worsening LFTS  follow Qtc     Pamela Reaves M.D. ,   please reach via teams   If no answer, or after 5PM/ weekends,  then please call  [phone]    Assessment and plan discussed with the primary team .  Prognosis guarded

## 2024-03-18 NOTE — PROGRESS NOTE ADULT - SUBJECTIVE AND OBJECTIVE BOX
[facility]   [phone]  RENAL FOLLOW UP NOTE  --------------------------------------------------------------------------------  Chief Complaint:    24 hour events/subjective:    seen earlier   intubated     PAST HISTORY  --------------------------------------------------------------------------------  No significant changes to PMH, PSH, FHx, SHx, unless otherwise noted    ALLERGIES & MEDICATIONS  --------------------------------------------------------------------------------  Allergies    No Known Allergies    Intolerances      Standing Inpatient Medications  allopurinol 100 milliGRAM(s) Oral daily  aMIOdarone    Tablet 200 milliGRAM(s) Oral daily  ascorbic acid 500 milliGRAM(s) Oral daily  atorvastatin 40 milliGRAM(s) Oral at bedtime  Biotene Dry Mouth Oral Rinse 5 milliLiter(s) Swish and Spit daily  chlorhexidine 0.12% Liquid 15 milliLiter(s) Oral Mucosa every 12 hours  chlorhexidine 2% Cloths 1 Application(s) Topical <User Schedule>  CRRT Treatment    <Continuous>  cyanocobalamin 1000 MICROGram(s) Oral daily  dexMEDEtomidine Infusion 0.02 MICROgram(s)/kG/Hr IV Continuous <Continuous>  dextrose 50% Injectable 25 Gram(s) IV Push once  DOBUTamine Infusion 10 MICROgram(s)/kG/Min IV Continuous <Continuous>  EPINEPHrine    Infusion 0.1 MICROgram(s)/kG/Min IV Continuous <Continuous>  epoetin zara-epbx (RETACRIT) Injectable 94646 Unit(s) IV Push <User Schedule>  etomidate Injectable 40 milliGRAM(s) IV Push once  folic acid 1 milliGRAM(s) Oral daily  insulin lispro (ADMELOG) corrective regimen sliding scale   SubCutaneous every 6 hours  levothyroxine Injectable 37.5 MICROGram(s) IV Push at bedtime  lidocaine   4% Patch 1 Patch Transdermal daily  melatonin 5 milliGRAM(s) Oral at bedtime  meropenem  IVPB 1000 milliGRAM(s) IV Intermittent every 8 hours  meropenem  IVPB      metoclopramide Injectable 5 milliGRAM(s) IV Push every 8 hours  midodrine 20 milliGRAM(s) Oral every 8 hours  norepinephrine Infusion 0.06 MICROgram(s)/kG/Min IV Continuous <Continuous>  pantoprazole  Injectable 40 milliGRAM(s) IV Push daily  petrolatum white Ointment 1 Application(s) Topical two times a day  polyethylene glycol 3350 17 Gram(s) Oral daily  PrismaSATE Dialysate BGK 4 / 2.5 5000 milliLiter(s) CRRT <Continuous>  PrismaSOL Filtration BGK 4 / 2.5 5000 milliLiter(s) CRRT <Continuous>  PrismaSOL Filtration BGK 4 / 2.5 5000 milliLiter(s) CRRT <Continuous>  propofol Infusion 10 MICROgram(s)/kG/Min IV Continuous <Continuous>  rocuronium Injectable 50 milliGRAM(s) IV Push once  senna 2 Tablet(s) Oral at bedtime  sertraline 50 milliGRAM(s) Oral daily  sodium chloride 0.9% lock flush 3 milliLiter(s) IV Push every 8 hours  sodium chloride 3%  Inhalation 4 milliLiter(s) Inhalation every 12 hours  thiamine 100 milliGRAM(s) Oral daily  tranexamic acid Injectable for Nebulization 500 milliGRAM(s) Inhalation every 8 hours  vancomycin  IVPB 750 milliGRAM(s) IV Intermittent every 12 hours  vasopressin Infusion 0.04 Unit(s)/Min IV Continuous <Continuous>    PRN Inpatient Medications      REVIEW OF SYSTEMS  --------------------------------------------------------------------------------    intubated     VITALS/PHYSICAL EXAM  --------------------------------------------------------------------------------  T(C): 34.6 (03-18-24 @ 08:00), Max: 36.7 (03-18-24 @ 04:00)  HR: 81 (03-18-24 @ 20:45) (80 - 99)  BP: --  RR: 18 (03-18-24 @ 20:45) (15 - 34)  SpO2: 100% (03-18-24 @ 20:45) (90% - 100%)  Wt(kg): --        03-17-24 @ 07:01  -  03-18-24 @ 07:00  --------------------------------------------------------  IN: 3847.1 mL / OUT: 1200 mL / NET: 2647.1 mL    03-18-24 @ 07:01  -  03-18-24 @ 21:34  --------------------------------------------------------  IN: 1541.4 mL / OUT: 948 mL / NET: 593.4 mL      Physical Exam:  	      Gen:  on high flow O2 + IJ cath   	Pulm: decrease bs + coarse bs   	CV: RRR, S1S2; no rub  	Abd: +BS, soft, nontender/nondistended  	: No suprapubic tenderness  	UE: Warm, no cyanosis  no clubbing,  no edema  	LE: Warm, no cyanosis  no clubbing,  no   edema  	Neuro: alert and oriented. speech coherent       LABS/STUDIES  --------------------------------------------------------------------------------              8.8    15.13 >-----------<  84       [03-18-24 @ 00:26]              27.1     135  |  101  |  25  ----------------------------<  189      [03-18-24 @ 13:26]  4.3   |  21  |  0.87        Ca     10.0     [03-18-24 @ 13:26]      Mg     2.9     [03-18-24 @ 00:26]      Phos  2.4     [03-18-24 @ 13:26]    TPro  6.8  /  Alb  4.6  /  TBili  7.9  /  DBili  x   /  AST  98  /  ALT  33  /  AlkPhos  131  [03-18-24 @ 13:26]    PT/INR: PT 13.3 , INR 1.28       [03-17-24 @ 05:52]  PTT: 32.5       [03-17-24 @ 05:52]      Creatinine Trend:  SCr 0.87 [03-18 @ 13:26]  SCr 1.03 [03-18 @ 00:26]  SCr 1.17 [03-17 @ 00:41]  SCr 1.16 [03-16 @ 00:47]  SCr 1.23 [03-15 @ 00:22]        Iron 290, TIBC 430, %sat 67      [03-06-24 @ 13:57]  Ferritin 335      [03-06-24 @ 13:57]  PTH -- (Ca 8.9)      [03-05-24 @ 13:03]   242  TSH 2.02      [03-06-24 @ 13:57]

## 2024-03-18 NOTE — PROGRESS NOTE ADULT - PROBLEM SELECTOR PLAN 1
- recommend nasal TXA b/l to heal diffuse nasal mucosal irritation   - afrin to b/l nare for active bleed   - call ENT prn

## 2024-03-18 NOTE — PROGRESS NOTE ADULT - SUBJECTIVE AND OBJECTIVE BOX
CC: oral eval     HPI:  83 yr male 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, 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, Severe MR valvular heart failure , CRI, AICD, Decompensated heart failure, cardiogenic shock with renal failure, Marginal hemodynamics with ^ dose of multiple pressor & iontrope, Anemia, sepsis, Hypophosphatemia, Hematuria, overnight pt noted to become sob with significant dried secretions noted in larynx upon intubation. ENT called for oral evaluation. Pt currently intubated and unable to answer any further modifying factors.        PAST MEDICAL & SURGICAL HISTORY:  Aortic stenosis      AICD (automatic cardioverter/defibrillator) present      Aortic valve regurgitation      Ascending aorta dilation      H/O paroxysmal supraventricular tachycardia      HLD (hyperlipidemia)      Mitral valve regurgitation      Cardiac arrest      Severe mitral regurgitation      S/P aortic valve replacement  3/13/2004      S/P hernia repair  2002      History of tonsillectomy and adenoidectomy      S/P TURP  1996      S/P colonoscopy  2001, 2002, 2006, 2011, 2016      S/P endoscopy  2006      S/P cardiac cath  1994, 1995, 1999, 2002, 2004      AICD (automatic cardioverter/defibrillator) present  12/19/17      Cardiac pacemaker  12/19/17      History of cardioversion  9/11/20      S/P ablation of atrial fibrillation  10/29/20      Status post ablation of ventricular arrhythmia  2/14/19        Allergies    No Known Allergies    Intolerances      MEDICATIONS  (STANDING):  allopurinol 100 milliGRAM(s) Oral daily  aMIOdarone    Tablet 200 milliGRAM(s) Oral daily  ascorbic acid 500 milliGRAM(s) Oral daily  atorvastatin 40 milliGRAM(s) Oral at bedtime  Biotene Dry Mouth Oral Rinse 5 milliLiter(s) Swish and Spit daily  chlorhexidine 0.12% Liquid 15 milliLiter(s) Oral Mucosa every 12 hours  chlorhexidine 2% Cloths 1 Application(s) Topical <User Schedule>  CRRT Treatment    <Continuous>  cyanocobalamin 1000 MICROGram(s) Oral daily  dexMEDEtomidine Infusion 0.02 MICROgram(s)/kG/Hr (0.34 mL/Hr) IV Continuous <Continuous>  dextrose 50% Injectable 25 Gram(s) IV Push once  DOBUTamine Infusion 10 MICROgram(s)/kG/Min (10.2 mL/Hr) IV Continuous <Continuous>  EPINEPHrine    Infusion 0.1 MICROgram(s)/kG/Min (12.7 mL/Hr) IV Continuous <Continuous>  epoetin zara-epbx (RETACRIT) Injectable 87767 Unit(s) IV Push <User Schedule>  etomidate Injectable 40 milliGRAM(s) IV Push once  fluconAZOLE IVPB      fluconAZOLE IVPB 200 milliGRAM(s) IV Intermittent every 24 hours  folic acid 1 milliGRAM(s) Oral daily  insulin lispro (ADMELOG) corrective regimen sliding scale   SubCutaneous every 6 hours  levothyroxine Injectable 37.5 MICROGram(s) IV Push at bedtime  lidocaine   4% Patch 1 Patch Transdermal daily  melatonin 5 milliGRAM(s) Oral at bedtime  meropenem  IVPB      meropenem  IVPB 1000 milliGRAM(s) IV Intermittent every 8 hours  metoclopramide Injectable 5 milliGRAM(s) IV Push every 8 hours  midodrine 20 milliGRAM(s) Oral every 8 hours  norepinephrine Infusion 0.06 MICROgram(s)/kG/Min (3.81 mL/Hr) IV Continuous <Continuous>  pantoprazole  Injectable 40 milliGRAM(s) IV Push daily  petrolatum white Ointment 1 Application(s) Topical two times a day  polyethylene glycol 3350 17 Gram(s) Oral daily  PrismaSATE Dialysate BGK 4 / 2.5 5000 milliLiter(s) (1200 mL/Hr) CRRT <Continuous>  PrismaSOL Filtration BGK 4 / 2.5 5000 milliLiter(s) (600 mL/Hr) CRRT <Continuous>  PrismaSOL Filtration BGK 4 / 2.5 5000 milliLiter(s) (400 mL/Hr) CRRT <Continuous>  propofol Infusion 10 MICROgram(s)/kG/Min (4.07 mL/Hr) IV Continuous <Continuous>  rocuronium Injectable 50 milliGRAM(s) IV Push once  senna 2 Tablet(s) Oral at bedtime  sertraline 50 milliGRAM(s) Oral daily  sodium chloride 0.9% lock flush 3 milliLiter(s) IV Push every 8 hours  sodium chloride 3%  Inhalation 4 milliLiter(s) Inhalation every 12 hours  thiamine 100 milliGRAM(s) Oral daily  tranexamic acid Injectable for Nebulization 500 milliGRAM(s) Inhalation every 8 hours  vancomycin  IVPB 750 milliGRAM(s) IV Intermittent every 12 hours  vasopressin Infusion 0.04 Unit(s)/Min (6 mL/Hr) IV Continuous <Continuous>    MEDICATIONS  (PRN):      Social History: no tobacco, no etoh     Family history: Pt denies any sign FHx    ROS:   ENT: all negative except as noted in HPI   CV: denies palpitations  Pulm: denies SOB, cough, hemoptysis  GI: denies change in apetite, indigestion, n/v  : denies pertinent urinary symptoms, urgency  Neuro: denies numbness/tingling, loss of sensation  Psych: denies anxiety  MS: denies muscle weakness, instability  Heme: denies easy bruising or bleeding  Endo: denies heat/cold intolerance, excessive sweating  Vascular: denies LE edema    Vital Signs Last 24 Hrs  T(C): 34.6 (18 Mar 2024 08:00), Max: 36.7 (18 Mar 2024 04:00)  T(F): 94.3 (18 Mar 2024 08:00), Max: 98 (18 Mar 2024 04:00)  HR: 88 (18 Mar 2024 17:12) (80 - 100)  BP: --  BP(mean): --  RR: 18 (18 Mar 2024 17:00) (15 - 34)  SpO2: 100% (18 Mar 2024 17:12) (90% - 100%)    Parameters below as of 18 Mar 2024 17:12  Patient On (Oxygen Delivery Method): ventilator                              8.8    15.13 )-----------( 84       ( 18 Mar 2024 00:26 )             27.1    03-18    135  |  101  |  25<H>  ----------------------------<  189<H>  4.3   |  21<L>  |  0.87    Ca    10.0      18 Mar 2024 13:26  Phos  2.4     03-18  Mg     2.9     03-18    TPro  6.8  /  Alb  4.6  /  TBili  7.9<H>  /  DBili  x   /  AST  98<H>  /  ALT  33  /  AlkPhos  131<H>  03-18   PT/INR - ( 17 Mar 2024 05:52 )   PT: 13.3 sec;   INR: 1.28 ratio         PTT - ( 17 Mar 2024 05:52 )  PTT:32.5 sec    PHYSICAL EXAM:  Gen: NAD  Skin: No rashes, bruises, or lesions  Head: Normocephalic, Atraumatic  Face: no edema, erythema, or fluctuance. Parotid glands soft without mass  Eyes: no scleral injection  Nose: ng tube in left nare, no active bleeding, small amount of dry blood under left nare, suctioned via nares significant amount of blood clots seen indirect laryngoscopy below  Mouth: No Stridor / Drooling / Trismus.  Mucosa moist, tongue/uvula midline, oropharynx with ETT in place, significant amount of thickened secretions and blood clots noted, suctioned completely  Neck: Flat, supple, no lymphadenopathy, trachea midline, no masses  Lymphatic: No lymphadenopathy  Resp: on vent   CV: no peripheral edema/cyanosis  GI: nondistended   Peripheral vascular: no JVD or edema  Neuro: facial nerve intact, no facial droop            Fiberoptic Indirect laryngoscopy:  (Scope #2 used)Reason for Laryngoscopy:    Patient was unable to cooperate with mirror.  Nasopharynx b/l irritation likely 2/2 ng tube placement, oropharynx and hypopharynx with significant amount of dry blood clots and dried mucus mixed together, nasopharynx irrigated, suctioned significant amount of debris, able to visualize NG tube from left nare and ETT clearly without any signs of active bleeding from nares, slight blood still present at posterior nares but doesn't appear to be active             IMAGING/ADDITIONAL STUDIES:  CC: oral eval     HPI:  83 yr male 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, 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, Severe MR valvular heart failure , CRI, AICD, Decompensated heart failure, cardiogenic shock with renal failure, Marginal hemodynamics with ^ dose of multiple pressor & iontrope, Anemia, sepsis, Hypophosphatemia, Hematuria, overnight pt noted to become sob with significant dried secretions noted in larynx upon intubation. ENT called for oral evaluation. Pt currently intubated and unable to answer any further modifying factors.        PAST MEDICAL & SURGICAL HISTORY:  Aortic stenosis      AICD (automatic cardioverter/defibrillator) present      Aortic valve regurgitation      Ascending aorta dilation      H/O paroxysmal supraventricular tachycardia      HLD (hyperlipidemia)      Mitral valve regurgitation      Cardiac arrest      Severe mitral regurgitation      S/P aortic valve replacement  3/13/2004      S/P hernia repair  2002      History of tonsillectomy and adenoidectomy      S/P TURP  1996      S/P colonoscopy  2001, 2002, 2006, 2011, 2016      S/P endoscopy  2006      S/P cardiac cath  1994, 1995, 1999, 2002, 2004      AICD (automatic cardioverter/defibrillator) present  12/19/17      Cardiac pacemaker  12/19/17      History of cardioversion  9/11/20      S/P ablation of atrial fibrillation  10/29/20      Status post ablation of ventricular arrhythmia  2/14/19        Allergies    No Known Allergies    Intolerances      MEDICATIONS  (STANDING):  allopurinol 100 milliGRAM(s) Oral daily  aMIOdarone    Tablet 200 milliGRAM(s) Oral daily  ascorbic acid 500 milliGRAM(s) Oral daily  atorvastatin 40 milliGRAM(s) Oral at bedtime  Biotene Dry Mouth Oral Rinse 5 milliLiter(s) Swish and Spit daily  chlorhexidine 0.12% Liquid 15 milliLiter(s) Oral Mucosa every 12 hours  chlorhexidine 2% Cloths 1 Application(s) Topical <User Schedule>  CRRT Treatment    <Continuous>  cyanocobalamin 1000 MICROGram(s) Oral daily  dexMEDEtomidine Infusion 0.02 MICROgram(s)/kG/Hr (0.34 mL/Hr) IV Continuous <Continuous>  dextrose 50% Injectable 25 Gram(s) IV Push once  DOBUTamine Infusion 10 MICROgram(s)/kG/Min (10.2 mL/Hr) IV Continuous <Continuous>  EPINEPHrine    Infusion 0.1 MICROgram(s)/kG/Min (12.7 mL/Hr) IV Continuous <Continuous>  epoetin zara-epbx (RETACRIT) Injectable 43831 Unit(s) IV Push <User Schedule>  etomidate Injectable 40 milliGRAM(s) IV Push once  fluconAZOLE IVPB      fluconAZOLE IVPB 200 milliGRAM(s) IV Intermittent every 24 hours  folic acid 1 milliGRAM(s) Oral daily  insulin lispro (ADMELOG) corrective regimen sliding scale   SubCutaneous every 6 hours  levothyroxine Injectable 37.5 MICROGram(s) IV Push at bedtime  lidocaine   4% Patch 1 Patch Transdermal daily  melatonin 5 milliGRAM(s) Oral at bedtime  meropenem  IVPB      meropenem  IVPB 1000 milliGRAM(s) IV Intermittent every 8 hours  metoclopramide Injectable 5 milliGRAM(s) IV Push every 8 hours  midodrine 20 milliGRAM(s) Oral every 8 hours  norepinephrine Infusion 0.06 MICROgram(s)/kG/Min (3.81 mL/Hr) IV Continuous <Continuous>  pantoprazole  Injectable 40 milliGRAM(s) IV Push daily  petrolatum white Ointment 1 Application(s) Topical two times a day  polyethylene glycol 3350 17 Gram(s) Oral daily  PrismaSATE Dialysate BGK 4 / 2.5 5000 milliLiter(s) (1200 mL/Hr) CRRT <Continuous>  PrismaSOL Filtration BGK 4 / 2.5 5000 milliLiter(s) (600 mL/Hr) CRRT <Continuous>  PrismaSOL Filtration BGK 4 / 2.5 5000 milliLiter(s) (400 mL/Hr) CRRT <Continuous>  propofol Infusion 10 MICROgram(s)/kG/Min (4.07 mL/Hr) IV Continuous <Continuous>  rocuronium Injectable 50 milliGRAM(s) IV Push once  senna 2 Tablet(s) Oral at bedtime  sertraline 50 milliGRAM(s) Oral daily  sodium chloride 0.9% lock flush 3 milliLiter(s) IV Push every 8 hours  sodium chloride 3%  Inhalation 4 milliLiter(s) Inhalation every 12 hours  thiamine 100 milliGRAM(s) Oral daily  tranexamic acid Injectable for Nebulization 500 milliGRAM(s) Inhalation every 8 hours  vancomycin  IVPB 750 milliGRAM(s) IV Intermittent every 12 hours  vasopressin Infusion 0.04 Unit(s)/Min (6 mL/Hr) IV Continuous <Continuous>    MEDICATIONS  (PRN):      Social History: no tobacco, no etoh     Family history: Pt denies any sign FHx    ROS:   ENT: all negative except as noted in HPI   CV: denies palpitations  Pulm: denies SOB, cough, hemoptysis  GI: denies change in apetite, indigestion, n/v  : denies pertinent urinary symptoms, urgency  Neuro: denies numbness/tingling, loss of sensation  Psych: denies anxiety  MS: denies muscle weakness, instability  Heme: denies easy bruising or bleeding  Endo: denies heat/cold intolerance, excessive sweating  Vascular: denies LE edema    Vital Signs Last 24 Hrs  T(C): 34.6 (18 Mar 2024 08:00), Max: 36.7 (18 Mar 2024 04:00)  T(F): 94.3 (18 Mar 2024 08:00), Max: 98 (18 Mar 2024 04:00)  HR: 88 (18 Mar 2024 17:12) (80 - 100)  BP: --  BP(mean): --  RR: 18 (18 Mar 2024 17:00) (15 - 34)  SpO2: 100% (18 Mar 2024 17:12) (90% - 100%)    Parameters below as of 18 Mar 2024 17:12  Patient On (Oxygen Delivery Method): ventilator                              8.8    15.13 )-----------( 84       ( 18 Mar 2024 00:26 )             27.1    03-18    135  |  101  |  25<H>  ----------------------------<  189<H>  4.3   |  21<L>  |  0.87    Ca    10.0      18 Mar 2024 13:26  Phos  2.4     03-18  Mg     2.9     03-18    TPro  6.8  /  Alb  4.6  /  TBili  7.9<H>  /  DBili  x   /  AST  98<H>  /  ALT  33  /  AlkPhos  131<H>  03-18   PT/INR - ( 17 Mar 2024 05:52 )   PT: 13.3 sec;   INR: 1.28 ratio         PTT - ( 17 Mar 2024 05:52 )  PTT:32.5 sec    PHYSICAL EXAM:  Gen: NAD  Skin: No rashes, bruises, or lesions  Head: Normocephalic, Atraumatic  Face: no edema, erythema, or fluctuance. Parotid glands soft without mass  Eyes: no scleral injection  Nose: ng tube in left nare, no active bleeding, small amount of dry blood under left nare, suctioned via nares significant amount of blood clots seen indirect laryngoscopy below  Mouth: No Stridor / Drooling / Trismus.  Mucosa moist, tongue/uvula midline, oropharynx with ETT in place, significant amount of thickened secretions and blood clots noted, suctioned completely  Neck: Flat, supple, no lymphadenopathy, trachea midline, no masses  Lymphatic: No lymphadenopathy  Resp: on vent   CV: no peripheral edema/cyanosis  GI: nondistended   Peripheral vascular: no JVD or edema  Neuro: facial nerve intact, no facial droop            Fiberoptic Indirect laryngoscopy:  (Scope #2 used)Reason for Laryngoscopy:    Patient was unable to cooperate with mirror.  Nasopharynx b/l irritation likely 2/2 ng tube placement, oropharynx and hypopharynx with significant amount of dry blood clots and dried mucus mixed together, nasopharynx irrigated, suctioned significant amount of debris, able to visualize NG tube from left nare and ETT clearly without any signs of active bleeding from nares, slight blood still present at posterior nares but doesn't appear to be active     except for the above labeled significant findings the b/l superior, middle, inferior meati and turbinates are clear, OMCs and sphenoethmoidal recesses clear b/l, no evidence of discharge or nasal masses.  septum midline but with diffuse mucosal irritation         IMAGING/ADDITIONAL STUDIES:

## 2024-03-18 NOTE — PROGRESS NOTE ADULT - ASSESSMENT
83y Male PMH ICM/HFrEF (now 30%; LVIDd 6.7 cm; prev req inotrope), CAD c/b IWMI (1994) s/p angioplasty, aortic stenosis s/p bio-AVR 2004, VT arrest (2017) s/p ICD, Afib s/p multiple DCCV and ablation x2 (2020 and 2023; on AC), Aflutter s/p WARREN/DCCV (8/23), prior Ao aneurysm s/p Bentall (2021), severe MR prior MVr (2021) with MAC (precludes M-KRISTIE d/t his anatomy; turned down for TMVR trials by [employer]), atrophic kidney with CKD (b/l Cr 2.5-3), was admitted on 2/20 with acute on chronic heart failure and acute on chronic kidney dysfunction now with MODS on multiple pressors. Urology consulted for hemorrhage from the penis s/p herbert removal.    Recs:  - Given paco color urine, no indication for CBI at this time  - Herbert can be discontinued by primary team in 3 days to tamponade urethral bleeding, after 3 days, TOV per primary team  - PAtient aneuric  - RN staff may hand irrigate catheter PRN   - Trend H/H  - No further urological intervention  - Remaining care per primary team    Discussed with Dr. Sharpe  [facility]  [address] [place] [address], [address] [address] [phone]

## 2024-03-18 NOTE — PROGRESS NOTE ADULT - ASSESSMENT
82 y/o M with ICM/HFrEF (now 30%; LVIDd 6.7 cm; prev req inotrope), CAD c/b IWMI (1994) s/p angioplasty, aortic stenosis s/p bio-AVR 2004, VT arrest (2017) s/p ICD, Afib s/p multiple DCCV and ablation x2 (2020 and 2023; on AC), Aflutter s/p WARREN/DCCV (8/23), prior Ao aneurysm s/p Bentall (2021), severe MR prior MVr (2021) with MAC (precludes M-KRISTIE d/t his anatomy; turned down for TMVR trials by [employer]), atrophic kidney with CKD (b/l Cr 2.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 Patient was initiated on bumex gtt with gradual improvement with Cr improving from 3.6 to 3.1. Patient was considered for MV re-op; however, patient was reluctant of the procedure and expressed wanting to follow up in the outpatient setting to schedule. During last admission, patient was also noted to be in flutter, EP was consulted, and patient underwent repeat WARREN/DCCV , on amiodarone. Patient was discharged home 2/28 on higher dose of diuretics, as per HF team. Today, patient was evaluated at CTS office four routine follow-up, pt c/o worsening shortness of breath and L>R LE edema, with redness/swelling on left dorsal aspect of the foot, readmitted to CTS service for further management.  noticed with rising creatinine and bun      1- SURESH on ckd   2- decompensated CHF  3- Mitral regurgitation   4- hypotension   5- hypothyroid   6- hyperuricemia   7- anemia      cont dobutamine drip   midodrine 20  mg tid   cont vasopressin drip  /levophed/epinephrine    CRRT with [id number] dialyzer;  dfr 1200   0    cc hr fluid removal  as bp tolerates   see new orders   + campos meropenem 1 g tid   reatcrit 69658 U twice weekly   allopurinol 100 mg daily   no blood work RUE  strict I/O  d/w CTS team when seen earlier

## 2024-03-18 NOTE — PROGRESS NOTE ADULT - PROBLEM SELECTOR PROBLEM 2
Acute on chronic renal failure
Poor oral hygiene
Acute on chronic renal failure

## 2024-03-18 NOTE — PROGRESS NOTE ADULT - ASSESSMENT
83 yr male 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, 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, Severe MR valvular heart failure , CRI, AICD, Decompensated heart failure, cardiogenic shock with renal failure, Marginal hemodynamics with ^ dose of multiple pressor & iontrope, Anemia, sepsis, Hypophosphatemia, Hematuria, overnight pt noted to become sob with significant dried secretions noted in larynx upon intubation. ENT called for oral evaluation. Pt currently intubated oral exam revealed with ETT in place, significant amount of thickened secretions and blood clots noted, suctioned completely. B/l nares also with clots suctioned, irrigated, diffused nasal mucosal irritation, ?ng tube on left nare

## 2024-03-18 NOTE — PROCEDURE NOTE - NSBRONCHPROCDETAILS_GEN_A_CORE_FT
Patient intubated under DL large plug removed from oropharynx with forceps intubated successfully with size ET tube , post intubation quick bronchoscopic examination performed ET tune in satisfactory position, jesus identified sharp bloody secretions suctioned Rt main bronchus with fresh blood with no active bleeding identified L main with copious non bloody secretion suctioned BAL performed, will ask ENT for upper airway inspection    CXR ordered

## 2024-03-19 LAB
ALBUMIN SERPL ELPH-MCNC: 4.6 G/DL — SIGNIFICANT CHANGE UP (ref 3.3–5)
ALP SERPL-CCNC: 140 U/L — HIGH (ref 40–120)
ALT FLD-CCNC: 37 U/L — SIGNIFICANT CHANGE UP (ref 10–45)
ANION GAP SERPL CALC-SCNC: 15 MMOL/L — SIGNIFICANT CHANGE UP (ref 5–17)
APTT BLD: 32.5 SEC — SIGNIFICANT CHANGE UP (ref 24.5–35.6)
AST SERPL-CCNC: 120 U/L — HIGH (ref 10–40)
BASE EXCESS BLDV CALC-SCNC: -0.4 MMOL/L — SIGNIFICANT CHANGE UP (ref -2–3)
BASE EXCESS BLDV CALC-SCNC: -0.5 MMOL/L — SIGNIFICANT CHANGE UP (ref -2–3)
BASE EXCESS BLDV CALC-SCNC: -0.6 MMOL/L — SIGNIFICANT CHANGE UP (ref -2–3)
BASE EXCESS BLDV CALC-SCNC: -1.6 MMOL/L — SIGNIFICANT CHANGE UP (ref -2–3)
BASE EXCESS BLDV CALC-SCNC: -2.8 MMOL/L — LOW (ref -2–3)
BASE EXCESS BLDV CALC-SCNC: 0 MMOL/L — SIGNIFICANT CHANGE UP (ref -2–3)
BASE EXCESS BLDV CALC-SCNC: 0 MMOL/L — SIGNIFICANT CHANGE UP (ref -2–3)
BASE EXCESS BLDV CALC-SCNC: 1.2 MMOL/L — SIGNIFICANT CHANGE UP (ref -2–3)
BILIRUB SERPL-MCNC: 8.5 MG/DL — HIGH (ref 0.2–1.2)
BUN SERPL-MCNC: 25 MG/DL — HIGH (ref 7–23)
CA-I SERPL-SCNC: 1.21 MMOL/L — SIGNIFICANT CHANGE UP (ref 1.15–1.33)
CA-I SERPL-SCNC: 1.23 MMOL/L — SIGNIFICANT CHANGE UP (ref 1.15–1.33)
CA-I SERPL-SCNC: 1.31 MMOL/L — SIGNIFICANT CHANGE UP (ref 1.15–1.33)
CA-I SERPL-SCNC: 1.34 MMOL/L — HIGH (ref 1.15–1.33)
CALCIUM SERPL-MCNC: 10.1 MG/DL — SIGNIFICANT CHANGE UP (ref 8.4–10.5)
CHLORIDE BLDV-SCNC: 100 MMOL/L — SIGNIFICANT CHANGE UP (ref 96–108)
CHLORIDE BLDV-SCNC: 98 MMOL/L — SIGNIFICANT CHANGE UP (ref 96–108)
CHLORIDE SERPL-SCNC: 100 MMOL/L — SIGNIFICANT CHANGE UP (ref 96–108)
CO2 BLDV-SCNC: 26 MMOL/L — SIGNIFICANT CHANGE UP (ref 22–26)
CO2 BLDV-SCNC: 27 MMOL/L — HIGH (ref 22–26)
CO2 BLDV-SCNC: 29 MMOL/L — HIGH (ref 22–26)
CO2 SERPL-SCNC: 18 MMOL/L — LOW (ref 22–31)
CREAT SERPL-MCNC: 0.81 MG/DL — SIGNIFICANT CHANGE UP (ref 0.5–1.3)
EGFR: 87 ML/MIN/1.73M2 — SIGNIFICANT CHANGE UP
GAS PNL BLDA: SIGNIFICANT CHANGE UP
GAS PNL BLDV: 132 MMOL/L — LOW (ref 136–145)
GAS PNL BLDV: 133 MMOL/L — LOW (ref 136–145)
GAS PNL BLDV: SIGNIFICANT CHANGE UP
GLUCOSE BLDV-MCNC: 129 MG/DL — HIGH (ref 70–99)
GLUCOSE BLDV-MCNC: 139 MG/DL — HIGH (ref 70–99)
GLUCOSE BLDV-MCNC: 146 MG/DL — HIGH (ref 70–99)
GLUCOSE BLDV-MCNC: 158 MG/DL — HIGH (ref 70–99)
GLUCOSE SERPL-MCNC: 160 MG/DL — HIGH (ref 70–99)
HCO3 BLDV-SCNC: 24 MMOL/L — SIGNIFICANT CHANGE UP (ref 22–29)
HCO3 BLDV-SCNC: 25 MMOL/L — SIGNIFICANT CHANGE UP (ref 22–29)
HCO3 BLDV-SCNC: 25 MMOL/L — SIGNIFICANT CHANGE UP (ref 22–29)
HCO3 BLDV-SCNC: 26 MMOL/L — SIGNIFICANT CHANGE UP (ref 22–29)
HCO3 BLDV-SCNC: 27 MMOL/L — SIGNIFICANT CHANGE UP (ref 22–29)
HCT VFR BLD CALC: 32.2 % — LOW (ref 39–50)
HCT VFR BLDA CALC: 31 % — LOW (ref 39–51)
HCT VFR BLDA CALC: 32 % — LOW (ref 39–51)
HGB BLD CALC-MCNC: 10.4 G/DL — LOW (ref 12.6–17.4)
HGB BLD CALC-MCNC: 10.5 G/DL — LOW (ref 12.6–17.4)
HGB BLD CALC-MCNC: 10.7 G/DL — LOW (ref 12.6–17.4)
HGB BLD CALC-MCNC: 10.8 G/DL — LOW (ref 12.6–17.4)
HGB BLD-MCNC: 10.6 G/DL — LOW (ref 13–17)
HOROWITZ INDEX BLDV+IHG-RTO: 50 — SIGNIFICANT CHANGE UP
INR BLD: 1.37 RATIO — HIGH (ref 0.85–1.18)
LACTATE BLDV-MCNC: 1.3 MMOL/L — SIGNIFICANT CHANGE UP (ref 0.5–2)
LACTATE BLDV-MCNC: 1.3 MMOL/L — SIGNIFICANT CHANGE UP (ref 0.5–2)
LACTATE BLDV-MCNC: 1.5 MMOL/L — SIGNIFICANT CHANGE UP (ref 0.5–2)
LACTATE BLDV-MCNC: 1.5 MMOL/L — SIGNIFICANT CHANGE UP (ref 0.5–2)
MAGNESIUM SERPL-MCNC: 2.8 MG/DL — HIGH (ref 1.6–2.6)
MCHC RBC-ENTMCNC: 30.9 PG — SIGNIFICANT CHANGE UP (ref 27–34)
MCHC RBC-ENTMCNC: 32.9 GM/DL — SIGNIFICANT CHANGE UP (ref 32–36)
MCV RBC AUTO: 93.9 FL — SIGNIFICANT CHANGE UP (ref 80–100)
NRBC # BLD: 0 /100 WBCS — SIGNIFICANT CHANGE UP (ref 0–0)
OB PNL STL: POSITIVE
PCO2 BLDV: 46 MMHG — SIGNIFICANT CHANGE UP (ref 42–55)
PCO2 BLDV: 46 MMHG — SIGNIFICANT CHANGE UP (ref 42–55)
PCO2 BLDV: 47 MMHG — SIGNIFICANT CHANGE UP (ref 42–55)
PCO2 BLDV: 47 MMHG — SIGNIFICANT CHANGE UP (ref 42–55)
PCO2 BLDV: 48 MMHG — SIGNIFICANT CHANGE UP (ref 42–55)
PCO2 BLDV: 48 MMHG — SIGNIFICANT CHANGE UP (ref 42–55)
PCO2 BLDV: 49 MMHG — SIGNIFICANT CHANGE UP (ref 42–55)
PCO2 BLDV: 52 MMHG — SIGNIFICANT CHANGE UP (ref 42–55)
PH BLDV: 7.3 — LOW (ref 7.32–7.43)
PH BLDV: 7.3 — LOW (ref 7.32–7.43)
PH BLDV: 7.34 — SIGNIFICANT CHANGE UP (ref 7.32–7.43)
PH BLDV: 7.35 — SIGNIFICANT CHANGE UP (ref 7.32–7.43)
PH BLDV: 7.36 — SIGNIFICANT CHANGE UP (ref 7.32–7.43)
PHOSPHATE SERPL-MCNC: 2.2 MG/DL — LOW (ref 2.5–4.5)
PHOSPHATE SERPL-MCNC: 3.7 MG/DL — SIGNIFICANT CHANGE UP (ref 2.5–4.5)
PLATELET # BLD AUTO: 76 K/UL — LOW (ref 150–400)
PO2 BLDV: 33 MMHG — SIGNIFICANT CHANGE UP (ref 25–45)
PO2 BLDV: 35 MMHG — SIGNIFICANT CHANGE UP (ref 25–45)
PO2 BLDV: 36 MMHG — SIGNIFICANT CHANGE UP (ref 25–45)
PO2 BLDV: 37 MMHG — SIGNIFICANT CHANGE UP (ref 25–45)
PO2 BLDV: 38 MMHG — SIGNIFICANT CHANGE UP (ref 25–45)
PO2 BLDV: 44 MMHG — SIGNIFICANT CHANGE UP (ref 25–45)
POTASSIUM BLDV-SCNC: 4.2 MMOL/L — SIGNIFICANT CHANGE UP (ref 3.5–5.1)
POTASSIUM BLDV-SCNC: 4.2 MMOL/L — SIGNIFICANT CHANGE UP (ref 3.5–5.1)
POTASSIUM BLDV-SCNC: 4.4 MMOL/L — SIGNIFICANT CHANGE UP (ref 3.5–5.1)
POTASSIUM BLDV-SCNC: 4.5 MMOL/L — SIGNIFICANT CHANGE UP (ref 3.5–5.1)
POTASSIUM SERPL-MCNC: 4.3 MMOL/L — SIGNIFICANT CHANGE UP (ref 3.5–5.3)
POTASSIUM SERPL-SCNC: 4.3 MMOL/L — SIGNIFICANT CHANGE UP (ref 3.5–5.3)
PROT SERPL-MCNC: 7 G/DL — SIGNIFICANT CHANGE UP (ref 6–8.3)
PROTHROM AB SERPL-ACNC: 14.3 SEC — HIGH (ref 9.5–13)
RBC # BLD: 3.43 M/UL — LOW (ref 4.2–5.8)
RBC # FLD: 20.8 % — HIGH (ref 10.3–14.5)
SAO2 % BLDV: 57.9 % — LOW (ref 67–88)
SAO2 % BLDV: 60.2 % — LOW (ref 67–88)
SAO2 % BLDV: 61.4 % — LOW (ref 67–88)
SAO2 % BLDV: 61.6 % — LOW (ref 67–88)
SAO2 % BLDV: 63.7 % — LOW (ref 67–88)
SAO2 % BLDV: 64.4 % — LOW (ref 67–88)
SAO2 % BLDV: 65.2 % — LOW (ref 67–88)
SAO2 % BLDV: 65.3 % — LOW (ref 67–88)
SODIUM SERPL-SCNC: 133 MMOL/L — LOW (ref 135–145)
VANCOMYCIN TROUGH SERPL-MCNC: 17 UG/ML — SIGNIFICANT CHANGE UP (ref 10–20)
VANCOMYCIN TROUGH SERPL-MCNC: 17.9 UG/ML — SIGNIFICANT CHANGE UP (ref 10–20)
WBC # BLD: 19.18 K/UL — HIGH (ref 3.8–10.5)
WBC # FLD AUTO: 19.18 K/UL — HIGH (ref 3.8–10.5)

## 2024-03-19 PROCEDURE — 99232 SBSQ HOSP IP/OBS MODERATE 35: CPT

## 2024-03-19 PROCEDURE — 99292 CRITICAL CARE ADDL 30 MIN: CPT | Mod: 25

## 2024-03-19 PROCEDURE — 36620 INSERTION CATHETER ARTERY: CPT

## 2024-03-19 PROCEDURE — 99291 CRITICAL CARE FIRST HOUR: CPT | Mod: 25

## 2024-03-19 PROCEDURE — 71045 X-RAY EXAM CHEST 1 VIEW: CPT | Mod: 26

## 2024-03-19 RX ORDER — SODIUM BICARBONATE 1 MEQ/ML
50 SYRINGE (ML) INTRAVENOUS ONCE
Refills: 0 | Status: COMPLETED | OUTPATIENT
Start: 2024-03-19 | End: 2024-03-19

## 2024-03-19 RX ORDER — PANTOPRAZOLE SODIUM 20 MG/1
40 TABLET, DELAYED RELEASE ORAL EVERY 12 HOURS
Refills: 0 | Status: DISCONTINUED | OUTPATIENT
Start: 2024-03-19 | End: 2024-03-24

## 2024-03-19 RX ORDER — CALCIUM GLUCONATE 100 MG/ML
2 VIAL (ML) INTRAVENOUS ONCE
Refills: 0 | Status: COMPLETED | OUTPATIENT
Start: 2024-03-19 | End: 2024-03-19

## 2024-03-19 RX ADMIN — Medication 37.5 MICROGRAM(S): at 22:16

## 2024-03-19 RX ADMIN — TRANEXAMIC ACID 500 MILLIGRAM(S): 100 INJECTION, SOLUTION INTRAVENOUS at 13:16

## 2024-03-19 RX ADMIN — SODIUM CHLORIDE 4 MILLILITER(S): 9 INJECTION INTRAMUSCULAR; INTRAVENOUS; SUBCUTANEOUS at 05:12

## 2024-03-19 RX ADMIN — MEROPENEM 100 MILLIGRAM(S): 1 INJECTION INTRAVENOUS at 12:41

## 2024-03-19 RX ADMIN — Medication 200 GRAM(S): at 09:29

## 2024-03-19 RX ADMIN — Medication 250 MILLIGRAM(S): at 05:52

## 2024-03-19 RX ADMIN — Medication 2: at 06:09

## 2024-03-19 RX ADMIN — DEXMEDETOMIDINE HYDROCHLORIDE IN 0.9% SODIUM CHLORIDE 0.34 MICROGRAM(S)/KG/HR: 4 INJECTION INTRAVENOUS at 07:56

## 2024-03-19 RX ADMIN — Medication 50 MILLIEQUIVALENT(S): at 02:16

## 2024-03-19 RX ADMIN — MEROPENEM 100 MILLIGRAM(S): 1 INJECTION INTRAVENOUS at 04:58

## 2024-03-19 RX ADMIN — SERTRALINE 50 MILLIGRAM(S): 25 TABLET, FILM COATED ORAL at 05:27

## 2024-03-19 RX ADMIN — TRANEXAMIC ACID 500 MILLIGRAM(S): 100 INJECTION, SOLUTION INTRAVENOUS at 21:13

## 2024-03-19 RX ADMIN — Medication 1 APPLICATION(S): at 17:53

## 2024-03-19 RX ADMIN — VASOPRESSIN 6 UNIT(S)/MIN: 20 INJECTION INTRAVENOUS at 07:55

## 2024-03-19 RX ADMIN — Medication 3.81 MICROGRAM(S)/KG/MIN: at 07:56

## 2024-03-19 RX ADMIN — ATORVASTATIN CALCIUM 40 MILLIGRAM(S): 80 TABLET, FILM COATED ORAL at 22:16

## 2024-03-19 RX ADMIN — PROPOFOL 4.07 MICROGRAM(S)/KG/MIN: 10 INJECTION, EMULSION INTRAVENOUS at 07:57

## 2024-03-19 RX ADMIN — MEROPENEM 100 MILLIGRAM(S): 1 INJECTION INTRAVENOUS at 20:01

## 2024-03-19 RX ADMIN — CHLORHEXIDINE GLUCONATE 15 MILLILITER(S): 213 SOLUTION TOPICAL at 05:27

## 2024-03-19 RX ADMIN — CHLORHEXIDINE GLUCONATE 1 APPLICATION(S): 213 SOLUTION TOPICAL at 06:10

## 2024-03-19 RX ADMIN — SODIUM CHLORIDE 3 MILLILITER(S): 9 INJECTION INTRAMUSCULAR; INTRAVENOUS; SUBCUTANEOUS at 13:08

## 2024-03-19 RX ADMIN — MIDODRINE HYDROCHLORIDE 20 MILLIGRAM(S): 2.5 TABLET ORAL at 05:06

## 2024-03-19 RX ADMIN — PROPOFOL 4.07 MICROGRAM(S)/KG/MIN: 10 INJECTION, EMULSION INTRAVENOUS at 17:36

## 2024-03-19 RX ADMIN — Medication 5 MILLIGRAM(S): at 05:06

## 2024-03-19 RX ADMIN — Medication 1 MILLIGRAM(S): at 12:41

## 2024-03-19 RX ADMIN — Medication 63.75 MILLIMOLE(S): at 02:16

## 2024-03-19 RX ADMIN — AMIODARONE HYDROCHLORIDE 200 MILLIGRAM(S): 400 TABLET ORAL at 05:52

## 2024-03-19 RX ADMIN — EPINEPHRINE 12.7 MICROGRAM(S)/KG/MIN: 0.3 INJECTION INTRAMUSCULAR; SUBCUTANEOUS at 07:56

## 2024-03-19 RX ADMIN — Medication 200 GRAM(S): at 21:28

## 2024-03-19 RX ADMIN — Medication 10.2 MICROGRAM(S)/KG/MIN: at 07:55

## 2024-03-19 RX ADMIN — SODIUM CHLORIDE 3 MILLILITER(S): 9 INJECTION INTRAMUSCULAR; INTRAVENOUS; SUBCUTANEOUS at 21:23

## 2024-03-19 RX ADMIN — Medication 500 MILLIGRAM(S): at 12:40

## 2024-03-19 RX ADMIN — Medication 5 MILLIGRAM(S): at 22:16

## 2024-03-19 RX ADMIN — SODIUM CHLORIDE 4 MILLILITER(S): 9 INJECTION INTRAMUSCULAR; INTRAVENOUS; SUBCUTANEOUS at 18:06

## 2024-03-19 RX ADMIN — Medication 2: at 17:35

## 2024-03-19 RX ADMIN — MIDODRINE HYDROCHLORIDE 20 MILLIGRAM(S): 2.5 TABLET ORAL at 22:16

## 2024-03-19 RX ADMIN — MIDODRINE HYDROCHLORIDE 20 MILLIGRAM(S): 2.5 TABLET ORAL at 13:10

## 2024-03-19 RX ADMIN — Medication 100 MILLIGRAM(S): at 12:41

## 2024-03-19 RX ADMIN — PREGABALIN 1000 MICROGRAM(S): 225 CAPSULE ORAL at 12:40

## 2024-03-19 RX ADMIN — PANTOPRAZOLE SODIUM 40 MILLIGRAM(S): 20 TABLET, DELAYED RELEASE ORAL at 17:36

## 2024-03-19 RX ADMIN — TRANEXAMIC ACID 500 MILLIGRAM(S): 100 INJECTION, SOLUTION INTRAVENOUS at 05:11

## 2024-03-19 RX ADMIN — Medication 5 MILLILITER(S): at 17:38

## 2024-03-19 RX ADMIN — CHLORHEXIDINE GLUCONATE 15 MILLILITER(S): 213 SOLUTION TOPICAL at 17:52

## 2024-03-19 NOTE — PROGRESS NOTE ADULT - ASSESSMENT
82 y/o M with ICM/HFrEF (now 30%; LVIDd 6.7 cm; prev req inotrope), CAD c/b IWMI (1994) s/p angioplasty, aortic stenosis s/p bio-AVR 2004, VT arrest (2017) s/p ICD, Afib s/p multiple DCCV and ablation x2 (2020 and 2023; on AC), Aflutter s/p WARREN/DCCV (8/23), prior Ao aneurysm s/p Bentall (2021), severe MR prior MVr (2021) with MAC (precludes M-KRISTIE d/t his anatomy; turned down for TMVR trials by [employer]), atrophic kidney with CKD (b/l Cr 2.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 Patient was initiated on bumex gtt with gradual improvement with Cr improving from 3.6 to 3.1. Patient was considered for MV re-op; however, patient was reluctant of the procedure and expressed wanting to follow up in the outpatient setting to schedule. During last admission, patient was also noted to be in flutter, EP was consulted, and patient underwent repeat WARREN/DCCV , on amiodarone. Patient was discharged home 2/28 on higher dose of diuretics, as per HF team. Today, patient was evaluated at CTS office four routine follow-up, pt c/o worsening shortness of breath and L>R LE edema, with redness/swelling on left dorsal aspect of the foot, readmitted to CTS service for further management.  noticed with rising creatinine and bun      1- SURESH on ckd   2- decompensated CHF  3- Mitral regurgitation   4- hypotension   5- hypothyroid   6- hyperuricemia   7- anemia      cont dobutamine drip   midodrine 20  mg tid   cont vasopressin drip  /levophed/epinephrine    CRRT with [id number] dialyzer;  dfr 1200   0    cc hr fluid removal  as bp tolerates   see new orders   + luek  s/p meropenem 1 g tid   reatcrit 99460 U twice weekly   allopurinol 100 mg daily   no blood work RUE  strict I/O  d/w CTS team when seen earlier

## 2024-03-19 NOTE — PROGRESS NOTE ADULT - SUBJECTIVE AND OBJECTIVE BOX
Patient seen and examined at the bedside.    Remained critically ill on continuous ICU monitoring.    OBJECTIVE:  Vital Signs Last 24 Hrs  T(C): 36.5 (19 Mar 2024 16:00), Max: 36.7 (19 Mar 2024 12:00)  T(F): 97.7 (19 Mar 2024 16:00), Max: 98.1 (19 Mar 2024 12:00)  HR: 82 (19 Mar 2024 19:15) (80 - 101)  BP: --  BP(mean): --  RR: 14 (19 Mar 2024 19:15) (14 - 71)  SpO2: 100% (19 Mar 2024 19:15) (91% - 100%)    Parameters below as of 19 Mar 2024 16:00  Patient On (Oxygen Delivery Method): ventilator    O2 Concentration (%): 50      Physical Exam:   General: NAD   Neurology: nonfocal   ENT/Neck: Neck supple, trachea midline, No JVD   Respiratory: Clear bilaterally   CV: S1S2, no murmurs        [x] Sternal dressing        [x] Paced rhythm, [x] PPM -   Abdominal: Soft, NT, ND +BS   Extremities: 1-2+ pedal edema noted         Assessment:  84 y/o M with ICM/HFrEF (now 30%; LVIDd 6.7 cm; prev req inotrope), CAD c/b IWMI (1994) s/p angioplasty, aortic stenosis s/p bio-AVR 2004, VT arrest (2017) s/p ICD, Afib s/p multiple DCCV and ablation x2 (2020 and 2023; on AC), Aflutter s/p WARREN/DCCV (8/23), prior Ao aneurysm s/p Bentall (2021), 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    Tx to ICU for Hypotension/CHF/renal failure  Hemodynamic instability  Hypovolemia  Post op respiratory insufficiency  Acute blood loss anemia  Thrombocytopenia  Leukocytosis      Plan:   ***Neuro***  [x] Precedex / Propofol   Continue with Melatonin for sleep regimen   Continue Zoloft    ***Cardiovascular***  Invasive hemodynamic monitoring, assess perfusion indices   IL / CVP 11 / MAP 66/ Hct 32.2% / Lactate 1.5  [x] Levophed - 0.06 mcg/kG/Min   [x] Vasopressin - 0.04 Unit(s)/Min   [x] Epinephrine - 0.1 mcg/kG/Min   [x] Dobutamine - 10 mcg/kG/Min  [x] S/P 1 unit PRBC today   Continuos reassessment of hemodynamics   PO Amiodarone  for AFib prophylaxis   Continue Midodrine  [x] Statin       ***Pulmonary***  [x] Intubated last night for hypoxia   Post op vent management   Titration of FiO2 and PEEP, follow SpO2, CXR, blood gasses     Mode: CPAP with PS  FiO2: 50  PEEP: 5  PS: 10  MAP: 7  PIP: 16              ***GI***  [x] NPO with Nepro TF @ 50 mL/Hr   [x] Protonix for stress ulcer ppx  Bowel regimen with Miralax and Senna  Reglan for gut motility  GI following, appreciate recs.    ***Renal***  [x] CKD  Continue to monitor I/Os, BUN/Creatinine.   Replete lytes PRN  Royal present  CVVHD  Goal net negative   RETACRIT for renal support     ***ID***  Meropenem for Empiric dosing    ***Endocrine***  [x] Stress Hyperglycemia : HbA1c 5.7%                - [x] ISS             - Need tight glycemic control to prevent wound infection.  [x] Gout             - [x] Allopurinol  [x] Hypothyroidism             - [x] Synthroid        Patient requires continuous monitoring with bedside rhythm monitoring, pulse oximetry monitoring, and continuous central venous and arterial pressure monitoring; and intermittent blood gas analysis. Care plan discussed with the ICU care team.   Patient remained critical, at risk for life threatening decompensation.    I have spent 30 minutes providing critical care management to this patient.    By signing my name below, I, Tressa Tamayo, attest that this documentation has been prepared under the direction and in the presence of SHARON Fonseca.   Electronically signed: Home Mendoza, 03-19-24 @ 19:39    I, Alex Flores , personally performed the services described in this documentation. all medical record entries made by the buddyibe were at my direction and in my presence. I have reviewed the chart and agree that the record reflects my personal performance and is accurate and complete  Electronically signed: SHARON Fonseca.  Patient seen and examined at the bedside.    Remained critically ill on continuous ICU monitoring.    OBJECTIVE:  Vital Signs Last 24 Hrs  T(C): 36.5 (19 Mar 2024 16:00), Max: 36.7 (19 Mar 2024 12:00)  T(F): 97.7 (19 Mar 2024 16:00), Max: 98.1 (19 Mar 2024 12:00)  HR: 82 (19 Mar 2024 19:15) (80 - 101)  BP: 111/49  BP(mean): 68  RR: 14 (19 Mar 2024 19:15) (14 - 71)  SpO2: 100% (19 Mar 2024 19:15) (91% - 100%)    Parameters below as of 19 Mar 2024 16:00  Patient On (Oxygen Delivery Method): ventilator    O2 Concentration (%): 50      Physical Exam:   General: NAD, intubated   Neurology: nonfocal   ENT/Neck: Neck supple, trachea midline, No JVD   Respiratory: Clear bilaterally   CV: S1S2, no murmurs        [x] Sternal dressing        [x] Paced rhythm, [x] PPM -   Abdominal: Soft, NT, ND +BS   Extremities: 1-2+ pedal edema noted         Assessment:  82 y/o M with ICM/HFrEF (now 30%; LVIDd 6.7 cm; prev req inotrope), CAD c/b IWMI (1994) s/p angioplasty, aortic stenosis s/p bio-AVR 2004, VT arrest (2017) s/p ICD, Afib s/p multiple DCCV and ablation x2 (2020 and 2023; on AC), Aflutter s/p WARREN/DCCV (8/23), prior Ao aneurysm s/p Bentall (2021), 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    Tx to ICU for Hypotension/CHF/renal failure  Hemodynamic instability  Hypovolemia  Post op respiratory insufficiency  Acute blood loss anemia  Thrombocytopenia  Leukocytosis      Plan:   ***Neuro***  [x] Precedex / Propofol   Continue with Melatonin for sleep regimen   Continue Zoloft    ***Cardiovascular***  Invasive hemodynamic monitoring, assess perfusion indices   MD / CVP 11 / MAP 66/ Hct 32.2% / Lactate 1.5  [x] Levophed - 0.06 mcg/kG/Min   [x] Vasopressin - 0.04 Unit(s)/Min   [x] Epinephrine - 0.1 mcg/kG/Min   [x] Dobutamine - 10 mcg/kG/Min  [x] S/P 1 unit PRBC today   Continuos reassessment of hemodynamics   PO Amiodarone  for AFib prophylaxis   Continue Midodrine  [x] Statin       ***Pulmonary***  [x] Intubated last night for hypoxia   Post op vent management   Titration of FiO2 and PEEP, follow SpO2, CXR, blood gasses     Mode: CPAP with PS  FiO2: 50  PEEP: 5  PS: 10  MAP: 7  PIP: 16              ***GI***  [x] NPO with Nepro TF @ 50 mL/Hr   [x] Protonix for stress ulcer ppx  Bowel regimen with Miralax and Senna  Reglan for gut motility  GI following, appreciate recs.    ***Renal***  [x] CKD  Continue to monitor I/Os, BUN/Creatinine.   Replete lytes PRN  Royal present  CVVHD  Goal net negative   RETACRIT for renal support     ***ID***  Meropenem for Empiric dosing    ***Endocrine***  [x] Stress Hyperglycemia : HbA1c 5.7%                - [x] ISS             - Need tight glycemic control to prevent wound infection.  [x] Gout             - [x] Allopurinol  [x] Hypothyroidism             - [x] Synthroid        Patient requires continuous monitoring with bedside rhythm monitoring, pulse oximetry monitoring, and continuous central venous and arterial pressure monitoring; and intermittent blood gas analysis. Care plan discussed with the ICU care team.   Patient remained critical, at risk for life threatening decompensation.    I have spent 30 minutes providing critical care management to this patient.    By signing my name below, I, Tressa Tamayo, attest that this documentation has been prepared under the direction and in the presence of SHARON Fosneca.   Electronically signed: Home Mendoza, 03-19-24 @ 19:39    I, Alex Flores , personally performed the services described in this documentation. all medical record entries made by the buddyibe were at my direction and in my presence. I have reviewed the chart and agree that the record reflects my personal performance and is accurate and complete  Electronically signed: SHARON Fonseca.

## 2024-03-19 NOTE — PROGRESS NOTE ADULT - SUBJECTIVE AND OBJECTIVE BOX
[facility]   [phone]  RENAL FOLLOW UP NOTE  --------------------------------------------------------------------------------  Chief Complaint:    24 hour events/subjective:    seen earlier intubated   on pressors   on CRRT     PAST HISTORY  --------------------------------------------------------------------------------  No significant changes to PMH, PSH, FHx, SHx, unless otherwise noted    ALLERGIES & MEDICATIONS  --------------------------------------------------------------------------------  Allergies    No Known Allergies    Intolerances      Standing Inpatient Medications  allopurinol 100 milliGRAM(s) Oral daily  aMIOdarone    Tablet 200 milliGRAM(s) Oral daily  ascorbic acid 500 milliGRAM(s) Oral daily  atorvastatin 40 milliGRAM(s) Oral at bedtime  Biotene Dry Mouth Oral Rinse 5 milliLiter(s) Swish and Spit daily  chlorhexidine 0.12% Liquid 15 milliLiter(s) Oral Mucosa every 12 hours  chlorhexidine 2% Cloths 1 Application(s) Topical <User Schedule>  CRRT Treatment    <Continuous>  cyanocobalamin 1000 MICROGram(s) Oral daily  dexMEDEtomidine Infusion 0.02 MICROgram(s)/kG/Hr IV Continuous <Continuous>  dextrose 50% Injectable 25 Gram(s) IV Push once  DOBUTamine Infusion 10 MICROgram(s)/kG/Min IV Continuous <Continuous>  EPINEPHrine    Infusion 0.1 MICROgram(s)/kG/Min IV Continuous <Continuous>  epoetin zara-epbx (RETACRIT) Injectable 95641 Unit(s) IV Push <User Schedule>  etomidate Injectable 40 milliGRAM(s) IV Push once  folic acid 1 milliGRAM(s) Oral daily  insulin lispro (ADMELOG) corrective regimen sliding scale   SubCutaneous every 6 hours  levothyroxine Injectable 37.5 MICROGram(s) IV Push at bedtime  lidocaine   4% Patch 1 Patch Transdermal daily  melatonin 5 milliGRAM(s) Oral at bedtime  metoclopramide Injectable 5 milliGRAM(s) IV Push every 8 hours  midodrine 20 milliGRAM(s) Oral every 8 hours  norepinephrine Infusion 0.06 MICROgram(s)/kG/Min IV Continuous <Continuous>  pantoprazole  Injectable 40 milliGRAM(s) IV Push every 12 hours  petrolatum white Ointment 1 Application(s) Topical two times a day  polyethylene glycol 3350 17 Gram(s) Oral daily  PrismaSATE Dialysate BGK 4 / 2.5 5000 milliLiter(s) CRRT <Continuous>  PrismaSOL Filtration BGK 4 / 2.5 5000 milliLiter(s) CRRT <Continuous>  PrismaSOL Filtration BGK 4 / 2.5 5000 milliLiter(s) CRRT <Continuous>  propofol Infusion 10 MICROgram(s)/kG/Min IV Continuous <Continuous>  rocuronium Injectable 50 milliGRAM(s) IV Push once  senna 2 Tablet(s) Oral at bedtime  sertraline 50 milliGRAM(s) Oral daily  sodium chloride 0.9% lock flush 3 milliLiter(s) IV Push every 8 hours  sodium chloride 3%  Inhalation 4 milliLiter(s) Inhalation every 12 hours  thiamine 100 milliGRAM(s) Oral daily  tranexamic acid Injectable for Nebulization 500 milliGRAM(s) Inhalation every 8 hours  vasopressin Infusion 0.04 Unit(s)/Min IV Continuous <Continuous>    PRN Inpatient Medications      REVIEW OF SYSTEMS  --------------------------------------------------------------------------------      VITALS/PHYSICAL EXAM  --------------------------------------------------------------------------------  T(C): 36.1 (03-19-24 @ 20:00), Max: 36.7 (03-19-24 @ 12:00)  HR: 80 (03-19-24 @ 20:00) (80 - 101)  BP: --  RR: 28 (03-19-24 @ 20:00) (14 - 71)  SpO2: 100% (03-19-24 @ 20:00) (99% - 100%)  Wt(kg): --        03-18-24 @ 07:01  -  03-19-24 @ 07:00  --------------------------------------------------------  IN: 2958 mL / OUT: 3418 mL / NET: -460 mL    03-19-24 @ 07:01  -  03-19-24 @ 20:42  --------------------------------------------------------  IN: 1672.9 mL / OUT: 1700 mL / NET: -27.1 mL      Physical Exam:  	    Gen:  on high flow O2 + IJ cath   	Pulm: decrease bs + coarse bs   	CV: RRR, S1S2; no rub  	Abd: +BS, soft, nontender/nondistended  	: No suprapubic tenderness  	UE: Warm, no cyanosis  no clubbing,  no edema  	LE: Warm, no cyanosis  no clubbing,  no   edema  	Neuro: alert and oriented. speech coherent     LABS/STUDIES  --------------------------------------------------------------------------------              10.6   19.18 >-----------<  76       [03-19-24 @ 00:26]              32.2     133  |  100  |  25  ----------------------------<  160      [03-19-24 @ 00:25]  4.3   |  18  |  0.81        Ca     10.1     [03-19-24 @ 00:25]      Mg     2.8     [03-19-24 @ 00:25]      Phos  3.7     [03-19-24 @ 06:05]    TPro  7.0  /  Alb  4.6  /  TBili  8.5  /  DBili  x   /  AST  120  /  ALT  37  /  AlkPhos  140  [03-19-24 @ 00:25]    PT/INR: PT 14.3 , INR 1.37       [03-19-24 @ 00:25]  PTT: 32.5       [03-19-24 @ 00:25]      Creatinine Trend:  SCr 0.81 [03-19 @ 00:25]  SCr 0.87 [03-18 @ 13:26]  SCr 1.03 [03-18 @ 00:26]  SCr 1.17 [03-17 @ 00:41]  SCr 1.16 [03-16 @ 00:47]          Iron 290, TIBC 430, %sat 67      [03-06-24 @ 13:57]  Ferritin 335      [03-06-24 @ 13:57]  PTH -- (Ca 8.9)      [03-05-24 @ 13:03]   242  TSH 2.02      [03-06-24 @ 13:57]

## 2024-03-19 NOTE — PROGRESS NOTE ADULT - SUBJECTIVE AND OBJECTIVE BOX
infectious diseases progress note:    Patient is a 83y old  Male who presents with a chief complaint of shortness of breath (19 Mar 2024 06:03)        Heart failure             Allergies    No Known Allergies    Intolerances        ANTIBIOTICS/RELEVANT:  antimicrobials  meropenem  IVPB      meropenem  IVPB 1000 milliGRAM(s) IV Intermittent every 8 hours  vancomycin  IVPB 750 milliGRAM(s) IV Intermittent every 12 hours    immunologic:  epoetin zara-epbx (RETACRIT) Injectable 27217 Unit(s) IV Push <User Schedule>    OTHER:  allopurinol 100 milliGRAM(s) Oral daily  aMIOdarone    Tablet 200 milliGRAM(s) Oral daily  ascorbic acid 500 milliGRAM(s) Oral daily  atorvastatin 40 milliGRAM(s) Oral at bedtime  Biotene Dry Mouth Oral Rinse 5 milliLiter(s) Swish and Spit daily  chlorhexidine 0.12% Liquid 15 milliLiter(s) Oral Mucosa every 12 hours  chlorhexidine 2% Cloths 1 Application(s) Topical <User Schedule>  CRRT Treatment    <Continuous>  cyanocobalamin 1000 MICROGram(s) Oral daily  dexMEDEtomidine Infusion 0.02 MICROgram(s)/kG/Hr IV Continuous <Continuous>  dextrose 50% Injectable 25 Gram(s) IV Push once  DOBUTamine Infusion 10 MICROgram(s)/kG/Min IV Continuous <Continuous>  EPINEPHrine    Infusion 0.1 MICROgram(s)/kG/Min IV Continuous <Continuous>  etomidate Injectable 40 milliGRAM(s) IV Push once  folic acid 1 milliGRAM(s) Oral daily  insulin lispro (ADMELOG) corrective regimen sliding scale   SubCutaneous every 6 hours  levothyroxine Injectable 37.5 MICROGram(s) IV Push at bedtime  lidocaine   4% Patch 1 Patch Transdermal daily  melatonin 5 milliGRAM(s) Oral at bedtime  metoclopramide Injectable 5 milliGRAM(s) IV Push every 8 hours  midodrine 20 milliGRAM(s) Oral every 8 hours  norepinephrine Infusion 0.06 MICROgram(s)/kG/Min IV Continuous <Continuous>  pantoprazole  Injectable 40 milliGRAM(s) IV Push daily  petrolatum white Ointment 1 Application(s) Topical two times a day  polyethylene glycol 3350 17 Gram(s) Oral daily  PrismaSATE Dialysate BGK 4 / 2.5 5000 milliLiter(s) CRRT <Continuous>  PrismaSOL Filtration BGK 4 / 2.5 5000 milliLiter(s) CRRT <Continuous>  PrismaSOL Filtration BGK 4 / 2.5 5000 milliLiter(s) CRRT <Continuous>  propofol Infusion 10 MICROgram(s)/kG/Min IV Continuous <Continuous>  rocuronium Injectable 50 milliGRAM(s) IV Push once  senna 2 Tablet(s) Oral at bedtime  sertraline 50 milliGRAM(s) Oral daily  sodium chloride 0.9% lock flush 3 milliLiter(s) IV Push every 8 hours  sodium chloride 3%  Inhalation 4 milliLiter(s) Inhalation every 12 hours  thiamine 100 milliGRAM(s) Oral daily  tranexamic acid Injectable for Nebulization 500 milliGRAM(s) Inhalation every 8 hours  vasopressin Infusion 0.04 Unit(s)/Min IV Continuous <Continuous>      Objective:  Vital Signs Last 24 Hrs  T(C): 36.1 (19 Mar 2024 08:00), Max: 36.1 (19 Mar 2024 08:00)  T(F): 97 (19 Mar 2024 08:00), Max: 97 (19 Mar 2024 08:00)  HR: 80 (19 Mar 2024 08:00) (80 - 96)  BP: --  BP(mean): --  RR: 30 (19 Mar 2024 08:00) (15 - 69)  SpO2: 100% (19 Mar 2024 08:00) (90% - 100%)    Parameters below as of 19 Mar 2024 08:00  Patient On (Oxygen Delivery Method): ventilator    O2 Concentration (%): 50        Eyes:JESUS, EOMI  Ear/Nose/Throat: no oral lesion, no sinus tenderness on percussion	  Neck:no JVD, no lymphadenopathy, supple  Respiratory: CTA sumi  Cardiovascular: S1S2 RRR, no murmurs  Gastrointestinal:soft, (+) BS, no HSM  Extremities:no e/e/c        LABS:                        10.6   19.18 )-----------( 76       ( 19 Mar 2024 00:26 )             32.2     03-19    133<L>  |  100  |  25<H>  ----------------------------<  160<H>  4.3   |  18<L>  |  0.81    Ca    10.1      19 Mar 2024 00:25  Phos  3.7     03-19  Mg     2.8     03-19    TPro  7.0  /  Alb  4.6  /  TBili  8.5<H>  /  DBili  x   /  AST  120<H>  /  ALT  37  /  AlkPhos  140<H>  03-19    PT/INR - ( 19 Mar 2024 00:25 )   PT: 14.3 sec;   INR: 1.37 ratio         PTT - ( 19 Mar 2024 00:25 )  PTT:32.5 sec  Urinalysis Basic - ( 19 Mar 2024 00:25 )    Color: x / Appearance: x / SG: x / pH: x  Gluc: 160 mg/dL / Ketone: x  / Bili: x / Urobili: x   Blood: x / Protein: x / Nitrite: x   Leuk Esterase: x / RBC: x / WBC x   Sq Epi: x / Non Sq Epi: x / Bacteria: x          MICROBIOLOGY:    RECENT CULTURES:  03-13 @ 13:00 .Blood Blood                No growth at 5 days    03-13 @ 11:02 .Blood Blood                No growth at 5 days    03-12 @ 17:30 .Blood Blood-Peripheral                No growth at 5 days          RESPIRATORY CULTURES:              RADIOLOGY & ADDITIONAL STUDIES:        Pager [phone]  After 5 pm/weekends or if no response :[phone]

## 2024-03-19 NOTE — PROGRESS NOTE ADULT - SUBJECTIVE AND OBJECTIVE BOX
YAYA BHATIA  MRN-[id number]  Patient is a 83y old  Male who presents with a chief complaint of shortness of breath (19 Mar 2024 20:41)    HPI:  82 y/o M with ICM/HFrEF (now 30%; LVIDd 6.7 cm; prev req inotrope), CAD c/b IWMI (1994) s/p angioplasty, aortic stenosis s/p bio-AVR 2004, VT arrest (2017) s/p ICD, Afib s/p multiple DCCV and ablation x2 (2020 and 2023; on AC), Aflutter s/p WARREN/DCCV (8/23), prior Ao aneurysm s/p Bentall (2021), 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 Patient was initiated on bumex gtt with gradual improvement with Cr improving from 3.6 to 3.1. Patient was considered for MV re-op; however, patient was reluctant of the procedure and expressed wanting to follow up in the outpatient setting to schedule. During last admission, patient was also noted to be in flutter, EP was consulted, and patient underwent repeat WARREN/DCCV , on amiodarone. Patient was discharged home 2/28 on higher dose of diuretics, as per HF team. Today, patient was evaluated at CTS office four routine follow-up, pt c/o worsening shortness of breath and L>R LE edema, with redness/swelling on left dorsal aspect of the foot, readmitted to CTS service for further management. Denies acute fever, chills, dizziness, headache, chest pain, palpitations, acute shortness of breath, abdominal pain, n/v, (04 Mar 2024 12:18)      Surgery/Hospital course:  date/OR    brought to icu from OR , intubated. full vent support.  sedated on precedex  drips insuline and norepinephrine   BP labile, fluid resuscitated , titrate pressors     Physical Exam:  Gen:  intubated, full vent support  CNS: sedated post anesthesia  Neck: no JVD  RES : clear , no wheezing    Chest:   + chest tubes                     CVS: Regular  rhythm. Normal S1/S2  Abd: Soft, non-distended. Bowel sounds present.  Skin: No rash.  Ext:  no edema    Vital Signs Last 24 Hrs  T(C): 36.1 (19 Mar 2024 20:00), Max: 36.7 (19 Mar 2024 12:00)  T(F): 97 (19 Mar 2024 20:00), Max: 98.1 (19 Mar 2024 12:00)  HR: 97 (19 Mar 2024 22:30) (80 - 101)  BP: --  BP(mean): --  RR: 16 (19 Mar 2024 22:30) (14 - 71)  SpO2: 99% (19 Mar 2024 22:30) (96% - 100%)    Parameters below as of 19 Mar 2024 16:00  Patient On (Oxygen Delivery Method): ventilator    O2 Concentration (%): 50    LABS:                        10.6   19.18 )-----------( 76       ( 19 Mar 2024 00:26 )             32.2     03-19    133<L>  |  100  |  25<H>  ----------------------------<  160<H>  4.3   |  18<L>  |  0.81    Ca    10.1      19 Mar 2024 00:25  Phos  3.7     03-19  Mg     2.8     03-19    TPro  7.0  /  Alb  4.6  /  TBili  8.5<H>  /  DBili  x   /  AST  120<H>  /  ALT  37  /  AlkPhos  140<H>  03-19    PT/INR - ( 19 Mar 2024 00:25 )   PT: 14.3 sec;   INR: 1.37 ratio         PTT - ( 19 Mar 2024 00:25 )  PTT:32.5 sec  Urinalysis Basic - ( 19 Mar 2024 00:25 )    Color: x / Appearance: x / SG: x / pH: x  Gluc: 160 mg/dL / Ketone: x  / Bili: x / Urobili: x   Blood: x / Protein: x / Nitrite: x   Leuk Esterase: x / RBC: x / WBC x   Sq Epi: x / Non Sq Epi: x / Bacteria: x      ABG - ( 19 Mar 2024 20:58 )  pH, Arterial: 7.41  pH, Blood: x     /  pCO2: 36    /  pO2: 137   / HCO3: 23    / Base Excess: -1.5  /  SaO2: 99.2                                            dexMEDEtomidine Infusion 0.02 MICROgram(s)/kG/Hr (0.34 mL/Hr) IV Continuous <Continuous>  etomidate Injectable 40 milliGRAM(s) IV Push once  melatonin 5 milliGRAM(s) Oral at bedtime  metoclopramide Injectable 5 milliGRAM(s) IV Push every 8 hours  propofol Infusion 10 MICROgram(s)/kG/Min (4.07 mL/Hr) IV Continuous <Continuous>  rocuronium Injectable 50 milliGRAM(s) IV Push once  sertraline 50 milliGRAM(s) Oral daily      Microbiology  sodium chloride 3%  Inhalation 4 milliLiter(s) Inhalation every 12 hours      ============================I/O===========================   I&O's Detail    18 Mar 2024 07:01  -  19 Mar 2024 07:00  --------------------------------------------------------  IN:    Dexmedetomidine: 64.6 mL    DOBUTamine: 244.8 mL    Enteral Tube Flush: 50 mL    EPINEPHrine: 304.8 mL    IV PiggyBack: 250 mL    IV PiggyBack: 550 mL    Nepro with Carb Steady: 860 mL    Norepinephrine: 73.7 mL    Propofol: 86.1 mL    Sodium Chloride 0.9% Bolus: 114 mL    Vasopressin: 360 mL  Total IN: 2958 mL    OUT:    Indwelling Catheter - Urethral (mL): 0 mL    Other (mL): 3418 mL  Total OUT: 3418 mL    Total NET: -460 mL      19 Mar 2024 07:01  -  19 Mar 2024 22:58  --------------------------------------------------------  IN:    Dexmedetomidine: 10.2 mL    DOBUTamine: 153 mL    Enteral Tube Flush: 50 mL    EPINEPHrine: 190.5 mL    IV PiggyBack: 350 mL    Nepro with Carb Steady: 550 mL    Norepinephrine: 167.2 mL    Propofol: 18 mL    Sodium Chloride 0.9% Bolus: 135 mL    Vasopressin: 225 mL    Vital HN: 150 mL  Total IN: 1998.9 mL    OUT:    Indwelling Catheter - Urethral (mL): 0 mL    Other (mL): 1889 mL  Total OUT: 1889 mL    Total NET: 109.9 mL          =====================Rad/Cardio/cultures =================  CXR: Reviewed  Echo:Reviewed  Culture: Reviewed    UA: Urinalysis Basic - ( 19 Mar 2024 00:25 )    Color: x / Appearance: x / SG: x / pH: x  Gluc: 160 mg/dL / Ketone: x  / Bili: x / Urobili: x   Blood: x / Protein: x / Nitrite: x   Leuk Esterase: x / RBC: x / WBC x   Sq Epi: x / Non Sq Epi: x / Bacteria: x      ======================================================  Home Medications:  acetaminophen 325 mg oral tablet: 2 tab(s) orally every 6 hours As needed Temp greater or equal to 38C (100.4F), Mild Pain (1 - 3) (07 Mar 2024 08:39)  atorvastatin 40 mg oral tablet: 1 tab(s) orally once a day (at bedtime) (07 Mar 2024 08:39)  CoQ10 300 mg oral capsule: 1 orally once a day (at bedtime) (07 Mar 2024 08:39)  levothyroxine 50 mcg (0.05 mg) oral tablet: 1.5 tab(s) orally once a day (07 Mar 2024 08:39)  melatonin 3 mg oral tablet: 1 tab(s) orally once a day (at bedtime) As needed Insomnia (07 Mar 2024 08:39)  pantoprazole 40 mg oral delayed release tablet: 1 tab(s) orally once a day (before a meal) (07 Mar 2024 08:39)  polyethylene glycol 3350 oral powder for reconstitution: 17 gram(s) orally once a day (07 Mar 2024 08:39)  Presser Vision Areds 2:  (07 Mar 2024 08:39)  senna leaf extract oral tablet: 2 tab(s) orally once a day (at bedtime) (07 Mar 2024 08:39)  Xarelto 15 mg oral tablet: 1 tablet orally once a day (07 Mar 2024 08:39)    PAST MEDICAL & SURGICAL HISTORY:  Aortic stenosis  AICD (automatic cardioverter/defibrillator) present  Aortic valve regurgitation  Ascending aorta dilation  H/O paroxysmal supraventricular tachycardia  HLD (hyperlipidemia)  Mitral valve regurgitation  Cardiac arrest  Severe mitral regurgitation  S/P aortic valve replacement3/13/2004  S/P hernia [id number]  History of tonsillectomy and adenoidectomy  S/P [id number]  S/P [id number], 2002, 2006, 2011, 2016  S/P [id number]  S/P cardiac [id number], 1995, 1999, 2002, 2004  AICD (automatic cardioverter/defibrillator) [id number]/19/17  Cardiac kswsgvxro67/19/17  History of cardioversion9/11/20  S/P ablation of atrial thzmytffrvqo27/29/20  Status post ablation of ventricular arrhythmia2/14/19  ====================ASSESMENT/MDM ==============  Dx:  OR  Congestive heart failure acute chronic systolic diastolic combined  hemodynamic instability  arrhythmia   CAD/ASHD  Acute Respiratory insuficiency post op  hypovolemia fluid overload  SURESH CKD ESRD/HD  Anemia blood loss    Hyperglycemia  Diabetes mellitus type 2  Hypothyroidism     infection sepsis     encounter weaning from and management of  respirator  encounter management of temporary Pacing  and wires interogation, VVI   encounter IABP management  encounter ECMO management    Plan:  ====================== NEUROLOGY============  MS ,exam  pain control:     dexMEDEtomidine Infusion 0.02 MICROgram(s)/kG/Hr (0.34 mL/Hr) IV Continuous <Continuous>  etomidate Injectable 40 milliGRAM(s) IV Push once  melatonin 5 milliGRAM(s) Oral at bedtime  metoclopramide Injectable 5 milliGRAM(s) IV Push every 8 hours  propofol Infusion 10 MICROgram(s)/kG/Min (4.07 mL/Hr) IV Continuous <Continuous>  rocuronium Injectable 50 milliGRAM(s) IV Push once  sertraline 50 milliGRAM(s) Oral daily    ====================== RESPIRATORY============  O2: NC CPAP BIPAP HF VENT / settings  ABG - ( 19 Mar 2024 20:58 )  pH, Arterial: 7.41  pH, Blood: x     /  pCO2: 36    /  pO2: 137   / HCO3: 23    / Base Excess: -1.5  /  SaO2: 99.2        Mechanical Ventilation:  Mode: CPAP with PS  FiO2: 50  PEEP: 5  PS: 10  MAP: 9  PIP: 16      incentive spirometry  titrate FiO2 , keep sat above 92%  Monitor chest tube: outputs , Air leak,   continue chest tube to  suction , f/u cxr  Monitor chest tube outputs , Air leak, suction, water seal     bronchodilators , inhaled dornase alpha and hypertonic saline  sodium chloride 3%  Inhalation 4 milliLiter(s) Inhalation every 12 hours    ======================CARDIOVASCULAR =========  I&O's Summary    18 Mar 2024 07:01  -  19 Mar 2024 07:00  --------------------------------------------------------  IN: 2958 mL / OUT: 3418 mL / NET: -460 mL    19 Mar 2024 07:01  -  19 Mar 2024 22:58  --------------------------------------------------------  IN: 1998.9 mL / OUT: 1889 mL / NET: 109.9 mL    hemodynamics :   Lactate :                  , vo2   pressors: Levophed  Vasopressin Neosynphrine infusion, Midodrine , Droxidopa  inotropes: Epinephrine  Dobutamine  Primacor     aMIOdarone    Tablet 200 milliGRAM(s) Oral daily  DOBUTamine Infusion 10 MICROgram(s)/kG/Min (10.2 mL/Hr) IV Continuous <Continuous>  EPINEPHrine    Infusion 0.1 MICROgram(s)/kG/Min (12.7 mL/Hr) IV Continuous <Continuous>  midodrine 20 milliGRAM(s) Oral every 8 hours  norepinephrine Infusion 0.06 MICROgram(s)/kG/Min (3.81 mL/Hr) IV Continuous <Continuous>    titrate pressors for mean BP above 60  titrate inotropes   Monitor Hemodynamic, Telemetry  =======================Hematology===============                        10.6   19.18 )-----------( 76       ( 19 Mar 2024 00:26 )             32.2        PT/INR - ( 19 Mar 2024 00:25 )   PT: 14.3 sec;   INR: 1.37 ratio         PTT - ( 19 Mar 2024 00:25 )  PTT:32.5 sec   Hct:  Plts:  INR    antiplatets: ASA, Plavix iv Cangelor  Anticoagulation:  Heparin Argatroban Angiomax Lovenox  VTE PPX:   tranexamic acid Injectable for Nebulization 500 milliGRAM(s) Inhalation every 8 hours      monitor  Hb/Hct ,plts ,coags     ========================RENAL ===================  03-19    133<L>  |  100  |  25<H>  ----------------------------<  160<H>  4.3   |  18<L>  |  0.81    Ca    10.1      19 Mar 2024 00:25  Phos  3.7     03-19  Mg     2.8     03-19    TPro  7.0  /  Alb  4.6  /  TBili  8.5<H>  /  DBili  x   /  AST  120<H>  /  ALT  37  /  AlkPhos  140<H>  03-19    Diuresis  Iv Fluids:  Continue to monitor I/Os, BUN/Creatinine.   Replete lytes PRN  Royal Void     ======================== GASTROINTESTINAL========  Diet:NPO     GI prophylaxis :PPI  Pepcid  Bowel regimen:    LIVER FUNCTIONS - ( 19 Mar 2024 00:25 )  Alb: 4.6 g/dL / Pro: 7.0 g/dL / ALK PHOS: 140 U/L / ALT: 37 U/L / AST: 120 U/L / GGT: x             ascorbic acid 500 milliGRAM(s) Oral daily  cyanocobalamin 1000 MICROGram(s) Oral daily  folic acid 1 milliGRAM(s) Oral daily  pantoprazole  Injectable 40 milliGRAM(s) IV Push every 12 hours  polyethylene glycol 3350 17 Gram(s) Oral daily  senna 2 Tablet(s) Oral at bedtime  sodium chloride 0.9% lock flush 3 milliLiter(s) IV Push every 8 hours  thiamine 100 milliGRAM(s) Oral daily    ======================= ENDOCRINE  =============  HbA1c:  Glycemic control : insulin drip  , Lantus/NPH, Lispro sliding scale  statin   Hypothyroid : synthroid TSH    allopurinol 100 milliGRAM(s) Oral daily  atorvastatin 40 milliGRAM(s) Oral at bedtime  dextrose 50% Injectable 25 Gram(s) IV Push once  insulin lispro (ADMELOG) corrective regimen sliding scale   SubCutaneous every 6 hours  levothyroxine Injectable 37.5 MICROGram(s) IV Push at bedtime  vasopressin Infusion 0.04 Unit(s)/Min (6 mL/Hr) IV Continuous <Continuous>    ========================INFECTIOUS DISEASE========  No active antibiotic coverage    prophylactic antibiotics:  treatment antibiotics:  blood cx:  sputum cx:  urine cx:      -----------------------------------------------------------------------------------------------------------  ALL MEDICATIONS (delete after use)  MEDICATIONS  (STANDING):  allopurinol 100 milliGRAM(s) Oral daily  aMIOdarone    Tablet 200 milliGRAM(s) Oral daily  ascorbic acid 500 milliGRAM(s) Oral daily  atorvastatin 40 milliGRAM(s) Oral at bedtime  Biotene Dry Mouth Oral Rinse 5 milliLiter(s) Swish and Spit daily  chlorhexidine 0.12% Liquid 15 milliLiter(s) Oral Mucosa every 12 hours  chlorhexidine 2% Cloths 1 Application(s) Topical <User Schedule>  CRRT Treatment    <Continuous>  cyanocobalamin 1000 MICROGram(s) Oral daily  dexMEDEtomidine Infusion 0.02 MICROgram(s)/kG/Hr (0.34 mL/Hr) IV Continuous <Continuous>  dextrose 50% Injectable 25 Gram(s) IV Push once  DOBUTamine Infusion 10 MICROgram(s)/kG/Min (10.2 mL/Hr) IV Continuous <Continuous>  EPINEPHrine    Infusion 0.1 MICROgram(s)/kG/Min (12.7 mL/Hr) IV Continuous <Continuous>  epoetin zara-epbx (RETACRIT) Injectable 18550 Unit(s) IV Push <User Schedule>  etomidate Injectable 40 milliGRAM(s) IV Push once  folic acid 1 milliGRAM(s) Oral daily  insulin lispro (ADMELOG) corrective regimen sliding scale   SubCutaneous every 6 hours  levothyroxine Injectable 37.5 MICROGram(s) IV Push at bedtime  lidocaine   4% Patch 1 Patch Transdermal daily  melatonin 5 milliGRAM(s) Oral at bedtime  metoclopramide Injectable 5 milliGRAM(s) IV Push every 8 hours  midodrine 20 milliGRAM(s) Oral every 8 hours  norepinephrine Infusion 0.06 MICROgram(s)/kG/Min (3.81 mL/Hr) IV Continuous <Continuous>  pantoprazole  Injectable 40 milliGRAM(s) IV Push every 12 hours  petrolatum white Ointment 1 Application(s) Topical two times a day  polyethylene glycol 3350 17 Gram(s) Oral daily  PrismaSATE Dialysate BGK 4 / 2.5 5000 milliLiter(s) (1200 mL/Hr) CRRT <Continuous>  PrismaSOL Filtration BGK 4 / 2.5 5000 milliLiter(s) (600 mL/Hr) CRRT <Continuous>  PrismaSOL Filtration BGK 4 / 2.5 5000 milliLiter(s) (400 mL/Hr) CRRT <Continuous>  propofol Infusion 10 MICROgram(s)/kG/Min (4.07 mL/Hr) IV Continuous <Continuous>  rocuronium Injectable 50 milliGRAM(s) IV Push once  senna 2 Tablet(s) Oral at bedtime  sertraline 50 milliGRAM(s) Oral daily  sodium chloride 0.9% lock flush 3 milliLiter(s) IV Push every 8 hours  sodium chloride 3%  Inhalation 4 milliLiter(s) Inhalation every 12 hours  thiamine 100 milliGRAM(s) Oral daily  tranexamic acid Injectable for Nebulization 500 milliGRAM(s) Inhalation every 8 hours  vasopressin Infusion 0.04 Unit(s)/Min (6 mL/Hr) IV Continuous <Continuous>    MEDICATIONS  (PRN):    ------------------------------------------------------------------------------------------------------  .patriciat   YAYA BHATIA  MRN-[id number]  Patient is a 83y old  Male who presents with a chief complaint of shortness of breath (19 Mar 2024 20:41)    HPI:  84 y/o M with ICM/HFrEF (now 30%; LVIDd 6.7 cm; prev req inotrope), CAD c/b IWMI (1994) s/p angioplasty, aortic stenosis s/p bio-AVR 2004, VT arrest (2017) s/p ICD, Afib s/p multiple DCCV and ablation x2 (2020 and 2023; on AC), Aflutter s/p WARREN/DCCV (8/23), prior Ao aneurysm s/p Bentall (2021), severe MR prior MVr (2021) with MAC (precludes M-KRISTIE d/t his anatomy; turned down for TMVR trials by [employer]), atrophic kidney with CKD (b/l Cr 2.5-3),   was admitted on 2/20 with acute on chronic heart failure and acute on chronic kidney dysfunction, with Heart Failure and Renal teams following. Patient was initiated on bumex gtt with gradual improvement with Cr improving from 3.6 to 3.1. Patient was considered for MV re-op; however, patient was reluctant of the procedure and expressed wanting to follow up in the outpatient setting to schedule. During last admission, patient was also noted to be in flutter, EP was consulted, and patient underwent repeat WARREN/DCCV , on amiodarone. Patient was discharged home 2/28 on higher dose of diuretics, as per HF team. Today, patient was evaluated at CTS office four routine follow-up, pt c/o worsening shortness of breath and L>R LE edema, with redness/swelling on left dorsal aspect of the foot, readmitted to CTS service for further management. Denies acute fever, chills, dizziness, headache, chest pain, palpitations, acute shortness of breath, abdominal pain, n/v, (04 Mar 2024 12:18)      Surgery/Hospital course:  transfer to CTU , required hemodialysis 3/7, started on CVVH  pt is on pressors and inotropes,   intubated 3/18  plan for possible surgery next week      Physical Exam:  Gen:  intubated, full vent support  CNS: sedated Neck: no JVD  RES : rales , no wheezing               CVS: paced rhythm , pt with AICD  Abd: Soft, non-distended. Bowel sounds present.  Skin: No rash.  Ext:  no edema    Vital Signs Last 24 Hrs  T(C): 36.1 (19 Mar 2024 20:00), Max: 36.7 (19 Mar 2024 12:00)  T(F): 97 (19 Mar 2024 20:00), Max: 98.1 (19 Mar 2024 12:00)  HR: 97 (19 Mar 2024 22:30) (80 - 101)  BP: --  BP(mean): --  RR: 16 (19 Mar 2024 22:30) (14 - 71)  SpO2: 99% (19 Mar 2024 22:30) (96% - 100%)    Parameters below as of 19 Mar 2024 16:00  Patient On (Oxygen Delivery Method): ventilator    O2 Concentration (%): 50    LABS:                        10.6   19.18 )-----------( 76       ( 19 Mar 2024 00:26 )             32.2     03-19    133<L>  |  100  |  25<H>  ----------------------------<  160<H>  4.3   |  18<L>  |  0.81    Ca    10.1      19 Mar 2024 00:25  Phos  3.7     03-19  Mg     2.8     03-19    TPro  7.0  /  Alb  4.6  /  TBili  8.5<H>  /  DBili  x   /  AST  120<H>  /  ALT  37  /  AlkPhos  140<H>  03-19    PT/INR - ( 19 Mar 2024 00:25 )   PT: 14.3 sec;   INR: 1.37 ratio  PTT - ( 19 Mar 2024 00:25 )  PTT:32.5 sec    ABG - ( 19 Mar 2024 20:58 ) pH, Arterial: 7.41  pH, Blood: x     /  pCO2: 36    /  pO2: 137   / HCO3: 23    / Base Excess: -1.5  /  SaO2: 99.2      ============================I/O===========================   I&O's Detail    18 Mar 2024 07:01  -  19 Mar 2024 07:00  --------------------------------------------------------  IN:    Dexmedetomidine: 64.6 mL    DOBUTamine: 244.8 mL    Enteral Tube Flush: 50 mL    EPINEPHrine: 304.8 mL    IV PiggyBack: 250 mL    IV PiggyBack: 550 mL    Nepro with Carb Steady: 860 mL    Norepinephrine: 73.7 mL    Propofol: 86.1 mL    Sodium Chloride 0.9% Bolus: 114 mL    Vasopressin: 360 mL  Total IN: 2958 mL    OUT:    Indwelling Catheter - Urethral (mL): 0 mL    Other (mL): 3418 mL  Total OUT: 3418 mL    Total NET: -460 mL      19 Mar 2024 07:01  -  19 Mar 2024 22:58  --------------------------------------------------------  IN:    Dexmedetomidine: 10.2 mL    DOBUTamine: 153 mL    Enteral Tube Flush: 50 mL    EPINEPHrine: 190.5 mL    IV PiggyBack: 350 mL    Nepro with Carb Steady: 550 mL    Norepinephrine: 167.2 mL    Propofol: 18 mL    Sodium Chloride 0.9% Bolus: 135 mL    Vasopressin: 225 mL    Vital HN: 150 mL  Total IN: 1998.9 mL    OUT:    Indwelling Catheter - Urethral (mL): 0 mL    Other (mL): 1889 mL  Total OUT: 1889 mL    Total NET: 109.9 mL      =====================Rad/Cardio/cultures =================  CXR: Reviewed  Echo:Reviewed  Culture: Reviewed    ======================================================  Home Medications:  acetaminophen 325 mg oral tablet: 2 tab(s) orally every 6 hours As needed Temp greater or equal to 38C (100.4F), Mild Pain (1 - 3) (07 Mar 2024 08:39)  atorvastatin 40 mg oral tablet: 1 tab(s) orally once a day (at bedtime) (07 Mar 2024 08:39)  CoQ10 300 mg oral capsule: 1 orally once a day (at bedtime) (07 Mar 2024 08:39)  levothyroxine 50 mcg (0.05 mg) oral tablet: 1.5 tab(s) orally once a day (07 Mar 2024 08:39)  melatonin 3 mg oral tablet: 1 tab(s) orally once a day (at bedtime) As needed Insomnia (07 Mar 2024 08:39)  pantoprazole 40 mg oral delayed release tablet: 1 tab(s) orally once a day (before a meal) (07 Mar 2024 08:39)  polyethylene glycol 3350 oral powder for reconstitution: 17 gram(s) orally once a day (07 Mar 2024 08:39)  Presser Vision Areds 2:  (07 Mar 2024 08:39)  senna leaf extract oral tablet: 2 tab(s) orally once a day (at bedtime) (07 Mar 2024 08:39)  Xarelto 15 mg oral tablet: 1 tablet orally once a day (07 Mar 2024 08:39)    PAST MEDICAL & SURGICAL HISTORY:  Aortic stenosis  AICD (automatic cardioverter/defibrillator) present  Aortic valve regurgitation  Ascending aorta dilation  H/O paroxysmal supraventricular tachycardia  HLD (hyperlipidemia)  Mitral valve regurgitation  Cardiac arrest  Severe mitral regurgitation  S/P aortic valve replacement3/13/2004  S/P hernia [id number]  History of tonsillectomy and adenoidectomy  S/P [id number]  S/P [id number], 2002, 2006, 2011, 2016  S/P [id number]  S/P cardiac [id number], 1995, 1999, 2002, 2004  AICD (automatic cardioverter/defibrillator) nejjwyp14/19/17  Cardiac upwcztfmw99/19/17  History of cardioversion9/11/20  S/P ablation of atrial [id number]/29/20  Status post ablation of ventricular arrhythmia2/14/19    ====================ASSESMENT/MDM ==============  acute hypoxemic respiratory failure, intubated  acute on chronic CHF ( biventricular dysfunction)  acute on chronic renal failure  hemodynamic instability  severe Mitral Regurgitation  AICD present, Afib/A flutter  Leukocytosis, r/o sepsis, pneumonia  CAD   Hyperbilirubinemia    Plan:  ====================== NEUROLOGY============  sedated for vent synchrony and comfort  pain control:     dexMEDEtomidine Infusion 0.02 MICROgram(s)/kG/Hr (0.34 mL/Hr) IV Continuous <Continuous>  etomidate Injectable 40 milliGRAM(s) IV Push once  melatonin 5 milliGRAM(s) Oral at bedtime  metoclopramide Injectable 5 milliGRAM(s) IV Push every 8 hours  propofol Infusion 10 MICROgram(s)/kG/Min (4.07 mL/Hr) IV Continuous <Continuous>  rocuronium Injectable 50 milliGRAM(s) IV Push once  sertraline 50 milliGRAM(s) Oral daily    ====================== RESPIRATORY============  O2: PSV VENT / settings  ABG - ( 19 Mar 2024 20:58 )  pH, Arterial: 7.41  pH, Blood: x     /  pCO2: 36    /  pO2: 137   / HCO3: 23    / Base Excess: -1.5  /  SaO2: 99.2      Mechanical Ventilation:  Mode: CPAP with PS  FiO2: 50  PEEP: 5  PS: 10  MAP: 9  PIP: 16  sodium chloride 3%  Inhalation 4 milliLiter(s) Inhalation every 12 hours    ======================CARDIOVASCULAR =========  hemodynamics : support with   pressors: Levophed  Vasopressin   inotropes: Epinephrine  Dobutamine        aMIOdarone    Tablet 200 milliGRAM(s) Oral daily  DOBUTamine Infusion 10 MICROgram(s)/kG/Min (10.2 mL/Hr) IV Continuous <Continuous>  EPINEPHrine    Infusion 0.1 MICROgram(s)/kG/Min (12.7 mL/Hr) IV Continuous <Continuous>  midodrine 20 milliGRAM(s) Oral every 8 hours  norepinephrine Infusion 0.06 MICROgram(s)/kG/Min (3.81 mL/Hr) IV Continuous <Continuous>    titrate pressors for mean BP above 60  titrate inotropes   Monitor Hemodynamic, Telemetry  =======================Hematology===============                        10.6   19.18 )-----------( 76       ( 19 Mar 2024 00:26 )             32.2        PT/INR - ( 19 Mar 2024 00:25 )   PT: 14.3 sec;   INR: 1.37 ratio  PTT - ( 19 Mar 2024 00:25 )  PTT:32.5 sec     tranexamic acid Injectable for Nebulization 500 milliGRAM(s) Inhalation every 8 hours      monitor  Hb/Hct ,plts ,coags     ========================RENAL ===================  03-19    133<L>  |  100  |  25<H>  ----------------------------<  160<H>  4.3   |  18<L>  |  0.81    Ca    10.1      19 Mar 2024 00:25  Phos  3.7     03-19  Mg     2.8     03-19    TPro  7.0  /  Alb  4.6  /  TBili  8.5<H>  /  DBili  x   /  AST  120<H>  /  ALT  37  /  AlkPhos  140<H>  03-19    CRRT , no net fluid removal  ======================== GASTROINTESTINAL========  Diet: NPO   , feeding tube Nepro  GI prophylaxis :PPI  Pepcid  Bowel regimen:    LIVER FUNCTIONS - ( 19 Mar 2024 00:25 )  Alb: 4.6 g/dL / Pro: 7.0 g/dL / ALK PHOS: 140 U/L / ALT: 37 U/L / AST: 120 U/L / GGT: x             ascorbic acid 500 milliGRAM(s) Oral daily  cyanocobalamin 1000 MICROGram(s) Oral daily  folic acid 1 milliGRAM(s) Oral daily  pantoprazole  Injectable 40 milliGRAM(s) IV Push every 12 hours  polyethylene glycol 3350 17 Gram(s) Oral daily  senna 2 Tablet(s) Oral at bedtime  sodium chloride 0.9% lock flush 3 milliLiter(s) IV Push every 8 hours  thiamine 100 milliGRAM(s) Oral daily    ======================= ENDOCRINE  =============  Glycemic control : Lispro sliding scale  statin   Hypothyroid : synthroid TSH    allopurinol 100 milliGRAM(s) Oral daily  atorvastatin 40 milliGRAM(s) Oral at bedtime  dextrose 50% Injectable 25 Gram(s) IV Push once  insulin lispro (ADMELOG) corrective regimen sliding scale   SubCutaneous every 6 hours  levothyroxine Injectable 37.5 MICROGram(s) IV Push at bedtime  vasopressin Infusion 0.04 Unit(s)/Min (6 mL/Hr) IV Continuous <Continuous>    ========================INFECTIOUS DISEASE========  emperic antibiotics   vanco , cherrie , diflucan  cultures negative     -----------------------------------------------------------------------------------------------------------  ALL MEDICATIONS (delete after use)  MEDICATIONS  (STANDING):  allopurinol 100 milliGRAM(s) Oral daily  aMIOdarone    Tablet 200 milliGRAM(s) Oral daily  ascorbic acid 500 milliGRAM(s) Oral daily  atorvastatin 40 milliGRAM(s) Oral at bedtime  Biotene Dry Mouth Oral Rinse 5 milliLiter(s) Swish and Spit daily  chlorhexidine 0.12% Liquid 15 milliLiter(s) Oral Mucosa every 12 hours  chlorhexidine 2% Cloths 1 Application(s) Topical <User Schedule>  CRRT Treatment    <Continuous>  cyanocobalamin 1000 MICROGram(s) Oral daily  dexMEDEtomidine Infusion 0.02 MICROgram(s)/kG/Hr (0.34 mL/Hr) IV Continuous <Continuous>  dextrose 50% Injectable 25 Gram(s) IV Push once  DOBUTamine Infusion 10 MICROgram(s)/kG/Min (10.2 mL/Hr) IV Continuous <Continuous>  EPINEPHrine    Infusion 0.1 MICROgram(s)/kG/Min (12.7 mL/Hr) IV Continuous <Continuous>  epoetin zara-epbx (RETACRIT) Injectable 93677 Unit(s) IV Push <User Schedule>  etomidate Injectable 40 milliGRAM(s) IV Push once  folic acid 1 milliGRAM(s) Oral daily  insulin lispro (ADMELOG) corrective regimen sliding scale   SubCutaneous every 6 hours  levothyroxine Injectable 37.5 MICROGram(s) IV Push at bedtime  lidocaine   4% Patch 1 Patch Transdermal daily  melatonin 5 milliGRAM(s) Oral at bedtime  metoclopramide Injectable 5 milliGRAM(s) IV Push every 8 hours  midodrine 20 milliGRAM(s) Oral every 8 hours  norepinephrine Infusion 0.06 MICROgram(s)/kG/Min (3.81 mL/Hr) IV Continuous <Continuous>  pantoprazole  Injectable 40 milliGRAM(s) IV Push every 12 hours  petrolatum white Ointment 1 Application(s) Topical two times a day  polyethylene glycol 3350 17 Gram(s) Oral daily  PrismaSATE Dialysate BGK 4 / 2.5 5000 milliLiter(s) (1200 mL/Hr) CRRT <Continuous>  PrismaSOL Filtration BGK 4 / 2.5 5000 milliLiter(s) (600 mL/Hr) CRRT <Continuous>  PrismaSOL Filtration BGK 4 / 2.5 5000 milliLiter(s) (400 mL/Hr) CRRT <Continuous>  propofol Infusion 10 MICROgram(s)/kG/Min (4.07 mL/Hr) IV Continuous <Continuous>  rocuronium Injectable 50 milliGRAM(s) IV Push once  senna 2 Tablet(s) Oral at bedtime  sertraline 50 milliGRAM(s) Oral daily  sodium chloride 0.9% lock flush 3 milliLiter(s) IV Push every 8 hours  sodium chloride 3%  Inhalation 4 milliLiter(s) Inhalation every 12 hours  thiamine 100 milliGRAM(s) Oral daily  tranexamic acid Injectable for Nebulization 500 milliGRAM(s) Inhalation every 8 hours  vasopressin Infusion 0.04 Unit(s)/Min (6 mL/Hr) IV Continuous <Continuous>    MEDICATIONS  (PRN):    ------------------------------------------------------------------------------------------------------  Patient requires continuous monitoring with:  bedside rhythm monitoring, arterial line, O2 supplement and pulse oximetry monitoring ; ventilator management and monitoring; intermittent blood gas analysis.  Care plan discussed with ICU care team.  patient remain critical, at risk for life threatening decompensation; required more than usual post op care; I have spent 35 minutes providing non routine post op care, revaluated multiple times.

## 2024-03-19 NOTE — CHART NOTE - NSCHARTNOTEFT_GEN_A_CORE
Nutrition Follow Up Note  Patient seen for: malnutrition follow up. Chart reviewed, events noted.    Source: [] Patient       [x] Medical Record        [x] RN        [] Family at bedside       [] Other:    -If unable to interview patient: [x] Trach/Vent/BiPAP  [] Disoriented/confused/inappropriate to interview    Diet Order: Diet, NPO with Tube Feed:   Tube Feeding Modality: Nasogastric  Vital AF (VITALAFRTH)  Total Volume for 24 Hours (mL): 1200  Continuous  Starting Tube Feed Rate {mL per Hour}: 40  Increase Tube Feed Rate by (mL): 10  Until Goal Tube Feed Rate (mL per Hour): 50  Tube Feed Duration (in Hours): 24  Tube Feed Start Time: 16:40  Supplement Feeding Modality:  Nasogastric  Ensure Enlive Cans or Servings Per Day:  2       Frequency:  Daily (24 @ 17:07)    EN Order Provides: 1200ml volume, 1440kcal, 90g protein, 973ml free water.   Current Pump Rate: 50ml/hr  EN provision (per flow sheets): pt previously on feeds of Nepro at 40ml/hr x 24hr from 3/15 - 3/18.   - 3/18: 860ml (Nepro & Vital AF)  - 3/17: 630ml Nepro  - 3/16: 720ml Nepro  - 3/15: 660ml Nepro   - 3/14: 90ml Nepro (feeds started)    Is current diet order appropriate/adequate? See recommendations below.    PO intake :   [] >75%  Adequate    [] 50-75%  Fair       [] <50%  Poor      [x] N/A    Nutrition-related concerns:   - Increased pressor/inotrope support required (Epinephrine, Levophed, Vasopressin, Dobutamine). Failed IABP placement 3/13. Required intubation 3/18. Sedated on Precedex, Propofol currently off.    - Pt remains on CRRT; electrolytes fluctuating but trending low. Phos repleted overnight.   - BG management with sliding scale insulin for stress hyperglycemia     GI:  Last BM 3/19 x 2.   Bowel Regimen? [x] Yes   [] No   - Ordered for Reglan & PPI     Weights:   Daily Weight in k.1 (-), Weight in k.9 (-), Weight in k.2 (-), Weight in k.1 (03-16), Weight in k.7 (03-15), Weight in k.2 (03-14), Weight in k.8 (03-13)  - weight fluctuations noted    Drug Dosing Weight  Height (cm): 175.3 (04 Mar 2024 12:00)  Weight (kg): 67.8 (04 Mar 2024 12:00)  BMI (kg/m2): 22.1 (04 Mar 2024 12:00)  BSA (m2): 1.83 (04 Mar 2024 12:00)    MEDICATIONS  (STANDING):  allopurinol 100 milliGRAM(s) Oral daily  aMIOdarone    Tablet 200 milliGRAM(s) Oral daily  ascorbic acid 500 milliGRAM(s) Oral daily  atorvastatin 40 milliGRAM(s) Oral at bedtime  Biotene Dry Mouth Oral Rinse 5 milliLiter(s) Swish and Spit daily  chlorhexidine 0.12% Liquid 15 milliLiter(s) Oral Mucosa every 12 hours  chlorhexidine 2% Cloths 1 Application(s) Topical <User Schedule>  CRRT Treatment    <Continuous>  cyanocobalamin 1000 MICROGram(s) Oral daily  dexMEDEtomidine Infusion 0.02 MICROgram(s)/kG/Hr (0.34 mL/Hr) IV Continuous <Continuous>  dextrose 50% Injectable 25 Gram(s) IV Push once  DOBUTamine Infusion 10 MICROgram(s)/kG/Min (10.2 mL/Hr) IV Continuous <Continuous>  EPINEPHrine    Infusion 0.1 MICROgram(s)/kG/Min (12.7 mL/Hr) IV Continuous <Continuous>  epoetin zara-epbx (RETACRIT) Injectable 49766 Unit(s) IV Push <User Schedule>  etomidate Injectable 40 milliGRAM(s) IV Push once  folic acid 1 milliGRAM(s) Oral daily  insulin lispro (ADMELOG) corrective regimen sliding scale   SubCutaneous every 6 hours  levothyroxine Injectable 37.5 MICROGram(s) IV Push at bedtime  lidocaine   4% Patch 1 Patch Transdermal daily  melatonin 5 milliGRAM(s) Oral at bedtime  meropenem  IVPB 1000 milliGRAM(s) IV Intermittent every 8 hours  meropenem  IVPB      metoclopramide Injectable 5 milliGRAM(s) IV Push every 8 hours  midodrine 20 milliGRAM(s) Oral every 8 hours  norepinephrine Infusion 0.06 MICROgram(s)/kG/Min (3.81 mL/Hr) IV Continuous <Continuous>  pantoprazole  Injectable 40 milliGRAM(s) IV Push daily  petrolatum white Ointment 1 Application(s) Topical two times a day  polyethylene glycol 3350 17 Gram(s) Oral daily  PrismaSATE Dialysate BGK 4 / 2.5 5000 milliLiter(s) (1200 mL/Hr) CRRT <Continuous>  PrismaSOL Filtration BGK 4 / 2.5 5000 milliLiter(s) (600 mL/Hr) CRRT <Continuous>  PrismaSOL Filtration BGK 4 / 2.5 5000 milliLiter(s) (400 mL/Hr) CRRT <Continuous>  propofol Infusion 10 MICROgram(s)/kG/Min (4.07 mL/Hr) IV Continuous <Continuous>  rocuronium Injectable 50 milliGRAM(s) IV Push once  senna 2 Tablet(s) Oral at bedtime  sertraline 50 milliGRAM(s) Oral daily  sodium chloride 0.9% lock flush 3 milliLiter(s) IV Push every 8 hours  sodium chloride 3%  Inhalation 4 milliLiter(s) Inhalation every 12 hours  thiamine 100 milliGRAM(s) Oral daily  tranexamic acid Injectable for Nebulization 500 milliGRAM(s) Inhalation every 8 hours  vancomycin  IVPB 750 milliGRAM(s) IV Intermittent every 12 hours  vasopressin Infusion 0.04 Unit(s)/Min (6 mL/Hr) IV Continuous <Continuous>    Pertinent Labs:  @ 06:05: Na --, BUN --, Cr --, BG --, K+ --, Phos 3.7, Mg --, Alk Phos --, ALT/SGPT --, AST/SGOT --, HbA1c --   @ 00:25: Na 133<L>, BUN 25<H>, Cr 0.81, <H>, K+ 4.3, Phos 2.2<L>, Mg 2.8<H>, Alk Phos 140<H>, ALT/SGPT 37, AST/SGOT 120<H>, HbA1c --   @ 13:26: Na 135, BUN 25<H>, Cr 0.87, <H>, K+ 4.3, Phos 2.4<L>, Mg --, Alk Phos 131<H>, ALT/SGPT 33, AST/SGOT 98<H>, HbA1c --    A1C with Estimated Average Glucose Result: 5.7 % (24 @ 13:11)  A1C with Estimated Average Glucose Result: 5.6 % (24 @ 15:55)    Finger Sticks:  POCT Blood Glucose.: 154 mg/dL (19 Mar 2024 05:58)  POCT Blood Glucose.: 156 mg/dL (18 Mar 2024 23:09)  POCT Blood Glucose.: 185 mg/dL (18 Mar 2024 18:24)    Skin per nursing documentation: No noted pressure injuries as per documentation.   Edema: 1+ sumi. leg     Recalculated based on: 3/15 wt 61.7kg with consideration for intubation.   Estimated Energy Needs: 1327 - 1667kcal (22 - 27kcal/kg)  Estimated Protein Needs: 86 - 99g protein (1.4 - 1.6g/kg)  Estimated Fluid Needs: per team.  [place] State Equation: 1380kcal (22kcal/kg)    Previous Nutrition Diagnosis: Severe Malnutrition, Increased Nutrient Needs   Nutrition Diagnosis is: [x] ongoing  [] resolved [] not applicable     New Nutrition Diagnosis: [x] Not applicable    Nutrition Care Plan:  [x] In Progress  [] Achieved  [] Not applicable    Nutrition Interventions:     Education Provided: N/A    Recommendations:      1) Continue feeds of Vital AF at 50ml/hr x 24hr to provide 1200ml volume, 1440kcal, 90g protein, 973ml free water. Meets 23kcal/kg, 1.5g/kg protein based on dosing wt 61.7kg. Defer additional free water to team.   2) Multivitamin if not otherwise contraindicated.  3) Monitor GI tolerance to feeds, RD remains available to adjust TF regimen/formulary as needed/upon request.     Monitoring and Evaluation:   Continue to monitor nutritional intake, tolerance to diet prescription, weights, labs, skin integrity    RD remains available upon request and will follow up per protocol    Patricia Pizarro MS, RD, CDN, CNSC; available via [employer] Teams

## 2024-03-19 NOTE — PROGRESS NOTE ADULT - ASSESSMENT
82 y/o M with ICM/HFrEF (now 30%; LVIDd 6.7 cm; prev req inotrope), CAD c/b IWMI (1994) s/p angioplasty, aortic stenosis s/p bio-AVR 2004, VT arrest (2017) s/p ICD, Afib s/p multiple DCCV and ablation x2 (2020 and 2023; on AC), Aflutter s/p WARREN/DCCV (8/23), prior Ao aneurysm s/p Bentall (2021), severe MR prior MVr (2021) with MAC (precludes M-KRISTIE d/t his anatomy; turned down for TMVR trials by [facility]), atrophic kidney with CKD (b/l Cr 2.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 Patient was initiated on bumex gtt with gradual improvement with Cr improving from 3.6 to 3.1. Patient was considered for MV re-op; however, patient was reluctant of the procedure and expressed wanting to follow up in the outpatient setting to schedule. During last admission, patient was also noted to be in flutter, EP was consulted, and patient underwent repeat WARREN/DCCV , on amiodarone. Patient was discharged home 2/28 on higher dose of diuretics, as per HF team.pt returned with worsening SOB ,edema, but was  alert  chest x-ray compactible l with chf  cant r/o infection         Pt ws treated with empiric meropenem ( diflucan per  [facility])   cultures - so far negative   dose vanco carefully by level in view of renal insufficieny but now on CVVHD  discussed with [facility] alison Chase suggestive of pulmonary edema  no documented infection   Pt now intubated   would complete 7 days of antibiotics - started March 12th- day #7     Discussed with [facility]  very cachetic   would compete  antibiotics today and stop   monitor wbc

## 2024-03-19 NOTE — PROGRESS NOTE ADULT - SUBJECTIVE AND OBJECTIVE BOX
CRITICAL CARE ATTENDING - CTICU    MEDICATIONS  (STANDING):  allopurinol 100 milliGRAM(s) Oral daily  aMIOdarone    Tablet 200 milliGRAM(s) Oral daily  ascorbic acid 500 milliGRAM(s) Oral daily  atorvastatin 40 milliGRAM(s) Oral at bedtime  Biotene Dry Mouth Oral Rinse 5 milliLiter(s) Swish and Spit daily  chlorhexidine 0.12% Liquid 15 milliLiter(s) Oral Mucosa every 12 hours  chlorhexidine 2% Cloths 1 Application(s) Topical <User Schedule>  CRRT Treatment    <Continuous>  cyanocobalamin 1000 MICROGram(s) Oral daily  dexMEDEtomidine Infusion 0.02 MICROgram(s)/kG/Hr (0.34 mL/Hr) IV Continuous <Continuous>  dextrose 50% Injectable 25 Gram(s) IV Push once  DOBUTamine Infusion 10 MICROgram(s)/kG/Min (10.2 mL/Hr) IV Continuous <Continuous>  EPINEPHrine    Infusion 0.1 MICROgram(s)/kG/Min (12.7 mL/Hr) IV Continuous <Continuous>  epoetin zara-epbx (RETACRIT) Injectable 02405 Unit(s) IV Push <User Schedule>  etomidate Injectable 40 milliGRAM(s) IV Push once  folic acid 1 milliGRAM(s) Oral daily  insulin lispro (ADMELOG) corrective regimen sliding scale   SubCutaneous every 6 hours  levothyroxine Injectable 37.5 MICROGram(s) IV Push at bedtime  lidocaine   4% Patch 1 Patch Transdermal daily  melatonin 5 milliGRAM(s) Oral at bedtime  meropenem  IVPB 1000 milliGRAM(s) IV Intermittent every 8 hours  meropenem  IVPB      metoclopramide Injectable 5 milliGRAM(s) IV Push every 8 hours  midodrine 20 milliGRAM(s) Oral every 8 hours  norepinephrine Infusion 0.06 MICROgram(s)/kG/Min (3.81 mL/Hr) IV Continuous <Continuous>  pantoprazole  Injectable 40 milliGRAM(s) IV Push daily  petrolatum white Ointment 1 Application(s) Topical two times a day  polyethylene glycol 3350 17 Gram(s) Oral daily  PrismaSATE Dialysate BGK 4 / 2.5 5000 milliLiter(s) (1200 mL/Hr) CRRT <Continuous>  PrismaSOL Filtration BGK 4 / 2.5 5000 milliLiter(s) (600 mL/Hr) CRRT <Continuous>  PrismaSOL Filtration BGK 4 / 2.5 5000 milliLiter(s) (400 mL/Hr) CRRT <Continuous>  propofol Infusion 10 MICROgram(s)/kG/Min (4.07 mL/Hr) IV Continuous <Continuous>  rocuronium Injectable 50 milliGRAM(s) IV Push once  senna 2 Tablet(s) Oral at bedtime  sertraline 50 milliGRAM(s) Oral daily  sodium chloride 0.9% lock flush 3 milliLiter(s) IV Push every 8 hours  sodium chloride 3%  Inhalation 4 milliLiter(s) Inhalation every 12 hours  thiamine 100 milliGRAM(s) Oral daily  tranexamic acid Injectable for Nebulization 500 milliGRAM(s) Inhalation every 8 hours  vancomycin  IVPB 750 milliGRAM(s) IV Intermittent every 12 hours  vasopressin Infusion 0.04 Unit(s)/Min (6 mL/Hr) IV Continuous <Continuous>                                    10.6   19.18 )-----------( 76       ( 19 Mar 2024 00:26 )             32.2       03-19    133<L>  |  100  |  25<H>  ----------------------------<  160<H>  4.3   |  18<L>  |  0.81    Ca    10.1      19 Mar 2024 00:25  Phos  2.2     -  Mg     2.8         TPro  7.0  /  Alb  4.6  /  TBili  8.5<H>  /  DBili  x   /  AST  120<H>  /  ALT  37  /  AlkPhos  140<H>        PT/INR - ( 19 Mar 2024 00:25 )   PT: 14.3 sec;   INR: 1.37 ratio         PTT - ( 19 Mar 2024 00:25 )  PTT:32.5 sec    Mode: AC/ CMV (Assist Control/ Continuous Mandatory Ventilation)  RR (machine): 14  TV (machine): 500  FiO2: 50  PEEP: 5  ITime: 1  MAP: 12  PIP: 26      Daily     Daily Weight in k.1 (19 Mar 2024 00:15)      -17 @ 07:01  -  03-18 @ 07:00  --------------------------------------------------------  IN: 3847.1 mL / OUT: 1200 mL / NET: 2647.1 mL     @ 07:01   @ 06:04  --------------------------------------------------------  IN: 2763.8 mL / OUT: 2694 mL / NET: 69.8 mL        Critically Ill patient  : [ x] preoperative - MVR   [ ] post operative,   [ x] non operative ?    Requires :  [x] Arterial Line   [x] Central Line  [x ] PA catheter  [ ] IABP  [ ] ECMO  [ ] LVAD  [x ] Ventilator  [x ] pacemaker- PPM [ ] Impella  [x] CVVHD                      [x ] ABG's     [ x] Pulse Oxymetry Monitoring  Bedside evaluation , monitoring , treatment of hemodynamics , fluids , IVP/ IVCD meds.        Diagnosis:     3/5 - Tx to ICU for Hypotension/CHF/renal failure    Pre Op Evaluation / Optimization - Re Op MVR     3/13 - R. fem IABP placement failed (tortuous anatomy)    h/o AVR / MVR     Cardiogenic Shock     Hypotension     Mitral Regurgitation     Respiratory Failure    Pulmonary Hypertension 2 to biventricular cardiac dysfunction     Requires chest PT, pulmonary toilet, ambu bagging, suctioning to maintain SaO2,  patent airway and treat atelectasis.     Hypoxemia - Requires [ ] BIPAP  [ ] HFNC  [x] Nitric oxide 40ppm     Difficult weaning process - multiple organ system involvement in critically ill patient     Ventilator Management:  [ x]AC-rest    [ ]CPAP-PS Wean    [ ]Trach Collar     [ ]Extubate    [ ] T-Piece  [ ]peep>5     IVCD Precedex/Propofol- vent synchrony     Paced Rhythm     AICD/PPM    Swanz Salvatore catheter interpretation and therapeutic management of unstable hemodynamics     CHF- acute [x]   chronic [x]    systolic [x]   diatolic [ ]    Valvular  [x]           - Echo- EF -   37% /  MR       [x ] RV dysfunction          - Cxr-cardiomegally, edema          - Clinical-  [x]inotropes   [x]pressors   [x ]diuresis   [ ]IABP   [ ]ECMO   [ ]LVAD   [x ] Respiratory Failure [x] CVVHD      Hemodynamic lability,  instability. Requires IVCD [x] vasopressors + midodrine [x] inotropes  [ x]  CVVHD   [ ]IVSS fluid / Blood Products to maintain MAP, perfusion, C.I.     Hyperglycemia - Insulin Sliding Scale     Synthroid for Hypothyroidism    Thrombocytopenia ? DIC ?     Renal Failure - Acute Kidney Injury                                                    Metabolic Acidosis     CVVHD - negative balance    Chronic Renal failure    Renal Failure - Acute Kidney Injury     Cardiac Cachexia - NG - goal rate feeds    Tolerates NG feeds at    [ ] trophic rate    [x ]  50cc/hr     rate     Requires bedside physical therapy, mobilization and total senior care care.     Strongly consider sepsis - worsening CXR / advancing pressor requirements                I, Ildefonso Bañuelos, personally performed the services described in this documentation. All medical record entries made by the scribe were at my direction and in my presence. I have reviewed the chart and agree that the record reflects my personal performance and is accurate and complete.   Ildefonso Bañuelos MD.       By signing my name below, I, Kev Hanna, attest that this documentation has been prepared under the direction and in the presence of Ildefonso Bañuelos MD.   Electronically Signed: Home Ravi 24 @ 06:04        Discussed with CT surgeon, Physician Assistant - Nurse Practitioner- Critical care medicine team.   Dicussed at  AM / PM rounds.   Chart, labs , films reviewed.    Cumulative Critical Care Time Given Today:  CRITICAL CARE ATTENDING - CTICU    MEDICATIONS  (STANDING):  allopurinol 100 milliGRAM(s) Oral daily  aMIOdarone    Tablet 200 milliGRAM(s) Oral daily  ascorbic acid 500 milliGRAM(s) Oral daily  atorvastatin 40 milliGRAM(s) Oral at bedtime  Biotene Dry Mouth Oral Rinse 5 milliLiter(s) Swish and Spit daily  chlorhexidine 0.12% Liquid 15 milliLiter(s) Oral Mucosa every 12 hours  chlorhexidine 2% Cloths 1 Application(s) Topical <User Schedule>  CRRT Treatment    <Continuous>  cyanocobalamin 1000 MICROGram(s) Oral daily  dexMEDEtomidine Infusion 0.02 MICROgram(s)/kG/Hr (0.34 mL/Hr) IV Continuous <Continuous>  dextrose 50% Injectable 25 Gram(s) IV Push once  DOBUTamine Infusion 10 MICROgram(s)/kG/Min (10.2 mL/Hr) IV Continuous <Continuous>  EPINEPHrine    Infusion 0.1 MICROgram(s)/kG/Min (12.7 mL/Hr) IV Continuous <Continuous>  epoetin zara-epbx (RETACRIT) Injectable 79965 Unit(s) IV Push <User Schedule>  etomidate Injectable 40 milliGRAM(s) IV Push once  folic acid 1 milliGRAM(s) Oral daily  insulin lispro (ADMELOG) corrective regimen sliding scale   SubCutaneous every 6 hours  levothyroxine Injectable 37.5 MICROGram(s) IV Push at bedtime  lidocaine   4% Patch 1 Patch Transdermal daily  melatonin 5 milliGRAM(s) Oral at bedtime  meropenem  IVPB 1000 milliGRAM(s) IV Intermittent every 8 hours  meropenem  IVPB      metoclopramide Injectable 5 milliGRAM(s) IV Push every 8 hours  midodrine 20 milliGRAM(s) Oral every 8 hours  norepinephrine Infusion 0.06 MICROgram(s)/kG/Min (3.81 mL/Hr) IV Continuous <Continuous>  pantoprazole  Injectable 40 milliGRAM(s) IV Push daily  petrolatum white Ointment 1 Application(s) Topical two times a day  polyethylene glycol 3350 17 Gram(s) Oral daily  PrismaSATE Dialysate BGK 4 / 2.5 5000 milliLiter(s) (1200 mL/Hr) CRRT <Continuous>  PrismaSOL Filtration BGK 4 / 2.5 5000 milliLiter(s) (600 mL/Hr) CRRT <Continuous>  PrismaSOL Filtration BGK 4 / 2.5 5000 milliLiter(s) (400 mL/Hr) CRRT <Continuous>  propofol Infusion 10 MICROgram(s)/kG/Min (4.07 mL/Hr) IV Continuous <Continuous>  rocuronium Injectable 50 milliGRAM(s) IV Push once  senna 2 Tablet(s) Oral at bedtime  sertraline 50 milliGRAM(s) Oral daily  sodium chloride 0.9% lock flush 3 milliLiter(s) IV Push every 8 hours  sodium chloride 3%  Inhalation 4 milliLiter(s) Inhalation every 12 hours  thiamine 100 milliGRAM(s) Oral daily  tranexamic acid Injectable for Nebulization 500 milliGRAM(s) Inhalation every 8 hours  vancomycin  IVPB 750 milliGRAM(s) IV Intermittent every 12 hours  vasopressin Infusion 0.04 Unit(s)/Min (6 mL/Hr) IV Continuous <Continuous>                                    10.6   19.18 )-----------( 76       ( 19 Mar 2024 00:26 )             32.2       03-19    133<L>  |  100  |  25<H>  ----------------------------<  160<H>  4.3   |  18<L>  |  0.81    Ca    10.1      19 Mar 2024 00:25  Phos  2.2     -  Mg     2.8         TPro  7.0  /  Alb  4.6  /  TBili  8.5<H>  /  DBili  x   /  AST  120<H>  /  ALT  37  /  AlkPhos  140<H>        PT/INR - ( 19 Mar 2024 00:25 )   PT: 14.3 sec;   INR: 1.37 ratio         PTT - ( 19 Mar 2024 00:25 )  PTT:32.5 sec    Mode: AC/ CMV (Assist Control/ Continuous Mandatory Ventilation)  RR (machine): 14  TV (machine): 500  FiO2: 50  PEEP: 5  ITime: 1  MAP: 12  PIP: 26      Daily     Daily Weight in k.1 (19 Mar 2024 00:15)      -17 @ 07:01  -  03-18 @ 07:00  --------------------------------------------------------  IN: 3847.1 mL / OUT: 1200 mL / NET: 2647.1 mL     @ 07:01   @ 06:04  --------------------------------------------------------  IN: 2763.8 mL / OUT: 2694 mL / NET: 69.8 mL        Critically Ill patient  : [ x] preoperative - MVR   [ ] post operative,   [ x] non operative ?    Requires :  [x] Arterial Line   [x] Central Line  [ ] PA catheter  [ ] IABP  [ ] ECMO  [ ] LVAD  [x ] Ventilator  [x ] pacemaker- PPM [ ] Impella  [x] CVVHD  [x] SU                      [x ] ABG's     [ x] Pulse Oxymetry Monitoring  Bedside evaluation , monitoring , treatment of hemodynamics , fluids , IVP/ IVCD meds.        Diagnosis:     3/5 - Tx to ICU for Hypotension/CHF/renal failure    Pre Op Evaluation / Optimization - Re Op MVR     3/13 - R. fem IABP placement failed (tortuous anatomy)    h/o AVR / MVR     Cardiogenic Shock     Hypotension     Mitral Regurgitation     Respiratory Failure    Pulmonary Hypertension 2 to biventricular cardiac dysfunction     Requires chest PT, pulmonary toilet, ambu bagging, suctioning to maintain SaO2,  patent airway and treat atelectasis.     Hypoxemia - Requires intubation   [x] Nitric oxide - weaning      Difficult weaning process - multiple organ system involvement in critically ill patient     Ventilator Management:  [ x]AC-rest    [x ]CPAP-PS Wean    [ ]Trach Collar     [ ]Extubate    [ ] T-Piece  [ ]peep>5     IVCD Precedex/Propofol- vent synchrony / weaning     Paced Rhythm     AICD/PPM    CHF- acute [x]   chronic [x]    systolic [x]   diatolic [ ]    Valvular  [x]           - Echo- EF -   30's  /  MR       [x ] RV dysfunction          - Cxr-cardiomegally, edema          - Clinical-  [x]inotropes   [x]pressors   [x ]diuresis   [ ]IABP   [ ]ECMO   [ ]LVAD   [x ] Respiratory Failure [x] CVVHD      Hemodynamic lability,  instability. Requires IVCD [x] vasopressors + midodrine [x] inotropes  [ x]  CVVHD   [ ]IVSS fluid / Blood Products to maintain MAP, perfusion, C.I.     Hyperglycemia - Insulin Sliding Scale     Synthroid for Hypothyroidism    Thrombocytopenia ? DIC ?     Renal Failure - Acute Kidney Injury                                                    Metabolic Acidosis     CVVHD - negative balance    Chronic Renal failure    Renal Failure - Acute Kidney Injury     Cardiac Cachexia - NG - goal rate feeds    Tolerates NG feeds at    [ ] trophic rate    [x ]  50cc/hr     rate     Requires bedside physical therapy, mobilization and total [facility] care.     Strongly consider sepsis - worsening CXR / advancing pressor requirements                I, Ildefonso Bañuelos, personally performed the services described in this documentation. All medical record entries made by the scribe were at my direction and in my presence. I have reviewed the chart and agree that the record reflects my personal performance and is accurate and complete.   Ildefonso Bañuelos MD.       By signing my name below, I, Kev Hanna, attest that this documentation has been prepared under the direction and in the presence of Ildefonso Bañuelos MD.   Electronically Signed: Home Ravi 24 @ 06:04        Discussed with CT surgeon, Physician Assistant - Nurse Practitioner- Critical care medicine team.   Dicussed at  AM / PM rounds.   Chart, labs , films reviewed.    Cumulative Critical Care Time Given Today:  105 min

## 2024-03-20 LAB
ALBUMIN SERPL ELPH-MCNC: 4.5 G/DL — SIGNIFICANT CHANGE UP (ref 3.3–5)
ALP SERPL-CCNC: 177 U/L — HIGH (ref 40–120)
ALT FLD-CCNC: 52 U/L — HIGH (ref 10–45)
ANION GAP SERPL CALC-SCNC: 18 MMOL/L — HIGH (ref 5–17)
AST SERPL-CCNC: 192 U/L — HIGH (ref 10–40)
BASE EXCESS BLDV CALC-SCNC: -0.6 MMOL/L — SIGNIFICANT CHANGE UP (ref -2–3)
BASE EXCESS BLDV CALC-SCNC: -1.9 MMOL/L — SIGNIFICANT CHANGE UP (ref -2–3)
BILIRUB SERPL-MCNC: 10 MG/DL — HIGH (ref 0.2–1.2)
BLD GP AB SCN SERPL QL: NEGATIVE — SIGNIFICANT CHANGE UP
BUN SERPL-MCNC: 29 MG/DL — HIGH (ref 7–23)
C DIFF GDH STL QL: NEGATIVE — SIGNIFICANT CHANGE UP
C DIFF GDH STL QL: SIGNIFICANT CHANGE UP
CALCIUM SERPL-MCNC: 10.2 MG/DL — SIGNIFICANT CHANGE UP (ref 8.4–10.5)
CHLORIDE SERPL-SCNC: 97 MMOL/L — SIGNIFICANT CHANGE UP (ref 96–108)
CO2 BLDV-SCNC: 26 MMOL/L — SIGNIFICANT CHANGE UP (ref 22–26)
CO2 BLDV-SCNC: 27 MMOL/L — HIGH (ref 22–26)
CO2 SERPL-SCNC: 20 MMOL/L — LOW (ref 22–31)
CREAT SERPL-MCNC: 0.85 MG/DL — SIGNIFICANT CHANGE UP (ref 0.5–1.3)
EGFR: 86 ML/MIN/1.73M2 — SIGNIFICANT CHANGE UP
GAS PNL BLDA: SIGNIFICANT CHANGE UP
GAS PNL BLDV: SIGNIFICANT CHANGE UP
GLUCOSE SERPL-MCNC: 137 MG/DL — HIGH (ref 70–99)
HAPTOGLOB SERPL-MCNC: <20 MG/DL — LOW (ref 34–200)
HCO3 BLDV-SCNC: 24 MMOL/L — SIGNIFICANT CHANGE UP (ref 22–29)
HCO3 BLDV-SCNC: 25 MMOL/L — SIGNIFICANT CHANGE UP (ref 22–29)
HCT VFR BLD CALC: 32 % — LOW (ref 39–50)
HEPARIN-PF4 AB RESULT: <0.6 U/ML — SIGNIFICANT CHANGE UP (ref 0–0.9)
HGB BLD-MCNC: 10.4 G/DL — LOW (ref 13–17)
HOROWITZ INDEX BLDV+IHG-RTO: 50 — SIGNIFICANT CHANGE UP
HOROWITZ INDEX BLDV+IHG-RTO: 50 — SIGNIFICANT CHANGE UP
LDH SERPL L TO P-CCNC: 506 U/L — HIGH (ref 50–242)
MAGNESIUM SERPL-MCNC: 2.8 MG/DL — HIGH (ref 1.6–2.6)
MCHC RBC-ENTMCNC: 30.8 PG — SIGNIFICANT CHANGE UP (ref 27–34)
MCHC RBC-ENTMCNC: 32.5 GM/DL — SIGNIFICANT CHANGE UP (ref 32–36)
MCV RBC AUTO: 94.7 FL — SIGNIFICANT CHANGE UP (ref 80–100)
NRBC # BLD: 2 /100 WBCS — HIGH (ref 0–0)
PCO2 BLDV: 46 MMHG — SIGNIFICANT CHANGE UP (ref 42–55)
PCO2 BLDV: 46 MMHG — SIGNIFICANT CHANGE UP (ref 42–55)
PF4 HEPARIN CMPLX AB SER-ACNC: NEGATIVE — SIGNIFICANT CHANGE UP
PH BLDV: 7.33 — SIGNIFICANT CHANGE UP (ref 7.32–7.43)
PH BLDV: 7.35 — SIGNIFICANT CHANGE UP (ref 7.32–7.43)
PHOSPHATE SERPL-MCNC: 1.8 MG/DL — LOW (ref 2.5–4.5)
PLATELET # BLD AUTO: 93 K/UL — LOW (ref 150–400)
PO2 BLDV: 33 MMHG — SIGNIFICANT CHANGE UP (ref 25–45)
PO2 BLDV: 38 MMHG — SIGNIFICANT CHANGE UP (ref 25–45)
POTASSIUM SERPL-MCNC: 4.5 MMOL/L — SIGNIFICANT CHANGE UP (ref 3.5–5.3)
POTASSIUM SERPL-SCNC: 4.5 MMOL/L — SIGNIFICANT CHANGE UP (ref 3.5–5.3)
PROT SERPL-MCNC: 7 G/DL — SIGNIFICANT CHANGE UP (ref 6–8.3)
RBC # BLD: 3.38 M/UL — LOW (ref 4.2–5.8)
RBC # FLD: 21.2 % — HIGH (ref 10.3–14.5)
RH IG SCN BLD-IMP: POSITIVE — SIGNIFICANT CHANGE UP
SAO2 % BLDV: 62.1 % — LOW (ref 67–88)
SAO2 % BLDV: 64.1 % — LOW (ref 67–88)
SODIUM SERPL-SCNC: 135 MMOL/L — SIGNIFICANT CHANGE UP (ref 135–145)
WBC # BLD: 20.93 K/UL — HIGH (ref 3.8–10.5)
WBC # FLD AUTO: 20.93 K/UL — HIGH (ref 3.8–10.5)

## 2024-03-20 PROCEDURE — 99291 CRITICAL CARE FIRST HOUR: CPT

## 2024-03-20 PROCEDURE — 99232 SBSQ HOSP IP/OBS MODERATE 35: CPT

## 2024-03-20 PROCEDURE — 99223 1ST HOSP IP/OBS HIGH 75: CPT | Mod: GC

## 2024-03-20 PROCEDURE — 71045 X-RAY EXAM CHEST 1 VIEW: CPT | Mod: 26

## 2024-03-20 PROCEDURE — 99292 CRITICAL CARE ADDL 30 MIN: CPT

## 2024-03-20 RX ORDER — ALBUMIN HUMAN 25 %
250 VIAL (ML) INTRAVENOUS ONCE
Refills: 0 | Status: COMPLETED | OUTPATIENT
Start: 2024-03-20 | End: 2024-03-20

## 2024-03-20 RX ORDER — ALBUMIN HUMAN 25 %
100 VIAL (ML) INTRAVENOUS ONCE
Refills: 0 | Status: DISCONTINUED | OUTPATIENT
Start: 2024-03-20 | End: 2024-03-20

## 2024-03-20 RX ORDER — BUMETANIDE 0.25 MG/ML
4 INJECTION INTRAMUSCULAR; INTRAVENOUS ONCE
Refills: 0 | Status: COMPLETED | OUTPATIENT
Start: 2024-03-20 | End: 2024-03-20

## 2024-03-20 RX ORDER — ALBUMIN HUMAN 25 %
100 VIAL (ML) INTRAVENOUS ONCE
Refills: 0 | Status: COMPLETED | OUTPATIENT
Start: 2024-03-20 | End: 2024-03-20

## 2024-03-20 RX ORDER — LOPERAMIDE HCL 2 MG
2 TABLET ORAL EVERY 4 HOURS
Refills: 0 | Status: DISCONTINUED | OUTPATIENT
Start: 2024-03-20 | End: 2024-03-21

## 2024-03-20 RX ORDER — ALBUMIN HUMAN 25 %
100 VIAL (ML) INTRAVENOUS ONCE
Refills: 0 | Status: COMPLETED | OUTPATIENT
Start: 2024-03-20 | End: 2024-03-21

## 2024-03-20 RX ADMIN — Medication 125 MILLILITER(S): at 15:00

## 2024-03-20 RX ADMIN — CHLORHEXIDINE GLUCONATE 15 MILLILITER(S): 213 SOLUTION TOPICAL at 05:02

## 2024-03-20 RX ADMIN — TRANEXAMIC ACID 500 MILLIGRAM(S): 100 INJECTION, SOLUTION INTRAVENOUS at 22:25

## 2024-03-20 RX ADMIN — Medication 5 MILLIGRAM(S): at 13:12

## 2024-03-20 RX ADMIN — PANTOPRAZOLE SODIUM 40 MILLIGRAM(S): 20 TABLET, DELAYED RELEASE ORAL at 05:02

## 2024-03-20 RX ADMIN — SODIUM CHLORIDE 4 MILLILITER(S): 9 INJECTION INTRAMUSCULAR; INTRAVENOUS; SUBCUTANEOUS at 17:21

## 2024-03-20 RX ADMIN — LIDOCAINE 1 PATCH: 4 CREAM TOPICAL at 11:15

## 2024-03-20 RX ADMIN — Medication 3.81 MICROGRAM(S)/KG/MIN: at 20:04

## 2024-03-20 RX ADMIN — SODIUM CHLORIDE 4 MILLILITER(S): 9 INJECTION INTRAMUSCULAR; INTRAVENOUS; SUBCUTANEOUS at 05:46

## 2024-03-20 RX ADMIN — Medication 37.5 MICROGRAM(S): at 22:54

## 2024-03-20 RX ADMIN — TRANEXAMIC ACID 500 MILLIGRAM(S): 100 INJECTION, SOLUTION INTRAVENOUS at 13:17

## 2024-03-20 RX ADMIN — Medication 5 MILLIGRAM(S): at 22:54

## 2024-03-20 RX ADMIN — Medication 85 MILLIMOLE(S): at 02:53

## 2024-03-20 RX ADMIN — Medication 50 MILLILITER(S): at 23:14

## 2024-03-20 RX ADMIN — Medication 100 MILLIGRAM(S): at 11:16

## 2024-03-20 RX ADMIN — CHLORHEXIDINE GLUCONATE 15 MILLILITER(S): 213 SOLUTION TOPICAL at 17:40

## 2024-03-20 RX ADMIN — EPINEPHRINE 12.7 MICROGRAM(S)/KG/MIN: 0.3 INJECTION INTRAMUSCULAR; SUBCUTANEOUS at 20:04

## 2024-03-20 RX ADMIN — Medication 2: at 11:16

## 2024-03-20 RX ADMIN — MIDODRINE HYDROCHLORIDE 20 MILLIGRAM(S): 2.5 TABLET ORAL at 05:03

## 2024-03-20 RX ADMIN — Medication 5 MILLILITER(S): at 11:52

## 2024-03-20 RX ADMIN — Medication 125 MILLILITER(S): at 18:46

## 2024-03-20 RX ADMIN — SODIUM CHLORIDE 3 MILLILITER(S): 9 INJECTION INTRAMUSCULAR; INTRAVENOUS; SUBCUTANEOUS at 11:52

## 2024-03-20 RX ADMIN — PREGABALIN 1000 MICROGRAM(S): 225 CAPSULE ORAL at 11:16

## 2024-03-20 RX ADMIN — SODIUM CHLORIDE 3 MILLILITER(S): 9 INJECTION INTRAMUSCULAR; INTRAVENOUS; SUBCUTANEOUS at 23:40

## 2024-03-20 RX ADMIN — SODIUM CHLORIDE 3 MILLILITER(S): 9 INJECTION INTRAMUSCULAR; INTRAVENOUS; SUBCUTANEOUS at 05:24

## 2024-03-20 RX ADMIN — MIDODRINE HYDROCHLORIDE 20 MILLIGRAM(S): 2.5 TABLET ORAL at 22:53

## 2024-03-20 RX ADMIN — Medication 1 APPLICATION(S): at 05:24

## 2024-03-20 RX ADMIN — MIDODRINE HYDROCHLORIDE 20 MILLIGRAM(S): 2.5 TABLET ORAL at 13:12

## 2024-03-20 RX ADMIN — VASOPRESSIN 6 UNIT(S)/MIN: 20 INJECTION INTRAVENOUS at 20:04

## 2024-03-20 RX ADMIN — EPINEPHRINE 12.7 MICROGRAM(S)/KG/MIN: 0.3 INJECTION INTRAMUSCULAR; SUBCUTANEOUS at 05:02

## 2024-03-20 RX ADMIN — DEXMEDETOMIDINE HYDROCHLORIDE IN 0.9% SODIUM CHLORIDE 0.34 MICROGRAM(S)/KG/HR: 4 INJECTION INTRAVENOUS at 05:03

## 2024-03-20 RX ADMIN — DEXMEDETOMIDINE HYDROCHLORIDE IN 0.9% SODIUM CHLORIDE 0.34 MICROGRAM(S)/KG/HR: 4 INJECTION INTRAVENOUS at 19:43

## 2024-03-20 RX ADMIN — AMIODARONE HYDROCHLORIDE 200 MILLIGRAM(S): 400 TABLET ORAL at 05:02

## 2024-03-20 RX ADMIN — PANTOPRAZOLE SODIUM 40 MILLIGRAM(S): 20 TABLET, DELAYED RELEASE ORAL at 17:35

## 2024-03-20 RX ADMIN — Medication 2 MILLIGRAM(S): at 02:56

## 2024-03-20 RX ADMIN — Medication 1 MILLIGRAM(S): at 11:16

## 2024-03-20 RX ADMIN — CHLORHEXIDINE GLUCONATE 1 APPLICATION(S): 213 SOLUTION TOPICAL at 05:15

## 2024-03-20 RX ADMIN — TRANEXAMIC ACID 500 MILLIGRAM(S): 100 INJECTION, SOLUTION INTRAVENOUS at 05:41

## 2024-03-20 RX ADMIN — Medication 10.2 MICROGRAM(S)/KG/MIN: at 19:45

## 2024-03-20 RX ADMIN — LIDOCAINE 1 PATCH: 4 CREAM TOPICAL at 23:00

## 2024-03-20 RX ADMIN — LIDOCAINE 1 PATCH: 4 CREAM TOPICAL at 19:15

## 2024-03-20 RX ADMIN — Medication 10.2 MICROGRAM(S)/KG/MIN: at 05:01

## 2024-03-20 RX ADMIN — Medication 1 APPLICATION(S): at 17:40

## 2024-03-20 RX ADMIN — SERTRALINE 50 MILLIGRAM(S): 25 TABLET, FILM COATED ORAL at 05:02

## 2024-03-20 RX ADMIN — ATORVASTATIN CALCIUM 40 MILLIGRAM(S): 80 TABLET, FILM COATED ORAL at 22:54

## 2024-03-20 RX ADMIN — BUMETANIDE 132 MILLIGRAM(S): 0.25 INJECTION INTRAMUSCULAR; INTRAVENOUS at 09:37

## 2024-03-20 RX ADMIN — Medication 2: at 05:16

## 2024-03-20 RX ADMIN — Medication 500 MILLIGRAM(S): at 11:16

## 2024-03-20 NOTE — PROGRESS NOTE ADULT - SUBJECTIVE AND OBJECTIVE BOX
infectious diseases progress note:    Patient is a 83y old  Male who presents with a chief complaint of shortness of breath (19 Mar 2024 22:57)        Heart failure             Allergies    No Known Allergies    Intolerances        ANTIBIOTICS/RELEVANT:  antimicrobials    immunologic:  epoetin zara-epbx (RETACRIT) Injectable 95298 Unit(s) IV Push <User Schedule>    OTHER:  allopurinol 100 milliGRAM(s) Oral daily  aMIOdarone    Tablet 200 milliGRAM(s) Oral daily  ascorbic acid 500 milliGRAM(s) Oral daily  atorvastatin 40 milliGRAM(s) Oral at bedtime  Biotene Dry Mouth Oral Rinse 5 milliLiter(s) Swish and Spit daily  chlorhexidine 0.12% Liquid 15 milliLiter(s) Oral Mucosa every 12 hours  chlorhexidine 2% Cloths 1 Application(s) Topical <User Schedule>  CRRT Treatment    <Continuous>  cyanocobalamin 1000 MICROGram(s) Oral daily  dexMEDEtomidine Infusion 0.02 MICROgram(s)/kG/Hr IV Continuous <Continuous>  dextrose 50% Injectable 25 Gram(s) IV Push once  DOBUTamine Infusion 10 MICROgram(s)/kG/Min IV Continuous <Continuous>  EPINEPHrine    Infusion 0.1 MICROgram(s)/kG/Min IV Continuous <Continuous>  etomidate Injectable 40 milliGRAM(s) IV Push once  folic acid 1 milliGRAM(s) Oral daily  insulin lispro (ADMELOG) corrective regimen sliding scale   SubCutaneous every 6 hours  levothyroxine Injectable 37.5 MICROGram(s) IV Push at bedtime  lidocaine   4% Patch 1 Patch Transdermal daily  loperamide 2 milliGRAM(s) Oral every 4 hours PRN  melatonin 5 milliGRAM(s) Oral at bedtime  metoclopramide Injectable 5 milliGRAM(s) IV Push every 8 hours  midodrine 20 milliGRAM(s) Oral every 8 hours  norepinephrine Infusion 0.06 MICROgram(s)/kG/Min IV Continuous <Continuous>  pantoprazole  Injectable 40 milliGRAM(s) IV Push every 12 hours  petrolatum white Ointment 1 Application(s) Topical two times a day  polyethylene glycol 3350 17 Gram(s) Oral daily  PrismaSATE Dialysate BGK 4 / 2.5 5000 milliLiter(s) CRRT <Continuous>  PrismaSOL Filtration BGK 4 / 2.5 5000 milliLiter(s) CRRT <Continuous>  PrismaSOL Filtration BGK 4 / 2.5 5000 milliLiter(s) CRRT <Continuous>  propofol Infusion 10 MICROgram(s)/kG/Min IV Continuous <Continuous>  rocuronium Injectable 50 milliGRAM(s) IV Push once  senna 2 Tablet(s) Oral at bedtime  sertraline 50 milliGRAM(s) Oral daily  sodium chloride 0.9% lock flush 3 milliLiter(s) IV Push every 8 hours  sodium chloride 3%  Inhalation 4 milliLiter(s) Inhalation every 12 hours  thiamine 100 milliGRAM(s) Oral daily  tranexamic acid Injectable for Nebulization 500 milliGRAM(s) Inhalation every 8 hours  vasopressin Infusion 0.04 Unit(s)/Min IV Continuous <Continuous>      Objective:  Vital Signs Last 24 Hrs  T(C): 37.1 (20 Mar 2024 08:00), Max: 37.1 (20 Mar 2024 08:00)  T(F): 98.8 (20 Mar 2024 08:00), Max: 98.8 (20 Mar 2024 08:00)  HR: 100 (20 Mar 2024 07:45) (79 - 101)  BP: --  BP(mean): --  RR: 17 (20 Mar 2024 07:45) (14 - 71)  SpO2: 100% (20 Mar 2024 07:45) (93% - 100%)    Parameters below as of 20 Mar 2024 08:00  Patient On (Oxygen Delivery Method): ventilator    O2 Concentration (%): 50       Eyes:JESUS, EOMI  Ear/Nose/Throat: no oral lesion, no sinus tenderness on percussion	  Neck:no JVD, no lymphadenopathy, supple  Respiratory: CTA sumi  Cardiovascular: S1S2 RRR, no murmurs  Gastrointestinal:soft, (+) BS, no HSM  Extremities:no e/e/c        LABS:                        10.4   20.93 )-----------( 93       ( 20 Mar 2024 00:51 )             32.0     03-20    135  |  97  |  29<H>  ----------------------------<  137<H>  4.5   |  20<L>  |  0.85    Ca    10.2      20 Mar 2024 00:52  Phos  1.8     03-20  Mg     2.8     03-20    TPro  7.0  /  Alb  4.5  /  TBili  10.0<H>  /  DBili  x   /  AST  192<H>  /  ALT  52<H>  /  AlkPhos  177<H>  03-20    PT/INR - ( 19 Mar 2024 00:25 )   PT: 14.3 sec;   INR: 1.37 ratio         PTT - ( 19 Mar 2024 00:25 )  PTT:32.5 sec  Urinalysis Basic - ( 20 Mar 2024 00:52 )    Color: x / Appearance: x / SG: x / pH: x  Gluc: 137 mg/dL / Ketone: x  / Bili: x / Urobili: x   Blood: x / Protein: x / Nitrite: x   Leuk Esterase: x / RBC: x / WBC x   Sq Epi: x / Non Sq Epi: x / Bacteria: x          MICROBIOLOGY:    RECENT CULTURES:  03-13 @ 13:00 .Blood Blood                No growth at 5 days    03-13 @ 11:02 .Blood Blood                No growth at 5 days          RESPIRATORY CULTURES:      Clostridium difficile [facility] Toxins A&amp;B, EIA:   Negative (03-19 @ 23:56)          RADIOLOGY & ADDITIONAL STUDIES:        Pager [phone]  After 5 pm/weekends or if no response :[phone]

## 2024-03-20 NOTE — CONSULT NOTE ADULT - ASSESSMENT
83M with ICM/HFrEF (now 30%; LVIDd 6.7 cm; prev req inotrope), CAD c/b IWMI (1994) s/p angioplasty, aortic stenosis s/p bio-AVR 2004, VT arrest (2017) s/p ICD, Afib s/p multiple DCCV and ablation x2 (2020 and 2023; on AC), Aflutter s/p WARREN/DCCV (8/23), severe MR prior MVr (2021) CKD (b/l Cr 2.5-3), admitted with cardiogenic shock and decompensated heart failure and acute on chronic kidney dysfunction now on CVVHD, with course notable for hypoxic failure requiring intubation. Course now further notable for maroon stools overnight and this AM for which GI is consulted. Of note, pt currently on levo 0.18, epi 0.1, vaso 0.1, and dobutamine 10. No previous EGD/colonoscopy.    Impression  #hematochezia; resolved; suspect possible ischemic colitis; less likely diverticular bleeding vs. malignancy  - Hgb 10.4 <- 8.8 after 2u pRBC  - rectal 3/20 with brown stools  - no cross-sectional abdominal imaging available    #shock; on levo, vaso, epi, dobutamine  #decompensated heart failure  #renal failure on CVVHD    Recommendations  - given pt in multiorgan failure requiring 3 pressors and inotropic agent, without signs of active bleeding, and stable Hgb ~10, would opt for medical therapy at this time  - agree with empiric abx  - supportive care with pRBC and pressor support as needed  - trend H/H; monitor for signs of recurrent bleeding; if persistent with downtrend Hgb, can re-evaluate for possible endoscopic evaluation    Note and recommendations are incomplete until reviewed and attested by attending.    Timmy Hoyos MD  GI/Hepatology Fellow, PGY4  Teams preferred (7AM to 5PM); after 5PM, call GI fellow on call    NEW CONSULTS:  Please email [email or web address] 83M with ICM/HFrEF (now 30%; LVIDd 6.7 cm; prev req inotrope), CAD c/b IWMI (1994) s/p angioplasty, aortic stenosis s/p bio-AVR 2004, VT arrest (2017) s/p ICD, Afib s/p multiple DCCV and ablation x2 (2020 and 2023; on AC), Aflutter s/p WARREN/DCCV (8/23), severe MR prior MVr (2021) CKD (b/l Cr 2.5-3), admitted with cardiogenic shock and decompensated heart failure and acute on chronic kidney dysfunction now on CVVHD, with course notable for hypoxic failure requiring intubation. Course now further notable for maroon stools overnight and this AM for which GI is consulted. Of note, pt currently on levo 0.18, epi 0.1, vaso 0.1, and dobutamine 10. No previous EGD/colonoscopy.    Impression  #hematochezia; resolved; suspect possible ischemic colitis; less likely diverticular bleeding vs. malignancy  - Hgb 10.4 <- 8.8 after 2u pRBC  - rectal 3/20 with brown stools  - no cross-sectional abdominal imaging available    #shock; on levo, vaso, epi, dobutamine  #decompensated heart failure  #renal failure on CVVHD    Recommendations  - given pt in multiorgan failure requiring 3 pressors and inotropic agent, without signs of active bleeding, and stable Hgb ~10, would opt for medical therapy at this time  - agree with empiric abx  - supportive care with pRBC and pressor support as needed  - Consider CT A/P if pain/massive bleeding recurs  - trend H/H; monitor for signs of recurrent bleeding; if persistent with downtrend Hgb, can re-evaluate for possible endoscopic evaluation    Note and recommendations are incomplete until reviewed and attested by attending.    Timmy Hoyos MD  GI/Hepatology Fellow, PGY4  Teams preferred (7AM to 5PM); after 5PM, call GI fellow on call    NEW CONSULTS:  Please email [email or web address]

## 2024-03-20 NOTE — PROGRESS NOTE ADULT - SUBJECTIVE AND OBJECTIVE BOX
YAYA BHATIA  MRN-[id number]  Patient is a 83y old  Male who presents with a chief complaint of shortness of breath (20 Mar 2024 20:35)    HPI:  84 y/o M with ICM/HFrEF (now 30%; LVIDd 6.7 cm; prev req inotrope), CAD c/b IWMI (1994) s/p angioplasty, aortic stenosis s/p bio-AVR 2004, VT arrest (2017) s/p ICD, Afib s/p multiple DCCV and ablation x2 (2020 and 2023; on AC), Aflutter s/p WARREN/DCCV (8/23), prior Ao aneurysm s/p Bentall (2021), severe MR prior MVr (2021) with MAC (precludes M-KRISTIE d/t his anatomy; turned down for TMVR trials by [employer]), atrophic kidney with CKD (b/l Cr 2.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 Patient was initiated on bumex gtt with gradual improvement with Cr improving from 3.6 to 3.1. Patient was considered for MV re-op; however, patient was reluctant of the procedure and expressed wanting to follow up in the outpatient setting to schedule. During last admission, patient was also noted to be in flutter, EP was consulted, and patient underwent repeat WARREN/DCCV , on amiodarone. Patient was discharged home 2/28 on higher dose of diuretics, as per HF team. Today, patient was evaluated at CTS office four routine follow-up, pt c/o worsening shortness of breath and L>R LE edema, with redness/swelling on left dorsal aspect of the foot, readmitted to CTS service for further management. Denies acute fever, chills, dizziness, headache, chest pain, palpitations, acute shortness of breath, abdominal pain, n/v, (04 Mar 2024 12:18)      Surgery/Hospital course:  transfer to CTU , required hemodialysis 3/7, started on CVVH  pt is on pressors and inotropes,   intubated 3/18  plan for possible surgery next week      Physical Exam:  Gen:  intubated, full vent support  CNS: sedated Neck: no JVD  RES : rales , no wheezing               CVS: paced rhythm , pt with AICD  Abd: Soft, non-distended. Bowel sounds present.  Skin: No rash.  Ext:  no edema      Vital Signs Last 24 Hrs  T(C): 36.4 (20 Mar 2024 20:00), Max: 37.7 (20 Mar 2024 12:00)  T(F): 97.5 (20 Mar 2024 20:00), Max: 99.9 (20 Mar 2024 12:00)  HR: 100 (20 Mar 2024 21:13) (79 - 100)  BP: --  BP(mean): --  RR: 26 (20 Mar 2024 21:00) (14 - 30)  SpO2: 97% (20 Mar 2024 21:13) (92% - 100%)    Parameters below as of 20 Mar 2024 20:00  Patient On (Oxygen Delivery Method): ventilator, PSV 10/5/-/50%    O2 Concentration (%): 50    LABS:                        10.4   20.93 )-----------( 93       ( 20 Mar 2024 00:51 )             32.0     03-20    135  |  97  |  29<H>  ----------------------------<  137<H>  4.5   |  20<L>  |  0.85    Ca    10.2      20 Mar 2024 00:52  Phos  1.8     03-20  Mg     2.8     03-20    TPro  7.0  /  Alb  4.5  /  TBili  10.0<H>  /  DBili  x   /  AST  192<H>  /  ALT  52<H>  /  AlkPhos  177<H>  03-20    PT/INR - ( 19 Mar 2024 00:25 )   PT: 14.3 sec;   INR: 1.37 ratio         PTT - ( 19 Mar 2024 00:25 )  PTT:32.5 sec  Urinalysis Basic - ( 20 Mar 2024 00:52 )    ABG - ( 20 Mar 2024 17:50 )  pH, Arterial: 7.39  pH, Blood: x     /  pCO2: 39    /  pO2: 172   / HCO3: 24    / Base Excess: -1.2  /  SaO2: 100.0     ============================I/O===========================   I&O's Detail    19 Mar 2024 07:01  -  20 Mar 2024 07:00  --------------------------------------------------------  IN:    Dexmedetomidine: 27.2 mL    DOBUTamine: 244.8 mL    Enteral Tube Flush: 150 mL    EPINEPHrine: 304.8 mL    IV PiggyBack: 766.5 mL    Nepro with Carb Steady: 550 mL    Norepinephrine: 326.8 mL    Propofol: 34.6 mL    Sodium Chloride 0.9% Bolus: 180 mL    Vasopressin: 360 mL    Vital HN: 600 mL  Total IN: 3544.7 mL    OUT:    Indwelling Catheter - Urethral (mL): 0 mL    Other (mL): 2834 mL  Total OUT: 2834 mL    Total NET: 710.7 mL      20 Mar 2024 07:01  -  20 Mar 2024 23:31  --------------------------------------------------------  IN:    Albumin 25%  -  50 mL: 750 mL    Dexmedetomidine: 13.6 mL    DOBUTamine: 165 mL    Enteral Tube Flush: 210 mL    EPINEPHrine: 203.2 mL    IV PiggyBack: 216.6 mL    Norepinephrine: 231.4 mL    Sodium Chloride 0.9% Bolus: 80 mL    Vasopressin: 198 mL    Vital HN: 800 mL  Total IN: 2867.8 mL    OUT:    Indwelling Catheter - Urethral (mL): 20 mL    Other (mL): 2223 mL    Propofol: 0 mL  Total OUT: 2243 mL    Total NET: 624.8 mL          =====================Rad/Cardio/cultures =================  CXR: Reviewed  Echo:Reviewed  Culture: Reviewed    ======================================================  Home Medications:  acetaminophen 325 mg oral tablet: 2 tab(s) orally every 6 hours As needed Temp greater or equal to 38C (100.4F), Mild Pain (1 - 3) (07 Mar 2024 08:39)  atorvastatin 40 mg oral tablet: 1 tab(s) orally once a day (at bedtime) (07 Mar 2024 08:39)  CoQ10 300 mg oral capsule: 1 orally once a day (at bedtime) (07 Mar 2024 08:39)  levothyroxine 50 mcg (0.05 mg) oral tablet: 1.5 tab(s) orally once a day (07 Mar 2024 08:39)  melatonin 3 mg oral tablet: 1 tab(s) orally once a day (at bedtime) As needed Insomnia (07 Mar 2024 08:39)  pantoprazole 40 mg oral delayed release tablet: 1 tab(s) orally once a day (before a meal) (07 Mar 2024 08:39)  polyethylene glycol 3350 oral powder for reconstitution: 17 gram(s) orally once a day (07 Mar 2024 08:39)  Presser Vision Areds 2:  (07 Mar 2024 08:39)  senna leaf extract oral tablet: 2 tab(s) orally once a day (at bedtime) (07 Mar 2024 08:39)  Xarelto 15 mg oral tablet: 1 tablet orally once a day (07 Mar 2024 08:39)    PAST MEDICAL & SURGICAL HISTORY:  Aortic stenosis  AICD (automatic cardioverter/defibrillator) present  Aortic valve regurgitation  Ascending aorta dilation  H/O paroxysmal supraventricular tachycardia  HLD (hyperlipidemia)  Mitral valve regurgitation  Cardiac arrest  Severe mitral regurgitation  S/P aortic valve replacement3/13/2004  S/P hernia [id number]  History of tonsillectomy and adenoidectomy  S/P [id number]  S/P [id number], 2002, 2006, 2011, 2016  S/P [id number]  S/P cardiac [id number], 1995, 1999, 2002, 2004  AICD (automatic cardioverter/defibrillator) [id number]/19/17  Cardiac [id number]/19/17  History of cardioversion9/11/20  S/P ablation of atrial ixntuismqsby08/29/20  ====================ASSESMENT/MDM ==============  acute hypoxemic respiratory failure, intubated  acute on chronic CHF ( biventricular dysfunction)  acute on chronic renal failure  hemodynamic instability  severe Mitral Regurgitation  AICD present, Afib/A flutter  Leukocytosis, r/o sepsis, pneumonia  CAD   Hyperbilirubinemia    Plan:  ====================== NEUROLOGY============  sedated for vent synchrony and comfort  pain control:     dexMEDEtomidine Infusion 0.02 MICROgram(s)/kG/Hr (0.34 mL/Hr) IV Continuous <Continuous>  melatonin 5 milliGRAM(s) Oral at bedtime  metoclopramide Injectable 5 milliGRAM(s) IV Push every 8 hours  propofol Infusion 10 MICROgram(s)/kG/Min (4.07 mL/Hr) IV Continuous <Continuous>  sertraline 50 milliGRAM(s) Oral daily    ====================== RESPIRATORY============  O2: PSV  VENT / settings  ABG - ( 20 Mar 2024 17:50 )  pH, Arterial: 7.39  pH, Blood: x     /  pCO2: 39    /  pO2: 172   / HCO3: 24    / Base Excess: -1.2  /  SaO2: 100.0     Mechanical Ventilation:  Mode: CPAP with PS  FiO2: 50  PEEP: 5  PS: 10  MAP: 9  PIP: 16    ======================CARDIOVASCULAR =========  pressors: Levophed  Vasopressin   inotropes: Epinephrine  Dobutamine      aMIOdarone    Tablet 200 milliGRAM(s) Oral daily  DOBUTamine Infusion 10 MICROgram(s)/kG/Min (10.2 mL/Hr) IV Continuous <Continuous>  EPINEPHrine    Infusion 0.1 MICROgram(s)/kG/Min (12.7 mL/Hr) IV Continuous <Continuous>  midodrine 20 milliGRAM(s) Oral every 8 hours  norepinephrine Infusion 0.06 MICROgram(s)/kG/Min (3.81 mL/Hr) IV Continuous <Continuous>    titrate pressors for mean BP above 60  titrate inotrope   Monitor Hemodynamic, Telemetry  =======================Hematology===============                        10.4   20.93 )-----------( 93       ( 20 Mar 2024 00:51 )             32.0        PT/INR - ( 19 Mar 2024 00:25 )   PT: 14.3 sec;   INR: 1.37 ratio  PTT - ( 19 Mar 2024 00:25 )  PTT:32.5 sec     tranexamic acid Injectable for Nebulization 500 milliGRAM(s) Inhalation every 8 hours  hold lovenox for gi bleed    monitor  Hb/Hct ,plts ,coags     ========================RENAL ===================  03-20    135  |  97  |  29<H>  ----------------------------<  137<H>  4.5   |  20<L>  |  0.85    Ca    10.2      20 Mar 2024 00:52  Phos  1.8     03-20  Mg     2.8     03-20    TPro  7.0  /  Alb  4.5  /  TBili  10.0<H>  /  DBili  x   /  AST  192<H>  /  ALT  52<H>  /  AlkPhos  177<H>  03-20    CRRT no fluid removal  fluid resuscitation    ======================== GASTROINTESTINAL========  Diet: NPO   , feeding vital  GI prophylaxis :PPI   Bowel regimen:    LIVER FUNCTIONS - ( 20 Mar 2024 00:52 )  Alb: 4.5 g/dL / Pro: 7.0 g/dL / ALK PHOS: 177 U/L / ALT: 52 U/L / AST: 192 U/L / GGT: x             albumin human 25% IVPB 100 milliLiter(s) IV Intermittent once  ascorbic acid 500 milliGRAM(s) Oral daily  cyanocobalamin 1000 MICROGram(s) Oral daily  folic acid 1 milliGRAM(s) Oral daily  loperamide 2 milliGRAM(s) Oral every 4 hours PRN Diarrhea  pantoprazole  Injectable 40 milliGRAM(s) IV Push every 12 hours  polyethylene glycol 3350 17 Gram(s) Oral daily  senna 2 Tablet(s) Oral at bedtime  sodium chloride 0.9% lock flush 3 milliLiter(s) IV Push every 8 hours  thiamine 100 milliGRAM(s) Oral daily    ======================= ENDOCRINE  =============  HbA1c: 5.7  Glycemic control : insulin Lispro sliding scale  statin   Hypothyroid : synthroid TSH    allopurinol 100 milliGRAM(s) Oral daily  atorvastatin 40 milliGRAM(s) Oral at bedtime  dextrose 50% Injectable 25 Gram(s) IV Push once  insulin lispro (ADMELOG) corrective regimen sliding scale   SubCutaneous every 6 hours  levothyroxine Injectable 37.5 MICROGram(s) IV Push at bedtime  vasopressin Infusion 0.04 Unit(s)/Min (6 mL/Hr) IV Continuous <Continuous>    ========================INFECTIOUS DISEASE========  No active antibiotic coverage    prophylactic antibiotics:  treatment antibiotics:  blood cx:  sputum cx:  urine cx:    Culture - Blood (collected 19 Mar 2024 11:08)  Source: .Blood Blood-Peripheral  Preliminary Report (20 Mar 2024 15:11):    No growth at 24 hours        -----------------------------------------------------------------------------------------------------------  ALL MEDICATIONS   MEDICATIONS  (STANDING):  albumin human 25% IVPB 100 milliLiter(s) IV Intermittent once  allopurinol 100 milliGRAM(s) Oral daily  aMIOdarone    Tablet 200 milliGRAM(s) Oral daily  ascorbic acid 500 milliGRAM(s) Oral daily  atorvastatin 40 milliGRAM(s) Oral at bedtime  Biotene Dry Mouth Oral Rinse 5 milliLiter(s) Swish and Spit daily  chlorhexidine 0.12% Liquid 15 milliLiter(s) Oral Mucosa every 12 hours  chlorhexidine 2% Cloths 1 Application(s) Topical <User Schedule>  CRRT Treatment    <Continuous>  cyanocobalamin 1000 MICROGram(s) Oral daily  dexMEDEtomidine Infusion 0.02 MICROgram(s)/kG/Hr (0.34 mL/Hr) IV Continuous <Continuous>  dextrose 50% Injectable 25 Gram(s) IV Push once  DOBUTamine Infusion 10 MICROgram(s)/kG/Min (10.2 mL/Hr) IV Continuous <Continuous>  EPINEPHrine    Infusion 0.1 MICROgram(s)/kG/Min (12.7 mL/Hr) IV Continuous <Continuous>  epoetin zara-epbx (RETACRIT) Injectable 31591 Unit(s) IV Push <User Schedule>  etomidate Injectable 40 milliGRAM(s) IV Push once  folic acid 1 milliGRAM(s) Oral daily  insulin lispro (ADMELOG) corrective regimen sliding scale   SubCutaneous every 6 hours  levothyroxine Injectable 37.5 MICROGram(s) IV Push at bedtime  lidocaine   4% Patch 1 Patch Transdermal daily  melatonin 5 milliGRAM(s) Oral at bedtime  metoclopramide Injectable 5 milliGRAM(s) IV Push every 8 hours  midodrine 20 milliGRAM(s) Oral every 8 hours  norepinephrine Infusion 0.06 MICROgram(s)/kG/Min (3.81 mL/Hr) IV Continuous <Continuous>  pantoprazole  Injectable 40 milliGRAM(s) IV Push every 12 hours  petrolatum white Ointment 1 Application(s) Topical two times a day  polyethylene glycol 3350 17 Gram(s) Oral daily  PrismaSATE Dialysate BGK 4 / 2.5 5000 milliLiter(s) (1200 mL/Hr) CRRT <Continuous>  PrismaSOL Filtration BGK 4 / 2.5 5000 milliLiter(s) (600 mL/Hr) CRRT <Continuous>  PrismaSOL Filtration BGK 4 / 2.5 5000 milliLiter(s) (400 mL/Hr) CRRT <Continuous>  propofol Infusion 10 MICROgram(s)/kG/Min (4.07 mL/Hr) IV Continuous <Continuous>  rocuronium Injectable 50 milliGRAM(s) IV Push once  senna 2 Tablet(s) Oral at bedtime  sertraline 50 milliGRAM(s) Oral daily  sodium chloride 0.9% lock flush 3 milliLiter(s) IV Push every 8 hours  sodium chloride 3%  Inhalation 4 milliLiter(s) Inhalation every 12 hours  thiamine 100 milliGRAM(s) Oral daily  tranexamic acid Injectable for Nebulization 500 milliGRAM(s) Inhalation every 8 hours  vasopressin Infusion 0.04 Unit(s)/Min (6 mL/Hr) IV Continuous <Continuous>    MEDICATIONS  (PRN):  loperamide 2 milliGRAM(s) Oral every 4 hours PRN Diarrhea    ------------------------------------------------------------------------------------------------------  Patient requires continuous monitoring with:  bedside rhythm monitoring, arterial line, O2 supplement and pulse oximetry monitoring ; ventilator management and monitoring; intermittent blood gas analysis.  Care plan discussed with ICU care team.  patient remain critical, at risk for life threatining decompensation; required more than usual post op care; I have spent 35 minutes providing non routine post op care, revaluated multiple times.    Dominic Sharma MD  intensivist   [facility]

## 2024-03-20 NOTE — PROGRESS NOTE ADULT - ASSESSMENT
84 y/o M with ICM/HFrEF (now 30%; LVIDd 6.7 cm; prev req inotrope), CAD c/b IWMI (1994) s/p angioplasty, aortic stenosis s/p bio-AVR 2004, VT arrest (2017) s/p ICD, Afib s/p multiple DCCV and ablation x2 (2020 and 2023; on AC), Aflutter s/p WARREN/DCCV (8/23), prior Ao aneurysm s/p Bentall (2021), severe MR prior MVr (2021) with MAC (precludes M-KRISTIE d/t his anatomy; turned down for TMVR trials by [employer]), atrophic kidney with CKD (b/l Cr 2.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 Patient was initiated on bumex gtt with gradual improvement with Cr improving from 3.6 to 3.1. Patient was considered for MV re-op; however, patient was reluctant of the procedure and expressed wanting to follow up in the outpatient setting to schedule. During last admission, patient was also noted to be in flutter, EP was consulted, and patient underwent repeat WARREN/DCCV , on amiodarone. Patient was discharged home 2/28 on higher dose of diuretics, as per HF team.pt returned with worsening SOB ,edema, but was  alert  chest x-ray compactible l with chf  cant r/o infection         Pt ws treated with empiric meropenem ( diflucan per  [facility])   cultures - so far negative   dose vanco carefully by level in view of renal insufficieny but now on CVVHD  discussed with [facility] swlis Salvatore suggestive of pulmonary edema  no documented infection   Pt now intubated   would completed 7 days of antibiotics - started March 12th- day #7     Discussed with [facility]  very cachetic    off antilithics  discussed with CV[facility] team in detail   monitor wbc

## 2024-03-20 NOTE — PROGRESS NOTE ADULT - SUBJECTIVE AND OBJECTIVE BOX
[facility]   [phone]  RENAL FOLLOW UP NOTE  --------------------------------------------------------------------------------  Chief Complaint:    24 hour events/subjective:    seen earlier  intubated   on pressors on CRRT     PAST HISTORY  --------------------------------------------------------------------------------  No significant changes to PMH, PSH, FHx, SHx, unless otherwise noted    ALLERGIES & MEDICATIONS  --------------------------------------------------------------------------------  Allergies    No Known Allergies    Intolerances      Standing Inpatient Medications  allopurinol 100 milliGRAM(s) Oral daily  aMIOdarone    Tablet 200 milliGRAM(s) Oral daily  ascorbic acid 500 milliGRAM(s) Oral daily  atorvastatin 40 milliGRAM(s) Oral at bedtime  Biotene Dry Mouth Oral Rinse 5 milliLiter(s) Swish and Spit daily  chlorhexidine 0.12% Liquid 15 milliLiter(s) Oral Mucosa every 12 hours  chlorhexidine 2% Cloths 1 Application(s) Topical <User Schedule>  CRRT Treatment    <Continuous>  cyanocobalamin 1000 MICROGram(s) Oral daily  dexMEDEtomidine Infusion 0.02 MICROgram(s)/kG/Hr IV Continuous <Continuous>  dextrose 50% Injectable 25 Gram(s) IV Push once  DOBUTamine Infusion 10 MICROgram(s)/kG/Min IV Continuous <Continuous>  EPINEPHrine    Infusion 0.1 MICROgram(s)/kG/Min IV Continuous <Continuous>  epoetin zara-epbx (RETACRIT) Injectable 89316 Unit(s) IV Push <User Schedule>  etomidate Injectable 40 milliGRAM(s) IV Push once  folic acid 1 milliGRAM(s) Oral daily  insulin lispro (ADMELOG) corrective regimen sliding scale   SubCutaneous every 6 hours  levothyroxine Injectable 37.5 MICROGram(s) IV Push at bedtime  lidocaine   4% Patch 1 Patch Transdermal daily  melatonin 5 milliGRAM(s) Oral at bedtime  metoclopramide Injectable 5 milliGRAM(s) IV Push every 8 hours  midodrine 20 milliGRAM(s) Oral every 8 hours  norepinephrine Infusion 0.06 MICROgram(s)/kG/Min IV Continuous <Continuous>  pantoprazole  Injectable 40 milliGRAM(s) IV Push every 12 hours  petrolatum white Ointment 1 Application(s) Topical two times a day  polyethylene glycol 3350 17 Gram(s) Oral daily  PrismaSATE Dialysate BGK 4 / 2.5 5000 milliLiter(s) CRRT <Continuous>  PrismaSOL Filtration BGK 4 / 2.5 5000 milliLiter(s) CRRT <Continuous>  PrismaSOL Filtration BGK 4 / 2.5 5000 milliLiter(s) CRRT <Continuous>  propofol Infusion 10 MICROgram(s)/kG/Min IV Continuous <Continuous>  rocuronium Injectable 50 milliGRAM(s) IV Push once  senna 2 Tablet(s) Oral at bedtime  sertraline 50 milliGRAM(s) Oral daily  sodium chloride 0.9% lock flush 3 milliLiter(s) IV Push every 8 hours  sodium chloride 3%  Inhalation 4 milliLiter(s) Inhalation every 12 hours  thiamine 100 milliGRAM(s) Oral daily  tranexamic acid Injectable for Nebulization 500 milliGRAM(s) Inhalation every 8 hours  vasopressin Infusion 0.04 Unit(s)/Min IV Continuous <Continuous>    PRN Inpatient Medications  loperamide 2 milliGRAM(s) Oral every 4 hours PRN      REVIEW OF SYSTEMS  --------------------------------------------------------------------------------      VITALS/PHYSICAL EXAM  --------------------------------------------------------------------------------  T(C): 36.4 (03-20-24 @ 20:00), Max: 37.7 (03-20-24 @ 12:00)  HR: 82 (03-20-24 @ 20:15) (79 - 100)  BP: --  RR: 30 (03-20-24 @ 20:15) (14 - 30)  SpO2: 100% (03-20-24 @ 20:15) (92% - 100%)  Wt(kg): --        03-19-24 @ 07:01  -  03-20-24 @ 07:00  --------------------------------------------------------  IN: 3544.7 mL / OUT: 2834 mL / NET: 710.7 mL    03-20-24 @ 07:01  -  03-20-24 @ 20:36  --------------------------------------------------------  IN: 2524 mL / OUT: 1952 mL / NET: 572 mL      Physical Exam:  	      Gen:  intubated + IJ cath   	Pulm: decrease bs + coarse bs   	CV: RRR, S1S2; no rub  	Abd: +BS, soft, nontender/nondistended  	: No suprapubic tenderness  	UE: Warm,  +  cyanosis  no clubbing,  no edema  	LE: Warm,  +  cyanosis  no clubbing,  no   edema      LABS/STUDIES  --------------------------------------------------------------------------------              10.4   20.93 >-----------<  93       [03-20-24 @ 00:51]              32.0     135  |  97  |  29  ----------------------------<  137      [03-20-24 @ 00:52]  4.5   |  20  |  0.85        Ca     10.2     [03-20-24 @ 00:52]      Mg     2.8     [03-20-24 @ 00:52]      Phos  1.8     [03-20-24 @ 00:52]    TPro  7.0  /  Alb  4.5  /  TBili  10.0  /  DBili  x   /  AST  192  /  ALT  52  /  AlkPhos  177  [03-20-24 @ 00:52]    PT/INR: PT 14.3 , INR 1.37       [03-19-24 @ 00:25]  PTT: 32.5       [03-19-24 @ 00:25]          [03-20-24 @ 05:57]    Creatinine Trend:  SCr 0.85 [03-20 @ 00:52]  SCr 0.81 [03-19 @ 00:25]  SCr 0.87 [03-18 @ 13:26]  SCr 1.03 [03-18 @ 00:26]  SCr 1.17 [03-17 @ 00:41]    Iron 290, TIBC 430, %sat 67      [03-06-24 @ 13:57]  Ferritin 335      [03-06-24 @ 13:57]  PTH -- (Ca 8.9)      [03-05-24 @ 13:03]   242  TSH 2.02      [03-06-24 @ 13:57]

## 2024-03-20 NOTE — CONSULT NOTE ADULT - SUBJECTIVE AND OBJECTIVE BOX
Chief Complaint:  Patient is a 83y old  Male who presents with a chief complaint of shortness of breath (20 Mar 2024 10:57)    HPI:  83M with ICM/HFrEF (now 30%; LVIDd 6.7 cm; prev req inotrope), CAD c/b IWMI () s/p angioplasty, aortic stenosis s/p bio-AVR , VT arrest () s/p ICD, Afib s/p multiple DCCV and ablation x2 ( and ; on AC), Aflutter s/p WARREN/DCCV (), severe MR prior MVr () CKD (b/l Cr 2.5-3), admitted with cardiogenic shock and decompensated heart failure and acute on chronic kidney dysfunction now on CVVHD, with course notable for hypoxic failure requiring intubation. Course now further notable for maroon stools for which GI is consulted. Of note, pt currently on levo 0.18, epi 0.1, vaso 0.1, and dobutamine 10. No previous EGD/colonoscopy.      Allergies:  No Known Allergies      Home Medications:    Hospital Medications:  albumin human  5% IVPB 250 milliLiter(s) IV Intermittent once  albumin human  5% IVPB 250 milliLiter(s) IV Intermittent once  allopurinol 100 milliGRAM(s) Oral daily  aMIOdarone    Tablet 200 milliGRAM(s) Oral daily  ascorbic acid 500 milliGRAM(s) Oral daily  atorvastatin 40 milliGRAM(s) Oral at bedtime  Biotene Dry Mouth Oral Rinse 5 milliLiter(s) Swish and Spit daily  chlorhexidine 0.12% Liquid 15 milliLiter(s) Oral Mucosa every 12 hours  chlorhexidine 2% Cloths 1 Application(s) Topical <User Schedule>  CRRT Treatment    <Continuous>  cyanocobalamin 1000 MICROGram(s) Oral daily  dexMEDEtomidine Infusion 0.02 MICROgram(s)/kG/Hr IV Continuous <Continuous>  dextrose 50% Injectable 25 Gram(s) IV Push once  DOBUTamine Infusion 10 MICROgram(s)/kG/Min IV Continuous <Continuous>  EPINEPHrine    Infusion 0.1 MICROgram(s)/kG/Min IV Continuous <Continuous>  epoetin zara-epbx (RETACRIT) Injectable 18177 Unit(s) IV Push <User Schedule>  etomidate Injectable 40 milliGRAM(s) IV Push once  folic acid 1 milliGRAM(s) Oral daily  insulin lispro (ADMELOG) corrective regimen sliding scale   SubCutaneous every 6 hours  levothyroxine Injectable 37.5 MICROGram(s) IV Push at bedtime  lidocaine   4% Patch 1 Patch Transdermal daily  loperamide 2 milliGRAM(s) Oral every 4 hours PRN  melatonin 5 milliGRAM(s) Oral at bedtime  metoclopramide Injectable 5 milliGRAM(s) IV Push every 8 hours  midodrine 20 milliGRAM(s) Oral every 8 hours  norepinephrine Infusion 0.06 MICROgram(s)/kG/Min IV Continuous <Continuous>  pantoprazole  Injectable 40 milliGRAM(s) IV Push every 12 hours  petrolatum white Ointment 1 Application(s) Topical two times a day  polyethylene glycol 3350 17 Gram(s) Oral daily  PrismaSATE Dialysate BGK 4 / 2.5 5000 milliLiter(s) CRRT <Continuous>  PrismaSOL Filtration BGK 4 / 2.5 5000 milliLiter(s) CRRT <Continuous>  PrismaSOL Filtration BGK 4 / 2.5 5000 milliLiter(s) CRRT <Continuous>  propofol Infusion 10 MICROgram(s)/kG/Min IV Continuous <Continuous>  rocuronium Injectable 50 milliGRAM(s) IV Push once  senna 2 Tablet(s) Oral at bedtime  sertraline 50 milliGRAM(s) Oral daily  sodium chloride 0.9% lock flush 3 milliLiter(s) IV Push every 8 hours  sodium chloride 3%  Inhalation 4 milliLiter(s) Inhalation every 12 hours  thiamine 100 milliGRAM(s) Oral daily  tranexamic acid Injectable for Nebulization 500 milliGRAM(s) Inhalation every 8 hours  vasopressin Infusion 0.04 Unit(s)/Min IV Continuous <Continuous>      PMHX/PSHX:  Aortic stenosis    AICD (automatic cardioverter/defibrillator) present    Aortic valve regurgitation    Ascending aorta dilation    H/O paroxysmal supraventricular tachycardia    HLD (hyperlipidemia)    Mitral valve regurgitation    S/P ablation of atrial fibrillation    Cardiac arrest    Severe mitral regurgitation    S/P aortic valve replacement    S/P hernia repair    History of tonsillectomy and adenoidectomy    S/P TURP    S/P colonoscopy    S/P endoscopy    S/P cardiac cath    AICD (automatic cardioverter/defibrillator) present    Cardiac pacemaker    History of cardioversion    S/P ablation of atrial fibrillation    Status post ablation of ventricular arrhythmia        Family history:      Denies family history of colon cancer/polyps, stomach cancer/polyps, pancreatic cancer/masses, liver cancer/disease, ovarian cancer and endometrial cancer.    Social History:     Tob: Denies  EtOH: Denies  Illicit Drugs: Denies    ROS:   Unable to be obtained    PHYSICAL EXAM:   GENERAL: critically ill  NEURO: following commands  HEENT: no conjunctival pallor appreciated  CHEST: intubated, mechanical breath sounds  CARDIAC: regular rate, +S1/S2  ABDOMEN: soft, nondistended, nontender, no rebound or guarding  RECTAL (performed by Dr. Wallace; chaperoned by writer); brown stool  EXTREMITIES: warm, well perfused  SKIN: no lesions noted    Vital Signs:  Vital Signs Last 24 Hrs  T(C): 36.7 (20 Mar 2024 16:00), Max: 37.7 (20 Mar 2024 12:00)  T(F): 98.1 (20 Mar 2024 16:00), Max: 99.9 (20 Mar 2024 12:00)  HR: 99 (20 Mar 2024 16:30) (79 - 101)  BP: --  BP(mean): --  RR: 17 (20 Mar 2024 16:30) (14 - 44)  SpO2: 97% (20 Mar 2024 16:30) (92% - 100%)    Parameters below as of 20 Mar 2024 16:00  Patient On (Oxygen Delivery Method): ventilator    O2 Concentration (%): 50  Daily     Daily Weight in k (20 Mar 2024 00:00)    LABS:                        10.4   20.93 )-----------( 93       ( 20 Mar 2024 00:51 )             32.0     Mean Cell Volume: 94.7 fl (-24 @ 00:51)        135  |  97  |  29<H>  ----------------------------<  137<H>  4.5   |  20<L>  |  0.85    Ca    10.2      20 Mar 2024 00:52  Phos  1.8       Mg     2.8         TPro  7.0  /  Alb  4.5  /  TBili  10.0<H>  /  DBili  x   /  AST  192<H>  /  ALT  52<H>  /  AlkPhos  177<H>      LIVER FUNCTIONS - ( 20 Mar 2024 00:52 )  Alb: 4.5 g/dL / Pro: 7.0 g/dL / ALK PHOS: 177 U/L / ALT: 52 U/L / AST: 192 U/L / GGT: x           PT/INR - ( 19 Mar 2024 00:25 )   PT: 14.3 sec;   INR: 1.37 ratio         PTT - ( 19 Mar 2024 00:25 )  PTT:32.5 sec  Urinalysis Basic - ( 20 Mar 2024 00:52 )    Color: x / Appearance: x / SG: x / pH: x  Gluc: 137 mg/dL / Ketone: x  / Bili: x / Urobili: x   Blood: x / Protein: x / Nitrite: x   Leuk Esterase: x / RBC: x / WBC x   Sq Epi: x / Non Sq Epi: x / Bacteria: x                              10.4   20.93 )-----------( 93       ( 20 Mar 2024 00:51 )             32.0                         10.6   19.18 )-----------( 76       ( 19 Mar 2024 00:26 )             32.2                         8.8    15.13 )-----------( 84       ( 18 Mar 2024 00:26 )             27.1 Chief Complaint:  Patient is a 83y old  Male who presents with a chief complaint of shortness of breath (20 Mar 2024 10:57)    HPI:  83M with ICM/HFrEF (now 30%; LVIDd 6.7 cm; prev req inotrope), CAD c/b IWMI () s/p angioplasty, aortic stenosis s/p bio-AVR , VT arrest () s/p ICD, Afib s/p multiple DCCV and ablation x2 ( and ; on AC), Aflutter s/p WARREN/DCCV (), severe MR prior MVr () CKD (b/l Cr 2.5-3), admitted with cardiogenic shock and decompensated heart failure and acute on chronic kidney dysfunction now on CVVHD, with course notable for hypoxic failure requiring intubation. Course now further notable for maroon stools for which GI is consulted. Of note, pt currently on levo 0.18, epi 0.1, vaso 0.1, and dobutamine 10. No previous EGD/colonoscopy. He is able to nod and is awake despite intubation. He denies abdominal pain.      Allergies:  No Known Allergies      Home Medications:    Hospital Medications:  albumin human  5% IVPB 250 milliLiter(s) IV Intermittent once  albumin human  5% IVPB 250 milliLiter(s) IV Intermittent once  allopurinol 100 milliGRAM(s) Oral daily  aMIOdarone    Tablet 200 milliGRAM(s) Oral daily  ascorbic acid 500 milliGRAM(s) Oral daily  atorvastatin 40 milliGRAM(s) Oral at bedtime  Biotene Dry Mouth Oral Rinse 5 milliLiter(s) Swish and Spit daily  chlorhexidine 0.12% Liquid 15 milliLiter(s) Oral Mucosa every 12 hours  chlorhexidine 2% Cloths 1 Application(s) Topical <User Schedule>  CRRT Treatment    <Continuous>  cyanocobalamin 1000 MICROGram(s) Oral daily  dexMEDEtomidine Infusion 0.02 MICROgram(s)/kG/Hr IV Continuous <Continuous>  dextrose 50% Injectable 25 Gram(s) IV Push once  DOBUTamine Infusion 10 MICROgram(s)/kG/Min IV Continuous <Continuous>  EPINEPHrine    Infusion 0.1 MICROgram(s)/kG/Min IV Continuous <Continuous>  epoetin zara-epbx (RETACRIT) Injectable 25479 Unit(s) IV Push <User Schedule>  etomidate Injectable 40 milliGRAM(s) IV Push once  folic acid 1 milliGRAM(s) Oral daily  insulin lispro (ADMELOG) corrective regimen sliding scale   SubCutaneous every 6 hours  levothyroxine Injectable 37.5 MICROGram(s) IV Push at bedtime  lidocaine   4% Patch 1 Patch Transdermal daily  loperamide 2 milliGRAM(s) Oral every 4 hours PRN  melatonin 5 milliGRAM(s) Oral at bedtime  metoclopramide Injectable 5 milliGRAM(s) IV Push every 8 hours  midodrine 20 milliGRAM(s) Oral every 8 hours  norepinephrine Infusion 0.06 MICROgram(s)/kG/Min IV Continuous <Continuous>  pantoprazole  Injectable 40 milliGRAM(s) IV Push every 12 hours  petrolatum white Ointment 1 Application(s) Topical two times a day  polyethylene glycol 3350 17 Gram(s) Oral daily  PrismaSATE Dialysate BGK 4 / 2.5 5000 milliLiter(s) CRRT <Continuous>  PrismaSOL Filtration BGK 4 / 2.5 5000 milliLiter(s) CRRT <Continuous>  PrismaSOL Filtration BGK 4 / 2.5 5000 milliLiter(s) CRRT <Continuous>  propofol Infusion 10 MICROgram(s)/kG/Min IV Continuous <Continuous>  rocuronium Injectable 50 milliGRAM(s) IV Push once  senna 2 Tablet(s) Oral at bedtime  sertraline 50 milliGRAM(s) Oral daily  sodium chloride 0.9% lock flush 3 milliLiter(s) IV Push every 8 hours  sodium chloride 3%  Inhalation 4 milliLiter(s) Inhalation every 12 hours  thiamine 100 milliGRAM(s) Oral daily  tranexamic acid Injectable for Nebulization 500 milliGRAM(s) Inhalation every 8 hours  vasopressin Infusion 0.04 Unit(s)/Min IV Continuous <Continuous>      PMHX/PSHX:  Aortic stenosis    AICD (automatic cardioverter/defibrillator) present    Aortic valve regurgitation    Ascending aorta dilation    H/O paroxysmal supraventricular tachycardia    HLD (hyperlipidemia)    Mitral valve regurgitation    S/P ablation of atrial fibrillation    Cardiac arrest    Severe mitral regurgitation    S/P aortic valve replacement    S/P hernia repair    History of tonsillectomy and adenoidectomy    S/P TURP    S/P colonoscopy    S/P endoscopy    S/P cardiac cath    AICD (automatic cardioverter/defibrillator) present    Cardiac pacemaker    History of cardioversion    S/P ablation of atrial fibrillation    Status post ablation of ventricular arrhythmia        Family history:      Denies family history of colon cancer/polyps, stomach cancer/polyps, pancreatic cancer/masses, liver cancer/disease, ovarian cancer and endometrial cancer.    Social History:     Tob: Denies  EtOH: Denies  Illicit Drugs: Denies    ROS:   Unable to be obtained due to intubation    PHYSICAL EXAM:   GENERAL: critically ill  NEURO: following commands  HEENT: no conjunctival pallor appreciated  CHEST: intubated, mechanical breath sounds  CARDIAC: regular rate, +S1/S2  ABDOMEN: soft, nondistended, nontender, no rebound or guarding  RECTAL (performed by Dr. Wallace; chaperoned by writer); brown stool  EXTREMITIES: warm, well perfused  SKIN: no lesions noted    Vital Signs:  Vital Signs Last 24 Hrs  T(C): 36.7 (20 Mar 2024 16:00), Max: 37.7 (20 Mar 2024 12:00)  T(F): 98.1 (20 Mar 2024 16:00), Max: 99.9 (20 Mar 2024 12:00)  HR: 99 (20 Mar 2024 16:30) (79 - 101)  BP: --  BP(mean): --  RR: 17 (20 Mar 2024 16:30) (14 - 44)  SpO2: 97% (20 Mar 2024 16:30) (92% - 100%)    Parameters below as of 20 Mar 2024 16:00  Patient On (Oxygen Delivery Method): ventilator    O2 Concentration (%): 50  Daily     Daily Weight in k (20 Mar 2024 00:00)    LABS:                        10.4   20.93 )-----------( 93       ( 20 Mar 2024 00:51 )             32.0     Mean Cell Volume: 94.7 fl (-24 @ 00:51)        135  |  97  |  29<H>  ----------------------------<  137<H>  4.5   |  20<L>  |  0.85    Ca    10.2      20 Mar 2024 00:52  Phos  1.8       Mg     2.8         TPro  7.0  /  Alb  4.5  /  TBili  10.0<H>  /  DBili  x   /  AST  192<H>  /  ALT  52<H>  /  AlkPhos  177<H>  03-20    LIVER FUNCTIONS - ( 20 Mar 2024 00:52 )  Alb: 4.5 g/dL / Pro: 7.0 g/dL / ALK PHOS: 177 U/L / ALT: 52 U/L / AST: 192 U/L / GGT: x           PT/INR - ( 19 Mar 2024 00:25 )   PT: 14.3 sec;   INR: 1.37 ratio         PTT - ( 19 Mar 2024 00:25 )  PTT:32.5 sec  Urinalysis Basic - ( 20 Mar 2024 00:52 )    Color: x / Appearance: x / SG: x / pH: x  Gluc: 137 mg/dL / Ketone: x  / Bili: x / Urobili: x   Blood: x / Protein: x / Nitrite: x   Leuk Esterase: x / RBC: x / WBC x   Sq Epi: x / Non Sq Epi: x / Bacteria: x                              10.4   20.93 )-----------( 93       ( 20 Mar 2024 00:51 )             32.0                         10.6   19.18 )-----------( 76       ( 19 Mar 2024 00:26 )             32.2                         8.8    15.13 )-----------( 84       ( 18 Mar 2024 00:26 )             27.1

## 2024-03-20 NOTE — PROGRESS NOTE ADULT - ASSESSMENT
82 y/o M with ICM/HFrEF (now 30%; LVIDd 6.7 cm; prev req inotrope), CAD c/b IWMI (1994) s/p angioplasty, aortic stenosis s/p bio-AVR 2004, VT arrest (2017) s/p ICD, Afib s/p multiple DCCV and ablation x2 (2020 and 2023; on AC), Aflutter s/p WARREN/DCCV (8/23), prior Ao aneurysm s/p Bentall (2021), severe MR prior MVr (2021) with MAC (precludes M-KRISTIE d/t his anatomy; turned down for TMVR trials by [employer]), atrophic kidney with CKD (b/l Cr 2.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 Patient was initiated on bumex gtt with gradual improvement with Cr improving from 3.6 to 3.1. Patient was considered for MV re-op; however, patient was reluctant of the procedure and expressed wanting to follow up in the outpatient setting to schedule. During last admission, patient was also noted to be in flutter, EP was consulted, and patient underwent repeat WARREN/DCCV , on amiodarone. Patient was discharged home 2/28 on higher dose of diuretics, as per HF team. Today, patient was evaluated at CTS office four routine follow-up, pt c/o worsening shortness of breath and L>R LE edema, with redness/swelling on left dorsal aspect of the foot, readmitted to CTS service for further management.  noticed with rising creatinine and bun      1- SURESH on ckd   2- decompensated CHF  3- Mitral regurgitation   4- hypotension   5- hypothyroid   6- hyperuricemia   7- anemia      cont dobutamine drip   midodrine 20  mg tid   cont vasopressin drip  /levophed/epinephrine    CRRT with [id number] dialyzer;  dfr 1200   0    cc hr fluid removal  as bp tolerates   see new orders   reatcrit 01312 U twice weekly   allopurinol 100 mg daily   no blood work RUE  strict I/O  d/w CTS team when seen earlier

## 2024-03-20 NOTE — CONSULT NOTE ADULT - ATTENDING COMMENTS
Agree with above. Patient s/p 2U 3/18, Hgb responded appropriately. Stools now brown on rectal exam, no active bleeding. Differential broad, suspect possible ischemic colitis given cardiogenic shock, though other lesions such as diverticulosis, AVMs also possible. Given resolution of bleeding on exam and stable h/h, d/w daughter at bedside - she agrees with treating conservatively for now, supportive care, antibiotics and hold off on endoscopic evaluation at this time.

## 2024-03-20 NOTE — PROGRESS NOTE ADULT - SUBJECTIVE AND OBJECTIVE BOX
CRITICAL CARE ATTENDING - CTICU    MEDICATIONS  (STANDING):  allopurinol 100 milliGRAM(s) Oral daily  aMIOdarone    Tablet 200 milliGRAM(s) Oral daily  ascorbic acid 500 milliGRAM(s) Oral daily  atorvastatin 40 milliGRAM(s) Oral at bedtime  Biotene Dry Mouth Oral Rinse 5 milliLiter(s) Swish and Spit daily  chlorhexidine 0.12% Liquid 15 milliLiter(s) Oral Mucosa every 12 hours  chlorhexidine 2% Cloths 1 Application(s) Topical <User Schedule>  CRRT Treatment    <Continuous>  cyanocobalamin 1000 MICROGram(s) Oral daily  dexMEDEtomidine Infusion 0.02 MICROgram(s)/kG/Hr (0.34 mL/Hr) IV Continuous <Continuous>  dextrose 50% Injectable 25 Gram(s) IV Push once  DOBUTamine Infusion 10 MICROgram(s)/kG/Min (10.2 mL/Hr) IV Continuous <Continuous>  EPINEPHrine    Infusion 0.1 MICROgram(s)/kG/Min (12.7 mL/Hr) IV Continuous <Continuous>  epoetin zara-epbx (RETACRIT) Injectable 31729 Unit(s) IV Push <User Schedule>  etomidate Injectable 40 milliGRAM(s) IV Push once  folic acid 1 milliGRAM(s) Oral daily  insulin lispro (ADMELOG) corrective regimen sliding scale   SubCutaneous every 6 hours  levothyroxine Injectable 37.5 MICROGram(s) IV Push at bedtime  lidocaine   4% Patch 1 Patch Transdermal daily  melatonin 5 milliGRAM(s) Oral at bedtime  metoclopramide Injectable 5 milliGRAM(s) IV Push every 8 hours  midodrine 20 milliGRAM(s) Oral every 8 hours  norepinephrine Infusion 0.06 MICROgram(s)/kG/Min (3.81 mL/Hr) IV Continuous <Continuous>  pantoprazole  Injectable 40 milliGRAM(s) IV Push every 12 hours  petrolatum white Ointment 1 Application(s) Topical two times a day  polyethylene glycol 3350 17 Gram(s) Oral daily  PrismaSATE Dialysate BGK 4 / 2.5 5000 milliLiter(s) (1200 mL/Hr) CRRT <Continuous>  PrismaSOL Filtration BGK 4 / 2.5 5000 milliLiter(s) (600 mL/Hr) CRRT <Continuous>  PrismaSOL Filtration BGK 4 / 2.5 5000 milliLiter(s) (400 mL/Hr) CRRT <Continuous>  propofol Infusion 10 MICROgram(s)/kG/Min (4.07 mL/Hr) IV Continuous <Continuous>  rocuronium Injectable 50 milliGRAM(s) IV Push once  senna 2 Tablet(s) Oral at bedtime  sertraline 50 milliGRAM(s) Oral daily  sodium chloride 0.9% lock flush 3 milliLiter(s) IV Push every 8 hours  sodium chloride 3%  Inhalation 4 milliLiter(s) Inhalation every 12 hours  thiamine 100 milliGRAM(s) Oral daily  tranexamic acid Injectable for Nebulization 500 milliGRAM(s) Inhalation every 8 hours  vasopressin Infusion 0.04 Unit(s)/Min (6 mL/Hr) IV Continuous <Continuous>                                    10.4   20.93 )-----------( 93       ( 20 Mar 2024 00:51 )             32.0       03-20    135  |  97  |  29<H>  ----------------------------<  137<H>  4.5   |  20<L>  |  0.85    Ca    10.2      20 Mar 2024 00:52  Phos  1.8     0320  Mg     2.8     20    TPro  7.0  /  Alb  4.5  /  TBili  10.0<H>  /  DBili  x   /  AST  192<H>  /  ALT  52<H>  /  AlkPhos  177<H>  20      PT/INR - ( 19 Mar 2024 00:25 )   PT: 14.3 sec;   INR: 1.37 ratio         PTT - ( 19 Mar 2024 00:25 )  PTT:32.5 sec    Mode: CPAP with PS  FiO2: 50  PEEP: 5  PS: 10  MAP: 9  PIP: 16      Daily     Daily Weight in k (20 Mar 2024 00:00)       @ 07:  -   @ 07:00  --------------------------------------------------------  IN: 3544.7 mL / OUT: 2834 mL / NET: 710.7 mL     @ 07:01  -  03-20 @ 10:58  --------------------------------------------------------  IN: 601.7 mL / OUT: 300 mL / NET: 301.7 mL        Critically Ill patient  : [x ] preoperative ,   [ ] post operative    Requires :  [x ] Arterial Line   [x ] Central Line  [ ] PA catheter  [ ] IABP  [ ] ECMO  [ ] LVAD  [ x] Ventilator  [ x] pacemaker - PPM/AICD [ ] Impella.  [x] CVVHD                      [x ] ABG's     [ x] Pulse Oxymetry Monitoring  Bedside evaluation , monitoring , treatment of hemodynamics , fluids , IVP/ IVCD meds.        Diagnosis:     Pre op Evaluation / optimization for Re Op MVR     Cardiogenic Shock     CHF- acute [ x]   chronic [x ]    systolic [x ]   diastolic [ ]  Valvular [x ]          - Echo- EF -  30's            [x ] RV dysfunction          - Cxr-cardiomegally, edema          - Clinical-  [ x]inotropes   [ x]pressors   [ ]diuresis   [ ]IABP   [ ]ECMO   [x ] CVVHD   [x ]Respiratory Failure    Respiratory Failure     Requires chest PT, pulmonary toilet, ambu bagging, suctioning to maintain SaO2,  patent airway and treat atelectasis.     Ventilator Management:  [ x]AC-rest  rest    [x ]CPAP-PS Wean    [ ]Trach Collar     [ ]Extubate    [ ] T-Piece  [ ]peep>5     Difficult weaning process - multiple organ system involvement in critically ill patient     Fluid  overload     Renal Failure - Acute Kidney Injury     CVVHD     Tolerates NG / NJ feeds at [x ] goal rate    [ ] trophic rate    [ ]       rate     Hemodynamic lability,  instability. Requires IVCD [ x] vasopressors [x ] inotropes  [ ] vasodilator  [ ]IVSS fluid  to maintain MAP, perfusion, C.I.     Thrombocytopenia     metabolic Acidosis    Requires bedside physical therapy, mobilization and total FPC care.     ? GI Bleeding ?                         -                     Discussed with CT surgeon, Physician's Assistant - Nurse Practitioner- Critical care medicine team.   Discussed at  AM / PM rounds.   Chart, labs , films reviewed.    Cumulative Critical Care Time Given Today : 105 min

## 2024-03-21 LAB
ALBUMIN SERPL ELPH-MCNC: 4.9 G/DL — SIGNIFICANT CHANGE UP (ref 3.3–5)
ALP SERPL-CCNC: 182 U/L — HIGH (ref 40–120)
ALT FLD-CCNC: 58 U/L — HIGH (ref 10–45)
ANION GAP SERPL CALC-SCNC: 15 MMOL/L — SIGNIFICANT CHANGE UP (ref 5–17)
AST SERPL-CCNC: 217 U/L — HIGH (ref 10–40)
BASE EXCESS BLDV CALC-SCNC: -0.9 MMOL/L — SIGNIFICANT CHANGE UP (ref -2–3)
BASE EXCESS BLDV CALC-SCNC: -1.1 MMOL/L — SIGNIFICANT CHANGE UP (ref -2–3)
BILIRUB SERPL-MCNC: 9.8 MG/DL — HIGH (ref 0.2–1.2)
BUN SERPL-MCNC: 31 MG/DL — HIGH (ref 7–23)
CA-I SERPL-SCNC: 1.21 MMOL/L — SIGNIFICANT CHANGE UP (ref 1.15–1.33)
CA-I SERPL-SCNC: 1.24 MMOL/L — SIGNIFICANT CHANGE UP (ref 1.15–1.33)
CALCIUM SERPL-MCNC: 9.5 MG/DL — SIGNIFICANT CHANGE UP (ref 8.4–10.5)
CHLORIDE BLDV-SCNC: 100 MMOL/L — SIGNIFICANT CHANGE UP (ref 96–108)
CHLORIDE BLDV-SCNC: 101 MMOL/L — SIGNIFICANT CHANGE UP (ref 96–108)
CHLORIDE SERPL-SCNC: 98 MMOL/L — SIGNIFICANT CHANGE UP (ref 96–108)
CO2 BLDV-SCNC: 26 MMOL/L — SIGNIFICANT CHANGE UP (ref 22–26)
CO2 BLDV-SCNC: 26 MMOL/L — SIGNIFICANT CHANGE UP (ref 22–26)
CO2 SERPL-SCNC: 22 MMOL/L — SIGNIFICANT CHANGE UP (ref 22–31)
CREAT SERPL-MCNC: 0.94 MG/DL — SIGNIFICANT CHANGE UP (ref 0.5–1.3)
E COLI SXT SPEC: DETECTED
EGFR: 80 ML/MIN/1.73M2 — SIGNIFICANT CHANGE UP
ETEC DNA STL QL NAA+PROBE: DETECTED
GAS PNL BLDA: SIGNIFICANT CHANGE UP
GAS PNL BLDV: 132 MMOL/L — LOW (ref 136–145)
GAS PNL BLDV: 133 MMOL/L — LOW (ref 136–145)
GAS PNL BLDV: SIGNIFICANT CHANGE UP
GI PCR PANEL: DETECTED
GLUCOSE BLDV-MCNC: 144 MG/DL — HIGH (ref 70–99)
GLUCOSE BLDV-MCNC: 148 MG/DL — HIGH (ref 70–99)
GLUCOSE SERPL-MCNC: 155 MG/DL — HIGH (ref 70–99)
HAPTOGLOB SERPL-MCNC: <20 MG/DL — LOW (ref 34–200)
HCO3 BLDV-SCNC: 25 MMOL/L — SIGNIFICANT CHANGE UP (ref 22–29)
HCO3 BLDV-SCNC: 25 MMOL/L — SIGNIFICANT CHANGE UP (ref 22–29)
HCT VFR BLD CALC: 28.5 % — LOW (ref 39–50)
HCT VFR BLDA CALC: 27 % — LOW (ref 39–51)
HCT VFR BLDA CALC: 29 % — LOW (ref 39–51)
HGB BLD CALC-MCNC: 8.9 G/DL — LOW (ref 12.6–17.4)
HGB BLD CALC-MCNC: 9.6 G/DL — LOW (ref 12.6–17.4)
HGB BLD-MCNC: 9.4 G/DL — LOW (ref 13–17)
HOROWITZ INDEX BLDV+IHG-RTO: 50 — SIGNIFICANT CHANGE UP
LACTATE BLDV-MCNC: 1.2 MMOL/L — SIGNIFICANT CHANGE UP (ref 0.5–2)
LACTATE BLDV-MCNC: 1.4 MMOL/L — SIGNIFICANT CHANGE UP (ref 0.5–2)
MAGNESIUM SERPL-MCNC: 2.8 MG/DL — HIGH (ref 1.6–2.6)
MCHC RBC-ENTMCNC: 30.6 PG — SIGNIFICANT CHANGE UP (ref 27–34)
MCHC RBC-ENTMCNC: 33 GM/DL — SIGNIFICANT CHANGE UP (ref 32–36)
MCV RBC AUTO: 92.8 FL — SIGNIFICANT CHANGE UP (ref 80–100)
NRBC # BLD: 1 /100 WBCS — HIGH (ref 0–0)
PCO2 BLDV: 45 MMHG — SIGNIFICANT CHANGE UP (ref 42–55)
PCO2 BLDV: 46 MMHG — SIGNIFICANT CHANGE UP (ref 42–55)
PH BLDV: 7.34 — SIGNIFICANT CHANGE UP (ref 7.32–7.43)
PH BLDV: 7.35 — SIGNIFICANT CHANGE UP (ref 7.32–7.43)
PHOSPHATE SERPL-MCNC: 2 MG/DL — LOW (ref 2.5–4.5)
PLATELET # BLD AUTO: 88 K/UL — LOW (ref 150–400)
PO2 BLDV: 33 MMHG — SIGNIFICANT CHANGE UP (ref 25–45)
PO2 BLDV: 35 MMHG — SIGNIFICANT CHANGE UP (ref 25–45)
POTASSIUM BLDV-SCNC: 4.2 MMOL/L — SIGNIFICANT CHANGE UP (ref 3.5–5.1)
POTASSIUM BLDV-SCNC: 4.6 MMOL/L — SIGNIFICANT CHANGE UP (ref 3.5–5.1)
POTASSIUM SERPL-MCNC: 4.4 MMOL/L — SIGNIFICANT CHANGE UP (ref 3.5–5.3)
POTASSIUM SERPL-SCNC: 4.4 MMOL/L — SIGNIFICANT CHANGE UP (ref 3.5–5.3)
PROT SERPL-MCNC: 6.9 G/DL — SIGNIFICANT CHANGE UP (ref 6–8.3)
RBC # BLD: 3.07 M/UL — LOW (ref 4.2–5.8)
RBC # FLD: 20.5 % — HIGH (ref 10.3–14.5)
SAO2 % BLDV: 55.3 % — LOW (ref 67–88)
SAO2 % BLDV: 60.3 % — LOW (ref 67–88)
SODIUM SERPL-SCNC: 135 MMOL/L — SIGNIFICANT CHANGE UP (ref 135–145)
WBC # BLD: 20.18 K/UL — HIGH (ref 3.8–10.5)
WBC # FLD AUTO: 20.18 K/UL — HIGH (ref 3.8–10.5)

## 2024-03-21 PROCEDURE — 99232 SBSQ HOSP IP/OBS MODERATE 35: CPT

## 2024-03-21 PROCEDURE — 99291 CRITICAL CARE FIRST HOUR: CPT

## 2024-03-21 PROCEDURE — 99292 CRITICAL CARE ADDL 30 MIN: CPT

## 2024-03-21 PROCEDURE — 99232 SBSQ HOSP IP/OBS MODERATE 35: CPT | Mod: GC

## 2024-03-21 PROCEDURE — 71045 X-RAY EXAM CHEST 1 VIEW: CPT | Mod: 26,76

## 2024-03-21 RX ORDER — CEFEPIME 1 G/1
INJECTION, POWDER, FOR SOLUTION INTRAMUSCULAR; INTRAVENOUS
Refills: 0 | Status: DISCONTINUED | OUTPATIENT
Start: 2024-03-21 | End: 2024-03-21

## 2024-03-21 RX ORDER — VANCOMYCIN HCL 1 G
500 VIAL (EA) INTRAVENOUS ONCE
Refills: 0 | Status: COMPLETED | OUTPATIENT
Start: 2024-03-21 | End: 2024-03-21

## 2024-03-21 RX ORDER — CEFEPIME 1 G/1
2000 INJECTION, POWDER, FOR SOLUTION INTRAMUSCULAR; INTRAVENOUS ONCE
Refills: 0 | Status: COMPLETED | OUTPATIENT
Start: 2024-03-21 | End: 2024-03-21

## 2024-03-21 RX ORDER — VANCOMYCIN HCL 1 G
VIAL (EA) INTRAVENOUS
Refills: 0 | Status: DISCONTINUED | OUTPATIENT
Start: 2024-03-21 | End: 2024-03-23

## 2024-03-21 RX ORDER — CEFEPIME 1 G/1
2000 INJECTION, POWDER, FOR SOLUTION INTRAMUSCULAR; INTRAVENOUS EVERY 8 HOURS
Refills: 0 | Status: DISCONTINUED | OUTPATIENT
Start: 2024-03-22 | End: 2024-03-22

## 2024-03-21 RX ORDER — CEFEPIME 1 G/1
INJECTION, POWDER, FOR SOLUTION INTRAMUSCULAR; INTRAVENOUS
Refills: 0 | Status: DISCONTINUED | OUTPATIENT
Start: 2024-03-21 | End: 2024-03-22

## 2024-03-21 RX ORDER — ALBUMIN HUMAN 25 %
250 VIAL (ML) INTRAVENOUS ONCE
Refills: 0 | Status: COMPLETED | OUTPATIENT
Start: 2024-03-21 | End: 2024-03-21

## 2024-03-21 RX ORDER — ALBUMIN HUMAN 25 %
100 VIAL (ML) INTRAVENOUS ONCE
Refills: 0 | Status: COMPLETED | OUTPATIENT
Start: 2024-03-21 | End: 2024-03-21

## 2024-03-21 RX ORDER — VANCOMYCIN HCL 1 G
500 VIAL (EA) INTRAVENOUS EVERY 12 HOURS
Refills: 0 | Status: DISCONTINUED | OUTPATIENT
Start: 2024-03-22 | End: 2024-03-23

## 2024-03-21 RX ORDER — CEFEPIME 1 G/1
2000 INJECTION, POWDER, FOR SOLUTION INTRAMUSCULAR; INTRAVENOUS ONCE
Refills: 0 | Status: DISCONTINUED | OUTPATIENT
Start: 2024-03-21 | End: 2024-03-21

## 2024-03-21 RX ORDER — CALCIUM CHLORIDE
1000 POWDER (GRAM) MISCELLANEOUS ONCE
Refills: 0 | Status: COMPLETED | OUTPATIENT
Start: 2024-03-21 | End: 2024-03-21

## 2024-03-21 RX ADMIN — PANTOPRAZOLE SODIUM 40 MILLIGRAM(S): 20 TABLET, DELAYED RELEASE ORAL at 05:25

## 2024-03-21 RX ADMIN — Medication 5 MILLIGRAM(S): at 21:37

## 2024-03-21 RX ADMIN — MIDODRINE HYDROCHLORIDE 20 MILLIGRAM(S): 2.5 TABLET ORAL at 21:37

## 2024-03-21 RX ADMIN — SODIUM CHLORIDE 3 MILLILITER(S): 9 INJECTION INTRAMUSCULAR; INTRAVENOUS; SUBCUTANEOUS at 22:27

## 2024-03-21 RX ADMIN — ATORVASTATIN CALCIUM 40 MILLIGRAM(S): 80 TABLET, FILM COATED ORAL at 21:37

## 2024-03-21 RX ADMIN — PANTOPRAZOLE SODIUM 40 MILLIGRAM(S): 20 TABLET, DELAYED RELEASE ORAL at 17:32

## 2024-03-21 RX ADMIN — CHLORHEXIDINE GLUCONATE 15 MILLILITER(S): 213 SOLUTION TOPICAL at 05:25

## 2024-03-21 RX ADMIN — Medication 1 APPLICATION(S): at 17:53

## 2024-03-21 RX ADMIN — Medication 500 MILLIGRAM(S): at 13:10

## 2024-03-21 RX ADMIN — Medication 85 MILLIMOLE(S): at 03:07

## 2024-03-21 RX ADMIN — Medication 1000 MILLIGRAM(S): at 22:32

## 2024-03-21 RX ADMIN — CHLORHEXIDINE GLUCONATE 1 APPLICATION(S): 213 SOLUTION TOPICAL at 05:26

## 2024-03-21 RX ADMIN — Medication 3.81 MICROGRAM(S)/KG/MIN: at 19:48

## 2024-03-21 RX ADMIN — Medication 4: at 05:26

## 2024-03-21 RX ADMIN — TRANEXAMIC ACID 500 MILLIGRAM(S): 100 INJECTION, SOLUTION INTRAVENOUS at 05:15

## 2024-03-21 RX ADMIN — SODIUM CHLORIDE 4 MILLILITER(S): 9 INJECTION INTRAMUSCULAR; INTRAVENOUS; SUBCUTANEOUS at 05:15

## 2024-03-21 RX ADMIN — PREGABALIN 1000 MICROGRAM(S): 225 CAPSULE ORAL at 13:10

## 2024-03-21 RX ADMIN — SODIUM CHLORIDE 3 MILLILITER(S): 9 INJECTION INTRAMUSCULAR; INTRAVENOUS; SUBCUTANEOUS at 05:51

## 2024-03-21 RX ADMIN — Medication 37.5 MICROGRAM(S): at 21:43

## 2024-03-21 RX ADMIN — AMIODARONE HYDROCHLORIDE 200 MILLIGRAM(S): 400 TABLET ORAL at 05:25

## 2024-03-21 RX ADMIN — Medication 5 MILLIGRAM(S): at 05:28

## 2024-03-21 RX ADMIN — Medication 1 MILLIGRAM(S): at 13:10

## 2024-03-21 RX ADMIN — POLYETHYLENE GLYCOL 3350 17 GRAM(S): 17 POWDER, FOR SOLUTION ORAL at 13:11

## 2024-03-21 RX ADMIN — Medication 100 MILLIGRAM(S): at 13:10

## 2024-03-21 RX ADMIN — Medication 50 MILLILITER(S): at 13:28

## 2024-03-21 RX ADMIN — Medication 5 MILLILITER(S): at 13:11

## 2024-03-21 RX ADMIN — Medication 1 APPLICATION(S): at 05:51

## 2024-03-21 RX ADMIN — Medication 5 MILLIGRAM(S): at 13:09

## 2024-03-21 RX ADMIN — CHLORHEXIDINE GLUCONATE 15 MILLILITER(S): 213 SOLUTION TOPICAL at 17:42

## 2024-03-21 RX ADMIN — EPINEPHRINE 12.7 MICROGRAM(S)/KG/MIN: 0.3 INJECTION INTRAMUSCULAR; SUBCUTANEOUS at 19:54

## 2024-03-21 RX ADMIN — CEFEPIME 100 MILLIGRAM(S): 1 INJECTION, POWDER, FOR SOLUTION INTRAMUSCULAR; INTRAVENOUS at 22:42

## 2024-03-21 RX ADMIN — Medication 50 MILLILITER(S): at 01:06

## 2024-03-21 RX ADMIN — MIDODRINE HYDROCHLORIDE 20 MILLIGRAM(S): 2.5 TABLET ORAL at 13:11

## 2024-03-21 RX ADMIN — TRANEXAMIC ACID 500 MILLIGRAM(S): 100 INJECTION, SOLUTION INTRAVENOUS at 14:27

## 2024-03-21 RX ADMIN — Medication 125 MILLILITER(S): at 18:30

## 2024-03-21 RX ADMIN — VASOPRESSIN 6 UNIT(S)/MIN: 20 INJECTION INTRAVENOUS at 19:50

## 2024-03-21 RX ADMIN — Medication 125 MILLILITER(S): at 17:45

## 2024-03-21 RX ADMIN — DEXMEDETOMIDINE HYDROCHLORIDE IN 0.9% SODIUM CHLORIDE 0.34 MICROGRAM(S)/KG/HR: 4 INJECTION INTRAVENOUS at 19:55

## 2024-03-21 RX ADMIN — Medication 2: at 13:46

## 2024-03-21 RX ADMIN — Medication 125 MILLILITER(S): at 17:30

## 2024-03-21 RX ADMIN — SODIUM CHLORIDE 3 MILLILITER(S): 9 INJECTION INTRAMUSCULAR; INTRAVENOUS; SUBCUTANEOUS at 13:12

## 2024-03-21 RX ADMIN — SERTRALINE 50 MILLIGRAM(S): 25 TABLET, FILM COATED ORAL at 05:25

## 2024-03-21 RX ADMIN — Medication 2: at 17:38

## 2024-03-21 RX ADMIN — Medication 100 MILLIGRAM(S): at 13:11

## 2024-03-21 RX ADMIN — Medication 10.2 MICROGRAM(S)/KG/MIN: at 19:54

## 2024-03-21 RX ADMIN — Medication 100 MILLIGRAM(S): at 21:38

## 2024-03-21 RX ADMIN — MIDODRINE HYDROCHLORIDE 20 MILLIGRAM(S): 2.5 TABLET ORAL at 05:25

## 2024-03-21 NOTE — PROGRESS NOTE ADULT - ASSESSMENT
84 y/o M with ICM/HFrEF (now 30%; LVIDd 6.7 cm; prev req inotrope), CAD c/b IWMI (1994) s/p angioplasty, aortic stenosis s/p bio-AVR 2004, VT arrest (2017) s/p ICD, Afib s/p multiple DCCV and ablation x2 (2020 and 2023; on AC), Aflutter s/p WARREN/DCCV (8/23), prior Ao aneurysm s/p Bentall (2021), severe MR prior MVr (2021) with MAC (precludes M-KRISTIE d/t his anatomy; turned down for TMVR trials by [employer]), atrophic kidney with CKD (b/l Cr 2.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 Patient was initiated on bumex gtt with gradual improvement with Cr improving from 3.6 to 3.1. Patient was considered for MV re-op; however, patient was reluctant of the procedure and expressed wanting to follow up in the outpatient setting to schedule. During last admission, patient was also noted to be in flutter, EP was consulted, and patient underwent repeat WARREN/DCCV , on amiodarone. Patient was discharged home 2/28 on higher dose of diuretics, as per HF team.pt returned with worsening SOB ,edema, but was  alert  chest x-ray compactible l with chf  cant r/o infection         Pt ws treated with empiric meropenem ( diflucan per  [facility])   cultures - so far negative   dose vanco carefully by level in view of renal insufficieny but now on CVVHD  discussed with [facility] suggestive of pulmonary edema  no documented infection   Pt now intubated     completed 7 days of antibiotics - started March 12th- on may 29  Meropenem    GI PCR sent due to diarrhea  not bloody positive for both shigella like E-COLI and E-COLI enteragre ,  pt has not eaten any food and only has received tube feeds.  There are many false positive PCRs for these strains and I would not treat. Treatment is generally controversial

## 2024-03-21 NOTE — PROGRESS NOTE ADULT - NUTRITIONAL ASSESSMENT
This patient has been assessed with a concern for Malnutrition and has been determined to have a diagnosis/diagnoses of Severe protein-calorie malnutrition.    This patient is being managed with:   Diet NPO with Tube Feed-  Tube Feeding Modality: Nasogastric  Vital AF (VITALAFRTH)  Total Volume for 24 Hours (mL): 1200  Continuous  Starting Tube Feed Rate {mL per Hour}: 40  Increase Tube Feed Rate by (mL): 10  Until Goal Tube Feed Rate (mL per Hour): 50  Tube Feed Duration (in Hours): 24  Tube Feed Start Time: 16:40  Banatrol TF     Qty per Day:  3  Supplement Feeding Modality:  Nasogastric  Ensure Enlive Cans or Servings Per Day:  2       Frequency:  Daily  Entered: Mar 20 2024 10:51AM  
This patient has been assessed with a concern for Malnutrition and has been determined to have a diagnosis/diagnoses of Severe protein-calorie malnutrition.    This patient is being managed with:   Diet NPO with Tube Feed-  Tube Feeding Modality: Nasogastric  Vital AF (VITALAFRTH)  Total Volume for 24 Hours (mL): 1200  Continuous  Starting Tube Feed Rate {mL per Hour}: 40  Increase Tube Feed Rate by (mL): 10  Until Goal Tube Feed Rate (mL per Hour): 50  Tube Feed Duration (in Hours): 24  Tube Feed Start Time: 16:40  Banatrol TF     Qty per Day:  3  Supplement Feeding Modality:  Nasogastric  Ensure Enlive Cans or Servings Per Day:  2       Frequency:  Daily  Entered: Mar 20 2024 10:51AM  
This patient has been assessed with a concern for Malnutrition and has been determined to have a diagnosis/diagnoses of Severe protein-calorie malnutrition.    This patient is being managed with:   Diet NPO with Tube Feed-  Tube Feeding Modality: Nasogastric  Vital AF (VITALAFRTH)  Total Volume for 24 Hours (mL): 1200  Continuous  Starting Tube Feed Rate {mL per Hour}: 40  Increase Tube Feed Rate by (mL): 10  Until Goal Tube Feed Rate (mL per Hour): 50  Tube Feed Duration (in Hours): 24  Tube Feed Start Time: 16:40  Supplement Feeding Modality:  Nasogastric  Ensure Enlive Cans or Servings Per Day:  2       Frequency:  Daily  Entered: Mar 18 2024  5:06PM  
This patient has been assessed with a concern for Malnutrition and has been determined to have a diagnosis/diagnoses of Severe protein-calorie malnutrition.    This patient is being managed with:   Diet NPO with Tube Feed-  Tube Feeding Modality: Nasogastric  Nepro with Carb Steady (NEPRORTH)  Total Volume for 24 Hours (mL): 960  Continuous  Starting Tube Feed Rate {mL per Hour}: 10  Until Goal Tube Feed Rate (mL per Hour): 40  Tube Feed Duration (in Hours): 24  Tube Feed Start Time: 16:40  Supplement Feeding Modality:  Nasogastric  Ensure Enlive Cans or Servings Per Day:  2       Frequency:  Daily  Entered: Mar 15 2024  5:00PM  
This patient has been assessed with a concern for Malnutrition and has been determined to have a diagnosis/diagnoses of Severe protein-calorie malnutrition.    This patient is being managed with:   Diet NPO with Tube Feed-  Tube Feeding Modality: Nasogastric  Nepro with Carb Steady (NEPRORTH)  Total Volume for 24 Hours (mL): 960  Continuous  Starting Tube Feed Rate {mL per Hour}: 10  Until Goal Tube Feed Rate (mL per Hour): 40  Tube Feed Duration (in Hours): 24  Tube Feed Start Time: 16:40  Supplement Feeding Modality:  Nasogastric  Ensure Enlive Cans or Servings Per Day:  2       Frequency:  Daily  Entered: Mar 15 2024  5:00PM  
This patient has been assessed with a concern for Malnutrition and has been determined to have a diagnosis/diagnoses of Severe protein-calorie malnutrition.    This patient is being managed with:   Diet NPO with Tube Feed-  Tube Feeding Modality: Nasogastric  Vital AF (VITALAFRTH)  Total Volume for 24 Hours (mL): 1200  Continuous  Starting Tube Feed Rate {mL per Hour}: 40  Increase Tube Feed Rate by (mL): 10  Until Goal Tube Feed Rate (mL per Hour): 50  Tube Feed Duration (in Hours): 24  Tube Feed Start Time: 16:40  Banatrol TF     Qty per Day:  3  Supplement Feeding Modality:  Nasogastric  Ensure Enlive Cans or Servings Per Day:  2       Frequency:  Daily  Entered: Mar 20 2024 10:51AM

## 2024-03-21 NOTE — PROGRESS NOTE ADULT - SUBJECTIVE AND OBJECTIVE BOX
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 Patient was initiated on bumex gtt with gradual improvement with Cr improving from 3.6 to 3.1. Patient was considered for MV re-op; however, patient was reluctant of the procedure and expressed wanting to follow up in the outpatient setting to schedule. During last admission, patient was also noted to be in flutter, EP was consulted, and patient underwent repeat WARREN/DCCV , on amiodarone. Patient was discharged home 2/28 on higher dose of diuretics, as per HF team. Today, patient was evaluated at CTS office four routine follow-up, pt c/o worsening shortness of breath and L>R LE edema, with redness/swelling on left dorsal aspect of the foot, readmitted to CTS service for further management. Denies acute fever, chills, dizziness, headache, chest pain, palpitations, acute shortness of breath, abdominal pain, n/v, (04 Mar 2024 12:18)      Patient seen and examined at the bedside.    Remained critically ill on continuous ICU monitoring.    OBJECTIVE:  Vital Signs Last 24 Hrs  T(C): 36.7 (21 Mar 2024 16:00), Max: 36.7 (21 Mar 2024 16:00)  T(F): 98.1 (21 Mar 2024 16:00), Max: 98.1 (21 Mar 2024 16:00)  HR: 97 (21 Mar 2024 20:00) (80 - 100)  BP: --  BP(mean): --  RR: 28 (21 Mar 2024 19:00) (14 - 29)  SpO2: 98% (21 Mar 2024 20:00) (84% - 100%)    Parameters below as of 21 Mar 2024 16:00  Patient On (Oxygen Delivery Method): ventilator    O2 Concentration (%): 50    Physical Exam:   General: Intubated  Neurology: Sedated  Eyes: bilateral pupils equal and reactive   ENT/Neck: Neck supple, trachea midline, No JVD   Respiratory: Clear bilaterally   CV: S1S2, no murmurs        [x] Pacedrhythm, [x] PPM  Abdominal: Soft, NT, ND +BS  Extremities: 1-2+ pedal edema noted, + peripheral pulses   Skin: No Rashes, Hematoma, Ecchymosis                           Assessment:   82 y/o M with ICM/HFrEF (now 30%; LVIDd 6.7 cm; prev req inotrope), CAD c/b IWMI (1994) s/p angioplasty, aortic stenosis s/p bio-AVR 2004, VT arrest (2017) s/p ICD, Afib s/p multiple DCCV and ablation x2 (2020 and 2023; on AC), Aflutter s/p WARREN/DCCV (8/23), prior Ao aneurysm s/p Bentall (2021), severe MR prior MVr (2021) with MAC (precludes M-KRISTIE d/t his anatomy; turned down for TMVR trials by [employer]), atrophic kidney with CKD (b/l Cr 2.5-3)    Aortic root aneurysm s/p mitral valve repair and sternotomy on 06/02/2021  Tx to ICU for Hypotension/CHF/renal failure  Hemodynamic instability  Hypovolemia  Post op respiratory insufficiency  Acute blood loss anemia  Thrombocytopenia       Plan:   ***Neuro***  Addressed analgesic regimen to optimize function and sedated for ventilatory synchrony.     ***Cardiovascular***  Inotropic support with IV Dobutamine and Epinephrine infusion for acute systolic heart failure/cardiogenic shock/vasogenic shock. IV Levophed and Midodrine infusion for hypovolemic/vasogenic shock. Invasive hemodynamic monitoring with a central venous catheter & an A-line were required for the continuous central venous and MAP/BP monitoring to ensure adequate cardiovascular support. Permanent pacing wires in place. Lipitor was also continued for long term graft patency.    ***Pulmonary***  Respiratory status required full ventilatory support, close monitoring of respiratory rate and breathing pattern, the following of ABG’s with A-line monitoring, continuous pulse oximetry monitoring    Mode: AC/ CMV (Assist Control/ Continuous Mandatory Ventilation)  RR (machine): 22  TV (machine): 600  FiO2: 70  PEEP: 7  ITime: 1  MAP: 6  PIP: 15               ***Heme***  Anemia due to acute blood loss, no current plan for transfusion. Continue to monitor hemoglobin and hematocrit levels        ***GI***  Patient is on tube feeds goal rate of 50 ml/hr. Protonix for stress ulcer prophylaxis. Continue bowel regimen with Miralax and Senna.    ***Renal***  Optimize renal perfusion with adequate volume resuscitation and continued monitoring of urine output, fluid balance, electrolytes, and BUN/Creatinine. Patient with acute kidney injury on chronic kidney disease receiving continuous renal replacement therapy. Continue to maintain fluid balance as tolerated.       ***ID***  Afebrile, white blood count elevated. Continue trending white blood count and monitoring fever curve.     ***Endocrine***  Metabolic stability, stress hyperglycemia/history of diabetes mellitus required review and adjustment of regular Insulin sliding scale and glycemic regimen while following serial glucose levels to help achieve and maintain euglycemia            Patient requires continuous monitoring with bedside rhythm monitoring, pulse oximetry monitoring, and continuous central venous and arterial pressure monitoring; and intermittent blood gas analysis. Care plan discussed with the ICU care team.   Patient remained critical, at risk for life threatening decompensation.    I have spent 30 minutes providing critical care management to this patient.    By signing my name below, I, Alma Blevins, attest that this documentation has been prepared under the direction and in the presence of Valeria Real MD   Electronically signed: Home Chacko, 03-21-24 @ 20:21    I, Valeria Real, personally performed the services described in this documentation. all medical record entries made by the buddyibmarino were at my direction and in my presence. 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 Patient was initiated on bumex gtt with gradual improvement with Cr improving from 3.6 to 3.1. Patient was considered for MV re-op; however, patient was reluctant of the procedure and expressed wanting to follow up in the outpatient setting to schedule. During last admission, patient was also noted to be in flutter, EP was consulted, and patient underwent repeat WARREN/DCCV , on amiodarone. Patient was discharged home 2/28 on higher dose of diuretics, as per HF team. Today, patient was evaluated at CTS office four routine follow-up, pt c/o worsening shortness of breath and L>R LE edema, with redness/swelling on left dorsal aspect of the foot, readmitted to CTS service for further management. Denies acute fever, chills, dizziness, headache, chest pain, palpitations, acute shortness of breath, abdominal pain, n/v, (04 Mar 2024 12:18)      Patient seen and examined at the bedside.    Remained critically ill on continuous ICU monitoring.    OBJECTIVE:  Vital Signs Last 24 Hrs  T(C): 36.7 (21 Mar 2024 16:00), Max: 36.7 (21 Mar 2024 16:00)  T(F): 98.1 (21 Mar 2024 16:00), Max: 98.1 (21 Mar 2024 16:00)  HR: 97 (21 Mar 2024 20:00) (80 - 100)  BP: --  BP(mean): --  RR: 28 (21 Mar 2024 19:00) (14 - 29)  SpO2: 98% (21 Mar 2024 20:00) (84% - 100%)    Parameters below as of 21 Mar 2024 16:00  Patient On (Oxygen Delivery Method): ventilator    O2 Concentration (%): 50    Physical Exam:   General: Thin, elderly man, mild respiratory distress on full ventilator support  Neurology: Awake on low dose sedation, able to follow commands and move all extremities  Eyes: bilateral pupils equal and reactive   ENT/Neck: Neck supple, trachea midline, No JVD   Respiratory: Clear bilaterally   CV: S1S2, no murmurs        [x] Paced rhythm, [x] PPM  Abdominal: Soft, NT, ND +BS  Extremities: trace pedal edema noted, + peripheral pulses   Skin: Fingers and toes cyanotic, purpuric lesions over UE and LE                         Assessment:   82 y/o M with ICM/HFrEF (now 30%; LVIDd 6.7 cm; prev req inotrope), CAD c/b IWMI (1994) s/p angioplasty, aortic stenosis s/p bio-AVR 2004, VT arrest (2017) s/p ICD, Afib s/p multiple DCCV and ablation x2 (2020 and 2023; on AC), Aflutter s/p WARREN/DCCV (8/23), prior Ao aneurysm s/p Bentall (2021), 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    ***Cardiovascular***  Patient continues on inotropic support with high dose IV Dobutamine and Epinephrine infusions for acute on chronic systolic heart failure and now cardiogenic shock. In addition, he is on maximal IV Vasopressin and an escalating dose of IV Levophed due to increased instability today. Previous attempt at IABP unsuccessful due to tortuosity of vessels. Invasive hemodynamic monitoring with a central venous catheter & an A-line were required for the continuous central venous and MAP/BP monitoring to ensure adequate cardiovascular support. Permanent pacing wires in place. Lipitor was also continued for long term graft patency.    ***Pulmonary***  Respiratory status required full ventilatory support, close monitoring of respiratory rate and breathing pattern, the following of ABG’s with A-line monitoring, continuous pulse oximetry monitoring. Hypoxia required increasing Fi02 and added peep this evening.    Mode: AC/ CMV (Assist Control/ Continuous Mandatory Ventilation)  RR (machine): 22  TV (machine): 600  FiO2: 70  PEEP: 7  ITime: 1  MAP: 6  PIP: 15               ***Heme***  Anemia due to acute blood loss. Given shock state, one unit of packed rbc's ordered to be given slowly given ongoing volume overload of right and left ventricles. Continue to monitor hemoglobin and hematocrit levels        ***GI***  Patient is on tube feeds goal rate of 50 ml/hr. Protonix for stress ulcer prophylaxis. Continue bowel regimen with Miralax and Senna.    ***Renal***  Patient with acute anuric renal failure on chronic kidney disease, likely due to ATN, patient is receiving continuous renal replacement therapy with continued monitoring of fluid balance, electrolytes, and BUN/Creatinine. Fluid removal discontinued at this time due to shock and continuing with clearance only.      ***ID***  Afebrile, white blood count elevated. Due to further decompensation this evening and possibility of sepsis as a cause, plan to resume antibiotics this evening. Continue trending white blood count and monitoring fever curve.     ***Endocrine***  Metabolic stability, stress hyperglycemia required review and adjustment of regular Insulin sliding scale and glycemic regimen while following serial glucose levels to help achieve and maintain euglycemia            Patient requires continuous monitoring with bedside rhythm monitoring, pulse oximetry monitoring, and continuous central venous and arterial pressure monitoring; and intermittent blood gas analysis. Care plan discussed with the ICU care team.   Patient remained critical, at risk for life threatening decompensation.    I have spent 45 minutes providing critical care management to this patient.    By signing my name below, I, Alma Blevins, attest that this documentation has been prepared under the direction and in the presence of Valeria Real MD   Electronically signed: Home Chacko, 03-21-24 @ 20:21    IValeria, personally performed the services described in this documentation. all medical record entries made by the scribe were at my direction and in my presence. I have reviewed the chart and agree that the record reflects my personal performance and is accurate and complete  Electronically signed: Valeria Real MD

## 2024-03-21 NOTE — PROGRESS NOTE ADULT - ASSESSMENT
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 Patient was initiated on bumex gtt with gradual improvement with Cr improving from 3.6 to 3.1. Patient was considered for MV re-op; however, patient was reluctant of the procedure and expressed wanting to follow up in the outpatient setting to schedule. During last admission, patient was also noted to be in flutter, EP was consulted, and patient underwent repeat WARREN/DCCV , on amiodarone. Patient was discharged home 2/28 on higher dose of diuretics, as per HF team. Today, patient was evaluated at CTS office four routine follow-up, pt c/o worsening shortness of breath and L>R LE edema, with redness/swelling on left dorsal aspect of the foot, readmitted to CTS service for further management.  noticed with rising creatinine and bun      1- SURESH on ckd   2- decompensated CHF  3- Mitral regurgitation   4- hypotension   5- hypothyroid   6- hyperuricemia   7- anemia      cont dobutamine drip   midodrine 20  mg tid   cont vasopressin drip  /levophed/epinephrine    CRRT with [id number] dialyzer;  dfr 1200   0    cc hr fluid removal  as bp tolerates   see new orders   reatcrit [id number] U twice weekly   allopurinol 100 mg daily   no blood work RUE  strict I/O  d/w CTS team when seen earlier

## 2024-03-21 NOTE — PROGRESS NOTE ADULT - ASSESSMENT
83M with ICM/HFrEF (now 30%; LVIDd 6.7 cm; prev req inotrope), CAD c/b IWMI (1994) s/p angioplasty, aortic stenosis s/p bio-AVR 2004, VT arrest (2017) s/p ICD, Afib s/p multiple DCCV and ablation x2 (2020 and 2023; on AC), Aflutter s/p WARREN/DCCV (8/23), severe MR prior MVr (2021) CKD (b/l Cr 2.5-3), admitted with cardiogenic shock and decompensated heart failure and acute on chronic kidney dysfunction now on CVVHD, with course notable for hypoxic failure requiring intubation. Course now further notable for maroon stools overnight and this AM for which GI is consulted. Of note, pt currently on levo 0.18, epi 0.1, vaso 0.1, and dobutamine 10. No previous EGD/colonoscopy.    Impression  #hematochezia; resolved; suspect possible ischemic colitis; less likely diverticular bleeding vs. malignancy  #anemia, multifactorial; possible component of hematochezia although resolved now; vs. hemolysis given hyperbilirubinemia, elevated LDH, low hapto  - Hgb 10.4 <- 8.8 after 2u pRBC  - rectal 3/20 with brown stools  - no cross-sectional abdominal imaging available    #shock; on levo, vaso, epi, dobutamine  #decompensated heart failure  #renal failure on CVVHD    Recommendations  - given pt in multiorgan failure requiring 3 pressors and inotropic agent, without signs of active bleeding, and stable Hgb ~10, would opt for medical therapy at this time  - agree with empiric abx  - supportive care with pRBC and pressor support as needed  - Consider CT A/P if pain/massive bleeding recurs  - trend H/H; monitor for signs of recurrent bleeding; if persistent with downtrend Hgb, can re-evaluate for possible endoscopic evaluation  - GI will sign off. Please call with questions    Note and recommendations are incomplete until reviewed and attested by attending.    Timmy Hoyos MD  GI/Hepatology Fellow, PGY4  Teams preferred (7AM to 5PM); after 5PM, call GI fellow on call    NEW CONSULTS:  Please email [email or web address]

## 2024-03-21 NOTE — PROGRESS NOTE ADULT - SUBJECTIVE AND OBJECTIVE BOX
Chief Complaint:  Patient is a 83y old  Male who presents with a chief complaint of shortness of breath (21 Mar 2024 08:34)      Interval Events:   3BMs overnight (unsure color); on vaso/levo/epi/    Allergies:  No Known Allergies        Hospital Medications:  allopurinol 100 milliGRAM(s) Oral daily  aMIOdarone    Tablet 200 milliGRAM(s) Oral daily  ascorbic acid 500 milliGRAM(s) Oral daily  atorvastatin 40 milliGRAM(s) Oral at bedtime  Biotene Dry Mouth Oral Rinse 5 milliLiter(s) Swish and Spit daily  chlorhexidine 0.12% Liquid 15 milliLiter(s) Oral Mucosa every 12 hours  chlorhexidine 2% Cloths 1 Application(s) Topical <User Schedule>  CRRT Treatment    <Continuous>  cyanocobalamin 1000 MICROGram(s) Oral daily  dexMEDEtomidine Infusion 0.02 MICROgram(s)/kG/Hr IV Continuous <Continuous>  dextrose 50% Injectable 25 Gram(s) IV Push once  DOBUTamine Infusion 10 MICROgram(s)/kG/Min IV Continuous <Continuous>  EPINEPHrine    Infusion 0.1 MICROgram(s)/kG/Min IV Continuous <Continuous>  epoetin zara-epbx (RETACRIT) Injectable 90460 Unit(s) IV Push <User Schedule>  etomidate Injectable 40 milliGRAM(s) IV Push once  folic acid 1 milliGRAM(s) Oral daily  insulin lispro (ADMELOG) corrective regimen sliding scale   SubCutaneous every 6 hours  levothyroxine Injectable 37.5 MICROGram(s) IV Push at bedtime  lidocaine   4% Patch 1 Patch Transdermal daily  melatonin 5 milliGRAM(s) Oral at bedtime  metoclopramide Injectable 5 milliGRAM(s) IV Push every 8 hours  midodrine 20 milliGRAM(s) Oral every 8 hours  norepinephrine Infusion 0.06 MICROgram(s)/kG/Min IV Continuous <Continuous>  pantoprazole  Injectable 40 milliGRAM(s) IV Push every 12 hours  petrolatum white Ointment 1 Application(s) Topical two times a day  polyethylene glycol 3350 17 Gram(s) Oral daily  PrismaSATE Dialysate BGK 4 / 2.5 5000 milliLiter(s) CRRT <Continuous>  PrismaSOL Filtration BGK 4 / 2.5 5000 milliLiter(s) CRRT <Continuous>  PrismaSOL Filtration BGK 4 / 2.5 5000 milliLiter(s) CRRT <Continuous>  rocuronium Injectable 50 milliGRAM(s) IV Push once  senna 2 Tablet(s) Oral at bedtime  sertraline 50 milliGRAM(s) Oral daily  sodium chloride 0.9% lock flush 3 milliLiter(s) IV Push every 8 hours  sodium chloride 3%  Inhalation 4 milliLiter(s) Inhalation every 12 hours  thiamine 100 milliGRAM(s) Oral daily  tranexamic acid Injectable for Nebulization 500 milliGRAM(s) Inhalation every 8 hours  vasopressin Infusion 0.04 Unit(s)/Min IV Continuous <Continuous>      PMHX/PSHX:  Aortic stenosis    AICD (automatic cardioverter/defibrillator) present    Aortic valve regurgitation    Ascending aorta dilation    H/O paroxysmal supraventricular tachycardia    HLD (hyperlipidemia)    Mitral valve regurgitation    S/P ablation of atrial fibrillation    Cardiac arrest    Severe mitral regurgitation    S/P aortic valve replacement    S/P hernia repair    History of tonsillectomy and adenoidectomy    S/P TURP    S/P colonoscopy    S/P endoscopy    S/P cardiac cath    AICD (automatic cardioverter/defibrillator) present    Cardiac pacemaker    History of cardioversion    S/P ablation of atrial fibrillation    Status post ablation of ventricular arrhythmia        Family history:      PHYSICAL EXAM:   Vital Signs:  Vital Signs Last 24 Hrs  T(C): 36.4 (21 Mar 2024 08:00), Max: 37.7 (20 Mar 2024 12:00)  T(F): 97.5 (21 Mar 2024 08:00), Max: 99.9 (20 Mar 2024 12:00)  HR: 98 (21 Mar 2024 08:45) (80 - 100)  BP: --  BP(mean): --  RR: 19 (21 Mar 2024 08:45) (14 - 30)  SpO2: 100% (21 Mar 2024 08:45) (91% - 100%)    Parameters below as of 21 Mar 2024 08:00  Patient On (Oxygen Delivery Method): ventilator    O2 Concentration (%): 50  Daily     Daily Weight in k.1 (21 Mar 2024 00:00)    GENERAL: critically ill  NEURO: opens eyes  HEENT: anicteric sclera, no conjunctival pallor appreciated  CHEST: intubated, mechanical breath sounds  CARDIAC: regular rate, +S1/S2  ABDOMEN: soft, nondistended, nontender, no rebound or guarding  EXTREMITIES: warm, well perfused  SKIN: no lesions noted    LABS:                        9.4    20.18 )-----------( 88       ( 21 Mar 2024 00:22 )             28.5     Mean Cell Volume: 92.8 fl (- @ 00:22)        135  |  98  |  31<H>  ----------------------------<  155<H>  4.4   |  22  |  0.94    Ca    9.5      21 Mar 2024 00:22  Phos  2.0       Mg     2.8         TPro  6.9  /  Alb  4.9  /  TBili  9.8<H>  /  DBili  x   /  AST  217<H>  /  ALT  58<H>  /  AlkPhos  182<H>      LIVER FUNCTIONS - ( 21 Mar 2024 00:22 )  Alb: 4.9 g/dL / Pro: 6.9 g/dL / ALK PHOS: 182 U/L / ALT: 58 U/L / AST: 217 U/L / GGT: x             Urinalysis Basic - ( 21 Mar 2024 00:22 )    Color: x / Appearance: x / SG: x / pH: x  Gluc: 155 mg/dL / Ketone: x  / Bili: x / Urobili: x   Blood: x / Protein: x / Nitrite: x   Leuk Esterase: x / RBC: x / WBC x   Sq Epi: x / Non Sq Epi: x / Bacteria: x                              9.4    20.18 )-----------( 88       ( 21 Mar 2024 00:22 )             28.5                         10.4   20.93 )-----------( 93       ( 20 Mar 2024 00:51 )             32.0                         10.6   19.18 )-----------( 76       ( 19 Mar 2024 00:26 )             32.2

## 2024-03-21 NOTE — PROGRESS NOTE ADULT - SUBJECTIVE AND OBJECTIVE BOX
infectious diseases progress note:    Patient is a 83y old  Male who presents with a chief complaint of shortness of breath (21 Mar 2024 07:50)        Heart failure             Allergies    No Known Allergies    Intolerances        ANTIBIOTICS/RELEVANT:  antimicrobials    immunologic:  epoetin zara-epbx (RETACRIT) Injectable 67664 Unit(s) IV Push <User Schedule>    OTHER:  allopurinol 100 milliGRAM(s) Oral daily  aMIOdarone    Tablet 200 milliGRAM(s) Oral daily  ascorbic acid 500 milliGRAM(s) Oral daily  atorvastatin 40 milliGRAM(s) Oral at bedtime  Biotene Dry Mouth Oral Rinse 5 milliLiter(s) Swish and Spit daily  chlorhexidine 0.12% Liquid 15 milliLiter(s) Oral Mucosa every 12 hours  chlorhexidine 2% Cloths 1 Application(s) Topical <User Schedule>  CRRT Treatment    <Continuous>  cyanocobalamin 1000 MICROGram(s) Oral daily  dexMEDEtomidine Infusion 0.02 MICROgram(s)/kG/Hr IV Continuous <Continuous>  dextrose 50% Injectable 25 Gram(s) IV Push once  DOBUTamine Infusion 10 MICROgram(s)/kG/Min IV Continuous <Continuous>  EPINEPHrine    Infusion 0.1 MICROgram(s)/kG/Min IV Continuous <Continuous>  etomidate Injectable 40 milliGRAM(s) IV Push once  folic acid 1 milliGRAM(s) Oral daily  insulin lispro (ADMELOG) corrective regimen sliding scale   SubCutaneous every 6 hours  levothyroxine Injectable 37.5 MICROGram(s) IV Push at bedtime  lidocaine   4% Patch 1 Patch Transdermal daily  melatonin 5 milliGRAM(s) Oral at bedtime  metoclopramide Injectable 5 milliGRAM(s) IV Push every 8 hours  midodrine 20 milliGRAM(s) Oral every 8 hours  norepinephrine Infusion 0.06 MICROgram(s)/kG/Min IV Continuous <Continuous>  pantoprazole  Injectable 40 milliGRAM(s) IV Push every 12 hours  petrolatum white Ointment 1 Application(s) Topical two times a day  polyethylene glycol 3350 17 Gram(s) Oral daily  PrismaSATE Dialysate BGK 4 / 2.5 5000 milliLiter(s) CRRT <Continuous>  PrismaSOL Filtration BGK 4 / 2.5 5000 milliLiter(s) CRRT <Continuous>  PrismaSOL Filtration BGK 4 / 2.5 5000 milliLiter(s) CRRT <Continuous>  rocuronium Injectable 50 milliGRAM(s) IV Push once  senna 2 Tablet(s) Oral at bedtime  sertraline 50 milliGRAM(s) Oral daily  sodium chloride 0.9% lock flush 3 milliLiter(s) IV Push every 8 hours  sodium chloride 3%  Inhalation 4 milliLiter(s) Inhalation every 12 hours  thiamine 100 milliGRAM(s) Oral daily  tranexamic acid Injectable for Nebulization 500 milliGRAM(s) Inhalation every 8 hours  vasopressin Infusion 0.04 Unit(s)/Min IV Continuous <Continuous>      Objective:  Vital Signs Last 24 Hrs  T(C): 36.4 (21 Mar 2024 08:00), Max: 37.7 (20 Mar 2024 12:00)  T(F): 97.5 (21 Mar 2024 08:00), Max: 99.9 (20 Mar 2024 12:00)  HR: 100 (21 Mar 2024 08:00) (80 - 100)  BP: --  BP(mean): --  RR: 19 (21 Mar 2024 08:00) (14 - 30)  SpO2: 100% (21 Mar 2024 08:00) (91% - 100%)    Parameters below as of 21 Mar 2024 08:00  Patient On (Oxygen Delivery Method): ventilator    O2 Concentration (%): 50       Eyes:JESUS, EOMI  Ear/Nose/Throat: no oral lesion, no sinus tenderness on percussion	  Neck:no JVD, no lymphadenopathy, supple  Respiratory: CTA sumi  Cardiovascular: S1S2 RRR, no murmurs  Gastrointestinal:soft, (+) BS, no HSM  Extremities:no e/e/c        LABS:                        9.4    20.18 )-----------( 88       ( 21 Mar 2024 00:22 )             28.5     03-21    135  |  98  |  31<H>  ----------------------------<  155<H>  4.4   |  22  |  0.94    Ca    9.5      21 Mar 2024 00:22  Phos  2.0     03-21  Mg     2.8     03-21    TPro  6.9  /  Alb  4.9  /  TBili  9.8<H>  /  DBili  x   /  AST  217<H>  /  ALT  58<H>  /  AlkPhos  182<H>  03-21      Urinalysis Basic - ( 21 Mar 2024 00:22 )    Color: x / Appearance: x / SG: x / pH: x  Gluc: 155 mg/dL / Ketone: x  / Bili: x / Urobili: x   Blood: x / Protein: x / Nitrite: x   Leuk Esterase: x / RBC: x / WBC x   Sq Epi: x / Non Sq Epi: x / Bacteria: x          MICROBIOLOGY:    RECENT CULTURES:  03-20 @ 17:55 .Stool Feces                Testing in progress    03-19 @ 11:08 .Blood Blood-Peripheral                No growth at 24 hours          RESPIRATORY CULTURES:      Clostridium difficile [facility] Toxins A&amp;B, EIA:   Negative (03-19 @ 23:56)          RADIOLOGY & ADDITIONAL STUDIES:        Pager [phone]  After 5 pm/weekends or if no response :[phone]

## 2024-03-21 NOTE — PROGRESS NOTE ADULT - SUBJECTIVE AND OBJECTIVE BOX
CRITICAL CARE ATTENDING - CTICU    MEDICATIONS  (STANDING):  allopurinol 100 milliGRAM(s) Oral daily  aMIOdarone    Tablet 200 milliGRAM(s) Oral daily  ascorbic acid 500 milliGRAM(s) Oral daily  atorvastatin 40 milliGRAM(s) Oral at bedtime  Biotene Dry Mouth Oral Rinse 5 milliLiter(s) Swish and Spit daily  chlorhexidine 0.12% Liquid 15 milliLiter(s) Oral Mucosa every 12 hours  chlorhexidine 2% Cloths 1 Application(s) Topical <User Schedule>  CRRT Treatment    <Continuous>  cyanocobalamin 1000 MICROGram(s) Oral daily  dexMEDEtomidine Infusion 0.02 MICROgram(s)/kG/Hr (0.34 mL/Hr) IV Continuous <Continuous>  dextrose 50% Injectable 25 Gram(s) IV Push once  DOBUTamine Infusion 10 MICROgram(s)/kG/Min (10.2 mL/Hr) IV Continuous <Continuous>  EPINEPHrine    Infusion 0.1 MICROgram(s)/kG/Min (12.7 mL/Hr) IV Continuous <Continuous>  epoetin zara-epbx (RETACRIT) Injectable 95069 Unit(s) IV Push <User Schedule>  etomidate Injectable 40 milliGRAM(s) IV Push once  folic acid 1 milliGRAM(s) Oral daily  insulin lispro (ADMELOG) corrective regimen sliding scale   SubCutaneous every 6 hours  levothyroxine Injectable 37.5 MICROGram(s) IV Push at bedtime  lidocaine   4% Patch 1 Patch Transdermal daily  melatonin 5 milliGRAM(s) Oral at bedtime  metoclopramide Injectable 5 milliGRAM(s) IV Push every 8 hours  midodrine 20 milliGRAM(s) Oral every 8 hours  norepinephrine Infusion 0.06 MICROgram(s)/kG/Min (3.81 mL/Hr) IV Continuous <Continuous>  pantoprazole  Injectable 40 milliGRAM(s) IV Push every 12 hours  petrolatum white Ointment 1 Application(s) Topical two times a day  polyethylene glycol 3350 17 Gram(s) Oral daily  PrismaSATE Dialysate BGK 4 / 2.5 5000 milliLiter(s) (1200 mL/Hr) CRRT <Continuous>  PrismaSOL Filtration BGK 4 / 2.5 5000 milliLiter(s) (600 mL/Hr) CRRT <Continuous>  PrismaSOL Filtration BGK 4 / 2.5 5000 milliLiter(s) (400 mL/Hr) CRRT <Continuous>  rocuronium Injectable 50 milliGRAM(s) IV Push once  senna 2 Tablet(s) Oral at bedtime  sertraline 50 milliGRAM(s) Oral daily  sodium chloride 0.9% lock flush 3 milliLiter(s) IV Push every 8 hours  sodium chloride 3%  Inhalation 4 milliLiter(s) Inhalation every 12 hours  thiamine 100 milliGRAM(s) Oral daily  tranexamic acid Injectable for Nebulization 500 milliGRAM(s) Inhalation every 8 hours  vasopressin Infusion 0.04 Unit(s)/Min (6 mL/Hr) IV Continuous <Continuous>                                    9.4    20.18 )-----------( 88       ( 21 Mar 2024 00:22 )             28.5           135  |  98  |  31<H>  ----------------------------<  155<H>  4.4   |  22  |  0.94    Ca    9.5      21 Mar 2024 00:22  Phos  2.0       Mg     2.8         TPro  6.9  /  Alb  4.9  /  TBili  9.8<H>  /  DBili  x   /  AST  217<H>  /  ALT  58<H>  /  AlkPhos  182<H>            Mode: CPAP with PS  FiO2: 50  PEEP: 5  PS: 10  MAP: 10  PIP: 17      Daily     Daily Weight in k.1 (21 Mar 2024 00:00)      03-20 @ 07:01  -   @ 07:00  --------------------------------------------------------  IN: 4238.4 mL / OUT: 3149 mL / NET: 1089.4 mL        Critically Ill patient  : [ x] preoperative - MVR   [ ] post operative,   [ x] non operative ?    Requires :  [x] Arterial Line   [x] Central Line  [ ] PA catheter  [ ] IABP  [ ] ECMO  [ ] LVAD  [x ] Ventilator  [x ] pacemaker- PPM [ ] Impella  [x] CVVHD  [ ] SU                      [x ] ABG's     [ x] Pulse Oxymetry Monitoring  Bedside evaluation , monitoring , treatment of hemodynamics , fluids , IVP/ IVCD meds.        Diagnosis:     3/5 - Tx to ICU for Hypotension/CHF/renal failure    Pre Op Evaluation / Optimization - Re Op MVR     3/13 - R. fem IABP placement failed (tortuous anatomy)    h/o AVR / MVR     Requires  [x ]DDD  [ ]VVI    [ ]AII  temporary pacing at      to maintain HR, MAP, CI, and perfusion.     Cardiogenic Shock     Hypotension     Mitral Regurgitation     Respiratory Failure    Pulmonary Hypertension 2 to biventricular cardiac dysfunction     Requires chest PT, pulmonary toilet, ambu bagging, suctioning to maintain SaO2,  patent airway and treat atelectasis.     Difficult weaning process - multiple organ system involvement in critically ill patient     Ventilator Management:  [ x]AC-rest    [x ]CPAP-PS Wean    [ ]Trach Collar     [ ]Extubate    [ ] T-Piece  [ ]peep>5     IVCD Precedex/Propofol- vent synchrony / weaning     Paced Rhythm     AICD/PPM    CHF- acute [x]   chronic [x]    systolic [x]   diatolic [ ]    Valvular  [x]           - Echo- EF -   30's  /  MR       [x ] RV dysfunction          - Cxr-cardiomegally, edema          - Clinical-  [x]inotropes   [x]pressors   [x ]diuresis   [ ]IABP   [ ]ECMO   [ ]LVAD   [x ] Respiratory Failure [x] CVVHD      Hemodynamic lability,  instability. Requires IVCD [x] vasopressors + midodrine [x] inotropes  [ x]  CVVHD   [ ]IVSS fluid / Blood Products to maintain MAP, perfusion, C.I.     Hyperglycemia - Insulin Sliding Scale     Synthroid for Hypothyroidism    Thrombocytopenia ? DIC ?     Renal Failure - Acute Kidney Injury                                                    Metabolic Acidosis     CVVHD - negative balance    Chronic Renal failure    Renal Failure - Acute Kidney Injury     Cardiac Cachexia - NG - goal rate feeds    Tolerates NG feeds at    [ ] trophic rate    [x ]  50cc/hr     rate     Requires bedside physical therapy, mobilization and total [facility] care.     Strongly consider sepsis - worsening CXR / advancing pressor requirements              I, Ildefonso Bañuelos, personally performed the services described in this documentation. All medical record entries made by the scribe were at my direction and in my presence. I have reviewed the chart and agree that the record reflects my personal performance and is accurate and complete.   Ildefonso Bañuelos MD.       By signing my name below, I, Mahir Camarena, attest that this documentation has been prepared under the direction and in the presence of Ildefonso Bañuelos MD.   Electronically Signed: Home Akins 24 @ 07:50        Discussed with CT surgeon, Physician Assistant - Nurse Practitioner- Critical care medicine team.   Dicussed at  AM / PM rounds.   Chart, labs , films reviewed.    Cumulative Critical Care Time Given Today:  CRITICAL CARE ATTENDING - CTICU    MEDICATIONS  (STANDING):  allopurinol 100 milliGRAM(s) Oral daily  aMIOdarone    Tablet 200 milliGRAM(s) Oral daily  ascorbic acid 500 milliGRAM(s) Oral daily  atorvastatin 40 milliGRAM(s) Oral at bedtime  Biotene Dry Mouth Oral Rinse 5 milliLiter(s) Swish and Spit daily  chlorhexidine 0.12% Liquid 15 milliLiter(s) Oral Mucosa every 12 hours  chlorhexidine 2% Cloths 1 Application(s) Topical <User Schedule>  CRRT Treatment    <Continuous>  cyanocobalamin 1000 MICROGram(s) Oral daily  dexMEDEtomidine Infusion 0.02 MICROgram(s)/kG/Hr (0.34 mL/Hr) IV Continuous <Continuous>  dextrose 50% Injectable 25 Gram(s) IV Push once  DOBUTamine Infusion 10 MICROgram(s)/kG/Min (10.2 mL/Hr) IV Continuous <Continuous>  EPINEPHrine    Infusion 0.1 MICROgram(s)/kG/Min (12.7 mL/Hr) IV Continuous <Continuous>  epoetin zara-epbx (RETACRIT) Injectable 91113 Unit(s) IV Push <User Schedule>  etomidate Injectable 40 milliGRAM(s) IV Push once  folic acid 1 milliGRAM(s) Oral daily  insulin lispro (ADMELOG) corrective regimen sliding scale   SubCutaneous every 6 hours  levothyroxine Injectable 37.5 MICROGram(s) IV Push at bedtime  lidocaine   4% Patch 1 Patch Transdermal daily  melatonin 5 milliGRAM(s) Oral at bedtime  metoclopramide Injectable 5 milliGRAM(s) IV Push every 8 hours  midodrine 20 milliGRAM(s) Oral every 8 hours  norepinephrine Infusion 0.06 MICROgram(s)/kG/Min (3.81 mL/Hr) IV Continuous <Continuous>  pantoprazole  Injectable 40 milliGRAM(s) IV Push every 12 hours  petrolatum white Ointment 1 Application(s) Topical two times a day  polyethylene glycol 3350 17 Gram(s) Oral daily  PrismaSATE Dialysate BGK 4 / 2.5 5000 milliLiter(s) (1200 mL/Hr) CRRT <Continuous>  PrismaSOL Filtration BGK 4 / 2.5 5000 milliLiter(s) (600 mL/Hr) CRRT <Continuous>  PrismaSOL Filtration BGK 4 / 2.5 5000 milliLiter(s) (400 mL/Hr) CRRT <Continuous>  rocuronium Injectable 50 milliGRAM(s) IV Push once  senna 2 Tablet(s) Oral at bedtime  sertraline 50 milliGRAM(s) Oral daily  sodium chloride 0.9% lock flush 3 milliLiter(s) IV Push every 8 hours  sodium chloride 3%  Inhalation 4 milliLiter(s) Inhalation every 12 hours  thiamine 100 milliGRAM(s) Oral daily  tranexamic acid Injectable for Nebulization 500 milliGRAM(s) Inhalation every 8 hours  vasopressin Infusion 0.04 Unit(s)/Min (6 mL/Hr) IV Continuous <Continuous>                                    9.4    20.18 )-----------( 88       ( 21 Mar 2024 00:22 )             28.5           135  |  98  |  31<H>  ----------------------------<  155<H>  4.4   |  22  |  0.94    Ca    9.5      21 Mar 2024 00:22  Phos  2.0       Mg     2.8         TPro  6.9  /  Alb  4.9  /  TBili  9.8<H>  /  DBili  x   /  AST  217<H>  /  ALT  58<H>  /  AlkPhos  182<H>            Mode: CPAP with PS  FiO2: 50  PEEP: 5  PS: 10  MAP: 10  PIP: 17      Daily     Daily Weight in k.1 (21 Mar 2024 00:00)      03-20 @ 07:01  -   @ 07:00  --------------------------------------------------------  IN: 4238.4 mL / OUT: 3149 mL / NET: 1089.4 mL        Critically Ill patient  : [ x] preoperative - MVR   [ ] post operative,   [ x] non operative ?    Requires :  [x] Arterial Line   [x] Central Line  [ ] PA catheter  [ ] IABP  [ ] ECMO  [ ] LVAD  [x ] Ventilator  [x ] pacemaker- PPM [ ] Impella  [x] CVVHD  [ ] SU                      [x ] ABG's     [ x] Pulse Oxymetry Monitoring  Bedside evaluation , monitoring , treatment of hemodynamics , fluids , IVP/ IVCD meds.        Diagnosis:     3/5 - Tx to ICU for Hypotension/CHF/renal failure    Pre Op Evaluation / Optimization - Re Op MVR     3/13 - R. fem IABP placement failed (tortuous anatomy)    h/o AVR / MVR     Requires  [x ]DDD  [ ]VVI    [ ]AII  temporary pacing at  70 - 80 min     to maintain HR, MAP, CI, and perfusion.     Cardiogenic Shock     Hypotension     Mitral Regurgitation     Respiratory Failure    Pulmonary Hypertension 2 to biventricular cardiac dysfunction     Requires chest PT, pulmonary toilet, ambu bagging, suctioning to maintain SaO2,  patent airway and treat atelectasis.     Difficult weaning process - multiple organ system involvement in critically ill patient     Ventilator Management:  [ x]AC-rest    [x ]CPAP-PS Wean    [ ]Trach Collar     [ ]Extubate    [ ] T-Piece  [ ]peep>5     IVCD Precedex/Propofol- vent synchrony / weaning     Paced Rhythm     AICD/PPM    CHF- acute [x]   chronic [x]    systolic [x]   diatolic [ ]    Valvular  [x]           - Echo- EF -   30's  /  MR       [x ] RV dysfunction          - Cxr-cardiomegally, edema          - Clinical-  [x]inotropes   [x]pressors   [x ]diuresis   [ ]IABP   [ ]ECMO   [ ]LVAD   [x ] Respiratory Failure [x] CVVHD      Hemodynamic lability,  instability. Requires IVCD [x] vasopressors + midodrine [x] inotropes  [ x]  CVVHD   [ ]IVSS fluid / Blood Products to maintain MAP, perfusion, C.I.     Hyperglycemia - Insulin Sliding Scale     Synthroid for Hypothyroidism    Thrombocytopenia ? DIC ?     Renal Failure - Acute Kidney Injury                                                    Metabolic Acidosis     CVVHD - even balance today     Chronic Renal failure    Renal Failure - Acute Kidney Injury     Cardiac Cachexia - NG - goal rate feeds    Tolerates NG feeds at    [ ] trophic rate    [x ]  50cc/hr     rate     Requires bedside physical therapy, mobilization and total long-term care.     Strongly consider sepsis - worsening CXR / advancing pressor requirements              I, Ildefonso Bañuelos, personally performed the services described in this documentation. All medical record entries made by the scribe were at my direction and in my presence. I have reviewed the chart and agree that the record reflects my personal performance and is accurate and complete.   Ildefonso Bañuelos MD.       By signing my name below, I, Evelin Camarena, attest that this documentation has been prepared under the direction and in the presence of Ildefonso Bañuelos MD.   Electronically Signed: Home Akins 24 @ 07:50        Discussed with CT surgeon, Physician Assistant - Nurse Practitioner- Critical care medicine team.   Dicussed at  AM / PM rounds.   Chart, labs , films reviewed.    Cumulative Critical Care Time Given Today:  105min

## 2024-03-21 NOTE — PROGRESS NOTE ADULT - SUBJECTIVE AND OBJECTIVE BOX
[facility]   [phone]  RENAL FOLLOW UP NOTE  --------------------------------------------------------------------------------  Chief Complaint:    24 hour events/subjective:    seen earlier   on CRRT  on pressors     PAST HISTORY  --------------------------------------------------------------------------------  No significant changes to PMH, PSH, FHx, SHx, unless otherwise noted    ALLERGIES & MEDICATIONS  --------------------------------------------------------------------------------  Allergies    No Known Allergies    Intolerances      Standing Inpatient Medications  albumin human  5% IVPB 250 milliLiter(s) IV Intermittent once  albumin human  5% IVPB 250 milliLiter(s) IV Intermittent once  albumin human  5% IVPB 250 milliLiter(s) IV Intermittent once  allopurinol 100 milliGRAM(s) Oral daily  aMIOdarone    Tablet 200 milliGRAM(s) Oral daily  ascorbic acid 500 milliGRAM(s) Oral daily  atorvastatin 40 milliGRAM(s) Oral at bedtime  Biotene Dry Mouth Oral Rinse 5 milliLiter(s) Swish and Spit daily  cefepime   IVPB      chlorhexidine 0.12% Liquid 15 milliLiter(s) Oral Mucosa every 12 hours  chlorhexidine 2% Cloths 1 Application(s) Topical <User Schedule>  CRRT Treatment    <Continuous>  cyanocobalamin 1000 MICROGram(s) Oral daily  dexMEDEtomidine Infusion 0.02 MICROgram(s)/kG/Hr IV Continuous <Continuous>  dextrose 50% Injectable 25 Gram(s) IV Push once  DOBUTamine Infusion 10 MICROgram(s)/kG/Min IV Continuous <Continuous>  EPINEPHrine    Infusion 0.1 MICROgram(s)/kG/Min IV Continuous <Continuous>  epoetin zara-epbx (RETACRIT) Injectable 77979 Unit(s) IV Push <User Schedule>  etomidate Injectable 40 milliGRAM(s) IV Push once  folic acid 1 milliGRAM(s) Oral daily  insulin lispro (ADMELOG) corrective regimen sliding scale   SubCutaneous every 6 hours  levothyroxine Injectable 37.5 MICROGram(s) IV Push at bedtime  lidocaine   4% Patch 1 Patch Transdermal daily  melatonin 5 milliGRAM(s) Oral at bedtime  metoclopramide Injectable 5 milliGRAM(s) IV Push every 8 hours  midodrine 20 milliGRAM(s) Oral every 8 hours  norepinephrine Infusion 0.06 MICROgram(s)/kG/Min IV Continuous <Continuous>  pantoprazole  Injectable 40 milliGRAM(s) IV Push every 12 hours  petrolatum white Ointment 1 Application(s) Topical two times a day  polyethylene glycol 3350 17 Gram(s) Oral daily  PrismaSATE Dialysate BGK 4 / 2.5 5000 milliLiter(s) CRRT <Continuous>  PrismaSOL Filtration BGK 4 / 2.5 5000 milliLiter(s) CRRT <Continuous>  PrismaSOL Filtration BGK 4 / 2.5 5000 milliLiter(s) CRRT <Continuous>  rocuronium Injectable 50 milliGRAM(s) IV Push once  senna 2 Tablet(s) Oral at bedtime  sertraline 50 milliGRAM(s) Oral daily  sodium chloride 0.9% lock flush 3 milliLiter(s) IV Push every 8 hours  thiamine 100 milliGRAM(s) Oral daily  vancomycin  IVPB      vasopressin Infusion 0.04 Unit(s)/Min IV Continuous <Continuous>    PRN Inpatient Medications      REVIEW OF SYSTEMS  --------------------------------------------------------------------------------      VITALS/PHYSICAL EXAM  --------------------------------------------------------------------------------  T(C): 36.7 (03-21-24 @ 16:00), Max: 36.7 (03-21-24 @ 16:00)  HR: 97 (03-21-24 @ 20:00) (80 - 100)  BP: --  RR: 28 (03-21-24 @ 19:00) (14 - 29)  SpO2: 98% (03-21-24 @ 20:00) (84% - 100%)  Wt(kg): --        03-20-24 @ 07:01  -  03-21-24 @ 07:00  --------------------------------------------------------  IN: 4238.4 mL / OUT: 3253 mL / NET: 985.4 mL    03-21-24 @ 07:01  -  03-21-24 @ 20:59  --------------------------------------------------------  IN: 1924.1 mL / OUT: 1346 mL / NET: 578.1 mL      Physical Exam:  	  Gen:  intubated + IJ cath   	Pulm: decrease bs + coarse bs   	CV: RRR, S1S2; no rub  	Abd: +BS, soft, nontender/nondistended  	: No suprapubic tenderness  	UE: Warm,  +  cyanosis  no clubbing,  no edema  	LE: Warm,  +  cyanosis  no clubbing,  no   edema    LABS/STUDIES  --------------------------------------------------------------------------------              9.4    20.18 >-----------<  88       [03-21-24 @ 00:22]              28.5     135  |  98  |  31  ----------------------------<  155      [03-21-24 @ 00:22]  4.4   |  22  |  0.94        Ca     9.5     [03-21-24 @ 00:22]      Mg     2.8     [03-21-24 @ 00:22]      Phos  2.0     [03-21-24 @ 00:22]    TPro  6.9  /  Alb  4.9  /  TBili  9.8  /  DBili  x   /  AST  217  /  ALT  58  /  AlkPhos  182  [03-21-24 @ 00:22]              [03-20-24 @ 05:57]    Creatinine Trend:  SCr 0.94 [03-21 @ 00:22]  SCr 0.85 [03-20 @ 00:52]  SCr 0.81 [03-19 @ 00:25]  SCr 0.87 [03-18 @ 13:26]  SCr 1.03 [03-18 @ 00:26]      Iron 290, TIBC 430, %sat 67      [03-06-24 @ 13:57]  Ferritin 335      [03-06-24 @ 13:57]  PTH -- (Ca 8.9)      [03-05-24 @ 13:03]   242  TSH 2.02      [03-06-24 @ 13:57]

## 2024-03-21 NOTE — PROGRESS NOTE ADULT - ATTENDING COMMENTS
Agree with above. H/H stable, no preeti hematochezia reported. Would hold off on endoscopic evaluation given critical illness. Avoid further hypotension, continue supportive care.

## 2024-03-22 DIAGNOSIS — S09.93XA UNSPECIFIED INJURY OF FACE, INITIAL ENCOUNTER: ICD-10-CM

## 2024-03-22 LAB
ALBUMIN SERPL ELPH-MCNC: 4.4 G/DL — SIGNIFICANT CHANGE UP (ref 3.3–5)
ALBUMIN SERPL ELPH-MCNC: 4.8 G/DL — SIGNIFICANT CHANGE UP (ref 3.3–5)
ALP SERPL-CCNC: 221 U/L — HIGH (ref 40–120)
ALP SERPL-CCNC: 230 U/L — HIGH (ref 40–120)
ALT FLD-CCNC: 85 U/L — HIGH (ref 10–45)
ALT FLD-CCNC: 91 U/L — HIGH (ref 10–45)
ANION GAP SERPL CALC-SCNC: 12 MMOL/L — SIGNIFICANT CHANGE UP (ref 5–17)
ANION GAP SERPL CALC-SCNC: 14 MMOL/L — SIGNIFICANT CHANGE UP (ref 5–17)
AST SERPL-CCNC: 281 U/L — HIGH (ref 10–40)
AST SERPL-CCNC: 291 U/L — HIGH (ref 10–40)
BASE EXCESS BLDV CALC-SCNC: -1.7 MMOL/L — SIGNIFICANT CHANGE UP (ref -2–3)
BASE EXCESS BLDV CALC-SCNC: -1.8 MMOL/L — SIGNIFICANT CHANGE UP (ref -2–3)
BILIRUB SERPL-MCNC: 13.4 MG/DL — HIGH (ref 0.2–1.2)
BILIRUB SERPL-MCNC: 14.7 MG/DL — HIGH (ref 0.2–1.2)
BUN SERPL-MCNC: 33 MG/DL — HIGH (ref 7–23)
BUN SERPL-MCNC: 33 MG/DL — HIGH (ref 7–23)
CA-I SERPL-SCNC: 1.18 MMOL/L — SIGNIFICANT CHANGE UP (ref 1.15–1.33)
CA-I SERPL-SCNC: 1.48 MMOL/L — HIGH (ref 1.15–1.33)
CALCIUM SERPL-MCNC: 10.4 MG/DL — SIGNIFICANT CHANGE UP (ref 8.4–10.5)
CALCIUM SERPL-MCNC: 8.7 MG/DL — SIGNIFICANT CHANGE UP (ref 8.4–10.5)
CHLORIDE BLDV-SCNC: 101 MMOL/L — SIGNIFICANT CHANGE UP (ref 96–108)
CHLORIDE BLDV-SCNC: 101 MMOL/L — SIGNIFICANT CHANGE UP (ref 96–108)
CHLORIDE SERPL-SCNC: 103 MMOL/L — SIGNIFICANT CHANGE UP (ref 96–108)
CHLORIDE SERPL-SCNC: 99 MMOL/L — SIGNIFICANT CHANGE UP (ref 96–108)
CO2 BLDV-SCNC: 26 MMOL/L — SIGNIFICANT CHANGE UP (ref 22–26)
CO2 BLDV-SCNC: 28 MMOL/L — HIGH (ref 22–26)
CO2 SERPL-SCNC: 21 MMOL/L — LOW (ref 22–31)
CO2 SERPL-SCNC: 22 MMOL/L — SIGNIFICANT CHANGE UP (ref 22–31)
CREAT SERPL-MCNC: 0.91 MG/DL — SIGNIFICANT CHANGE UP (ref 0.5–1.3)
CREAT SERPL-MCNC: 0.92 MG/DL — SIGNIFICANT CHANGE UP (ref 0.5–1.3)
CULTURE RESULTS: SIGNIFICANT CHANGE UP
CULTURE RESULTS: SIGNIFICANT CHANGE UP
EGFR: 83 ML/MIN/1.73M2 — SIGNIFICANT CHANGE UP
EGFR: 84 ML/MIN/1.73M2 — SIGNIFICANT CHANGE UP
GAS PNL BLDA: SIGNIFICANT CHANGE UP
GAS PNL BLDA: SIGNIFICANT CHANGE UP
GAS PNL BLDV: 132 MMOL/L — LOW (ref 136–145)
GAS PNL BLDV: 133 MMOL/L — LOW (ref 136–145)
GAS PNL BLDV: SIGNIFICANT CHANGE UP
GLUCOSE BLDV-MCNC: 135 MG/DL — HIGH (ref 70–99)
GLUCOSE BLDV-MCNC: 146 MG/DL — HIGH (ref 70–99)
GLUCOSE SERPL-MCNC: 150 MG/DL — HIGH (ref 70–99)
GLUCOSE SERPL-MCNC: 170 MG/DL — HIGH (ref 70–99)
HCO3 BLDV-SCNC: 25 MMOL/L — SIGNIFICANT CHANGE UP (ref 22–29)
HCO3 BLDV-SCNC: 26 MMOL/L — SIGNIFICANT CHANGE UP (ref 22–29)
HCT VFR BLD CALC: 27.3 % — LOW (ref 39–50)
HCT VFR BLD CALC: 28.3 % — LOW (ref 39–50)
HCT VFR BLDA CALC: 28 % — LOW (ref 39–51)
HCT VFR BLDA CALC: 29 % — LOW (ref 39–51)
HGB BLD CALC-MCNC: 9.2 G/DL — LOW (ref 12.6–17.4)
HGB BLD CALC-MCNC: 9.5 G/DL — LOW (ref 12.6–17.4)
HGB BLD-MCNC: 8.9 G/DL — LOW (ref 13–17)
HGB BLD-MCNC: 9.2 G/DL — LOW (ref 13–17)
HOROWITZ INDEX BLDV+IHG-RTO: 50 — SIGNIFICANT CHANGE UP
LACTATE BLDV-MCNC: 1.3 MMOL/L — SIGNIFICANT CHANGE UP (ref 0.5–2)
LACTATE BLDV-MCNC: 1.5 MMOL/L — SIGNIFICANT CHANGE UP (ref 0.5–2)
LDH SERPL L TO P-CCNC: 516 U/L — HIGH (ref 50–242)
MAGNESIUM SERPL-MCNC: 2.6 MG/DL — SIGNIFICANT CHANGE UP (ref 1.6–2.6)
MAGNESIUM SERPL-MCNC: 2.7 MG/DL — HIGH (ref 1.6–2.6)
MCHC RBC-ENTMCNC: 30.3 PG — SIGNIFICANT CHANGE UP (ref 27–34)
MCHC RBC-ENTMCNC: 30.4 PG — SIGNIFICANT CHANGE UP (ref 27–34)
MCHC RBC-ENTMCNC: 32.5 GM/DL — SIGNIFICANT CHANGE UP (ref 32–36)
MCHC RBC-ENTMCNC: 32.6 GM/DL — SIGNIFICANT CHANGE UP (ref 32–36)
MCV RBC AUTO: 92.9 FL — SIGNIFICANT CHANGE UP (ref 80–100)
MCV RBC AUTO: 93.4 FL — SIGNIFICANT CHANGE UP (ref 80–100)
NRBC # BLD: 1 /100 WBCS — HIGH (ref 0–0)
NRBC # BLD: 1 /100 WBCS — HIGH (ref 0–0)
PCO2 BLDV: 49 MMHG — SIGNIFICANT CHANGE UP (ref 42–55)
PCO2 BLDV: 59 MMHG — HIGH (ref 42–55)
PH BLDV: 7.25 — LOW (ref 7.32–7.43)
PH BLDV: 7.31 — LOW (ref 7.32–7.43)
PHOSPHATE SERPL-MCNC: 2.1 MG/DL — LOW (ref 2.5–4.5)
PHOSPHATE SERPL-MCNC: 2.2 MG/DL — LOW (ref 2.5–4.5)
PLATELET # BLD AUTO: 102 K/UL — LOW (ref 150–400)
PLATELET # BLD AUTO: 82 K/UL — LOW (ref 150–400)
PO2 BLDV: 36 MMHG — SIGNIFICANT CHANGE UP (ref 25–45)
PO2 BLDV: 38 MMHG — SIGNIFICANT CHANGE UP (ref 25–45)
POTASSIUM BLDV-SCNC: 4.6 MMOL/L — SIGNIFICANT CHANGE UP (ref 3.5–5.1)
POTASSIUM BLDV-SCNC: 4.8 MMOL/L — SIGNIFICANT CHANGE UP (ref 3.5–5.1)
POTASSIUM SERPL-MCNC: 4.7 MMOL/L — SIGNIFICANT CHANGE UP (ref 3.5–5.3)
POTASSIUM SERPL-MCNC: 4.7 MMOL/L — SIGNIFICANT CHANGE UP (ref 3.5–5.3)
POTASSIUM SERPL-SCNC: 4.7 MMOL/L — SIGNIFICANT CHANGE UP (ref 3.5–5.3)
POTASSIUM SERPL-SCNC: 4.7 MMOL/L — SIGNIFICANT CHANGE UP (ref 3.5–5.3)
PROT SERPL-MCNC: 6.1 G/DL — SIGNIFICANT CHANGE UP (ref 6–8.3)
PROT SERPL-MCNC: 6.5 G/DL — SIGNIFICANT CHANGE UP (ref 6–8.3)
RBC # BLD: 2.94 M/UL — LOW (ref 4.2–5.8)
RBC # BLD: 3.03 M/UL — LOW (ref 4.2–5.8)
RBC # FLD: 19.9 % — HIGH (ref 10.3–14.5)
RBC # FLD: 20 % — HIGH (ref 10.3–14.5)
SAO2 % BLDV: 61.4 % — LOW (ref 67–88)
SAO2 % BLDV: 67 % — SIGNIFICANT CHANGE UP (ref 67–88)
SODIUM SERPL-SCNC: 134 MMOL/L — LOW (ref 135–145)
SODIUM SERPL-SCNC: 137 MMOL/L — SIGNIFICANT CHANGE UP (ref 135–145)
SPECIMEN SOURCE: SIGNIFICANT CHANGE UP
SPECIMEN SOURCE: SIGNIFICANT CHANGE UP
VANCOMYCIN TROUGH SERPL-MCNC: 10.4 UG/ML — SIGNIFICANT CHANGE UP (ref 10–20)
VANCOMYCIN TROUGH SERPL-MCNC: 7.9 UG/ML — LOW (ref 10–20)
WBC # BLD: 18.48 K/UL — HIGH (ref 3.8–10.5)
WBC # BLD: 19.6 K/UL — HIGH (ref 3.8–10.5)
WBC # FLD AUTO: 18.48 K/UL — HIGH (ref 3.8–10.5)
WBC # FLD AUTO: 19.6 K/UL — HIGH (ref 3.8–10.5)

## 2024-03-22 PROCEDURE — 99233 SBSQ HOSP IP/OBS HIGH 50: CPT | Mod: FS

## 2024-03-22 PROCEDURE — 71045 X-RAY EXAM CHEST 1 VIEW: CPT | Mod: 26

## 2024-03-22 PROCEDURE — 99232 SBSQ HOSP IP/OBS MODERATE 35: CPT

## 2024-03-22 PROCEDURE — 99291 CRITICAL CARE FIRST HOUR: CPT

## 2024-03-22 PROCEDURE — 71045 X-RAY EXAM CHEST 1 VIEW: CPT | Mod: 26,77

## 2024-03-22 RX ORDER — HYDROMORPHONE HYDROCHLORIDE 2 MG/ML
0.5 INJECTION INTRAMUSCULAR; INTRAVENOUS; SUBCUTANEOUS ONCE
Refills: 0 | Status: DISCONTINUED | OUTPATIENT
Start: 2024-03-22 | End: 2024-03-22

## 2024-03-22 RX ORDER — TRANEXAMIC ACID 100 MG/ML
500 INJECTION, SOLUTION INTRAVENOUS EVERY 8 HOURS
Refills: 0 | Status: COMPLETED | OUTPATIENT
Start: 2024-03-22 | End: 2024-03-23

## 2024-03-22 RX ORDER — CEFEPIME 1 G/1
2000 INJECTION, POWDER, FOR SOLUTION INTRAMUSCULAR; INTRAVENOUS EVERY 12 HOURS
Refills: 0 | Status: DISCONTINUED | OUTPATIENT
Start: 2024-03-22 | End: 2024-03-24

## 2024-03-22 RX ORDER — CEFEPIME 1 G/1
1000 INJECTION, POWDER, FOR SOLUTION INTRAMUSCULAR; INTRAVENOUS EVERY 8 HOURS
Refills: 0 | Status: DISCONTINUED | OUTPATIENT
Start: 2024-03-22 | End: 2024-03-22

## 2024-03-22 RX ORDER — VANCOMYCIN HCL 500 MG
5 VIAL (EA) INTRAVENOUS ONCE
Refills: 0 | Status: COMPLETED | OUTPATIENT
Start: 2024-03-22 | End: 2024-03-22

## 2024-03-22 RX ORDER — HYDROCORTISONE 20 MG
100 TABLET ORAL EVERY 8 HOURS
Refills: 0 | Status: DISCONTINUED | OUTPATIENT
Start: 2024-03-22 | End: 2024-03-24

## 2024-03-22 RX ORDER — ALBUMIN HUMAN 25 %
250 VIAL (ML) INTRAVENOUS ONCE
Refills: 0 | Status: COMPLETED | OUTPATIENT
Start: 2024-03-22 | End: 2024-03-22

## 2024-03-22 RX ADMIN — Medication 1 MILLIGRAM(S): at 12:10

## 2024-03-22 RX ADMIN — Medication 2: at 12:07

## 2024-03-22 RX ADMIN — Medication 10.2 MICROGRAM(S)/KG/MIN: at 07:37

## 2024-03-22 RX ADMIN — Medication 2: at 00:26

## 2024-03-22 RX ADMIN — Medication 5 MILLIGRAM(S): at 14:58

## 2024-03-22 RX ADMIN — CEFEPIME 100 MILLIGRAM(S): 1 INJECTION, POWDER, FOR SOLUTION INTRAMUSCULAR; INTRAVENOUS at 06:04

## 2024-03-22 RX ADMIN — Medication 100 MILLIGRAM(S): at 07:30

## 2024-03-22 RX ADMIN — SODIUM CHLORIDE 3 MILLILITER(S): 9 INJECTION INTRAMUSCULAR; INTRAVENOUS; SUBCUTANEOUS at 05:50

## 2024-03-22 RX ADMIN — Medication 37.5 MICROGRAM(S): at 21:02

## 2024-03-22 RX ADMIN — CHLORHEXIDINE GLUCONATE 15 MILLILITER(S): 213 SOLUTION TOPICAL at 05:05

## 2024-03-22 RX ADMIN — Medication 2: at 05:20

## 2024-03-22 RX ADMIN — Medication 100 MILLIGRAM(S): at 12:10

## 2024-03-22 RX ADMIN — PANTOPRAZOLE SODIUM 40 MILLIGRAM(S): 20 TABLET, DELAYED RELEASE ORAL at 17:16

## 2024-03-22 RX ADMIN — Medication 800 GRAM(S): at 23:22

## 2024-03-22 RX ADMIN — CHLORHEXIDINE GLUCONATE 15 MILLILITER(S): 213 SOLUTION TOPICAL at 17:13

## 2024-03-22 RX ADMIN — SODIUM CHLORIDE 3 MILLILITER(S): 9 INJECTION INTRAMUSCULAR; INTRAVENOUS; SUBCUTANEOUS at 14:57

## 2024-03-22 RX ADMIN — Medication 63.75 MILLIMOLE(S): at 02:17

## 2024-03-22 RX ADMIN — Medication 5 MILLIGRAM(S): at 05:05

## 2024-03-22 RX ADMIN — HYDROMORPHONE HYDROCHLORIDE 0.5 MILLIGRAM(S): 2 INJECTION INTRAMUSCULAR; INTRAVENOUS; SUBCUTANEOUS at 22:05

## 2024-03-22 RX ADMIN — MIDODRINE HYDROCHLORIDE 20 MILLIGRAM(S): 2.5 TABLET ORAL at 14:58

## 2024-03-22 RX ADMIN — MIDODRINE HYDROCHLORIDE 20 MILLIGRAM(S): 2.5 TABLET ORAL at 21:02

## 2024-03-22 RX ADMIN — Medication 3.81 MICROGRAM(S)/KG/MIN: at 06:24

## 2024-03-22 RX ADMIN — PREGABALIN 1000 MICROGRAM(S): 225 CAPSULE ORAL at 12:10

## 2024-03-22 RX ADMIN — Medication 500 MILLIGRAM(S): at 12:10

## 2024-03-22 RX ADMIN — Medication 1 APPLICATION(S): at 17:16

## 2024-03-22 RX ADMIN — VASOPRESSIN 6 UNIT(S)/MIN: 20 INJECTION INTRAVENOUS at 19:17

## 2024-03-22 RX ADMIN — ERYTHROPOIETIN 20000 UNIT(S): 10000 INJECTION, SOLUTION INTRAVENOUS; SUBCUTANEOUS at 18:22

## 2024-03-22 RX ADMIN — DEXMEDETOMIDINE HYDROCHLORIDE IN 0.9% SODIUM CHLORIDE 0.34 MICROGRAM(S)/KG/HR: 4 INJECTION INTRAVENOUS at 19:15

## 2024-03-22 RX ADMIN — Medication 100 MILLIGRAM(S): at 21:55

## 2024-03-22 RX ADMIN — Medication 1 APPLICATION(S): at 05:52

## 2024-03-22 RX ADMIN — EPINEPHRINE 12.7 MICROGRAM(S)/KG/MIN: 0.3 INJECTION INTRAMUSCULAR; SUBCUTANEOUS at 07:35

## 2024-03-22 RX ADMIN — CHLORHEXIDINE GLUCONATE 1 APPLICATION(S): 213 SOLUTION TOPICAL at 05:47

## 2024-03-22 RX ADMIN — EPINEPHRINE 12.7 MICROGRAM(S)/KG/MIN: 0.3 INJECTION INTRAMUSCULAR; SUBCUTANEOUS at 19:17

## 2024-03-22 RX ADMIN — SODIUM CHLORIDE 3 MILLILITER(S): 9 INJECTION INTRAMUSCULAR; INTRAVENOUS; SUBCUTANEOUS at 21:44

## 2024-03-22 RX ADMIN — CEFEPIME 100 MILLIGRAM(S): 1 INJECTION, POWDER, FOR SOLUTION INTRAMUSCULAR; INTRAVENOUS at 17:12

## 2024-03-22 RX ADMIN — HYDROMORPHONE HYDROCHLORIDE 0.5 MILLIGRAM(S): 2 INJECTION INTRAMUSCULAR; INTRAVENOUS; SUBCUTANEOUS at 21:50

## 2024-03-22 RX ADMIN — Medication 3.81 MICROGRAM(S)/KG/MIN: at 07:36

## 2024-03-22 RX ADMIN — VASOPRESSIN 6 UNIT(S)/MIN: 20 INJECTION INTRAVENOUS at 09:54

## 2024-03-22 RX ADMIN — AMIODARONE HYDROCHLORIDE 200 MILLIGRAM(S): 400 TABLET ORAL at 05:04

## 2024-03-22 RX ADMIN — Medication 5 MILLIGRAM(S): at 21:01

## 2024-03-22 RX ADMIN — Medication 5 MILLIGRAM(S): at 21:02

## 2024-03-22 RX ADMIN — Medication 125 MILLILITER(S): at 17:32

## 2024-03-22 RX ADMIN — Medication 10.2 MICROGRAM(S)/KG/MIN: at 19:16

## 2024-03-22 RX ADMIN — Medication 3.81 MICROGRAM(S)/KG/MIN: at 19:18

## 2024-03-22 RX ADMIN — Medication 10.2 MICROGRAM(S)/KG/MIN: at 06:24

## 2024-03-22 RX ADMIN — Medication 100 MILLIGRAM(S): at 18:37

## 2024-03-22 RX ADMIN — VASOPRESSIN 6 UNIT(S)/MIN: 20 INJECTION INTRAVENOUS at 07:36

## 2024-03-22 RX ADMIN — SERTRALINE 50 MILLIGRAM(S): 25 TABLET, FILM COATED ORAL at 05:04

## 2024-03-22 RX ADMIN — PANTOPRAZOLE SODIUM 40 MILLIGRAM(S): 20 TABLET, DELAYED RELEASE ORAL at 05:04

## 2024-03-22 RX ADMIN — DEXMEDETOMIDINE HYDROCHLORIDE IN 0.9% SODIUM CHLORIDE 0.34 MICROGRAM(S)/KG/HR: 4 INJECTION INTRAVENOUS at 07:37

## 2024-03-22 RX ADMIN — EPINEPHRINE 12.7 MICROGRAM(S)/KG/MIN: 0.3 INJECTION INTRAMUSCULAR; SUBCUTANEOUS at 17:02

## 2024-03-22 RX ADMIN — Medication 5 MILLILITER(S): at 12:08

## 2024-03-22 RX ADMIN — MIDODRINE HYDROCHLORIDE 20 MILLIGRAM(S): 2.5 TABLET ORAL at 05:04

## 2024-03-22 NOTE — PROVIDER CONTACT NOTE (OTHER) - RECOMMENDATIONS
labs sent. Contact Urology
notify randolph machado and MD Caban, have providers assess at bedside. discuss consulting ENT for tongue.

## 2024-03-22 NOTE — PROVIDER CONTACT NOTE (OTHER) - REASON
bilateral ears and chin dusky. tongue dusky with etta of black necrosis
Bleeding from urethra post Royal removal

## 2024-03-22 NOTE — PROGRESS NOTE ADULT - ASSESSMENT
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 Patient was initiated on bumex gtt with gradual improvement with Cr improving from 3.6 to 3.1. Patient was considered for MV re-op; however, patient was reluctant of the procedure and expressed wanting to follow up in the outpatient setting to schedule. During last admission, patient was also noted to be in flutter, EP was consulted, and patient underwent repeat WARREN/DCCV , on amiodarone. Patient was discharged home 2/28 on higher dose of diuretics, as per HF team. Today, patient was evaluated at CTS office four routine follow-up, pt c/o worsening shortness of breath and L>R LE edema, with redness/swelling on left dorsal aspect of the foot, readmitted to CTS service for further management.  noticed with rising creatinine and bun started on CRRT due to hypotension. respiratory failure intubated       1- SURESH on ckd   2- decompensated CHF  3- Mitral regurgitation   4- hypotension   5- hypothyroid   6- hyperuricemia   7- anemia      cont dobutamine drip   midodrine 20  mg tid   cont vasopressin drip  /levophed/epinephrine    CRRT with [id number] dialyzer; BFR decrease from  given pt with hypotension cont  dfr 1200   0    cc hr fluid removal  as bp tolerates   see new orders   reatcrit 22818 U twice weekly   allopurinol 100 mg daily   no blood work RUE  strict I/O  d/w CTS team when seen earlier

## 2024-03-22 NOTE — PROGRESS NOTE ADULT - SUBJECTIVE AND OBJECTIVE BOX
infectious diseases progress note:    Patient is a 83y old  Male who presents with a chief complaint of shortness of breath (22 Mar 2024 07:19)        Heart failure               Allergies    No Known Allergies    Intolerances        ANTIBIOTICS/RELEVANT:  antimicrobials  cefepime   IVPB 1000 milliGRAM(s) IV Intermittent every 8 hours  vancomycin  IVPB 500 milliGRAM(s) IV Intermittent every 12 hours  vancomycin  IVPB        immunologic:  epoetin zara-epbx (RETACRIT) Injectable 63196 Unit(s) IV Push <User Schedule>    OTHER:  allopurinol 100 milliGRAM(s) Oral daily  aMIOdarone    Tablet 200 milliGRAM(s) Oral daily  ascorbic acid 500 milliGRAM(s) Oral daily  atorvastatin 40 milliGRAM(s) Oral at bedtime  Biotene Dry Mouth Oral Rinse 5 milliLiter(s) Swish and Spit daily  chlorhexidine 0.12% Liquid 15 milliLiter(s) Oral Mucosa every 12 hours  chlorhexidine 2% Cloths 1 Application(s) Topical <User Schedule>  CRRT Treatment    <Continuous>  cyanocobalamin 1000 MICROGram(s) Oral daily  dexMEDEtomidine Infusion 0.02 MICROgram(s)/kG/Hr IV Continuous <Continuous>  dextrose 50% Injectable 25 Gram(s) IV Push once  DOBUTamine Infusion 10 MICROgram(s)/kG/Min IV Continuous <Continuous>  EPINEPHrine    Infusion 0.1 MICROgram(s)/kG/Min IV Continuous <Continuous>  etomidate Injectable 40 milliGRAM(s) IV Push once  folic acid 1 milliGRAM(s) Oral daily  insulin lispro (ADMELOG) corrective regimen sliding scale   SubCutaneous every 6 hours  levothyroxine Injectable 37.5 MICROGram(s) IV Push at bedtime  lidocaine   4% Patch 1 Patch Transdermal daily  melatonin 5 milliGRAM(s) Oral at bedtime  metoclopramide Injectable 5 milliGRAM(s) IV Push every 8 hours  midodrine 20 milliGRAM(s) Oral every 8 hours  norepinephrine Infusion 0.06 MICROgram(s)/kG/Min IV Continuous <Continuous>  pantoprazole  Injectable 40 milliGRAM(s) IV Push every 12 hours  petrolatum white Ointment 1 Application(s) Topical two times a day  polyethylene glycol 3350 17 Gram(s) Oral daily  PrismaSATE Dialysate BGK 4 / 2.5 5000 milliLiter(s) CRRT <Continuous>  PrismaSOL Filtration BGK 4 / 2.5 5000 milliLiter(s) CRRT <Continuous>  PrismaSOL Filtration BGK 4 / 2.5 5000 milliLiter(s) CRRT <Continuous>  rocuronium Injectable 50 milliGRAM(s) IV Push once  senna 2 Tablet(s) Oral at bedtime  sertraline 50 milliGRAM(s) Oral daily  sodium chloride 0.9% lock flush 3 milliLiter(s) IV Push every 8 hours  thiamine 100 milliGRAM(s) Oral daily  vasopressin Infusion 0.04 Unit(s)/Min IV Continuous <Continuous>      Objective:  Vital Signs Last 24 Hrs  T(C): 36.2 (22 Mar 2024 04:00), Max: 36.7 (21 Mar 2024 16:00)  T(F): 97.2 (22 Mar 2024 04:00), Max: 98.1 (21 Mar 2024 16:00)  HR: 81 (22 Mar 2024 07:00) (80 - 100)  BP: --  BP(mean): --  RR: 22 (22 Mar 2024 07:00) (16 - 36)  SpO2: 100% (22 Mar 2024 07:00) (79% - 100%)    Parameters below as of 22 Mar 2024 04:00  Patient On (Oxygen Delivery Method): ventilator    O2 Sarah   Neck:no JVD, no lymphadenopathy, supple  Respiratory: CTA sumi  Cardiovascular: S1S2 RRR, no murmurs  Gastrointestinal:soft, (+) BS, no HSM  Extremities:no e/e/c        LABS:                        9.2    19.60 )-----------( 82       ( 22 Mar 2024 00:43 )             28.3     03-22    134<L>  |  99  |  33<H>  ----------------------------<  150<H>  4.7   |  21<L>  |  0.91    Ca    10.4      22 Mar 2024 00:43  Phos  2.2     03-22  Mg     2.7     03-22    TPro  6.5  /  Alb  4.8  /  TBili  13.4<H>  /  DBili  x   /  AST  291<H>  /  ALT  85<H>  /  AlkPhos  221<H>  03-22      Urinalysis Basic - ( 22 Mar 2024 00:43 )    Color: x / Appearance: x / SG: x / pH: x  Gluc: 150 mg/dL / Ketone: x  / Bili: x / Urobili: x   Blood: x / Protein: x / Nitrite: x   Leuk Esterase: x / RBC: x / WBC x   Sq Epi: x / Non Sq Epi: x / Bacteria: x          MICROBIOLOGY:    RECENT CULTURES:  03-20 @ 17:55 .Stool Feces                No Protozoa seen by trichrome stain  No Helminths or Protozoa seen in formalin concentrate  performed by iodine stain  (routine O+P not evaluated for Microsporidia,  Cryptosporidia or Cyclospora)  One negative sample does not necessarily rule  out the presence of a parasitic infection.    03-20 @ 17:54 .Stool Feces                No enteric pathogens to date: Final culture pending    03-19 @ 11:08 .Blood Blood-Peripheral                No growth at 48 Hours          RESPIRATORY CULTURES:      Clostridium difficile GDH Toxins A&amp;B, EIA:   Negative (03-19 @ 23:56)          RADIOLOGY & ADDITIONAL STUDIES:        Pager [phone]  After 5 pm/weekends or if no response :[phone]

## 2024-03-22 NOTE — PROVIDER CONTACT NOTE (OTHER) - ASSESSMENT
active bleeding from urethra. Vital sign stable
bilateral ears and chin are dusky and tongue is dusky with etta of black necrosis. left hand fingers dusky and bilateral toes are dusky, pulses dopplerable throughout. right hand cap refill <3, left hand and bilateral toes cap refill >3. Upper extremities warm, sumi. feet cool. pt on , Epi, Levo and Vaso to maintain MAP >65- see MAR and flow sheet for details.

## 2024-03-22 NOTE — PROVIDER CONTACT NOTE (OTHER) - ACTION/TREATMENT ORDERED:
randolph machado and MD Caban made aware and assessed patient at bedside. ENT Consulted, no further intervention at this time.

## 2024-03-22 NOTE — PROGRESS NOTE ADULT - PROBLEM SELECTOR PROBLEM 1
Acute on chronic systolic congestive heart failure
Tongue injury
Acute on chronic systolic congestive heart failure
Acute on chronic systolic congestive heart failure
Epistaxis
Acute on chronic systolic congestive heart failure

## 2024-03-22 NOTE — PROVIDER CONTACT NOTE (OTHER) - BACKGROUND
pmhx: CAD, AVR (04), AICD (2017), Naunall (21), admitted with severe MR requiring pressors - see MAR for details.
per pt's H&P

## 2024-03-22 NOTE — PROGRESS NOTE ADULT - ASSESSMENT
84 y/o M with ICM/HFrEF (now 30%; LVIDd 6.7 cm; prev req inotrope), CAD c/b IWMI (1994) s/p angioplasty, aortic stenosis s/p bio-AVR 2004, VT arrest (2017) s/p ICD, Afib s/p multiple DCCV and ablation x2 (2020 and 2023; on AC), Aflutter s/p WARREN/DCCV (8/23), prior Ao aneurysm s/p Bentall (2021), severe MR prior MVr (2021) with MAC (precludes M-KRISTIE d/t his anatomy; turned down for TMVR trials by [employer]), atrophic kidney with CKD (b/l Cr 2.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 Patient was initiated on bumex gtt with gradual improvement with Cr improving from 3.6 to 3.1. Patient was considered for MV re-op; however, patient was reluctant of the procedure and expressed wanting to follow up in the outpatient setting to schedule. During last admission, patient was also noted to be in flutter, EP was consulted, and patient underwent repeat WARREN/DCCV , on amiodarone. Patient was discharged home 2/28 on higher dose of diuretics, as per HF team.pt returned with worsening SOB ,edema, but was  alert  chest x-ray compactible l with chf  cant r/o infection         Pt ws treated with empiric meropenem ( diflucan per  [facility])   cultures - so far negative      discussed with [facility] alison Chase suggestive of pulmonary edema  no documented infection   Pt now intubated     completed 7 days of antibiotics - started March 12th- on may 19  Meropenem    GI PCR sent due to diarrhea  not bloody positive for both shigella like E-COLI and E-COLI enteragre ,  pt has not eaten any food and only has received tube feeds.  There are many false positive PCRs for these strains and I would not treat. Treatment is generally controversial   was off ab from 3/19 to 3/21 but restarted due to instability    repeat cultures pending  cefepime dose cut to 1 q 8 hr  of note is that bilirubin climbing   perhaps related to right sided heart failure but would get a sono  no signs of TTP   follow vanco levels

## 2024-03-22 NOTE — PROGRESS NOTE ADULT - SUBJECTIVE AND OBJECTIVE BOX
[facility]   [phone]  RENAL FOLLOW UP NOTE  --------------------------------------------------------------------------------  Chief Complaint:    24 hour events/subjective:    seen earlier   on CRRT  on pressors   intubated     PAST HISTORY  --------------------------------------------------------------------------------  No significant changes to PMH, PSH, FHx, SHx, unless otherwise noted    ALLERGIES & MEDICATIONS  --------------------------------------------------------------------------------  Allergies    No Known Allergies    Intolerances      Standing Inpatient Medications  allopurinol 100 milliGRAM(s) Oral daily  aMIOdarone    Tablet 200 milliGRAM(s) Oral daily  ascorbic acid 500 milliGRAM(s) Oral daily  Biotene Dry Mouth Oral Rinse 5 milliLiter(s) Swish and Spit daily  cefepime   IVPB 2000 milliGRAM(s) IV Intermittent every 12 hours  chlorhexidine 0.12% Liquid 15 milliLiter(s) Oral Mucosa every 12 hours  chlorhexidine 2% Cloths 1 Application(s) Topical <User Schedule>  CRRT Treatment    <Continuous>  cyanocobalamin 1000 MICROGram(s) Oral daily  dexMEDEtomidine Infusion 0.02 MICROgram(s)/kG/Hr IV Continuous <Continuous>  dextrose 50% Injectable 25 Gram(s) IV Push once  DOBUTamine Infusion 10 MICROgram(s)/kG/Min IV Continuous <Continuous>  EPINEPHrine    Infusion 0.1 MICROgram(s)/kG/Min IV Continuous <Continuous>  epoetin zara-epbx (RETACRIT) Injectable 83813 Unit(s) IV Push <User Schedule>  etomidate Injectable 40 milliGRAM(s) IV Push once  folic acid 1 milliGRAM(s) Oral daily  insulin lispro (ADMELOG) corrective regimen sliding scale   SubCutaneous every 6 hours  levothyroxine Injectable 37.5 MICROGram(s) IV Push at bedtime  lidocaine   4% Patch 1 Patch Transdermal daily  melatonin 5 milliGRAM(s) Oral at bedtime  metoclopramide Injectable 5 milliGRAM(s) IV Push every 8 hours  midodrine 20 milliGRAM(s) Oral every 8 hours  norepinephrine Infusion 0.06 MICROgram(s)/kG/Min IV Continuous <Continuous>  pantoprazole  Injectable 40 milliGRAM(s) IV Push every 12 hours  petrolatum white Ointment 1 Application(s) Topical two times a day  polyethylene glycol 3350 17 Gram(s) Oral daily  PrismaSATE Dialysate BGK 4 / 2.5 5000 milliLiter(s) CRRT <Continuous>  PrismaSOL Filtration BGK 4 / 2.5 5000 milliLiter(s) CRRT <Continuous>  PrismaSOL Filtration BGK 4 / 2.5 5000 milliLiter(s) CRRT <Continuous>  rocuronium Injectable 50 milliGRAM(s) IV Push once  senna 2 Tablet(s) Oral at bedtime  sertraline 50 milliGRAM(s) Oral daily  sodium chloride 0.9% lock flush 3 milliLiter(s) IV Push every 8 hours  thiamine 100 milliGRAM(s) Oral daily  vancomycin  IVPB 500 milliGRAM(s) IV Intermittent every 12 hours  vancomycin  IVPB      vasopressin Infusion 0.04 Unit(s)/Min IV Continuous <Continuous>    PRN Inpatient Medications      REVIEW OF SYSTEMS  --------------------------------------------------------------------------------      VITALS/PHYSICAL EXAM  --------------------------------------------------------------------------------  T(C): 38.7 (03-22-24 @ 16:00), Max: 38.7 (03-22-24 @ 16:00)  HR: 92 (03-22-24 @ 17:00) (79 - 100)  BP: --  RR: 25 (03-22-24 @ 17:00) (22 - 36)  SpO2: 100% (03-22-24 @ 17:00) (79% - 100%)  Wt(kg): --        03-21-24 @ 07:01  -  03-22-24 @ 07:00  --------------------------------------------------------  IN: 3796.5 mL / OUT: 1367 mL / NET: 2429.5 mL    03-22-24 @ 07:01  -  03-22-24 @ 17:54  --------------------------------------------------------  IN: 1069.9 mL / OUT: 857 mL / NET: 212.9 mL      Physical Exam:  	    Gen:  intubated + IJ cath   	Pulm: decrease bs + coarse bs   	CV: RRR, S1S2; no rub  	Abd: +BS, soft, nontender/nondistended  	: No suprapubic tenderness  	UE: Warm,  +  cyanosis  no clubbing,  no edema  	LE: Warm,  +  cyanosis  no clubbing,  no   edema    LABS/STUDIES  --------------------------------------------------------------------------------              9.2    19.60 >-----------<  82       [03-22-24 @ 00:43]              28.3     134  |  99  |  33  ----------------------------<  150      [03-22-24 @ 00:43]  4.7   |  21  |  0.91        Ca     10.4     [03-22-24 @ 00:43]      Mg     2.7     [03-22-24 @ 00:43]      Phos  2.2     [03-22-24 @ 00:43]    TPro  6.5  /  Alb  4.8  /  TBili  13.4  /  DBili  x   /  AST  291  /  ALT  85  /  AlkPhos  221  [03-22-24 @ 00:43]          Creatinine Trend:  SCr 0.91 [03-22 @ 00:43]  SCr 0.94 [03-21 @ 00:22]  SCr 0.85 [03-20 @ 00:52]  SCr 0.81 [03-19 @ 00:25]  SCr 0.87 [03-18 @ 13:26]      Iron 290, TIBC 430, %sat 67      [03-06-24 @ 13:57]  Ferritin 335      [03-06-24 @ 13:57]  PTH -- (Ca 8.9)      [03-05-24 @ 13:03]   242  TSH 2.02      [03-06-24 @ 13:57]

## 2024-03-22 NOTE — PROGRESS NOTE ADULT - PROBLEM SELECTOR PLAN 1
- Recommend topical mineral oil  - No further ENT intervention required at this time, please call back if needed. - Recommend topical mineral oil  - If pt is still here in 7 days (3/29), will re-evaluate tongue necrosis  - No further ENT intervention required at this time, please call back if needed. - Recommend topical mineral oil  - Avoid direct trauma and avoid putting pressure on b/l ears and anterior 1/3 of tongue.   - If pt is still here in 7 days (3/29), will re-evaluate tongue necrosis  - No further ENT intervention required at this time, please call back if needed. - Recommend topical mineral oil on tongue  - Avoid direct trauma and avoid putting pressure on b/l ears and tongue  - If pt is still here in 7 days (3/29), will re-evaluate tongue necrosis - Start topical mineral oil on tongue TID  - Avoid direct trauma and avoid putting pressure on b/l ears and tongue  - If pt is still here in 7 days (3/29), will re-evaluate tongue necrosis

## 2024-03-22 NOTE — PROGRESS NOTE ADULT - SUBJECTIVE AND OBJECTIVE BOX
Patient seen and examined at the bedside.    Remained critically ill on continuous ICU monitoring.    OBJECTIVE:  Vital Signs Last 24 Hrs  T(C): 36.2 (22 Mar 2024 04:00), Max: 36.7 (21 Mar 2024 16:00)  T(F): 97.2 (22 Mar 2024 04:00), Max: 98.1 (21 Mar 2024 16:00)  HR: 81 (22 Mar 2024 07:00) (80 - 100)  BP: --  BP(mean): --  RR: 22 (22 Mar 2024 07:00) (16 - 36)  SpO2: 100% (22 Mar 2024 07:00) (79% - 100%)    Parameters below as of 22 Mar 2024 04:00  Patient On (Oxygen Delivery Method): ventilator    O2 Concentration (%): 50      Physical Exam:   General: NAD, intubated   Neurology: nonfocal   ENT/Neck: Neck supple, trachea midline, No JVD   Respiratory: Clear bilaterally   CV: S1S2, no murmurs        [x] Sternal dressing        [x] Paced rhythm, [x] PPM -   Abdominal: Soft, NT, ND +BS   Extremities: 1-2+ pedal edema noted         Assessment:  82 y/o M with ICM/HFrEF (now 30%; LVIDd 6.7 cm; prev req inotrope), CAD c/b IWMI (1994) s/p angioplasty, aortic stenosis s/p bio-AVR 2004, VT arrest (2017) s/p ICD, Afib s/p multiple DCCV and ablation x2 (2020 and 2023; on AC), Aflutter s/p WARREN/DCCV (8/23), prior Ao aneurysm s/p Bentall (2021), 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    Tx to ICU for Hypotension/CHF/renal failure  Hemodynamic instability  Hypovolemia  Post op respiratory insufficiency  Acute blood loss anemia  Thrombocytopenia  Leukocytosis      Plan:   ***Neuro***  [x] Precedex/Etomidate/Rocuronium    Continue with Melatonin for sleep regimen   Continue Zoloft    ***Cardiovascular***  Invasive hemodynamic monitoring, assess perfusion indices   MN / CVP 15 / MAP 70/ Hct 28.3% / Lactate 1.3  [x] Levophed - 0.06 mcg/kG/Min   [x] Vasopressin - 0.04 Unit(s)/Min   [x] Epinephrine - 0.1 mcg/kG/Min   [x] Dobutamine - 10 mcg/kG/Min  [x] S/P 1 unit PRBC overnight   Continuos reassessment of hemodynamics   PO Amiodarone for rate control  Continue Midodrine  [x] Statin       ***Pulmonary***  Post op vent management   Titration of FiO2 and PEEP, follow SpO2, CXR, blood gasses     Mechanical Ventilation:  Mode: AC/ CMV (Assist Control/ Continuous Mandatory Ventilation)  RR (machine): 22  TV (machine): 550  FiO2: 50  PEEP: 7  ITime: 1  MAP: 15  PIP: 32                ***GI***  [x] NPO with Nepro TF @ 50 mL/Hr   [x] Protonix for stress ulcer ppx  Bowel regimen with Miralax and Senna  Reglan for gut motility  GI following, appreciate recs.    ***Renal***  [x] CKD  Continue to monitor I/Os, BUN/Creatinine.   Replete lytes PRN  Royal present  CVVHD  Goal net negative   RETACRIT for renal support     ***ID***  Cefepime/Vanco for Empiric dosing    ***Endocrine***  [x] Stress Hyperglycemia : HbA1c 5.7%                - [x] ISS             - Need tight glycemic control to prevent wound infection.  [x] Gout             - [x] Allopurinol  [x] Hypothyroidism             - [x] Synthroid      Patient requires continuous monitoring with bedside rhythm monitoring, pulse oximetry monitoring, and continuous central venous and arterial pressure monitoring; and intermittent blood gas analysis. Care plan discussed with the ICU care team.   Patient remained critical, at risk for life threatening decompensation.    I have spent 30 minutes providing critical care management to this patient.    By signing my name below, I, Kev Hanna, attest that this documentation has been prepared under the direction and in the presence of SHARON Smith   Electronically signed: Michelle Ravimarino, 03-22-24 @ 07:20    Cesar EVERETT PA, personally performed the services described in this documentation. all medical record entries made by the scribe were at my direction and in my presence. I have reviewed the chart and agree that the record reflects my personal performance and is accurate and complete  Electronically signed: SHARON Smith  Patient seen and examined at the bedside.    Remained critically ill on continuous ICU monitoring.    OBJECTIVE:  Vital Signs Last 24 Hrs  T(C): 36.2 (22 Mar 2024 04:00), Max: 36.7 (21 Mar 2024 16:00)  T(F): 97.2 (22 Mar 2024 04:00), Max: 98.1 (21 Mar 2024 16:00)  HR: 81 (22 Mar 2024 07:00) (80 - 100)  BP: --  BP(mean): --  RR: 22 (22 Mar 2024 07:00) (16 - 36)  SpO2: 100% (22 Mar 2024 07:00) (79% - 100%)    Parameters below as of 22 Mar 2024 04:00  Patient On (Oxygen Delivery Method): ventilator    O2 Concentration (%): 50      Physical Exam:   General: NAD, intubated   Neurology: nonfocal   ENT/Neck: Neck supple, trachea midline, No JVD   Respiratory: Clear bilaterally   CV: S1S2, no murmurs        [x] Sternal dressing        [x] Paced rhythm, [x] PPM -   Abdominal: Soft, NT, ND +BS   Extremities: 1-2+ pedal edema noted         Assessment:  84 y/o M with ICM/HFrEF (now 30%; LVIDd 6.7 cm; prev req inotrope), CAD c/b IWMI (1994) s/p angioplasty, aortic stenosis s/p bio-AVR 2004, VT arrest (2017) s/p ICD, Afib s/p multiple DCCV and ablation x2 (2020 and 2023; on AC), Aflutter s/p WARREN/DCCV (8/23), prior Ao aneurysm s/p Bentall (2021), 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    Tx to ICU for Hypotension/CHF/renal failure  Hemodynamic instability  Hypovolemia  Post op respiratory insufficiency  Acute blood loss anemia  Thrombocytopenia  Leukocytosis      Plan:   ***Neuro***  [x] Precedex/Etomidate/Rocuronium    Continue with Melatonin for sleep regimen   Continue Zoloft    ***Cardiovascular***  Invasive hemodynamic monitoring, assess perfusion indices   NH / CVP 15 / MAP 70/ Hct 28.3% / Lactate 1.3  [x] Levophed - 0.06 mcg/kG/Min   [x] Vasopressin - 0.1 Unit(s)/Min   [x] Epinephrine - 0.1 mcg/kG/Min   [x] Dobutamine - 10 mcg/kG/Min  [x] S/P 1 unit PRBC overnight   Continuos reassessment of hemodynamics   PO Amiodarone for rate control  Continue Midodrine  [x] Statin       ***Pulmonary***  Post op vent management   Titration of FiO2 and PEEP, follow SpO2, CXR, blood gasses     Mechanical Ventilation:  Mode: AC/ CMV (Assist Control/ Continuous Mandatory Ventilation)  RR (machine): 22  TV (machine): 550  FiO2: 50  PEEP: 7  ITime: 1  MAP: 15  PIP: 32                ***GI***  [x] NPO with Nepro TF @ 50 mL/Hr   [x] Protonix for stress ulcer ppx  Bowel regimen with Miralax and Senna  Reglan for gut motility  GI following, appreciate recs.    ***Renal***  [x] CKD  Continue to monitor I/Os, BUN/Creatinine.   Replete lytes PRN  Royal present  CVVHD  Goal net negative   RETACRIT for renal support     ***ID***  Cefepime/Vanco for Empiric dosing    ***Endocrine***  [x] Stress Hyperglycemia : HbA1c 5.7%                - [x] ISS             - Need tight glycemic control to prevent wound infection.  [x] Gout             - [x] Allopurinol  [x] Hypothyroidism             - [x] Synthroid      Patient requires continuous monitoring with bedside rhythm monitoring, pulse oximetry monitoring, and continuous central venous and arterial pressure monitoring; and intermittent blood gas analysis. Care plan discussed with the ICU care team.   Patient remained critical, at risk for life threatening decompensation.    I have spent 30 minutes providing critical care management to this patient.    By signing my name below, I, Kev Hanna, attest that this documentation has been prepared under the direction and in the presence of SHARON Smith   Electronically signed: Michelle Ravimarino, 03-22-24 @ 07:20    Cesar EVERETT PA, personally performed the services described in this documentation. all medical record entries made by the scribe were at my direction and in my presence. I have reviewed the chart and agree that the record reflects my personal performance and is accurate and complete  Electronically signed: SHARON Smith

## 2024-03-22 NOTE — PROGRESS NOTE ADULT - NS ATTEND AMEND GEN_ALL_CORE FT
ENT consulted for tongue lesion    82 y/o M w/ extensive cardiac hx, ENT initially consulted on 03/18 for oral bleeding evaluation, found to have dried clots in b/l nares and oropharynx that were suctioned, no signs of active bleeding from oral cavity or b/l nares.     ENT was reconsulted today for evaluation of tongue necrosis. Pt currently on pressors.     Physical exam shows small area mucosal necrosis noted on anterior-inferior surface of tongue. b/l usha and antihelix ecchymosis, likely 2/2 pressors and poor circulation.    Recommend:  Tongue Necrosis   - Recommend topical mineral oil on tongue  - Avoid direct trauma and avoid putting pressure on b/l ears and tongue  - If pt is still here in 7 days (3/29), will re-evaluate tongue necrosis ENT consulted for tongue lesion    84 y/o M w/ extensive cardiac hx, ENT initially consulted on 03/18 for oral bleeding evaluation, found to have dried clots in b/l nares and oropharynx that were suctioned, no signs of active bleeding from oral cavity or b/l nares.     ENT was reconsulted today for evaluation of tongue necrosis. Pt currently on pressors.     Physical exam shows small area mucosal necrosis noted on anterior-inferior surface of tongue. b/l usha and antihelix ecchymosis, likely 2/2 pressors and poor circulation.    Recommend:  Tongue Necrosis   - Start topical mineral oil on tongue TID  - Avoid direct trauma and avoid putting pressure on b/l ears and tongue  - If pt is still here in 7 days (3/29), will re-evaluate tongue necrosis

## 2024-03-22 NOTE — PROGRESS NOTE ADULT - SUBJECTIVE AND OBJECTIVE BOX
ENT ISSUE/POD: Tongue necrosis    HPI: 84 y/o M 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, 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, Severe MR valvular heart failure, CRI, AICD, Decompensated heart failure, cardiogenic shock with renal failure, Marginal hemodynamics with ^ dose of multiple pressor & iontrope, Anemia, sepsis, Hypophosphatemia, Hematuria. ENT initially consulted on 03/18 for oral bleeding evaluation, found to have dried clots in b/l nares and oropharynx that were suctioned, no signs of active bleeding from oral cavity or b/l nares. ENT was reconsulted today for evaluation of tongue necrosis. Pt currently on pressors. Pt currently intubated and unable to answer any further modifying factors.                PAST MEDICAL & SURGICAL HISTORY:  Aortic stenosis      AICD (automatic cardioverter/defibrillator) present      Aortic valve regurgitation      Ascending aorta dilation      H/O paroxysmal supraventricular tachycardia      HLD (hyperlipidemia)      Mitral valve regurgitation      Cardiac arrest      Severe mitral regurgitation      S/P aortic valve replacement  3/13/2004      S/P hernia repair  2002      History of tonsillectomy and adenoidectomy      S/P TURP  1996      S/P colonoscopy  2001, 2002, 2006, 2011, 2016      S/P endoscopy  2006      S/P cardiac cath  1994, 1995, 1999, 2002, 2004      AICD (automatic cardioverter/defibrillator) present  12/19/17      Cardiac pacemaker  12/19/17      History of cardioversion  9/11/20      S/P ablation of atrial fibrillation  10/29/20      Status post ablation of ventricular arrhythmia  2/14/19        Allergies    No Known Allergies    Intolerances      MEDICATIONS  (STANDING):  allopurinol 100 milliGRAM(s) Oral daily  aMIOdarone    Tablet 200 milliGRAM(s) Oral daily  ascorbic acid 500 milliGRAM(s) Oral daily  Biotene Dry Mouth Oral Rinse 5 milliLiter(s) Swish and Spit daily  cefepime   IVPB 2000 milliGRAM(s) IV Intermittent every 12 hours  chlorhexidine 0.12% Liquid 15 milliLiter(s) Oral Mucosa every 12 hours  chlorhexidine 2% Cloths 1 Application(s) Topical <User Schedule>  CRRT Treatment    <Continuous>  cyanocobalamin 1000 MICROGram(s) Oral daily  dexMEDEtomidine Infusion 0.02 MICROgram(s)/kG/Hr (0.34 mL/Hr) IV Continuous <Continuous>  dextrose 50% Injectable 25 Gram(s) IV Push once  DOBUTamine Infusion 10 MICROgram(s)/kG/Min (10.2 mL/Hr) IV Continuous <Continuous>  EPINEPHrine    Infusion 0.1 MICROgram(s)/kG/Min (12.7 mL/Hr) IV Continuous <Continuous>  epoetin zara-epbx (RETACRIT) Injectable 74758 Unit(s) IV Push <User Schedule>  etomidate Injectable 40 milliGRAM(s) IV Push once  folic acid 1 milliGRAM(s) Oral daily  insulin lispro (ADMELOG) corrective regimen sliding scale   SubCutaneous every 6 hours  levothyroxine Injectable 37.5 MICROGram(s) IV Push at bedtime  lidocaine   4% Patch 1 Patch Transdermal daily  melatonin 5 milliGRAM(s) Oral at bedtime  metoclopramide Injectable 5 milliGRAM(s) IV Push every 8 hours  midodrine 20 milliGRAM(s) Oral every 8 hours  norepinephrine Infusion 0.06 MICROgram(s)/kG/Min (3.81 mL/Hr) IV Continuous <Continuous>  pantoprazole  Injectable 40 milliGRAM(s) IV Push every 12 hours  petrolatum white Ointment 1 Application(s) Topical two times a day  polyethylene glycol 3350 17 Gram(s) Oral daily  PrismaSATE Dialysate BGK 4 / 2.5 5000 milliLiter(s) (1200 mL/Hr) CRRT <Continuous>  PrismaSOL Filtration BGK 4 / 2.5 5000 milliLiter(s) (600 mL/Hr) CRRT <Continuous>  PrismaSOL Filtration BGK 4 / 2.5 5000 milliLiter(s) (400 mL/Hr) CRRT <Continuous>  rocuronium Injectable 50 milliGRAM(s) IV Push once  senna 2 Tablet(s) Oral at bedtime  sertraline 50 milliGRAM(s) Oral daily  sodium chloride 0.9% lock flush 3 milliLiter(s) IV Push every 8 hours  thiamine 100 milliGRAM(s) Oral daily  vancomycin  IVPB      vancomycin  IVPB 500 milliGRAM(s) IV Intermittent every 12 hours  vasopressin Infusion 0.04 Unit(s)/Min (6 mL/Hr) IV Continuous <Continuous>    MEDICATIONS  (PRN):      Social History: see consult    Family history: see consult    ROS:   Unable to obtain further history due to pts clinical condition.       Vital Signs Last 24 Hrs  T(C): 37.7 (22 Mar 2024 12:00), Max: 37.7 (22 Mar 2024 12:00)  T(F): 99.9 (22 Mar 2024 12:00), Max: 99.9 (22 Mar 2024 12:00)  HR: 80 (22 Mar 2024 13:00) (79 - 100)  BP: --  BP(mean): --  RR: 22 (22 Mar 2024 13:00) (17 - 36)  SpO2: 100% (22 Mar 2024 13:00) (79% - 100%)    Parameters below as of 22 Mar 2024 12:00  Patient On (Oxygen Delivery Method): ventilator    O2 Concentration (%): 50                          9.2    19.60 )-----------( 82       ( 22 Mar 2024 00:43 )             28.3    03-22    134<L>  |  99  |  33<H>  ----------------------------<  150<H>  4.7   |  21<L>  |  0.91    Ca    10.4      22 Mar 2024 00:43  Phos  2.2     03-22  Mg     2.7     03-22    TPro  6.5  /  Alb  4.8  /  TBili  13.4<H>  /  DBili  x   /  AST  291<H>  /  ALT  85<H>  /  AlkPhos  221<H>  03-22       PHYSICAL EXAM:  Gen: NAD, sedated, intubated  Skin: No rashes or lesions  Head: Normocephalic, Atraumatic  Face: no edema, erythema, or fluctuance. Parotid glands soft without mass  Eyes: no scleral injection  Nose: Nares bilaterally patent, no discharge  Mouth: Difficult to visualize buccal mucosa, soft palate, and posterior oropharynx d/t ETT in place but small area mucosal necrosis noted on anterior-inferior surface of tongue. No Stridor / Drooling / Trismus. Tongue/uvula midline, no signs of active bleeding.   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 ENT initially consulted on 03/18 for oral bleeding evaluation, found to have dried clots in b/l nares and oropharynx that were suctioned, no signs of active bleeding from oral cavity or b/l nares. ENT was reconsulted today for evaluation of tongue necrosis. Pt currently on pressors. Pt currently intubated and unable to answer any further modifying factors.                PAST MEDICAL & SURGICAL HISTORY:  Aortic stenosis      AICD (automatic cardioverter/defibrillator) present      Aortic valve regurgitation      Ascending aorta dilation      H/O paroxysmal supraventricular tachycardia      HLD (hyperlipidemia)      Mitral valve regurgitation      Cardiac arrest      Severe mitral regurgitation      S/P aortic valve replacement  3/13/2004      S/P hernia repair  2002      History of tonsillectomy and adenoidectomy      S/P TURP  1996      S/P colonoscopy  2001, 2002, 2006, 2011, 2016      S/P endoscopy  2006      S/P cardiac cath  1994, 1995, 1999, 2002, 2004      AICD (automatic cardioverter/defibrillator) present  12/19/17      Cardiac pacemaker  12/19/17      History of cardioversion  9/11/20      S/P ablation of atrial fibrillation  10/29/20      Status post ablation of ventricular arrhythmia  2/14/19        Allergies    No Known Allergies    Intolerances      MEDICATIONS  (STANDING):  allopurinol 100 milliGRAM(s) Oral daily  aMIOdarone    Tablet 200 milliGRAM(s) Oral daily  ascorbic acid 500 milliGRAM(s) Oral daily  Biotene Dry Mouth Oral Rinse 5 milliLiter(s) Swish and Spit daily  cefepime   IVPB 2000 milliGRAM(s) IV Intermittent every 12 hours  chlorhexidine 0.12% Liquid 15 milliLiter(s) Oral Mucosa every 12 hours  chlorhexidine 2% Cloths 1 Application(s) Topical <User Schedule>  CRRT Treatment    <Continuous>  cyanocobalamin 1000 MICROGram(s) Oral daily  dexMEDEtomidine Infusion 0.02 MICROgram(s)/kG/Hr (0.34 mL/Hr) IV Continuous <Continuous>  dextrose 50% Injectable 25 Gram(s) IV Push once  DOBUTamine Infusion 10 MICROgram(s)/kG/Min (10.2 mL/Hr) IV Continuous <Continuous>  EPINEPHrine    Infusion 0.1 MICROgram(s)/kG/Min (12.7 mL/Hr) IV Continuous <Continuous>  epoetin zara-epbx (RETACRIT) Injectable 02064 Unit(s) IV Push <User Schedule>  etomidate Injectable 40 milliGRAM(s) IV Push once  folic acid 1 milliGRAM(s) Oral daily  insulin lispro (ADMELOG) corrective regimen sliding scale   SubCutaneous every 6 hours  levothyroxine Injectable 37.5 MICROGram(s) IV Push at bedtime  lidocaine   4% Patch 1 Patch Transdermal daily  melatonin 5 milliGRAM(s) Oral at bedtime  metoclopramide Injectable 5 milliGRAM(s) IV Push every 8 hours  midodrine 20 milliGRAM(s) Oral every 8 hours  norepinephrine Infusion 0.06 MICROgram(s)/kG/Min (3.81 mL/Hr) IV Continuous <Continuous>  pantoprazole  Injectable 40 milliGRAM(s) IV Push every 12 hours  petrolatum white Ointment 1 Application(s) Topical two times a day  polyethylene glycol 3350 17 Gram(s) Oral daily  PrismaSATE Dialysate BGK 4 / 2.5 5000 milliLiter(s) (1200 mL/Hr) CRRT <Continuous>  PrismaSOL Filtration BGK 4 / 2.5 5000 milliLiter(s) (600 mL/Hr) CRRT <Continuous>  PrismaSOL Filtration BGK 4 / 2.5 5000 milliLiter(s) (400 mL/Hr) CRRT <Continuous>  rocuronium Injectable 50 milliGRAM(s) IV Push once  senna 2 Tablet(s) Oral at bedtime  sertraline 50 milliGRAM(s) Oral daily  sodium chloride 0.9% lock flush 3 milliLiter(s) IV Push every 8 hours  thiamine 100 milliGRAM(s) Oral daily  vancomycin  IVPB      vancomycin  IVPB 500 milliGRAM(s) IV Intermittent every 12 hours  vasopressin Infusion 0.04 Unit(s)/Min (6 mL/Hr) IV Continuous <Continuous>    MEDICATIONS  (PRN):      Social History: see consult    Family history: see consult    ROS:   Unable to obtain further history due to pts clinical condition.       Vital Signs Last 24 Hrs  T(C): 37.7 (22 Mar 2024 12:00), Max: 37.7 (22 Mar 2024 12:00)  T(F): 99.9 (22 Mar 2024 12:00), Max: 99.9 (22 Mar 2024 12:00)  HR: 80 (22 Mar 2024 13:00) (79 - 100)  BP: --  BP(mean): --  RR: 22 (22 Mar 2024 13:00) (17 - 36)  SpO2: 100% (22 Mar 2024 13:00) (79% - 100%)    Parameters below as of 22 Mar 2024 12:00  Patient On (Oxygen Delivery Method): ventilator    O2 Concentration (%): 50                          9.2    19.60 )-----------( 82       ( 22 Mar 2024 00:43 )             28.3    03-22    134<L>  |  99  |  33<H>  ----------------------------<  150<H>  4.7   |  21<L>  |  0.91    Ca    10.4      22 Mar 2024 00:43  Phos  2.2     03-22  Mg     2.7     03-22    TPro  6.5  /  Alb  4.8  /  TBili  13.4<H>  /  DBili  x   /  AST  291<H>  /  ALT  85<H>  /  AlkPhos  221<H>  03-22       PHYSICAL EXAM:  Gen: NAD, sedated, intubated  Skin: No rashes or lesions  Head: Normocephalic, Atraumatic  Face: no edema, erythema, or fluctuance. Parotid glands soft without mass  Eyes: no scleral injection  Nose: Nares bilaterally patent, no discharge  Ear: b/l usha and antihelix ecchymosis, no swelling, no pallor  Mouth: Difficult to visualize buccal mucosa, soft palate, and posterior oropharynx d/t ETT in place but small area mucosal necrosis noted on anterior-inferior surface of tongue. No Stridor / Drooling / Trismus. Tongue/uvula midline, no signs of active bleeding.   Neck: Flat, supple, no lymphadenopathy, trachea midline, no masses  Lymphatic: No lymphadenopathy  Resp: intubated         ENT ISSUE/POD: Tongue necrosis    HPI: 82 y/o M 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, 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, Severe MR valvular heart failure, CRI, AICD, Decompensated heart failure, cardiogenic shock with renal failure, Marginal hemodynamics with ^ dose of multiple pressor & iontrope, Anemia, sepsis, Hypophosphatemia, Hematuria. ENT initially consulted on 03/18 for oral bleeding evaluation, found to have dried clots in b/l nares and oropharynx that were suctioned, no signs of active bleeding from oral cavity or b/l nares. ENT was reconsulted today for evaluation of tongue necrosis. Pt currently on pressors. Pt currently intubated and unable to answer any further modifying factors.                PAST MEDICAL & SURGICAL HISTORY:  Aortic stenosis      AICD (automatic cardioverter/defibrillator) present      Aortic valve regurgitation      Ascending aorta dilation      H/O paroxysmal supraventricular tachycardia      HLD (hyperlipidemia)      Mitral valve regurgitation      Cardiac arrest      Severe mitral regurgitation      S/P aortic valve replacement  3/13/2004      S/P hernia repair  2002      History of tonsillectomy and adenoidectomy      S/P TURP  1996      S/P colonoscopy  2001, 2002, 2006, 2011, 2016      S/P endoscopy  2006      S/P cardiac cath  1994, 1995, 1999, 2002, 2004      AICD (automatic cardioverter/defibrillator) present  12/19/17      Cardiac pacemaker  12/19/17      History of cardioversion  9/11/20      S/P ablation of atrial fibrillation  10/29/20      Status post ablation of ventricular arrhythmia  2/14/19        Allergies    No Known Allergies    Intolerances      MEDICATIONS  (STANDING):  allopurinol 100 milliGRAM(s) Oral daily  aMIOdarone    Tablet 200 milliGRAM(s) Oral daily  ascorbic acid 500 milliGRAM(s) Oral daily  Biotene Dry Mouth Oral Rinse 5 milliLiter(s) Swish and Spit daily  cefepime   IVPB 2000 milliGRAM(s) IV Intermittent every 12 hours  chlorhexidine 0.12% Liquid 15 milliLiter(s) Oral Mucosa every 12 hours  chlorhexidine 2% Cloths 1 Application(s) Topical <User Schedule>  CRRT Treatment    <Continuous>  cyanocobalamin 1000 MICROGram(s) Oral daily  dexMEDEtomidine Infusion 0.02 MICROgram(s)/kG/Hr (0.34 mL/Hr) IV Continuous <Continuous>  dextrose 50% Injectable 25 Gram(s) IV Push once  DOBUTamine Infusion 10 MICROgram(s)/kG/Min (10.2 mL/Hr) IV Continuous <Continuous>  EPINEPHrine    Infusion 0.1 MICROgram(s)/kG/Min (12.7 mL/Hr) IV Continuous <Continuous>  epoetin zara-epbx (RETACRIT) Injectable 85085 Unit(s) IV Push <User Schedule>  etomidate Injectable 40 milliGRAM(s) IV Push once  folic acid 1 milliGRAM(s) Oral daily  insulin lispro (ADMELOG) corrective regimen sliding scale   SubCutaneous every 6 hours  levothyroxine Injectable 37.5 MICROGram(s) IV Push at bedtime  lidocaine   4% Patch 1 Patch Transdermal daily  melatonin 5 milliGRAM(s) Oral at bedtime  metoclopramide Injectable 5 milliGRAM(s) IV Push every 8 hours  midodrine 20 milliGRAM(s) Oral every 8 hours  norepinephrine Infusion 0.06 MICROgram(s)/kG/Min (3.81 mL/Hr) IV Continuous <Continuous>  pantoprazole  Injectable 40 milliGRAM(s) IV Push every 12 hours  petrolatum white Ointment 1 Application(s) Topical two times a day  polyethylene glycol 3350 17 Gram(s) Oral daily  PrismaSATE Dialysate BGK 4 / 2.5 5000 milliLiter(s) (1200 mL/Hr) CRRT <Continuous>  PrismaSOL Filtration BGK 4 / 2.5 5000 milliLiter(s) (600 mL/Hr) CRRT <Continuous>  PrismaSOL Filtration BGK 4 / 2.5 5000 milliLiter(s) (400 mL/Hr) CRRT <Continuous>  rocuronium Injectable 50 milliGRAM(s) IV Push once  senna 2 Tablet(s) Oral at bedtime  sertraline 50 milliGRAM(s) Oral daily  sodium chloride 0.9% lock flush 3 milliLiter(s) IV Push every 8 hours  thiamine 100 milliGRAM(s) Oral daily  vancomycin  IVPB      vancomycin  IVPB 500 milliGRAM(s) IV Intermittent every 12 hours  vasopressin Infusion 0.04 Unit(s)/Min (6 mL/Hr) IV Continuous <Continuous>    MEDICATIONS  (PRN):      Social History: see consult    Family history: see consult    ROS:   Unable to obtain further history due to pts clinical condition.       Vital Signs Last 24 Hrs  T(C): 37.7 (22 Mar 2024 12:00), Max: 37.7 (22 Mar 2024 12:00)  T(F): 99.9 (22 Mar 2024 12:00), Max: 99.9 (22 Mar 2024 12:00)  HR: 80 (22 Mar 2024 13:00) (79 - 100)  BP: --  BP(mean): --  RR: 22 (22 Mar 2024 13:00) (17 - 36)  SpO2: 100% (22 Mar 2024 13:00) (79% - 100%)    Parameters below as of 22 Mar 2024 12:00  Patient On (Oxygen Delivery Method): ventilator    O2 Concentration (%): 50                          9.2    19.60 )-----------( 82       ( 22 Mar 2024 00:43 )             28.3    03-22    134<L>  |  99  |  33<H>  ----------------------------<  150<H>  4.7   |  21<L>  |  0.91    Ca    10.4      22 Mar 2024 00:43  Phos  2.2     03-22  Mg     2.7     03-22    TPro  6.5  /  Alb  4.8  /  TBili  13.4<H>  /  DBili  x   /  AST  291<H>  /  ALT  85<H>  /  AlkPhos  221<H>  03-22       PHYSICAL EXAM:  Gen: NAD, sedated, intubated  Skin: No rashes or lesions  Head: Normocephalic, Atraumatic  Face: no edema, erythema, or fluctuance. Parotid glands soft without mass  Eyes: no scleral injection  Ear: b/l usha and antihelix ecchymosis, no swelling, no pallor  Mouth: Difficult to visualize buccal mucosa, soft palate, and posterior oropharynx d/t ETT in place but small area mucosal necrosis noted on anterior-inferior surface of tongue, no necrosis noted on the superior surface of tongue. No Stridor / Drooling / Trismus. Tongue/uvula midline, no signs of active bleeding.   Neck: Flat, supple, no lymphadenopathy, trachea midline, no masses  Lymphatic: No lymphadenopathy  Resp: intubated

## 2024-03-22 NOTE — PROGRESS NOTE ADULT - ASSESSMENT
82 y/o M w/ extensive cardiac hx, ENT initially consulted on 03/18 for oral bleeding evaluation, found to have dried clots in b/l nares and oropharynx that were suctioned, no signs of active bleeding from oral cavity or b/l nares. ENT was reconsulted today for evaluation of tongue necrosis. Pt currently on pressors. Physical exam was remarkable for small area mucosal necrosis noted on anterior-inferior surface of tongue, likely 2/2 pressors.  82 y/o M w/ extensive cardiac hx, ENT initially consulted on 03/18 for oral bleeding evaluation, found to have dried clots in b/l nares and oropharynx that were suctioned, no signs of active bleeding from oral cavity or b/l nares. ENT was reconsulted today for evaluation of tongue necrosis. Pt currently on pressors. Physical exam was remarkable for small area mucosal necrosis noted on anterior-inferior surface of tongue as well as b/l usha and antihelix ecchymosis, likely 2/2 pressors and poor circulation.  82 y/o M w/ extensive cardiac hx, ENT initially consulted on 03/18 for oral bleeding evaluation, found to have dried clots in b/l nares and oropharynx that were suctioned, no signs of active bleeding from oral cavity or b/l nares.     ENT was reconsulted today for evaluation of tongue necrosis. Pt currently on pressors.     Physical exam shows small area mucosal necrosis noted on anterior-inferior surface of tongue. b/l usha and antihelix ecchymosis, likely 2/2 pressors and poor circulation.

## 2024-03-23 ENCOUNTER — RESULT REVIEW (OUTPATIENT)
Age: 84
End: 2024-03-23

## 2024-03-23 DIAGNOSIS — R57.0 CARDIOGENIC SHOCK: ICD-10-CM

## 2024-03-23 LAB
APTT BLD: 36.6 SEC — HIGH (ref 24.5–35.6)
BASE EXCESS BLDV CALC-SCNC: -4.4 MMOL/L — LOW (ref -2–3)
CO2 BLDV-SCNC: 24 MMOL/L — SIGNIFICANT CHANGE UP (ref 22–26)
D DIMER BLD IA.RAPID-MCNC: 1263 NG/ML DDU — HIGH
FIBRINOGEN PPP-MCNC: 361 MG/DL — SIGNIFICANT CHANGE UP (ref 200–445)
GAS PNL BLDA: SIGNIFICANT CHANGE UP
GAS PNL BLDV: SIGNIFICANT CHANGE UP
GRAM STN FLD: ABNORMAL
GRAM STN FLD: SIGNIFICANT CHANGE UP
HAPTOGLOB SERPL-MCNC: <20 MG/DL — LOW (ref 34–200)
HCO3 BLDV-SCNC: 23 MMOL/L — SIGNIFICANT CHANGE UP (ref 22–29)
HOROWITZ INDEX BLDV+IHG-RTO: 60 — SIGNIFICANT CHANGE UP
INR BLD: 1.73 RATIO — HIGH (ref 0.85–1.18)
PCO2 BLDV: 48 MMHG — SIGNIFICANT CHANGE UP (ref 42–55)
PH BLDV: 7.28 — LOW (ref 7.32–7.43)
PO2 BLDV: 28 MMHG — SIGNIFICANT CHANGE UP (ref 25–45)
PROTHROM AB SERPL-ACNC: 17.9 SEC — HIGH (ref 9.5–13)
SAO2 % BLDV: 45 % — LOW (ref 67–88)
SPECIMEN SOURCE: SIGNIFICANT CHANGE UP
SPECIMEN SOURCE: SIGNIFICANT CHANGE UP
VANCOMYCIN TROUGH SERPL-MCNC: 9.7 UG/ML — LOW (ref 10–20)
VANCOMYCIN TROUGH SERPL-MCNC: 9.7 UG/ML — LOW (ref 10–20)

## 2024-03-23 PROCEDURE — 99291 CRITICAL CARE FIRST HOUR: CPT

## 2024-03-23 PROCEDURE — 93321 DOPPLER ECHO F-UP/LMTD STD: CPT | Mod: 26

## 2024-03-23 PROCEDURE — 99232 SBSQ HOSP IP/OBS MODERATE 35: CPT

## 2024-03-23 PROCEDURE — 71045 X-RAY EXAM CHEST 1 VIEW: CPT | Mod: 26

## 2024-03-23 PROCEDURE — 93308 TTE F-UP OR LMTD: CPT | Mod: 26

## 2024-03-23 PROCEDURE — 31645 BRNCHSC W/THER ASPIR 1ST: CPT

## 2024-03-23 PROCEDURE — 99292 CRITICAL CARE ADDL 30 MIN: CPT

## 2024-03-23 PROCEDURE — 76705 ECHO EXAM OF ABDOMEN: CPT | Mod: 26

## 2024-03-23 RX ORDER — CASPOFUNGIN ACETATE 7 MG/ML
70 INJECTION, POWDER, LYOPHILIZED, FOR SOLUTION INTRAVENOUS ONCE
Refills: 0 | Status: COMPLETED | OUTPATIENT
Start: 2024-03-23 | End: 2024-03-23

## 2024-03-23 RX ORDER — VANCOMYCIN HCL 1 G
VIAL (EA) INTRAVENOUS
Refills: 0 | Status: DISCONTINUED | OUTPATIENT
Start: 2024-03-23 | End: 2024-03-24

## 2024-03-23 RX ORDER — CASPOFUNGIN ACETATE 7 MG/ML
50 INJECTION, POWDER, LYOPHILIZED, FOR SOLUTION INTRAVENOUS EVERY 24 HOURS
Refills: 0 | Status: DISCONTINUED | OUTPATIENT
Start: 2024-03-24 | End: 2024-03-24

## 2024-03-23 RX ORDER — SODIUM BICARBONATE 1 MEQ/ML
50 SYRINGE (ML) INTRAVENOUS ONCE
Refills: 0 | Status: COMPLETED | OUTPATIENT
Start: 2024-03-23 | End: 2024-03-23

## 2024-03-23 RX ORDER — CALCIUM GLUCONATE 100 MG/ML
1000 VIAL (ML) INTRAVENOUS ONCE
Refills: 0 | Status: DISCONTINUED | OUTPATIENT
Start: 2024-03-23 | End: 2024-03-23

## 2024-03-23 RX ORDER — LANOLIN/MINERAL OIL
1 LOTION (ML) TOPICAL
Refills: 0 | Status: DISCONTINUED | OUTPATIENT
Start: 2024-03-23 | End: 2024-03-23

## 2024-03-23 RX ORDER — KETAMINE HYDROCHLORIDE 100 MG/ML
50 INJECTION INTRAMUSCULAR; INTRAVENOUS ONCE
Refills: 0 | Status: DISCONTINUED | OUTPATIENT
Start: 2024-03-23 | End: 2024-03-23

## 2024-03-23 RX ORDER — VANCOMYCIN HCL 1 G
750 VIAL (EA) INTRAVENOUS EVERY 12 HOURS
Refills: 0 | Status: DISCONTINUED | OUTPATIENT
Start: 2024-03-24 | End: 2024-03-24

## 2024-03-23 RX ORDER — CALCIUM CHLORIDE
1000 POWDER (GRAM) MISCELLANEOUS ONCE
Refills: 0 | Status: COMPLETED | OUTPATIENT
Start: 2024-03-23 | End: 2024-03-23

## 2024-03-23 RX ORDER — OXYCODONE HYDROCHLORIDE 5 MG/1
5 TABLET ORAL EVERY 6 HOURS
Refills: 0 | Status: DISCONTINUED | OUTPATIENT
Start: 2024-03-23 | End: 2024-03-24

## 2024-03-23 RX ORDER — CASPOFUNGIN ACETATE 7 MG/ML
INJECTION, POWDER, LYOPHILIZED, FOR SOLUTION INTRAVENOUS
Refills: 0 | Status: DISCONTINUED | OUTPATIENT
Start: 2024-03-23 | End: 2024-03-24

## 2024-03-23 RX ORDER — DEXTROSE 50 % IN WATER 50 %
50 SYRINGE (ML) INTRAVENOUS ONCE
Refills: 0 | Status: COMPLETED | OUTPATIENT
Start: 2024-03-23 | End: 2024-03-23

## 2024-03-23 RX ORDER — INSULIN HUMAN 100 [IU]/ML
10 INJECTION, SOLUTION SUBCUTANEOUS ONCE
Refills: 0 | Status: COMPLETED | OUTPATIENT
Start: 2024-03-23 | End: 2024-03-23

## 2024-03-23 RX ORDER — KETAMINE HYDROCHLORIDE 100 MG/ML
20 INJECTION INTRAMUSCULAR; INTRAVENOUS ONCE
Refills: 0 | Status: DISCONTINUED | OUTPATIENT
Start: 2024-03-23 | End: 2024-03-23

## 2024-03-23 RX ORDER — CHLORHEXIDINE GLUCONATE 213 G/1000ML
15 SOLUTION TOPICAL EVERY 12 HOURS
Refills: 0 | Status: DISCONTINUED | OUTPATIENT
Start: 2024-03-23 | End: 2024-03-24

## 2024-03-23 RX ORDER — VANCOMYCIN HCL 1 G
750 VIAL (EA) INTRAVENOUS ONCE
Refills: 0 | Status: COMPLETED | OUTPATIENT
Start: 2024-03-23 | End: 2024-03-23

## 2024-03-23 RX ORDER — ANGIOTENSIN II 2.5 MG/ML
15 INJECTION INTRAVENOUS
Qty: 2.5 | Refills: 0 | Status: DISCONTINUED | OUTPATIENT
Start: 2024-03-23 | End: 2024-03-24

## 2024-03-23 RX ADMIN — Medication 50 MILLIEQUIVALENT(S): at 11:55

## 2024-03-23 RX ADMIN — Medication 500 MILLIGRAM(S): at 12:40

## 2024-03-23 RX ADMIN — Medication 100 MILLIGRAM(S): at 13:20

## 2024-03-23 RX ADMIN — Medication 37.5 MICROGRAM(S): at 22:01

## 2024-03-23 RX ADMIN — SODIUM CHLORIDE 3 MILLILITER(S): 9 INJECTION INTRAMUSCULAR; INTRAVENOUS; SUBCUTANEOUS at 05:36

## 2024-03-23 RX ADMIN — PREGABALIN 1000 MICROGRAM(S): 225 CAPSULE ORAL at 12:40

## 2024-03-23 RX ADMIN — MIDODRINE HYDROCHLORIDE 20 MILLIGRAM(S): 2.5 TABLET ORAL at 05:09

## 2024-03-23 RX ADMIN — CHLORHEXIDINE GLUCONATE 15 MILLILITER(S): 213 SOLUTION TOPICAL at 17:28

## 2024-03-23 RX ADMIN — Medication 5 MILLIGRAM(S): at 21:12

## 2024-03-23 RX ADMIN — Medication 100 MILLIGRAM(S): at 12:40

## 2024-03-23 RX ADMIN — Medication 1 APPLICATION(S): at 05:36

## 2024-03-23 RX ADMIN — INSULIN HUMAN 10 UNIT(S): 100 INJECTION, SOLUTION SUBCUTANEOUS at 20:30

## 2024-03-23 RX ADMIN — OXYCODONE HYDROCHLORIDE 5 MILLIGRAM(S): 5 TABLET ORAL at 13:18

## 2024-03-23 RX ADMIN — Medication 50 MILLIEQUIVALENT(S): at 13:19

## 2024-03-23 RX ADMIN — MIDODRINE HYDROCHLORIDE 20 MILLIGRAM(S): 2.5 TABLET ORAL at 13:21

## 2024-03-23 RX ADMIN — CASPOFUNGIN ACETATE 260 MILLIGRAM(S): 7 INJECTION, POWDER, LYOPHILIZED, FOR SOLUTION INTRAVENOUS at 12:08

## 2024-03-23 RX ADMIN — MIDODRINE HYDROCHLORIDE 20 MILLIGRAM(S): 2.5 TABLET ORAL at 21:12

## 2024-03-23 RX ADMIN — Medication 50 MILLIEQUIVALENT(S): at 20:30

## 2024-03-23 RX ADMIN — LIDOCAINE 1 PATCH: 4 CREAM TOPICAL at 12:38

## 2024-03-23 RX ADMIN — CHLORHEXIDINE GLUCONATE 15 MILLILITER(S): 213 SOLUTION TOPICAL at 05:09

## 2024-03-23 RX ADMIN — LIDOCAINE 1 PATCH: 4 CREAM TOPICAL at 19:52

## 2024-03-23 RX ADMIN — EPINEPHRINE 12.7 MICROGRAM(S)/KG/MIN: 0.3 INJECTION INTRAMUSCULAR; SUBCUTANEOUS at 19:34

## 2024-03-23 RX ADMIN — OXYCODONE HYDROCHLORIDE 5 MILLIGRAM(S): 5 TABLET ORAL at 13:50

## 2024-03-23 RX ADMIN — KETAMINE HYDROCHLORIDE 20 MILLIGRAM(S): 100 INJECTION INTRAMUSCULAR; INTRAVENOUS at 11:30

## 2024-03-23 RX ADMIN — VASOPRESSIN 6 UNIT(S)/MIN: 20 INJECTION INTRAVENOUS at 19:34

## 2024-03-23 RX ADMIN — LIDOCAINE 1 PATCH: 4 CREAM TOPICAL at 23:29

## 2024-03-23 RX ADMIN — Medication 3.81 MICROGRAM(S)/KG/MIN: at 07:33

## 2024-03-23 RX ADMIN — AMIODARONE HYDROCHLORIDE 200 MILLIGRAM(S): 400 TABLET ORAL at 05:09

## 2024-03-23 RX ADMIN — Medication 10.2 MICROGRAM(S)/KG/MIN: at 19:33

## 2024-03-23 RX ADMIN — INSULIN HUMAN 10 UNIT(S): 100 INJECTION, SOLUTION SUBCUTANEOUS at 12:05

## 2024-03-23 RX ADMIN — Medication 50 MILLILITER(S): at 12:07

## 2024-03-23 RX ADMIN — Medication 50 MILLIEQUIVALENT(S): at 16:19

## 2024-03-23 RX ADMIN — Medication 250 MILLIGRAM(S): at 19:56

## 2024-03-23 RX ADMIN — SENNA PLUS 2 TABLET(S): 8.6 TABLET ORAL at 21:12

## 2024-03-23 RX ADMIN — CEFEPIME 100 MILLIGRAM(S): 1 INJECTION, POWDER, FOR SOLUTION INTRAMUSCULAR; INTRAVENOUS at 17:25

## 2024-03-23 RX ADMIN — Medication 5 MILLIGRAM(S): at 13:21

## 2024-03-23 RX ADMIN — TRANEXAMIC ACID 500 MILLIGRAM(S): 100 INJECTION, SOLUTION INTRAVENOUS at 13:56

## 2024-03-23 RX ADMIN — Medication 1000 MILLIGRAM(S): at 11:55

## 2024-03-23 RX ADMIN — VASOPRESSIN 6 UNIT(S)/MIN: 20 INJECTION INTRAVENOUS at 07:34

## 2024-03-23 RX ADMIN — CEFEPIME 100 MILLIGRAM(S): 1 INJECTION, POWDER, FOR SOLUTION INTRAMUSCULAR; INTRAVENOUS at 05:11

## 2024-03-23 RX ADMIN — TRANEXAMIC ACID 500 MILLIGRAM(S): 100 INJECTION, SOLUTION INTRAVENOUS at 03:12

## 2024-03-23 RX ADMIN — Medication 100 MILLIGRAM(S): at 05:11

## 2024-03-23 RX ADMIN — TRANEXAMIC ACID 500 MILLIGRAM(S): 100 INJECTION, SOLUTION INTRAVENOUS at 05:53

## 2024-03-23 RX ADMIN — Medication 1 MILLIGRAM(S): at 12:41

## 2024-03-23 RX ADMIN — PANTOPRAZOLE SODIUM 40 MILLIGRAM(S): 20 TABLET, DELAYED RELEASE ORAL at 17:28

## 2024-03-23 RX ADMIN — Medication 3.81 MICROGRAM(S)/KG/MIN: at 19:34

## 2024-03-23 RX ADMIN — DEXMEDETOMIDINE HYDROCHLORIDE IN 0.9% SODIUM CHLORIDE 0.34 MICROGRAM(S)/KG/HR: 4 INJECTION INTRAVENOUS at 19:33

## 2024-03-23 RX ADMIN — Medication 1000 MILLIGRAM(S): at 10:15

## 2024-03-23 RX ADMIN — PANTOPRAZOLE SODIUM 40 MILLIGRAM(S): 20 TABLET, DELAYED RELEASE ORAL at 05:11

## 2024-03-23 RX ADMIN — DEXMEDETOMIDINE HYDROCHLORIDE IN 0.9% SODIUM CHLORIDE 0.34 MICROGRAM(S)/KG/HR: 4 INJECTION INTRAVENOUS at 07:30

## 2024-03-23 RX ADMIN — Medication 100 MILLIGRAM(S): at 21:11

## 2024-03-23 RX ADMIN — SODIUM CHLORIDE 3 MILLILITER(S): 9 INJECTION INTRAMUSCULAR; INTRAVENOUS; SUBCUTANEOUS at 13:28

## 2024-03-23 RX ADMIN — OXYCODONE HYDROCHLORIDE 5 MILLIGRAM(S): 5 TABLET ORAL at 23:55

## 2024-03-23 RX ADMIN — EPINEPHRINE 12.7 MICROGRAM(S)/KG/MIN: 0.3 INJECTION INTRAMUSCULAR; SUBCUTANEOUS at 07:32

## 2024-03-23 RX ADMIN — SERTRALINE 50 MILLIGRAM(S): 25 TABLET, FILM COATED ORAL at 05:09

## 2024-03-23 RX ADMIN — Medication 10.2 MICROGRAM(S)/KG/MIN: at 07:31

## 2024-03-23 RX ADMIN — SODIUM CHLORIDE 3 MILLILITER(S): 9 INJECTION INTRAMUSCULAR; INTRAVENOUS; SUBCUTANEOUS at 20:49

## 2024-03-23 RX ADMIN — Medication 50 MILLILITER(S): at 20:34

## 2024-03-23 RX ADMIN — Medication 100 MILLIGRAM(S): at 06:16

## 2024-03-23 RX ADMIN — CHLORHEXIDINE GLUCONATE 1 APPLICATION(S): 213 SOLUTION TOPICAL at 05:15

## 2024-03-23 RX ADMIN — ANGIOTENSIN II 6.1 NANOGRAM(S)/KG/MIN: 2.5 INJECTION INTRAVENOUS at 10:01

## 2024-03-23 RX ADMIN — Medication 63.75 MILLIMOLE(S): at 06:52

## 2024-03-23 RX ADMIN — Medication 1 APPLICATION(S): at 17:38

## 2024-03-23 RX ADMIN — Medication 5 MILLIGRAM(S): at 05:10

## 2024-03-23 RX ADMIN — Medication 50 MILLIEQUIVALENT(S): at 19:00

## 2024-03-23 NOTE — PROGRESS NOTE ADULT - SUBJECTIVE AND OBJECTIVE BOX
Follow Up:   fever    Interval History/ROS:  febrile overnight , one loose bm this am, resp secretions sent for culture    Allergies  No Known Allergies    ANTIMICROBIALS:  cefepime   IVPB 2000 every 12 hours  vancomycin  IVPB 500 every 12 hours  vancomycin  IVPB        OTHER MEDS:  MEDICATIONS  (STANDING):  allopurinol 100 daily  aMIOdarone    Tablet 200 daily  dexMEDEtomidine Infusion 0.02 <Continuous>  dextrose 50% Injectable 25 once  DOBUTamine Infusion 10 <Continuous>  EPINEPHrine    Infusion 0.1 <Continuous>  epoetin zara-epbx (RETACRIT) Injectable 20000 <User Schedule>  hydrocortisone sodium succinate Injectable 100 every 8 hours  insulin lispro (ADMELOG) corrective regimen sliding scale  every 6 hours  levothyroxine Injectable 37.5 at bedtime  melatonin 5 at bedtime  metoclopramide Injectable 5 every 8 hours  midodrine 20 every 8 hours  norepinephrine Infusion 0.06 <Continuous>  pantoprazole  Injectable 40 every 12 hours  polyethylene glycol 3350 17 daily  senna 2 at bedtime  sertraline 50 daily  tranexamic acid Injectable for Nebulization 500 every 8 hours  vasopressin Infusion 0.04 <Continuous>      Vital Signs Last 24 Hrs  T(C): 38 (23 Mar 2024 08:00), Max: 38.8 (22 Mar 2024 20:00)  T(F): 100.4 (23 Mar 2024 08:00), Max: 101.8 (22 Mar 2024 20:00)  HR: 82 (23 Mar 2024 09:06) (79 - 98)  BP: --  BP(mean): --  RR: 28 (23 Mar 2024 09:00) (18 - 50)  SpO2: 96% (23 Mar 2024 09:06) (89% - 100%)    Parameters below as of 23 Mar 2024 08:00  Patient On (Oxygen Delivery Method): ventilator    O2 Concentration (%): 50    PHYSICAL EXAM:  General:  NAD, HD in progress  Neurology: sedated on vent  Respiratory: Clear to auscultation bilaterally  CV: RRR, S1S2, no murmurs, rubs or gallops  Abdominal: Soft, Non-tender, non-distended  Line Sites: Clear  Skin: No rash                          8.9    18.48 )-----------( 102      ( 22 Mar 2024 23:12 )             27.3   WBC Count: 18.48 (03-22 @ 23:12)  WBC Count: 19.60 (03-22 @ 00:43)  WBC Count: 20.18 (03-21 @ 00:22)  WBC Count: 20.93 (03-20 @ 00:51)  WBC Count: 19.18 (03-19 @ 00:26)    03-22    137  |  103  |  33<H>  ----------------------------<  170<H>  4.7   |  22  |  0.92    Ca    8.7      22 Mar 2024 23:12  Phos  2.1     03-22  Mg     2.6     03-22    TPro  6.1  /  Alb  4.4  /  TBili  14.7<H>  /  DBili  x   /  AST  281<H>  /  ALT  91<H>  /  AlkPhos  230<H>  03-22            MICROBIOLOGY:  .Blood Blood-Peripheral  03-21-24   No growth at 24 hours  --  --      .Blood Blood-Peripheral  03-21-24   No growth at 24 hours  --  --      .Stool Feces  03-20-24   No Protozoa seen by trichrome stain  No Helminths or Protozoa seen in formalin concentrate  performed by iodine stain  (routine O+P not evaluated for Microsporidia,  Cryptosporidia or Cyclospora)  One negative sample does not necessarily rule  out the presence of a parasitic infection.  --  --      .Stool Feces  03-20-24   No enteric pathogens isolated.  (Stool culture examined for Salmonella,  Shigella, Campylobacter, Aeromonas, Plesiomonas,  Vibrio, E.coli O157 and Yersinia)  --  --      .Blood Blood-Peripheral  03-19-24   No growth at 72 Hours  --  --      .Blood Blood  03-13-24   No growth at 5 days  --  --      .Blood Blood  03-13-24   No growth at 5 days  --  --      .Blood Blood-Peripheral  03-12-24   No growth at 5 days  --  --      .Blood Blood-Peripheral  03-04-24   No growth at 5 days  --  --        .Blood Blood-Peripheral  .Blood Blood-Peripheral  .Stool Feces  .Stool Feces    Vancomycin Level, Trough: 9.7 ug/mL (03-23-24 @ 05:11)  Vancomycin Level, Trough: 10.4 ug/mL (03-22-24 @ 17:13)  v      Clostridium difficile GD Toxins A&amp;B, EIA:   Negative (03-19-24 @ 23:56)  Clostridium difficile GDH Interpretation: Negative for toxigenic C. Difficile.  This specimen is negative for C.  Difficile glutamate dehydrogenase (GDH) antigen and negative for C.  Difficile Toxins A & B, by EIA.   (03-19-24 @ 23:56)    GI PCR Panel Stool (03.20.24 @ 17:54)   GI PCR Panel: Detected:   Enterotoxigenic E.coli (ETEC) lt/st: Detected  Shiga-like toxin-producing E.coli (STEC) stx1/stx2: Detected    RADIOLOGY:  < from: Xray Chest 1 View- PORTABLE-Routine (Xray Chest 1 View- PORTABLE-Routine in AM.) (03.22.24 @ 02:58) >  FINDINGS:  3/21/2024 1:52 AM  Left chest wall AICD. Enteric tube terminates in the distal stomach.   Bilateral central venous catheters terminate in the SVC. Endotracheal   tube terminates in the mid trachea.  The heart size is not accurately assessed on this projection.  Diffuse bilateral periventricular hazy opacities with bilateral   peripheral linear opacities, compatible with pulmonary edema.  There is no pneumothorax or large pleuraleffusion.    3/21/2024 9:11 PM  No significant change.    3/22/2024 1:14 AM  No significant change.    < end of copied text >      Jeff Navarro MD; Division of Infectious Disease; Pager: [phone]; nights and weekends: [phone]

## 2024-03-23 NOTE — PROGRESS NOTE ADULT - SUBJECTIVE AND OBJECTIVE BOX
Patient seen and examined at the bedside.    Remains critically ill on continuous ICU monitoring.      Brief Summary:  82 y/o M s/p Tx to ICU for Hypotension/CHF/renal failure    24 Hour events:      Objective:  Vital Signs Last 24 Hrs  T(C): 36.8 (23 Mar 2024 04:00), Max: 38.8 (22 Mar 2024 20:00)  T(F): 98.2 (23 Mar 2024 04:00), Max: 101.8 (22 Mar 2024 20:00)  HR: 85 (23 Mar 2024 06:00) (79 - 98)  BP: --  BP(mean): --  RR: 30 (23 Mar 2024 06:00) (18 - 50)  SpO2: 100% (23 Mar 2024 06:00) (89% - 100%)    Parameters below as of 23 Mar 2024 04:00  Patient On (Oxygen Delivery Method): ventilator    O2 Concentration (%): 50    Mode: AC/ CMV (Assist Control/ Continuous Mandatory Ventilation)  RR (machine): 22  TV (machine): 550  FiO2: 50  PEEP: 7  ITime: 1  MAP: 12  PIP: 29              Physical Exam:   General: Intubated   Neurology: Sedated   Respiratory: Clear bilaterally   CV: Paced  Abdominal: Soft, Nontender  Extremities: Warm, well-perfused  Royal    -------------------------------------------------------------------------------------------------------------------------------    Labs:                        8.9    18.48 )-----------( 102      ( 22 Mar 2024 23:12 )             27.3     03-22    137  |  103  |  33<H>  ----------------------------<  170<H>  4.7   |  22  |  0.92    Ca    8.7      22 Mar 2024 23:12  Phos  2.1     03-22  Mg     2.6     03-22    TPro  6.1  /  Alb  4.4  /  TBili  14.7<H>  /  DBili  x   /  AST  281<H>  /  ALT  91<H>  /  AlkPhos  230<H>  03-22    LIVER FUNCTIONS - ( 22 Mar 2024 23:12 )  Alb: 4.4 g/dL / Pro: 6.1 g/dL / ALK PHOS: 230 U/L / ALT: 91 U/L / AST: 281 U/L / GGT: x           PT/INR - ( 22 Mar 2024 23:12 )   PT: 17.9 sec;   INR: 1.73 ratio         PTT - ( 22 Mar 2024 23:12 )  PTT:36.6 sec  ABG - ( 22 Mar 2024 23:10 )  pH, Arterial: 7.33  pH, Blood: x     /  pCO2: 44    /  pO2: 84    / HCO3: 23    / Base Excess: -2.7  /  SaO2: 98.1      ------------------------------------------------------------------------------------------------------------------------------  Assessment:  82 y/o M with ICM/HFrEF (now 30%; LVIDd 6.7 cm; prev req inotrope), CAD c/b IWMI (1994) s/p angioplasty, aortic stenosis s/p bio-AVR 2004, VT arrest (2017) s/p ICD, Afib s/p multiple DCCV and ablation x2 (2020 and 2023; on AC), Aflutter s/p WARREN/DCCV (8/23), prior Ao aneurysm s/p Bentall (2021), severe MR prior MVr (2021) with MAC (precludes M-KRISTIE d/t his anatomy; turned down for TMVR trials by [employer]), atrophic kidney with CKD (b/l Cr 2.5-3).    Tx to ICU for Hypotension/CHF/renal failure  Hemodynamic instability  Hypovolemia  Post op respiratory insufficiency  Acute blood loss anemia  Thrombocytopenia  Leukocytosis      Plan:   ***Neuro***  Sedated with Precedex for vent synchrony/comfort   Postoperative acute pain control with Tylenol, Gabapentin, and prns.  Continue with Melatonin for sleep regimen   Continue Zoloft      ***Cardiovascular***  At high risk for hemodynamic instability and cardiac arrhythmias.  Wean pressors for MAP > 65 mm Hg  Continue Dobutamine and Epinephrine   PO Amiodarone for rate control  Continue Midodrine      ***Pulmonary***  Postoperative acute respiratory failure  Wean ventilator as tolerated.  Deep breathing and coughing exercises.    ***GI***  Continue Tube feeds  Protonix for stress ulcer prophylaxis  Reglan for gut motility    Bowel regimen.    ***Renal***  CKD  CCVHD - goal net negative  Trend Creatinine   Royal catheter for strict I/O measurements.  Replete electrolytes as indicated.  RETACRIT for renal support  Allopurinol for gout     ***ID***  Trend leukocytosis  Empiric dosing with Cefepime & Vanco  Tranexamic nebs for hemoptysis      ***Endocrine***  Hyperglycemia- Insulin sliding scale.   Synthroid for hypothyroidism    ***Hematology***  Acute blood loss anemia and thrombocytopenia - no current transfusion indication          Patient requires continuous monitoring with bedside rhythm monitoring, pulse oximetry monitoring, and continuous central venous and arterial pressure monitoring; and intermittent blood gas analysis. Care plan discussed with the ICU care team.   Patient remains critical, at risk for life threatening decompensation.    I have spent 30 minutes providing critical care management to this patient.    By signing my name below, I, Oh Thomas, attest that this documentation has been prepared under the direction and in the presence of Demi Caban MD  Electronically signed: Oh Thomas, 03-23-24 @ 06:06    I, Demi Caban MD, personally performed the services described in this documentation. all medical record entries made by the scribe were at my direction and in my presence. I have reviewed the chart and agree that the record reflects my personal performance and is accurate and complete  Electronically signed: Demi Caban MD  Patient seen and examined at the bedside.    Remains critically ill on continuous ICU monitoring.      Brief Summary:  82 y/o M with severe MR and cardiogenic shock    24 Hour events:  Remains on very high dose inotropes and pressors  Cyanokit given with some improvement.      Objective:  Vital Signs Last 24 Hrs  T(C): 36.8 (23 Mar 2024 04:00), Max: 38.8 (22 Mar 2024 20:00)  T(F): 98.2 (23 Mar 2024 04:00), Max: 101.8 (22 Mar 2024 20:00)  HR: 85 (23 Mar 2024 06:00) (79 - 98)  BP: --  BP(mean): --  RR: 30 (23 Mar 2024 06:00) (18 - 50)  SpO2: 100% (23 Mar 2024 06:00) (89% - 100%)    Parameters below as of 23 Mar 2024 04:00  Patient On (Oxygen Delivery Method): ventilator    O2 Concentration (%): 50    Mode: AC/ CMV (Assist Control/ Continuous Mandatory Ventilation)  RR (machine): 22  TV (machine): 550  FiO2: 50  PEEP: 7  ITime: 1  MAP: 12  PIP: 29              Physical Exam:   General: Intubated   Neurology: Sedated, but following  HEENT: Tongue with black pressure injury  Respiratory: Clear bilaterally   CV: Paced  Abdominal: Soft, Nontender  Extremities: Warm, well-perfused  Royal    -------------------------------------------------------------------------------------------------------------------------------    Labs:                        8.9    18.48 )-----------( 102      ( 22 Mar 2024 23:12 )             27.3     03-22    137  |  103  |  33<H>  ----------------------------<  170<H>  4.7   |  22  |  0.92    Ca    8.7      22 Mar 2024 23:12  Phos  2.1     03-22  Mg     2.6     03-22    TPro  6.1  /  Alb  4.4  /  TBili  14.7<H>  /  DBili  x   /  AST  281<H>  /  ALT  91<H>  /  AlkPhos  230<H>  03-22    LIVER FUNCTIONS - ( 22 Mar 2024 23:12 )  Alb: 4.4 g/dL / Pro: 6.1 g/dL / ALK PHOS: 230 U/L / ALT: 91 U/L / AST: 281 U/L / GGT: x           PT/INR - ( 22 Mar 2024 23:12 )   PT: 17.9 sec;   INR: 1.73 ratio         PTT - ( 22 Mar 2024 23:12 )  PTT:36.6 sec  ABG - ( 22 Mar 2024 23:10 )  pH, Arterial: 7.33  pH, Blood: x     /  pCO2: 44    /  pO2: 84    / HCO3: 23    / Base Excess: -2.7  /  SaO2: 98.1      ------------------------------------------------------------------------------------------------------------------------------  Assessment:  82 y/o M with severe MR and Cardiogenic shock    Hemodynamic instability  Acute on chronic systolic heart failure  Acute respiratory failure  Acute blood loss anemia  Thrombocytopenia  Leukocytosis  Hyperbilirubinemia  Hypophosphatemia  SURESH on CKD      Plan:   ***Neuro***  Sedated with Precedex for vent synchrony/comfort   Acute pain control with prns  Continue with Melatonin for sleep regimen   Continue Zoloft    ***Cardiovascular***  At high risk for ongoing hemodynamic instability and cardiac arrhythmias.  Wean pressors for MAP > 65 mm Hg  Continue Dobutamine and Epinephrine   Amiodarone daily  Continue Midodrine  Stress dose steroids started overnight.  Repeat ECHO pending today.    ***Pulmonary***  Acute respiratory failure  Continue vent support.  Deep breathing and coughing exercises.    ***GI***  Continue Tube feeds  Protonix for stress ulcer prophylaxis  Elevated LFTs and bilirubin  RUQ ultrasound pending.  Bowel regimen.    ***Renal***  SURESH on CKD  CRRT - pull fluid as able.  Royal catheter for strict I/O measurements.  Replete electrolytes as indicated.     ***ID***  Trend leukocytosis  Empiric coverage with Vanco and Cefepime.  Will add antifungal coverage.    ***Endocrine***  Hyperglycemia - Insulin sliding scale.   Synthroid for hypothyroidism    ***Hematology***  Acute blood loss anemia and thrombocytopenia - no current transfusion indication  Trend CBC and coags and monitor for bleeding.  Anticoagulation and VTE prophylaxis remains on hold.      Patient requires continuous monitoring with bedside rhythm monitoring, pulse oximetry monitoring, and continuous central venous and arterial pressure monitoring; and intermittent blood gas analysis. Care plan discussed with the ICU care team.   Patient remains critical, at risk for life threatening decompensation.    I have spent 38 minutes providing critical care management to this patient.    By signing my name below, I, Oh Thomas, attest that this documentation has been prepared under the direction and in the presence of Demi Caban MD  Electronically signed: Oh Thomas, 03-23-24 @ 06:06    I, Demi Caban MD, personally performed the services described in this documentation. all medical record entries made by the scribe were at my direction and in my presence. I have reviewed the chart and agree that the record reflects my personal performance and is accurate and complete  Electronically signed: Demi Caban MD

## 2024-03-23 NOTE — PROGRESS NOTE ADULT - SUBJECTIVE AND OBJECTIVE BOX
Patient seen and examined at the bedside.    Remained critically ill on continuous ICU monitoring.    OBJECTIVE:  Vital Signs Last 24 Hrs  T(C): 36.6 (23 Mar 2024 16:00), Max: 38.8 (22 Mar 2024 20:00)  T(F): 97.9 (23 Mar 2024 16:00), Max: 101.8 (22 Mar 2024 20:00)  HR: 85 (23 Mar 2024 16:45) (80 - 98)  BP: --  BP(mean): --  RR: 14 (23 Mar 2024 16:45) (14 - 50)  SpO2: 63% (23 Mar 2024 13:00) (52% - 100%)    Parameters below as of 23 Mar 2024 16:00  Patient On (Oxygen Delivery Method): ventilator    O2 Concentration (%): 80    Physical Exam:  General: Intubated, In bed with lines & multiple gtt   Neurology: Able f/u commands moves all 4   Eyes: bilateral pupils equal and reactive   ENT/Neck: Neck supple, trachea midline, ++ JVD   Respiratory: Rales noted bilaterally   CV: [x] + holosystolic murmur        [x] Paced rhythm [x] PPM - DDD 80  Abdominal: Soft, NT, ND +BS,   Extremities: no edema, + peripheral pulses   Skin: No Rashes, Hematoma, Ecchymosis                           Assessment:  83 yr male 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, 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, Severe MR valvular heart failure , CRI, AICD   Decompensated heart failure, cardiogenic shock with renal failure  Marginal hemodynamics with ^^ dose of multiple pressor & iontrope  Anemia    sepsis   Hypophosphatemia  Hematuria     24 hr events remained critically ill with worsening hypotension ^ Pressor requirement on no MCS          Plan:   ***Neuro***  [x] Precedex  [x]  neurologically intact        Continue  neuro assessment        Melatonin & Zoloft     ***Cardiovascular***  Cardiogenic shock complicated with MODS unable to place IABP sec vascular issues    Paced  80-90 / CVP 19-21  / MAP 60-75  / Hct 27.3% / Lactate 4.5  [x] Levophed 0.06  mcg/kg/min Target MAP >65   [x] Epinephrine -  0.1 mcg/kg/min   [x] Vasopressin 0.04   units/min  [x] Angiotensin 15 nanograms/Kg/min  [x] Dobutamine - 10 mcg/kg/Min   [x] Midodrine 20 mgs Q8hrs  [x] CRT 0  cc/hr    [x] maintain Hct 24% RETACRIT + PRN Tx    [x] Amiodarone   [x] Mg >2 K >4 Replete PO4 f/u level   [x] cortisol >18 no indication to start stress dose steroids     High risk Sx candidate for MVR     ***Pulmonary***  Cardiogenic Pulmonary edema    Post op vent management  Titration of FiO2 and PEEP, follow SpO2, CXR, blood gases     Mode: CPAP with PS  FiO2: 80  PEEP: 5  PS: 8  MAP: 8  PIP: 15              Will plan to wean & extubate once pt is hemodynamically stable and not bleeding    ***GI***  Avoid high dose tube feeds given high pressor requirement   [x] Nepro TF @50cc/hr  [x] Protonix for stress ulcer prophylaxis  Bowel regimen with Miralax and Senna  Reglan for gut motility     ***Renal***  Anuric renal failure  Acute on chronic renal failure   Allopurinol for gout   Continue Kyleigh-crit for renal support   CVVHDF    ***ID***  Empiric dosing with Caspo/ Cefepime / IV Vanco   Blood culture X2 3/13 negative     ***Endocrine***  [x] Hyperglycemia : HbA1c 5.7%             - [x] ISS  [x] Hypothyroidism             - [x] Synthroid        Patient requires continuous monitoring with bedside rhythm monitoring, pulse oximetry monitoring, and continuous central venous and arterial pressure monitoring; and intermittent blood gas analysis. Care plan discussed with the ICU care team.   Patient remained critical, at risk for life threatening decompensation.    I have spent 67 minutes providing critical care management to this patient.    By signing my name below, I, Tressa Tamayo, attest that this documentation has been prepared under the direction and in the presence of Russell Deal MD   Electronically signed: Home Mendoza, 03-23-24 @ 16:59    I, Russell Deal, personally performed the services described in this documentation. all medical record entries made by the buddyibe were at my direction and in my presence. I have reviewed the chart and agree that the record reflects my personal performance and is accurate and complete  Electronically signed: Russell Deal MD  Patient seen and examined at the bedside.    Remained critically ill on continuous ICU monitoring.    OBJECTIVE:  Vital Signs Last 24 Hrs  T(C): 36.6 (23 Mar 2024 16:00), Max: 38.8 (22 Mar 2024 20:00)  T(F): 97.9 (23 Mar 2024 16:00), Max: 101.8 (22 Mar 2024 20:00)  HR: 85 (23 Mar 2024 16:45) (80 - 98)  BP: --  BP(mean): --  RR: 14 (23 Mar 2024 16:45) (14 - 50)  SpO2: 63% (23 Mar 2024 13:00) (52% - 100%)    Parameters below as of 23 Mar 2024 16:00  Patient On (Oxygen Delivery Method): ventilator    O2 Concentration (%): 80    Physical Exam:  General: Intubated, In bed with lines & multiple gtt   Neurology: Able f/u commands moves all 4   Eyes: bilateral pupils equal and reactive   ENT/Neck: Neck supple, trachea midline, ++ JVD   Respiratory: Rales noted bilaterally   CV: [x] + holosystolic murmur        [x] Paced rhythm [x] PPM - DDD 80  Abdominal: Soft, NT, ND +BS,   Extremities: no edema, + peripheral pulses   Skin: No Rashes, Hematoma, Ecchymosis                           Assessment:  83 yr male 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, 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, Severe MR valvular heart failure , CRI, AICD   Decompensated heart failure, cardiogenic shock with renal failure  Marginal hemodynamics with ^^ dose of multiple pressor & iontrope  Anemia    sepsis   Hypophosphatemia  Hematuria     24 hr events remained critically ill with worsening hypotension ^ and rising lactate. Pressor requirement on no MCS. Bronched for blood in ET tube today, was suctioned at bedside.          Plan:   ***Neuro***  [x] Precedex  [x]  neurologically intact        Continue  neuro assessment        Melatonin & Zoloft     ***Cardiovascular***  Cardiogenic shock complicated with MODS unable to place IABP sec vascular issues    Paced  80-90 / CVP 19-21  / MAP 60-75  / Hct 27.3% / Lactate 4.5  [x] Levophed 0.06  mcg/kg/min Target MAP >65   [x] Epinephrine -  0.1 mcg/kg/min   [x] Vasopressin 0.04   units/min  [x] Angiotensin 15 nanograms/Kg/min  [x] Dobutamine - 10 mcg/kg/Min   [x] Midodrine 20 mgs Q8hrs  CTU [x] 1 unit PRBC today   [x] CRT 0  cc/hr    [x] maintain Hct 24% RETACRIT + PRN Tx    [x] Amiodarone   [x] Mg >2 K >4 Replete PO4 f/u level   [x] cortisol >18 no indication to start stress dose steroids     High risk Sx candidate for MVR     ***Pulmonary***  Cardiogenic Pulmonary edema    Post op vent management  Titration of FiO2 and PEEP, follow SpO2, CXR, blood gases     Mode: CPAP with PS  FiO2: 80  PEEP: 5  PS: 8  MAP: 8  PIP: 15              Will plan to wean & extubate once pt is hemodynamically stable and not bleeding    ***GI***  Avoid high dose tube feeds given high pressor requirement   [x] Nepro TF @50cc/hr  [x] Protonix for stress ulcer prophylaxis  Bowel regimen with Miralax and Senna  Reglan for gut motility     ***Renal***  Anuric renal failure  Acute on chronic renal failure   Allopurinol for gout   Continue Kyleigh-crit for renal support   CVVHDF    ***ID***  Empiric dosing with Caspo/ Cefepime / IV Vanco   Blood culture X2 3/13 negative     ***Endocrine***  [x] Hyperglycemia : HbA1c 5.7%             - [x] ISS  [x] Hypothyroidism             - [x] Synthroid        Patient requires continuous monitoring with bedside rhythm monitoring, pulse oximetry monitoring, and continuous central venous and arterial pressure monitoring; and intermittent blood gas analysis. Care plan discussed with the ICU care team.   Patient remained critical, at risk for life threatening decompensation.    I have spent 67 minutes providing critical care management to this patient.    By signing my name below, I, Tressa Tamayo, attest that this documentation has been prepared under the direction and in the presence of Russell Deal MD   Electronically signed: Home Mendoza, 03-23-24 @ 16:59    I, Russell Deal, personally performed the services described in this documentation. all medical record entries made by the buddyibmarino were at my direction and in my presence. I have reviewed the chart and agree that the record reflects my personal performance and is accurate and complete  Electronically signed: Russell Deal MD  Patient seen and examined at the bedside.    Remained critically ill on continuous ICU monitoring.    OBJECTIVE:  Vital Signs Last 24 Hrs  T(C): 36.6 (23 Mar 2024 16:00), Max: 38.8 (22 Mar 2024 20:00)  T(F): 97.9 (23 Mar 2024 16:00), Max: 101.8 (22 Mar 2024 20:00)  HR: 85 (23 Mar 2024 16:45) (80 - 98)  BP: --  BP(mean): --  RR: 14 (23 Mar 2024 16:45) (14 - 50)  SpO2: 63% (23 Mar 2024 13:00) (52% - 100%)    Parameters below as of 23 Mar 2024 16:00  Patient On (Oxygen Delivery Method): ventilator    O2 Concentration (%): 80    Physical Exam:  General: Intubated, In bed with lines & multiple gtt   Neurology: Able f/u commands moves all 4   Eyes: bilateral pupils equal and reactive   ENT/Neck: Neck supple, trachea midline, ++ JVD   Respiratory: Rales noted bilaterally   CV: [x] + holosystolic murmur        [x] Paced rhythm [x] PPM - DDD 80  Abdominal: Soft, NT, ND +BS,   Extremities: no edema, + peripheral pulses   Skin: No Rashes, Hematoma, Ecchymosis                           Assessment:  83 yr male 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, 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, severe MR valvular heart failure , CRI, AICD   Decompensated heart failure in  cardiogenic shock with failing hemodynamics   Lactic acidosis  Renal failure w hyperkalemia   Passive congestive hepatopathy ^TB, Transaminitis   Anemia            24 hr events worsening hypotension inability to maintain MAP >60, Stress steroids &  Giapreza started emergent TTE RV volume & pressure overload severe TR, PHTN          Plan:   ***Neuro***  [x] Precedex  [x]  neurologically intact        Continue  neuro assessment        Melatonin & Zoloft     ***Cardiovascular***  Refractory hypotension escalating doses of triple pressors   TTE results noted    Cardiogenic shock complicated with MODS unable to place IABP sec vascular issues    Paced  80-90 / CVP 20-22  / MAP 60-75  / Hct 27.3% / Lactate ^ 5.8   [x] Levophed 0.8  mcg/kg/min Target MAP >65   [x] Epinephrine -  0.1 mcg/kg/min   [x] Vasopressin 0.1   units/ min  [x] Angiotensin 40 nanograms/Kg/min  [x] Dobutamine - 10 mcg/kg/Min   [x] Midodrine 20 mg Q8hrs   [x] 1 unit PRBC today  [X] Hydrocortisone 100 mg IV Q 8 hr   [x] CRT ^K with worsening acidosis  all bags changed shifted f/u levels        plan to remove fluid if able for RV volume overload        ++Fluid balance +5lit x 48 hr   [x] maintain Hct 24% RETACRIT + PRN Tx    [x] Amiodarone       High risk Sx candidate made DNR today      ***Pulmonary***  Cardiogenic Pulmonary edema    Post op vent management  Titration of FiO2 and PEEP, follow SpO2, CXR, blood gases     Mode: PCV  FiO2: 80  PEEP: 5  PS: 8  MAP: 8  PIP: 15                ***GI***  Avoid high dose tube feeds given high pressor requirement   [x] Nepro TF @50cc/hr WILL DECREASE TO 10 cc/hr given ^ LA   [x] Protonix for stress ulcer prophylaxis  Bowel regimen with Miralax and Senna  Reglan for gut motility     ***Renal***  Anuric renal failure  Acute on chronic renal failure   Allopurinol for gout   Continue Kyleigh-crit for renal support   CVVHDF    ***ID***  Empiric dosing with Caspo/ Cefepime / IV Vanco   Blood culture X2 3/21 NEGATIVE     ***Endocrine***  [x] Hyperglycemia : HbA1c 5.7%             - [x] ISS  [x] Hypothyroidism             - [x] Synthroid        Patient requires continuous monitoring with bedside rhythm monitoring, pulse oximetry monitoring, and continuous central venous and arterial pressure monitoring; and intermittent blood gas analysis. Care plan discussed with the ICU care team.   Patient remained critical, at risk for life threatening decompensation.    I have spent 67 minutes providing critical care management to this patient.    By signing my name below, I, Tressa Tamayo, attest that this documentation has been prepared under the direction and in the presence of Russell Deal MD   Electronically signed: Frieda Mendoza, 03-23-24 @ 16:59    I, Russell Deal, personally performed the services described in this documentation. all medical record entries made by the frieda were at my direction and in my presence. I have reviewed the chart and agree that the record reflects my personal performance and is accurate and complete  Electronically signed: Russell Deal MD

## 2024-03-23 NOTE — PROGRESS NOTE ADULT - ASSESSMENT
82 y/o M with ICM/HFrEF (now 30%; LVIDd 6.7 cm; prev req inotrope), CAD c/b IWMI (1994) s/p angioplasty, aortic stenosis s/p bio-AVR 2004, VT arrest (2017) s/p ICD, Afib s/p multiple DCCV and ablation x2 (2020 and 2023; on AC), Aflutter s/p WARREN/DCCV (8/23), prior Ao aneurysm s/p Bentall (2021), severe MR prior MVr (2021) with MAC (precludes M-KRISTIE d/t his anatomy; turned down for TMVR trials by [employer]), atrophic kidney with CKD (b/l Cr 2.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 Patient was initiated on bumex gtt with gradual improvement with Cr improving from 3.6 to 3.1. Patient was considered for MV re-op; however, patient was reluctant of the procedure and expressed wanting to follow up in the outpatient setting to schedule. During last admission, patient was also noted to be in flutter, EP was consulted, and patient underwent repeat WARREN/DCCV , on amiodarone. Patient was discharged home 2/28 on higher dose of diuretics, as per HF team. Today, patient was evaluated at CTS office four routine follow-up, pt c/o worsening shortness of breath and L>R LE edema, with redness/swelling on left dorsal aspect of the foot, readmitted to CTS service for further management.  noticed with rising creatinine and bun started on CRRT due to hypotension. respiratory failure intubated       1- SURESH on ckd   2- decompensated CHF  3- Mitral regurgitation   4- hypotension   5- hypothyroid   6- hyperuricemia   7- anemia      cont dobutamine drip   midodrine 20  mg tid   cont vasopressin drip  /levophed/epinephrine    CRRT with [id number] dialyzer;  given pt with hypotension cont  dfr 1200   0    cc hr fluid removal  as bp tolerates   see new orders   reatcrit 78551 U twice weekly   allopurinol 100 mg daily   hypophosphatemia supplement phos   d/w CTS team when seen earlier

## 2024-03-23 NOTE — PROCEDURE NOTE - NSICDXPROBLEMMLMBOX_GEN
IPSTART~^~1~^~[id number]~^~~^~IPEND  PKSTART~^~[id number]~^~PKEND  IPSTART~^~2~^~[id number]~^~~^~IPEND  PKSTART~^~[id number]~^~PKEND  IPSTART~^~3~^~[id number]~^~~^~IPEND  PKSTART~^~[id number]~^~PKEND  IPSTART~^~4~^~[id number]~^~~^~IPEND  PKSTART~^~[id number]~^~PKEND

## 2024-03-23 NOTE — PROGRESS NOTE ADULT - SUBJECTIVE AND OBJECTIVE BOX
[facility]   [phone]  RENAL FOLLOW UP NOTE  --------------------------------------------------------------------------------  Chief Complaint:    24 hour events/subjective:    seen earlier   on CRRT   on multiple pressors   intubated     PAST HISTORY  --------------------------------------------------------------------------------  No significant changes to PMH, PSH, FHx, SHx, unless otherwise noted    ALLERGIES & MEDICATIONS  --------------------------------------------------------------------------------  Allergies    No Known Allergies    Intolerances      Standing Inpatient Medications  allopurinol 100 milliGRAM(s) Oral daily  aMIOdarone    Tablet 200 milliGRAM(s) Oral daily  angiotensin II Infusion 15 NANOGram(s)/kG/Min IV Continuous <Continuous>  ascorbic acid 500 milliGRAM(s) Oral daily  Biotene Dry Mouth Oral Rinse 5 milliLiter(s) Swish and Spit daily  caspofungin IVPB      cefepime   IVPB 2000 milliGRAM(s) IV Intermittent every 12 hours  chlorhexidine 0.12% Liquid 15 milliLiter(s) Oral Mucosa every 12 hours  chlorhexidine 2% Cloths 1 Application(s) Topical <User Schedule>  CRRT Treatment    <Continuous>  cyanocobalamin 1000 MICROGram(s) Oral daily  dexMEDEtomidine Infusion 0.02 MICROgram(s)/kG/Hr IV Continuous <Continuous>  dextrose 50% Injectable 25 Gram(s) IV Push once  DOBUTamine Infusion 10 MICROgram(s)/kG/Min IV Continuous <Continuous>  EPINEPHrine    Infusion 0.1 MICROgram(s)/kG/Min IV Continuous <Continuous>  epoetin zara-epbx (RETACRIT) Injectable 85055 Unit(s) IV Push <User Schedule>  folic acid 1 milliGRAM(s) Oral daily  hydrocortisone sodium succinate Injectable 100 milliGRAM(s) IV Push every 8 hours  insulin lispro (ADMELOG) corrective regimen sliding scale   SubCutaneous every 6 hours  levothyroxine Injectable 37.5 MICROGram(s) IV Push at bedtime  lidocaine   4% Patch 1 Patch Transdermal daily  melatonin 5 milliGRAM(s) Oral at bedtime  metoclopramide Injectable 5 milliGRAM(s) IV Push every 8 hours  midodrine 20 milliGRAM(s) Oral every 8 hours  norepinephrine Infusion 0.06 MICROgram(s)/kG/Min IV Continuous <Continuous>  oxyCODONE    IR 5 milliGRAM(s) Oral every 6 hours  pantoprazole  Injectable 40 milliGRAM(s) IV Push every 12 hours  petrolatum white Ointment 1 Application(s) Topical two times a day  polyethylene glycol 3350 17 Gram(s) Oral daily  PrismaSATE Dialysate BGK 4 / 2.5 5000 milliLiter(s) CRRT <Continuous>  PrismaSOL Filtration BGK 0 / 2.5 5000 milliLiter(s) CRRT <Continuous>  PrismaSOL Filtration BGK 0 / 2.5 5000 milliLiter(s) CRRT <Continuous>  senna 2 Tablet(s) Oral at bedtime  sertraline 50 milliGRAM(s) Oral daily  sodium bicarbonate  Injectable 50 milliEquivalent(s) IV Push once  sodium chloride 0.9% lock flush 3 milliLiter(s) IV Push every 8 hours  thiamine 100 milliGRAM(s) Oral daily  vancomycin  IVPB      vancomycin  IVPB 750 milliGRAM(s) IV Intermittent once  vasopressin Infusion 0.04 Unit(s)/Min IV Continuous <Continuous>    PRN Inpatient Medications      REVIEW OF SYSTEMS  --------------------------------------------------------------------------------        VITALS/PHYSICAL EXAM  --------------------------------------------------------------------------------  T(C): 36.6 (03-23-24 @ 16:00), Max: 38.8 (03-22-24 @ 20:00)  HR: 84 (03-23-24 @ 19:00) (80 - 98)  BP: --  RR: 17 (03-23-24 @ 19:00) (14 - 50)  SpO2: 63% (03-23-24 @ 13:00) (52% - 100%)  Wt(kg): --        03-22-24 @ 07:01  -  03-23-24 @ 07:00  --------------------------------------------------------  IN: 3385.6 mL / OUT: 898 mL / NET: 2487.6 mL    03-23-24 @ 07:01  -  03-23-24 @ 19:21  --------------------------------------------------------  IN: 2757.2 mL / OUT: 8 mL / NET: 2749.2 mL      Physical Exam:  	      Gen:  intubated + IJ cath   	Pulm: decrease bs + coarse bs   	CV: RRR, S1S2; no rub  	Abd: +BS, soft, nontender/nondistended  	: No suprapubic tenderness  	UE: Warm,  +  cyanosis  no clubbing,  no edema  	LE: Warm,  +  cyanosis  no clubbing,  no   edema    LABS/STUDIES  --------------------------------------------------------------------------------              8.9    18.48 >-----------<  102      [03-22-24 @ 23:12]              27.3     137  |  103  |  33  ----------------------------<  170      [03-22-24 @ 23:12]  4.7   |  22  |  0.92        Ca     8.7     [03-22-24 @ 23:12]      Mg     2.6     [03-22-24 @ 23:12]      Phos  2.1     [03-22-24 @ 23:12]    TPro  6.1  /  Alb  4.4  /  TBili  14.7  /  DBili  x   /  AST  281  /  ALT  91  /  AlkPhos  230  [03-22-24 @ 23:12]    PT/INR: PT 17.9 , INR 1.73       [03-22-24 @ 23:12]  PTT: 36.6       [03-22-24 @ 23:12]          [03-22-24 @ 23:12]    Creatinine Trend:  SCr 0.92 [03-22 @ 23:12]  SCr 0.91 [03-22 @ 00:43]  SCr 0.94 [03-21 @ 00:22]  SCr 0.85 [03-20 @ 00:52]  SCr 0.81 [03-19 @ 00:25]      Iron 290, TIBC 430, %sat 67      [03-06-24 @ 13:57]  Ferritin 335      [03-06-24 @ 13:57]  PTH -- (Ca 8.9)      [03-05-24 @ 13:03]   242  TSH 2.02      [03-06-24 @ 13:57]

## 2024-03-23 NOTE — PROCEDURE NOTE - NSBRONCHFINDINGS_GEN_A_CORE_FT
Findings:  Bronchoscope inserted through ETT. ETT noted to be in good position with blood surrounding outside of ETT. Airway evaluation revealed Sharp Gail. SONIA and LLL evaluation revealed patent airway with moderate amount of blood on bronchial walls, lavaged & suctioned out. Lt airways appeared friable but no active bleeding noted. RUL, RML, RLL revealed patent airways with no blood or secretions. Bronchoscope then withdrawn from ETT. Minimal bleeding noted.    Specimens:  - BAL

## 2024-03-23 NOTE — PROGRESS NOTE ADULT - ASSESSMENT
84 y/o M with ICM/HFrEF (now 30%; LVIDd 6.7 cm; prev req inotrope), CAD c/b IWMI (1994) s/p angioplasty, aortic stenosis s/p bio-AVR 2004, VT arrest (2017) s/p ICD, Afib s/p multiple DCCV and ablation x2 (2020 and 2023; on AC), Aflutter s/p WARREN/DCCV (8/23), prior Ao aneurysm s/p Bentall (2021), severe MR prior MVr (2021) with MAC (precludes M-KRISTIE d/t his anatomy; turned down for TMVR trials by [facility]), atrophic kidney with CKD (b/l Cr 2.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 Patient was initiated on bumex gtt with gradual improvement with Cr improving from 3.6 to 3.1. Patient was considered for MV re-op; however, patient was reluctant of the procedure and expressed wanting to follow up in the outpatient setting to schedule. During last admission, patient was also noted to be in flutter, EP was consulted, and patient underwent repeat WARREN/DCCV , on amiodarone. Patient was discharged home 2/28 on higher dose of diuretics, as per HF team.pt returned with worsening SOB ,edema, but was  alert  chest x-ray compactible l with chf  cant r/o infection         Pt ws treated with empiric meropenem ( diflucan per  [facility])   cultures - so far negative      discussed with [facility] alison Chase suggestive of pulmonary edema  no documented infection   Pt now intubated     completed 7 days of antibiotics - started March 12th- on may 19  Meropenem    GI PCR sent due to diarrhea  not bloody positive for both shigella like E-COLI and E-COLI enteragre ,  pt has not eaten any food and only has received tube feeds.  There are many false positive PCRs for these strains and I would not treat. Treatment is generally controversial   was off ab from 3/19 to 3/21 but restarted due to instability    3/21 BC x2 NGTD  3.23  high fever over night  leukocytosis stable, thrombocytopenia improved    continue empiric Cefepime/Vanco  monitor culture results

## 2024-03-24 VITALS — RESPIRATION RATE: 5 BRPM | HEART RATE: 13 BPM

## 2024-03-24 LAB
ADD ON TEST-SPECIMEN IN LAB: SIGNIFICANT CHANGE UP
ALBUMIN SERPL ELPH-MCNC: 3.9 G/DL — SIGNIFICANT CHANGE UP (ref 3.3–5)
ALP SERPL-CCNC: 212 U/L — HIGH (ref 40–120)
ALT FLD-CCNC: 134 U/L — HIGH (ref 10–45)
ANION GAP SERPL CALC-SCNC: 23 MMOL/L — HIGH (ref 5–17)
APTT BLD: 36.7 SEC — HIGH (ref 24.5–35.6)
AST SERPL-CCNC: 418 U/L — HIGH (ref 10–40)
BASE EXCESS BLDV CALC-SCNC: -4.1 MMOL/L — LOW (ref -2–3)
BASE EXCESS BLDV CALC-SCNC: -4.6 MMOL/L — LOW (ref -2–3)
BASE EXCESS BLDV CALC-SCNC: -5.8 MMOL/L — LOW (ref -2–3)
BILIRUB SERPL-MCNC: 18.7 MG/DL — HIGH (ref 0.2–1.2)
BLD GP AB SCN SERPL QL: NEGATIVE — SIGNIFICANT CHANGE UP
BUN SERPL-MCNC: 31 MG/DL — HIGH (ref 7–23)
CALCIUM SERPL-MCNC: 9.1 MG/DL — SIGNIFICANT CHANGE UP (ref 8.4–10.5)
CHLORIDE SERPL-SCNC: 100 MMOL/L — SIGNIFICANT CHANGE UP (ref 96–108)
CO2 BLDV-SCNC: 24 MMOL/L — SIGNIFICANT CHANGE UP (ref 22–26)
CO2 BLDV-SCNC: 24 MMOL/L — SIGNIFICANT CHANGE UP (ref 22–26)
CO2 BLDV-SCNC: 25 MMOL/L — SIGNIFICANT CHANGE UP (ref 22–26)
CO2 SERPL-SCNC: 15 MMOL/L — LOW (ref 22–31)
CREAT SERPL-MCNC: 0.74 MG/DL — SIGNIFICANT CHANGE UP (ref 0.5–1.3)
CULTURE RESULTS: ABNORMAL
CULTURE RESULTS: SIGNIFICANT CHANGE UP
EGFR: 90 ML/MIN/1.73M2 — SIGNIFICANT CHANGE UP
FIBRINOGEN PPP-MCNC: 366 MG/DL — SIGNIFICANT CHANGE UP (ref 200–445)
GAS PNL BLDA: SIGNIFICANT CHANGE UP
GAS PNL BLDV: SIGNIFICANT CHANGE UP
GLUCOSE SERPL-MCNC: 128 MG/DL — HIGH (ref 70–99)
HCO3 BLDV-SCNC: 22 MMOL/L — SIGNIFICANT CHANGE UP (ref 22–29)
HCO3 BLDV-SCNC: 23 MMOL/L — SIGNIFICANT CHANGE UP (ref 22–29)
HCO3 BLDV-SCNC: 23 MMOL/L — SIGNIFICANT CHANGE UP (ref 22–29)
HCT VFR BLD CALC: 30.1 % — LOW (ref 39–50)
HGB BLD-MCNC: 10 G/DL — LOW (ref 13–17)
HOROWITZ INDEX BLDV+IHG-RTO: 100 — SIGNIFICANT CHANGE UP
HOROWITZ INDEX BLDV+IHG-RTO: 60 — SIGNIFICANT CHANGE UP
INR BLD: 2.09 RATIO — HIGH (ref 0.85–1.18)
MAGNESIUM SERPL-MCNC: 2.6 MG/DL — SIGNIFICANT CHANGE UP (ref 1.6–2.6)
MCHC RBC-ENTMCNC: 30.2 PG — SIGNIFICANT CHANGE UP (ref 27–34)
MCHC RBC-ENTMCNC: 33.2 GM/DL — SIGNIFICANT CHANGE UP (ref 32–36)
MCV RBC AUTO: 90.9 FL — SIGNIFICANT CHANGE UP (ref 80–100)
NRBC # BLD: 2 /100 WBCS — HIGH (ref 0–0)
PCO2 BLDV: 49 MMHG — SIGNIFICANT CHANGE UP (ref 42–55)
PCO2 BLDV: 53 MMHG — SIGNIFICANT CHANGE UP (ref 42–55)
PCO2 BLDV: 55 MMHG — SIGNIFICANT CHANGE UP (ref 42–55)
PH BLDV: 7.22 — LOW (ref 7.32–7.43)
PH BLDV: 7.25 — LOW (ref 7.32–7.43)
PH BLDV: 7.28 — LOW (ref 7.32–7.43)
PHOSPHATE SERPL-MCNC: 3 MG/DL — SIGNIFICANT CHANGE UP (ref 2.5–4.5)
PLATELET # BLD AUTO: 110 K/UL — LOW (ref 150–400)
PO2 BLDV: 14 MMHG — LOW (ref 25–45)
PO2 BLDV: 27 MMHG — SIGNIFICANT CHANGE UP (ref 25–45)
PO2 BLDV: 51 MMHG — HIGH (ref 25–45)
POTASSIUM SERPL-MCNC: 4.7 MMOL/L — SIGNIFICANT CHANGE UP (ref 3.5–5.3)
POTASSIUM SERPL-SCNC: 4.7 MMOL/L — SIGNIFICANT CHANGE UP (ref 3.5–5.3)
PROT SERPL-MCNC: 5.9 G/DL — LOW (ref 6–8.3)
PROTHROM AB SERPL-ACNC: 22.5 SEC — HIGH (ref 9.5–13)
RBC # BLD: 3.31 M/UL — LOW (ref 4.2–5.8)
RBC # FLD: 21.4 % — HIGH (ref 10.3–14.5)
RH IG SCN BLD-IMP: POSITIVE — SIGNIFICANT CHANGE UP
SAO2 % BLDV: 21 % — LOW (ref 67–88)
SAO2 % BLDV: 45.8 % — LOW (ref 67–88)
SAO2 % BLDV: 78.4 % — SIGNIFICANT CHANGE UP (ref 67–88)
SODIUM SERPL-SCNC: 138 MMOL/L — SIGNIFICANT CHANGE UP (ref 135–145)
SPECIMEN SOURCE: SIGNIFICANT CHANGE UP
SPECIMEN SOURCE: SIGNIFICANT CHANGE UP
VANCOMYCIN TROUGH SERPL-MCNC: 11.7 UG/ML — SIGNIFICANT CHANGE UP (ref 10–20)
WBC # BLD: 21.9 K/UL — HIGH (ref 3.8–10.5)
WBC # FLD AUTO: 21.9 K/UL — HIGH (ref 3.8–10.5)

## 2024-03-24 PROCEDURE — 84132 ASSAY OF SERUM POTASSIUM: CPT

## 2024-03-24 PROCEDURE — 94799 UNLISTED PULMONARY SVC/PX: CPT

## 2024-03-24 PROCEDURE — 83540 ASSAY OF IRON: CPT

## 2024-03-24 PROCEDURE — 86803 HEPATITIS C AB TEST: CPT

## 2024-03-24 PROCEDURE — 84443 ASSAY THYROID STIM HORMONE: CPT

## 2024-03-24 PROCEDURE — 36556 INSERT NON-TUNNEL CV CATH: CPT

## 2024-03-24 PROCEDURE — 83615 LACTATE (LD) (LDH) ENZYME: CPT

## 2024-03-24 PROCEDURE — 86901 BLOOD TYPING SEROLOGIC RH(D): CPT

## 2024-03-24 PROCEDURE — 82962 GLUCOSE BLOOD TEST: CPT

## 2024-03-24 PROCEDURE — 87640 STAPH A DNA AMP PROBE: CPT

## 2024-03-24 PROCEDURE — 83735 ASSAY OF MAGNESIUM: CPT

## 2024-03-24 PROCEDURE — P9059: CPT

## 2024-03-24 PROCEDURE — 0225U NFCT DS DNA&RNA 21 SARSCOV2: CPT

## 2024-03-24 PROCEDURE — 94660 CPAP INITIATION&MGMT: CPT

## 2024-03-24 PROCEDURE — 93971 EXTREMITY STUDY: CPT

## 2024-03-24 PROCEDURE — 82435 ASSAY OF BLOOD CHLORIDE: CPT

## 2024-03-24 PROCEDURE — 87040 BLOOD CULTURE FOR BACTERIA: CPT

## 2024-03-24 PROCEDURE — 85730 THROMBOPLASTIN TIME PARTIAL: CPT

## 2024-03-24 PROCEDURE — 83550 IRON BINDING TEST: CPT

## 2024-03-24 PROCEDURE — 85018 HEMOGLOBIN: CPT

## 2024-03-24 PROCEDURE — 86704 HEP B CORE ANTIBODY TOTAL: CPT

## 2024-03-24 PROCEDURE — 87324 CLOSTRIDIUM AG IA: CPT

## 2024-03-24 PROCEDURE — 85384 FIBRINOGEN ACTIVITY: CPT

## 2024-03-24 PROCEDURE — 97116 GAIT TRAINING THERAPY: CPT

## 2024-03-24 PROCEDURE — 71045 X-RAY EXAM CHEST 1 VIEW: CPT | Mod: 26

## 2024-03-24 PROCEDURE — 99261: CPT

## 2024-03-24 PROCEDURE — C1769: CPT

## 2024-03-24 PROCEDURE — 76705 ECHO EXAM OF ABDOMEN: CPT

## 2024-03-24 PROCEDURE — 82330 ASSAY OF CALCIUM: CPT

## 2024-03-24 PROCEDURE — 36620 INSERTION CATHETER ARTERY: CPT

## 2024-03-24 PROCEDURE — 86706 HEP B SURFACE ANTIBODY: CPT

## 2024-03-24 PROCEDURE — 87641 MR-STAPH DNA AMP PROBE: CPT

## 2024-03-24 PROCEDURE — 84550 ASSAY OF BLOOD/URIC ACID: CPT

## 2024-03-24 PROCEDURE — 84295 ASSAY OF SERUM SODIUM: CPT

## 2024-03-24 PROCEDURE — 36800 INSERTION OF CANNULA: CPT

## 2024-03-24 PROCEDURE — 93005 ELECTROCARDIOGRAM TRACING: CPT

## 2024-03-24 PROCEDURE — 84439 ASSAY OF FREE THYROXINE: CPT

## 2024-03-24 PROCEDURE — 87507 IADNA-DNA/RNA PROBE TQ 12-25: CPT

## 2024-03-24 PROCEDURE — 82247 BILIRUBIN TOTAL: CPT

## 2024-03-24 PROCEDURE — 84100 ASSAY OF PHOSPHORUS: CPT

## 2024-03-24 PROCEDURE — P9016: CPT

## 2024-03-24 PROCEDURE — 93970 EXTREMITY STUDY: CPT

## 2024-03-24 PROCEDURE — 87070 CULTURE OTHR SPECIMN AEROBIC: CPT

## 2024-03-24 PROCEDURE — 80053 COMPREHEN METABOLIC PANEL: CPT

## 2024-03-24 PROCEDURE — 80202 ASSAY OF VANCOMYCIN: CPT

## 2024-03-24 PROCEDURE — 82728 ASSAY OF FERRITIN: CPT

## 2024-03-24 PROCEDURE — 36415 COLL VENOUS BLD VENIPUNCTURE: CPT

## 2024-03-24 PROCEDURE — 97162 PT EVAL MOD COMPLEX 30 MIN: CPT

## 2024-03-24 PROCEDURE — 83970 ASSAY OF PARATHORMONE: CPT

## 2024-03-24 PROCEDURE — 36430 TRANSFUSION BLD/BLD COMPNT: CPT

## 2024-03-24 PROCEDURE — 97530 THERAPEUTIC ACTIVITIES: CPT

## 2024-03-24 PROCEDURE — 83605 ASSAY OF LACTIC ACID: CPT

## 2024-03-24 PROCEDURE — 82248 BILIRUBIN DIRECT: CPT

## 2024-03-24 PROCEDURE — 85610 PROTHROMBIN TIME: CPT

## 2024-03-24 PROCEDURE — 83010 ASSAY OF HAPTOGLOBIN QUANT: CPT

## 2024-03-24 PROCEDURE — ZZZZZ: CPT

## 2024-03-24 PROCEDURE — 82310 ASSAY OF CALCIUM: CPT

## 2024-03-24 PROCEDURE — C8924: CPT

## 2024-03-24 PROCEDURE — 85520 HEPARIN ASSAY: CPT

## 2024-03-24 PROCEDURE — 86022 PLATELET ANTIBODIES: CPT

## 2024-03-24 PROCEDURE — C1889: CPT

## 2024-03-24 PROCEDURE — 99232 SBSQ HOSP IP/OBS MODERATE 35: CPT

## 2024-03-24 PROCEDURE — 83880 ASSAY OF NATRIURETIC PEPTIDE: CPT

## 2024-03-24 PROCEDURE — 82565 ASSAY OF CREATININE: CPT

## 2024-03-24 PROCEDURE — 87046 STOOL CULTR AEROBIC BACT EA: CPT

## 2024-03-24 PROCEDURE — 93308 TTE F-UP OR LMTD: CPT

## 2024-03-24 PROCEDURE — 84134 ASSAY OF PREALBUMIN: CPT

## 2024-03-24 PROCEDURE — 85027 COMPLETE CBC AUTOMATED: CPT

## 2024-03-24 PROCEDURE — 86850 RBC ANTIBODY SCREEN: CPT

## 2024-03-24 PROCEDURE — 80048 BASIC METABOLIC PNL TOTAL CA: CPT

## 2024-03-24 PROCEDURE — 94010 BREATHING CAPACITY TEST: CPT

## 2024-03-24 PROCEDURE — 82803 BLOOD GASES ANY COMBINATION: CPT

## 2024-03-24 PROCEDURE — 87449 NOS EACH ORGANISM AG IA: CPT

## 2024-03-24 PROCEDURE — 82947 ASSAY GLUCOSE BLOOD QUANT: CPT

## 2024-03-24 PROCEDURE — 94002 VENT MGMT INPAT INIT DAY: CPT

## 2024-03-24 PROCEDURE — 87077 CULTURE AEROBIC IDENTIFY: CPT

## 2024-03-24 PROCEDURE — 87045 FECES CULTURE AEROBIC BACT: CPT

## 2024-03-24 PROCEDURE — P9045: CPT

## 2024-03-24 PROCEDURE — 94003 VENT MGMT INPAT SUBQ DAY: CPT

## 2024-03-24 PROCEDURE — 97110 THERAPEUTIC EXERCISES: CPT

## 2024-03-24 PROCEDURE — C1894: CPT

## 2024-03-24 PROCEDURE — 87177 OVA AND PARASITES SMEARS: CPT

## 2024-03-24 PROCEDURE — 85379 FIBRIN DEGRADATION QUANT: CPT

## 2024-03-24 PROCEDURE — 94640 AIRWAY INHALATION TREATMENT: CPT

## 2024-03-24 PROCEDURE — 93321 DOPPLER ECHO F-UP/LMTD STD: CPT

## 2024-03-24 PROCEDURE — 87637 SARSCOV2&INF A&B&RSV AMP PRB: CPT

## 2024-03-24 PROCEDURE — 71045 X-RAY EXAM CHEST 1 VIEW: CPT

## 2024-03-24 PROCEDURE — 82533 TOTAL CORTISOL: CPT

## 2024-03-24 PROCEDURE — 85014 HEMATOCRIT: CPT

## 2024-03-24 PROCEDURE — 86900 BLOOD TYPING SEROLOGIC ABO: CPT

## 2024-03-24 PROCEDURE — 85025 COMPLETE CBC W/AUTO DIFF WBC: CPT

## 2024-03-24 PROCEDURE — 87340 HEPATITIS B SURFACE AG IA: CPT

## 2024-03-24 PROCEDURE — 33967 INSERT I-AORT PERCUT DEVICE: CPT | Mod: 73

## 2024-03-24 PROCEDURE — 81003 URINALYSIS AUTO W/O SCOPE: CPT

## 2024-03-24 PROCEDURE — 82272 OCCULT BLD FECES 1-3 TESTS: CPT

## 2024-03-24 PROCEDURE — 83036 HEMOGLOBIN GLYCOSYLATED A1C: CPT

## 2024-03-24 PROCEDURE — 86923 COMPATIBILITY TEST ELECTRIC: CPT

## 2024-03-24 PROCEDURE — P9047: CPT

## 2024-03-24 RX ORDER — VECURONIUM BROMIDE 20 MG/1
2.5 INJECTION, POWDER, FOR SOLUTION INTRAVENOUS ONCE
Refills: 0 | Status: COMPLETED | OUTPATIENT
Start: 2024-03-24 | End: 2024-03-24

## 2024-03-24 RX ORDER — MIDAZOLAM HYDROCHLORIDE 1 MG/ML
1 INJECTION, SOLUTION INTRAMUSCULAR; INTRAVENOUS ONCE
Refills: 0 | Status: DISCONTINUED | OUTPATIENT
Start: 2024-03-24 | End: 2024-03-24

## 2024-03-24 RX ORDER — FENTANYL CITRATE 50 UG/ML
200 INJECTION INTRAVENOUS ONCE
Refills: 0 | Status: DISCONTINUED | OUTPATIENT
Start: 2024-03-24 | End: 2024-03-24

## 2024-03-24 RX ORDER — FENTANYL CITRATE 50 UG/ML
50 INJECTION INTRAVENOUS ONCE
Refills: 0 | Status: DISCONTINUED | OUTPATIENT
Start: 2024-03-24 | End: 2024-03-24

## 2024-03-24 RX ORDER — FENTANYL CITRATE 50 UG/ML
100 INJECTION INTRAVENOUS ONCE
Refills: 0 | Status: DISCONTINUED | OUTPATIENT
Start: 2024-03-24 | End: 2024-03-24

## 2024-03-24 RX ORDER — FENTANYL CITRATE 50 UG/ML
1 INJECTION INTRAVENOUS
Qty: 2500 | Refills: 0 | Status: DISCONTINUED | OUTPATIENT
Start: 2024-03-24 | End: 2024-03-24

## 2024-03-24 RX ORDER — SODIUM BICARBONATE 1 MEQ/ML
50 SYRINGE (ML) INTRAVENOUS
Refills: 0 | Status: COMPLETED | OUTPATIENT
Start: 2024-03-24 | End: 2024-03-24

## 2024-03-24 RX ORDER — CALCIUM GLUCONATE 100 MG/ML
2 VIAL (ML) INTRAVENOUS ONCE
Refills: 0 | Status: COMPLETED | OUTPATIENT
Start: 2024-03-24 | End: 2024-03-24

## 2024-03-24 RX ORDER — SCOPALAMINE 1 MG/3D
1 PATCH, EXTENDED RELEASE TRANSDERMAL ONCE
Refills: 0 | Status: COMPLETED | OUTPATIENT
Start: 2024-03-24 | End: 2024-03-24

## 2024-03-24 RX ORDER — OXYCODONE HYDROCHLORIDE 5 MG/1
5 TABLET ORAL EVERY 6 HOURS
Refills: 0 | Status: DISCONTINUED | OUTPATIENT
Start: 2024-03-24 | End: 2024-03-24

## 2024-03-24 RX ORDER — MIDAZOLAM HYDROCHLORIDE 1 MG/ML
2 INJECTION, SOLUTION INTRAMUSCULAR; INTRAVENOUS ONCE
Refills: 0 | Status: DISCONTINUED | OUTPATIENT
Start: 2024-03-24 | End: 2024-03-24

## 2024-03-24 RX ORDER — FENTANYL CITRATE 50 UG/ML
1 INJECTION INTRAVENOUS
Qty: 5000 | Refills: 0 | Status: DISCONTINUED | OUTPATIENT
Start: 2024-03-24 | End: 2024-03-24

## 2024-03-24 RX ADMIN — Medication 100 MILLIGRAM(S): at 05:30

## 2024-03-24 RX ADMIN — FENTANYL CITRATE 100 MICROGRAM(S): 50 INJECTION INTRAVENOUS at 11:32

## 2024-03-24 RX ADMIN — AMIODARONE HYDROCHLORIDE 200 MILLIGRAM(S): 400 TABLET ORAL at 05:30

## 2024-03-24 RX ADMIN — FENTANYL CITRATE 3.39 MICROGRAM(S)/KG/HR: 50 INJECTION INTRAVENOUS at 11:05

## 2024-03-24 RX ADMIN — CEFEPIME 100 MILLIGRAM(S): 1 INJECTION, POWDER, FOR SOLUTION INTRAMUSCULAR; INTRAVENOUS at 05:29

## 2024-03-24 RX ADMIN — MIDAZOLAM HYDROCHLORIDE 1 MILLIGRAM(S): 1 INJECTION, SOLUTION INTRAMUSCULAR; INTRAVENOUS at 03:14

## 2024-03-24 RX ADMIN — DEXMEDETOMIDINE HYDROCHLORIDE IN 0.9% SODIUM CHLORIDE 0.34 MICROGRAM(S)/KG/HR: 4 INJECTION INTRAVENOUS at 07:35

## 2024-03-24 RX ADMIN — Medication 1 APPLICATION(S): at 06:16

## 2024-03-24 RX ADMIN — CHLORHEXIDINE GLUCONATE 1 APPLICATION(S): 213 SOLUTION TOPICAL at 05:31

## 2024-03-24 RX ADMIN — Medication 50 MILLIEQUIVALENT(S): at 03:48

## 2024-03-24 RX ADMIN — MIDODRINE HYDROCHLORIDE 20 MILLIGRAM(S): 2.5 TABLET ORAL at 06:30

## 2024-03-24 RX ADMIN — Medication 250 MILLIGRAM(S): at 08:02

## 2024-03-24 RX ADMIN — FENTANYL CITRATE 100 MICROGRAM(S): 50 INJECTION INTRAVENOUS at 03:27

## 2024-03-24 RX ADMIN — SERTRALINE 50 MILLIGRAM(S): 25 TABLET, FILM COATED ORAL at 05:30

## 2024-03-24 RX ADMIN — Medication 200 GRAM(S): at 02:00

## 2024-03-24 RX ADMIN — Medication 200 GRAM(S): at 05:19

## 2024-03-24 RX ADMIN — Medication 5 MILLIGRAM(S): at 05:29

## 2024-03-24 RX ADMIN — Medication 10.2 MICROGRAM(S)/KG/MIN: at 07:34

## 2024-03-24 RX ADMIN — Medication 50 MILLIEQUIVALENT(S): at 03:00

## 2024-03-24 RX ADMIN — MIDAZOLAM HYDROCHLORIDE 2 MILLIGRAM(S): 1 INJECTION, SOLUTION INTRAMUSCULAR; INTRAVENOUS at 11:32

## 2024-03-24 RX ADMIN — CHLORHEXIDINE GLUCONATE 15 MILLILITER(S): 213 SOLUTION TOPICAL at 05:31

## 2024-03-24 RX ADMIN — EPINEPHRINE 12.7 MICROGRAM(S)/KG/MIN: 0.3 INJECTION INTRAMUSCULAR; SUBCUTANEOUS at 07:33

## 2024-03-24 RX ADMIN — VASOPRESSIN 6 UNIT(S)/MIN: 20 INJECTION INTRAVENOUS at 07:34

## 2024-03-24 RX ADMIN — Medication 3.81 MICROGRAM(S)/KG/MIN: at 07:34

## 2024-03-24 RX ADMIN — FENTANYL CITRATE 50 MICROGRAM(S): 50 INJECTION INTRAVENOUS at 11:38

## 2024-03-24 RX ADMIN — VECURONIUM BROMIDE 2.5 MILLIGRAM(S): 20 INJECTION, POWDER, FOR SOLUTION INTRAVENOUS at 03:15

## 2024-03-24 RX ADMIN — SCOPALAMINE 1 PATCH: 1 PATCH, EXTENDED RELEASE TRANSDERMAL at 09:39

## 2024-03-24 RX ADMIN — PANTOPRAZOLE SODIUM 40 MILLIGRAM(S): 20 TABLET, DELAYED RELEASE ORAL at 05:30

## 2024-03-24 RX ADMIN — OXYCODONE HYDROCHLORIDE 5 MILLIGRAM(S): 5 TABLET ORAL at 00:25

## 2024-03-24 RX ADMIN — FENTANYL CITRATE 100 MICROGRAM(S): 50 INJECTION INTRAVENOUS at 03:42

## 2024-03-24 RX ADMIN — SODIUM CHLORIDE 3 MILLILITER(S): 9 INJECTION INTRAMUSCULAR; INTRAVENOUS; SUBCUTANEOUS at 05:31

## 2024-03-24 NOTE — PROCEDURE NOTE - NSPERFORMEDBY_GEN_A_CORE
Morelia HERRERA/Myself
Morelia HERRERA/Myself
Myself
Morelia HERRERA/Myself
Myself

## 2024-03-24 NOTE — PROCEDURE NOTE - NSPROCNAME_GEN_A_CORE
Central Line Insertion
Bronchoscopy
Central Line Insertion
Arterial Puncture/Cannulation
Central Line Insertion
Tracheal Intubation
Bronchoscopy
ICD Interrogation Note
Arterial Puncture/Cannulation
Central Line Insertion
ICD Interrogation Note
Central Line Insertion
General
ICD Interrogation Note
Central Line Insertion

## 2024-03-24 NOTE — PROCEDURE NOTE - NSASSISTBY_GEN_A_CORE
Tree Pool PA/PA
Myself
Tree Pool PA/PA
Alvarez rep/Other
Myself
Myself
Tree Pool PA/PA
Myself
Myself

## 2024-03-24 NOTE — PROCEDURE NOTE - NSPROCDETAILS_GEN_ALL_CORE
patient pre-oxygenated, tube inserted, placement confirmed
location identified, draped/prepped, sterile technique used, needle inserted/introduced/positive blood return obtained via catheter/connected to a pressurized flush line/sutured in place/hemostasis with direct pressure, dressing applied/Seldinger technique/all materials/supplies accounted for at end of procedure
guidewire recovered/lumen(s) aspirated and flushed/sterile dressing applied/sterile technique, catheter placed/ultrasound guidance with use of sterile gel and probe cove
location identified, draped/prepped, sterile technique used, needle inserted/introduced/all materials/supplies accounted for at end of procedure
guidewire recovered/lumen(s) aspirated and flushed/sterile dressing applied/sterile technique, catheter placed/ultrasound guidance with use of sterile gel and probe cove

## 2024-03-24 NOTE — PROCEDURE NOTE - NSCVLACTUALSITE_VASC_A_CORE
right/femoral vein
right/internal jugular
left/internal jugular
right/internal jugular
internal jugular
left/internal jugular

## 2024-03-24 NOTE — PROGRESS NOTE ADULT - SUBJECTIVE AND OBJECTIVE BOX
Patient seen and examined at the bedside.    Remained critically ill on continuous ICU monitoring.      Brief Summary:  84 y/o M with severe MR and cardiogenic shock    24 Hour events:  Remains on very high dose inotropes and pressors  Cyanokit given with some improvement.      OBJECTIVE:  Vital Signs Last 24 Hrs  T(C): 36.8 (24 Mar 2024 04:00), Max: 38 (23 Mar 2024 08:00)  T(F): 98.2 (24 Mar 2024 04:00), Max: 100.4 (23 Mar 2024 08:00)  HR: 85 (24 Mar 2024 06:15) (80 - 98)  BP: --  BP(mean): --  RR: 25 (24 Mar 2024 06:15) (14 - 48)  SpO2: 94% (24 Mar 2024 05:45) (52% - 100%)    Parameters below as of 24 Mar 2024 04:00  Patient On (Oxygen Delivery Method): ventilator    O2 Concentration (%): 60    Mode: AC/ CMV (Assist Control/ Continuous Mandatory Ventilation)  RR (machine): 16  FiO2: 60  PEEP: 5  ITime: 0.8  MAP: 7  PC: 8  PIP: 16              Physical Exam:   General: Intubated   Neurology: Sedated, but following  HEENT: Tongue with black pressure injury  Respiratory: Clear bilaterally   CV: Paced  Abdominal: Soft, Nontender  Extremities: Warm, well-perfused  Royal  -------------------------------------------------------------------------------------------------------------------------------  Labs:                          10.0   21.90 )-----------( 110      ( 24 Mar 2024 00:56 )             30.1     03-24    138  |  100  |  31<H>  ----------------------------<  128<H>  4.7   |  15<L>  |  0.74    Ca    9.1      24 Mar 2024 00:56  Phos  3.0     03-24  Mg     2.6     03-24    TPro  5.9<L>  /  Alb  3.9  /  TBili  18.7<H>  /  DBili  x   /  AST  418<H>  /  ALT  134<H>  /  AlkPhos  212<H>  03-24    LIVER FUNCTIONS - ( 24 Mar 2024 00:56 )  Alb: 3.9 g/dL / Pro: 5.9 g/dL / ALK PHOS: 212 U/L / ALT: 134 U/L / AST: 418 U/L / GGT: x           PT/INR - ( 24 Mar 2024 00:56 )   PT: 22.5 sec;   INR: 2.09 ratio         PTT - ( 24 Mar 2024 00:56 )  PTT:36.7 sec  ABG - ( 24 Mar 2024 05:00 )  pH, Arterial: 7.30  pH, Blood: x     /  pCO2: 44    /  pO2: 198   / HCO3: 22    / Base Excess: -4.6  /  SaO2: 98.8    ------------------------------------------------------------------------------------------------------------------------------  Assessment:  84 y/o M with severe MR and Cardiogenic shock    Hemodynamic instability  Acute on chronic systolic heart failure  Acute respiratory failure  Acute blood loss anemia  Thrombocytopenia  Leukocytosis  Hyperbilirubinemia  Hypophosphatemia  SURESH on CKD      Plan:   ***Neuro***  Sedated with Precedex for vent synchrony/comfort   Acute pain control with prns  Continue with Melatonin for sleep regimen     ***Cardiovascular***  At high risk for ongoing hemodynamic instability and cardiac arrhythmias.  Wean pressors for MAP > 65 mm Hg  Continue Dobutamine and Epinephrine   Amiodarone daily  Continue Midodrine  Remains on Angiotensin   Stress dose steroids started overnight.  Repeat ECHO pending today.    ***Pulmonary***  Acute respiratory failure  Continue vent support.  Deep breathing and coughing exercises.    ***GI***  Continue Tube feeds  Protonix for stress ulcer prophylaxis  Elevated LFTs and bilirubin  RUQ ultrasound pending.  Bowel regimen.    ***Renal***  SURESH on CKD  CRRT - pull fluid as able.  Royal catheter for strict I/O measurements.  Replete electrolytes as indicated.     ***ID***  Trend leukocytosis  On caspofungin for antifungal coverage   Empiric coverage with Vanco and Cefepime.    ***Endocrine***  Hyperglycemia - Insulin sliding scale.   Synthroid for hypothyroidism    ***Hematology***  Acute blood loss anemia and thrombocytopenia - no current transfusion indication  Trend CBC and coags and monitor for bleeding.  Anticoagulation and VTE prophylaxis remains on hold.          Patient requires continuous monitoring with bedside rhythm monitoring, pulse oximetry monitoring, and continuous central venous and arterial pressure monitoring; and intermittent blood gas analysis. Care plan discussed with the ICU care team.   Patient remained critical, at risk for life threatening decompensation.    I have spent 30 minutes providing critical care management to this patient.    By signing my name below, I, Tressa Tamayo, attest that this documentation has been prepared under the direction and in the presence of Demi Caban MD  Electronically signed: Home Mendoza, 03-24-24 @ 06:22    I, Demi Caban MD, personally performed the services described in this documentation. all medical record entries made by the buddyibe were at my direction and in my presence. I have reviewed the chart and agree that the record reflects my personal performance and is accurate and complete  Electronically signed: Demi Caban MD Patient seen and examined at the bedside.    Remains critically ill on continuous ICU monitoring.      Brief Summary:  84 y/o M with severe MR and cardiogenic shock      24 Hour events:  Remains on very high dose inotropes and pressors as well as Angiotensin II.  Nitric oxide added.  Goal of care discussion held between Dr. Fenton and the patient's wife.  She understands there is no hope for meaningful recovery.  Plan is for palliative withdrawal of support today.  Fentanyl infusion started.      Objective:  Vital Signs Last 24 Hrs  T(C): 36.8 (24 Mar 2024 04:00), Max: 38 (23 Mar 2024 08:00)  T(F): 98.2 (24 Mar 2024 04:00), Max: 100.4 (23 Mar 2024 08:00)  HR: 85 (24 Mar 2024 06:15) (80 - 98)  BP: --  BP(mean): --  RR: 25 (24 Mar 2024 06:15) (14 - 48)  SpO2: 94% (24 Mar 2024 05:45) (52% - 100%)    Parameters below as of 24 Mar 2024 04:00  Patient On (Oxygen Delivery Method): ventilator    O2 Concentration (%): 60    Mode: AC/ CMV (Assist Control/ Continuous Mandatory Ventilation)  RR (machine): 16  FiO2: 60  PEEP: 5  ITime: 0.8  MAP: 7  PC: 8  PIP: 16              Physical Exam:   General: Intubated   Neurology: Sedated, but following commands  HEENT: Tongue with black pressure injury  Respiratory: Clear bilaterally   CV: Intermittently pacing  Abdominal: Soft, nontender  Extremities: Warm, well-perfused    -------------------------------------------------------------------------------------------------------------------------------  Labs:                          10.0   21.90 )-----------( 110      ( 24 Mar 2024 00:56 )             30.1     03-24    138  |  100  |  31<H>  ----------------------------<  128<H>  4.7   |  15<L>  |  0.74    Ca    9.1      24 Mar 2024 00:56  Phos  3.0     03-24  Mg     2.6     03-24    TPro  5.9<L>  /  Alb  3.9  /  TBili  18.7<H>  /  DBili  x   /  AST  418<H>  /  ALT  134<H>  /  AlkPhos  212<H>  03-24    LIVER FUNCTIONS - ( 24 Mar 2024 00:56 )  Alb: 3.9 g/dL / Pro: 5.9 g/dL / ALK PHOS: 212 U/L / ALT: 134 U/L / AST: 418 U/L / GGT: x           PT/INR - ( 24 Mar 2024 00:56 )   PT: 22.5 sec;   INR: 2.09 ratio         PTT - ( 24 Mar 2024 00:56 )  PTT:36.7 sec  ABG - ( 24 Mar 2024 05:00 )  pH, Arterial: 7.30  pH, Blood: x     /  pCO2: 44    /  pO2: 198   / HCO3: 22    / Base Excess: -4.6  /  SaO2: 98.8      ------------------------------------------------------------------------------------------------------------------------------    Assessment:  84 y/o M with severe MR and Cardiogenic shock    Hemodynamic instability  Acute on chronic systolic heart failure  Acute respiratory failure  Acute blood loss anemia  Thrombocytopenia  Leukocytosis  Hyperbilirubinemia  Hypophosphatemia  SURESH on CKD      Plan:     Transition to comfort care.  Fentanyl infusion and prns for pain and anxiety.  Palliative withdrawal of life-sustaining support, including CRRT, mechanical ventilation, and pressors/inotropes.        By signing my name below, I, Tressa Tamayo, attest that this documentation has been prepared under the direction and in the presence of Demi Caban MD  Electronically signed: Home Mendoza, 03-24-24 @ 06:22    I, Demi Caban MD, personally performed the services described in this documentation. all medical record entries made by the scribe were at my direction and in my presence. I have reviewed the chart and agree that the record reflects my personal performance and is accurate and complete  Electronically signed: Demi Caban MD

## 2024-03-24 NOTE — PROGRESS NOTE ADULT - PROVIDER SPECIALTY LIST ADULT
Critical Care
Heart Failure
Infectious Disease
Infectious Disease
Nephrology
Urology
Critical Care
ENT
Infectious Disease
Nephrology
Critical Care
Infectious Disease
Nephrology
Critical Care
Heart Failure
Heme/Onc
Infectious Disease
Nephrology
Gastroenterology
Heart Failure
Urology
Heart Failure
Heart Failure
ENT
Heart Failure
Heart Failure

## 2024-03-24 NOTE — PROGRESS NOTE ADULT - REASON FOR ADMISSION

## 2024-03-24 NOTE — PROCEDURE NOTE - NSCVLATTEMPTSITEVASC_A_CORE
left
right/internal jugular
right/femoral vein
left/internal jugular
right/internal jugular
left/internal jugular

## 2024-03-24 NOTE — PROCEDURE NOTE - NSINFORMCONSENT_GEN_A_CORE
This was an emergent procedure.
Benefits, risks, and possible complications of procedure explained to patient/caregiver who verbalized understanding and gave verbal consent.
This was an emergent procedure.
This was an emergent procedure.
Benefits, risks, and possible complications of procedure explained to patient/caregiver who verbalized understanding and gave written consent.
Benefits, risks, and possible complications of procedure explained to patient/caregiver who verbalized understanding and gave verbal consent.
Benefits, risks, and possible complications of procedure explained to patient/caregiver who verbalized understanding and gave verbal consent.
This was an emergent procedure.
Benefits, risks, and possible complications of procedure explained to patient/caregiver who verbalized understanding and gave written consent.
This was an emergent procedure.
Benefits, risks, and possible complications of procedure explained to patient/caregiver who verbalized understanding and gave verbal consent.

## 2024-03-24 NOTE — PROCEDURE NOTE - PROCEDURE DATE TIME, MLM
06-Mar-2024 18:30
18-Mar-2024
14-Mar-2024 11:27
24-Mar-2024
23-Mar-2024 11:30
13-Mar-2024 13:00
13-Mar-2024 14:00
24-Mar-2024
18-Mar-2024
05-Mar-2024 11:00
08-Mar-2024 11:00
05-Mar-2024 10:00
13-Mar-2024 14:30
05-Mar-2024 17:18

## 2024-03-24 NOTE — PROCEDURE NOTE - NSPOSTPRCRAD_GEN_A_CORE
post-procedure radiography performed
central line located in the superior vena cava/no pneumothorax/post-procedure radiography performed
post-procedure radiography performed
central line located in the superior vena cava/no pneumothorax/post-procedure radiography performed
central line located in the superior vena cava/no pneumothorax/post-procedure radiography performed
no pneumothorax/post-procedure radiography performed

## 2024-03-24 NOTE — PROGRESS NOTE ADULT - SUBJECTIVE AND OBJECTIVE BOX
Follow Up:  fever    Interval History/ROS:  unresponsive on vent    Allergies  No Known Allergies        ANTIMICROBIALS:  caspofungin IVPB 50 every 24 hours  caspofungin IVPB    cefepime   IVPB 2000 every 12 hours  vancomycin  IVPB    vancomycin  IVPB 750 every 12 hours      OTHER MEDS:  MEDICATIONS  (STANDING):  aMIOdarone    Tablet 200 daily  angiotensin II Infusion 15 <Continuous>  dexMEDEtomidine Infusion 0.02 <Continuous>  dextrose 50% Injectable 25 once  DOBUTamine Infusion 10 <Continuous>  EPINEPHrine    Infusion 0.1 <Continuous>  epoetin zara-epbx (RETACRIT) Injectable 67044 <User Schedule>  fentaNYL   Infusion 1 <Continuous>  hydrocortisone sodium succinate Injectable 100 every 8 hours  insulin lispro (ADMELOG) corrective regimen sliding scale  every 6 hours  levothyroxine Injectable 37.5 at bedtime  melatonin 5 at bedtime  metoclopramide Injectable 5 every 8 hours  midodrine 20 every 8 hours  norepinephrine Infusion 0.06 <Continuous>  pantoprazole  Injectable 40 every 12 hours  polyethylene glycol 3350 17 daily  scopolamine 1 mG/72 Hr(s) Patch 1 once  senna 2 at bedtime  vasopressin Infusion 0.04 <Continuous>      Vital Signs Last 24 Hrs  T(C): 36.1 (24 Mar 2024 08:00), Max: 36.8 (24 Mar 2024 04:00)  T(F): 97 (24 Mar 2024 08:00), Max: 98.2 (24 Mar 2024 04:00)  HR: 82 (24 Mar 2024 09:00) (80 - 94)  BP: --  BP(mean): --  RR: 17 (24 Mar 2024 09:00) (14 - 48)  SpO2: 94% (24 Mar 2024 05:45) (52% - 100%)    Parameters below as of 24 Mar 2024 08:00  Patient On (Oxygen Delivery Method): ventilator        PHYSICAL EXAM:  General: ill appearing, NAD, CVVHD in progress  Neurology: unresponsive  Respiratory: Clear to auscultation bilaterally  CV: RRR, S1S2, no murmurs, rubs or gallops  Abdominal: doughy,  bowel sounds no appreciated  Extremities: feet cool, pale  Line Sites: Clear  Skin: extensive ecchymoses, juandice                          10.0   21.90 )-----------( 110      ( 24 Mar 2024 00:56 )             30.1   WBC Count: 21.90 (03-24 @ 00:56)  WBC Count: 18.48 (03-22 @ 23:12)  WBC Count: 19.60 (03-22 @ 00:43)  WBC Count: 20.18 (03-21 @ 00:22)  WBC Count: 20.93 (03-20 @ 00:51)    03-24    138  |  100  |  31<H>  ----------------------------<  128<H>  4.7   |  15<L>  |  0.74    Ca    9.1      24 Mar 2024 00:56  Phos  3.0     03-24  Mg     2.6     03-24    TPro  5.9<L>  /  Alb  3.9  /  TBili  18.7<H>  /  DBili  x   /  AST  418<H>  /  ALT  134<H>  /  AlkPhos  212<H>  03-24      MICROBIOLOGY:  .Bronchial Bronchial Lavage  03-23-24 --  --    Rare polymorphonuclear leukocytes per low power field  Rare Squamous epithelial cells per low power field  No organisms seen per oil power field      ET Tube ET Tube  03-23-24 --  --    Rare polymorphonuclear leukocytes per low power field  Rare Squamous epithelial cells per low power field  Rare Gram Variable Rods  per oil power field      .Blood Blood-Peripheral  03-21-24   No growth at 48 Hours  --  --      .Blood Blood-Peripheral  03-21-24   No growth at 48 Hours  --  --      .Stool Feces  03-20-24   No Protozoa seen by trichrome stain  No Helminths or Protozoa seen in formalin concentrate  performed by iodine stain  (routine O+P not evaluated for Microsporidia,  Cryptosporidia or Cyclospora)  One negative sample does not necessarily rule  out the presence of a parasitic infection.  --  --      .Stool Feces  03-20-24   No enteric pathogens isolated.  (Stool culture examined for Salmonella,  Shigella, Campylobacter, Aeromonas, Plesiomonas,  Vibrio, E.coli O157 and Yersinia)  --  --      .Blood Blood-Peripheral  03-19-24   No growth at 4 days  --  --      .Blood Blood  03-13-24   No growth at 5 days  --  --      .Blood Blood  03-13-24   No growth at 5 days  --  --      .Blood Blood-Peripheral  03-12-24   No growth at 5 days  --  --      .Blood Blood-Peripheral  03-04-24   No growth at 5 days  --  --    Vancomycin Level, Trough: 11.7 ug/mL (03-24-24 @ 05:42)  Vancomycin Level, Trough: 9.7 ug/mL (03-23-24 @ 17:28)    Clostridium difficile GDH Toxins A&amp;B, EIA:   Negative (03-19-24 @ 23:56)  Clostridium difficile GDH Interpretation: Negative for toxigenic C. Difficile.  This specimen is negative for C.  Difficile glutamate dehydrogenase (GDH) antigen and negative for C.  Difficile Toxins A & B, by EIA.  . (03-19-24 @ 23:56)      RADIOLOGY:  < from: Xray Chest 1 View- PORTABLE-Urgent (Xray Chest 1 View- PORTABLE-Urgent .) (03.23.24 @ 13:00) >  FINDINGS:  Right IJ central venous catheter tip in SVC.  Enteric tube coursing beneath the diaphragm without visualization of tip.  Endotracheal tube tip above the jesus.  Left chest wall defibrillator with connected intracardiac leads.  Redemonstrated bilateral reticular opacities.  No pneumothorax. No large pleural effusion.  Cardiomediastinal silhouette size is within normal limits. Cardiac valve   replacement.  No acute osseous abnormality. Median sternotomywires.    IMPRESSION:  Prominent reticular opacities, similar to comparison.  Lines and tubes as above.    < end of copied text >      Jeff Navarro MD; Division of Infectious Disease; Pager: [phone]; nights and weekends: [phone]

## 2024-03-24 NOTE — PROCEDURE NOTE - NSSITEPREP_SKIN_A_CORE
chlorhexidine
chlorhexidine/Adherence to aseptic technique: hand hygiene prior to donning barriers (gown, gloves), don cap and mask, sterile drape over patient
chlorhexidine
chlorhexidine

## 2024-03-24 NOTE — AIRWAY REMOVAL NOTE  ADULT & PEDS - ARTIFICAL AIRWAY REMOVAL COMMENTS
Written order for extubation verified, patient was identified by full name and birth date compared to the identification band. Present during the procedure was ... Gianna Choi ([email or web address])

## 2024-03-24 NOTE — PROCEDURE NOTE - NSPOSTCAREGUIDE_GEN_A_CORE
Instructed patient/caregiver regarding signs and symptoms of infection
Care for catheter as per unit/ICU protocols

## 2024-03-24 NOTE — PROCEDURE NOTE - NSINDICATIONS_GEN_A_CORE
dialysis/CRRT
dialysis/CRRT
airway protection/critical patient/respiratory distress/respiratory failure
critical illness/hemodynamic monitoring/venous access/volume resuscitation
arterial puncture to obtain ABG's/blood sampling/critical patient
critical illness
dialysis/CRRT
monitoring purposes
critical illness/emergency venous access/hemodynamic monitoring/venous access

## 2024-03-24 NOTE — PROGRESS NOTE ADULT - ASSESSMENT
84 y/o M with ICM/HFrEF (now 30%; LVIDd 6.7 cm; prev req inotrope), CAD c/b IWMI (1994) s/p angioplasty, aortic stenosis s/p bio-AVR 2004, VT arrest (2017) s/p ICD, Afib s/p multiple DCCV and ablation x2 (2020 and 2023; on AC), Aflutter s/p WARREN/DCCV (8/23), prior Ao aneurysm s/p Bentall (2021), severe MR prior MVr (2021) with MAC (precludes M-KRISTIE d/t his anatomy; turned down for TMVR trials by [employer]), atrophic kidney with CKD (b/l Cr 2.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 Patient was initiated on bumex gtt with gradual improvement with Cr improving from 3.6 to 3.1. Patient was considered for MV re-op; however, patient was reluctant of the procedure and expressed wanting to follow up in the outpatient setting to schedule. During last admission, patient was also noted to be in flutter, EP was consulted, and patient underwent repeat WARREN/DCCV , on amiodarone. Patient was discharged home 2/28 on higher dose of diuretics, as per HF team.pt returned with worsening SOB ,edema, but was  alert  chest x-ray compactible l with chf  cant r/o infection         Pt ws treated with empiric meropenem ( diflucan per  [facility])   cultures - so far negative      discussed with [facility] alison Chase suggestive of pulmonary edema  no documented infection   Pt now intubated     completed 7 days of antibiotics - started March 12th- on may 19  Meropenem    GI PCR sent due to diarrhea  not bloody positive for both shigella like E-COLI and E-COLI enteragre ,  pt has not eaten any food and only has received tube feeds.  There are many false positive PCRs for these strains and I would not treat. Treatment is generally controversial   was off ab from 3/19 to 3/21 but restarted due to instability    3/21 BC x2 NGTD  3.23  high fever over night  leukocytosis increased, hepatopathy, shock, anion gap metabolic acidosis  ?bowel ischemia    On empiric Cefepime/Vanco - now with Caspofungin  ADD Metronidazole 500 mg IV every 12 hours

## 2024-03-25 LAB
CULTURE RESULTS: SIGNIFICANT CHANGE UP
SPECIMEN SOURCE: SIGNIFICANT CHANGE UP

## 2024-03-26 LAB
CULTURE RESULTS: SIGNIFICANT CHANGE UP
CULTURE RESULTS: SIGNIFICANT CHANGE UP
SPECIMEN SOURCE: SIGNIFICANT CHANGE UP
SPECIMEN SOURCE: SIGNIFICANT CHANGE UP

## 2024-03-29 LAB
CULTURE RESULTS: SIGNIFICANT CHANGE UP
SPECIMEN SOURCE: SIGNIFICANT CHANGE UP

## 2024-04-26 NOTE — DIETITIAN INITIAL EVALUATION ADULT - PROBLEM SELECTOR PLAN 3
- Renal (Dr. Christine Lala) consulted and aware  - K 6.6 (specimen hemolyzed), rpt BMP with K 4.9   - Monitor BMP, Uric acid level per Renal  - Preserve RUE, in anticipation for HD access, per Dr. Lala  - Avoid nephrotoxic agents if possible Need for prophylactic measure

## 2024-06-27 NOTE — PROGRESS NOTE ADULT - ATTENDING SUPERVISION STATEMENT
Outpatient Speech Therapy     [x] [place]  Phone: [phone]  Fax: [phone]      [] [place]  Phone: [phone]  Fax: [phone]    SPEECH THERAPY CANCELLATION / NO SHOW NOTE      Patient Name:  Yves Ibrahim  :  2019   Date:  2024  Cancels to Date: 1  No-shows to Date: 0    For today's appointment patient:  [x]  Cancelled  []  Rescheduled appointment  []  No-show     Reason given by patient:  [x]  Patient ill  []  Conflicting appointment  []  No transportation    []  Conflict with work  []  No reason given  []  Other:     Comments:      Electronically signed by: Danisha Brown M.S., CCC-SLP                  Date: 2024    
Fellow

## 2025-01-10 ENCOUNTER — NON-APPOINTMENT (OUTPATIENT)
Age: 85
End: 2025-01-10

## 2025-01-14 NOTE — PROCEDURE NOTE - NSPROCDETAILS_GEN_ALL_CORE
What Type Of Note Output Would You Prefer (Optional)?: Bullet Format Hpi Title: Evaluation of Skin Lesions How Severe Are Your Spot(S)?: mild Have Your Spot(S) Been Treated In The Past?: has not been treated location identified, draped/prepped, sterile technique used, needle inserted/introduced/positive blood return obtained via catheter/connected to a pressurized flush line/sutured in place/hemostasis with direct pressure, dressing applied/Seldinger technique/all materials/supplies accounted for at end of procedure

## 2025-04-10 NOTE — PRE-ANESTHESIA EVALUATION ADULT - NSANTHSUBSTSD_GEN_ALL_CORE
No - DVT prophylaxis: Heparin subcutaneous 5000 units q8h  - Diet: Carbohydrate-consistent   - Activity: as tolerated  - Disposition: Pending clinical course

## 2025-04-26 NOTE — CONSULT NOTE ADULT - CONSULT REQUESTED DATE/TIME
17-Mar-2024 06:23
05-Mar-2024 12:00
06-Mar-2024
04-Mar-2024 22:21
20-Mar-2024 16:33
PAST MEDICAL HISTORY:  2019 novel coronavirus disease (COVID-19)     Benign hypertension     Calculus of gallbladder without cholecystitis without obstruction     Chronic back pain     Lymphedema     Mesenteric mass     Metastasis to liver     Mild aortic stenosis     Morbid obesity     Neuroendocrine tumor     JOSTIN (obstructive sleep apnea)     Osteoarthritis of knees, bilateral     Type 2 diabetes mellitus

## 2025-05-05 NOTE — PROGRESS NOTE ADULT - ASSESSMENT
Called and left VMM for patient, explaining where to find the virtual care consent, and that it must be signed prior to her upcoming appointment. Left a call back number for any help in locating the document.LVS   83 y/o M with ICM, LVEF 35-40% (LVIDd 6.7 cm), CAD c/b IWMI (1994) s/p angioplasty, aortic stenosis s/p bio-AVR 2004, VT arrest (2017) s/p ICD, Afib s/p multiple DCCV and ablation x2 (2020 and 2023; on AC), prior Ao aneurysm s/p Bentall (2021), severe MR with MAC (precludes KRISTIE), atrophic kidney with CKD (b/l Cr 1.9), admitted for acute on chronic HFrEF & SURESH on CKD. He is being considered for Tendyne clinical trial with Dr. Rico at [facility]. Clinically improved with inotrope-assisted diuresis, now off bumex infusion and weaning milrinone.    Cardiac Studies  8/18/23 [facility]: RA 19, RV 65/4, PA 65/33/47, PCWP 25, PA 52.9%, CO/CI 3.6/1.9, BP 90/61/72 SVR 1178 dsc, PVR 6.1  5/24/23 - pLAD 40%; mRCA 40%; RA 16, RV 76/19, PA 77/34/50; PCWP 25 (v 35); /67/84; CO/CI 3.5/1.86; SVR 1560; PVR 7.1    8/17/23 [facility]: LVEF 36%. G2DD. RV mildly enlarged with reduced function. TENNILLE, BioAVR (MG 12) w/mild intravalvular regurgitation. Severe MR at BP 91/60. Mod-severe TR. LVH was called, but over-measured on review.  4/10/23 [facility]: EF 38%, LVEDD 6.7 cm, severe MR with systolic reversal in pulmonic vein, severe TR, RVSP 49; IVC dilated  3/27/23 WARREN (at ): EF 35-40%, paradoxical septal motion, RV enlargement/dysfunction, bioAVR (mean gradient 10) with mild AI, marked MAC with posterior leaflet immobility, mild prolapse of anterior leaflet with torn chords, severe posterior directed MR w/ systolic reversal in PV, moderate TR; no intracardiac clot